# Patient Record
Sex: FEMALE | Race: WHITE | NOT HISPANIC OR LATINO | Employment: OTHER | URBAN - METROPOLITAN AREA
[De-identification: names, ages, dates, MRNs, and addresses within clinical notes are randomized per-mention and may not be internally consistent; named-entity substitution may affect disease eponyms.]

---

## 2017-09-11 DIAGNOSIS — I25.10 CORONARY ARTERY DISEASE INVOLVING NATIVE CORONARY ARTERY OF NATIVE HEART WITHOUT ANGINA PECTORIS: Primary | ICD-10-CM

## 2017-09-12 ENCOUNTER — DOCUMENTATION ONLY (OUTPATIENT)
Dept: CARDIOLOGY | Facility: CLINIC | Age: 67
End: 2017-09-12

## 2017-09-12 ENCOUNTER — INITIAL CONSULT (OUTPATIENT)
Dept: CARDIOLOGY | Facility: CLINIC | Age: 67
End: 2017-09-12
Payer: MEDICARE

## 2017-09-12 VITALS
HEIGHT: 62 IN | WEIGHT: 151 LBS | SYSTOLIC BLOOD PRESSURE: 105 MMHG | HEART RATE: 72 BPM | OXYGEN SATURATION: 94 % | DIASTOLIC BLOOD PRESSURE: 53 MMHG | BODY MASS INDEX: 27.79 KG/M2

## 2017-09-12 DIAGNOSIS — I35.0 NONRHEUMATIC AORTIC VALVE STENOSIS: Primary | ICD-10-CM

## 2017-09-12 DIAGNOSIS — K21.9 GASTROESOPHAGEAL REFLUX DISEASE WITHOUT ESOPHAGITIS: ICD-10-CM

## 2017-09-12 DIAGNOSIS — J42 CHRONIC BRONCHITIS, UNSPECIFIED CHRONIC BRONCHITIS TYPE: ICD-10-CM

## 2017-09-12 DIAGNOSIS — E78.5 DYSLIPIDEMIA: ICD-10-CM

## 2017-09-12 DIAGNOSIS — I71.40 ABDOMINAL AORTIC ANEURYSM (AAA) WITHOUT RUPTURE: ICD-10-CM

## 2017-09-12 DIAGNOSIS — Z72.0 TOBACCO ABUSE: ICD-10-CM

## 2017-09-12 DIAGNOSIS — I65.23 BILATERAL CAROTID ARTERY STENOSIS: ICD-10-CM

## 2017-09-12 DIAGNOSIS — I10 ESSENTIAL HYPERTENSION: ICD-10-CM

## 2017-09-12 PROCEDURE — 99213 OFFICE O/P EST LOW 20 MIN: CPT | Mod: PBBFAC | Performed by: INTERNAL MEDICINE

## 2017-09-12 PROCEDURE — 1126F AMNT PAIN NOTED NONE PRSNT: CPT | Mod: ,,, | Performed by: INTERNAL MEDICINE

## 2017-09-12 PROCEDURE — 1159F MED LIST DOCD IN RCRD: CPT | Mod: ,,, | Performed by: INTERNAL MEDICINE

## 2017-09-12 PROCEDURE — 99204 OFFICE O/P NEW MOD 45 MIN: CPT | Mod: S$PBB,,, | Performed by: INTERNAL MEDICINE

## 2017-09-12 PROCEDURE — 99999 PR PBB SHADOW E&M-EST. PATIENT-LVL III: CPT | Mod: PBBFAC,,, | Performed by: INTERNAL MEDICINE

## 2017-09-12 RX ORDER — SODIUM CHLORIDE 9 MG/ML
3 INJECTION, SOLUTION INTRAVENOUS CONTINUOUS
Status: CANCELLED | OUTPATIENT
Start: 2017-09-12 | End: 2017-09-12

## 2017-09-12 RX ORDER — DIPHENHYDRAMINE HCL 25 MG
50 CAPSULE ORAL ONCE
Status: CANCELLED | OUTPATIENT
Start: 2017-09-12 | End: 2017-09-12

## 2017-09-12 RX ORDER — DIPHENOXYLATE HYDROCHLORIDE AND ATROPINE SULFATE 2.5; .025 MG/1; MG/1
1 TABLET ORAL 4 TIMES DAILY PRN
Status: ON HOLD | COMMUNITY
End: 2018-04-18 | Stop reason: HOSPADM

## 2017-09-12 RX ORDER — ASPIRIN 81 MG/1
81 TABLET ORAL DAILY
Status: ON HOLD
Start: 2017-09-12 | End: 2018-03-30

## 2017-09-12 NOTE — LETTER
September 12, 2017      George Crawley MD  [address] [place] [address] Bld  [address]  Cardiology Center  Brianda MALDONADO [address]           [facility]  [address]  [facility] [address]  Phone: [phone]          Patient: Kristina Aguirre   MR Number: [id number]   YOB: 1950   Date of Visit: 9/12/2017       Dear Dr. George Crawley:    Thank you for referring Kristina Aguirre to me for evaluation. Attached you will find relevant portions of my assessment and plan of care.    If you have questions, please do not hesitate to call me. I look forward to following Kristina Aguirre along with you.    Sincerely,    John Bautista MD    Enclosure  CC:  No Recipients    If you would like to receive this communication electronically, please contact [email or web address] or [phone] to request more information on [employer] Link access.    For providers and/or their staff who would like to refer a patient to [facility], please contact us through our one-stop-shop provider referral line, [facility], at [phone].    If you feel you have received this communication in error or would no longer like to receive these types of communications, please e-mail [email or web address]

## 2017-09-12 NOTE — PROGRESS NOTES
OUTPATIENT CATHETERIZATION INSTRUCTIONS    You have been scheduled for a procedure in the catheterization lab on Monday, September 18, 2017.     Please report to the Cardiology Waiting Area on the Third floor of the hospital and check in at 6 AM.   You will then be taken to the SSCU (Short Stay Cardiac Unit) and prepared for your procedure. Please be aware that this is not the time of your procedure but the time you are to arrive. The procedures are scheduled on an hourly basis; however, emergency cases take precedence over all other cases.       You may not have anything to eat or drink after midnight the night before your test. You may take your regular morning medications with water. If there are any medications that you should not take you will be instructed to hold them that morning. If you are diabetic and on Metformin (Glucophage) do not take it the day before, the day of, and for 2 days after your procedure.      The procedure will take 1-2 hours to perform. After the procedure, you will return to SSCU on the third floor of the hospital. You will need to lie still (or keep your arm still) for the next 4 to 6 hours to minimize bleeding from the puncture site. Your family may remain in the room with you during this time.       You may be able to be discharged home that same afternoon if there is someone to drive you home and there were no complications. If you have one of the balloon, stent, or device procedures you may spend the night in the hospital. Your doctor will determine, based on your progress, the date and time of your discharge. The results of your procedure will be discussed with you before you are discharged. Any further testing or procedures will be scheduled for you either before you leave or you will be called with these appointments.       If you should have any questions, concerns, or need to change the date of your procedure, please call  SOFIE Callahan @ [phone]    Special  Instructions:  Hold Metformin Sunday, Monday, Tuesday and Wednesday  Drink plenty of water the day before and after the procedure        THE ABOVE INSTRUCTIONS WERE GIVEN TO THE PATIENT VERBALLY AND THEY VERBALIZED UNDERSTANDING.  THEY DO NOT REQUIRE ANY SPECIAL NEEDS AND DO NOT HAVE ANY LEARNING BARRIERS.          Directions for Reporting to Cardiology Waiting Area in the Hospital  If you park in the Parking Garage:  Take elevators to the1st floor of the parking garage.  Continue past the gift shop, coffee shop, and piano.  Take a right and go to the gold elevators. (Elevator B)  Take the elevator to the 3rd floor.  Follow the arrow on the sign on the wall that says Cath Lab Registration/EP Lab Registration.  Follow the long hallway all the way around until you come to a big open area.  This is the registration area.  Check in at Reception Desk.    OR    If family is dropping you off:  Have them drop you off at the front of the Hospital under the green overhang.  Enter through the doors and take a right.  Take the E elevators to the 3rd floor Cardiology Waiting Area.  Check in at the Reception Desk in the waiting room.

## 2017-09-13 NOTE — PROGRESS NOTES
"Subjective:    Patient ID:  Kristina Aguirre is a 67 y.o. female who presents for evaluation of Chest Pain    Referring cardiologist: Dr. Hector DOWLING  Mrs. Aguirre is a 66 y/o woman with a h/o HTN, dyslipidemia, PAD, DM2, and aortic valve stenosis. She reports a h/o daily chest pain for at least the last 6 months.  The patient also reports progressive exertional dyspnea, decreasing energy, and increasing fatigue.  Her chest pain is exacerbated with physical exertion.  She denies LE edema, but reports 2 pillow orthopnea and daily PND.  She denies syncope, but reports progressive "dizziness" that is relieved with bending over.  She denies dizziness when standing from a seated position. She is an active smoker.  Of note the patient reports a h/o watery diarrhea for which she is undergoing GI evauation.  She also reports having lsot 10 lbs over the last 2 months unintentionally. Finally she reports transient loss of vision in the right eye several months ago.  THis resolved spontaneously, but she reports poor visual acutiy in both eyes.    Past Medical History:   Diagnosis Date    Abdominal aortic aneurysm (AAA) without rupture 9/12/2017    Asthma     Bilateral carotid artery stenosis 9/12/2017    Cancer     lung    Diabetes mellitus     Heart valve disorder     Hyperlipidemia     Hypertension      Past Surgical History:   Procedure Laterality Date    HAND SURGERY Right     KNEE ARTHROSCOPY      LUNG LOBECTOMY Right     middle lobe    mediastinoscopy      SPINAL FUSION      TONSILLECTOMY      WRIST SURGERY Right      Current Outpatient Prescriptions on File Prior to Visit   Medication Sig Dispense Refill    amlodipine (NORVASC) 10 MG tablet Take 10 mg by mouth once daily.  5    cetirizine (ZYRTEC) 10 MG tablet Take 10 mg by mouth once daily.  6    clopidogrel (PLAVIX) 75 mg tablet Take 75 mg by mouth once daily.  3    cyclobenzaprine (FLEXERIL) 5 MG tablet TAKE 1 TABLET BY MOUTH TWICE DAILY AS NEEDED " "FOR SPASM  0    fenofibrate 160 MG Tab Take 160 mg by mouth once daily.  5    hydrocodone-acetaminophen 10-325mg (NORCO)  mg Tab Take 1 tablet by mouth 3 (three) times daily as needed.  0    JANUVIA 100 mg Tab Take 100 mg by mouth once daily.  3    losartan-hydrochlorothiazide 100-25 mg (HYZAAR) 100-25 mg per tablet Take 1 tablet by mouth once daily.  5    meclizine (ANTIVERT) 50 MG tablet Take 25 mg by mouth 3 (three) times daily as needed.      metformin (GLUCOPHAGE) 1000 MG tablet Take 1,000 mg by mouth 2 (two) times daily.  5    NEXIUM 40 mg capsule Take 40 mg by mouth once daily.  5    omega-3 acid ethyl esters (LOVAZA) 1 gram capsule       pravastatin (PRAVACHOL) 40 MG tablet Take 40 mg by mouth every evening.  5    PROAIR HFA 90 mcg/actuation inhaler Inhale 2 puffs into the lungs every 6 (six) hours as needed.  6    SURE COMFORT PEN NEEDLE 31 gauge x 3/16" Ndle USE EVERY DAY  1    SYMBICORT 160-4.5 mcg/actuation HFAA Inhale 2 puffs into the lungs 2 (two) times daily.  4    TRUEPLUS LANCETS 30 gauge Misc USE DAILY when testing  10    TRUETEST TEST STRIPS Strp   5    VICTOZA 3-JOSE 0.6 mg/0.1 mL (18 mg/3 mL) PnIj   1    lisinopril (PRINIVIL,ZESTRIL) 5 MG tablet Take 5 mg by mouth once daily.  0    nitrofurantoin, macrocrystal-monohydrate, (MACROBID) 100 MG capsule Take 100 mg by mouth 2 (two) times daily.  0    tizanidine (ZANAFLEX) 4 MG tablet       [DISCONTINUED] doxycycline (VIBRA-TABS) 100 MG tablet Take 100 mg by mouth 2 (two) times daily.  0    [DISCONTINUED] doxycycline (VIBRAMYCIN) 100 MG Cap Take 100 mg by mouth 2 (two) times daily.  0    [DISCONTINUED] oxycodone-acetaminophen (PERCOCET)  mg per tablet Take 1 tablet by mouth every 6 (six) hours as needed.  0    [DISCONTINUED] oxycodone-acetaminophen (PERCOCET) 5-325 mg per tablet   0    [DISCONTINUED] promethazine (PHENERGAN) 12.5 MG Tab Take 12.5 mg by mouth every 6 (six) hours as needed.  0    [DISCONTINUED] " SYMBICORT 80-4.5 mcg/actuation HFAA Inhale 2 puffs into the lungs 2 (two) times daily.  6    [DISCONTINUED] tobramycin-dexamethasone 0.3-0.1% (TOBRADEX) 0.3-0.1 % DrpS instill ONE DROP in left EYE 4 TIMES DAILY  0     No current facility-administered medications on file prior to visit.      Review of patient's allergies indicates:  No Known Allergies  Social History   Substance Use Topics    Smoking status: Former Smoker     Packs/day: 1.50    Smokeless tobacco: Never Used    Alcohol use No     Family History   Problem Relation Age of Onset    Throat cancer Mother     Heart attack Father        Review of Systems   Constitution: Positive for malaise/fatigue and weight loss. Negative for diaphoresis, fever, weakness and weight gain.   HENT: Negative for congestion, hearing loss, nosebleeds and sore throat.    Eyes: Positive for blurred vision. Negative for visual disturbance.   Cardiovascular: Positive for chest pain, dyspnea on exertion, orthopnea and paroxysmal nocturnal dyspnea. Negative for claudication, irregular heartbeat, leg swelling, near-syncope, palpitations and syncope.   Respiratory: Negative for cough, hemoptysis, shortness of breath and wheezing.    Endocrine: Negative for polyuria.   Hematologic/Lymphatic: Negative for bleeding problem. Does not bruise/bleed easily.   Skin: Negative for poor wound healing and rash.   Musculoskeletal: Negative for myalgias.   Gastrointestinal: Positive for diarrhea. Negative for abdominal pain, change in bowel habit, constipation, dysphagia, heartburn, hematemesis, melena, nausea and vomiting.   Genitourinary: Negative for bladder incontinence and dysuria.   Neurological: Negative for focal weakness and headaches.   Psychiatric/Behavioral: Negative for depression.   Allergic/Immunologic: Negative for hives and persistent infections.        Objective:  Vitals:    09/12/17 0920 09/12/17 0933   BP: 120/62 (!) 105/53   BP Location: Right arm Left arm   Patient  "Position: Sitting Sitting   BP Method: Medium (Automatic) Medium (Automatic)   Pulse: 72    SpO2: (!) 94%    Weight: 68.5 kg (151 lb 0.2 oz)    Height: 5' 1.5" (1.562 m)          Physical Exam   Constitutional: She appears well-developed and well-nourished.   HENT:   Head: Normocephalic and atraumatic.   Eyes: EOM are normal. Pupils are equal, round, and reactive to light. Right eye exhibits no discharge. No scleral icterus.   Neck: No JVD present. No thyromegaly present.   Cardiovascular: Normal rate and regular rhythm.  PMI is not displaced.  Exam reveals no gallop.    Murmur heard.   Systolic murmur is present with a grade of 6/6  Late peaking with soft and delayed carotid upstroke  Pulses:       Carotid pulses are 2+ on the right side, and 2+ on the left side.       Radial pulses are 2+ on the right side, and 2+ on the left side.        Femoral pulses are 2+ on the right side, and 2+ on the left side.       Dorsalis pedis pulses are 2+ on the right side, and 2+ on the left side.        Posterior tibial pulses are 2+ on the right side, and 2+ on the left side.   Pulmonary/Chest: Effort normal and breath sounds normal.   Abdominal: Soft. Bowel sounds are normal. There is no hepatosplenomegaly. There is no tenderness.   Lymphadenopathy:     She has no cervical adenopathy.   Neurological: She is alert. Gait normal.   Skin: Skin is warm, dry and intact. No rash noted. She is not diaphoretic. No erythema.   Psychiatric: She has a normal mood and affect.         Assessment:       1. Nonrheumatic aortic valve stenosis    2. Bilateral carotid artery stenosis    3. Dyslipidemia    4. Essential hypertension    5. Tobacco abuse    6. Abdominal aortic aneurysm (AAA) without rupture    7. Chronic bronchitis, unspecified chronic bronchitis type    8. Gastroesophageal reflux disease without esophagitis         Plan:       1. Chest pain syndrome.  The differential diagnosis for the patient's chest pain syndrome includes svere " aortic valve stenosis as well as obstructive CAD. Will therefore proceed with diagnostic LHC/RHC/ aortic valve study  All of the patient's questions were answered.  -restart EC ASA 81mg poq day  -continue Plavix 75mg po qday  -right CFA and CFV access    2) CAD.patient has multiple CAD risk factors; will proceed as above    3) Aortic valve stenosis. Patient has severe aortic valve stneosis by exam and by 8/24/17 TTE which demonstrated a mean aoritc valve gradient of 47.6mmHg and a LORA of 0.53cm2; will proceed as above;  Will discuss [facility] valve team referral with Dr. Young    4) Possible left subclavian artery stenosis. L UE BP is significantly lower than right UE BP in clinic today; will perform angiogram of left subclavian artery if patient is to be referred for CABG plus AVR    5) HTN. Blood pressure adequately controlled in clinic today; continue current medications    6) Dyslipidemia. patient is on pravachol 40mg po qday as well as fenofibrate and Lovaza; rec hert healthy diet    7) AAA. follow-up with Dr. Azul    8) H/o carotid stenosis. caroti duplex report datd 8/24/17 report R ICA PSV of 113.9cm/s and L ICA PSV of 143.5 cm/s; patient's transient right eye vision loss may have been amaurosis fugax; agree with statin, restart aspirin as above continue Plavix    9) DM2. rec tight glycemic control; hold metformin 24 hours prior to cath and 48 hours post cath    10) Tobacco abuse. Smoking cessation counseling given    11) H/o COPD. Rec PFT's prior to SAVR evaluation    12) H/o diarrhea. Patient reports she has f/u with GI    All of the patient's questions were answered.

## 2017-09-18 ENCOUNTER — SURGERY (OUTPATIENT)
Age: 67
End: 2017-09-18

## 2017-09-18 ENCOUNTER — HOSPITAL ENCOUNTER (OUTPATIENT)
Facility: HOSPITAL | Age: 67
Discharge: HOME | End: 2017-09-18
Attending: INTERNAL MEDICINE | Admitting: INTERNAL MEDICINE
Payer: MEDICARE

## 2017-09-18 VITALS
SYSTOLIC BLOOD PRESSURE: 133 MMHG | HEART RATE: 69 BPM | WEIGHT: 151 LBS | DIASTOLIC BLOOD PRESSURE: 62 MMHG | BODY MASS INDEX: 27.79 KG/M2 | OXYGEN SATURATION: 93 % | RESPIRATION RATE: 18 BRPM | TEMPERATURE: 97 F | HEIGHT: 62 IN

## 2017-09-18 DIAGNOSIS — I35.0 NONRHEUMATIC AORTIC VALVE STENOSIS: ICD-10-CM

## 2017-09-18 DIAGNOSIS — I65.23 BILATERAL CAROTID ARTERY STENOSIS: Primary | ICD-10-CM

## 2017-09-18 DIAGNOSIS — I35.0 NONRHEUMATIC AORTIC VALVE STENOSIS: Primary | ICD-10-CM

## 2017-09-18 DIAGNOSIS — I35.0 AORTIC VALVE STENOSIS, UNSPECIFIED ETIOLOGY: Primary | ICD-10-CM

## 2017-09-18 DIAGNOSIS — I65.23 OCCLUSION AND STENOSIS OF BILATERAL CAROTID ARTERIES: ICD-10-CM

## 2017-09-18 LAB
ABO + RH BLD: NORMAL
ALBUMIN SERPL BCP-MCNC: 3.9 G/DL
ANION GAP SERPL CALC-SCNC: 10 MMOL/L
BASOPHILS # BLD AUTO: 0.09 K/UL
BASOPHILS NFR BLD: 0.9 %
BLD GP AB SCN CELLS X3 SERPL QL: NORMAL
BUN SERPL-MCNC: 17 MG/DL
CALCIUM SERPL-MCNC: 9.4 MG/DL
CHLORIDE SERPL-SCNC: 104 MMOL/L
CO2 SERPL-SCNC: 26 MMOL/L
CREAT SERPL-MCNC: 0.8 MG/DL
DIFFERENTIAL METHOD: ABNORMAL
EOSINOPHIL # BLD AUTO: 0.8 K/UL
EOSINOPHIL NFR BLD: 7.9 %
ERYTHROCYTE [DISTWIDTH] IN BLOOD BY AUTOMATED COUNT: 14.9 %
EST. GFR  (AFRICAN AMERICAN): >60 ML/MIN/1.73 M^2
EST. GFR  (NON AFRICAN AMERICAN): >60 ML/MIN/1.73 M^2
GLUCOSE SERPL-MCNC: 118 MG/DL
HCT VFR BLD AUTO: 35.4 %
HGB BLD-MCNC: 12.2 G/DL
LYMPHOCYTES # BLD AUTO: 2 K/UL
LYMPHOCYTES NFR BLD: 20.5 %
MCH RBC QN AUTO: 28.2 PG
MCHC RBC AUTO-ENTMCNC: 34.5 G/DL
MCV RBC AUTO: 82 FL
MONOCYTES # BLD AUTO: 0.5 K/UL
MONOCYTES NFR BLD: 5.6 %
NEUTROPHILS # BLD AUTO: 6.2 K/UL
NEUTROPHILS NFR BLD: 64.7 %
PLATELET # BLD AUTO: 255 K/UL
PMV BLD AUTO: 9.6 FL
POCT GLUCOSE: 128 MG/DL (ref 70–110)
POCT GLUCOSE: 140 MG/DL (ref 70–110)
POTASSIUM SERPL-SCNC: 3.2 MMOL/L
RBC # BLD AUTO: 4.33 M/UL
SODIUM SERPL-SCNC: 140 MMOL/L
WBC # BLD AUTO: 9.64 K/UL

## 2017-09-18 PROCEDURE — 25000003 PHARM REV CODE 250: Performed by: INTERNAL MEDICINE

## 2017-09-18 PROCEDURE — 25000003 PHARM REV CODE 250

## 2017-09-18 PROCEDURE — 86900 BLOOD TYPING SEROLOGIC ABO: CPT

## 2017-09-18 PROCEDURE — 86850 RBC ANTIBODY SCREEN: CPT

## 2017-09-18 PROCEDURE — 93010 ELECTROCARDIOGRAM REPORT: CPT | Mod: ,,, | Performed by: INTERNAL MEDICINE

## 2017-09-18 PROCEDURE — 93460 R&L HRT ART/VENTRICLE ANGIO: CPT | Mod: 26,GC,, | Performed by: INTERNAL MEDICINE

## 2017-09-18 PROCEDURE — 99152 MOD SED SAME PHYS/QHP 5/>YRS: CPT | Mod: GC,,, | Performed by: INTERNAL MEDICINE

## 2017-09-18 PROCEDURE — 82040 ASSAY OF SERUM ALBUMIN: CPT

## 2017-09-18 PROCEDURE — 82962 GLUCOSE BLOOD TEST: CPT | Mod: 91

## 2017-09-18 PROCEDURE — 93005 ELECTROCARDIOGRAM TRACING: CPT | Mod: 59

## 2017-09-18 PROCEDURE — C1751 CATH, INF, PER/CENT/MIDLINE: HCPCS

## 2017-09-18 PROCEDURE — 63600175 PHARM REV CODE 636 W HCPCS

## 2017-09-18 PROCEDURE — 80048 BASIC METABOLIC PNL TOTAL CA: CPT

## 2017-09-18 PROCEDURE — 85025 COMPLETE CBC W/AUTO DIFF WBC: CPT

## 2017-09-18 PROCEDURE — 99152 MOD SED SAME PHYS/QHP 5/>YRS: CPT

## 2017-09-18 PROCEDURE — 36415 COLL VENOUS BLD VENIPUNCTURE: CPT

## 2017-09-18 RX ORDER — ACETAMINOPHEN 325 MG/1
650 TABLET ORAL EVERY 6 HOURS PRN
Status: DISCONTINUED | OUTPATIENT
Start: 2017-09-18 | End: 2017-09-18 | Stop reason: HOSPADM

## 2017-09-18 RX ORDER — SODIUM CHLORIDE 9 MG/ML
3 INJECTION, SOLUTION INTRAVENOUS CONTINUOUS
Status: ACTIVE | OUTPATIENT
Start: 2017-09-18 | End: 2017-09-18

## 2017-09-18 RX ORDER — DIPHENHYDRAMINE HCL 50 MG
50 CAPSULE ORAL ONCE
Status: COMPLETED | OUTPATIENT
Start: 2017-09-18 | End: 2017-09-18

## 2017-09-18 RX ORDER — SODIUM CHLORIDE 9 MG/ML
1.5 INJECTION, SOLUTION INTRAVENOUS CONTINUOUS
Status: ACTIVE | OUTPATIENT
Start: 2017-09-18 | End: 2017-09-18

## 2017-09-18 RX ADMIN — SODIUM CHLORIDE 3 ML/KG/HR: 0.9 INJECTION, SOLUTION INTRAVENOUS at 06:09

## 2017-09-18 RX ADMIN — DIPHENHYDRAMINE HYDROCHLORIDE 50 MG: 50 CAPSULE ORAL at 06:09

## 2017-09-18 RX ADMIN — ACETAMINOPHEN 650 MG: 325 TABLET ORAL at 12:09

## 2017-09-18 NOTE — CONSULTS
"Consult Note  Cardiothoracic Surgery    Consults  SUBJECTIVE:     History of Present Illness:  Patient is a 67 y.o. female with a history of dyslipidemia, PAD, DM2, and aortic valve stenosis. She reports frequent chest pain for at least the last 6 months.  The patient also reports progressive exertional dyspnea, decreasing energy, and increasing fatigue.  She was unable to walk from the  Her chest pain is exacerbated with physical exertion.  She denies LE edema, but reports 2 pillow orthopnea and daily PND.  She denies syncope, but reports progressive "dizziness."   She is an active smoker. She has a history of lung cancer and is s/p RML 3/2016.  Of note the patient reports a h/o watery diarrhea for which she is undergoing GI evauation.  She also reports having lost 10 lbs over the last 2 months unintentionally. CT surgery is asked to assess surgical candidacy for SAVR.    Scheduled Meds:   Infusions/Drips:   sodium chloride 0.9%       PRN Meds:    Review of patient's allergies indicates:  No Known Allergies    Past Medical History:   Diagnosis Date    Abdominal aortic aneurysm (AAA) without rupture 9/12/2017    Anticoagulant long-term use     Arthritis     Asthma     Bilateral carotid artery stenosis 9/12/2017    Cancer     lung    COPD (chronic obstructive pulmonary disease)     Diabetes mellitus     Heart valve disorder     Hyperlipidemia     Hypertension      Past Surgical History:   Procedure Laterality Date    HAND SURGERY Right     KNEE ARTHROSCOPY      LUNG LOBECTOMY Right     middle lobe    mediastinoscopy      SPINAL FUSION      TONSILLECTOMY      WRIST SURGERY Right      Family History   Problem Relation Age of Onset    Throat cancer Mother     Heart attack Father      Social History   Substance Use Topics    Smoking status: Former Smoker     Packs/day: 1.50    Smokeless tobacco: Never Used    Alcohol use No        Review of Systems:  Review of Systems   Constitutional: Negative " for fever.  + for fatigue and 10 pound weight loss over the past 2 months.  HENT: Negative for congestion and sore throat.    Eyes: Negative for blurred vision, double vision and discharge.   Respiratory: see HPI  Cardiovascular:  See HPI  Gastrointestinal: Negative for abdominal pain, constipation, having diarrhea and is in GI work up  Genitourinary: Negative for dysuria, frequency and urgency.   Musculoskeletal: Negative for back pain and myalgias.   Skin: Negative for itching and rash.   Neurological: Negative for dizziness, speech change, seizures, weakness and headaches.   Endo/Heme/Allergies: Does not bruise/bleed easily.   Psychiatric/Behavioral: Negative for depression. The patient is not nervous/anxious.        OBJECTIVE:     Vital Signs (Most Recent)  Temp: 97 °F (36.1 °C) (09/18/17 0930)  Pulse: 64 (09/18/17 1115)  Resp: 17 (09/18/17 1045)  BP: 127/60 (09/18/17 1115)  SpO2: (!) 93 % (09/18/17 0930)    Admission Weight: 68.5 kg (151 lb) (09/18/17 0639)   Most Recent Weight: 68.5 kg (151 lb) (09/18/17 0639)    Vital Signs Range (Last 24H):  Temp:  [97 °F (36.1 °C)]   Pulse:  [64-70]   Resp:  [16-18]   BP: (118-176)/(53-74)   SpO2:  [93 %]     Physical Exam:    Constitutional: She appears well-developed and well-nourished.   HENT:   Head: Normocephalic and atraumatic.   Eyes: EOM are normal. Pupils are equal, round, and reactive to light.   Neck: No JVD present. No thyromegaly present.   Cardiovascular: Normal rate and regular rhythm.  PMI is not displaced.  Exam reveals no gallop.    Murmur heard.   Systolic murmur   Pulmonary/Chest: Effort normal and breath sounds normal.   Abdominal: Soft. Bowel sounds are normal. There is no hepatosplenomegaly. There is no tenderness.   Lymphadenopathy:     She has no cervical adenopathy.   Neurological: She is alert.  Skin: Skin is warm, dry and intact. No rash noted. She is not diaphoretic. No erythema.   Psychiatric: She has a normal mood and affect.      Laboratory:  CBC:   Recent Labs  Lab 09/18/17  0616   WBC 9.64   RBC 4.33   HGB 12.2   HCT 35.4*      MCV 82   MCH 28.2   MCHC 34.5     BMP:   Recent Labs  Lab 09/18/17  0616   *      K 3.2*      CO2 26   BUN 17   CREATININE 0.8   CALCIUM 9.4       Diagnostic Results:    LHC:  - Left Main Coronary Artery:             The ostial LM has luminal irregularities. There is KRYS 3 flow.     - Left Anterior Descending Artery:             The proximal LAD has luminal irregularities. There is KRYS 3 flow. Provides collateral flow to the PDA (good).             The mid LAD has a 40% stenosis. There is KRYS 3 flow. Provides collateral flow to the PDA (good). The remaining portion of the vessel has luminal irregularities (10).             The distal LAD has luminal irregularities. There is KRYS 3 flow. Provides collateral flow to the PDA (good).     - D1:             The D1 has luminal irregularities. There is KRYS 3 flow.     - Left Circumflex Artery:             The proximal LCX has a 10% stenosis. There is KRYS 3 flow. The remaining portion of the vessel has luminal irregularities (10).             The distal LCX has luminal irregularities. There is KRYS 3 flow.     - Right Coronary Artery:             The proximal RCA has chronic total occlusion. There is KRYS 0 flow. There is collateral flow from the RCA (bridging).             The mid RCA has luminal irregularities. There is collateral flow from the RCA (bridging).             The distal RCA has luminal irregularities. There is collateral flow from the RCA (bridging).     - Posterior Lateral Branch:             The PLB has luminal irregularities. There is collateral flow from the LCX (good).             The distal PLB has luminal irregularities. There is collateral flow from the LCX (good).    Per report:  Patient has severe aortic valve stneosis by exam and by 8/24/17 TTE which demonstrated a mean aoritc valve gradient of 47.6mmHg and a LORA  "of 0.53cm2  ASSESSMENT/PLAN:     67 year old female with a past history of dyslipidemia, PAD, DM2, aortic valve stenosis, smoking history and continues to smoke "[place]ian cigarettes" and s/p RML 3/2016 for lung cancer.  She presents for TAVR work up.  She needs PFT's and frailty before STS score can be accurately assessed.  She is likely high risk for surgical AVR but should reassessed once all information available.      CTS Attending Note:    Will be glad to see in clinic once her lungs have been evaluated.   "

## 2017-09-18 NOTE — H&P (VIEW-ONLY)
"Subjective:    Patient ID:  Kristina Aguirre is a 67 y.o. female who presents for evaluation of Chest Pain    Referring cardiologist: Dr. Hector DOWLING  Mrs. Aguirre is a 68 y/o woman with a h/o HTN, dyslipidemia, PAD, DM2, and aortic valve stenosis. She reports a h/o daily chest pain for at least the last 6 months.  The patient also reports progressive exertional dyspnea, decreasing energy, and increasing fatigue.  Her chest pain is exacerbated with physical exertion.  She denies LE edema, but reports 2 pillow orthopnea and daily PND.  She denies syncope, but reports progressive "dizziness" that is relieved with bending over.  She denies dizziness when standing from a seated position. She is an active smoker.  Of note the patient reports a h/o watery diarrhea for which she is undergoing GI evauation.  She also reports having lsot 10 lbs over the last 2 months unintentionally. Finally she reports transient loss of vision in the right eye several months ago.  THis resolved spontaneously, but she reports poor visual acutiy in both eyes.    Past Medical History:   Diagnosis Date    Abdominal aortic aneurysm (AAA) without rupture 9/12/2017    Asthma     Bilateral carotid artery stenosis 9/12/2017    Cancer     lung    Diabetes mellitus     Heart valve disorder     Hyperlipidemia     Hypertension      Past Surgical History:   Procedure Laterality Date    HAND SURGERY Right     KNEE ARTHROSCOPY      LUNG LOBECTOMY Right     middle lobe    mediastinoscopy      SPINAL FUSION      TONSILLECTOMY      WRIST SURGERY Right      Current Outpatient Prescriptions on File Prior to Visit   Medication Sig Dispense Refill    amlodipine (NORVASC) 10 MG tablet Take 10 mg by mouth once daily.  5    cetirizine (ZYRTEC) 10 MG tablet Take 10 mg by mouth once daily.  6    clopidogrel (PLAVIX) 75 mg tablet Take 75 mg by mouth once daily.  3    cyclobenzaprine (FLEXERIL) 5 MG tablet TAKE 1 TABLET BY MOUTH TWICE DAILY AS NEEDED " "FOR SPASM  0    fenofibrate 160 MG Tab Take 160 mg by mouth once daily.  5    hydrocodone-acetaminophen 10-325mg (NORCO)  mg Tab Take 1 tablet by mouth 3 (three) times daily as needed.  0    JANUVIA 100 mg Tab Take 100 mg by mouth once daily.  3    losartan-hydrochlorothiazide 100-25 mg (HYZAAR) 100-25 mg per tablet Take 1 tablet by mouth once daily.  5    meclizine (ANTIVERT) 50 MG tablet Take 25 mg by mouth 3 (three) times daily as needed.      metformin (GLUCOPHAGE) 1000 MG tablet Take 1,000 mg by mouth 2 (two) times daily.  5    NEXIUM 40 mg capsule Take 40 mg by mouth once daily.  5    omega-3 acid ethyl esters (LOVAZA) 1 gram capsule       pravastatin (PRAVACHOL) 40 MG tablet Take 40 mg by mouth every evening.  5    PROAIR HFA 90 mcg/actuation inhaler Inhale 2 puffs into the lungs every 6 (six) hours as needed.  6    SURE COMFORT PEN NEEDLE 31 gauge x 3/16" Ndle USE EVERY DAY  1    SYMBICORT 160-4.5 mcg/actuation HFAA Inhale 2 puffs into the lungs 2 (two) times daily.  4    TRUEPLUS LANCETS 30 gauge Misc USE DAILY when testing  10    TRUETEST TEST STRIPS Strp   5    VICTOZA 3-JOSE 0.6 mg/0.1 mL (18 mg/3 mL) PnIj   1    lisinopril (PRINIVIL,ZESTRIL) 5 MG tablet Take 5 mg by mouth once daily.  0    nitrofurantoin, macrocrystal-monohydrate, (MACROBID) 100 MG capsule Take 100 mg by mouth 2 (two) times daily.  0    tizanidine (ZANAFLEX) 4 MG tablet       [DISCONTINUED] doxycycline (VIBRA-TABS) 100 MG tablet Take 100 mg by mouth 2 (two) times daily.  0    [DISCONTINUED] doxycycline (VIBRAMYCIN) 100 MG Cap Take 100 mg by mouth 2 (two) times daily.  0    [DISCONTINUED] oxycodone-acetaminophen (PERCOCET)  mg per tablet Take 1 tablet by mouth every 6 (six) hours as needed.  0    [DISCONTINUED] oxycodone-acetaminophen (PERCOCET) 5-325 mg per tablet   0    [DISCONTINUED] promethazine (PHENERGAN) 12.5 MG Tab Take 12.5 mg by mouth every 6 (six) hours as needed.  0    [DISCONTINUED] " SYMBICORT 80-4.5 mcg/actuation HFAA Inhale 2 puffs into the lungs 2 (two) times daily.  6    [DISCONTINUED] tobramycin-dexamethasone 0.3-0.1% (TOBRADEX) 0.3-0.1 % DrpS instill ONE DROP in left EYE 4 TIMES DAILY  0     No current facility-administered medications on file prior to visit.      Review of patient's allergies indicates:  No Known Allergies  Social History   Substance Use Topics    Smoking status: Former Smoker     Packs/day: 1.50    Smokeless tobacco: Never Used    Alcohol use No     Family History   Problem Relation Age of Onset    Throat cancer Mother     Heart attack Father        Review of Systems   Constitution: Positive for malaise/fatigue and weight loss. Negative for diaphoresis, fever, weakness and weight gain.   HENT: Negative for congestion, hearing loss, nosebleeds and sore throat.    Eyes: Positive for blurred vision. Negative for visual disturbance.   Cardiovascular: Positive for chest pain, dyspnea on exertion, orthopnea and paroxysmal nocturnal dyspnea. Negative for claudication, irregular heartbeat, leg swelling, near-syncope, palpitations and syncope.   Respiratory: Negative for cough, hemoptysis, shortness of breath and wheezing.    Endocrine: Negative for polyuria.   Hematologic/Lymphatic: Negative for bleeding problem. Does not bruise/bleed easily.   Skin: Negative for poor wound healing and rash.   Musculoskeletal: Negative for myalgias.   Gastrointestinal: Positive for diarrhea. Negative for abdominal pain, change in bowel habit, constipation, dysphagia, heartburn, hematemesis, melena, nausea and vomiting.   Genitourinary: Negative for bladder incontinence and dysuria.   Neurological: Negative for focal weakness and headaches.   Psychiatric/Behavioral: Negative for depression.   Allergic/Immunologic: Negative for hives and persistent infections.        Objective:  Vitals:    09/12/17 0920 09/12/17 0933   BP: 120/62 (!) 105/53   BP Location: Right arm Left arm   Patient  "Position: Sitting Sitting   BP Method: Medium (Automatic) Medium (Automatic)   Pulse: 72    SpO2: (!) 94%    Weight: 68.5 kg (151 lb 0.2 oz)    Height: 5' 1.5" (1.562 m)          Physical Exam   Constitutional: She appears well-developed and well-nourished.   HENT:   Head: Normocephalic and atraumatic.   Eyes: EOM are normal. Pupils are equal, round, and reactive to light. Right eye exhibits no discharge. No scleral icterus.   Neck: No JVD present. No thyromegaly present.   Cardiovascular: Normal rate and regular rhythm.  PMI is not displaced.  Exam reveals no gallop.    Murmur heard.   Systolic murmur is present with a grade of 6/6  Late peaking with soft and delayed carotid upstroke  Pulses:       Carotid pulses are 2+ on the right side, and 2+ on the left side.       Radial pulses are 2+ on the right side, and 2+ on the left side.        Femoral pulses are 2+ on the right side, and 2+ on the left side.       Dorsalis pedis pulses are 2+ on the right side, and 2+ on the left side.        Posterior tibial pulses are 2+ on the right side, and 2+ on the left side.   Pulmonary/Chest: Effort normal and breath sounds normal.   Abdominal: Soft. Bowel sounds are normal. There is no hepatosplenomegaly. There is no tenderness.   Lymphadenopathy:     She has no cervical adenopathy.   Neurological: She is alert. Gait normal.   Skin: Skin is warm, dry and intact. No rash noted. She is not diaphoretic. No erythema.   Psychiatric: She has a normal mood and affect.         Assessment:       1. Nonrheumatic aortic valve stenosis    2. Bilateral carotid artery stenosis    3. Dyslipidemia    4. Essential hypertension    5. Tobacco abuse    6. Abdominal aortic aneurysm (AAA) without rupture    7. Chronic bronchitis, unspecified chronic bronchitis type    8. Gastroesophageal reflux disease without esophagitis         Plan:       1. Chest pain syndrome.  The differential diagnosis for the patient's chest pain syndrome includes svere " aortic valve stenosis as well as obstructive CAD. Will therefore proceed with diagnostic LHC/RHC/ aortic valve study  All of the patient's questions were answered.  -restart EC ASA 81mg poq day  -continue Plavix 75mg po qday  -right CFA and CFV access    2) CAD.patient has multiple CAD risk factors; will proceed as above    3) Aortic valve stenosis. Patient has severe aortic valve stneosis by exam and by 8/24/17 TTE which demonstrated a mean aoritc valve gradient of 47.6mmHg and a LORA of 0.53cm2; will proceed as above;  Will discuss [facility] [place] [facility] valve team referral with Dr. Young    4) Possible left subclavian artery stenosis. L UE BP is significantly lower than right UE BP in clinic today; will perform angiogram of left subclavian artery if patient is to be referred for CABG plus AVR    5) HTN. Blood pressure adequately controlled in clinic today; continue current medications    6) Dyslipidemia. patient is on pravachol 40mg po qday as well as fenofibrate and Lovaza; rec hert healthy diet    7) AAA. follow-up with Dr. Azul    8) H/o carotid stenosis. caroti duplex report datd 8/24/17 report R ICA PSV of 113.9cm/s and L ICA PSV of 143.5 cm/s; patient's transient right eye vision loss may have been amaurosis fugax; agree with statin, restart aspirin as above continue Plavix    9) DM2. rec tight glycemic control; hold metformin 24 hours prior to cath and 48 hours post cath    10) Tobacco abuse. Smoking cessation counseling given    11) H/o COPD. Rec PFT's prior to SAV[facility] evaluation    12) H/o diarrhea. Patient reports she has f/u with GI    All of the patient's questions were answered.

## 2017-09-18 NOTE — PLAN OF CARE
Patient discharged per MD orders. Instructions given on medications, wound care, activity, signs of infection, when to call MD, and follow up appointments. Pt verbalized understanding. Patient AAOx3, VSS, no c/o pain or discomfort at this time. Right groin with gauze dressing c/d/i. No active bleeding. No hematoma noted. Telemetry and PIV removed. Patient was discharged home via wheel chair accompanied by her friend.                   Spontaneous, unlabored and symmetrical

## 2017-09-18 NOTE — PROGRESS NOTES
Patient c/o back pain from top to bottom. Dr Aguayo notified. Orders received for prn tylenol. Warm pack applied. Will monitor.

## 2017-09-18 NOTE — PLAN OF CARE
Problem: Patient Care Overview  Goal: Plan of Care Review  Outcome: Ongoing (interventions implemented as appropriate)  Report received from SOFIE Flores. Patient s/p c. R groin dressing saturated after patient had coughing episode. MD at bedside holding pressure. Post procedure protocol discussed with patient. Verbalized understanding. IV fluids infusing. Call light in reach. Will monitor.

## 2017-09-18 NOTE — DISCHARGE INSTRUCTIONS
Appointment with Dr. Young on Thursday, 9/21 at 2pm for 48 hour Holter Monitor.     1. Do not strain or lift anything greater than 5 lb for 1 week.   2. Do not drive or operate any dangerous machinery for 24 hours.   3. Keep the dressing on, clean, and dry for 24 hours.   4. After 24 hours, the dressing may be removed and a shower is allowed.   5. Clean the area with mild soap and water and then leave it opened to air. Do not apply any lotions or ointments to the site.   6. Once the skin has healed, bathing in a tub or swimming is allowed.   7. Inspect the groin site daily and report to the physician any  Bleeding and/or swelling at the site that   cannot be controlled with manual pressure for 10 minutes, unusual pain at the   access site or affected extremity, unusual bleeding and/orswelling at the access site, or signs or   symptoms of infection such as redness, pain, or fever.   Call 911 if you have:   Bleeding from the puncture site that you cannot stop by doing the following:   Relax and lie down right away. Keep your leg flat and apply firm pressure to the site using your fingers and a gauze pad. Keep the pressure on for 20 minutes. Continue this until the bleeding stops. This may take awhile. When bleeding stops, cover the site with a sterile bandage and keep your leg still as much as possible.

## 2017-09-18 NOTE — INTERVAL H&P NOTE
The patient has been examined and the H&P has been reviewed:    I concur with the findings and no changes have occurred since H&P was written.    Anesthesia/Surgery risks, benefits and alternative options discussed and understood by patient/family.          Active Hospital Problems    Diagnosis  POA    Nonrheumatic aortic valve stenosis [I35.0]  Yes      Resolved Hospital Problems    Diagnosis Date Resolved POA   No resolved problems to display.

## 2017-09-18 NOTE — PROGRESS NOTES
Patient ambulated in [place] with RN. R groin dressing cdi, soft. Some bruising noted but no complaints from patient. Will monitor.

## 2017-09-18 NOTE — DISCHARGE SUMMARY
Discharge Summary  Interventional Cardiology      Admit Date: 9/18/2017    Discharge Date:  9/18/2017    Attending Physician: John Bautista MD    Discharge Physician:John Bautista MD      Principal Diagnoses: Severe Aortic Stenosis   Indication for Admission: LHC/RHC/ aortic valve study  Discharged Condition: Good    Hospital Course:   Patient presented for outpatient LHC/RHC and AV study which went without complication. Severe aortic valve stenosis with mean aortic valve gradient = 54mmHg and calculated LORA=0.79cm2. Short  of proximal RCA with briding collaterals and left to right collaterals from LAD to PDA and LCX to PLB.    40% mid LAD stenosis.    Outpatient Plan:  - Follow up with Dr. Young for further managmenet of AS.  - There were no medication changes    Diet: Cardiac diet    Activity: Ad felicity    Disposition: Home or Self Care    Discharge Medications:      Medication List      CONTINUE taking these medications    amlodipine 10 MG tablet  Commonly known as:  NORVASC     aspirin 81 MG EC tablet  Commonly known as:  ECOTRIN  Take 1 tablet (81 mg total) by mouth once daily.     cetirizine 10 MG tablet  Commonly known as:  ZYRTEC     clopidogrel 75 mg tablet  Commonly known as:  PLAVIX     cyclobenzaprine 5 MG tablet  Commonly known as:  FLEXERIL     diphenoxylate-atropine 2.5-0.025 mg 2.5-0.025 mg per tablet  Commonly known as:  LOMOTIL     fenofibrate 160 MG Tab     hydrocodone-acetaminophen 10-325mg  mg Tab  Commonly known as:  NORCO     JANUVIA 100 MG Tab  Generic drug:  SITagliptin     lisinopril 5 MG tablet  Commonly known as:  PRINIVIL,ZESTRIL     losartan-hydrochlorothiazide 100-25 mg 100-25 mg per tablet  Commonly known as:  HYZAAR     meclizine 50 MG tablet  Commonly known as:  ANTIVERT     metformin 1000 MG tablet  Commonly known as:  GLUCOPHAGE     NEXIUM 40 MG capsule  Generic drug:  esomeprazole     nitrofurantoin (macrocrystal-monohydrate) 100 MG capsule  Commonly known  "as:  MACROBID     omega-3 acid ethyl esters 1 gram capsule  Commonly known as:  LOVAZA     pravastatin 40 MG tablet  Commonly known as:  PRAVACHOL     PROAIR HFA 90 mcg/actuation inhaler  Generic drug:  albuterol     SURE COMFORT PEN NEEDLE 31 gauge x 3/16" Ndle  Generic drug:  pen needle, diabetic     SYMBICORT 160-4.5 mcg/actuation Hfaa  Generic drug:  budesonide-formoterol 160-4.5 mcg     tizanidine 4 MG tablet  Commonly known as:  ZANAFLEX     TRUEPLUS LANCETS 30 gauge Misc  Generic drug:  lancets     TRUETEST TEST STRIPS Strp  Generic drug:  blood sugar diagnostic     VICTOZA 3-JOSE 0.6 mg/0.1 mL (18 mg/3 mL) Pnij  Generic drug:  liraglutide 0.6 mg/0.1 mL (18 mg/3 mL) subq PNIJ                "

## 2017-09-18 NOTE — CONSULTS
[facility]  Interventional Cardiology  Valve Center  Consult Note    Patient Name: Kristina Aguirre  MRN: [id number]    Referring: Dr. Dominick Bautista/ Dr George Young    Inpatient consult to Interventional Cardiology  Consult performed by: RADHA HERRING  Consult ordered by: PRAVEENA QUIROZ  Reason for consult: TAVR eval        Subjective:     HPI:   Ms. Aguirre is referred by Dr. Bautista (Primary Cardiologist, Dr. Young) for evaluation of severe AS. 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 She c/o worsening PLAZA and chest discomfort for at least 6 months. 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 She also c/o intermittent episodes of dizziness/lightheadedness and near syncope. The last episode was 2 weeks ago.     TAVR work-up summary:  · Echo (8/24/17): LORA= 0.53 cm2, Mean Gradient = 47.6 mmHg, EF= 50%  · LHC (9/18/17): LM has LI, LAD has LI-- provides collateral flow to PDA, LAD has LI, RCA has  with bridging collaterals  · STS= 2.6%  · Frailty: 1/4  · EKG : No block  · Needs TAVR CTA  · Needs CTS consult      Past Medical History:   Diagnosis Date    Abdominal aortic aneurysm (AAA) without rupture 9/12/2017    Anticoagulant long-term use     Arthritis     Asthma     Bilateral carotid artery stenosis 9/12/2017    Cancer     lung    COPD (chronic obstructive pulmonary disease)     Diabetes mellitus     Heart valve disorder     Hyperlipidemia     Hypertension        Past Surgical History:   Procedure Laterality Date    HAND SURGERY Right     KNEE ARTHROSCOPY      LUNG LOBECTOMY Right     middle lobe    mediastinoscopy      SPINAL FUSION      TONSILLECTOMY      WRIST SURGERY Right        Review of  patient's allergies indicates:  No Known Allergies    No current facility-administered medications on file prior to encounter.      Current Outpatient Prescriptions on File Prior to Encounter   Medication Sig    amlodipine (NORVASC) 10 MG tablet Take 10 mg by mouth once daily.    aspirin (ECOTRIN) 81 MG EC tablet Take 1 tablet (81 mg total) by mouth once daily.    cetirizine (ZYRTEC) 10 MG tablet Take 10 mg by mouth once daily.    clopidogrel (PLAVIX) 75 mg tablet Take 75 mg by mouth once daily.    diphenoxylate-atropine 2.5-0.025 mg (LOMOTIL) 2.5-0.025 mg per tablet Take 1 tablet by mouth 4 (four) times daily as needed for Diarrhea.    fenofibrate 160 MG Tab Take 160 mg by mouth once daily.    hydrocodone-acetaminophen 10-325mg (NORCO)  mg Tab Take 1 tablet by mouth 3 (three) times daily as needed.    JANUVIA 100 mg Tab Take 100 mg by mouth once daily.    lisinopril (PRINIVIL,ZESTRIL) 5 MG tablet Take 5 mg by mouth once daily.    losartan-hydrochlorothiazide 100-25 mg (HYZAAR) 100-25 mg per tablet Take 1 tablet by mouth once daily.    meclizine (ANTIVERT) 50 MG tablet Take 25 mg by mouth 3 (three) times daily as needed.    NEXIUM 40 mg capsule Take 40 mg by mouth once daily.    omega-3 acid ethyl esters (LOVAZA) 1 gram capsule     pravastatin (PRAVACHOL) 40 MG tablet Take 40 mg by mouth every evening.    PROAIR HFA 90 mcg/actuation inhaler Inhale 2 puffs into the lungs every 6 (six) hours as needed.    SYMBICORT 160-4.5 mcg/actuation HFAA Inhale 2 puffs into the lungs 2 (two) times daily.    tizanidine (ZANAFLEX) 4 MG tablet     VICTOZA 3-JOSE 0.6 mg/0.1 mL (18 mg/3 mL) PnIj     cyclobenzaprine (FLEXERIL) 5 MG tablet TAKE 1 TABLET BY MOUTH TWICE DAILY AS NEEDED FOR SPASM    metformin (GLUCOPHAGE) 1000 MG tablet Take 1,000 mg by mouth 2 (two) times daily.    nitrofurantoin, macrocrystal-monohydrate, (MACROBID) 100 MG capsule Take 100 mg by mouth 2 (two) times daily.    SURE COMFORT PEN  "NEEDLE 31 gauge x 3/16" Ndle USE EVERY DAY    TRUEPLUS LANCETS 30 gauge Misc USE DAILY when testing    TRUETEST TEST STRIPS Strp      Family History     Problem Relation (Age of Onset)    Heart attack Father    Throat cancer Mother        Social History Main Topics    Smoking status: Former Smoker     Packs/day: 1.50    Smokeless tobacco: Never Used    Alcohol use No    Drug use: No    Sexual activity: Not Currently     Review of Systems   Constitution: Negative for chills, diaphoresis, fever, weakness, weight gain and weight loss.   HENT: Negative for sore throat.    Eyes: Negative for blurred vision, vision loss in left eye, vision loss in right eye and visual disturbance.   Cardiovascular: Positive for chest pain and dyspnea on exertion. Negative for claudication, leg swelling, near-syncope, orthopnea, palpitations, paroxysmal nocturnal dyspnea and syncope.   Respiratory: Positive for shortness of breath. Negative for cough, hemoptysis, sputum production and wheezing.    Endocrine: Negative for cold intolerance and heat intolerance.   Hematologic/Lymphatic: Negative for adenopathy. Does not bruise/bleed easily.   Skin: Negative for rash.   Musculoskeletal: Negative for falls, muscle weakness and myalgias.   Gastrointestinal: Negative for abdominal pain, change in bowel habit, constipation, diarrhea, melena and nausea.   Genitourinary: Negative for bladder incontinence.   Neurological: Negative for dizziness, focal weakness, headaches, light-headedness and numbness.   Psychiatric/Behavioral: Negative for altered mental status.     Objective:     Vital Signs (Most Recent):  Temp: 97 °F (36.1 °C) (09/18/17 0930)  Pulse: 65 (09/18/17 1407)  Resp: 17 (09/18/17 1045)  BP: (!) 149/66 (09/18/17 1407)  SpO2: (!) 93 % (09/18/17 0930) Vital Signs (24h Range):  Temp:  [97 °F (36.1 °C)] 97 °F (36.1 °C)  Pulse:  [64-70] 65  Resp:  [16-18] 17  SpO2:  [93 %] 93 %  BP: (118-176)/(53-74) 149/66     Weight: 68.5 kg (151 " lb)  Body mass index is 28.07 kg/m².    SpO2: (!) 93 %  O2 Device (Oxygen Therapy): room air    No intake or output data in the 24 hours ending 09/18/17 1417    Lines/Drains/Airways     Peripheral Intravenous Line                 Peripheral IV - Single Lumen 09/18/17 0708 Left Antecubital less than 1 day         Peripheral IV - Single Lumen 09/18/17 0713 Left Forearm less than 1 day                Physical Exam   Constitutional: She is oriented to person, place, and time. She appears well-developed and well-nourished. No distress.   HENT:   Head: Normocephalic and atraumatic.   Mouth/Throat: Oropharynx is clear and moist.   Eyes: Conjunctivae and EOM are normal. Pupils are equal, round, and reactive to light. No scleral icterus.   Neck: Neck supple. No JVD present. No tracheal deviation present.   Cardiovascular: Normal rate and regular rhythm.  Exam reveals no gallop and no friction rub.    Murmur heard.  Pulmonary/Chest: Effort normal and breath sounds normal. No respiratory distress. She has no wheezes. She has no rales. She exhibits no tenderness.   Abdominal: Soft. Bowel sounds are normal. She exhibits no distension. There is no hepatosplenomegaly. There is no tenderness.   Musculoskeletal: She exhibits no edema or tenderness.   Neurological: She is alert and oriented to person, place, and time.   Skin: Skin is warm and dry. No rash noted. No erythema.   Psychiatric: She has a normal mood and affect. Her behavior is normal.       Significant Labs:   BMP:   Recent Labs  Lab 09/18/17  0616   *      K 3.2*      CO2 26   BUN 17   CREATININE 0.8   CALCIUM 9.4   , CBC   Recent Labs  Lab 09/18/17  0616   WBC 9.64   HGB 12.2   HCT 35.4*       and INR No results for input(s): INR, PROTIME in the last 48 hours.      Assessment and Plan:      Diagnosis    Nonrheumatic aortic valve stenosis - severe, symptomatic (NYHA Class II sx)  TAVR work-up summary:  · Echo (8/24/17): LORA= 0.53 cm2, Mean  "Gradient = 47.6 mmHg, EF= 50%  · [facility] (9/18/17): LM has LI, LAD has LI-- provides collateral flow to PDA, LAD has LI, RCA has  with bridging collaterals  · STS= 2.6%  · Frailty: 1/4  · EKG : No block  · Needs TAVR CTA  · Needs CTS consult      CAD -  of RCA with bridging collaterals    Lung CA - s/p RML lobectomy, path shows keratinizing squamous cell CA with positive PET. Follows with Dr. Nicole Howard. Records requested.     Diabetes mellitus - on PO meds    Dyslipidemia - stable, on statin     Essential hypertension - controlled on Norvasc, Avalide, and lisinopril    COPD - unknown severity, needs PFTs. On inhalers    Tobacco abuse - continued tobacco use. She is under the impression that the [employer] cigarettes she has been smoking are "nicotine free" and "non-addictive".      Will order 48 Hour Holter at discharge and have her see EP when she sees us in clinic. She will have the Holter done through Dr. Young (Thursday, 9/21 at 2 pm); results will be faxed to us.     Thank you for your consult. She will be scheduled to see us and EP in clinic on 9/29. TAVR CTA on 10/2.     Nataly Olivares PA-C  Cardiology   [facility]      "

## 2017-09-25 DIAGNOSIS — R55 SYNCOPE, UNSPECIFIED SYNCOPE TYPE: Primary | ICD-10-CM

## 2017-09-29 ENCOUNTER — HOSPITAL ENCOUNTER (OUTPATIENT)
Dept: PULMONOLOGY | Facility: CLINIC | Age: 67
Discharge: HOME | End: 2017-09-29
Payer: MEDICARE

## 2017-09-29 ENCOUNTER — OFFICE VISIT (OUTPATIENT)
Dept: CARDIOLOGY | Facility: CLINIC | Age: 67
End: 2017-09-29
Payer: MEDICARE

## 2017-09-29 VITALS
BODY MASS INDEX: 27.99 KG/M2 | HEIGHT: 62 IN | SYSTOLIC BLOOD PRESSURE: 101 MMHG | WEIGHT: 152.13 LBS | OXYGEN SATURATION: 95 % | HEART RATE: 66 BPM | DIASTOLIC BLOOD PRESSURE: 50 MMHG

## 2017-09-29 DIAGNOSIS — E78.5 DYSLIPIDEMIA: ICD-10-CM

## 2017-09-29 DIAGNOSIS — Z72.0 TOBACCO ABUSE: ICD-10-CM

## 2017-09-29 DIAGNOSIS — I73.9 PAD (PERIPHERAL ARTERY DISEASE): ICD-10-CM

## 2017-09-29 DIAGNOSIS — C34.2 MALIGNANT NEOPLASM OF MIDDLE LOBE OF RIGHT LUNG: ICD-10-CM

## 2017-09-29 DIAGNOSIS — J43.2 CENTRILOBULAR EMPHYSEMA: ICD-10-CM

## 2017-09-29 DIAGNOSIS — I35.0 NONRHEUMATIC AORTIC VALVE STENOSIS: ICD-10-CM

## 2017-09-29 DIAGNOSIS — I25.118 CORONARY ARTERY DISEASE OF NATIVE ARTERY OF NATIVE HEART WITH STABLE ANGINA PECTORIS: ICD-10-CM

## 2017-09-29 DIAGNOSIS — I10 ESSENTIAL HYPERTENSION: ICD-10-CM

## 2017-09-29 DIAGNOSIS — E11.51 TYPE 2 DIABETES MELLITUS WITH DIABETIC PERIPHERAL ANGIOPATHY WITHOUT GANGRENE, WITHOUT LONG-TERM CURRENT USE OF INSULIN: ICD-10-CM

## 2017-09-29 DIAGNOSIS — R55 PRE-SYNCOPE: ICD-10-CM

## 2017-09-29 PROBLEM — E11.9 DIABETES MELLITUS, TYPE 2: Status: ACTIVE | Noted: 2017-09-29

## 2017-09-29 PROBLEM — C34.91 MALIGNANT NEOPLASM OF RIGHT LUNG: Status: ACTIVE | Noted: 2017-09-29

## 2017-09-29 LAB
PRE FEV1 FVC: 71
PRE FEV1: 1.8
PRE FVC: 2.52
PREDICTED FEV1 FVC: 80
PREDICTED FEV1: 2
PREDICTED FVC: 2.54

## 2017-09-29 PROCEDURE — 1157F ADVNC CARE PLAN IN RCRD: CPT | Mod: ,,, | Performed by: INTERNAL MEDICINE

## 2017-09-29 PROCEDURE — 99999 PR PBB SHADOW E&M-EST. PATIENT-LVL V: CPT | Mod: PBBFAC,,, | Performed by: INTERNAL MEDICINE

## 2017-09-29 PROCEDURE — 94729 DIFFUSING CAPACITY: CPT | Mod: PBBFAC | Performed by: INTERNAL MEDICINE

## 2017-09-29 PROCEDURE — 1159F MED LIST DOCD IN RCRD: CPT | Mod: ,,, | Performed by: INTERNAL MEDICINE

## 2017-09-29 PROCEDURE — 94729 DIFFUSING CAPACITY: CPT | Mod: 26,S$PBB,, | Performed by: INTERNAL MEDICINE

## 2017-09-29 PROCEDURE — 99214 OFFICE O/P EST MOD 30 MIN: CPT | Mod: S$PBB,,, | Performed by: INTERNAL MEDICINE

## 2017-09-29 PROCEDURE — 99215 OFFICE O/P EST HI 40 MIN: CPT | Mod: PBBFAC | Performed by: INTERNAL MEDICINE

## 2017-09-29 PROCEDURE — 94010 BREATHING CAPACITY TEST: CPT | Mod: 26,S$PBB,, | Performed by: INTERNAL MEDICINE

## 2017-09-29 PROCEDURE — 94010 BREATHING CAPACITY TEST: CPT | Mod: PBBFAC | Performed by: INTERNAL MEDICINE

## 2017-09-29 PROCEDURE — 1126F AMNT PAIN NOTED NONE PRSNT: CPT | Mod: ,,, | Performed by: INTERNAL MEDICINE

## 2017-09-29 RX ORDER — AZITHROMYCIN 250 MG/1
TABLET, FILM COATED ORAL
COMMUNITY
Start: 2017-09-25 | End: 2017-09-29

## 2017-09-29 RX ORDER — SULFAMETHOXAZOLE AND TRIMETHOPRIM 800; 160 MG/1; MG/1
1 TABLET ORAL 2 TIMES DAILY
COMMUNITY
Start: 2017-09-25 | End: 2017-11-16

## 2017-09-29 NOTE — LETTER
September 29, 2017      John Bautista MD  [address]  [facility] [address]           [facility]  [address]  [facility] [address]  Phone: [phone]          Patient: Kristina Aguirre   MR Number: [id number]   YOB: 1950   Date of Visit: 9/29/2017       Dear Dr. John Bautista:    Thank you for referring Kristina Aguirre to me for evaluation. Attached you will find relevant portions of my assessment and plan of care.    If you have questions, please do not hesitate to call me. I look forward to following Kristina Aguirre along with you.    Sincerely,    Luis Napier MD    Enclosure  CC:  No Recipients    If you would like to receive this communication electronically, please contact [email or web address] or [phone] to request more information on [employer] access.    For providers and/or their staff who would like to refer a patient to [facility], please contact us through our one-stop-shop provider referral line, [facility], at [phone].    If you feel you have received this communication in error or would no longer like to receive these types of communications, please e-mail [email or web address]

## 2017-09-29 NOTE — PROGRESS NOTES
INTERVENTIONAL CARDIOLOGY CLINIC  HEART VALVE CENTER    REFERRING PHYSICIAN: George Young / John Bautista    CHIEF COMPLIANT:  Dyspnea    HISTORY OF PRESENT ILLNESS  Kristina Aguirre is a 67 y.o. female referred by Dr. Young and Dr Bautista for evaluation of severe aortic stenosis (NYHA class III symptoms).    Ms. Aguirre is referred by Dr. Bautista (Primary Cardiologist, Dr. Young) for evaluation of severe AS. 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 She c/o worsening PLAZA and chest discomfort for at least 6 months. 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 She also c/o intermittent episodes of dizziness/lightheadedness and near syncope. The last episode was 2 weeks ago.     She underwent ambulatory Holter monitoring that is being read by Dr Young's office, however preliminary read shows no significant bradyarrhythmias.  She is scheduled to see Dr Cabezas next Monday regarding pre-syncope.     PAST MEDICAL HISTORY  Past Medical History:   Diagnosis Date    Abdominal aortic aneurysm (AAA) without rupture 9/12/2017    Anticoagulant long-term use     Arthritis     Asthma     Bilateral carotid artery stenosis 9/12/2017    Cancer     lung    COPD (chronic obstructive pulmonary disease)     Coronary artery disease of native artery with stable angina pectoris 9/29/2017    Diabetes mellitus     Heart valve disorder     Hyperlipidemia     Hypertension         PAST SURGICAL HISTORY  Past Surgical History:   Procedure Laterality Date    HAND SURGERY Right     KNEE ARTHROSCOPY      LUNG LOBECTOMY Right     middle lobe    mediastinoscopy      SPINAL FUSION      TONSILLECTOMY      WRIST SURGERY Right   "      MEDICATIONS  Current Outpatient Prescriptions on File Prior to Visit   Medication Sig Dispense Refill    amlodipine (NORVASC) 10 MG tablet Take 10 mg by mouth once daily.  5    aspirin (ECOTRIN) 81 MG EC tablet Take 1 tablet (81 mg total) by mouth once daily.      cetirizine (ZYRTEC) 10 MG tablet Take 10 mg by mouth once daily.  6    clopidogrel (PLAVIX) 75 mg tablet Take 75 mg by mouth once daily.  3    diphenoxylate-atropine 2.5-0.025 mg (LOMOTIL) 2.5-0.025 mg per tablet Take 1 tablet by mouth 4 (four) times daily as needed for Diarrhea.      fenofibrate 160 MG Tab Take 160 mg by mouth once daily.  5    hydrocodone-acetaminophen 10-325mg (NORCO)  mg Tab Take 1 tablet by mouth 3 (three) times daily as needed.  0    JANUVIA 100 mg Tab Take 100 mg by mouth once daily.  3    lisinopril (PRINIVIL,ZESTRIL) 5 MG tablet Take 5 mg by mouth once daily.  0    losartan-hydrochlorothiazide 100-25 mg (HYZAAR) 100-25 mg per tablet Take 1 tablet by mouth once daily.  5    metformin (GLUCOPHAGE) 1000 MG tablet Take 1,000 mg by mouth 2 (two) times daily.  5    NEXIUM 40 mg capsule Take 40 mg by mouth once daily.  5    pravastatin (PRAVACHOL) 40 MG tablet Take 40 mg by mouth every evening.  5    PROAIR HFA 90 mcg/actuation inhaler Inhale 2 puffs into the lungs every 6 (six) hours as needed.  6    SYMBICORT 160-4.5 mcg/actuation HFAA Inhale 2 puffs into the lungs 2 (two) times daily.  4    VICTOZA 3-JOSE 0.6 mg/0.1 mL (18 mg/3 mL) PnIj   1    meclizine (ANTIVERT) 50 MG tablet Take 25 mg by mouth 3 (three) times daily as needed.      nitrofurantoin, macrocrystal-monohydrate, (MACROBID) 100 MG capsule Take 100 mg by mouth 2 (two) times daily.  0    SURE COMFORT PEN NEEDLE 31 gauge x 3/16" Ndle USE EVERY DAY  1    TRUEPLUS LANCETS 30 gauge Misc USE DAILY when testing  10    TRUETEST TEST STRIPS Strp   5    [DISCONTINUED] cyclobenzaprine (FLEXERIL) 5 MG tablet TAKE 1 TABLET BY MOUTH TWICE DAILY AS NEEDED " FOR SPASM  0    [DISCONTINUED] omega-3 acid ethyl esters (LOVAZA) 1 gram capsule       [DISCONTINUED] tizanidine (ZANAFLEX) 4 MG tablet        No current facility-administered medications on file prior to visit.         SOCIAL HISTORY  TOBACCO: Current everyday smoker, willing to quit.  ETOH: Denies  ILLEGAL DRUGS: Deneis      HPI  Review of Systems   Constitution: Negative for fever and weakness.   HENT: Negative for congestion and hoarse voice.    Eyes: Negative for blurred vision and double vision.   Cardiovascular: Positive for dyspnea on exertion and near-syncope. Negative for chest pain, claudication, cyanosis, irregular heartbeat, leg swelling, orthopnea, palpitations and paroxysmal nocturnal dyspnea.   Respiratory: Positive for shortness of breath. Negative for cough and hemoptysis.    Endocrine: Negative for cold intolerance and heat intolerance.   Hematologic/Lymphatic: Negative for bleeding problem. Does not bruise/bleed easily.   Skin: Negative for dry skin and nail changes.   Musculoskeletal: Positive for back pain. Negative for falls.   Gastrointestinal: Negative for abdominal pain and anorexia.   Neurological: Negative for brief paralysis and dizziness.           Objective:    Physical Exam   Constitutional: She is oriented to person, place, and time. She appears well-developed and well-nourished.   Eyes: Pupils are equal, round, and reactive to light.   Neck: No JVD present. No thyromegaly present.   Cardiovascular: Normal rate, regular rhythm and intact distal pulses.    Murmur heard.   Harsh midsystolic murmur is present with a grade of 4/6  at the upper right sternal border radiating to the neck  Pulses:       Carotid pulses are 2+ on the right side, and 2+ on the left side.       Radial pulses are 2+ on the right side, and 2+ on the left side.        Femoral pulses are 2+ on the right side, and 2+ on the left side.       Dorsalis pedis pulses are 2+ on the right side, and 2+ on the left side.         Posterior tibial pulses are 2+ on the right side, and 2+ on the left side.   Pulmonary/Chest: No respiratory distress. She has no wheezes. She exhibits no tenderness.   Abdominal: She exhibits no distension. There is no tenderness. There is no rebound.   Musculoskeletal: She exhibits no edema or tenderness.   Neurological: She is alert and oriented to person, place, and time.   Skin: Skin is warm and dry.   Psychiatric: She has a normal mood and affect.           Assessment:        Nonrheumatic aortic valve stenosis  NYHA Class III symptoms.     TAVR work-up summary:  ? Echo (8/24/17): LORA= 0.53 cm2, Mean Gradient = 47.6 mmHg, EF= 50%  ? LHC (9/18/17): LM has LI, LAD has LI-- provides collateral flow to PDA, LAD has LI, RCA has  with bridging collaterals  ? STS= 2.6%  ? Frailty: 1/4  ? EKG : No block. Holter monitor pending read per Dr Young but preliminary report only shows some short runs of SVT not correlated with symptoms.  ? Needs TAVR CTA (Scheduled 10.2.17)  ? CTS high risk per Nathan due to COPD, prior lobectomy. Awaiting PFTs  ? PFTs scheduled for today    Essential hypertension  Controlled today    Malignant neoplasm of right lung  s/p RML lobectomy, path showed keratinizing squamous cell CA with. Contacted oncologist who reported disease on remission, last PET scan negative.    Tobacco abuse  Smoking cessation advise given. Patient will call the [facility] smoking cessation program.    Dyslipidemia  On pravachol 40    Diabetes mellitus, type 2  On metformin    Coronary artery disease of native artery with stable angina pectoris  Patient has moderate mid LAD stenosis and  of the RCA. This is a short  with favorable anatomy. The vessel fills via left to right collaterals. I spoke with Dr Bautista to attempt RCA revascularization, as this may improve her symptoms while she waits for TAVR. He will bring her back to discuss  PCI. A balloon aortic valvuloplasty can be considered if unstable  for PCI.     Pre-syncope  Patient has episodes of exertional lightheadedness. This can be explained by her severe AS, RCA disease or both. She had Holter done and will see EP next week.     PAD (peripheral artery disease)  Femoral angiogram during last heart cath showed significant iliofemoral disease. Will await CT Partner to evalaute aortoiliac atherosclerosis.      Plan:       Will send Back to Dr Bautista to discuss RCA  PCI  Will review CTA, PFTs and EP evalaution when completed next week and finalize TAVR plan. TAVR will be scheduled if she's a candidate in 6-8 weeks          Luis Richmond MD  Interventional Cardiology  Structural/Valvular heart disease  [phone]

## 2017-09-29 NOTE — ASSESSMENT & PLAN NOTE
Patient has episodes of exertional lightheadedness. This can be explained by her severe AS, RCA disease or both. She had Holter done and will see EP next week.

## 2017-09-29 NOTE — ASSESSMENT & PLAN NOTE
Femoral angiogram during last heart cath showed significant iliofemoral disease. Will await CT Partner to evalaute aortoiliac atherosclerosis.

## 2017-09-29 NOTE — ASSESSMENT & PLAN NOTE
Patient has moderate mid LAD stenosis and  of the RCA. This is a short  with favorable anatomy. The vessel fills via left to right collaterals. I spoke with Dr Bautista to attempt RCA revascularization, as this may improve her symptoms while she waits for TAVR. He will bring her back to discuss  PCI. A balloon aortic valvuloplasty can be considered if unstable for PCI.

## 2017-09-29 NOTE — ASSESSMENT & PLAN NOTE
NYHA Class III symptoms.     TAVR work-up summary:  ? Echo (8/24/17): LORA= 0.53 cm2, Mean Gradient = 47.6 mmHg, EF= 50%  ? LHC (9/18/17): LM has LI, LAD has LI-- provides collateral flow to PDA, LAD has LI, RCA has  with bridging collaterals  ? STS= 2.6%  ? Frailty: 1/4  ? EKG : No block. Holter monitor pending read per Dr Young but preliminary report only shows some short runs of SVT not correlated with symptoms.  ? Needs TAVR CTA (Scheduled 10.2.17)  ? CTS high risk per Nathan due to COPD, prior lobectomy. Awaiting PFTs  ? PFTs scheduled for today

## 2017-09-29 NOTE — ASSESSMENT & PLAN NOTE
s/p RML lobectomy, path showed keratinizing squamous cell CA with. Contacted oncologist who reported disease on remission, last PET scan negative.

## 2017-10-02 ENCOUNTER — HOSPITAL ENCOUNTER (OUTPATIENT)
Dept: RADIOLOGY | Facility: HOSPITAL | Age: 67
Discharge: HOME | End: 2017-10-02
Payer: MEDICARE

## 2017-10-02 ENCOUNTER — INITIAL CONSULT (OUTPATIENT)
Dept: ELECTROPHYSIOLOGY | Facility: CLINIC | Age: 67
End: 2017-10-02
Payer: MEDICARE

## 2017-10-02 ENCOUNTER — HOSPITAL ENCOUNTER (OUTPATIENT)
Dept: CARDIOLOGY | Facility: CLINIC | Age: 67
Discharge: HOME | End: 2017-10-02
Payer: MEDICARE

## 2017-10-02 VITALS
HEIGHT: 61 IN | SYSTOLIC BLOOD PRESSURE: 140 MMHG | WEIGHT: 149 LBS | BODY MASS INDEX: 28.13 KG/M2 | DIASTOLIC BLOOD PRESSURE: 60 MMHG | HEART RATE: 68 BPM

## 2017-10-02 DIAGNOSIS — R55 POSTURAL DIZZINESS WITH PRESYNCOPE: ICD-10-CM

## 2017-10-02 DIAGNOSIS — E78.5 DYSLIPIDEMIA: Primary | ICD-10-CM

## 2017-10-02 DIAGNOSIS — I35.0 NONRHEUMATIC AORTIC VALVE STENOSIS: ICD-10-CM

## 2017-10-02 DIAGNOSIS — R55 SYNCOPE, UNSPECIFIED SYNCOPE TYPE: ICD-10-CM

## 2017-10-02 DIAGNOSIS — R42 POSTURAL DIZZINESS WITH PRESYNCOPE: ICD-10-CM

## 2017-10-02 DIAGNOSIS — I10 ESSENTIAL HYPERTENSION: ICD-10-CM

## 2017-10-02 PROCEDURE — 99999 PR PBB SHADOW E&M-EST. PATIENT-LVL III: CPT | Mod: PBBFAC,,, | Performed by: INTERNAL MEDICINE

## 2017-10-02 PROCEDURE — 1157F ADVNC CARE PLAN IN RCRD: CPT | Mod: ,,, | Performed by: INTERNAL MEDICINE

## 2017-10-02 PROCEDURE — 71275 CT ANGIOGRAPHY CHEST: CPT | Mod: TC

## 2017-10-02 PROCEDURE — 1126F AMNT PAIN NOTED NONE PRSNT: CPT | Mod: ,,, | Performed by: INTERNAL MEDICINE

## 2017-10-02 PROCEDURE — 1159F MED LIST DOCD IN RCRD: CPT | Mod: ,,, | Performed by: INTERNAL MEDICINE

## 2017-10-02 PROCEDURE — 74174 CTA ABD&PLVS W/CONTRAST: CPT | Mod: 26,GC,, | Performed by: INTERNAL MEDICINE

## 2017-10-02 PROCEDURE — 71275 CT ANGIOGRAPHY CHEST: CPT | Mod: 26,GC,, | Performed by: INTERNAL MEDICINE

## 2017-10-02 PROCEDURE — 25500020 PHARM REV CODE 255: Performed by: PHYSICIAN ASSISTANT

## 2017-10-02 PROCEDURE — 93005 ELECTROCARDIOGRAM TRACING: CPT | Mod: PBBFAC | Performed by: INTERNAL MEDICINE

## 2017-10-02 PROCEDURE — 99213 OFFICE O/P EST LOW 20 MIN: CPT | Mod: PBBFAC,25 | Performed by: INTERNAL MEDICINE

## 2017-10-02 PROCEDURE — 93010 ELECTROCARDIOGRAM REPORT: CPT | Mod: S$PBB,,, | Performed by: INTERNAL MEDICINE

## 2017-10-02 PROCEDURE — 74174 CTA ABD&PLVS W/CONTRAST: CPT | Mod: TC

## 2017-10-02 PROCEDURE — 99215 OFFICE O/P EST HI 40 MIN: CPT | Mod: S$PBB,,, | Performed by: INTERNAL MEDICINE

## 2017-10-02 RX ORDER — LOSARTAN POTASSIUM 50 MG/1
50 TABLET ORAL NIGHTLY
Qty: 90 TABLET | Refills: 3 | Status: ON HOLD | OUTPATIENT
Start: 2017-10-02 | End: 2018-03-30 | Stop reason: HOSPADM

## 2017-10-02 RX ORDER — CHLORTHALIDONE 25 MG/1
25 TABLET ORAL EVERY MORNING
Qty: 90 TABLET | Refills: 3 | Status: SHIPPED | OUTPATIENT
Start: 2017-10-02 | End: 2018-04-20 | Stop reason: SDUPTHER

## 2017-10-02 RX ADMIN — IOHEXOL 100 ML: 350 INJECTION, SOLUTION INTRAVENOUS at 02:10

## 2017-10-02 NOTE — PROGRESS NOTES
Subjective:    Patient ID:  Kristina Aguirre is a 67 y.o. female who presents for evaluation of LH    HPI     67 y.o. F  severe AS, pending TAVR  CAD, including  of RCA  AAA  DM on PO meds  HTN    Referred for eval of episodic LH, which usually occur during episodes of being in extreme heat (e.g., working at hot [employer] stand), but also has happened while just walking.  Episodes q few weeks. No LOC. Never has happened while lying down.  Occasional palpitations, but these don't correlate with the LH episodes.    Holter: 62-98 bpm. SR. No pause. 6 episodes NSAT, 3-10beats.  LVEF 50%    My interpretation of today's ECG is NSR. [id number]. QTc 474 sm    Review of Systems   Constitution: Negative. Negative for weakness and malaise/fatigue.   HENT: Negative.  Negative for ear pain and tinnitus.    Eyes: Negative for blurred vision.   Cardiovascular: Negative.  Negative for chest pain, dyspnea on exertion, near-syncope, palpitations and syncope.   Respiratory: Negative.  Negative for shortness of breath.    Endocrine: Negative.  Negative for polyuria.   Hematologic/Lymphatic: Does not bruise/bleed easily.   Skin: Negative.  Negative for rash.   Musculoskeletal: Negative.  Negative for joint pain and muscle weakness.   Gastrointestinal: Negative.  Negative for abdominal pain and change in bowel habit.   Genitourinary: Negative for frequency.   Neurological: Positive for light-headedness. Negative for dizziness.   Psychiatric/Behavioral: Negative.  Negative for depression. The patient is not nervous/anxious.    Allergic/Immunologic: Negative for environmental allergies.        Objective:    Physical Exam   Constitutional: She is oriented to person, place, and time. Vital signs are normal. She appears well-developed and well-nourished. She is active and cooperative.   HENT:   Head: Normocephalic and atraumatic.   Eyes: Conjunctivae and EOM are normal.   Neck: Normal range of motion. Carotid bruit is not present. No tracheal  deviation and no edema present. No thyroid mass and no thyromegaly present.   Cardiovascular: Normal rate, regular rhythm, intact distal pulses and normal pulses.   No extrasystoles are present. PMI is not displaced.  Exam reveals no gallop and no friction rub.    Murmur heard.   Harsh midsystolic murmur is present with a grade of 4/6  at the upper right sternal border radiating to the neck  Pulmonary/Chest: Effort normal and breath sounds normal. No respiratory distress. She has no wheezes. She has no rales.   Abdominal: Soft. Normal appearance. She exhibits no distension. There is no hepatosplenomegaly.   Musculoskeletal: Normal range of motion.   Neurological: She is alert and oriented to person, place, and time. Coordination normal.   Skin: Skin is warm and dry. No rash noted.   Psychiatric: She has a normal mood and affect. Her speech is normal and behavior is normal. Thought content normal. Cognition and memory are normal.   Nursing note and vitals reviewed.        Assessment:       1. Dyslipidemia    2. Essential hypertension    3. Postural dizziness with presyncope    severe AS       Plan:       Most likely vasovagal/orthostatic, but DDx remains broad and LH episodes may be contributed to by severe AS, perhaps bradycardic episodes...    Change [id number] to [id number] and (separately) [id number].  Discussed the indications and possible side effects of the medications prescribed to the patient by me.    30 day montior with autotrigger.  f/u after that.

## 2017-10-02 NOTE — LETTER
October 2, 2017      Nataly Olivares PA-C  [address]  [facility] [address]           Simon Samir - Arrhythmia  [address]  [facility] [address]  Phone: [phone]  Fax: [phone]          Patient: Kristina Aguirre   MR Number: [id number]   YOB: 1950   Date of Visit: 10/2/2017       Dear Nataly Olivares:    Thank you for referring Kristina Aguirre to me for evaluation. Attached you will find relevant portions of my assessment and plan of care.    If you have questions, please do not hesitate to call me. I look forward to following Kristina Aguirre along with you.    Sincerely,    Dawit Cabezas MD    Enclosure  CC:  No Recipients    If you would like to receive this communication electronically, please contact [email or web address] or [phone] to request more information on [employer] Link access.    For providers and/or their staff who would like to refer a patient to [facility], please contact us through our one-stop-shop provider referral line, [facility], at [phone].    If you feel you have received this communication in error or would no longer like to receive these types of communications, please e-mail [email or web address]

## 2017-10-03 ENCOUNTER — DOCUMENTATION ONLY (OUTPATIENT)
Dept: CARDIOLOGY | Facility: CLINIC | Age: 67
End: 2017-10-03

## 2017-10-03 DIAGNOSIS — I35.0 AORTIC VALVE STENOSIS, ETIOLOGY OF CARDIAC VALVE DISEASE UNSPECIFIED: Primary | ICD-10-CM

## 2017-10-03 NOTE — PROGRESS NOTES
[facility]    Kristina Aguirre is a 67 y.o. female referred by Dr Young/Dominick Bautista for evaluation of severe AS (NYHA Class III sx).    The patient has undergone the following TAVR work-up:     · Echo (8/24/17): LORA= 0.53 cm2, Mean Gradient = 47.6 mmHg, EF= 50%  · LHC (9/18/17): LM has LI, LAD has LI-- provides collateral flow to PDA, LAD has LI, RCA has  with bridging collaterals  · STS= 2.6%  · Frailty: 1/4   · Iliacs are >5.01 on R and > 4.7 on L. Left subclavian ostium is 7.24 mm, will need subclavian ultrasound and/or angiogram for possible transxillary access.  · LVOT area by CTA is 3.48 cm2 (23 mm X 19 mm) and Avg Diameter is 21.1 per Dr Richmond  · Incidental findings on CT: 6 mm solid nodule on her left upper lobe. She has a history of lung cancer, Dr Howard contacted (Her oncologist) who reports she will continue to do surveillance with PET scans. Her last PET was in June without recurrence of malignancy.   · CT Surgery risk assessment: Will need to schedule surgical clinic.   · Rhythm issues: NSR. Episodes of lightheadedness worked up by Dr Cabezas, Holter non-diagnostic. Has 30 day event monitor.  · PFTs: FEV1 90% predicted, DLCO 84% predicted  · Comorbidities: Lung CA, Emphysema, PAD, Tobacco use      Kristina Aguirre is a 26 mm  EvolutR valve candidate via left trans-axillary access if surgeons agree.    Will ask Dr Bautista to do a subclavian angiogram at the time of coronary angioplasty. Will have her see the surgeons in clinic now that her PFTs and rest of workup is finished.    Luis Richmond MD  Interventional Cardiology  Structural/Valvular heart disease  [phone]

## 2017-10-04 DIAGNOSIS — I73.9 PVD (PERIPHERAL VASCULAR DISEASE): Primary | ICD-10-CM

## 2017-11-02 LAB — CORONARY STENOSIS: ABNORMAL

## 2017-11-15 DIAGNOSIS — Z01.818 PRE-OP TESTING: Primary | ICD-10-CM

## 2017-11-16 ENCOUNTER — CLINICAL SUPPORT (OUTPATIENT)
Dept: CARDIOLOGY | Facility: CLINIC | Age: 67
End: 2017-11-16
Payer: MEDICARE

## 2017-11-16 ENCOUNTER — OFFICE VISIT (OUTPATIENT)
Dept: CARDIOLOGY | Facility: CLINIC | Age: 67
End: 2017-11-16
Payer: MEDICARE

## 2017-11-16 ENCOUNTER — OFFICE VISIT (OUTPATIENT)
Dept: CARDIOTHORACIC SURGERY | Facility: CLINIC | Age: 67
End: 2017-11-16
Payer: MEDICARE

## 2017-11-16 ENCOUNTER — DOCUMENTATION ONLY (OUTPATIENT)
Dept: CARDIOLOGY | Facility: CLINIC | Age: 67
End: 2017-11-16

## 2017-11-16 VITALS
OXYGEN SATURATION: 97 % | SYSTOLIC BLOOD PRESSURE: 135 MMHG | HEART RATE: 60 BPM | BODY MASS INDEX: 27.75 KG/M2 | HEIGHT: 61 IN | DIASTOLIC BLOOD PRESSURE: 62 MMHG | TEMPERATURE: 98 F | WEIGHT: 147 LBS

## 2017-11-16 VITALS
WEIGHT: 145.75 LBS | DIASTOLIC BLOOD PRESSURE: 55 MMHG | OXYGEN SATURATION: 93 % | SYSTOLIC BLOOD PRESSURE: 119 MMHG | BODY MASS INDEX: 26.82 KG/M2 | HEART RATE: 69 BPM | HEIGHT: 62 IN

## 2017-11-16 DIAGNOSIS — E78.5 DYSLIPIDEMIA: ICD-10-CM

## 2017-11-16 DIAGNOSIS — I10 ESSENTIAL HYPERTENSION: ICD-10-CM

## 2017-11-16 DIAGNOSIS — E11.51 TYPE 2 DIABETES MELLITUS WITH DIABETIC PERIPHERAL ANGIOPATHY WITHOUT GANGRENE, WITHOUT LONG-TERM CURRENT USE OF INSULIN: ICD-10-CM

## 2017-11-16 DIAGNOSIS — I73.9 PVD (PERIPHERAL VASCULAR DISEASE): ICD-10-CM

## 2017-11-16 DIAGNOSIS — I25.118 CORONARY ARTERY DISEASE OF NATIVE ARTERY OF NATIVE HEART WITH STABLE ANGINA PECTORIS: ICD-10-CM

## 2017-11-16 DIAGNOSIS — Z72.0 TOBACCO ABUSE: ICD-10-CM

## 2017-11-16 DIAGNOSIS — I35.0 NONRHEUMATIC AORTIC VALVE STENOSIS: Primary | ICD-10-CM

## 2017-11-16 DIAGNOSIS — I73.9 PAD (PERIPHERAL ARTERY DISEASE): ICD-10-CM

## 2017-11-16 DIAGNOSIS — I65.23 BILATERAL CAROTID ARTERY STENOSIS: ICD-10-CM

## 2017-11-16 DIAGNOSIS — I35.0 NONRHEUMATIC AORTIC VALVE STENOSIS: ICD-10-CM

## 2017-11-16 DIAGNOSIS — I71.40 ABDOMINAL AORTIC ANEURYSM (AAA) WITHOUT RUPTURE: ICD-10-CM

## 2017-11-16 DIAGNOSIS — I20.9 ANGINA, CLASS III: Primary | ICD-10-CM

## 2017-11-16 DIAGNOSIS — K21.9 GASTROESOPHAGEAL REFLUX DISEASE WITHOUT ESOPHAGITIS: ICD-10-CM

## 2017-11-16 PROCEDURE — 99215 OFFICE O/P EST HI 40 MIN: CPT | Mod: PBBFAC,25,27 | Performed by: INTERNAL MEDICINE

## 2017-11-16 PROCEDURE — 99213 OFFICE O/P EST LOW 20 MIN: CPT | Mod: PBBFAC | Performed by: THORACIC SURGERY (CARDIOTHORACIC VASCULAR SURGERY)

## 2017-11-16 PROCEDURE — 99999 PR PBB SHADOW E&M-EST. PATIENT-LVL III: CPT | Mod: PBBFAC,,, | Performed by: THORACIC SURGERY (CARDIOTHORACIC VASCULAR SURGERY)

## 2017-11-16 PROCEDURE — 99214 OFFICE O/P EST MOD 30 MIN: CPT | Mod: S$PBB,,, | Performed by: INTERNAL MEDICINE

## 2017-11-16 PROCEDURE — 99999 PR PBB SHADOW E&M-EST. PATIENT-LVL V: CPT | Mod: PBBFAC,,, | Performed by: INTERNAL MEDICINE

## 2017-11-16 PROCEDURE — 99204 OFFICE O/P NEW MOD 45 MIN: CPT | Mod: S$PBB,,, | Performed by: THORACIC SURGERY (CARDIOTHORACIC VASCULAR SURGERY)

## 2017-11-16 PROCEDURE — 93931 UPPER EXTREMITY STUDY: CPT | Mod: PBBFAC | Performed by: INTERNAL MEDICINE

## 2017-11-16 RX ORDER — BUDESONIDE AND FORMOTEROL FUMARATE DIHYDRATE 80; 4.5 UG/1; UG/1
2 AEROSOL RESPIRATORY (INHALATION) 2 TIMES DAILY
Refills: 1 | COMMUNITY
Start: 2017-10-24 | End: 2018-05-02

## 2017-11-16 RX ORDER — DIPHENHYDRAMINE HCL 25 MG
50 CAPSULE ORAL ONCE
Status: CANCELLED | OUTPATIENT
Start: 2017-11-16 | End: 2017-11-16

## 2017-11-16 RX ORDER — SODIUM CHLORIDE 9 MG/ML
3 INJECTION, SOLUTION INTRAVENOUS CONTINUOUS
Status: CANCELLED | OUTPATIENT
Start: 2017-11-16 | End: 2017-11-16

## 2017-11-16 RX ORDER — LOSARTAN POTASSIUM AND HYDROCHLOROTHIAZIDE 25; 100 MG/1; MG/1
1 TABLET ORAL DAILY
Refills: 1 | Status: ON HOLD | COMMUNITY
Start: 2017-09-19 | End: 2018-03-30 | Stop reason: HOSPADM

## 2017-11-16 RX ORDER — ALBUTEROL SULFATE 90 UG/1
2 AEROSOL, METERED RESPIRATORY (INHALATION) EVERY 4 HOURS PRN
Refills: 0 | COMMUNITY
Start: 2017-09-19

## 2017-11-16 RX ORDER — GLUCOSAM/CHON-MSM1/C/MANG/BOSW 500-416.6
TABLET ORAL
Refills: 10 | COMMUNITY
Start: 2017-08-31

## 2017-11-16 RX ORDER — LIRAGLUTIDE 6 MG/ML
INJECTION SUBCUTANEOUS
Refills: 2 | COMMUNITY
Start: 2017-10-24

## 2017-11-16 NOTE — PROGRESS NOTES
OUTPATIENT CATHETERIZATION INSTRUCTIONS    You have been scheduled for a procedure in the catheterization lab on Monday, December 4, 2017.     Please report to the Cardiology Waiting Area on the Third floor of the hospital and check in at 11 AM.   You will then be taken to the SSCU (Short Stay Cardiac Unit) and prepared for your procedure. Please be aware that this is not the time of your procedure but the time you are to arrive. The procedures are scheduled on an hourly basis; however, emergency cases take precedence over all other cases.       You may not have anything to eat or drink after midnight the night before your test. You may take your regular morning medications with water. If there are any medications that you should not take you will be instructed to hold them that morning. If you are diabetic and on Metformin (Glucophage) do not take it the day before, the day of, and for 2 days after your procedure.      The procedure will take 1-2 hours to perform. After the procedure, you will return to SSCU on the third floor of the hospital. You will need to lie still (or keep your arm still) for the next 4 to 6 hours to minimize bleeding from the puncture site. Your family may remain in the room with you during this time.       You may be able to be discharged home that same afternoon if there is someone to drive you home and there were no complications. If you have one of the balloon, stent, or device procedures you may spend the night in the hospital. Your doctor will determine, based on your progress, the date and time of your discharge. The results of your procedure will be discussed with you before you are discharged. Any further testing or procedures will be scheduled for you either before you leave or you will be called with these appointments.       If you should have any questions, concerns, or need to change the date of your procedure, please call  SOFIE Callahan @ [phone]    Special  Instructions:  Hold Victoza and Januvia the day of the procedure  Stop Metformin the day before, day of and 2 days after the procedure(Sunday, Monday, Tuesday and Wednesday)  Drink plenty of water the day before  And after the procedure        THE ABOVE INSTRUCTIONS WERE GIVEN TO THE PATIENT VERBALLY AND THEY VERBALIZED UNDERSTANDING.  THEY DO NOT REQUIRE ANY SPECIAL NEEDS AND DO NOT HAVE ANY LEARNING BARRIERS.          Directions for Reporting to Cardiology Waiting Area in the Hospital  If you park in the Parking Garage:  Take elevators to the1st floor of the parking garage.  Continue past the gift shop, coffee shop, and piano.  Take a right and go to the gold elevators. (Elevator B)  Take the elevator to the 3rd floor.  Follow the arrow on the sign on the wall that says Cath Lab Registration/EP Lab Registration.  Follow the long hallway all the way around until you come to a big open area.  This is the registration area.  Check in at Reception Desk.    OR    If family is dropping you off:  Have them drop you off at the front of the Hospital under the green overhang.  Enter through the doors and take a right.  Take the E elevators to the 3rd floor Cardiology Waiting Area.  Check in at the Reception Desk in the waiting room.

## 2017-11-16 NOTE — PROGRESS NOTES
"Patient is a 67 y.o. female with a history of dyslipidemia, PAD, DM2, and aortic valve stenosis. She reports frequent chest pain for at least the last 8 months.  The patient also reports progressive exertional dyspnea, decreasing energy, and increasing fatigue.  Her chest pain is exacerbated with physical exertion.  She denies LE edema, but reports 2 pillow orthopnea and daily PND.  She denies syncope, but reports progressive "dizziness."   She is an active smoker. She has a history of lung cancer and is s/p RML 3/2016.  Of note the patient reports a h/o watery diarrhea for which she is undergoing GI evauation.  She also reports having lost 10 lbs over the last 2 months unintentionally. CT surgery initially saw her in September.  At that time we recommended getting her lung stuff sorted out with her oncologist.    Allergies: reviewed  Medications: reviewed  Past medical history/past surgical history unchanged from H&P in 9/2017  ROS: unchanged from previous H&P and as stated above    PE:  Physical Exam   Constitutional: Patient appears well-developed and well-nourished.   HENT:   Head: Normocephalic and atraumatic.   Eyes: Pupils are equal, round, and reactive to light.   Neck: Normal range of motion. Neck supple.   Cardiovascular: Normal rate, regular rhythm and normal heart sounds.    Pulmonary/Chest: Effort normal and breath sounds normal.   Abdominal: Soft. Bowel sounds are normal.   Musculoskeletal: Normal range of motion.   Neurological: Alert and oriented to person, place, and time.   Skin: Skin is warm and dry.   Psychiatric: Normal mood and affect. Behavior is normal.       Data:  TAVR work-up summary:  ? Echo (8/24/17): LORA= 0.53 cm2, Mean Gradient = 47.6 mmHg, EF= 50%  ? LHC (9/18/17): LM has LI, LAD has LI-- provides collateral flow to PDA, LAD has LI, RCA has  with bridging collaterals  ? STS= 2.6%  ? Frailty: 1/4  PFT's  FEV1 1.8/90%      Assessment:  Severe AS  S/p RML 3/2016 for lung cancer.   " High risk for surgical AVR due to comorbidities      Plan:  Kristina Aguirre is a 26 mm  EvolutR valve candidate via left trans-axillary access       CTS Attending Note:    I have personally taken the history and examined this patient and agree with the NP's note as stated above. Given comorbid conditions, and calcification of ascending aorta, recommend ELISHA. There is significant arch calcification at origin of LSCA which may be an issue for TAx.

## 2017-11-16 NOTE — LETTER
November 16, 2017      Luis Napier MD  [address]  [facility] [address]           Simon Dawson - Cardiovascular Surg  [address]  [facility] [address]  Phone: [phone]          Patient: Kristina Aguirre   MR Number: [id number]   YOB: 1950   Date of Visit: 11/16/2017       Dear Dr. Luis Napier:    Thank you for referring Kristina Aguirre to me for evaluation. Attached you will find relevant portions of my assessment and plan of care.    If you have questions, please do not hesitate to call me. I look forward to following Kristina Aguirre along with you.    Sincerely,    Mode Evans MD    Enclosure  CC:  No Recipients    If you would like to receive this communication electronically, please contact [email or web address] or [phone] to request more information on [employer] Link access.    For providers and/or their staff who would like to refer a patient to [facility], please contact us through our one-stop-shop provider referral line, [facility], at [phone].    If you feel you have received this communication in error or would no longer like to receive these types of communications, please e-mail [email or web address]

## 2017-11-19 NOTE — PROGRESS NOTES
"Subjective:    Patient ID:  Kristina Aguirre is a 67 y.o. female who presents for follow-up of Aortic Stenosis and Coronary Artery Disease    Referring cardiologist: Dr. 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 She reports a h/o daily chest pain for at least the last 8 months.  The patient also reports progressive exertional dyspnea, decreasing energy, and increasing fatigue.  Her chest pain is exacerbated with physical exertion.  She denies LE edema, but reports 2 pillow orthopnea and daily PND.  She denies syncope, but reports progressive "dizziness" that is relieved with bending over.  She denies dizziness when standing from a seated position. She is an active smoker.  Of note the patient reports a h/o watery diarrhea for which she is undergoing GI evauation.   Finally she reports transient loss of vision in the right eye several months ago.  This resolved spontaneously, but she reports poor visual acutiy in both eyes.  She underwent cardiac cath on 9/18/17 and was found to have severe aortic valve stenosis as well as a  of the RCA and 40% stenosis of the mid LAD. In the interim since her last clinic visit on 9/12/17 she denies any new symptoms.        Past Medical History:   Diagnosis Date    Abdominal aortic aneurysm (AAA) without rupture 9/12/2017    Anticoagulant long-term use     Arthritis     Asthma     Bilateral carotid artery stenosis 9/12/2017    Cancer     lung    COPD (chronic obstructive pulmonary disease)     Coronary artery disease of native artery with stable angina pectoris 9/29/2017    Diabetes mellitus     Heart valve disorder     Hyperlipidemia     Hypertension      Past Surgical History:   Procedure Laterality Date    HAND SURGERY Right     KNEE ARTHROSCOPY      LUNG LOBECTOMY Right     middle lobe    mediastinoscopy      SPINAL FUSION      TONSILLECTOMY      WRIST " SURGERY Right      Current Outpatient Prescriptions on File Prior to Visit   Medication Sig Dispense Refill    amlodipine (NORVASC) 10 MG tablet Take 10 mg by mouth once daily.  5    aspirin (ECOTRIN) 81 MG EC tablet Take 1 tablet (81 mg total) by mouth once daily.      cetirizine (ZYRTEC) 10 MG tablet Take 10 mg by mouth once daily.  6    chlorthalidone (HYGROTEN) 25 MG Tab Take 1 tablet (25 mg total) by mouth every morning. 90 tablet 3    clopidogrel (PLAVIX) 75 mg tablet Take 75 mg by mouth once daily.  3    diphenoxylate-atropine 2.5-0.025 mg (LOMOTIL) 2.5-0.025 mg per tablet Take 1 tablet by mouth 4 (four) times daily as needed for Diarrhea.      fenofibrate 160 MG Tab Take 160 mg by mouth once daily.  5    hydrocodone-acetaminophen 10-325mg (NORCO)  mg Tab Take 1 tablet by mouth 3 (three) times daily as needed.  0    JANUVIA 100 mg Tab Take 100 mg by mouth once daily.  3    losartan (COZAAR) 50 MG tablet Take 1 tablet (50 mg total) by mouth every evening. 90 tablet 3    metformin (GLUCOPHAGE) 1000 MG tablet Take 1,000 mg by mouth 2 (two) times daily.  5    NEXIUM 40 mg capsule Take 40 mg by mouth once daily.  5    pravastatin (PRAVACHOL) 40 MG tablet Take 40 mg by mouth every evening.  5     No current facility-administered medications on file prior to visit.      Review of patient's allergies indicates:  No Known Allergies    Social History   Substance Use Topics    Smoking status: Former Smoker     Packs/day: 1.50     Types: Vaping w/o nicotine    Smokeless tobacco: Never Used    Alcohol use No     Family History   Problem Relation Age of Onset    Throat cancer Mother     Heart attack Father     No Known Problems Sister     No Known Problems Brother     No Known Problems Maternal Aunt     No Known Problems Maternal Uncle     No Known Problems Paternal Aunt     No Known Problems Paternal Uncle     No Known Problems Maternal Grandmother     No Known Problems Maternal Grandfather      No Known Problems Paternal Grandmother     No Known Problems Paternal Grandfather     Anemia Neg Hx     Arrhythmia Neg Hx     Asthma Neg Hx     Clotting disorder Neg Hx     Fainting Neg Hx     Heart disease Neg Hx     Heart failure Neg Hx     Hyperlipidemia Neg Hx     Hypertension Neg Hx     Stroke Neg Hx     Atrial Septal Defect Neg Hx          Review of Systems   Constitution: Negative for decreased appetite, diaphoresis, fever, weakness, malaise/fatigue, weight gain and weight loss.   HENT: Negative for congestion, hearing loss, nosebleeds and sore throat.    Eyes: Negative for blurred vision, vision loss in left eye, vision loss in right eye and visual disturbance.   Cardiovascular: Positive for chest pain, claudication, dyspnea on exertion, near-syncope and orthopnea. Negative for irregular heartbeat, leg swelling, palpitations, paroxysmal nocturnal dyspnea and syncope.   Respiratory: Negative for cough, hemoptysis, shortness of breath and wheezing.    Endocrine: Negative for polyuria.   Hematologic/Lymphatic: Negative for bleeding problem. Does not bruise/bleed easily.   Skin: Negative for nail changes, poor wound healing and rash.   Musculoskeletal: Negative for back pain, muscle cramps and myalgias.   Gastrointestinal: Negative for abdominal pain, change in bowel habit, constipation, diarrhea, dysphagia, heartburn, hematemesis, hematochezia, melena, nausea and vomiting.   Genitourinary: Negative for bladder incontinence, dysuria, frequency and hematuria.   Neurological: Negative for focal weakness and headaches.   Psychiatric/Behavioral: Negative for depression.   Allergic/Immunologic: Negative for hives and persistent infections.        Objective:  Vitals:    11/16/17 1356 11/16/17 1359   BP: (!) 120/58 (!) 119/55   BP Location: Right arm Left arm   Patient Position: Sitting Sitting   BP Method: Large (Automatic) Large (Automatic)   Pulse: 70 69   SpO2: (!) 93% (!) 93%   Weight: 66.1 kg  "(145 lb 11.6 oz) 66.1 kg (145 lb 11.6 oz)   Height: 5' 1.5" (1.562 m) 5' 1.5" (1.562 m)         Physical Exam   Constitutional: She appears well-developed and well-nourished.   HENT:   Head: Normocephalic and atraumatic.   Eyes: EOM are normal. Pupils are equal, round, and reactive to light. Right eye exhibits no discharge. No scleral icterus.   Neck: No JVD present. No thyromegaly present.   Cardiovascular: Normal rate and regular rhythm.  PMI is not displaced.  Exam reveals no gallop.    Murmur heard.   Harsh midsystolic murmur is present with a grade of 6/6  at the upper right sternal border radiating to the neck Late peaking  Pulses:       Carotid pulses are 2+ on the right side, and 2+ on the left side.       Radial pulses are 2+ on the right side, and 2+ on the left side.        Femoral pulses are 2+ on the right side, and 2+ on the left side.       Dorsalis pedis pulses are 2+ on the right side, and 2+ on the left side.        Posterior tibial pulses are 2+ on the right side, and 2+ on the left side.   Pulmonary/Chest: Effort normal and breath sounds normal.   Abdominal: Soft. Bowel sounds are normal. There is no hepatosplenomegaly. There is no tenderness.   Lymphadenopathy:     She has no cervical adenopathy.   Neurological: She is alert. Gait normal.   Skin: Skin is warm, dry and intact. She is not diaphoretic.   Psychiatric: She has a normal mood and affect.         Assessment:       1. Angina, class III    2. Abdominal aortic aneurysm (AAA) without rupture    3. Bilateral carotid artery stenosis    4. Essential hypertension    5. Nonrheumatic aortic valve stenosis    6. Coronary artery disease of native artery of native heart with stable angina pectoris    7. PAD (peripheral artery disease)    8. Tobacco abuse    9. Gastroesophageal reflux disease without esophagitis    10. Dyslipidemia    11. Type 2 diabetes mellitus with diabetic peripheral angiopathy without gangrene, without long-term current use of " insulin         Plan:       1) Aortic valve stenosis.  The patient was referred to the valve team after her diagnostic cath on 9/18/17.  She was deemed to be at high risk for sAVR and TAVR was recommended.  However in preparation for TAVR the valve team has requested RCA  PCI.   The risks, benefits, and alternatives of cardiac catheterization and  PCI were discussed with the patient.  All questions were answered and informed consent was obtained.  -Bilateral 6Fr CFA access; upsize to 8Fr  -continue EC ASA 81mg po  qday  -continue Plavix 75mg poq day  -will perform left subclavian artery angiogram a time of PCI at request of vave team for possible left subclvian access during TAVR    2) CAD. As above    3) Possible left subclavian artery stenosis. As above    5) HTN. Blood pressure adequately controlled in clinic today; continue current medications     6) Dyslipidemia. patient is on pravachol 40mg po qday as well as fenofibrate and Lovaza; rec hert healthy diet     7) AAA. follow-up with Dr. Azul     8) H/o carotid stenosis. caroti duplex report datd 8/24/17 report R ICA PSV of 113.9cm/s and L ICA PSV of 143.5 cm/s; patient's transient right eye vision loss may have been amaurosis fugax; agree with statin, continue aspirin as above continue Plavix     9) DM2. rec tight glycemic control; hold metformin 24 hours prior to cath and 48 hours post cath     10) Tobacco abuse. Smoking cessation counseling given     11) H/o COPD. stable     12) H/o diarrhea. Patient reports she has f/u with GI     All of the patient's questions were answered.

## 2017-11-30 ENCOUNTER — DOCUMENTATION ONLY (OUTPATIENT)
Dept: ELECTROPHYSIOLOGY | Facility: CLINIC | Age: 67
End: 2017-11-30

## 2017-12-04 ENCOUNTER — HOSPITAL ENCOUNTER (OUTPATIENT)
Facility: HOSPITAL | Age: 67
Discharge: HOME | End: 2017-12-05
Attending: INTERNAL MEDICINE | Admitting: INTERNAL MEDICINE
Payer: MEDICARE

## 2017-12-04 DIAGNOSIS — I25.10 CORONARY ARTERY DISEASE INVOLVING NATIVE CORONARY ARTERY OF NATIVE HEART WITHOUT ANGINA PECTORIS: Primary | ICD-10-CM

## 2017-12-04 DIAGNOSIS — I65.23 OCCLUSION AND STENOSIS OF BILATERAL CAROTID ARTERIES: ICD-10-CM

## 2017-12-04 DIAGNOSIS — I20.9 ANGINA, CLASS III: ICD-10-CM

## 2017-12-04 LAB
ABO + RH BLD: NORMAL
ANION GAP SERPL CALC-SCNC: 11 MMOL/L
BASOPHILS # BLD AUTO: 0.06 K/UL
BASOPHILS NFR BLD: 0.8 %
BLD GP AB SCN CELLS X3 SERPL QL: NORMAL
BUN SERPL-MCNC: 14 MG/DL
CALCIUM SERPL-MCNC: 9.1 MG/DL
CHLORIDE SERPL-SCNC: 100 MMOL/L
CO2 SERPL-SCNC: 28 MMOL/L
CREAT SERPL-MCNC: 0.8 MG/DL
DIFFERENTIAL METHOD: ABNORMAL
EOSINOPHIL # BLD AUTO: 0.4 K/UL
EOSINOPHIL NFR BLD: 5.4 %
ERYTHROCYTE [DISTWIDTH] IN BLOOD BY AUTOMATED COUNT: 14.4 %
EST. GFR  (AFRICAN AMERICAN): >60 ML/MIN/1.73 M^2
EST. GFR  (NON AFRICAN AMERICAN): >60 ML/MIN/1.73 M^2
GLUCOSE SERPL-MCNC: 122 MG/DL
HCT VFR BLD AUTO: 30 %
HGB BLD-MCNC: 9.8 G/DL
IMM GRANULOCYTES # BLD AUTO: 0.04 K/UL
IMM GRANULOCYTES NFR BLD AUTO: 0.5 %
LYMPHOCYTES # BLD AUTO: 1.7 K/UL
LYMPHOCYTES NFR BLD: 21.8 %
MCH RBC QN AUTO: 25.8 PG
MCHC RBC AUTO-ENTMCNC: 32.7 G/DL
MCV RBC AUTO: 79 FL
MONOCYTES # BLD AUTO: 0.5 K/UL
MONOCYTES NFR BLD: 6.4 %
NEUTROPHILS # BLD AUTO: 5.1 K/UL
NEUTROPHILS NFR BLD: 65.1 %
NRBC BLD-RTO: 0 /100 WBC
PLATELET # BLD AUTO: 236 K/UL
PMV BLD AUTO: 9.9 FL
POCT GLUCOSE: 129 MG/DL (ref 70–110)
POCT GLUCOSE: 95 MG/DL (ref 70–110)
POTASSIUM SERPL-SCNC: 3 MMOL/L
RBC # BLD AUTO: 3.8 M/UL
SODIUM SERPL-SCNC: 139 MMOL/L
WBC # BLD AUTO: 7.8 K/UL

## 2017-12-04 PROCEDURE — 25000003 PHARM REV CODE 250

## 2017-12-04 PROCEDURE — C9607 PERC D-E COR REVASC CHRO SIN: HCPCS | Mod: RC

## 2017-12-04 PROCEDURE — 92978 ENDOLUMINL IVUS OCT C 1ST: CPT | Mod: 26,GC,, | Performed by: INTERNAL MEDICINE

## 2017-12-04 PROCEDURE — 82962 GLUCOSE BLOOD TEST: CPT

## 2017-12-04 PROCEDURE — 80048 BASIC METABOLIC PNL TOTAL CA: CPT

## 2017-12-04 PROCEDURE — 63600175 PHARM REV CODE 636 W HCPCS: Performed by: INTERNAL MEDICINE

## 2017-12-04 PROCEDURE — 85025 COMPLETE CBC W/AUTO DIFF WBC: CPT

## 2017-12-04 PROCEDURE — C1753 CATH, INTRAVAS ULTRASOUND: HCPCS

## 2017-12-04 PROCEDURE — 25000003 PHARM REV CODE 250: Performed by: INTERNAL MEDICINE

## 2017-12-04 PROCEDURE — 92943 PRQ TRLUML REVSC CH OCC ANT: CPT | Mod: RC,GC,, | Performed by: INTERNAL MEDICINE

## 2017-12-04 PROCEDURE — 25000242 PHARM REV CODE 250 ALT 637 W/ HCPCS: Performed by: INTERNAL MEDICINE

## 2017-12-04 PROCEDURE — 93010 ELECTROCARDIOGRAM REPORT: CPT | Mod: 77,,, | Performed by: INTERNAL MEDICINE

## 2017-12-04 PROCEDURE — 86900 BLOOD TYPING SEROLOGIC ABO: CPT

## 2017-12-04 PROCEDURE — 93010 ELECTROCARDIOGRAM REPORT: CPT | Mod: ,,, | Performed by: INTERNAL MEDICINE

## 2017-12-04 PROCEDURE — 99152 MOD SED SAME PHYS/QHP 5/>YRS: CPT | Mod: GC,,, | Performed by: INTERNAL MEDICINE

## 2017-12-04 PROCEDURE — 99152 MOD SED SAME PHYS/QHP 5/>YRS: CPT

## 2017-12-04 PROCEDURE — 63600175 PHARM REV CODE 636 W HCPCS

## 2017-12-04 PROCEDURE — 86850 RBC ANTIBODY SCREEN: CPT

## 2017-12-04 PROCEDURE — 93005 ELECTROCARDIOGRAM TRACING: CPT

## 2017-12-04 RX ORDER — SODIUM CHLORIDE 9 MG/ML
3 INJECTION, SOLUTION INTRAVENOUS CONTINUOUS
Status: ACTIVE | OUTPATIENT
Start: 2017-12-04 | End: 2017-12-04

## 2017-12-04 RX ORDER — LOSARTAN POTASSIUM AND HYDROCHLOROTHIAZIDE 25; 100 MG/1; MG/1
1 TABLET ORAL DAILY
Status: DISCONTINUED | OUTPATIENT
Start: 2017-12-05 | End: 2017-12-05 | Stop reason: HOSPADM

## 2017-12-04 RX ORDER — FLUTICASONE FUROATE AND VILANTEROL 100; 25 UG/1; UG/1
1 POWDER RESPIRATORY (INHALATION) DAILY
Status: DISCONTINUED | OUTPATIENT
Start: 2017-12-05 | End: 2017-12-05 | Stop reason: HOSPADM

## 2017-12-04 RX ORDER — DIPHENHYDRAMINE HCL 50 MG
50 CAPSULE ORAL ONCE
Status: COMPLETED | OUTPATIENT
Start: 2017-12-04 | End: 2017-12-04

## 2017-12-04 RX ORDER — CLOPIDOGREL BISULFATE 75 MG/1
75 TABLET ORAL DAILY
Status: DISCONTINUED | OUTPATIENT
Start: 2017-12-05 | End: 2017-12-05 | Stop reason: HOSPADM

## 2017-12-04 RX ORDER — PRAVASTATIN SODIUM 40 MG/1
40 TABLET ORAL NIGHTLY
Status: DISCONTINUED | OUTPATIENT
Start: 2017-12-04 | End: 2017-12-05 | Stop reason: HOSPADM

## 2017-12-04 RX ORDER — IBUPROFEN 200 MG
16 TABLET ORAL
Status: DISCONTINUED | OUTPATIENT
Start: 2017-12-04 | End: 2017-12-05 | Stop reason: HOSPADM

## 2017-12-04 RX ORDER — POTASSIUM CHLORIDE 20 MEQ/1
40 TABLET, EXTENDED RELEASE ORAL ONCE
Status: COMPLETED | OUTPATIENT
Start: 2017-12-04 | End: 2017-12-04

## 2017-12-04 RX ORDER — POTASSIUM CHLORIDE 20 MEQ/15ML
40 SOLUTION ORAL ONCE
Status: DISCONTINUED | OUTPATIENT
Start: 2017-12-04 | End: 2017-12-04

## 2017-12-04 RX ORDER — INSULIN ASPART 100 [IU]/ML
0-5 INJECTION, SOLUTION INTRAVENOUS; SUBCUTANEOUS
Status: DISCONTINUED | OUTPATIENT
Start: 2017-12-04 | End: 2017-12-05 | Stop reason: HOSPADM

## 2017-12-04 RX ORDER — FENOFIBRATE 160 MG/1
160 TABLET ORAL DAILY
Status: DISCONTINUED | OUTPATIENT
Start: 2017-12-05 | End: 2017-12-05 | Stop reason: HOSPADM

## 2017-12-04 RX ORDER — ALBUTEROL SULFATE 90 UG/1
2 AEROSOL, METERED RESPIRATORY (INHALATION) EVERY 4 HOURS PRN
Status: DISCONTINUED | OUTPATIENT
Start: 2017-12-04 | End: 2017-12-05 | Stop reason: HOSPADM

## 2017-12-04 RX ORDER — ASPIRIN 81 MG/1
81 TABLET ORAL DAILY
Status: DISCONTINUED | OUTPATIENT
Start: 2017-12-05 | End: 2017-12-05 | Stop reason: HOSPADM

## 2017-12-04 RX ORDER — SODIUM CHLORIDE 9 MG/ML
1.5 INJECTION, SOLUTION INTRAVENOUS CONTINUOUS
Status: ACTIVE | OUTPATIENT
Start: 2017-12-04 | End: 2017-12-04

## 2017-12-04 RX ORDER — ACETAMINOPHEN 325 MG/1
650 TABLET ORAL EVERY 4 HOURS PRN
Status: DISCONTINUED | OUTPATIENT
Start: 2017-12-04 | End: 2017-12-05 | Stop reason: HOSPADM

## 2017-12-04 RX ORDER — IBUPROFEN 200 MG
24 TABLET ORAL
Status: DISCONTINUED | OUTPATIENT
Start: 2017-12-04 | End: 2017-12-05 | Stop reason: HOSPADM

## 2017-12-04 RX ORDER — DIPHENOXYLATE HYDROCHLORIDE AND ATROPINE SULFATE 2.5; .025 MG/1; MG/1
1 TABLET ORAL 4 TIMES DAILY PRN
Status: DISCONTINUED | OUTPATIENT
Start: 2017-12-04 | End: 2017-12-05 | Stop reason: HOSPADM

## 2017-12-04 RX ORDER — GLUCAGON 1 MG
1 KIT INJECTION
Status: DISCONTINUED | OUTPATIENT
Start: 2017-12-04 | End: 2017-12-05 | Stop reason: HOSPADM

## 2017-12-04 RX ADMIN — SODIUM CHLORIDE 3 ML/KG/HR: 0.9 INJECTION, SOLUTION INTRAVENOUS at 12:12

## 2017-12-04 RX ADMIN — DIPHENHYDRAMINE HYDROCHLORIDE 50 MG: 50 CAPSULE ORAL at 12:12

## 2017-12-04 RX ADMIN — PRAVASTATIN SODIUM 40 MG: 40 TABLET ORAL at 08:12

## 2017-12-04 RX ADMIN — POTASSIUM CHLORIDE 40 MEQ: 1500 TABLET, EXTENDED RELEASE ORAL at 01:12

## 2017-12-04 RX ADMIN — ACETAMINOPHEN 650 MG: 325 TABLET ORAL at 08:12

## 2017-12-04 NOTE — INTERVAL H&P NOTE
The patient has been examined and the H&P has been reviewed:    I concur with the findings and no changes have occurred since H&P was written.   Has had fevers over the weekend. TMax 100.6 a couple days ago, low grade this AM  Has c/o dry non-productive cough the past week, different than normal.  Also has chronic diarrhea.  Will await vitals and CBC results.  NPO since 11:30 PM last night.  Held metformin yesterday.  Here today for PCI of RCA .  Bilateral 6Fr CFA access; upsize to 8Fr.  Will also perform left subclavian artery angiogram at request of valve team for possible left subclvian access during TAVR.  Consents already signed in chart.    Anesthesia/Surgery risks, benefits and alternative options discussed and understood by patient/family.    Active Hospital Problems    Diagnosis  POA    Angina, class III [I20.9]  Yes      Resolved Hospital Problems    Diagnosis Date Resolved POA   No resolved problems to display.

## 2017-12-04 NOTE — PLAN OF CARE
Received report from Edison Gandara. Patient s/p [facility], AAOx3. VSS, no c/o pain or discomfort at this time, resp even and unlabored. Gauze/tegaderm dressing to ekta groin are CDI. No active bleeding. Swelling to l groin noted. MD Bautista at bedside. Resolved. Post procedure protocol reviewed with patient and patient's family. Understanding verbalized. Family members at bedside. Nurse call bell within reach. Will continue to monitor per post procedure protocol.

## 2017-12-04 NOTE — BRIEF OP NOTE
"    Post Cath Note  Referring Physician: John Bautista MD  Procedure: Angioplasty-coronary (N/A)       Access: Right CFA, Left CFA     fo RCA    See full report for further details    Intervention:     Successful PCI of proximal RCA  with 3 X 12 mm and 2.5 X 18 mm FRANCY.  Successful PCI of mid RCA with 2.25 X 22 mm FRANCY.  IVUS utilized for stent sizing.    Closure device: Mynx to L CFA, Perclosure to R CFA    Post Cath Exam:   BP (!) 114/54 (BP Location: Left arm, Patient Position: Lying)   Pulse 69   Temp 98 °F (36.7 °C) (Oral)   Resp 18   Ht 5' 1" (1.549 m)   Wt 67.6 kg (149 lb)   SpO2 95%   Breastfeeding? No   BMI 28.15 kg/m²   No unusual pain, hematoma, thrill or bruit at vascular access site.  Distal pulse present without signs of ischemia.    Recommendations:   - Routine post-cath care  - IVF at 1.5 cc/kg/hr for 4 hrs  - Cardiac rehab referral  - Continue medical management  - Risk factor reduction  - Plavix for at least 1 year and ASA 81 mg indefinitely  - Proceed with TAVR evaluation/scheduling  - Follow-up with outpatient cardiologist    Signed:  Soren Steel MD  Cardiology Fellow, PGY-5  12/4/2017 4:03 PM    "

## 2017-12-04 NOTE — H&P (VIEW-ONLY)
"Subjective:    Patient ID:  Kristina Aguirre is a 67 y.o. female who presents for follow-up of Aortic Stenosis and Coronary Artery Disease    Referring cardiologist: Dr. 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 She reports a h/o daily chest pain for at least the last 8 months.  The patient also reports progressive exertional dyspnea, decreasing energy, and increasing fatigue.  Her chest pain is exacerbated with physical exertion.  She denies LE edema, but reports 2 pillow orthopnea and daily PND.  She denies syncope, but reports progressive "dizziness" that is relieved with bending over.  She denies dizziness when standing from a seated position. She is an active smoker.  Of note the patient reports a h/o watery diarrhea for which she is undergoing GI evauation.   Finally she reports transient loss of vision in the right eye several months ago.  This resolved spontaneously, but she reports poor visual acutiy in both eyes.  She underwent cardiac cath on 9/18/17 and was found to have severe aortic valve stenosis as well as a  of the RCA and 40% stenosis of the mid LAD. In the interim since her last clinic visit on 9/12/17 she denies any new symptoms.        Past Medical History:   Diagnosis Date    Abdominal aortic aneurysm (AAA) without rupture 9/12/2017    Anticoagulant long-term use     Arthritis     Asthma     Bilateral carotid artery stenosis 9/12/2017    Cancer     lung    COPD (chronic obstructive pulmonary disease)     Coronary artery disease of native artery with stable angina pectoris 9/29/2017    Diabetes mellitus     Heart valve disorder     Hyperlipidemia     Hypertension      Past Surgical History:   Procedure Laterality Date    HAND SURGERY Right     KNEE ARTHROSCOPY      LUNG LOBECTOMY Right     middle lobe    mediastinoscopy      SPINAL FUSION      TONSILLECTOMY      WRIST " SURGERY Right      Current Outpatient Prescriptions on File Prior to Visit   Medication Sig Dispense Refill    amlodipine (NORVASC) 10 MG tablet Take 10 mg by mouth once daily.  5    aspirin (ECOTRIN) 81 MG EC tablet Take 1 tablet (81 mg total) by mouth once daily.      cetirizine (ZYRTEC) 10 MG tablet Take 10 mg by mouth once daily.  6    chlorthalidone (HYGROTEN) 25 MG Tab Take 1 tablet (25 mg total) by mouth every morning. 90 tablet 3    clopidogrel (PLAVIX) 75 mg tablet Take 75 mg by mouth once daily.  3    diphenoxylate-atropine 2.5-0.025 mg (LOMOTIL) 2.5-0.025 mg per tablet Take 1 tablet by mouth 4 (four) times daily as needed for Diarrhea.      fenofibrate 160 MG Tab Take 160 mg by mouth once daily.  5    hydrocodone-acetaminophen 10-325mg (NORCO)  mg Tab Take 1 tablet by mouth 3 (three) times daily as needed.  0    JANUVIA 100 mg Tab Take 100 mg by mouth once daily.  3    losartan (COZAAR) 50 MG tablet Take 1 tablet (50 mg total) by mouth every evening. 90 tablet 3    metformin (GLUCOPHAGE) 1000 MG tablet Take 1,000 mg by mouth 2 (two) times daily.  5    NEXIUM 40 mg capsule Take 40 mg by mouth once daily.  5    pravastatin (PRAVACHOL) 40 MG tablet Take 40 mg by mouth every evening.  5     No current facility-administered medications on file prior to visit.      Review of patient's allergies indicates:  No Known Allergies    Social History   Substance Use Topics    Smoking status: Former Smoker     Packs/day: 1.50     Types: Vaping w/o nicotine    Smokeless tobacco: Never Used    Alcohol use No     Family History   Problem Relation Age of Onset    Throat cancer Mother     Heart attack Father     No Known Problems Sister     No Known Problems Brother     No Known Problems Maternal Aunt     No Known Problems Maternal Uncle     No Known Problems Paternal Aunt     No Known Problems Paternal Uncle     No Known Problems Maternal Grandmother     No Known Problems Maternal Grandfather      No Known Problems Paternal Grandmother     No Known Problems Paternal Grandfather     Anemia Neg Hx     Arrhythmia Neg Hx     Asthma Neg Hx     Clotting disorder Neg Hx     Fainting Neg Hx     Heart disease Neg Hx     Heart failure Neg Hx     Hyperlipidemia Neg Hx     Hypertension Neg Hx     Stroke Neg Hx     Atrial Septal Defect Neg Hx          Review of Systems   Constitution: Negative for decreased appetite, diaphoresis, fever, weakness, malaise/fatigue, weight gain and weight loss.   HENT: Negative for congestion, hearing loss, nosebleeds and sore throat.    Eyes: Negative for blurred vision, vision loss in left eye, vision loss in right eye and visual disturbance.   Cardiovascular: Positive for chest pain, claudication, dyspnea on exertion, near-syncope and orthopnea. Negative for irregular heartbeat, leg swelling, palpitations, paroxysmal nocturnal dyspnea and syncope.   Respiratory: Negative for cough, hemoptysis, shortness of breath and wheezing.    Endocrine: Negative for polyuria.   Hematologic/Lymphatic: Negative for bleeding problem. Does not bruise/bleed easily.   Skin: Negative for nail changes, poor wound healing and rash.   Musculoskeletal: Negative for back pain, muscle cramps and myalgias.   Gastrointestinal: Negative for abdominal pain, change in bowel habit, constipation, diarrhea, dysphagia, heartburn, hematemesis, hematochezia, melena, nausea and vomiting.   Genitourinary: Negative for bladder incontinence, dysuria, frequency and hematuria.   Neurological: Negative for focal weakness and headaches.   Psychiatric/Behavioral: Negative for depression.   Allergic/Immunologic: Negative for hives and persistent infections.        Objective:  Vitals:    11/16/17 1356 11/16/17 1359   BP: (!) 120/58 (!) 119/55   BP Location: Right arm Left arm   Patient Position: Sitting Sitting   BP Method: Large (Automatic) Large (Automatic)   Pulse: 70 69   SpO2: (!) 93% (!) 93%   Weight: 66.1 kg  "(145 lb 11.6 oz) 66.1 kg (145 lb 11.6 oz)   Height: 5' 1.5" (1.562 m) 5' 1.5" (1.562 m)         Physical Exam   Constitutional: She appears well-developed and well-nourished.   HENT:   Head: Normocephalic and atraumatic.   Eyes: EOM are normal. Pupils are equal, round, and reactive to light. Right eye exhibits no discharge. No scleral icterus.   Neck: No JVD present. No thyromegaly present.   Cardiovascular: Normal rate and regular rhythm.  PMI is not displaced.  Exam reveals no gallop.    Murmur heard.   Harsh midsystolic murmur is present with a grade of 6/6  at the upper right sternal border radiating to the neck Late peaking  Pulses:       Carotid pulses are 2+ on the right side, and 2+ on the left side.       Radial pulses are 2+ on the right side, and 2+ on the left side.        Femoral pulses are 2+ on the right side, and 2+ on the left side.       Dorsalis pedis pulses are 2+ on the right side, and 2+ on the left side.        Posterior tibial pulses are 2+ on the right side, and 2+ on the left side.   Pulmonary/Chest: Effort normal and breath sounds normal.   Abdominal: Soft. Bowel sounds are normal. There is no hepatosplenomegaly. There is no tenderness.   Lymphadenopathy:     She has no cervical adenopathy.   Neurological: She is alert. Gait normal.   Skin: Skin is warm, dry and intact. She is not diaphoretic.   Psychiatric: She has a normal mood and affect.         Assessment:       1. Angina, class III    2. Abdominal aortic aneurysm (AAA) without rupture    3. Bilateral carotid artery stenosis    4. Essential hypertension    5. Nonrheumatic aortic valve stenosis    6. Coronary artery disease of native artery of native heart with stable angina pectoris    7. PAD (peripheral artery disease)    8. Tobacco abuse    9. Gastroesophageal reflux disease without esophagitis    10. Dyslipidemia    11. Type 2 diabetes mellitus with diabetic peripheral angiopathy without gangrene, without long-term current use of " insulin         Plan:       1) Aortic valve stenosis.  The patient was referred to the valve team after her diagnostic cath on 9/18/17.  She was deemed to be at high risk for sAVR and TAVR was recommended.  However in preparation for TAVR the valve team has requested RCA  PCI.   The risks, benefits, and alternatives of cardiac catheterization and  PCI were discussed with the patient.  All questions were answered and informed consent was obtained.  -Bilateral 6Fr CFA access; upsize to 8Fr  -continue EC ASA 81mg po  qday  -continue Plavix 75mg poq day  -will perform left subclavian artery angiogram a time of PCI at request of vave team for possible left subclvian access during TAVR    2) CAD. As above    3) Possible left subclavian artery stenosis. As above    5) HTN. Blood pressure adequately controlled in clinic today; continue current medications     6) Dyslipidemia. patient is on pravachol 40mg po qday as well as fenofibrate and Lovaza; rec hert healthy diet     7) AAA. follow-up with Dr. Azul     8) H/o carotid stenosis. caroti duplex report datd 8/24/17 report R ICA PSV of 113.9cm/s and L ICA PSV of 143.5 cm/s; patient's transient right eye vision loss may have been amaurosis fugax; agree with statin, continue aspirin as above continue Plavix     9) DM2. rec tight glycemic control; hold metformin 24 hours prior to cath and 48 hours post cath     10) Tobacco abuse. Smoking cessation counseling given     11) H/o COPD. stable     12) H/o diarrhea. Patient reports she has f/u with GI     All of the patient's questions were answered.

## 2017-12-05 VITALS
TEMPERATURE: 99 F | HEIGHT: 61 IN | RESPIRATION RATE: 18 BRPM | BODY MASS INDEX: 28.13 KG/M2 | SYSTOLIC BLOOD PRESSURE: 143 MMHG | HEART RATE: 72 BPM | WEIGHT: 149 LBS | DIASTOLIC BLOOD PRESSURE: 76 MMHG | OXYGEN SATURATION: 93 %

## 2017-12-05 DIAGNOSIS — Z98.61 POSTSURGICAL PERCUTANEOUS TRANSLUMINAL CORONARY ANGIOPLASTY STATUS: Primary | ICD-10-CM

## 2017-12-05 DIAGNOSIS — I25.10 CORONARY ARTERY DISEASE, ANGINA PRESENCE UNSPECIFIED, UNSPECIFIED VESSEL OR LESION TYPE, UNSPECIFIED WHETHER NATIVE OR TRANSPLANTED HEART: ICD-10-CM

## 2017-12-05 LAB
ANION GAP SERPL CALC-SCNC: 8 MMOL/L
BUN SERPL-MCNC: 10 MG/DL
CALCIUM SERPL-MCNC: 9.6 MG/DL
CHLORIDE SERPL-SCNC: 107 MMOL/L
CO2 SERPL-SCNC: 25 MMOL/L
CREAT SERPL-MCNC: 0.7 MG/DL
EST. GFR  (AFRICAN AMERICAN): >60 ML/MIN/1.73 M^2
EST. GFR  (NON AFRICAN AMERICAN): >60 ML/MIN/1.73 M^2
GLUCOSE SERPL-MCNC: 145 MG/DL
POCT GLUCOSE: 145 MG/DL (ref 70–110)
POTASSIUM SERPL-SCNC: 4.5 MMOL/L
SODIUM SERPL-SCNC: 140 MMOL/L

## 2017-12-05 PROCEDURE — 25000242 PHARM REV CODE 250 ALT 637 W/ HCPCS: Performed by: INTERNAL MEDICINE

## 2017-12-05 PROCEDURE — 25000003 PHARM REV CODE 250: Performed by: INTERNAL MEDICINE

## 2017-12-05 PROCEDURE — 63700000 PHARM REV CODE 250 ALT 637 W/O HCPCS: Performed by: INTERNAL MEDICINE

## 2017-12-05 PROCEDURE — 80048 BASIC METABOLIC PNL TOTAL CA: CPT

## 2017-12-05 PROCEDURE — 36415 COLL VENOUS BLD VENIPUNCTURE: CPT

## 2017-12-05 RX ORDER — METFORMIN HYDROCHLORIDE 1000 MG/1
1000 TABLET ORAL 2 TIMES DAILY
Refills: 5
Start: 2017-12-07 | End: 2018-05-02

## 2017-12-05 RX ADMIN — FLUTICASONE FUROATE AND VILANTEROL TRIFENATATE 1 PUFF: 100; 25 POWDER RESPIRATORY (INHALATION) at 11:12

## 2017-12-05 RX ADMIN — ASPIRIN 81 MG: 81 TABLET, COATED ORAL at 08:12

## 2017-12-05 RX ADMIN — CLOPIDOGREL 75 MG: 75 TABLET, FILM COATED ORAL at 08:12

## 2017-12-05 RX ADMIN — LOSARTAN POTASSIUM AND HYDROCHLOROTHIAZIDE 1 TABLET: 100; 25 TABLET, FILM COATED ORAL at 08:12

## 2017-12-05 RX ADMIN — FENOFIBRATE 160 MG: 160 TABLET ORAL at 11:12

## 2017-12-05 NOTE — NURSING
Pt is discharged with instructions. Iv,telemetry and visi are removed. Vss. Family will transport down to the front of the hospital.

## 2017-12-05 NOTE — CONSULTS
"Cardiac Rehab     Kristina Aguirre   [id number]   12/5/2017     Cardiac Rehab Phase Taught: Phase 1 & 2    Risk Factors-Modifiable: diabetes, hyperlipidemia, hypertension, sedentary lifestyle, tobacco use    Risk Factors-Non modifiable: age    Teaching Method: Verbal, Living with Heart Disease    Understanding: yes    Response: patient is interested in attending Phase II Cardiac rehab.  She lives in [place], so wants to attend at [facility].  Questions answered & encouraged patient to contact rehab staff with any additional questions.  Paper order given to patient.    Comments: S/P PTCA. Discussed cardiac rehab and risk factor modification. Team to refer patient to [facility] Cardiac Rehab Phase II. Educational materials were used in the process and given to the patient. They included "Your Guide to Living with Heart Disease", Phase Two Cardiac Rehabilitation information along with a sample Mediterranean diet.The patient expressed understanding of the teaching and expressed desire to take a role in modifying the risk factors when they return home.    Yolanda Amanda RN  Cardiac Rehab Nurse      "

## 2017-12-05 NOTE — PLAN OF CARE
Problem: Patient Care Overview  Goal: Plan of Care Review  Outcome: Ongoing (interventions implemented as appropriate)  Plan of care reviewed with pt. VS stable, pt requiring O2 while sleeping, O2 sats dropped to 82% RA while sleeping, O2 applied and no further issues. No acute events at this time. No complaints. Americo groin sites c/d/i no hematoma. Pt remains free from falls or injury. Bed low and locked, call light and personal belongings within reach. Will continue to monitor. Mariana Wolf RN

## 2017-12-05 NOTE — DISCHARGE SUMMARY
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 She reports a h/o daily chest pain for at least the last 8 months.  The patient also reports progressive exertional dyspnea, decreasing energy, and increasing fatigue.  Her chest pain is exacerbated with physical exertion.  She denies LE edema, but reports 2 pillow orthopnea and daily PND.  She denies syncope, but reports progressive "dizziness" that is relieved with bending over.  She denies dizziness when standing from a seated position. She is an active smoker.    She underwent cardiac cath on 9/18/17 and was found to have severe aortic valve stenosis as well as a  of the RCA and 40% stenosis of the mid LAD.     Patient presented for outpatient [facility] to address her RCA  which went without complication. Patient had successful PCI of proximal RCA  with 3 X 12 mm and 2.5 X 18 mm FRANCY, and successful PCI of mid RCA with 2.25 X 22 mm FRANCY. Hemostasis was achieved with Perclose of R CFA and Mynx of L CFA. Patient was monitored overnight with no incident. This morning patient's groin access sites were c/d/i, no hematoma. Patient was able to ambulate without limitation this morning. She was feeling well and anticipating discharge home today.    Outpatient Plan:  - Cardiac rehab referral  - Continue medical management  - Risk factor reduction  - Plavix for at least 1 year and ASA 81 mg indefinitely  - May resume Metformin in 2 days  - Proceed with TAVR evaluation/scheduling  - Follow-up with Dr. Coppola    Diet: Cardiac diet    Activity: Ad felicity, wound care " instructions provided    Disposition: Home or Self Care    Discharge Medications:      Medication List      CONTINUE taking these medications    amLODIPine 10 MG tablet  Commonly known as:  NORVASC     aspirin 81 MG EC tablet  Commonly known as:  ECOTRIN  Take 1 tablet (81 mg total) by mouth once daily.     cetirizine 10 MG tablet  Commonly known as:  ZYRTEC     chlorthalidone 25 MG Tab  Commonly known as:  HYGROTEN  Take 1 tablet (25 mg total) by mouth every morning.     clopidogrel 75 mg tablet  Commonly known as:  PLAVIX     diphenoxylate-atropine 2.5-0.025 mg 2.5-0.025 mg per tablet  Commonly known as:  LOMOTIL     fenofibrate 160 MG Tab     hydrocodone-acetaminophen 10-325mg  mg Tab  Commonly known as:  NORCO     JANUVIA 100 MG Tab  Generic drug:  SITagliptin     losartan 50 MG tablet  Commonly known as:  COZAAR  Take 1 tablet (50 mg total) by mouth every evening.     losartan-hydrochlorothiazide 100-25 mg 100-25 mg per tablet  Commonly known as:  HYZAAR     metFORMIN 1000 MG tablet  Commonly known as:  GLUCOPHAGE  Take 1 tablet (1,000 mg total) by mouth 2 (two) times daily.  Start taking on:  12/7/2017     NexIUM 40 MG capsule  Generic drug:  esomeprazole     pravastatin 40 MG tablet  Commonly known as:  PRAVACHOL     SYMBICORT 80-4.5 mcg/actuation Hfaa  Generic drug:  budesonide-formoterol 80-4.5 mcg     TRUEPLUS LANCETS 30 gauge Misc  Generic drug:  lancets     VENTOLIN HFA 90 mcg/actuation inhaler  Generic drug:  albuterol     VICTOZA 3-JOSE 0.6 mg/0.1 mL (18 mg/3 mL) Pnij  Generic drug:  liraglutide 0.6 mg/0.1 mL (18 mg/3 mL) subq PNIJ           Where to Get Your Medications      Information about where to get these medications is not yet available    Ask your nurse or doctor about these medications  · metFORMIN 1000 MG tablet       Follow Up:  Follow-up Information     Peter Coppola MD.    Specialties:  Cardiology, INTERVENTIONAL CARDIOLOGY  Why:  To continue TAVR evaluation  Contact  information:  [address]  [facility] [address]  [phone]

## 2017-12-05 NOTE — NURSING TRANSFER
Nursing Transfer Note      12/4/2017     Transfer To: room 389a    Transfer via stretcher    Transfer with  to O2, cardiac monitoring    Transported by nurses    Medicines sent: no    Chart send with patient: Yes    Notified: nurse    Patient reassessed at: 12/4/17 @ 1826    Upon arrival to floor: cardiac monitor applied, patient oriented to room, call bell in reach and bed in lowest position

## 2017-12-11 ENCOUNTER — CLINICAL SUPPORT (OUTPATIENT)
Dept: CARDIOLOGY | Facility: CLINIC | Age: 67
End: 2017-12-11
Attending: INTERNAL MEDICINE
Payer: MEDICARE

## 2017-12-11 DIAGNOSIS — I25.10 CORONARY ARTERY DISEASE INVOLVING NATIVE CORONARY ARTERY OF NATIVE HEART WITHOUT ANGINA PECTORIS: ICD-10-CM

## 2017-12-11 PROCEDURE — 93931 UPPER EXTREMITY STUDY: CPT | Mod: PBBFAC | Performed by: INTERNAL MEDICINE

## 2017-12-13 LAB
CORONARY STENOSIS: ABNORMAL
CORONARY STENT: YES

## 2017-12-18 ENCOUNTER — TELEPHONE (OUTPATIENT)
Dept: CARDIOLOGY | Facility: CLINIC | Age: 67
End: 2017-12-18

## 2018-01-02 ENCOUNTER — TELEPHONE (OUTPATIENT)
Dept: CARDIOLOGY | Facility: CLINIC | Age: 68
End: 2018-01-02

## 2018-01-02 NOTE — TELEPHONE ENCOUNTER
Pt called this morning asking status of TAVR workup. Was asked if pt had followup with Dr. Howard with [facility]. Pt stated had to cancel last appointment and had not rescheduled yet. Pt stated was going to call for appointment and would have the results faxed over to us after.    Ava Enamorado RN

## 2018-01-03 NOTE — PROGRESS NOTES
Received a phone call from Edda with [employer] [facility]. Multiple attempts were made to contact patient to deliver the 30 day Cardiac event monitor ordered by Dr. Cabezas. Patient did not return any messages left by [employer] nor did they receive a response to the letter that was sent to the patient.Patient monitor was cancelled.

## 2018-02-27 DIAGNOSIS — Z79.01 MONITORING FOR LONG-TERM ANTICOAGULANT USE: ICD-10-CM

## 2018-02-27 DIAGNOSIS — I35.0 AORTIC VALVE STENOSIS, ETIOLOGY OF CARDIAC VALVE DISEASE UNSPECIFIED: Primary | ICD-10-CM

## 2018-02-27 DIAGNOSIS — Z51.81 MONITORING FOR LONG-TERM ANTICOAGULANT USE: ICD-10-CM

## 2018-02-27 DIAGNOSIS — Z79.899 ENCOUNTER FOR LONG-TERM (CURRENT) USE OF MEDICATIONS: ICD-10-CM

## 2018-03-19 ENCOUNTER — DOCUMENTATION ONLY (OUTPATIENT)
Dept: CARDIOTHORACIC SURGERY | Facility: HOSPITAL | Age: 68
End: 2018-03-19

## 2018-03-19 NOTE — PROGRESS NOTES
Subjective:      Patient ID: Kristina Aguirre is a 67 y.o. female.    Chief Complaint: Severe AS    HPI:  Kristina Aguirre is a 67 y.o. female who presents for evaluation for her severe AS. 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 She c/o worsening PLAZA and chest discomfort for at least 6 months. 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 She also endorses intermittent episodes of dizziness/lightheadedness and near syncope.     Review of patient's allergies indicates:  No Known Allergies  Past Medical History:   Diagnosis Date    Abdominal aortic aneurysm (AAA) without rupture 9/12/2017    Anticoagulant long-term use     Arthritis     Asthma     Bilateral carotid artery stenosis 9/12/2017    Cancer     lung    COPD (chronic obstructive pulmonary disease)     Coronary artery disease of native artery with stable angina pectoris 9/29/2017    Diabetes mellitus     Heart valve disorder     Hyperlipidemia     Hypertension      Past Surgical History:   Procedure Laterality Date    HAND SURGERY Right     KNEE ARTHROSCOPY      LUNG LOBECTOMY Right     middle lobe    mediastinoscopy      SPINAL FUSION      TONSILLECTOMY      WRIST SURGERY Right      Family History     Problem Relation (Age of Onset)    Heart attack Father    No Known Problems Sister, Brother, Maternal Aunt, Maternal Uncle, Paternal Aunt, Paternal Uncle, Maternal Grandmother, Maternal Grandfather, Paternal Grandmother, Paternal Grandfather    Throat cancer Mother        Social History     Social History    Marital status:      Spouse name: N/A    Number of children: N/A    Years of education: N/A     Occupational History    Not on file.     Social History Main Topics     Smoking status: Former Smoker     Packs/day: 1.50     Types: Vaping w/o nicotine    Smokeless tobacco: Never Used    Alcohol use No    Drug use: No    Sexual activity: Not Currently     Other Topics Concern    Not on file     Social History Narrative    No narrative on file       Current medications Reviewed    Review of Systems   Constitutional: Positive for fatigue. Negative for activity change, appetite change and fever.   HENT: Negative for congestion, dental problem, sneezing and sore throat.    Eyes: Negative for pain, discharge and visual disturbance.   Respiratory: Positive for shortness of breath. Negative for cough and wheezing.         PLAZA   Cardiovascular: Positive for chest pain. Negative for palpitations and leg swelling.   Gastrointestinal: Negative for abdominal distention, abdominal pain, constipation, diarrhea, nausea and vomiting.   Endocrine: Negative for polydipsia, polyphagia and polyuria.   Genitourinary: Negative for dysuria, frequency and urgency.   Musculoskeletal: Negative for back pain and gait problem.   Skin: Negative for rash and wound.   Neurological: Positive for dizziness and light-headedness. Negative for seizures, syncope and headaches.   Hematological: Does not bruise/bleed easily.   Psychiatric/Behavioral: Negative for agitation and confusion. The patient is not nervous/anxious.      Objective:   Physical Exam   Constitutional: She is oriented to person, place, and time. She appears well-developed and well-nourished.   HENT:   Head: Normocephalic and atraumatic.   Eyes: Pupils are equal, round, and reactive to light.   Neck: Normal range of motion. Neck supple.   Cardiovascular: Normal rate and regular rhythm.    Murmur heard.  Pulmonary/Chest: Effort normal and breath sounds normal.   Abdominal: Soft. Bowel sounds are normal.   Musculoskeletal: Normal range of motion.   Neurological: She is alert and oriented to person, place, and time.   Skin: Skin is warm and dry.    Psychiatric: She has a normal mood and affect. Her behavior is normal.     Diagnostic Results:    Echo (8/24/17): LORA= 0.53 cm2, Mean Gradient = 47.6 mmHg, EF= 50%    [facility] 12/4/17  - Right Coronary Artery:             The proximal RCA has chronic total occlusion. There is KRYS 0 flow. There is collateral flow from the RCA (bridging). The remaining portion of the vessel has luminal irregularities (10).                     Lesion Details:   This is a Type C lesion.  The length is 10-20 mm, eccentric shape, moderate proximal tortuosity, segment angulation of 45-90 degrees, irregular contour, moderate to heavy calcification. Bifurcation is present. The   minimum luminal diameter is 0 millimeters.             The mid RCA has a 75% stenosis. There is KRYS 3 flow. There is collateral flow from the RCA (bridging). The remaining portion of the vessel has luminal irregularities (10).                     Lesion Details:   This is a Type C lesion.  The length is 20mm, eccentric shape, moderate proximal tortuosity, segment angulation of 45-90 degrees, irregular contour, mild to moderate calcification. Bifurcation is present. The minimum   luminal diameter is 0.4 millimeters.             The distal RCA has a 50% stenosis. There is KRYS 3 flow. There is collateral flow from the RCA (bridging). The remaining portion of the vessel has luminal irregularities (10).     - Posterior Lateral Branch:             The PLB has luminal irregularities. There is KRYS 3 flow. There is collateral flow from the LCX (good).     - Posterior Descending Artery:             The PDA has luminal irregularities. There is KRYS 3 flow. There is collateral flow from the LAD (good).    Intervention: Proximal RCA:              The lesion was successfully intervened. Post-stenosis of 0%, post-KRYS 3 flow and TMP grade 3. The vessel was accessed natively.  The following items were used: Cath Mini Trek 1.2x20 Rapid Exchange, 2.0MM 15MM [place] Balloon, Stent  Resolute Rx   2.50x18 (FRANCY), Stent Resolute Rx 3.00x12 (FRANCY) and Cath Ivus [place] [facility] [place].       Mid RCA:              The lesion was successfully intervened. Post-stenosis of 0%, post-KRYS 3 flow and TMP grade 3. The vessel was accessed natively.  The following items were used: Stent Resolute Rx 2.25x22 (FRANCY).    STS 2.6%  Assessment:   Kristina Aguirre is a 67 y.o. female who presents for evaluation for her severe AS. 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   Plan:   Pt is high risk for SAVR based on comorbidities of Lung CA, Emphysema, PAD, Tobacco use

## 2018-03-20 DIAGNOSIS — I35.0 NODULAR CALCIFIC AORTIC VALVE STENOSIS: Primary | ICD-10-CM

## 2018-03-20 RX ORDER — DIPHENHYDRAMINE HCL 25 MG
50 CAPSULE ORAL ONCE
Status: CANCELLED | OUTPATIENT
Start: 2018-03-20 | End: 2018-03-20

## 2018-03-20 RX ORDER — DEXTROSE MONOHYDRATE AND SODIUM CHLORIDE 5; .45 G/100ML; G/100ML
INJECTION, SOLUTION INTRAVENOUS CONTINUOUS
Status: CANCELLED | OUTPATIENT
Start: 2018-03-20

## 2018-03-21 ENCOUNTER — OFFICE VISIT (OUTPATIENT)
Dept: CARDIOTHORACIC SURGERY | Facility: CLINIC | Age: 68
DRG: 266 | End: 2018-03-21
Attending: THORACIC SURGERY (CARDIOTHORACIC VASCULAR SURGERY)
Payer: MEDICARE

## 2018-03-21 ENCOUNTER — ANESTHESIA EVENT (OUTPATIENT)
Dept: SURGERY | Facility: HOSPITAL | Age: 68
DRG: 266 | End: 2018-03-21
Payer: MEDICARE

## 2018-03-21 ENCOUNTER — HOSPITAL ENCOUNTER (OUTPATIENT)
Dept: CARDIOLOGY | Facility: CLINIC | Age: 68
Discharge: HOME | DRG: 266 | End: 2018-03-21
Attending: THORACIC SURGERY (CARDIOTHORACIC VASCULAR SURGERY)
Payer: MEDICARE

## 2018-03-21 ENCOUNTER — HOSPITAL ENCOUNTER (OUTPATIENT)
Dept: VASCULAR SURGERY | Facility: CLINIC | Age: 68
Discharge: HOME | DRG: 266 | End: 2018-03-21
Attending: THORACIC SURGERY (CARDIOTHORACIC VASCULAR SURGERY)
Payer: MEDICARE

## 2018-03-21 VITALS
HEIGHT: 62 IN | OXYGEN SATURATION: 95 % | DIASTOLIC BLOOD PRESSURE: 63 MMHG | BODY MASS INDEX: 27.02 KG/M2 | SYSTOLIC BLOOD PRESSURE: 131 MMHG | WEIGHT: 146.81 LBS | HEART RATE: 70 BPM | TEMPERATURE: 98 F

## 2018-03-21 DIAGNOSIS — I35.0 AORTIC VALVE STENOSIS, ETIOLOGY OF CARDIAC VALVE DISEASE UNSPECIFIED: Primary | ICD-10-CM

## 2018-03-21 DIAGNOSIS — I35.0 AORTIC VALVE STENOSIS, ETIOLOGY OF CARDIAC VALVE DISEASE UNSPECIFIED: ICD-10-CM

## 2018-03-21 PROCEDURE — 99213 OFFICE O/P EST LOW 20 MIN: CPT | Mod: PBBFAC,25 | Performed by: THORACIC SURGERY (CARDIOTHORACIC VASCULAR SURGERY)

## 2018-03-21 PROCEDURE — 99999 PR PBB SHADOW E&M-EST. PATIENT-LVL III: CPT | Mod: PBBFAC,,, | Performed by: THORACIC SURGERY (CARDIOTHORACIC VASCULAR SURGERY)

## 2018-03-21 PROCEDURE — 93880 EXTRACRANIAL BILAT STUDY: CPT | Mod: PBBFAC | Performed by: SURGERY

## 2018-03-21 PROCEDURE — 93880 EXTRACRANIAL BILAT STUDY: CPT | Mod: 26,S$PBB,, | Performed by: SURGERY

## 2018-03-21 PROCEDURE — 93010 ELECTROCARDIOGRAM REPORT: CPT | Mod: S$PBB,,, | Performed by: INTERNAL MEDICINE

## 2018-03-21 PROCEDURE — 93005 ELECTROCARDIOGRAM TRACING: CPT | Mod: PBBFAC | Performed by: INTERNAL MEDICINE

## 2018-03-21 PROCEDURE — 99499 UNLISTED E&M SERVICE: CPT | Mod: S$PBB,,, | Performed by: THORACIC SURGERY (CARDIOTHORACIC VASCULAR SURGERY)

## 2018-03-21 RX ORDER — CALCIUM CITRATE/VITAMIN D3 200MG-6.25
TABLET ORAL
Refills: 3 | COMMUNITY
Start: 2018-02-22

## 2018-03-21 RX ORDER — AMOXICILLIN AND CLAVULANATE POTASSIUM 875; 125 MG/1; MG/1
TABLET, FILM COATED ORAL
Refills: 0 | Status: ON HOLD | COMMUNITY
Start: 2018-01-18 | End: 2018-03-30 | Stop reason: HOSPADM

## 2018-03-21 RX ORDER — ACETAMINOPHEN AND CODEINE PHOSPHATE 120; 12 MG/5ML; MG/5ML
SOLUTION ORAL
Refills: 0 | Status: ON HOLD | COMMUNITY
Start: 2018-01-10 | End: 2018-04-18 | Stop reason: HOSPADM

## 2018-03-21 NOTE — PROGRESS NOTES
Kristina Aguirre presents to clinic for pre op visit for TA-TAVR      HPI:   Kristina Aguirre is a 67 y.o. female who presents for evaluation for her severe AS. 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 She c/o worsening PLAZA and chest discomfort for at least 6 months. 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 She also endorses intermittent episodes of dizziness/lightheadedness and near syncope.     She is tender over LLQ    She presented with acute abdominal pain. She was sent to the ER for evaluation    History of pancreatitis and diverticulitis

## 2018-03-21 NOTE — ANESTHESIA PREPROCEDURE EVALUATION
03/21/2018  Kristina Aguirre is a 67 y.o., female.    Pre-operative evaluation for Procedure(s) (LRB):  REPLACEMENT-VALVE-AORTIC (N/A)    Kristina Aguirre is a 67 y.o. female w/ h/o severe AS going for the above procedure. 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     She c/o worsening PLAZA and chest discomfort for at least 6 months. 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 She also endorses intermittent episodes of dizziness/lightheadedness and near syncope.      At her pre-op clinic visit on 3/21, pt is tender over LLQ, hx/o of pancreatitis and diverticulitis. She was sent to the ER for evaluation       Prev airway: none in system     Patient Active Problem List   Diagnosis    Bilateral carotid artery stenosis    Dyslipidemia    Essential hypertension    Aortic valve stenosis    Tobacco abuse    Abdominal aortic aneurysm (AAA) without rupture    Chronic bronchitis    Gastroesophageal reflux disease without esophagitis    Coronary artery disease of native artery with stable angina pectoris    Malignant neoplasm of right lung    Diabetes mellitus, type 2    PAD (peripheral artery disease)    Centrilobular emphysema    Pre-syncope    Angina, class III    Nodular calcific aortic valve stenosis       Review of patient's allergies indicates:  No Known Allergies     No current facility-administered medications on file prior to encounter.      Current Outpatient Prescriptions on File Prior to Encounter   Medication Sig Dispense Refill    ACETAMINOPHEN WITH CODEINE (ACETAMINOPHEN-CODEINE) 120mg 12mg 5mL Soln TAKE 1 TEASPOON (5  MILLILITERS) BY MOUTH EVERY 6 HOURS AS NEEDED FOR COUGH  0    amlodipine (NORVASC) 10 MG tablet Take 10 mg by mouth once daily.  5    amoxicillin-clavulanate 875-125mg (AUGMENTIN) 875-125 mg per tablet 1 TABLET BY MOUTH EVERY 1 2 HOURS FOR 10 DAYS  0    aspirin (ECOTRIN) 81 MG EC tablet Take 1 tablet (81 mg total) by mouth once daily.      cetirizine (ZYRTEC) 10 MG tablet Take 10 mg by mouth once daily.  6    chlorthalidone (HYGROTEN) 25 MG Tab Take 1 tablet (25 mg total) by mouth every morning. 90 tablet 3    clopidogrel (PLAVIX) 75 mg tablet Take 75 mg by mouth once daily.  3    diphenoxylate-atropine 2.5-0.025 mg (LOMOTIL) 2.5-0.025 mg per tablet Take 1 tablet by mouth 4 (four) times daily as needed for Diarrhea.      fenofibrate 160 MG Tab Take 160 mg by mouth once daily.  5    hydrocodone-acetaminophen 10-325mg (NORCO)  mg Tab Take 1 tablet by mouth 3 (three) times daily as needed.  0    JANUVIA 100 mg Tab Take 100 mg by mouth once daily.  3    losartan (COZAAR) 50 MG tablet Take 1 tablet (50 mg total) by mouth every evening. 90 tablet 3    losartan-hydrochlorothiazide 100-25 mg (HYZAAR) 100-25 mg per tablet Take 1 tablet by mouth once daily.  1    metFORMIN (GLUCOPHAGE) 1000 MG tablet Take 1 tablet (1,000 mg total) by mouth 2 (two) times daily.  5    NEXIUM 40 mg capsule Take 40 mg by mouth once daily.  5    pravastatin (PRAVACHOL) 40 MG tablet Take 40 mg by mouth every evening.  5    SYMBICORT 80-4.5 mcg/actuation HFAA Inhale 2 puffs into the lungs 2 (two) times daily.  1    TRUE METRIX GLUCOSE TEST STRIP Strp test DAILY  3    TRUEPLUS LANCETS 30 gauge Misc USE DAILY when testing  10    VENTOLIN HFA 90 mcg/actuation inhaler Inhale 2 puffs into the lungs every 4 (four) hours as needed.  0    VICTOZA 3-JOSE 0.6 mg/0.1 mL (18 mg/3 mL) PnIj INJECT 1.8 milligrams under the skin EVERY DAY  2       Past Surgical History:   Procedure Laterality Date    HAND SURGERY Right     KNEE  ARTHROSCOPY      LUNG LOBECTOMY Right     middle lobe    mediastinoscopy      SPINAL FUSION      TONSILLECTOMY      WRIST SURGERY Right        Social History     Social History    Marital status:      Spouse name: N/A    Number of children: N/A    Years of education: N/A     Occupational History    Not on file.     Social History Main Topics    Smoking status: Former Smoker     Packs/day: 1.50     Types: Vaping w/o nicotine    Smokeless tobacco: Never Used    Alcohol use No    Drug use: No    Sexual activity: Not Currently     Other Topics Concern    Not on file     Social History Narrative    No narrative on file         Vital Signs Range (Last 24H):  Temp:  [36.6 °C (97.9 °F)-37.1 °C (98.7 °F)]   Pulse:  [70]   Resp:  [16]   BP: (108-131)/(55-63)   SpO2:  [95 %]       CBC:   Recent Labs      03/21/18   1003   WBC  7.98   RBC  4.13   HGB  9.9*   HCT  32.0*   PLT  262   MCV  78*   MCH  24.0*   MCHC  30.9*       CMP:   Recent Labs      03/21/18   1003   NA  142   K  4.1   CL  105   CO2  27   BUN  16   CREATININE  0.7   GLU  117*   CALCIUM  9.7   ALBUMIN  3.6   PROT  7.0   ALKPHOS  68   ALT  6*   AST  21   BILITOT  0.5       INR  Recent Labs      03/21/18   1003   INR  0.9   APTT  <21.0           Diagnostic Studies:      EKG:  Vent. Rate : 067 BPM     Atrial Rate : 067 BPM     P-R Int : 160 ms          QRS Dur : 098 ms      QT Int : 434 ms       P-R-T Axes : 065 057 091 degrees     QTc Int : 458 ms    Normal sinus rhythm  Nonspecific ST and T wave abnormality  Abnormal ECG  When compared with ECG of 04-DEC-2017 16:39,  Nonspecific T wave abnormality now evident in Anterior leads  QT has shortened  Confirmed by Candice CASTRO, Mira ([id number]) on 3/21/2018 1:21:44 PM    Heart Cath:  Patient has a right dominant coronary artery.        - Right Coronary Artery:             The proximal RCA has chronic total occlusion. There is KRYS 0 flow. There is collateral flow from the RCA (bridging). The remaining  portion of the vessel has luminal irregularities (10).                     Lesion Details:   This is a Type C lesion.  The length is 10-20 mm, eccentric shape, moderate proximal tortuosity, segment angulation of 45-90 degrees, irregular contour, moderate to heavy calcification. Bifurcation is present. The   minimum luminal diameter is 0 millimeters.             The mid RCA has a 75% stenosis. There is KRYS 3 flow. There is collateral flow from the RCA (bridging). The remaining portion of the vessel has luminal irregularities (10).                     Lesion Details:   This is a Type C lesion.  The length is 20mm, eccentric shape, moderate proximal tortuosity, segment angulation of 45-90 degrees, irregular contour, mild to moderate calcification. Bifurcation is present. The minimum   luminal diameter is 0.4 millimeters.             The distal RCA has a 50% stenosis. There is KRYS 3 flow. There is collateral flow from the RCA (bridging). The remaining portion of the vessel has luminal irregularities (10).     - Posterior Lateral Branch:             The PLB has luminal irregularities. There is KRYS 3 flow. There is collateral flow from the LCX (good).     - Posterior Descending Artery:             The PDA has luminal irregularities. There is KRYS 3 flow. There is collateral flow from the LAD (good).          Anesthesia Evaluation         Review of Systems  Social:  Social Alcohol Use   Hematology/Oncology:        Oncology Comments: squamous cell CA s/p RML lobectomy with positive PET scan    Cardiovascular:   Hypertension CAD   Angina AAA  bilateral carotid stenosis   Pulmonary:   COPD Asthma    Hepatic/GI:   GERD    Musculoskeletal:  Musculoskeletal Normal    Neurological:  Neurology Normal    Endocrine:   Diabetes    Dermatological:  Skin Normal    Psych:   anxiety          Physical Exam  General:  Obesity    Airway/Jaw/Neck:  Airway Findings: Mouth Opening: Normal Tongue: Normal  General Airway Assessment: Adult   Mallampati: II  TM Distance: Normal, at least 6 cm  Jaw/Neck Findings:  Neck ROM: Normal ROM  Neck Findings: Normal    Eyes/Ears/Nose:  EYES/EARS/NOSE FINDINGS: Normal   Dental:  Dental Findings: Periodontal disease, Severe    Chest/Lungs:  Chest/Lungs Findings: Clear to auscultation, Normal Respiratory Rate     Heart/Vascular:  Heart Findings: Rate: Normal  Rhythm: Regular Rhythm  Sounds: Normal  Heart murmur: negative    Abdomen:  Abdomen Findings:  Tenderness     Musculoskeletal:  Musculoskeletal Findings: Normal   Skin:  Skin Findings: Normal    Mental Status:  Mental Status Findings:  Cooperative, Alert and Oriented, Anxious         Anesthesia Plan  Type of Anesthesia, risks & benefits discussed:  Anesthesia Type:  general  Patient's Preference:   Intra-op Monitoring Plan: standard ASA monitors, central line and arterial line  Intra-op Monitoring Plan Comments:   Post Op Pain Control Plan: multimodal analgesia and per primary service following discharge from PACU  Post Op Pain Control Plan Comments:   Induction:   IV  Beta Blocker:  Patient is not currently on a Beta-Blocker (No further documentation required).       Informed Consent: Patient understands risks and agrees with Anesthesia plan.  Questions answered. Anesthesia consent signed with patient.  ASA Score: 4     Day of Surgery Review of History & Physical:    H&P update referred to the surgeon.         Ready For Surgery From Anesthesia Perspective.

## 2018-03-22 ENCOUNTER — ANESTHESIA (OUTPATIENT)
Dept: SURGERY | Facility: HOSPITAL | Age: 68
DRG: 266 | End: 2018-03-22
Payer: MEDICARE

## 2018-03-22 ENCOUNTER — ANESTHESIA EVENT (OUTPATIENT)
Dept: MEDSURG UNIT | Facility: HOSPITAL | Age: 68
DRG: 266 | End: 2018-03-22
Payer: MEDICARE

## 2018-03-22 ENCOUNTER — HOSPITAL ENCOUNTER (INPATIENT)
Facility: HOSPITAL | Age: 68
LOS: 11 days | Discharge: NURSING, REHAB OR LONG-TERM CARE | DRG: 266 | End: 2018-04-02
Attending: INTERNAL MEDICINE | Admitting: THORACIC SURGERY (CARDIOTHORACIC VASCULAR SURGERY)
Payer: MEDICARE

## 2018-03-22 DIAGNOSIS — I35.0 AORTIC VALVE STENOSIS: ICD-10-CM

## 2018-03-22 DIAGNOSIS — I49.9 ARRHYTHMIA: ICD-10-CM

## 2018-03-22 DIAGNOSIS — I44.7 NEW ONSET LEFT BUNDLE BRANCH BLOCK (LBBB): ICD-10-CM

## 2018-03-22 DIAGNOSIS — Z99.11 ENCOUNTER FOR WEANING FROM VENTILATOR: ICD-10-CM

## 2018-03-22 DIAGNOSIS — I48.91 ATRIAL FIBRILLATION, UNSPECIFIED TYPE: ICD-10-CM

## 2018-03-22 DIAGNOSIS — C34.91 MALIGNANT NEOPLASM OF RIGHT LUNG: ICD-10-CM

## 2018-03-22 DIAGNOSIS — E11.9 DIABETES MELLITUS, TYPE 2: ICD-10-CM

## 2018-03-22 DIAGNOSIS — R55 PRE-SYNCOPE: ICD-10-CM

## 2018-03-22 DIAGNOSIS — E78.5 DYSLIPIDEMIA: ICD-10-CM

## 2018-03-22 DIAGNOSIS — I10 ESSENTIAL HYPERTENSION: ICD-10-CM

## 2018-03-22 DIAGNOSIS — I25.118 CORONARY ARTERY DISEASE OF NATIVE ARTERY WITH STABLE ANGINA PECTORIS: ICD-10-CM

## 2018-03-22 DIAGNOSIS — Z95.3 S/P TAVR (TRANSCATHETER AORTIC VALVE REPLACEMENT), BIOPROSTHETIC: ICD-10-CM

## 2018-03-22 DIAGNOSIS — I35.0 SEVERE AORTIC STENOSIS: Primary | ICD-10-CM

## 2018-03-22 DIAGNOSIS — K21.9 GASTROESOPHAGEAL REFLUX DISEASE WITHOUT ESOPHAGITIS: ICD-10-CM

## 2018-03-22 DIAGNOSIS — E11.51 TYPE 2 DIABETES MELLITUS WITH DIABETIC PERIPHERAL ANGIOPATHY WITHOUT GANGRENE, WITHOUT LONG-TERM CURRENT USE OF INSULIN: ICD-10-CM

## 2018-03-22 DIAGNOSIS — R00.0 TACHYCARDIA: ICD-10-CM

## 2018-03-22 DIAGNOSIS — J43.2 CENTRILOBULAR EMPHYSEMA: ICD-10-CM

## 2018-03-22 DIAGNOSIS — I73.9 PAD (PERIPHERAL ARTERY DISEASE): ICD-10-CM

## 2018-03-22 DIAGNOSIS — I65.23 BILATERAL CAROTID ARTERY STENOSIS: ICD-10-CM

## 2018-03-22 DIAGNOSIS — I35.0 NODULAR CALCIFIC AORTIC VALVE STENOSIS: ICD-10-CM

## 2018-03-22 DIAGNOSIS — I47.20 V-TACH: ICD-10-CM

## 2018-03-22 DIAGNOSIS — I44.2 ATRIOVENTRICULAR BLOCK, COMPLETE: ICD-10-CM

## 2018-03-22 DIAGNOSIS — Z72.0 TOBACCO ABUSE: ICD-10-CM

## 2018-03-22 DIAGNOSIS — I63.89 ACUTE ISCHEMIC MULTIFOCAL MULTIPLE VASCULAR TERRITORIES STROKE: ICD-10-CM

## 2018-03-22 DIAGNOSIS — I25.118 CORONARY ARTERY DISEASE OF NATIVE ARTERY OF NATIVE HEART WITH STABLE ANGINA PECTORIS: ICD-10-CM

## 2018-03-22 DIAGNOSIS — I20.9 ANGINA, CLASS III: ICD-10-CM

## 2018-03-22 DIAGNOSIS — Z95.2 S/P TAVR (TRANSCATHETER AORTIC VALVE REPLACEMENT): ICD-10-CM

## 2018-03-22 DIAGNOSIS — I65.23 OCCLUSION AND STENOSIS OF BILATERAL CAROTID ARTERIES: ICD-10-CM

## 2018-03-22 DIAGNOSIS — I71.40 ABDOMINAL AORTIC ANEURYSM (AAA) WITHOUT RUPTURE: ICD-10-CM

## 2018-03-22 DIAGNOSIS — J42 CHRONIC BRONCHITIS: ICD-10-CM

## 2018-03-22 LAB
ALLENS TEST: ABNORMAL
ANION GAP SERPL CALC-SCNC: 10 MMOL/L
AORTIC ATHEROMA: YES
AORTIC VALVE STENOSIS: ABNORMAL
APTT BLDCRRT: 27 SEC
BASOPHILS # BLD AUTO: 0.1 K/UL
BASOPHILS NFR BLD: 0.9 %
BUN SERPL-MCNC: 17 MG/DL
CALCIUM SERPL-MCNC: 8.7 MG/DL
CHLORIDE SERPL-SCNC: 106 MMOL/L
CO2 SERPL-SCNC: 24 MMOL/L
CREAT SERPL-MCNC: 0.8 MG/DL
DELSYS: ABNORMAL
DIFFERENTIAL METHOD: ABNORMAL
EOSINOPHIL # BLD AUTO: 0.7 K/UL
EOSINOPHIL NFR BLD: 6.3 %
ERYTHROCYTE [DISTWIDTH] IN BLOOD BY AUTOMATED COUNT: 19 %
EST. GFR  (AFRICAN AMERICAN): >60 ML/MIN/1.73 M^2
EST. GFR  (NON AFRICAN AMERICAN): >60 ML/MIN/1.73 M^2
FIBRINOGEN PPP-MCNC: 406 MG/DL
FIBRINOGEN PPP-MCNC: 421 MG/DL
GLUCOSE SERPL-MCNC: 168 MG/DL
HCO3 UR-SCNC: 25.5 MMOL/L (ref 24–28)
HCT VFR BLD AUTO: 29.5 %
HCT VFR BLD CALC: 27 %PCV (ref 36–54)
HGB BLD-MCNC: 9.2 G/DL
IMM GRANULOCYTES # BLD AUTO: 0.05 K/UL
IMM GRANULOCYTES NFR BLD AUTO: 0.5 %
INR PPP: 0.9
LYMPHOCYTES # BLD AUTO: 1.8 K/UL
LYMPHOCYTES NFR BLD: 16.8 %
MAGNESIUM SERPL-MCNC: 1.4 MG/DL
MCH RBC QN AUTO: 24.1 PG
MCHC RBC AUTO-ENTMCNC: 31.2 G/DL
MCV RBC AUTO: 77 FL
MITRAL VALVE MOBILITY: ABNORMAL
MITRAL VALVE REGURGITATION: ABNORMAL
MONOCYTES # BLD AUTO: 0.6 K/UL
MONOCYTES NFR BLD: 5.8 %
NEUTROPHILS # BLD AUTO: 7.5 K/UL
NEUTROPHILS NFR BLD: 69.7 %
NRBC BLD-RTO: 0 /100 WBC
PCO2 BLDA: 45 MMHG (ref 35–45)
PH SMN: 7.36 [PH] (ref 7.35–7.45)
PHOSPHATE SERPL-MCNC: 4.4 MG/DL
PLATELET # BLD AUTO: 231 K/UL
PMV BLD AUTO: 9.8 FL
PO2 BLDA: 80 MMHG (ref 80–100)
POC ACTIVATED CLOTTING TIME K: 120 SEC (ref 74–137)
POC ACTIVATED CLOTTING TIME K: 213 SEC (ref 74–137)
POC BE: 0 MMOL/L
POC IONIZED CALCIUM: 1.13 MMOL/L (ref 1.06–1.42)
POC SATURATED O2: 95 % (ref 95–100)
POC TCO2: 27 MMOL/L (ref 23–27)
POCT GLUCOSE: 103 MG/DL (ref 70–110)
POCT GLUCOSE: 110 MG/DL (ref 70–110)
POCT GLUCOSE: 111 MG/DL (ref 70–110)
POCT GLUCOSE: 122 MG/DL (ref 70–110)
POCT GLUCOSE: 125 MG/DL (ref 70–110)
POCT GLUCOSE: 158 MG/DL (ref 70–110)
POCT GLUCOSE: 184 MG/DL (ref 70–110)
POCT GLUCOSE: 197 MG/DL (ref 70–110)
POCT GLUCOSE: 199 MG/DL (ref 70–110)
POCT GLUCOSE: 225 MG/DL (ref 70–110)
POCT GLUCOSE: 232 MG/DL (ref 70–110)
POTASSIUM BLD-SCNC: 3.4 MMOL/L (ref 3.5–5.1)
POTASSIUM SERPL-SCNC: 3.7 MMOL/L
POTASSIUM SERPL-SCNC: 3.7 MMOL/L
PROTHROMBIN TIME: 10 SEC
RBC # BLD AUTO: 3.81 M/UL
RETIRED EF AND QEF - SEE NOTES: 50 (ref 55–65)
SAMPLE: ABNORMAL
SAMPLE: ABNORMAL
SAMPLE: NORMAL
SITE: ABNORMAL
SODIUM BLD-SCNC: 141 MMOL/L (ref 136–145)
SODIUM SERPL-SCNC: 140 MMOL/L
WBC # BLD AUTO: 10.75 K/UL

## 2018-03-22 PROCEDURE — 36000713 HC OR TIME LEV V EA ADD 15 MIN: Performed by: INTERNAL MEDICINE

## 2018-03-22 PROCEDURE — 20000000 HC ICU ROOM

## 2018-03-22 PROCEDURE — 25000003 PHARM REV CODE 250: Performed by: STUDENT IN AN ORGANIZED HEALTH CARE EDUCATION/TRAINING PROGRAM

## 2018-03-22 PROCEDURE — S0028 INJECTION, FAMOTIDINE, 20 MG: HCPCS | Performed by: THORACIC SURGERY (CARDIOTHORACIC VASCULAR SURGERY)

## 2018-03-22 PROCEDURE — 25000003 PHARM REV CODE 250: Performed by: INTERNAL MEDICINE

## 2018-03-22 PROCEDURE — C1729 CATH, DRAINAGE: HCPCS | Performed by: INTERNAL MEDICINE

## 2018-03-22 PROCEDURE — 93005 ELECTROCARDIOGRAM TRACING: CPT

## 2018-03-22 PROCEDURE — 37000009 HC ANESTHESIA EA ADD 15 MINS: Performed by: INTERNAL MEDICINE

## 2018-03-22 PROCEDURE — S5010 5% DEXTROSE AND 0.45% SALINE: HCPCS | Performed by: INTERNAL MEDICINE

## 2018-03-22 PROCEDURE — 25000242 PHARM REV CODE 250 ALT 637 W/ HCPCS: Performed by: THORACIC SURGERY (CARDIOTHORACIC VASCULAR SURGERY)

## 2018-03-22 PROCEDURE — 63600175 PHARM REV CODE 636 W HCPCS: Performed by: THORACIC SURGERY (CARDIOTHORACIC VASCULAR SURGERY)

## 2018-03-22 PROCEDURE — 94640 AIRWAY INHALATION TREATMENT: CPT

## 2018-03-22 PROCEDURE — 27000191 HC C-V MONITORING

## 2018-03-22 PROCEDURE — 99223 1ST HOSP IP/OBS HIGH 75: CPT | Mod: ,,, | Performed by: NURSE PRACTITIONER

## 2018-03-22 PROCEDURE — 93010 ELECTROCARDIOGRAM REPORT: CPT | Mod: 76,,, | Performed by: INTERNAL MEDICINE

## 2018-03-22 PROCEDURE — 63600175 PHARM REV CODE 636 W HCPCS

## 2018-03-22 PROCEDURE — 80048 BASIC METABOLIC PNL TOTAL CA: CPT

## 2018-03-22 PROCEDURE — 82330 ASSAY OF CALCIUM: CPT

## 2018-03-22 PROCEDURE — 63600175 PHARM REV CODE 636 W HCPCS: Performed by: STUDENT IN AN ORGANIZED HEALTH CARE EDUCATION/TRAINING PROGRAM

## 2018-03-22 PROCEDURE — 93312 ECHO TRANSESOPHAGEAL: CPT | Mod: 26,,, | Performed by: INTERNAL MEDICINE

## 2018-03-22 PROCEDURE — 82803 BLOOD GASES ANY COMBINATION: CPT

## 2018-03-22 PROCEDURE — 85730 THROMBOPLASTIN TIME PARTIAL: CPT

## 2018-03-22 PROCEDURE — C1768 GRAFT, VASCULAR: HCPCS | Performed by: INTERNAL MEDICINE

## 2018-03-22 PROCEDURE — 5A1213Z PERFORMANCE OF CARDIAC PACING, INTERMITTENT: ICD-10-PCS | Performed by: INTERNAL MEDICINE

## 2018-03-22 PROCEDURE — 84295 ASSAY OF SERUM SODIUM: CPT

## 2018-03-22 PROCEDURE — 84132 ASSAY OF SERUM POTASSIUM: CPT

## 2018-03-22 PROCEDURE — 27000221 HC OXYGEN, UP TO 24 HOURS

## 2018-03-22 PROCEDURE — 93320 DOPPLER ECHO COMPLETE: CPT | Mod: 26,,, | Performed by: INTERNAL MEDICINE

## 2018-03-22 PROCEDURE — 83735 ASSAY OF MAGNESIUM: CPT | Mod: 91

## 2018-03-22 PROCEDURE — 85384 FIBRINOGEN ACTIVITY: CPT

## 2018-03-22 PROCEDURE — 33363 REPLACE AORTIC VALVE OPEN: CPT | Mod: 62,Q0,, | Performed by: INTERNAL MEDICINE

## 2018-03-22 PROCEDURE — 27201037 HC PRESSURE MONITORING SET UP

## 2018-03-22 PROCEDURE — 94761 N-INVAS EAR/PLS OXIMETRY MLT: CPT

## 2018-03-22 PROCEDURE — 27000239 HC STAND-BY BYPASS PUMP

## 2018-03-22 PROCEDURE — 63600175 PHARM REV CODE 636 W HCPCS: Performed by: NURSE PRACTITIONER

## 2018-03-22 PROCEDURE — 93325 DOPPLER ECHO COLOR FLOW MAPG: CPT

## 2018-03-22 PROCEDURE — 99223 1ST HOSP IP/OBS HIGH 75: CPT | Mod: ,,, | Performed by: SURGERY

## 2018-03-22 PROCEDURE — 85610 PROTHROMBIN TIME: CPT

## 2018-03-22 PROCEDURE — 37000008 HC ANESTHESIA 1ST 15 MINUTES: Performed by: INTERNAL MEDICINE

## 2018-03-22 PROCEDURE — 33363 REPLACE AORTIC VALVE OPEN: CPT | Mod: 62,Q0,, | Performed by: THORACIC SURGERY (CARDIOTHORACIC VASCULAR SURGERY)

## 2018-03-22 PROCEDURE — D9220A PRA ANESTHESIA: Mod: Q0,,, | Performed by: ANESTHESIOLOGY

## 2018-03-22 PROCEDURE — 33210 INSERT ELECTRD/PM CATH SNGL: CPT | Mod: 59,Q0,, | Performed by: ANESTHESIOLOGY

## 2018-03-22 PROCEDURE — 93010 ELECTROCARDIOGRAM REPORT: CPT | Mod: ,,, | Performed by: INTERNAL MEDICINE

## 2018-03-22 PROCEDURE — C1769 GUIDE WIRE: HCPCS

## 2018-03-22 PROCEDURE — 36620 INSERTION CATHETER ARTERY: CPT | Mod: 59,Q0,, | Performed by: ANESTHESIOLOGY

## 2018-03-22 PROCEDURE — 97802 MEDICAL NUTRITION INDIV IN: CPT

## 2018-03-22 PROCEDURE — 93325 DOPPLER ECHO COLOR FLOW MAPG: CPT | Mod: 26,,, | Performed by: INTERNAL MEDICINE

## 2018-03-22 PROCEDURE — 25000003 PHARM REV CODE 250: Performed by: THORACIC SURGERY (CARDIOTHORACIC VASCULAR SURGERY)

## 2018-03-22 PROCEDURE — 82962 GLUCOSE BLOOD TEST: CPT | Performed by: INTERNAL MEDICINE

## 2018-03-22 PROCEDURE — 84100 ASSAY OF PHOSPHORUS: CPT

## 2018-03-22 PROCEDURE — 36000712 HC OR TIME LEV V 1ST 15 MIN: Performed by: INTERNAL MEDICINE

## 2018-03-22 PROCEDURE — 37799 UNLISTED PX VASCULAR SURGERY: CPT

## 2018-03-22 PROCEDURE — 85025 COMPLETE CBC W/AUTO DIFF WBC: CPT

## 2018-03-22 PROCEDURE — 02RF38Z REPLACEMENT OF AORTIC VALVE WITH ZOOPLASTIC TISSUE, PERCUTANEOUS APPROACH: ICD-10-PCS | Performed by: INTERNAL MEDICINE

## 2018-03-22 PROCEDURE — 99233 SBSQ HOSP IP/OBS HIGH 50: CPT | Mod: ,,, | Performed by: INTERNAL MEDICINE

## 2018-03-22 PROCEDURE — 85014 HEMATOCRIT: CPT

## 2018-03-22 PROCEDURE — 25000003 PHARM REV CODE 250: Performed by: NURSE PRACTITIONER

## 2018-03-22 DEVICE — VALVE COREVALVE TAV 26MM: Type: IMPLANTABLE DEVICE | Site: HEART | Status: FUNCTIONAL

## 2018-03-22 DEVICE — FELT TEFLON 1INX6IN: Type: IMPLANTABLE DEVICE | Site: HEART | Status: FUNCTIONAL

## 2018-03-22 RX ORDER — ACETAMINOPHEN 10 MG/ML
INJECTION, SOLUTION INTRAVENOUS
Status: DISCONTINUED | OUTPATIENT
Start: 2018-03-22 | End: 2018-03-22

## 2018-03-22 RX ORDER — LIDOCAINE HCL/PF 100 MG/5ML
SYRINGE (ML) INTRAVENOUS
Status: DISCONTINUED | OUTPATIENT
Start: 2018-03-22 | End: 2018-03-22

## 2018-03-22 RX ORDER — ASCORBIC ACID 250 MG
500 TABLET ORAL 2 TIMES DAILY
Status: DISCONTINUED | OUTPATIENT
Start: 2018-03-22 | End: 2018-04-02 | Stop reason: HOSPADM

## 2018-03-22 RX ORDER — NOREPINEPHRINE BITARTRATE/D5W 4MG/250ML
0.02 PLASTIC BAG, INJECTION (ML) INTRAVENOUS CONTINUOUS
Status: DISCONTINUED | OUTPATIENT
Start: 2018-03-22 | End: 2018-03-23

## 2018-03-22 RX ORDER — ROCURONIUM BROMIDE 10 MG/ML
INJECTION, SOLUTION INTRAVENOUS
Status: DISCONTINUED | OUTPATIENT
Start: 2018-03-22 | End: 2018-03-22

## 2018-03-22 RX ORDER — NEOSTIGMINE METHYLSULFATE 1 MG/ML
INJECTION, SOLUTION INTRAVENOUS
Status: DISCONTINUED | OUTPATIENT
Start: 2018-03-22 | End: 2018-03-22

## 2018-03-22 RX ORDER — PROTAMINE SULFATE 10 MG/ML
INJECTION, SOLUTION INTRAVENOUS
Status: DISCONTINUED | OUTPATIENT
Start: 2018-03-22 | End: 2018-03-22

## 2018-03-22 RX ORDER — MAGNESIUM SULFATE/D5W 2 G/50 ML
2 INTRAVENOUS SOLUTION, PIGGYBACK (ML) INTRAVENOUS
Status: DISCONTINUED | OUTPATIENT
Start: 2018-03-22 | End: 2018-03-23

## 2018-03-22 RX ORDER — POTASSIUM CHLORIDE 7.45 MG/ML
20 INJECTION INTRAVENOUS
Status: DISCONTINUED | OUTPATIENT
Start: 2018-03-22 | End: 2018-03-22

## 2018-03-22 RX ORDER — FENTANYL CITRATE 50 UG/ML
INJECTION, SOLUTION INTRAMUSCULAR; INTRAVENOUS
Status: DISCONTINUED | OUTPATIENT
Start: 2018-03-22 | End: 2018-03-22

## 2018-03-22 RX ORDER — DEXMEDETOMIDINE HYDROCHLORIDE 4 UG/ML
0.2 INJECTION, SOLUTION INTRAVENOUS CONTINUOUS
Status: DISCONTINUED | OUTPATIENT
Start: 2018-03-22 | End: 2018-03-23

## 2018-03-22 RX ORDER — POLYETHYLENE GLYCOL 3350 17 G/17G
17 POWDER, FOR SOLUTION ORAL DAILY
Status: DISCONTINUED | OUTPATIENT
Start: 2018-03-22 | End: 2018-04-02 | Stop reason: HOSPADM

## 2018-03-22 RX ORDER — MIDAZOLAM HYDROCHLORIDE 1 MG/ML
INJECTION, SOLUTION INTRAMUSCULAR; INTRAVENOUS
Status: DISCONTINUED | OUTPATIENT
Start: 2018-03-22 | End: 2018-03-22

## 2018-03-22 RX ORDER — ETOMIDATE 2 MG/ML
INJECTION INTRAVENOUS
Status: DISCONTINUED | OUTPATIENT
Start: 2018-03-22 | End: 2018-03-22

## 2018-03-22 RX ORDER — ASPIRIN 325 MG
325 TABLET ORAL DAILY
Status: DISCONTINUED | OUTPATIENT
Start: 2018-03-22 | End: 2018-03-23

## 2018-03-22 RX ORDER — GLYCOPYRROLATE 0.2 MG/ML
INJECTION INTRAMUSCULAR; INTRAVENOUS
Status: DISCONTINUED | OUTPATIENT
Start: 2018-03-22 | End: 2018-03-22

## 2018-03-22 RX ORDER — NICARDIPINE HYDROCHLORIDE 0.2 MG/ML
5 INJECTION INTRAVENOUS CONTINUOUS
Status: DISCONTINUED | OUTPATIENT
Start: 2018-03-22 | End: 2018-03-23

## 2018-03-22 RX ORDER — IPRATROPIUM BROMIDE AND ALBUTEROL SULFATE 2.5; .5 MG/3ML; MG/3ML
3 SOLUTION RESPIRATORY (INHALATION) EVERY 4 HOURS PRN
Status: DISCONTINUED | OUTPATIENT
Start: 2018-03-22 | End: 2018-04-02 | Stop reason: HOSPADM

## 2018-03-22 RX ORDER — HYDRALAZINE HYDROCHLORIDE 20 MG/ML
10 INJECTION INTRAMUSCULAR; INTRAVENOUS EVERY 6 HOURS PRN
Status: DISCONTINUED | OUTPATIENT
Start: 2018-03-22 | End: 2018-03-24

## 2018-03-22 RX ORDER — SODIUM CHLORIDE 9 MG/ML
INJECTION, SOLUTION INTRAVENOUS CONTINUOUS PRN
Status: DISCONTINUED | OUTPATIENT
Start: 2018-03-22 | End: 2018-03-22

## 2018-03-22 RX ORDER — OXYCODONE HYDROCHLORIDE 5 MG/1
5 TABLET ORAL EVERY 4 HOURS PRN
Status: DISCONTINUED | OUTPATIENT
Start: 2018-03-22 | End: 2018-03-24

## 2018-03-22 RX ORDER — ACETAMINOPHEN 650 MG/20.3ML
650 LIQUID ORAL EVERY 6 HOURS PRN
Status: DISCONTINUED | OUTPATIENT
Start: 2018-03-22 | End: 2018-04-02 | Stop reason: HOSPADM

## 2018-03-22 RX ORDER — BISACODYL 10 MG
10 SUPPOSITORY, RECTAL RECTAL EVERY 12 HOURS PRN
Status: DISCONTINUED | OUTPATIENT
Start: 2018-03-22 | End: 2018-03-25

## 2018-03-22 RX ORDER — HYDRALAZINE HYDROCHLORIDE 20 MG/ML
10 INJECTION INTRAMUSCULAR; INTRAVENOUS ONCE
Status: COMPLETED | OUTPATIENT
Start: 2018-03-22 | End: 2018-03-22

## 2018-03-22 RX ORDER — POTASSIUM CHLORIDE 7.45 MG/ML
60 INJECTION INTRAVENOUS
Status: DISCONTINUED | OUTPATIENT
Start: 2018-03-22 | End: 2018-03-22

## 2018-03-22 RX ORDER — CEFAZOLIN SODIUM 1 G/3ML
2 INJECTION, POWDER, FOR SOLUTION INTRAMUSCULAR; INTRAVENOUS
Status: COMPLETED | OUTPATIENT
Start: 2018-03-22 | End: 2018-03-23

## 2018-03-22 RX ORDER — MUPIROCIN 20 MG/G
1 OINTMENT TOPICAL 2 TIMES DAILY
Status: DISPENSED | OUTPATIENT
Start: 2018-03-22 | End: 2018-03-27

## 2018-03-22 RX ORDER — ASPIRIN 325 MG
325 TABLET ORAL ONCE
Status: DISCONTINUED | OUTPATIENT
Start: 2018-03-22 | End: 2018-03-23

## 2018-03-22 RX ORDER — ONDANSETRON 2 MG/ML
4 INJECTION INTRAMUSCULAR; INTRAVENOUS EVERY 12 HOURS PRN
Status: DISCONTINUED | OUTPATIENT
Start: 2018-03-22 | End: 2018-03-25

## 2018-03-22 RX ORDER — PHENYLEPHRINE HYDROCHLORIDE 10 MG/ML
INJECTION INTRAVENOUS
Status: DISCONTINUED | OUTPATIENT
Start: 2018-03-22 | End: 2018-03-22

## 2018-03-22 RX ORDER — LIDOCAINE HYDROCHLORIDE 10 MG/ML
INJECTION, SOLUTION EPIDURAL; INFILTRATION; INTRACAUDAL; PERINEURAL
Status: DISCONTINUED | OUTPATIENT
Start: 2018-03-22 | End: 2018-03-22 | Stop reason: HOSPADM

## 2018-03-22 RX ORDER — SODIUM CHLORIDE 450 MG/100ML
10 INJECTION, SOLUTION INTRAVENOUS CONTINUOUS
Status: DISCONTINUED | OUTPATIENT
Start: 2018-03-22 | End: 2018-03-23

## 2018-03-22 RX ORDER — ONDANSETRON 2 MG/ML
INJECTION INTRAMUSCULAR; INTRAVENOUS
Status: DISCONTINUED | OUTPATIENT
Start: 2018-03-22 | End: 2018-03-22

## 2018-03-22 RX ORDER — POTASSIUM CHLORIDE 7.45 MG/ML
40 INJECTION INTRAVENOUS
Status: DISCONTINUED | OUTPATIENT
Start: 2018-03-22 | End: 2018-03-22

## 2018-03-22 RX ORDER — NICARDIPINE HYDROCHLORIDE 0.2 MG/ML
1 INJECTION INTRAVENOUS CONTINUOUS
Status: DISCONTINUED | OUTPATIENT
Start: 2018-03-22 | End: 2018-03-23

## 2018-03-22 RX ORDER — FAMOTIDINE 10 MG/ML
20 INJECTION INTRAVENOUS 2 TIMES DAILY
Status: DISCONTINUED | OUTPATIENT
Start: 2018-03-22 | End: 2018-03-27

## 2018-03-22 RX ORDER — HEPARIN SODIUM 1000 [USP'U]/ML
INJECTION, SOLUTION INTRAVENOUS; SUBCUTANEOUS
Status: DISCONTINUED | OUTPATIENT
Start: 2018-03-22 | End: 2018-03-22

## 2018-03-22 RX ORDER — DEXTROSE MONOHYDRATE AND SODIUM CHLORIDE 5; .45 G/100ML; G/100ML
INJECTION, SOLUTION INTRAVENOUS CONTINUOUS
Status: DISCONTINUED | OUTPATIENT
Start: 2018-03-22 | End: 2018-03-23

## 2018-03-22 RX ORDER — DIPHENHYDRAMINE HCL 50 MG
50 CAPSULE ORAL ONCE
Status: DISCONTINUED | OUTPATIENT
Start: 2018-03-22 | End: 2018-03-22 | Stop reason: HOSPADM

## 2018-03-22 RX ADMIN — IPRATROPIUM BROMIDE AND ALBUTEROL SULFATE 3 ML: .5; 3 SOLUTION RESPIRATORY (INHALATION) at 10:03

## 2018-03-22 RX ADMIN — PHENYLEPHRINE HYDROCHLORIDE 100 MCG: 10 INJECTION INTRAVENOUS at 08:03

## 2018-03-22 RX ADMIN — CEFAZOLIN 2 G: 330 INJECTION, POWDER, FOR SOLUTION INTRAMUSCULAR; INTRAVENOUS at 04:03

## 2018-03-22 RX ADMIN — PHENYLEPHRINE HYDROCHLORIDE 200 MCG: 10 INJECTION INTRAVENOUS at 08:03

## 2018-03-22 RX ADMIN — ROCURONIUM BROMIDE 20 MG: 10 INJECTION, SOLUTION INTRAVENOUS at 09:03

## 2018-03-22 RX ADMIN — NOREPINEPHRINE BITARTRATE 0.02 MG: 1 INJECTION, SOLUTION, CONCENTRATE INTRAVENOUS at 08:03

## 2018-03-22 RX ADMIN — SODIUM CHLORIDE 1 UNITS/HR: 9 INJECTION, SOLUTION INTRAVENOUS at 08:03

## 2018-03-22 RX ADMIN — FAMOTIDINE 20 MG: 10 INJECTION, SOLUTION INTRAVENOUS at 12:03

## 2018-03-22 RX ADMIN — HEPARIN SODIUM 3000 UNITS: 1000 INJECTION, SOLUTION INTRAVENOUS; SUBCUTANEOUS at 10:03

## 2018-03-22 RX ADMIN — HEPARIN SODIUM 2000 UNITS: 1000 INJECTION, SOLUTION INTRAVENOUS; SUBCUTANEOUS at 10:03

## 2018-03-22 RX ADMIN — Medication 2 G: at 02:03

## 2018-03-22 RX ADMIN — NOREPINEPHRINE BITARTRATE 0.02 MG: 1 INJECTION, SOLUTION, CONCENTRATE INTRAVENOUS at 10:03

## 2018-03-22 RX ADMIN — Medication 500 MG: at 12:03

## 2018-03-22 RX ADMIN — ROCURONIUM BROMIDE 50 MG: 10 INJECTION, SOLUTION INTRAVENOUS at 08:03

## 2018-03-22 RX ADMIN — OXYCODONE HYDROCHLORIDE 5 MG: 5 TABLET ORAL at 10:03

## 2018-03-22 RX ADMIN — NEOSTIGMINE METHYLSULFATE 5 MG: 1 INJECTION INTRAVENOUS at 11:03

## 2018-03-22 RX ADMIN — PROTAMINE SULFATE 50 MG: 10 INJECTION, SOLUTION INTRAVENOUS at 11:03

## 2018-03-22 RX ADMIN — ETOMIDATE 8 MG: 2 INJECTION, SOLUTION INTRAVENOUS at 11:03

## 2018-03-22 RX ADMIN — HEPARIN SODIUM 8000 UNITS: 1000 INJECTION, SOLUTION INTRAVENOUS; SUBCUTANEOUS at 10:03

## 2018-03-22 RX ADMIN — ACETAMINOPHEN 1000 MG: 10 INJECTION, SOLUTION INTRAVENOUS at 11:03

## 2018-03-22 RX ADMIN — NICARDIPINE HYDROCHLORIDE 5 MG/HR: 0.2 INJECTION, SOLUTION INTRAVENOUS at 12:03

## 2018-03-22 RX ADMIN — MUPIROCIN 1 G: 20 OINTMENT TOPICAL at 09:03

## 2018-03-22 RX ADMIN — SODIUM CHLORIDE 10 ML/HR: 0.45 INJECTION, SOLUTION INTRAVENOUS at 12:03

## 2018-03-22 RX ADMIN — LIDOCAINE HYDROCHLORIDE 100 MG: 20 INJECTION, SOLUTION INTRAVENOUS at 08:03

## 2018-03-22 RX ADMIN — DEXTROSE 2 G: 50 INJECTION, SOLUTION INTRAVENOUS at 09:03

## 2018-03-22 RX ADMIN — POLYETHYLENE GLYCOL 3350 17 G: 17 POWDER, FOR SOLUTION ORAL at 12:03

## 2018-03-22 RX ADMIN — MIDAZOLAM HYDROCHLORIDE 1 MG: 1 INJECTION, SOLUTION INTRAMUSCULAR; INTRAVENOUS at 07:03

## 2018-03-22 RX ADMIN — NOREPINEPHRINE BITARTRATE 0.02 MCG/KG/MIN: 1 INJECTION, SOLUTION, CONCENTRATE INTRAVENOUS at 08:03

## 2018-03-22 RX ADMIN — HYDRALAZINE HYDROCHLORIDE 10 MG: 20 INJECTION INTRAMUSCULAR; INTRAVENOUS at 10:03

## 2018-03-22 RX ADMIN — FAMOTIDINE 20 MG: 10 INJECTION, SOLUTION INTRAVENOUS at 08:03

## 2018-03-22 RX ADMIN — Medication 500 MG: at 08:03

## 2018-03-22 RX ADMIN — ONDANSETRON 4 MG: 2 INJECTION INTRAMUSCULAR; INTRAVENOUS at 11:03

## 2018-03-22 RX ADMIN — POTASSIUM CHLORIDE 20 MEQ: 7.46 INJECTION, SOLUTION INTRAVENOUS at 02:03

## 2018-03-22 RX ADMIN — HYDRALAZINE HYDROCHLORIDE 10 MG: 20 INJECTION INTRAMUSCULAR; INTRAVENOUS at 04:03

## 2018-03-22 RX ADMIN — SODIUM CHLORIDE, SODIUM GLUCONATE, SODIUM ACETATE, POTASSIUM CHLORIDE, MAGNESIUM CHLORIDE, SODIUM PHOSPHATE, DIBASIC, AND POTASSIUM PHOSPHATE: .53; .5; .37; .037; .03; .012; .00082 INJECTION, SOLUTION INTRAVENOUS at 08:03

## 2018-03-22 RX ADMIN — HYDRALAZINE HYDROCHLORIDE 10 MG: 20 INJECTION INTRAMUSCULAR; INTRAVENOUS at 12:03

## 2018-03-22 RX ADMIN — MUPIROCIN 1 G: 20 OINTMENT TOPICAL at 12:03

## 2018-03-22 RX ADMIN — FENTANYL CITRATE 100 MCG: 50 INJECTION, SOLUTION INTRAMUSCULAR; INTRAVENOUS at 08:03

## 2018-03-22 RX ADMIN — SODIUM CHLORIDE, SODIUM GLUCONATE, SODIUM ACETATE, POTASSIUM CHLORIDE, MAGNESIUM CHLORIDE, SODIUM PHOSPHATE, DIBASIC, AND POTASSIUM PHOSPHATE: .53; .5; .37; .037; .03; .012; .00082 INJECTION, SOLUTION INTRAVENOUS at 09:03

## 2018-03-22 RX ADMIN — SODIUM CHLORIDE: 0.9 INJECTION, SOLUTION INTRAVENOUS at 07:03

## 2018-03-22 RX ADMIN — DEXTROSE AND SODIUM CHLORIDE: 5; .45 INJECTION, SOLUTION INTRAVENOUS at 12:03

## 2018-03-22 RX ADMIN — LIDOCAINE HYDROCHLORIDE 100 MG: 20 INJECTION, SOLUTION INTRAVENOUS at 10:03

## 2018-03-22 RX ADMIN — GLYCOPYRROLATE 0.6 MG: 0.2 INJECTION, SOLUTION INTRAMUSCULAR; INTRAVENOUS at 11:03

## 2018-03-22 RX ADMIN — ASPIRIN 325 MG ORAL TABLET 325 MG: 325 PILL ORAL at 12:03

## 2018-03-22 RX ADMIN — ETOMIDATE 12 MG: 2 INJECTION, SOLUTION INTRAVENOUS at 08:03

## 2018-03-22 NOTE — ASSESSMENT & PLAN NOTE
Contributing Nutrition Diagnosis  Increased nutrient needs    Related to (etiology):   Physiological causes    Signs and Symptoms (as evidenced by):   S/p TAVR    Nutrition Diagnosis Status:   New

## 2018-03-22 NOTE — HPI
66yo woman with severe As, COPD, hx of lung CA presenting for transaxillary TAVR today. Procedure performed successful, no periprocedural rhythm issues. EP consulted for routing post TAVR evaluation. No prior rhythm issues. EF 50-55%.    Tele: no pacing, no e/o AVB  Baseline EKG: normal sinus rhythm, CA normal,  ms  Post EKG 1: normal sinus rhythm, CA normal, new LBBB ( ms)

## 2018-03-22 NOTE — SUBJECTIVE & OBJECTIVE
Past Medical History:   Diagnosis Date    Abdominal aortic aneurysm (AAA) without rupture 9/12/2017    Anticoagulant long-term use     Arthritis     Asthma     Bilateral carotid artery stenosis 9/12/2017    Cancer     lung    COPD (chronic obstructive pulmonary disease)     Coronary artery disease of native artery with stable angina pectoris 9/29/2017    Diabetes mellitus     Heart valve disorder     Hyperlipidemia     Hypertension        Past Surgical History:   Procedure Laterality Date    HAND SURGERY Right     KNEE ARTHROSCOPY      LUNG LOBECTOMY Right     middle lobe    mediastinoscopy      SPINAL FUSION      TONSILLECTOMY      WRIST SURGERY Right        Review of patient's allergies indicates:  No Known Allergies    Current Facility-Administered Medications on File Prior to Encounter   Medication    [COMPLETED] omnipaque 350 iohexol 75 mL    [DISCONTINUED] 0.9%  NaCl infusion    [DISCONTINUED] acetaminophen (10 mg/mL) injection    [DISCONTINUED] ceFAZolin (ANCEF) 2 g in dextrose 5 % 50 mL IVPB    [DISCONTINUED] electrolyte-S (ISOLYTE)    [DISCONTINUED] electrolyte-S (ISOLYTE)    [DISCONTINUED] etomidate injection    [DISCONTINUED] fentaNYL injection    [DISCONTINUED] glycopyrrolate injection    [DISCONTINUED] heparin (porcine) injection    [DISCONTINUED] lidocaine (cardiac) injection    [DISCONTINUED] midazolam injection    [DISCONTINUED] neostigmine injection    [DISCONTINUED] norepinephrine 4 mg in dextrose 5 % 250 mL infusion    [DISCONTINUED] norepinephrine 4 mg in dextrose 5 % 250 mL infusion    [DISCONTINUED] ondansetron injection    [DISCONTINUED] phenylephrine injection    [DISCONTINUED] protamine injection    [DISCONTINUED] rocuronium injection     Current Outpatient Prescriptions on File Prior to Encounter   Medication Sig    ACETAMINOPHEN WITH CODEINE (ACETAMINOPHEN-CODEINE) 120mg 12mg 5mL Soln TAKE 1 TEASPOON (5 MILLILITERS) BY MOUTH EVERY 6 HOURS AS NEEDED  FOR COUGH    amlodipine (NORVASC) 10 MG tablet Take 10 mg by mouth once daily.    aspirin (ECOTRIN) 81 MG EC tablet Take 1 tablet (81 mg total) by mouth once daily.    cetirizine (ZYRTEC) 10 MG tablet Take 10 mg by mouth once daily.    chlorthalidone (HYGROTEN) 25 MG Tab Take 1 tablet (25 mg total) by mouth every morning.    clopidogrel (PLAVIX) 75 mg tablet Take 75 mg by mouth once daily.    diphenoxylate-atropine 2.5-0.025 mg (LOMOTIL) 2.5-0.025 mg per tablet Take 1 tablet by mouth 4 (four) times daily as needed for Diarrhea.    fenofibrate 160 MG Tab Take 160 mg by mouth once daily.    hydrocodone-acetaminophen 10-325mg (NORCO)  mg Tab Take 1 tablet by mouth 3 (three) times daily as needed.    JANUVIA 100 mg Tab Take 100 mg by mouth once daily.    losartan (COZAAR) 50 MG tablet Take 1 tablet (50 mg total) by mouth every evening.    losartan-hydrochlorothiazide 100-25 mg (HYZAAR) 100-25 mg per tablet Take 1 tablet by mouth once daily.    metFORMIN (GLUCOPHAGE) 1000 MG tablet Take 1 tablet (1,000 mg total) by mouth 2 (two) times daily.    NEXIUM 40 mg capsule Take 40 mg by mouth once daily.    pravastatin (PRAVACHOL) 40 MG tablet Take 40 mg by mouth every evening.    SYMBICORT 80-4.5 mcg/actuation HFAA Inhale 2 puffs into the lungs 2 (two) times daily.    VENTOLIN HFA 90 mcg/actuation inhaler Inhale 2 puffs into the lungs every 4 (four) hours as needed.    amoxicillin-clavulanate 875-125mg (AUGMENTIN) 875-125 mg per tablet 1 TABLET BY MOUTH EVERY 1 2 HOURS FOR 10 DAYS    TRUE METRIX GLUCOSE TEST STRIP Strp test DAILY    TRUEPLUS LANCETS 30 gauge Misc USE DAILY when testing    VICTOZA 3-JOSE 0.6 mg/0.1 mL (18 mg/3 mL) PnIj INJECT 1.8 milligrams under the skin EVERY DAY     Family History     Problem Relation (Age of Onset)    Heart attack Father    No Known Problems Sister, Brother, Maternal Aunt, Maternal Uncle, Paternal Aunt, Paternal Uncle, Maternal Grandmother, Maternal Grandfather,  Paternal Grandmother, Paternal Grandfather    Throat cancer Mother        Social History Main Topics    Smoking status: Former Smoker     Packs/day: 1.50     Types: Vaping w/o nicotine    Smokeless tobacco: Never Used    Alcohol use No    Drug use: No    Sexual activity: Not Currently     Review of Systems   Constitution: Negative for fever and malaise/fatigue.   Cardiovascular: Negative for chest pain, dyspnea on exertion, irregular heartbeat, leg swelling, near-syncope, orthopnea, palpitations, paroxysmal nocturnal dyspnea and syncope.   Respiratory: Negative for cough and shortness of breath.    Skin: Negative for color change and rash.   Gastrointestinal: Negative for abdominal pain, nausea and vomiting.   Neurological: Negative for dizziness, focal weakness and headaches.     Objective:     Vital Signs (Most Recent):  Temp: 98 °F (36.7 °C) (03/22/18 1145)  Pulse: 72 (03/22/18 1315)  Resp: 19 (03/22/18 1315)  BP: (!) 120/44 (03/22/18 0639)  SpO2: 95 % (03/22/18 1315) Vital Signs (24h Range):  Temp:  [97.7 °F (36.5 °C)-98.3 °F (36.8 °C)] 98 °F (36.7 °C)  Pulse:  [65-78] 72  Resp:  [15-19] 19  SpO2:  [93 %-97 %] 95 %  BP: (120-129)/(44-60) 120/44  Arterial Line BP: (135-158)/(42-52) 135/42     Weight: 66.2 kg (146 lb)  Body mass index is 26.7 kg/m².    SpO2: 95 %  O2 Device (Oxygen Therapy): nasal cannula    Physical Exam   Constitutional: She appears well-nourished. No distress.   HENT:   Head: Atraumatic.   Eyes: EOM are normal. No scleral icterus.   Neck: Neck supple. No JVD present.   Cardiovascular: Normal rate, regular rhythm and normal heart sounds.    Pulmonary/Chest: Effort normal. No respiratory distress.   Abdominal: Soft. She exhibits no distension.   Musculoskeletal: She exhibits no edema.   Neurological: She is alert.   somnolent   Skin: Skin is warm and dry. No erythema.       Significant Labs:   CMP:   Recent Labs  Lab 03/21/18  1003 03/21/18  1402 03/22/18  1150    139 140   K 4.1 3.6  3.7  3.7    104 106   CO2 27 26 24   * 95 168*   BUN 16 14 17   CREATININE 0.7 0.7 0.8   CALCIUM 9.7 10.2 8.7   PROT 7.0 7.3  --    ALBUMIN 3.6 3.9  --    BILITOT 0.5 0.5  --    ALKPHOS 68 60  --    AST 21 22  --    ALT 6* 9*  --    ANIONGAP 10 9 10   ESTGFRAFRICA >60.0 >60.0 >60.0   EGFRNONAA >60.0 >60.0 >60.0    and CBC:   Recent Labs  Lab 03/21/18  1003 03/21/18  1402 03/22/18  1150 03/22/18  1222   WBC 7.98 8.11 10.75  --    HGB 9.9* 9.7* 9.2*  --    HCT 32.0* 31.5* 29.5* 27*    287 231  --        Significant Imaging: Echocardiogram: 2D echo with color flow doppler: No results found for this or any previous visit.

## 2018-03-22 NOTE — H&P (VIEW-ONLY)
Subjective:      Patient ID: Kristina Aguirre is a 67 y.o. female.    Chief Complaint: Severe AS    HPI:  Kristina Aguirre is a 67 y.o. female who presents for evaluation for her severe AS. 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 She c/o worsening PLAZA and chest discomfort for at least 6 months. 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 She also endorses intermittent episodes of dizziness/lightheadedness and near syncope.     Review of patient's allergies indicates:  No Known Allergies  Past Medical History:   Diagnosis Date    Abdominal aortic aneurysm (AAA) without rupture 9/12/2017    Anticoagulant long-term use     Arthritis     Asthma     Bilateral carotid artery stenosis 9/12/2017    Cancer     lung    COPD (chronic obstructive pulmonary disease)     Coronary artery disease of native artery with stable angina pectoris 9/29/2017    Diabetes mellitus     Heart valve disorder     Hyperlipidemia     Hypertension      Past Surgical History:   Procedure Laterality Date    HAND SURGERY Right     KNEE ARTHROSCOPY      LUNG LOBECTOMY Right     middle lobe    mediastinoscopy      SPINAL FUSION      TONSILLECTOMY      WRIST SURGERY Right      Family History     Problem Relation (Age of Onset)    Heart attack Father    No Known Problems Sister, Brother, Maternal Aunt, Maternal Uncle, Paternal Aunt, Paternal Uncle, Maternal Grandmother, Maternal Grandfather, Paternal Grandmother, Paternal Grandfather    Throat cancer Mother        Social History     Social History    Marital status:      Spouse name: N/A    Number of children: N/A    Years of education: N/A     Occupational History    Not on file.     Social History Main Topics     Smoking status: Former Smoker     Packs/day: 1.50     Types: Vaping w/o nicotine    Smokeless tobacco: Never Used    Alcohol use No    Drug use: No    Sexual activity: Not Currently     Other Topics Concern    Not on file     Social History Narrative    No narrative on file       Current medications Reviewed    Review of Systems   Constitutional: Positive for fatigue. Negative for activity change, appetite change and fever.   HENT: Negative for congestion, dental problem, sneezing and sore throat.    Eyes: Negative for pain, discharge and visual disturbance.   Respiratory: Positive for shortness of breath. Negative for cough and wheezing.         PLAZA   Cardiovascular: Positive for chest pain. Negative for palpitations and leg swelling.   Gastrointestinal: Negative for abdominal distention, abdominal pain, constipation, diarrhea, nausea and vomiting.   Endocrine: Negative for polydipsia, polyphagia and polyuria.   Genitourinary: Negative for dysuria, frequency and urgency.   Musculoskeletal: Negative for back pain and gait problem.   Skin: Negative for rash and wound.   Neurological: Positive for dizziness and light-headedness. Negative for seizures, syncope and headaches.   Hematological: Does not bruise/bleed easily.   Psychiatric/Behavioral: Negative for agitation and confusion. The patient is not nervous/anxious.      Objective:   Physical Exam   Constitutional: She is oriented to person, place, and time. She appears well-developed and well-nourished.   HENT:   Head: Normocephalic and atraumatic.   Eyes: Pupils are equal, round, and reactive to light.   Neck: Normal range of motion. Neck supple.   Cardiovascular: Normal rate and regular rhythm.    Murmur heard.  Pulmonary/Chest: Effort normal and breath sounds normal.   Abdominal: Soft. Bowel sounds are normal.   Musculoskeletal: Normal range of motion.   Neurological: She is alert and oriented to person, place, and time.   Skin: Skin is warm and dry.    Psychiatric: She has a normal mood and affect. Her behavior is normal.     Diagnostic Results:    Echo (8/24/17): LORA= 0.53 cm2, Mean Gradient = 47.6 mmHg, EF= 50%    [facility] 12/4/17  - Right Coronary Artery:             The proximal RCA has chronic total occlusion. There is KRYS 0 flow. There is collateral flow from the RCA (bridging). The remaining portion of the vessel has luminal irregularities (10).                     Lesion Details:   This is a Type C lesion.  The length is 10-20 mm, eccentric shape, moderate proximal tortuosity, segment angulation of 45-90 degrees, irregular contour, moderate to heavy calcification. Bifurcation is present. The   minimum luminal diameter is 0 millimeters.             The mid RCA has a 75% stenosis. There is KRYS 3 flow. There is collateral flow from the RCA (bridging). The remaining portion of the vessel has luminal irregularities (10).                     Lesion Details:   This is a Type C lesion.  The length is 20mm, eccentric shape, moderate proximal tortuosity, segment angulation of 45-90 degrees, irregular contour, mild to moderate calcification. Bifurcation is present. The minimum   luminal diameter is 0.4 millimeters.             The distal RCA has a 50% stenosis. There is KRYS 3 flow. There is collateral flow from the RCA (bridging). The remaining portion of the vessel has luminal irregularities (10).     - Posterior Lateral Branch:             The PLB has luminal irregularities. There is KRYS 3 flow. There is collateral flow from the LCX (good).     - Posterior Descending Artery:             The PDA has luminal irregularities. There is KRYS 3 flow. There is collateral flow from the LAD (good).    Intervention: Proximal RCA:              The lesion was successfully intervened. Post-stenosis of 0%, post-KRYS 3 flow and TMP grade 3. The vessel was accessed natively.  The following items were used: Cath Mini Trek 1.2x20 Rapid Exchange, 2.0MM 15MM [place] Balloon, Stent  Resolute Rx   2.50x18 (FRANCY), Stent Resolute Rx 3.00x12 (FRANCY) and Cath Ivus [place] [facility] [place].       Mid RCA:              The lesion was successfully intervened. Post-stenosis of 0%, post-KRYS 3 flow and TMP grade 3. The vessel was accessed natively.  The following items were used: Stent Resolute Rx 2.25x22 (FRANCY).    STS 2.6%  Assessment:   Kristina Aguirre is a 67 y.o. female who presents for evaluation for her severe AS. 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   Plan:   Pt is high risk for SAVR based on comorbidities of Lung CA, Emphysema, PAD, Tobacco use

## 2018-03-22 NOTE — ASSESSMENT & PLAN NOTE
Start IV insulin infusion as BG >140 with intensive BG monitoring q1hr;  use standard IV insulin infusion given age.       Discharge planning: recommend d/c Januvia; May resume Victoza and metformin. F/u with PCP for long term DM mgmt.

## 2018-03-22 NOTE — ANESTHESIA PROCEDURE NOTES
[place] [facility]    Diagnosis: AS  Patient location during procedure: done in OR  Procedure start time: 3/22/2018 8:23 AM  Timeout: 3/22/2018 8:23 AM  Procedure end time: 3/22/2018 8:40 AM  Staffing  Anesthesiologist: FRANKI PANG  Resident/CRNA: ATA PAIGE  Other anesthesia staff: JESSIE CHAVARRIA  Performed: resident/CRNA   Anesthesiologist was present at the time of the procedure.  Preanesthetic Checklist  Completed: patient identified, site marked, surgical consent, pre-op evaluation, timeout performed, IV checked, risks and benefits discussed, monitors and equipment checked and anesthesia consent given  [place] [facility]  Skin Prep: chlorhexidine gluconate  Local Infiltration: none  Location: right,  internal jugular vein  Vessel Caliber: small, patent, compressibility normal  Coaxial introducer size: 6Fr Single lumen. manometry used.  Device: transvenous pacing catheter  Catheter Size: 5 Fr  Catheter placement by yes. Heme positive aspiration all ports. PAC floated with balloon up until wedgedSterile sheath used  Locked at: 37 cm.Insertion Attempts: 1  Indication: transvenous pacing, hemodynamic monitoring, intravenous therapy  Ultrasound Guidance  Needle advanced into vessel with real time Ultrasound guidance.  Guidewire confirmed in vessel.  Sterile sheath used.  Assessment  Central Line Bundle Protocol followed. Hand hygiene before procedure, surgical cap worn, surgical mask worn, sterile surgical gloves worn, large sterile drape used.  Verification: ultrasound and blood return  Dressing: secured with tape and tegaderm  Patient: Tolerated Well

## 2018-03-22 NOTE — CONSULTS
"  [facility]  Adult Nutrition  Consult Note    SUMMARY     Recommendations  Recommendation/Intervention:   As medically able, ADAT to Cardiac with texture per SLP.   RD to monitor.    Goals: Patient to receive nutrition by RD follow-up  Nutrition Goal Status: new  Communication of RD Recs:  (POC)    Reason for Assessment  Reason for Assessment: consult  Diagnosis: cardiac disease (s/p TAVR)  Relevant Medical History: SCC s/p lobectomy, CAD, COPD, DM, HTN, HLD  General Information Comments: OR this morning for TAVR. On NC.  Nutrition Discharge Planning: Adequate nutrition via PO intake.    Nutrition/Diet History  Do you have any cultural, spiritual, Orthodoxy conflicts, given your current situation?: none  Factors Affecting Nutritional Intake: NPO    Anthropometrics  Temp: 98 °F (36.7 °C)  Height Method: Stated  Height: 5' 2" (157.5 cm)  Height (inches): 62 in  Weight Method: Stated  Weight: 66.2 kg (146 lb)  Weight (lb): 146 lb  Ideal Body Weight (IBW), Female: 110 lb  % Ideal Body Weight, Female (lb): 132.73 lb  BMI (Calculated): 26.8  BMI Grade: 25 - 29.9 - overweight    Lab/Procedures/Meds  Pertinent Labs Reviewed: reviewed  Pertinent Labs Comments: POCT Glu 103-158, HgbA1c 6.5  Pertinent Medications Reviewed: reviewed  Pertinent Medications Comments: famotidine, IVF, precedex, epinephrine, cardene, levophed    Physical Findings/Assessment  Overall Physical Appearance: on oxygen therapy  Tubes:  (none)  Oral/Mouth Cavity: tooth/teeth missing  Skin: incision(s)    Estimated/Assessed Needs  Weight Used For Calorie Calculations: 66.2 kg (145 lb 15.1 oz)  Height: 5' 2" (157.5 cm)  Energy Calorie Requirements (kcal): 1425 kcal/day  Energy Need Method: [place]-St Jeor (x 1.25)  RMR ([place]-St. Jeor Equation): 1140.25  Protein Requirements: 80-93 g/day (1.2-1.4 g/kg)  Weight Used For Protein Calculations: 66.2 kg (145 lb 15.1 oz)  Fluid Requirements (mL): 1 mL/kcal or per MD  Fluid Need Method: RDA " Method  RDA Method (mL): 1425     Nutrition Prescription Ordered  Current Diet Order: NPO    Evaluation of Received Nutrient/Fluid Intake  I/O: +1.7L since admit  Comments: No BM recorded  % Intake of Estimated Energy Needs: 0 - 25 %  % Meal Intake: NPO    Nutrition Risk  Level of Risk/Frequency of Follow-up: high (2x/week)     Assessment and Plan  * S/p TAVR (transcatheter aortic valve replacement), bioprosthetic    Contributing Nutrition Diagnosis  Increased nutrient needs    Related to (etiology):   Physiological causes    Signs and Symptoms (as evidenced by):   S/p TAVR    Nutrition Diagnosis Status:   New          Monitor and Evaluation  Food and Nutrient Intake: energy intake, food and beverage intake  Food and Nutrient Adminstration: diet order  Physical Activity and Function: nutrition-related ADLs and IADLs  Anthropometric Measurements: weight, weight change  Biochemical Data, Medical Tests and Procedures: electrolyte and renal panel, gastrointestinal profile, glucose/endocrine profile, inflammatory profile  Nutrition-Focused Physical Findings: overall appearance     Nutrition Follow-Up  RD Follow-up?: Yes

## 2018-03-22 NOTE — SUBJECTIVE & OBJECTIVE
"PMH, PSH, FH, SH reviewed     Received in ICU, extubated. , without IV insulin infusing.     Review of Systems  Constitutional: Negative for weight changes. C/o incisional pain  Eyes: + visual disturbance, wears glasses, "blind as a bat", thinks she has + DR  Respiratory: Negative for cough. C/o dry mouth; + c/o SOB  Cardiovascular: Negative for chest pain.  Gastrointestinal: Negative for nausea.  Endocrine: Negative for polyuria, polydipsia.  Musculoskeletal: Negative for back pain.  Skin: Negative for rash.  Neurological: Negative for syncope. C/o L hand numbness  Psychiatric/Behavioral: Negative for depression.      Current Medications and/or Treatments Impacting Glycemic Control  Pressors:    Autonomic Drugs     Start     Stop Route Frequency Ordered    03/22/18 1230  epinephrine 4 mg in sodium chloride 0.9% 250 mL infusion     Question Answer Comment   Titrate by: (in mcg/kg/min) 0.05    Titrate interval: (in minutes) 5    Titrate to maintain: (SBP or MAP or Cardiac Index) SBP    Maximum dose: (in mcg/kg/min) 2        -- IV Continuous 03/22/18 1150    03/22/18 1230  norepinephrine 4 mg in dextrose 5% 250 mL infusion (premix) (titrating)     Question Answer Comment   Titrate by: (in mcg/kg/min) 0.05    Titrate interval: (in minutes) 5    Titrate to maintain: (MAP or SBP) MAP    Greater than: (in mmHg) 65    Maximum dose: (in mcg/kg/min) 3        -- IV Continuous 03/22/18 1150        Hyperglycemia/Diabetes Medications:   Antihyperglycemics     None         PHYSICAL EXAMINATION:  Vitals:    03/22/18 1400   BP:    Pulse: 75   Resp: (!) 25   Temp:      Body mass index is 26.7 kg/m².    Physical Exam   Constitutional:  Well developed, well nourished, NAD.  ENT: External ears no masses with nose patent; normal hearing.   Neck:  Supple; trachea midline.   Cardiovascular: Normal heart sounds, no LE edema.     Lungs:  Normal effort; lungs anterior bilaterally clear to auscultation.  Abdomen:  + BS x 4; Soft, no " masses,  no hernias.  MS: No clubbing or cyanosis of nails noted; unable to assess gait.  Skin: No rashes, lesions, or ulcers; no nodules.  Psychiatric: Good judgement and insight; normal mood and affect.

## 2018-03-22 NOTE — HPI
"Reason for Consult: Management of T2DM, Hyperglycemia     Surgical Procedure and Date: s/p TAVR 3/22/18    Diabetes diagnosis year: 2005 "Aleisha"  Lab Results   Component Value Date    HGBA1C 6.5 (H) 03/21/2018     Home Diabetes Medications:  Victoza 1.8mg QD, Januvia 100mg QD, metformin BID    How often checking glucose at home? One at alternating times of day  BG readings on regimen: 71 - 140  Hypoglycemia on the regimen?  Yes  Missed doses on regimen?  Yes; she skips Victoza if BG <100    Diabetes Complications include:     Diabetic retinopathy     HPI: Patient is a 67 y.o. female with a diagnosis of T2DM controlled pre admit by local PCP. She does acknowledge steroid induced hyperglycemia when she takes steroids for bronchitis.   She presented with severe AS, underwent TAVR.   Endocrine consulted for DM mgmt.           "

## 2018-03-22 NOTE — PROGRESS NOTES
Pt admitted to SICU [id number] via ICU bed connected to portable CM on 15L VM; sats 100%. VSS. Upon arrival pt connected to CM and 6L nasal cannula wall O2. Dr. Davis at bs. Pt's family at bedside. Will carry out all orders and continue to monitor patient closely.

## 2018-03-22 NOTE — TRANSFER OF CARE
"Anesthesia Transfer of Care Note    Patient: Kristina Aguirre    Procedure(s) Performed: Procedure(s) (LRB):  REPLACEMENT-VALVE-AORTIC (N/A)    Patient location: ICU    Anesthesia Type: general    Transport from OR: Transported from OR on 6-10 L/min O2 by face mask with adequate spontaneous ventilation    Post pain: adequate analgesia    Post assessment: no apparent anesthetic complications    Post vital signs: stable    Level of consciousness: awake    Nausea/Vomiting: no nausea/vomiting    Complications: none    Transfer of care protocol was followed      Last vitals:   Visit Vitals  BP (!) 120/44 (BP Location: Left arm)   Pulse 65   Temp 36.5 °C (97.7 °F) (Oral)   Resp 16   Ht 5' 2" (1.575 m)   Wt 66.2 kg (146 lb)   SpO2 97%   Breastfeeding? No   BMI 26.70 kg/m²     "

## 2018-03-22 NOTE — ASSESSMENT & PLAN NOTE
67F with hx of severe As, COPD s/p successful transaxillary TAVR. EP consulted for routine evaluation. EF 50-55%.    -No periprocedural rhythm issues  -TVP in place, threshold 0.6 mV, 40 VVI  -Tele without pacing or evidence of AVB, continue  -Baseline EKG NSR, narrow QRS  -Initial post EKG NSR, new LBBB  -Given new left bundle, tentatively plan for EPS +/- DC PPM tomorrow  -NPO at midnight  -EKG at 6 AM  -If bundle resolves, then will consider discharge with 30D event monitor    Seen and discussed with staff Dr. Cabezas.

## 2018-03-22 NOTE — INTERVAL H&P NOTE
The patient has been examined and the H&P has been reviewed:    I concur with the findings and no changes have occurred since H&P was written.    The patient has undergone the following TAVR work-up:      · Echo (8/24/17): LORA= 0.53 cm2, Mean Gradient = 47.6 mmHg, EF= 50%  · LHC (9/18/17): LM has LI, LAD has LI-- provides collateral flow to PDA, LAD has LI, RCA has  with bridging collaterals  · STS= 2.6%  · Frailty: 1/4   · Iliacs are >5.01 on R and > 4.7 on L. Left subclavian ostium is 7.24 mm, will need subclavian ultrasound and/or angiogram for possible transxillary access.  · LVOT area by CTA is 3.48 cm2 (23 mm X 19 mm) and Avg Diameter is 21.1 per Dr Richmond  · Incidental findings on CT: 6 mm solid nodule on her left upper lobe. She has a history of lung cancer, Dr Howard contacted (Her oncologist) who reports she will continue to do surveillance with PET scans. Her last PET was in June without recurrence of malignancy.   · CT Surgery risk assessment: high risk due to comorbidities.   · Rhythm issues: NSR. Episodes of lightheadedness worked up by Dr Cabezas, Holter non-diagnostic. Has 30 day event monitor.  · PFTs: FEV1 90% predicted, DLCO 84% predicted  · Comorbidities: Lung CA, Emphysema, PAD, Tobacco use        Kristina Aguirre is a 26 mm  EvolutR valve candidate via left trans-axillary access   Anesthesia/Surgery risks, benefits and alternative options discussed and understood by patient/family.          Active Hospital Problems    Diagnosis  POA    *Nodular calcific aortic valve stenosis [I35.0]  Unknown     Added automatically from request for surgery [id number]        Resolved Hospital Problems    Diagnosis Date Resolved POA   No resolved problems to display.

## 2018-03-22 NOTE — CONSULTS
"[facility]  Endocrinology  Diabetes Consult Note    Consult Requested by: Peter Coppola MD   Reason for admit: S/p TAVR (transcatheter aortic valve replacement), bioprosthetic    HISTORY OF PRESENT ILLNESS:  Reason for Consult: Management of T2DM, Hyperglycemia     Surgical Procedure and Date: s/p TAVR 3/22/18    Diabetes diagnosis year: 2005 "Aleisha"  Lab Results   Component Value Date    HGBA1C 6.5 (H) 03/21/2018     Home Diabetes Medications:  Victoza 1.8mg QD, Januvia 100mg QD, metformin BID    How often checking glucose at home? One at alternating times of day  BG readings on regimen: 71 - 140  Hypoglycemia on the regimen?  Yes  Missed doses on regimen?  Yes; she skips Victoza if BG <100    Diabetes Complications include:     Diabetic retinopathy     HPI: Patient is a 67 y.o. female with a diagnosis of T2DM controlled pre admit by local PCP. She does acknowledge steroid induced hyperglycemia when she takes steroids for bronchitis.   She presented with severe AS, underwent TAVR.   Endocrine consulted for DM mgmt.             PMH, PSH, FH, SH reviewed     Received in ICU, extubated. , without IV insulin infusing.     Review of Systems  Constitutional: Negative for weight changes. C/o incisional pain  Eyes: + visual disturbance, wears glasses, "blind as a bat", thinks she has + DR  Respiratory: Negative for cough. C/o dry mouth; + c/o SOB  Cardiovascular: Negative for chest pain.  Gastrointestinal: Negative for nausea.  Endocrine: Negative for polyuria, polydipsia.  Musculoskeletal: Negative for back pain.  Skin: Negative for rash.  Neurological: Negative for syncope. C/o L hand numbness  Psychiatric/Behavioral: Negative for depression.      Current Medications and/or Treatments Impacting Glycemic Control  Pressors:    Autonomic Drugs     Start     Stop Route Frequency Ordered    03/22/18 1230  epinephrine 4 mg in sodium chloride 0.9% 250 mL infusion     Question Answer Comment "   Titrate by: (in mcg/kg/min) 0.05    Titrate interval: (in minutes) 5    Titrate to maintain: (SBP or MAP or Cardiac Index) SBP    Maximum dose: (in mcg/kg/min) 2        -- IV Continuous 03/22/18 1150    03/22/18 1230  norepinephrine 4 mg in dextrose 5% 250 mL infusion (premix) (titrating)     Question Answer Comment   Titrate by: (in mcg/kg/min) 0.05    Titrate interval: (in minutes) 5    Titrate to maintain: (MAP or SBP) MAP    Greater than: (in mmHg) 65    Maximum dose: (in mcg/kg/min) 3        -- IV Continuous 03/22/18 1150        Hyperglycemia/Diabetes Medications:   Antihyperglycemics     None         PHYSICAL EXAMINATION:  Vitals:    03/22/18 1400   BP:    Pulse: 75   Resp: (!) 25   Temp:      Body mass index is 26.7 kg/m².    Physical Exam   Constitutional:  Well developed, well nourished, NAD.  ENT: External ears no masses with nose patent; normal hearing.   Neck:  Supple; trachea midline.   Cardiovascular: Normal heart sounds, no LE edema.     Lungs:  Normal effort; lungs anterior bilaterally clear to auscultation.  Abdomen:  + BS x 4; Soft, no masses,  no hernias.  MS: No clubbing or cyanosis of nails noted; unable to assess gait.  Skin: No rashes, lesions, or ulcers; no nodules.  Psychiatric: Good judgement and insight; normal mood and affect.        Labs Reviewed and Include     Recent Labs  Lab 03/22/18  1150   *   CALCIUM 8.7      K 3.7  3.7   CO2 24      BUN 17   CREATININE 0.8     Lab Results   Component Value Date    WBC 10.75 03/22/2018    HGB 9.2 (L) 03/22/2018    HCT 27 (L) 03/22/2018    MCV 77 (L) 03/22/2018     03/22/2018     No results for input(s): TSH, FREET4 in the last 168 hours.  Lab Results   Component Value Date    HGBA1C 6.5 (H) 03/21/2018       Nutritional status:   Body mass index is 26.7 kg/m².  Lab Results   Component Value Date    ALBUMIN 3.9 03/21/2018    ALBUMIN 3.6 03/21/2018    ALBUMIN 3.9 09/18/2017     No results found for:  PREALBUMIN    Estimated Creatinine Clearance: 59.2 mL/min (based on SCr of 0.8 mg/dL).    Accu-Checks  Recent Labs      03/21/18   1700  03/22/18   0637  03/22/18   1156   POCTGLUCOSE  103  111*  158*        ASSESSMENT and PLAN    * S/p TAVR (transcatheter aortic valve replacement), bioprosthetic    Per CTS, avoid hypoglycemia          Diabetes mellitus, type 2    Start IV insulin infusion as BG >140 with intensive BG monitoring q1hr;  use standard IV insulin infusion given age.       Discharge planning: recommend d/c Januvia; May resume Victoza and metformin. F/u with PCP for long term DM mgmt.           Coronary artery disease of native artery with stable angina pectoris    avoid hypoglycemia            PAD (peripheral artery disease)    Caution with use of SGLT2i for DM mgmt.               Plan discussed with patient, friend, and RN at bedside.     BARBARA Almanzar,ANP-C  Endocrinology  [facility]

## 2018-03-22 NOTE — PROGRESS NOTES
TRANSESOPHAGEAL ECHOCARDIOGRAPHY   PRE-PROCEDURE NOTE    03/22/2018    HPI:     Kristina Aguirre is a 67 y.o. female patient who is scheduled for TA TAVR in OR today. Has severe AS 0.53 cm2, MG 47.6, EF 50%.     Dysphagia or odynophagia:  no  Liver Disease, esophageal disease, or known varices:  no  Upper GI Bleeding: no  Snoring:  no  Sleep Apnea:  no  Prior neck surgery or radiation:  Yes. C Spine surgery in 1980s  Able to move neck in all directions:  yes  History of anesthetic difficulties:  no  Family history of anesthetic difficulties:  no  Last oral intake:  9 PM last night    Mallampati Class:  3  ASA Score:  3      Meds:     Scheduled Meds:   diphenhydrAMINE  50 mg Oral Once     PRN Meds:  Continuous Infusions:   dextrose 5 % and 0.45 % NaCl         Physical Exam:     Vitals:  Temp:  [97.7 °F (36.5 °C)-98.7 °F (37.1 °C)]   Pulse:  [65-72]   Resp:  [16-18]   BP: (108-131)/(44-63)   SpO2:  [95 %-97 %]        Constitutional:  NAD, conversant  HEENT:  Sclera anicteric, Uvula midline, EOMI, OP clear  Neck:   No JVD, moves to all direction without any limitations  CV:   RRR, systolic murmur in aortic area / rubs / gallops, normal S1/S2  Pulm:   CTAB, no wheezes, rales, or ronchi  GI:   Abdomen soft, NTND, +BS  Extremities:  No LE edema, warm with palpable pulses  Neuro:  AAOX3, no focal motor deficits      Labs:     Recent Results (from the past 336 hour(s))   CBC W/ AUTO DIFFERENTIAL    Collection Time: 03/21/18  2:02 PM   Result Value Ref Range    WBC 8.11 3.90 - 12.70 K/uL    Hemoglobin 9.7 (L) 12.0 - 16.0 g/dL    Hematocrit 31.5 (L) 37.0 - 48.5 %    Platelets 287 150 - 350 K/uL   CBC auto differential    Collection Time: 03/21/18 10:03 AM   Result Value Ref Range    WBC 7.98 3.90 - 12.70 K/uL    Hemoglobin 9.9 (L) 12.0 - 16.0 g/dL    Hematocrit 32.0 (L) 37.0 - 48.5 %    Platelets 262 150 - 350 K/uL       No results found for this or any previous visit (from the past 336 hour(s)).    Estimated Creatinine  Clearance: 67.7 mL/min (based on SCr of 0.7 mg/dL).    INR: 0.9    Imaging: out echo study. None in EPIC  · Echo (8/24/17): LORA= 0.53 cm2, Mean Gradient = 47.6 mmHg, EF= 50%  · LHC (9/18/17): LM has LI, LAD has LI-- provides collateral flow to PDA, LAD has LI, RCA has  with bridging collaterals    EKG: sinus rhythm, PVCs  Date: 3/22/18      Assessment & Plan:     PLAN:  1. ROSEY for evaluation of TAVR valve during TA TAVr surgery today in OR.    -The risks, benefits & alternatives of the procedure were explained to the patient.    -The risks of transesophageal echo include but are not limited to:  Dental trauma, esophageal trauma/perforation, bleeding, laryngospasm/brochospasm, aspiration, sore throat/hoarseness, & dislodgement of the endotracheal tube/nasogastric tube (where applicable).    -The risks of moderate sedation include hypotension, respiratory depression, arrhythmias, bronchospasm, & death.    -Informed consent was obtained & the patient is agreeable to proceed with the procedure.    I will discuss with the attending physician. Attending addendum is to follow.    Signed:  Hiren Tipton MD  Cardiology Fellow, PGY-VI  Pager: [phone]  3/22/2018 7:11 AM    Attending Addendum:

## 2018-03-22 NOTE — ANESTHESIA PREPROCEDURE EVALUATION
03/22/2018  Kristina Aguirre is a 67 y.o., female.    Kristina Aguirre is a 67 y.o. female w/ h/o severe AS going for the above procedure. 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     She c/o worsening PLAZA and chest discomfort for at least 6 months. 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 She also endorses intermittent episodes of dizziness/lightheadedness and near syncope.      At her pre-op clinic visit on 3/21, pt is tender over LLQ, hx/o of pancreatitis and diverticulitis. She was sent to the ER for evaluation    Patient is now s/p Aortic valve replacement and developed complete heart block. Patient is now scheduled for EP study and Dual Pacemaker placement.     LDA:  PIV 18G R hand  PIV 16G L AC  Art line R radial  Intoducer with PA catheter    Prev airway:  Present Prior to Hospital Arrival?: No; Placement Date: 03/22/18; Placement Time: 0812; Method of Intubation: Direct laryngoscopy; Inserted by: Anesthesia Resident; Airway Device: Endotracheal Tube; Mask Ventilation: Easy - oral; Intubated: Postinduction; Blade: Garcia #2; Airway Device Size: 7.0; Style: Cuffed; Cuff Inflation: Minimal occlusive pressure; Inflation Amount: 6; Placement Verified By: Auscultation, Capnometry, ETT Condensation; Grade: Grade I; Complicating Factors: Poor neck/head extension; Intubation Findings: Positive EtCO2, Bilateral breath sounds, Atraumatic/Condition of teeth unchanged;  Depth of Insertion: 21; Securment: Lips; Complications: None; Breath Sounds: Equal Bilateral; Insertion Attempts: 1; Removal Date: 03/22/18;  Removal Time: 1136    Jhonathan:  Cardene  @2.5      Patient Active Problem List   Diagnosis    Bilateral carotid artery stenosis    Dyslipidemia    Essential hypertension    Tobacco abuse    Abdominal aortic aneurysm (AAA) without rupture    Chronic bronchitis    Gastroesophageal reflux disease without esophagitis    Coronary artery disease of native artery with stable angina pectoris    Malignant neoplasm of right lung    Diabetes mellitus, type 2    PAD (peripheral artery disease)    Centrilobular emphysema    S/p TAVR (transcatheter aortic valve replacement), bioprosthetic       Review of patient's allergies indicates:  No Known Allergies     Current Facility-Administered Medications on File Prior to Visit   Medication Dose Route Frequency Provider Last Rate Last Dose    0.45% NaCl infusion  10 mL/hr Intravenous Continuous Robert Davis MD 10 mL/hr at 03/22/18 1300 10 mL/hr at 03/22/18 1300    acetaminophen oral solution 650 mg  650 mg Per OG tube Q6H PRN Robert Davis MD        albuterol-ipratropium 2.5mg-0.5mg/3mL nebulizer solution 3 mL  3 mL Nebulization Q4H PRN Robert Davis MD        ascorbic acid (vitamin C) tablet 500 mg  500 mg Oral BID Robert Davis MD   500 mg at 03/22/18 1244    aspirin tablet 325 mg  325 mg Oral Once Robert Davis MD   Stopped at 03/22/18 1230    aspirin tablet 325 mg  325 mg Oral Daily Robert Davis MD   325 mg at 03/22/18 1244    bisacodyl suppository 10 mg  10 mg Rectal Q12H PRN Robert Davis MD        ceFAZolin injection 2 g  2 g Intravenous Q8H Robert Davis MD        dexmedetomidine (PRECEDEX) 400mcg/100mL 0.9% NaCL infusion  0.2 mcg/kg/hr Intravenous Continuous Robert Davis MD        dextrose 5 % and 0.45 % NaCl infusion   Intravenous Continuous Peter Coppola  mL/hr at 03/22/18 1300      epinephrine 4 mg in sodium chloride 0.9% 250 mL infusion  0.02 mcg/kg/min Intravenous Continuous Robert  Jaret Davis MD        famotidine (PF) injection 20 mg  20 mg Intravenous BID Robert Davis MD   20 mg at 03/22/18 1232    magnesium sulfate 2 g in dextrose 5% 50 ml IVPB  2 g Intravenous PRN Robert Davis MD 25 mL/hr at 03/22/18 1403 2 g at 03/22/18 1403    mupirocin 2 % ointment 1 g  1 g Nasal BID Robert Davis MD   1 g at 03/22/18 1230    niCARdipine 40 mg/200 mL infusion  5 mg/hr Intravenous Continuous Robert Davis MD 12.5 mL/hr at 03/22/18 1315 2.5 mg/hr at 03/22/18 1315    niCARdipine 40 mg/200 mL infusion  1 mg/hr Intravenous Continuous Kelsea Polk MD 25 mL/hr at 03/22/18 1245 5 mg/hr at 03/22/18 1245    norepinephrine 4 mg in dextrose 5% 250 mL infusion (premix) (titrating)  0.02 mcg/kg/min Intravenous Continuous Robert Davis MD   Stopped at 03/22/18 1230    ondansetron injection 4 mg  4 mg Intravenous Q12H PRN Robert Davis MD        polyethylene glycol packet 17 g  17 g Oral Daily Robert Davis MD   17 g at 03/22/18 1244    promethazine (PHENERGAN) 6.25 mg in dextrose 5 % 50 mL IVPB  6.25 mg Intravenous Q6H PRN Robert Davis MD         Current Outpatient Prescriptions on File Prior to Visit   Medication Sig Dispense Refill    ACETAMINOPHEN WITH CODEINE (ACETAMINOPHEN-CODEINE) 120mg 12mg 5mL Soln TAKE 1 TEASPOON (5 MILLILITERS) BY MOUTH EVERY 6 HOURS AS NEEDED FOR COUGH  0    amlodipine (NORVASC) 10 MG tablet Take 10 mg by mouth once daily.  5    amoxicillin-clavulanate 875-125mg (AUGMENTIN) 875-125 mg per tablet 1 TABLET BY MOUTH EVERY 1 2 HOURS FOR 10 DAYS  0    aspirin (ECOTRIN) 81 MG EC tablet Take 1 tablet (81 mg total) by mouth once daily.      cetirizine (ZYRTEC) 10 MG tablet Take 10 mg by mouth once daily.  6    chlorthalidone (HYGROTEN) 25 MG Tab Take 1 tablet (25 mg total) by mouth every morning. 90 tablet 3    clopidogrel (PLAVIX) 75 mg tablet Take 75 mg by mouth once daily.  3     diphenoxylate-atropine 2.5-0.025 mg (LOMOTIL) 2.5-0.025 mg per tablet Take 1 tablet by mouth 4 (four) times daily as needed for Diarrhea.      fenofibrate 160 MG Tab Take 160 mg by mouth once daily.  5    hydrocodone-acetaminophen 10-325mg (NORCO)  mg Tab Take 1 tablet by mouth 3 (three) times daily as needed.  0    JANUVIA 100 mg Tab Take 100 mg by mouth once daily.  3    losartan (COZAAR) 50 MG tablet Take 1 tablet (50 mg total) by mouth every evening. 90 tablet 3    losartan-hydrochlorothiazide 100-25 mg (HYZAAR) 100-25 mg per tablet Take 1 tablet by mouth once daily.  1    metFORMIN (GLUCOPHAGE) 1000 MG tablet Take 1 tablet (1,000 mg total) by mouth 2 (two) times daily.  5    NEXIUM 40 mg capsule Take 40 mg by mouth once daily.  5    pravastatin (PRAVACHOL) 40 MG tablet Take 40 mg by mouth every evening.  5    SYMBICORT 80-4.5 mcg/actuation HFAA Inhale 2 puffs into the lungs 2 (two) times daily.  1    TRUE METRIX GLUCOSE TEST STRIP Strp test DAILY  3    TRUEPLUS LANCETS 30 gauge Misc USE DAILY when testing  10    VENTOLIN HFA 90 mcg/actuation inhaler Inhale 2 puffs into the lungs every 4 (four) hours as needed.  0    VICTOZA 3-JOSE 0.6 mg/0.1 mL (18 mg/3 mL) PnIj INJECT 1.8 milligrams under the skin EVERY DAY  2       Past Surgical History:   Procedure Laterality Date    HAND SURGERY Right     KNEE ARTHROSCOPY      LUNG LOBECTOMY Right     middle lobe    mediastinoscopy      SPINAL FUSION      TONSILLECTOMY      WRIST SURGERY Right        Social History     Social History    Marital status:      Spouse name: N/A    Number of children: N/A    Years of education: N/A     Occupational History    Not on file.     Social History Main Topics    Smoking status: Former Smoker     Packs/day: 1.50     Types: Vaping w/o nicotine    Smokeless tobacco: Never Used    Alcohol use No    Drug use: No    Sexual activity: Not Currently     Other Topics Concern    Not on file     Social  History Narrative    No narrative on file         Vital Signs Range (Last 24H):  Temp:  [36.5 °C (97.7 °F)-36.7 °C (98 °F)]   Pulse:  [65-78]   Resp:  [15-19]   BP: (120)/(44)   SpO2:  [93 %-97 %]   Arterial Line BP: (135-158)/(42-52)       CBC:   Recent Labs      03/21/18   1402  03/22/18   1150  03/22/18   1222   WBC  8.11  10.75   --    RBC  4.09  3.81*   --    HGB  9.7*  9.2*   --    HCT  31.5*  29.5*  27*   PLT  287  231   --    MCV  77*  77*   --    MCH  23.7*  24.1*   --    MCHC  30.8*  31.2*   --        CMP:   Recent Labs      03/21/18   1003  03/21/18   1402  03/22/18   1150   NA  142  139  140   K  4.1  3.6  3.7  3.7   CL  105  104  106   CO2  27  26  24   BUN  16  14  17   CREATININE  0.7  0.7  0.8   GLU  117*  95  168*   MG   --    --   1.4*   PHOS   --    --   4.4   CALCIUM  9.7  10.2  8.7   ALBUMIN  3.6  3.9   --    PROT  7.0  7.3   --    ALKPHOS  68  60   --    ALT  6*  9*   --    AST  21  22   --    BILITOT  0.5  0.5   --        INR  Recent Labs      03/21/18   1003  03/22/18   1150   INR  0.9  0.9   APTT  <21.0  27.0           Diagnostic Studies:      EKG:  Vent. Rate : 067 BPM     Atrial Rate : 067 BPM     P-R Int : 160 ms          QRS Dur : 098 ms      QT Int : 434 ms       P-R-T Axes : 065 057 091 degrees     QTc Int : 458 ms    Normal sinus rhythm  Nonspecific ST and T wave abnormality  Abnormal ECG  When compared with ECG of 04-DEC-2017 16:39,  Nonspecific T wave abnormality now evident in Anterior leads  QT has shortened  Confirmed by Mira Harvey MD ([id number]) on 3/21/2018 1:21:44 PM    Heart Cath:  Patient has a right dominant coronary artery.        - Right Coronary Artery:             The proximal RCA has chronic total occlusion. There is KRYS 0 flow. There is collateral flow from the RCA (bridging). The remaining portion of the vessel has luminal irregularities (10).                     Lesion Details:   This is a Type C lesion.  The length is 10-20 mm, eccentric shape, moderate proximal  tortuosity, segment angulation of 45-90 degrees, irregular contour, moderate to heavy calcification. Bifurcation is present. The   minimum luminal diameter is 0 millimeters.             The mid RCA has a 75% stenosis. There is KRYS 3 flow. There is collateral flow from the RCA (bridging). The remaining portion of the vessel has luminal irregularities (10).                     Lesion Details:   This is a Type C lesion.  The length is 20mm, eccentric shape, moderate proximal tortuosity, segment angulation of 45-90 degrees, irregular contour, mild to moderate calcification. Bifurcation is present. The minimum   luminal diameter is 0.4 millimeters.             The distal RCA has a 50% stenosis. There is KRYS 3 flow. There is collateral flow from the RCA (bridging). The remaining portion of the vessel has luminal irregularities (10).     - Posterior Lateral Branch:             The PLB has luminal irregularities. There is KRYS 3 flow. There is collateral flow from the LCX (good).     - Posterior Descending Artery:             The PDA has luminal irregularities. There is KRYS 3 flow. There is collateral flow from the LAD (good).          Pre-op Assessment         Review of Systems  Social:  Social Alcohol Use    Hematology/Oncology:        Oncology Comments: squamous cell CA s/p RML lobectomy with positive PET scan    Cardiovascular:   Hypertension CAD   Angina AAA  bilateral carotid stenosis   Pulmonary:   COPD Asthma    Hepatic/GI:   GERD    Musculoskeletal:  Musculoskeletal Normal    Neurological:  Neurology Normal    Endocrine:   Diabetes    Dermatological:  Skin Normal    Psych:   anxiety          Physical Exam  General:  Obesity    Airway/Jaw/Neck:  Airway Findings: Mouth Opening: Normal Tongue: Normal  General Airway Assessment: Adult  Mallampati: II  TM Distance: Normal, at least 6 cm  Jaw/Neck Findings:  Neck ROM: Normal ROM  Neck Findings: Normal    Eyes/Ears/Nose:  EYES/EARS/NOSE FINDINGS: Normal    Dental:  Dental Findings: Periodontal disease, Severe    Chest/Lungs:  Chest/Lungs Findings: Clear to auscultation, Normal Respiratory Rate     Heart/Vascular:  Heart Findings: Rate: Normal  Rhythm: Regular Rhythm  Sounds: Normal  Heart murmur: negative    Abdomen:  Abdomen Findings:  Tenderness     Musculoskeletal:  Musculoskeletal Findings: Normal   Skin:  Skin Findings: Normal    Mental Status:  Mental Status Findings:  Cooperative, Alert and Oriented, Anxious         Anesthesia Plan  Type of Anesthesia, risks & benefits discussed:  Anesthesia Type:  general  Patient's Preference:   Intra-op Monitoring Plan: standard ASA monitors, central line and arterial line  Intra-op Monitoring Plan Comments:   Post Op Pain Control Plan: multimodal analgesia and per primary service following discharge from PACU  Post Op Pain Control Plan Comments:   Induction:   IV  Beta Blocker:  Patient is not currently on a Beta-Blocker (No further documentation required).       Informed Consent: Patient understands risks and agrees with Anesthesia plan.  Questions answered. Anesthesia consent signed with patient.  ASA Score: 4     Day of Surgery Review of History & Physical:    H&P update referred to the surgeon.         Ready For Surgery From Anesthesia Perspective.

## 2018-03-22 NOTE — PLAN OF CARE
Problem: Patient Care Overview  Goal: Plan of Care Review  Recommendations  Recommendation/Intervention:   As medically able, ADAT to Cardiac with texture per SLP.   RD to monitor.    Goals: Patient to receive nutrition by RD follow-up  Nutrition Goal Status: new    Full assessment completed, see RD Note 3/22/2018.

## 2018-03-22 NOTE — PLAN OF CARE
Kristina Aguirre is a 67 y.o. female referred by Dr Young/Dominick Bautista for evaluation of severe AS (NYHA Class II sx).    The patient has undergone the following TAVR work-up:   ECHO (Date 8.24.17): LORA= 0.53 cm2, MG= 47.6mmHg, EF= 50%.   LHC (Date 12.4.17): PCI to RCA , mid LAD 40% stenosis   STS: 2.6%   Frailty: 1/4   Iliacs are >5.01 on R and > 4.7 on L. Left subclavian ostium is 7.24 mm  Subclavian ultrasound: No stenosis.   LVOT area by CTA is 3.48 cm2 (23 mm X 19 mm) and Avg Diameter is 21.1 per Dr Richmond  Incidental findings on CT: 6 mm solid nodule on her left upper lobe. She has a history of lung cancer, Dr Howard contacted (Her oncologist) who reports she will continue to do surveillance with PET scans. Her last PET was in February 2018 without recurrence of malignancy  CT Surgery risk assessment: high risk, per Dr Carvajal and Nathan due to Lung CA, emphysema, tobacco use  Rhythm issues: NSR  PFTs: FEV1 90% predicted, DLCO 84% predicted.  Comorbidities: Lung CA, Emphysema, PAD       Kristina Aguirre is a 26 mm  EvolutR valve candidate via LTAx access.

## 2018-03-22 NOTE — PROGRESS NOTES
A limited bedside trans thoracic echo done. Peak AV velocity 2.3, MG 10. No AI/paravalvular leaks noticed.  Hiren Tipton MD.  Cardiology fellow  [phone]

## 2018-03-22 NOTE — ANESTHESIA POSTPROCEDURE EVALUATION
"Anesthesia Post Evaluation    Patient: Kristina Aguirre    Procedure(s) Performed: Procedure(s) (LRB):  REPLACEMENT-VALVE-AORTIC (N/A)    Final Anesthesia Type: general  Patient location during evaluation: ICU  Patient participation: Yes- Able to Participate  Level of consciousness: awake and alert  Post-procedure vital signs: reviewed and stable  Pain management: adequate  Airway patency: patent  PONV status at discharge: No PONV  Anesthetic complications: no      Cardiovascular status: hemodynamically stable  Respiratory status: unassisted  Hydration status: euvolemic  Follow-up not needed.        Visit Vitals  BP (!) 120/44 (BP Location: Left arm)   Pulse 72   Temp 36.7 °C (98 °F) (Oral)   Resp 19   Ht 5' 2" (1.575 m)   Wt 66.2 kg (146 lb)   SpO2 95%   Breastfeeding? No   BMI 26.70 kg/m²       Pain/Renny Score: Pain Assessment Performed: Yes (3/22/2018 11:45 AM)  Presence of Pain: denies (3/22/2018 11:45 AM)  Pain Rating Post Med Admin: 0 (3/22/2018  1:43 PM)      "

## 2018-03-22 NOTE — ANESTHESIA PROCEDURE NOTES
Arterial    Diagnosis: aortic stenosis    Patient location during procedure: done in OR  Procedure start time: 3/22/2018 7:35 AM  Timeout: 3/22/2018 7:35 AM  Procedure end time: 3/22/2018 7:39 AM  Staffing  Anesthesiologist: FRANKI PANG  Resident/CRNA: ATA PAIGE  Performed: resident/CRNA   Anesthesiologist was present at the time of the procedure.  Preanesthetic Checklist  Completed: patient identified, site marked, surgical consent, pre-op evaluation, timeout performed, IV checked, risks and benefits discussed, monitors and equipment checked and anesthesia consent givenArterial  Skin Prep: chlorhexidine gluconate  Local Infiltration: lidocaine  Orientation: right  Location: radial  Catheter Size: 20 G  Catheter placement by Anatomical landmarks. Heme positive aspiration all ports.Insertion Attempts: 1  Assessment  Dressing: secured with tape and tegaderm  Patient: Tolerated well

## 2018-03-22 NOTE — CONSULTS
[facility]  Cardiac Electrophysiology  Consult Note    Admission Date: 3/22/2018  Code Status: Full Code   Attending Provider: Peter Coppola MD  Consulting Provider: Rishi Zaman MD  Principal Problem:S/p TAVR (transcatheter aortic valve replacement), bioprosthetic    Inpatient consult to Electrophysiology  Consult performed by: RISHI ZAMAN  Consult ordered by: AISHA BARROS        Subjective:     Chief Complaint:  TAVR    HPI:   68yo woman with severe As, COPD, hx of lung CA presenting for transaxillary TAVR today. Procedure performed successful, no periprocedural rhythm issues. EP consulted for routing post TAVR evaluation. No prior rhythm issues. EF 50-55%.    Tele: no pacing, no e/o AVB  Baseline EKG: normal sinus rhythm, NH normal,  ms  Post EKG 1: normal sinus rhythm, NH normal, new LBBB ( ms)     Past Medical History:   Diagnosis Date    Abdominal aortic aneurysm (AAA) without rupture 9/12/2017    Anticoagulant long-term use     Arthritis     Asthma     Bilateral carotid artery stenosis 9/12/2017    Cancer     lung    COPD (chronic obstructive pulmonary disease)     Coronary artery disease of native artery with stable angina pectoris 9/29/2017    Diabetes mellitus     Heart valve disorder     Hyperlipidemia     Hypertension        Past Surgical History:   Procedure Laterality Date    HAND SURGERY Right     KNEE ARTHROSCOPY      LUNG LOBECTOMY Right     middle lobe    mediastinoscopy      SPINAL FUSION      TONSILLECTOMY      WRIST SURGERY Right        Review of patient's allergies indicates:  No Known Allergies    Current Facility-Administered Medications on File Prior to Encounter   Medication    [COMPLETED] omnipaque 350 iohexol 75 mL    [DISCONTINUED] 0.9%  NaCl infusion    [DISCONTINUED] acetaminophen (10 mg/mL) injection    [DISCONTINUED] ceFAZolin (ANCEF) 2 g in dextrose 5 % 50 mL IVPB    [DISCONTINUED] electrolyte-S  (ISOLYTE)    [DISCONTINUED] electrolyte-S (ISOLYTE)    [DISCONTINUED] etomidate injection    [DISCONTINUED] fentaNYL injection    [DISCONTINUED] glycopyrrolate injection    [DISCONTINUED] heparin (porcine) injection    [DISCONTINUED] lidocaine (cardiac) injection    [DISCONTINUED] midazolam injection    [DISCONTINUED] neostigmine injection    [DISCONTINUED] norepinephrine 4 mg in dextrose 5 % 250 mL infusion    [DISCONTINUED] norepinephrine 4 mg in dextrose 5 % 250 mL infusion    [DISCONTINUED] ondansetron injection    [DISCONTINUED] phenylephrine injection    [DISCONTINUED] protamine injection    [DISCONTINUED] rocuronium injection     Current Outpatient Prescriptions on File Prior to Encounter   Medication Sig    ACETAMINOPHEN WITH CODEINE (ACETAMINOPHEN-CODEINE) 120mg 12mg 5mL Soln TAKE 1 TEASPOON (5 MILLILITERS) BY MOUTH EVERY 6 HOURS AS NEEDED FOR COUGH    amlodipine (NORVASC) 10 MG tablet Take 10 mg by mouth once daily.    aspirin (ECOTRIN) 81 MG EC tablet Take 1 tablet (81 mg total) by mouth once daily.    cetirizine (ZYRTEC) 10 MG tablet Take 10 mg by mouth once daily.    chlorthalidone (HYGROTEN) 25 MG Tab Take 1 tablet (25 mg total) by mouth every morning.    clopidogrel (PLAVIX) 75 mg tablet Take 75 mg by mouth once daily.    diphenoxylate-atropine 2.5-0.025 mg (LOMOTIL) 2.5-0.025 mg per tablet Take 1 tablet by mouth 4 (four) times daily as needed for Diarrhea.    fenofibrate 160 MG Tab Take 160 mg by mouth once daily.    hydrocodone-acetaminophen 10-325mg (NORCO)  mg Tab Take 1 tablet by mouth 3 (three) times daily as needed.    JANUVIA 100 mg Tab Take 100 mg by mouth once daily.    losartan (COZAAR) 50 MG tablet Take 1 tablet (50 mg total) by mouth every evening.    losartan-hydrochlorothiazide 100-25 mg (HYZAAR) 100-25 mg per tablet Take 1 tablet by mouth once daily.    metFORMIN (GLUCOPHAGE) 1000 MG tablet Take 1 tablet (1,000 mg total) by mouth 2 (two) times daily.     NEXIUM 40 mg capsule Take 40 mg by mouth once daily.    pravastatin (PRAVACHOL) 40 MG tablet Take 40 mg by mouth every evening.    SYMBICORT 80-4.5 mcg/actuation HFAA Inhale 2 puffs into the lungs 2 (two) times daily.    VENTOLIN HFA 90 mcg/actuation inhaler Inhale 2 puffs into the lungs every 4 (four) hours as needed.    amoxicillin-clavulanate 875-125mg (AUGMENTIN) 875-125 mg per tablet 1 TABLET BY MOUTH EVERY 1 2 HOURS FOR 10 DAYS    TRUE METRIX GLUCOSE TEST STRIP Strp test DAILY    TRUEPLUS LANCETS 30 gauge Misc USE DAILY when testing    VICTOZA 3-JOSE 0.6 mg/0.1 mL (18 mg/3 mL) PnIj INJECT 1.8 milligrams under the skin EVERY DAY     Family History     Problem Relation (Age of Onset)    Heart attack Father    No Known Problems Sister, Brother, Maternal Aunt, Maternal Uncle, Paternal Aunt, Paternal Uncle, Maternal Grandmother, Maternal Grandfather, Paternal Grandmother, Paternal Grandfather    Throat cancer Mother        Social History Main Topics    Smoking status: Former Smoker     Packs/day: 1.50     Types: Vaping w/o nicotine    Smokeless tobacco: Never Used    Alcohol use No    Drug use: No    Sexual activity: Not Currently     Review of Systems   Constitution: Negative for fever and malaise/fatigue.   Cardiovascular: Negative for chest pain, dyspnea on exertion, irregular heartbeat, leg swelling, near-syncope, orthopnea, palpitations, paroxysmal nocturnal dyspnea and syncope.   Respiratory: Negative for cough and shortness of breath.    Skin: Negative for color change and rash.   Gastrointestinal: Negative for abdominal pain, nausea and vomiting.   Neurological: Negative for dizziness, focal weakness and headaches.     Objective:     Vital Signs (Most Recent):  Temp: 98 °F (36.7 °C) (03/22/18 1145)  Pulse: 72 (03/22/18 1315)  Resp: 19 (03/22/18 1315)  BP: (!) 120/44 (03/22/18 0639)  SpO2: 95 % (03/22/18 1315) Vital Signs (24h Range):  Temp:  [97.7 °F (36.5 °C)-98.3 °F (36.8 °C)] 98 °F (36.7  °C)  Pulse:  [65-78] 72  Resp:  [15-19] 19  SpO2:  [93 %-97 %] 95 %  BP: (120-129)/(44-60) 120/44  Arterial Line BP: (135-158)/(42-52) 135/42     Weight: 66.2 kg (146 lb)  Body mass index is 26.7 kg/m².    SpO2: 95 %  O2 Device (Oxygen Therapy): nasal cannula    Physical Exam   Constitutional: She appears well-nourished. No distress.   HENT:   Head: Atraumatic.   Eyes: EOM are normal. No scleral icterus.   Neck: Neck supple. No JVD present.   Cardiovascular: Normal rate, regular rhythm and normal heart sounds.    Pulmonary/Chest: Effort normal. No respiratory distress.   Abdominal: Soft. She exhibits no distension.   Musculoskeletal: She exhibits no edema.   Neurological: She is alert.   somnolent   Skin: Skin is warm and dry. No erythema.       Significant Labs:   CMP:   Recent Labs  Lab 03/21/18  1003 03/21/18  1402 03/22/18  1150    139 140   K 4.1 3.6 3.7  3.7    104 106   CO2 27 26 24   * 95 168*   BUN 16 14 17   CREATININE 0.7 0.7 0.8   CALCIUM 9.7 10.2 8.7   PROT 7.0 7.3  --    ALBUMIN 3.6 3.9  --    BILITOT 0.5 0.5  --    ALKPHOS 68 60  --    AST 21 22  --    ALT 6* 9*  --    ANIONGAP 10 9 10   ESTGFRAFRICA >60.0 >60.0 >60.0   EGFRNONAA >60.0 >60.0 >60.0    and CBC:   Recent Labs  Lab 03/21/18  1003 03/21/18  1402 03/22/18  1150 03/22/18  1222   WBC 7.98 8.11 10.75  --    HGB 9.9* 9.7* 9.2*  --    HCT 32.0* 31.5* 29.5* 27*    287 231  --        Significant Imaging: Echocardiogram: 2D echo with color flow doppler: No results found for this or any previous visit.    Assessment and Plan:     * S/p TAVR (transcatheter aortic valve replacement), bioprosthetic    67F with hx of severe As, COPD s/p successful transaxillary TAVR. EP consulted for routine evaluation. EF 50-55%.    -No periprocedural rhythm issues  -TVP in place, threshold 0.6 mV, 40 VVI  -Tele without pacing or evidence of AVB, continue  -Baseline EKG NSR, narrow QRS  -Initial post EKG NSR, new LBBB  -Given new left bundle,  tentatively plan for EPS +/- DC PPM tomorrow  -NPO at midnight  -EKG at 6 AM  -If bundle resolves, then will consider discharge with 30D event monitor    Seen and discussed with staff Dr. Cabezas.            Thank you for your consult. I will follow-up with patient. Please contact us if you have any additional questions.    Rishi Zaman MD  Cardiac Electrophysiology  [facility]

## 2018-03-22 NOTE — BRIEF OP NOTE
[facility]  Brief Operative Note    SUMMARY     Surgery Date: 3/22/2018     Surgeon(s) and Role:     * Sarbjit Carvajal MD - [facility]     * Robert Davis MD - Fellow     * Peter Coppola MD -     * Luis Napier MD - [facility]    Assisting Surgeon: None    Pre-op Diagnosis:  Nodular calcific aortic valve stenosis [I35.0]    Post-op Diagnosis:  Post-Op Diagnosis Codes:     * Nodular calcific aortic valve stenosis [I35.0]    Procedure(s) (LRB):  REPLACEMENT-VALVE-AORTIC (N/A) L trans axillary open 46446    Anesthesia: General    findings of the procedure: Adequate depth of implant by fluoroscopy. Adequate hemostasis.     Estimated Blood Loss: 50 ml         Specimens:   Specimen (12h ago through future)    None

## 2018-03-23 ENCOUNTER — ANESTHESIA (OUTPATIENT)
Dept: MEDSURG UNIT | Facility: HOSPITAL | Age: 68
DRG: 266 | End: 2018-03-23
Payer: MEDICARE

## 2018-03-23 PROBLEM — I50.33 ACUTE ON CHRONIC DIASTOLIC HEART FAILURE: Status: ACTIVE | Noted: 2018-03-23

## 2018-03-23 LAB
ALLENS TEST: ABNORMAL
ANION GAP SERPL CALC-SCNC: 7 MMOL/L
APTT BLDCRRT: 22.1 SEC
BASOPHILS # BLD AUTO: 0.06 K/UL
BASOPHILS NFR BLD: 0.5 %
BUN SERPL-MCNC: 8 MG/DL
CALCIUM SERPL-MCNC: 8.3 MG/DL
CHLORIDE SERPL-SCNC: 105 MMOL/L
CO2 SERPL-SCNC: 24 MMOL/L
CREAT SERPL-MCNC: 0.7 MG/DL
DELSYS: ABNORMAL
DIFFERENTIAL METHOD: ABNORMAL
EOSINOPHIL # BLD AUTO: 0.3 K/UL
EOSINOPHIL NFR BLD: 2.9 %
ERYTHROCYTE [DISTWIDTH] IN BLOOD BY AUTOMATED COUNT: 18.9 %
ERYTHROCYTE [SEDIMENTATION RATE] IN BLOOD BY WESTERGREN METHOD: 4 MM/H
EST. GFR  (AFRICAN AMERICAN): >60 ML/MIN/1.73 M^2
EST. GFR  (NON AFRICAN AMERICAN): >60 ML/MIN/1.73 M^2
FIBRINOGEN PPP-MCNC: 457 MG/DL
FIBRINOGEN PPP-MCNC: 470 MG/DL
GLUCOSE SERPL-MCNC: 145 MG/DL
HCO3 UR-SCNC: 22.8 MMOL/L (ref 24–28)
HCT VFR BLD AUTO: 28.6 %
HCT VFR BLD AUTO: 28.6 %
HGB BLD-MCNC: 8.7 G/DL
HGB BLD-MCNC: 8.7 G/DL
IMM GRANULOCYTES # BLD AUTO: 0.05 K/UL
IMM GRANULOCYTES NFR BLD AUTO: 0.4 %
INR PPP: 1
LYMPHOCYTES # BLD AUTO: 1 K/UL
LYMPHOCYTES NFR BLD: 8.5 %
MAGNESIUM SERPL-MCNC: 1.9 MG/DL
MAGNESIUM SERPL-MCNC: 1.9 MG/DL
MCH RBC QN AUTO: 23.6 PG
MCHC RBC AUTO-ENTMCNC: 30.4 G/DL
MCV RBC AUTO: 78 FL
MODE: ABNORMAL
MONOCYTES # BLD AUTO: 0.6 K/UL
MONOCYTES NFR BLD: 5.7 %
NEUTROPHILS # BLD AUTO: 9.2 K/UL
NEUTROPHILS NFR BLD: 82 %
NRBC BLD-RTO: 0 /100 WBC
PCO2 BLDA: 36.3 MMHG (ref 35–45)
PH SMN: 7.41 [PH] (ref 7.35–7.45)
PHOSPHATE SERPL-MCNC: 2.6 MG/DL
PHOSPHATE SERPL-MCNC: 2.7 MG/DL
PLATELET # BLD AUTO: 273 K/UL
PMV BLD AUTO: 9.6 FL
PO2 BLDA: 68 MMHG (ref 80–100)
POC ACTIVATED CLOTTING TIME K: 219 SEC (ref 74–137)
POC BE: -2 MMOL/L
POC SATURATED O2: 93 % (ref 95–100)
POC TCO2: 24 MMOL/L (ref 23–27)
POCT GLUCOSE: 124 MG/DL (ref 70–110)
POCT GLUCOSE: 131 MG/DL (ref 70–110)
POCT GLUCOSE: 131 MG/DL (ref 70–110)
POCT GLUCOSE: 132 MG/DL (ref 70–110)
POCT GLUCOSE: 139 MG/DL (ref 70–110)
POCT GLUCOSE: 140 MG/DL (ref 70–110)
POCT GLUCOSE: 140 MG/DL (ref 70–110)
POCT GLUCOSE: 141 MG/DL (ref 70–110)
POCT GLUCOSE: 143 MG/DL (ref 70–110)
POCT GLUCOSE: 144 MG/DL (ref 70–110)
POCT GLUCOSE: 144 MG/DL (ref 70–110)
POCT GLUCOSE: 145 MG/DL (ref 70–110)
POCT GLUCOSE: 182 MG/DL (ref 70–110)
POCT GLUCOSE: 190 MG/DL (ref 70–110)
POCT GLUCOSE: 200 MG/DL (ref 70–110)
POTASSIUM SERPL-SCNC: 3.6 MMOL/L
POTASSIUM SERPL-SCNC: 3.6 MMOL/L
POTASSIUM SERPL-SCNC: 4.3 MMOL/L
PROTHROMBIN TIME: 9.7 SEC
RBC # BLD AUTO: 3.68 M/UL
SAMPLE: ABNORMAL
SAMPLE: ABNORMAL
SITE: ABNORMAL
SODIUM SERPL-SCNC: 136 MMOL/L
SP02: 94
WBC # BLD AUTO: 11.22 K/UL

## 2018-03-23 PROCEDURE — 25000242 PHARM REV CODE 250 ALT 637 W/ HCPCS: Performed by: THORACIC SURGERY (CARDIOTHORACIC VASCULAR SURGERY)

## 2018-03-23 PROCEDURE — 25000003 PHARM REV CODE 250: Performed by: THORACIC SURGERY (CARDIOTHORACIC VASCULAR SURGERY)

## 2018-03-23 PROCEDURE — 93600 BUNDLE OF HIS RECORDING: CPT | Mod: 26,,, | Performed by: INTERNAL MEDICINE

## 2018-03-23 PROCEDURE — 80048 BASIC METABOLIC PNL TOTAL CA: CPT

## 2018-03-23 PROCEDURE — G8979 MOBILITY GOAL STATUS: HCPCS | Mod: CJ

## 2018-03-23 PROCEDURE — 27000221 HC OXYGEN, UP TO 24 HOURS

## 2018-03-23 PROCEDURE — 83735 ASSAY OF MAGNESIUM: CPT

## 2018-03-23 PROCEDURE — 25000003 PHARM REV CODE 250: Performed by: STUDENT IN AN ORGANIZED HEALTH CARE EDUCATION/TRAINING PROGRAM

## 2018-03-23 PROCEDURE — 25000003 PHARM REV CODE 250: Performed by: NURSE ANESTHETIST, CERTIFIED REGISTERED

## 2018-03-23 PROCEDURE — 20000000 HC ICU ROOM

## 2018-03-23 PROCEDURE — 97166 OT EVAL MOD COMPLEX 45 MIN: CPT

## 2018-03-23 PROCEDURE — 84100 ASSAY OF PHOSPHORUS: CPT

## 2018-03-23 PROCEDURE — 63600175 PHARM REV CODE 636 W HCPCS: Performed by: THORACIC SURGERY (CARDIOTHORACIC VASCULAR SURGERY)

## 2018-03-23 PROCEDURE — 63600175 PHARM REV CODE 636 W HCPCS: Performed by: NURSE ANESTHETIST, CERTIFIED REGISTERED

## 2018-03-23 PROCEDURE — 99233 SBSQ HOSP IP/OBS HIGH 50: CPT | Mod: ,,, | Performed by: NURSE PRACTITIONER

## 2018-03-23 PROCEDURE — S0028 INJECTION, FAMOTIDINE, 20 MG: HCPCS | Performed by: THORACIC SURGERY (CARDIOTHORACIC VASCULAR SURGERY)

## 2018-03-23 PROCEDURE — 93005 ELECTROCARDIOGRAM TRACING: CPT

## 2018-03-23 PROCEDURE — 37000009 HC ANESTHESIA EA ADD 15 MINS: Performed by: INTERNAL MEDICINE

## 2018-03-23 PROCEDURE — 63600175 PHARM REV CODE 636 W HCPCS: Performed by: STUDENT IN AN ORGANIZED HEALTH CARE EDUCATION/TRAINING PROGRAM

## 2018-03-23 PROCEDURE — 4A0234Z MEASUREMENT OF CARDIAC ELECTRICAL ACTIVITY, PERCUTANEOUS APPROACH: ICD-10-PCS | Performed by: INTERNAL MEDICINE

## 2018-03-23 PROCEDURE — 37000008 HC ANESTHESIA 1ST 15 MINUTES: Performed by: INTERNAL MEDICINE

## 2018-03-23 PROCEDURE — G8978 MOBILITY CURRENT STATUS: HCPCS | Mod: CN

## 2018-03-23 PROCEDURE — D9220A PRA ANESTHESIA: Mod: ANES,,, | Performed by: ANESTHESIOLOGY

## 2018-03-23 PROCEDURE — 85384 FIBRINOGEN ACTIVITY: CPT

## 2018-03-23 PROCEDURE — 94761 N-INVAS EAR/PLS OXIMETRY MLT: CPT

## 2018-03-23 PROCEDURE — 93010 ELECTROCARDIOGRAM REPORT: CPT | Mod: ,,, | Performed by: INTERNAL MEDICINE

## 2018-03-23 PROCEDURE — 85025 COMPLETE CBC W/AUTO DIFF WBC: CPT

## 2018-03-23 PROCEDURE — C1730 CATH, EP, 19 OR FEW ELECT: HCPCS

## 2018-03-23 PROCEDURE — D9220A PRA ANESTHESIA: Mod: CRNA,,, | Performed by: NURSE ANESTHETIST, CERTIFIED REGISTERED

## 2018-03-23 PROCEDURE — 85730 THROMBOPLASTIN TIME PARTIAL: CPT

## 2018-03-23 PROCEDURE — 99233 SBSQ HOSP IP/OBS HIGH 50: CPT | Mod: ,,, | Performed by: SURGERY

## 2018-03-23 PROCEDURE — 97162 PT EVAL MOD COMPLEX 30 MIN: CPT

## 2018-03-23 PROCEDURE — 99900035 HC TECH TIME PER 15 MIN (STAT)

## 2018-03-23 PROCEDURE — 85610 PROTHROMBIN TIME: CPT

## 2018-03-23 PROCEDURE — 4A023FZ MEASUREMENT OF CARDIAC RHYTHM, PERCUTANEOUS APPROACH: ICD-10-PCS | Performed by: INTERNAL MEDICINE

## 2018-03-23 PROCEDURE — 82803 BLOOD GASES ANY COMBINATION: CPT

## 2018-03-23 PROCEDURE — 37799 UNLISTED PX VASCULAR SURGERY: CPT

## 2018-03-23 PROCEDURE — 25000003 PHARM REV CODE 250

## 2018-03-23 PROCEDURE — 94640 AIRWAY INHALATION TREATMENT: CPT

## 2018-03-23 PROCEDURE — 85384 FIBRINOGEN ACTIVITY: CPT | Mod: 91

## 2018-03-23 RX ORDER — GLUCAGON 1 MG
1 KIT INJECTION
Status: DISCONTINUED | OUTPATIENT
Start: 2018-03-23 | End: 2018-04-02 | Stop reason: HOSPADM

## 2018-03-23 RX ORDER — MIDAZOLAM HYDROCHLORIDE 1 MG/ML
INJECTION, SOLUTION INTRAMUSCULAR; INTRAVENOUS
Status: DISCONTINUED | OUTPATIENT
Start: 2018-03-23 | End: 2018-03-23

## 2018-03-23 RX ORDER — FENTANYL CITRATE 50 UG/ML
25 INJECTION, SOLUTION INTRAMUSCULAR; INTRAVENOUS
Status: DISCONTINUED | OUTPATIENT
Start: 2018-03-23 | End: 2018-03-24

## 2018-03-23 RX ORDER — INSULIN ASPART 100 [IU]/ML
1-10 INJECTION, SOLUTION INTRAVENOUS; SUBCUTANEOUS
Status: DISCONTINUED | OUTPATIENT
Start: 2018-03-23 | End: 2018-03-25

## 2018-03-23 RX ORDER — CLOPIDOGREL BISULFATE 75 MG/1
75 TABLET ORAL DAILY
Status: DISCONTINUED | OUTPATIENT
Start: 2018-03-23 | End: 2018-04-02 | Stop reason: HOSPADM

## 2018-03-23 RX ORDER — ASPIRIN 81 MG/1
81 TABLET ORAL DAILY
Status: DISCONTINUED | OUTPATIENT
Start: 2018-03-24 | End: 2018-03-25

## 2018-03-23 RX ORDER — SODIUM CHLORIDE 9 MG/ML
INJECTION, SOLUTION INTRAVENOUS CONTINUOUS PRN
Status: DISCONTINUED | OUTPATIENT
Start: 2018-03-23 | End: 2018-03-23

## 2018-03-23 RX ORDER — IBUPROFEN 200 MG
24 TABLET ORAL
Status: DISCONTINUED | OUTPATIENT
Start: 2018-03-23 | End: 2018-04-02 | Stop reason: HOSPADM

## 2018-03-23 RX ORDER — IBUPROFEN 200 MG
16 TABLET ORAL
Status: DISCONTINUED | OUTPATIENT
Start: 2018-03-23 | End: 2018-04-02 | Stop reason: HOSPADM

## 2018-03-23 RX ORDER — PROPOFOL 10 MG/ML
VIAL (ML) INTRAVENOUS
Status: DISCONTINUED | OUTPATIENT
Start: 2018-03-23 | End: 2018-03-23

## 2018-03-23 RX ORDER — FUROSEMIDE 10 MG/ML
40 INJECTION INTRAMUSCULAR; INTRAVENOUS ONCE
Status: COMPLETED | OUTPATIENT
Start: 2018-03-23 | End: 2018-03-23

## 2018-03-23 RX ORDER — FUROSEMIDE 10 MG/ML
20 INJECTION INTRAMUSCULAR; INTRAVENOUS ONCE
Status: COMPLETED | OUTPATIENT
Start: 2018-03-23 | End: 2018-03-23

## 2018-03-23 RX ORDER — FENTANYL CITRATE 50 UG/ML
50 INJECTION, SOLUTION INTRAMUSCULAR; INTRAVENOUS
Status: DISCONTINUED | OUTPATIENT
Start: 2018-03-23 | End: 2018-03-24

## 2018-03-23 RX ORDER — LABETALOL HYDROCHLORIDE 5 MG/ML
INJECTION, SOLUTION INTRAVENOUS
Status: DISPENSED
Start: 2018-03-23 | End: 2018-03-24

## 2018-03-23 RX ORDER — MUPIROCIN 20 MG/G
1 OINTMENT TOPICAL 2 TIMES DAILY
Status: DISPENSED | OUTPATIENT
Start: 2018-03-23 | End: 2018-03-28

## 2018-03-23 RX ORDER — OXYCODONE HYDROCHLORIDE 5 MG/1
10 TABLET ORAL
Status: DISCONTINUED | OUTPATIENT
Start: 2018-03-23 | End: 2018-03-24

## 2018-03-23 RX ORDER — OXYCODONE HYDROCHLORIDE 5 MG/1
5 TABLET ORAL
Status: DISCONTINUED | OUTPATIENT
Start: 2018-03-23 | End: 2018-03-24

## 2018-03-23 RX ORDER — FENTANYL CITRATE 50 UG/ML
INJECTION, SOLUTION INTRAMUSCULAR; INTRAVENOUS
Status: DISCONTINUED | OUTPATIENT
Start: 2018-03-23 | End: 2018-03-23

## 2018-03-23 RX ORDER — PROPRANOLOL HYDROCHLORIDE 10 MG/1
20 TABLET ORAL 2 TIMES DAILY
Status: DISCONTINUED | OUTPATIENT
Start: 2018-03-23 | End: 2018-03-23

## 2018-03-23 RX ORDER — POTASSIUM CHLORIDE 29.8 MG/ML
40 INJECTION INTRAVENOUS ONCE
Status: COMPLETED | OUTPATIENT
Start: 2018-03-23 | End: 2018-03-23

## 2018-03-23 RX ORDER — MORPHINE SULFATE ORAL SOLUTION 10 MG/5ML
10 SOLUTION ORAL
Status: DISCONTINUED | OUTPATIENT
Start: 2018-03-23 | End: 2018-03-24

## 2018-03-23 RX ORDER — SPIRONOLACTONE 25 MG/1
50 TABLET ORAL DAILY
Status: DISCONTINUED | OUTPATIENT
Start: 2018-03-23 | End: 2018-03-23

## 2018-03-23 RX ADMIN — NICARDIPINE HYDROCHLORIDE 5 MG/HR: 0.2 INJECTION, SOLUTION INTRAVENOUS at 01:03

## 2018-03-23 RX ADMIN — FENTANYL CITRATE 50 MCG: 50 INJECTION, SOLUTION INTRAMUSCULAR; INTRAVENOUS at 04:03

## 2018-03-23 RX ADMIN — HYDRALAZINE HYDROCHLORIDE 10 MG: 20 INJECTION INTRAMUSCULAR; INTRAVENOUS at 04:03

## 2018-03-23 RX ADMIN — SODIUM CHLORIDE: 0.9 INJECTION, SOLUTION INTRAVENOUS at 03:03

## 2018-03-23 RX ADMIN — IPRATROPIUM BROMIDE AND ALBUTEROL SULFATE 3 ML: .5; 3 SOLUTION RESPIRATORY (INHALATION) at 09:03

## 2018-03-23 RX ADMIN — MUPIROCIN 1 G: 20 OINTMENT TOPICAL at 08:03

## 2018-03-23 RX ADMIN — FUROSEMIDE 20 MG: 10 INJECTION, SOLUTION INTRAMUSCULAR; INTRAVENOUS at 04:03

## 2018-03-23 RX ADMIN — OXYCODONE HYDROCHLORIDE 5 MG: 5 TABLET ORAL at 01:03

## 2018-03-23 RX ADMIN — CEFAZOLIN 2 G: 330 INJECTION, POWDER, FOR SOLUTION INTRAMUSCULAR; INTRAVENOUS at 01:03

## 2018-03-23 RX ADMIN — POLYETHYLENE GLYCOL 3350 17 G: 17 POWDER, FOR SOLUTION ORAL at 08:03

## 2018-03-23 RX ADMIN — FENTANYL CITRATE 20 MCG: 50 INJECTION, SOLUTION INTRAMUSCULAR; INTRAVENOUS at 03:03

## 2018-03-23 RX ADMIN — MUPIROCIN 1 G: 20 OINTMENT TOPICAL at 09:03

## 2018-03-23 RX ADMIN — FAMOTIDINE 20 MG: 10 INJECTION, SOLUTION INTRAVENOUS at 09:03

## 2018-03-23 RX ADMIN — PROPOFOL 10 MG: 10 INJECTION, EMULSION INTRAVENOUS at 03:03

## 2018-03-23 RX ADMIN — Medication 500 MG: at 09:03

## 2018-03-23 RX ADMIN — POTASSIUM CHLORIDE 40 MEQ: 400 INJECTION, SOLUTION INTRAVENOUS at 02:03

## 2018-03-23 RX ADMIN — ACETAMINOPHEN 650 MG: 650 SOLUTION ORAL at 04:03

## 2018-03-23 RX ADMIN — CLOPIDOGREL 75 MG: 75 TABLET, FILM COATED ORAL at 01:03

## 2018-03-23 RX ADMIN — OXYCODONE HYDROCHLORIDE 5 MG: 5 TABLET ORAL at 07:03

## 2018-03-23 RX ADMIN — Medication 500 MG: at 08:03

## 2018-03-23 RX ADMIN — IPRATROPIUM BROMIDE AND ALBUTEROL SULFATE 3 ML: .5; 3 SOLUTION RESPIRATORY (INHALATION) at 04:03

## 2018-03-23 RX ADMIN — FAMOTIDINE 20 MG: 10 INJECTION, SOLUTION INTRAVENOUS at 08:03

## 2018-03-23 RX ADMIN — FUROSEMIDE 40 MG: 10 INJECTION, SOLUTION INTRAMUSCULAR; INTRAVENOUS at 08:03

## 2018-03-23 RX ADMIN — ASPIRIN 325 MG ORAL TABLET 325 MG: 325 PILL ORAL at 08:03

## 2018-03-23 RX ADMIN — PROPOFOL 30 MG: 10 INJECTION, EMULSION INTRAVENOUS at 03:03

## 2018-03-23 RX ADMIN — MIDAZOLAM 2 MG: 1 INJECTION INTRAMUSCULAR; INTRAVENOUS at 03:03

## 2018-03-23 RX ADMIN — FENTANYL CITRATE 10 MCG: 50 INJECTION, SOLUTION INTRAMUSCULAR; INTRAVENOUS at 03:03

## 2018-03-23 NOTE — BRIEF OP NOTE
Ms. Aguirre is s/p EP study which did not reveal a HV interval > 65 so at this point does not require a PPM.  Recommend a 30 day event monitor and to f/u with Dr. Banegas as outpatient in 6 weeks.    Discussed with Dr. Banegas and Dr. Richmond.    Mary Grace Nava MD  Cardiology Fellow  Pager: [phone]  3/23/2018 4:04 PM

## 2018-03-23 NOTE — ASSESSMENT & PLAN NOTE
67F with hx of severe As, COPD s/p successful transaxillary TAVR. EP consulted for routine evaluation. EF 50-55%.     -No periprocedural rhythm issues  -TVP in place, threshold 0.6 mV, 40 VVI  -Tele without pacing or evidence of AVB, continue  -Baseline EKG NSR, narrow QRS  -Initial post EKG NSR, new LBBB  -Repeat EKG this am, persistent LBBB  -EPS +/- DC PPM today  -NPO at midnight  -No heparin products

## 2018-03-23 NOTE — PROGRESS NOTES
[facility]  Interventional Cardiology  Progress Note    Patient Name: Kristina Aguirre  MRN: [id number]  Admission Date: 3/22/2018  Hospital Length of Stay: 1 days  Code Status: Full Code   Attending Physician: Sarbjit Carvajal MD   Primary Care Physician: Robert Mattson MD  Principal Problem:Severe aortic stenosis    Subjective:     Interval History: s/p 26mm Evolut TAVR via TAx access. Episode of SOB yesterday, likely acute on chronic diastolic heart failure, which improved after Lasix IV administered. NPO for EPS +/- PPM today, TVP in place. Patient more somnolent today following oxycodone administration.     Objective:     Vital Signs (Most Recent):  Temp: 98.5 °F (36.9 °C) (03/23/18 0700)  Pulse: 75 (03/23/18 0800)  Resp: (!) 33 (03/23/18 0800)  BP: (!) 135/90 (03/23/18 0800)  SpO2: 100 % (03/23/18 0800) Vital Signs (24h Range):  Temp:  [98 °F (36.7 °C)-99.8 °F (37.7 °C)] 98.5 °F (36.9 °C)  Pulse:  [] 75  Resp:  [15-38] 33  SpO2:  [89 %-100 %] 100 %  BP: (115-155)/(53-90) 135/90  Arterial Line BP: (127-185)/(34-52) 157/47     Weight: 69.9 kg (154 lb 1.6 oz)  Body mass index is 28.19 kg/m².    SpO2: 100 %  O2 Device (Oxygen Therapy): nasal cannula      Intake/Output Summary (Last 24 hours) at 03/23/18 0813  Last data filed at 03/23/18 0800   Gross per 24 hour   Intake          3583.17 ml   Output             3020 ml   Net           563.17 ml       Lines/Drains/Airways     Central Venous Catheter Line                 Percutaneous Central Line Insertion/Assessment - single lumen  03/22/18 0837 right internal jugular less than 1 day          Drain                 Urethral Catheter 03/22/18 0821 Temperature probe;Straight-tip;Non-latex 16 Fr. less than 1 day          Arterial Line                 Arterial Line 03/22/18 0738 Right Radial 1 day          Line                 Pacer Wires 03/22/18 1145 less than 1 day          Peripheral Intravenous Line                 Peripheral IV - Single  Lumen 03/22/18 0630 Right Hand 1 day         Peripheral IV - Single Lumen 03/22/18 0815 Left Antecubital less than 1 day                Physical Exam   Constitutional: She is oriented to person, place, and time. She appears well-developed and well-nourished. No distress.   HENT:   Head: Normocephalic and atraumatic.   Mouth/Throat: Oropharynx is clear and moist.   Eyes: Conjunctivae and EOM are normal. Pupils are equal, round, and reactive to light. No scleral icterus.   Neck: Neck supple. No JVD present. No tracheal deviation present.   RIJ TVP    Cardiovascular: Normal rate and regular rhythm.  Exam reveals no gallop and no friction rub.    Murmur heard.  Pulmonary/Chest: Effort normal. No respiratory distress. She has no wheezes. She has rales. She exhibits no tenderness.   Abdominal: Soft. Bowel sounds are normal. She exhibits no distension. There is no hepatosplenomegaly. There is no tenderness.   Musculoskeletal: She exhibits edema. She exhibits no tenderness.   Neurological: She is alert and oriented to person, place, and time.   Skin: Skin is warm and dry. No rash noted. No erythema.   Psychiatric: She has a normal mood and affect. Her behavior is normal.       Significant Labs:   ABG:   Recent Labs  Lab 03/22/18  1222 03/23/18  0417   PH 7.362 7.406   PCO2 45.0 36.3   HCO3 25.5 22.8*   POCSATURATED 95 93*   BE 0 -2   , BMP:   Recent Labs  Lab 03/21/18  1402 03/22/18  1150 03/22/18 2108 03/23/18  0331 03/23/18  0340   GLU 95 168*  --  145*  --     140  --  136  --    K 3.6 3.7  3.7 3.6  3.6 4.3  --     106  --  105  --    CO2 26 24  --  24  --    BUN 14 17  --  8  --    CREATININE 0.7 0.8  --  0.7  --    CALCIUM 10.2 8.7  --  8.3*  --    MG  --  1.4* 1.9  --  1.9   , CMP   Recent Labs  Lab 03/21/18  1003 03/21/18  1402 03/22/18  1150 03/22/18 2108 03/23/18  0331    139 140  --  136   K 4.1 3.6 3.7  3.7 3.6  3.6 4.3    104 106  --  105   CO2 27 26 24  --  24   * 95 168*   --  145*   BUN 16 14 17  --  8   CREATININE 0.7 0.7 0.8  --  0.7   CALCIUM 9.7 10.2 8.7  --  8.3*   PROT 7.0 7.3  --   --   --    ALBUMIN 3.6 3.9  --   --   --    BILITOT 0.5 0.5  --   --   --    ALKPHOS 68 60  --   --   --    AST 21 22  --   --   --    ALT 6* 9*  --   --   --    ANIONGAP 10 9 10  --  7*   ESTGFRAFRICA >60.0 >60.0 >60.0  --  >60.0   EGFRNONAA >60.0 >60.0 >60.0  --  >60.0    and CBC   Recent Labs  Lab 03/21/18  1402 03/22/18  1150  03/23/18  0331   WBC 8.11 10.75  --  11.22   HGB 9.7* 9.2*  --  8.7*  8.7*   HCT 31.5* 29.5*  < > 28.6*  28.6*    231  --  273   < > = values in this interval not displayed.    Significant Imaging: X-Ray: CXR: X-Ray Chest 1 View (CXR):   Results for orders placed or performed during the hospital encounter of 03/22/18   X-Ray Chest 1 View    Narrative    EXAMINATION:  XR CHEST 1 VIEW    CLINICAL HISTORY:  increased WOB;    TECHNIQUE:  Single frontal view of the chest was performed.    COMPARISON:  03/22/2018.    FINDINGS:  Cardiac silhouette is mildly enlarged but appears stable.  Moderate central vascular congestion.  There are coarsened interstitial lung markings with areas of patchy bibasilar and perihilar airspace disease, slightly worse when compared with the prior exam.  Transvenous pacer is present overlying the heart.  No pneumothorax.      Impression    Slight interval worsening of bilateral airspace disease suggestive of pulmonary edema.      Electronically signed by: Prince Núñez MD  Date:    03/23/2018  Time:    02:10     Assessment and Plan:     Patient is a 67 y.o. female presenting with:    * Severe aortic stenosis    S/p 26mm Evolut TAVR via TAx access  On ASA, will add plavix         Acute on chronic diastolic heart failure    Pt with SOB, CXR with findings suggestive of pulmonary edema  Lasix 40mg IV administered with improvement in SOB         S/p TAVR (transcatheter aortic valve replacement), bioprosthetic    S/p 26mm Evolut TAVR via TAx  access         PAD (peripheral artery disease)    Significant iliofemoral dz on angiogram   CTA with aortoiliac atherosclerosis         Diabetes mellitus, type 2    On insulin gtt         Malignant neoplasm of right lung    s/p RML lobectomy, path showed keratinizing squamous cell CA with. Contacted oncologist who reported disease on remission, last PET scan negative.        Coronary artery disease of native artery with stable angina pectoris     PCI to RCA , mid LAD 40% stenosis  (12/4/17)        Essential hypertension    Controlled on amlodipine, losartan-HCTZ        Dyslipidemia    On pravachol 40            VTE Risk Mitigation         Ordered     IP VTE HIGH RISK PATIENT  Once      03/22/18 1150     Reason for No Pharmacological VTE Prophylaxis  Once      03/22/18 1150     Place sequential compression device  Until discontinued      03/22/18 1150        PLAN:  -Diuresis overnight with Lasix IV x1 for acute on chronic diastolic heart failure  -TVP in place, awaiting EPS +/- PPM for new LBBB  -NPO for procedure as above       Janay Acuna, JOSE  Interventional Cardiology  [facility]

## 2018-03-23 NOTE — PLAN OF CARE
S/P  TAVR left transaxillary on 3/22/18. CM spoke with pt and David/friend concerning dc plan. plan to dc home with home health. Offered HH list, declined. Wishes for first available. Address is correct on face sheet. HH orders placed. Will update           03/23/18 2170   Discharge Assessment   Assessment Type Discharge Planning Assessment   Confirmed/corrected address and phone number on facesheet? Yes   Assessment information obtained from? Patient   Expected Length of Stay (days) 2   Prior to hospitilization cognitive status: Alert/Oriented   Prior to hospitalization functional status: Independent   Current cognitive status: Alert/Oriented   Current Functional Status: Needs Assistance   Lives With alone   Able to Return to Prior Arrangements yes   Is patient able to care for self after discharge? Yes   Who are your caregiver(s) and their phone number(s)? David Vega/friend [phone]   Patient's perception of discharge disposition home health   Readmission Within The Last 30 Days no previous admission in last 30 days   Patient currently being followed by outpatient case management? No   Patient currently receives any other outside agency services? No   Equipment Currently Used at Home none   Do you have any problems affording any of your prescribed medications? No   Is the patient taking medications as prescribed? yes   Does the patient have transportation home? Yes   Transportation Available family or friend will provide   Discharge Plan A Home with family;Home Health   Discharge Plan B Home with family   Patient/Family In Agreement With Plan yes

## 2018-03-23 NOTE — H&P
History & Physical  Surgical Intensive Care    SUBJECTIVE:     Chief Complaint/Reason for Admission: TAVR    History of Present Illness:   Kristina Aguirre is a 67 y.o. 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  She underwent a TAVR today.    PTA Medications   Medication Sig    ACETAMINOPHEN WITH CODEINE (ACETAMINOPHEN-CODEINE) 120mg 12mg 5mL Soln TAKE 1 TEASPOON (5 MILLILITERS) BY MOUTH EVERY 6 HOURS AS NEEDED FOR COUGH    amlodipine (NORVASC) 10 MG tablet Take 10 mg by mouth once daily.    aspirin (ECOTRIN) 81 MG EC tablet Take 1 tablet (81 mg total) by mouth once daily.    cetirizine (ZYRTEC) 10 MG tablet Take 10 mg by mouth once daily.    chlorthalidone (HYGROTEN) 25 MG Tab Take 1 tablet (25 mg total) by mouth every morning.    clopidogrel (PLAVIX) 75 mg tablet Take 75 mg by mouth once daily.    diphenoxylate-atropine 2.5-0.025 mg (LOMOTIL) 2.5-0.025 mg per tablet Take 1 tablet by mouth 4 (four) times daily as needed for Diarrhea.    fenofibrate 160 MG Tab Take 160 mg by mouth once daily.    hydrocodone-acetaminophen 10-325mg (NORCO)  mg Tab Take 1 tablet by mouth 3 (three) times daily as needed.    JANUVIA 100 mg Tab Take 100 mg by mouth once daily.    losartan (COZAAR) 50 MG tablet Take 1 tablet (50 mg total) by mouth every evening.    losartan-hydrochlorothiazide 100-25 mg (HYZAAR) 100-25 mg per tablet Take 1 tablet by mouth once daily.    metFORMIN (GLUCOPHAGE) 1000 MG tablet Take 1 tablet (1,000 mg total) by mouth 2 (two) times daily.    NEXIUM 40 mg capsule Take 40 mg by mouth once daily.    pravastatin (PRAVACHOL) 40 MG tablet Take 40 mg by mouth every evening.    SYMBICORT 80-4.5 mcg/actuation HFAA Inhale 2 puffs into the lungs 2 (two) times daily.    VENTOLIN HFA 90  mcg/actuation inhaler Inhale 2 puffs into the lungs every 4 (four) hours as needed.    amoxicillin-clavulanate 875-125mg (AUGMENTIN) 875-125 mg per tablet 1 TABLET BY MOUTH EVERY 1 2 HOURS FOR 10 DAYS    TRUE METRIX GLUCOSE TEST STRIP Strp test DAILY    TRUEPLUS LANCETS 30 gauge Misc USE DAILY when testing    VICTOZA 3-JOSE 0.6 mg/0.1 mL (18 mg/3 mL) PnIj INJECT 1.8 milligrams under the skin EVERY DAY       Review of patient's allergies indicates:  No Known Allergies    Past Medical History:   Diagnosis Date    Abdominal aortic aneurysm (AAA) without rupture 9/12/2017    Anticoagulant long-term use     Arthritis     Asthma     Bilateral carotid artery stenosis 9/12/2017    Cancer     lung    COPD (chronic obstructive pulmonary disease)     Coronary artery disease of native artery with stable angina pectoris 9/29/2017    Diabetes mellitus     Heart valve disorder     Hyperlipidemia     Hypertension      Past Surgical History:   Procedure Laterality Date    HAND SURGERY Right     KNEE ARTHROSCOPY      LUNG LOBECTOMY Right     middle lobe    mediastinoscopy      SPINAL FUSION      TONSILLECTOMY      WRIST SURGERY Right      Family History   Problem Relation Age of Onset    Throat cancer Mother     Heart attack Father     No Known Problems Sister     No Known Problems Brother     No Known Problems Maternal Aunt     No Known Problems Maternal Uncle     No Known Problems Paternal Aunt     No Known Problems Paternal Uncle     No Known Problems Maternal Grandmother     No Known Problems Maternal Grandfather     No Known Problems Paternal Grandmother     No Known Problems Paternal Grandfather     Anemia Neg Hx     Arrhythmia Neg Hx     Asthma Neg Hx     Clotting disorder Neg Hx     Fainting Neg Hx     Heart disease Neg Hx     Heart failure Neg Hx     Hyperlipidemia Neg Hx     Hypertension Neg Hx     Stroke Neg Hx     Atrial Septal Defect Neg Hx      Social History   Substance Use  Topics    Smoking status: Former Smoker     Packs/day: 1.50     Types: Vaping w/o nicotine    Smokeless tobacco: Never Used    Alcohol use No        Review of Systems:  ROS  Constitutional: Positive for fatigue. Negative for activity change, appetite change and fever.   HENT: Negative for congestion, dental problem, sneezing and sore throat.    Eyes: Negative for pain, discharge and visual disturbance.   Respiratory: Negative for cough and wheezing.    Cardiovascular: Negative for palpitations and leg swelling.   Gastrointestinal: Negative for abdominal distention, abdominal pain, constipation, diarrhea, nausea and vomiting.   Endocrine: Negative for polydipsia, polyphagia and polyuria.   Genitourinary: Negative for dysuria, frequency and urgency.   Musculoskeletal: Negative for back pain and gait problem.   Skin: Negative for rash and wound.   Neurological: Negative for seizures, syncope and headaches.   Hematological: Does not bruise/bleed easily.   Psychiatric/Behavioral: Negative for agitation and confusion. The patient is not nervous/anxious.      OBJECTIVE:     Vital Signs (Most Recent)  Temp: 99.4 °F (37.4 °C) (03/22/18 1900)  Pulse: 85 (03/22/18 1900)  Resp: (!) 32 (03/22/18 1900)  BP: (!) 133/58 (03/22/18 1900)  SpO2: 100 % (03/22/18 1900)       Physical Exam    Constitutional: She appears well-nourished. No distress.   HENT:   Head: Atraumatic.   Eyes: EOM are normal. No scleral icterus.   Neck: Neck supple. No JVD present.   Cardiovascular: Normal rate, regular rhythm and normal heart sounds.    Pulmonary/Chest: Effort normal. No respiratory distress.   Abdominal: Soft. She exhibits no distension.   Musculoskeletal: She exhibits no edema.   Neurological: She is awake, alert and oriented.  Skin: Skin is warm and dry. No erythema.    Lines/Drains:       Percutaneous Central Line Insertion/Assessment - single lumen  03/22/18 0837 right internal jugular (Active)   Dressing biopatch in place;dressing dry  and intact 3/22/2018  7:05 PM   Securement secured w/ sutures 3/22/2018  7:05 PM   Additional Site Signs no erythema;no warmth;no edema;no pain;no palpable cord 3/22/2018  7:05 PM   Patency/Care infusing 3/22/2018  7:05 PM   Waveform other (see comments) 3/22/2018  7:05 PM   Dressing Change Due 03/29/18 3/22/2018  3:00 PM   Daily Line Review Performed 3/22/2018  7:05 PM   Number of days: 0            Peripheral IV - Single Lumen 03/22/18 0630 Right Hand (Active)   Site Assessment Clean;Dry;Intact 3/22/2018  7:05 PM   Line Status Infusing 3/22/2018  7:05 PM   Dressing Status Clean;Dry;Intact 3/22/2018  7:05 PM   Dressing Intervention New dressing 3/22/2018  3:00 PM   Dressing Change Due 03/26/18 3/22/2018  3:00 PM   Site Change Due 03/26/18 3/22/2018 11:45 AM   Reason Not Rotated Not due 3/22/2018  7:05 PM   Number of days: 0            Peripheral IV - Single Lumen 03/22/18 0815 Left Antecubital (Active)   Site Assessment Clean;Dry;Intact;No redness;No swelling 3/22/2018  7:05 PM   Line Status Infusing 3/22/2018  7:05 PM   Dressing Status Clean;Dry;Intact 3/22/2018  7:05 PM   Dressing Intervention New dressing 3/22/2018  3:00 PM   Dressing Change Due 03/26/18 3/22/2018  3:00 PM   Site Change Due 03/26/18 3/22/2018 11:45 AM   Reason Not Rotated Not due 3/22/2018  7:05 PM   Number of days: 0            Arterial Line 03/22/18 0738 Right Radial (Active)   Site Assessment Clean;Dry;Intact;No redness;No swelling 3/22/2018  7:05 PM   Line Status Pulsatile blood flow 3/22/2018  7:05 PM   Art Line Waveform Appropriate 3/22/2018  7:05 PM   Arterial Line Interventions Zeroed and calibrated;Leveled;Connections checked and tightened;Line pulled back;Flushed per protocol 3/22/2018  7:05 PM   Color/Movement/Sensation Capillary refill less than 3 sec 3/22/2018  7:05 PM   Dressing Type Transparent 3/22/2018  7:05 PM   Dressing Status Clean;Dry;Intact 3/22/2018  7:05 PM   Dressing Intervention New dressing 3/22/2018  3:00 PM  "  Dressing Change Due 03/29/18 3/22/2018  3:00 PM   Number of days: 0            Pacer Wires 03/22/18 1145 (Active)   Pacer Wire Status Ventricular wires connected to pacer 3/22/2018  7:05 PM   Site Assessment Clean;Dry;Intact 3/22/2018  7:05 PM   How Pacer Wires are Secured Ventricular wires secured to dressing 3/22/2018  7:05 PM   Dressing Status Clean;Dry;Intact 3/22/2018  7:05 PM   Dressing Intervention Dressing reinforced 3/22/2018  3:00 PM   Dressing Change Due 03/29/18 3/22/2018  3:00 PM   Number of days: 0            Urethral Catheter 03/22/18 0821 Temperature probe;Straight-tip;Non-latex 16 Fr. (Active)   Site Assessment Clean;Intact 3/22/2018  7:05 PM   Collection Container Urimeter 3/22/2018  7:05 PM   Securement Method secured to top of thigh w/ adhesive device 3/22/2018  7:05 PM   Catheter Care Performed yes 3/22/2018  7:05 PM   Reason for Continuing Urinary Catheterization Post operative 3/22/2018  7:05 PM   CAUTI Prevention Bundle StatLock in place w 1" slack;Intact seal between catheter & drainage tubing;Drainage bag off the floor;Green sheeting clip in use;No dependent loops or kinks;Drainage bag not overfilled (<2/3 full);Drainage bag below bladder 3/22/2018  7:05 PM   Output (mL) 215 mL 3/22/2018  7:05 PM   Number of days: 0       Laboratory  CBC:   Recent Labs  Lab 03/22/18  1150 03/22/18  1222   WBC 10.75  --    RBC 3.81*  --    HGB 9.2*  --    HCT 29.5* 27*     --    MCV 77*  --    MCH 24.1*  --    MCHC 31.2*  --      CMP:   Recent Labs  Lab 03/21/18  1402 03/22/18  1150   GLU 95 168*   CALCIUM 10.2 8.7   ALBUMIN 3.9  --    PROT 7.3  --     140   K 3.6 3.7  3.7   CO2 26 24    106   BUN 14 17   CREATININE 0.7 0.8   ALKPHOS 60  --    ALT 9*  --    AST 22  --    BILITOT 0.5  --        Chest X-Ray: X-ray Chest Ap Portable    Result Date: 3/22/2018  See above Electronically signed by: Caleb Ledezma MD Date:    03/22/2018 Time:    15:03        ASSESSMENT/PLAN: "       Plan:    Neuro:   -Patient is AAOx3  -Pain control with oral meds     Pulmonary:   -extubated saturating comfortably on 5L         Recent Labs  Lab 03/22/18  1222   PH 7.362   PCO2 45.0   PO2 80   HCO3 25.5   POCSATURATED 95   BE 0       Cardiac:  -MAP goal >65  -HDS not requiring pressors  -TVP in place, threshold .6mV, 40VVI    Renal:   -UOP adequate   -Bun/Cr 17/.8     Fluids/Electrolytes/Nutrition/GI:   -Nutritional status: NPO @midnight   -replace lytes PRN  -maintenance fluids @125    Hematology/Oncology:  -H/H- 9.2/29.5  -Anticoagulation:     Infectious Disease:   -Afebrile  -Abx- post op ancef     Endocrine:  -Glucose goal of 120-150  -SSI    Dispo:  -Continue care in the ICU setting    Sarbjit Ruth PGY-1  03/22/2018

## 2018-03-23 NOTE — ASSESSMENT & PLAN NOTE
Plan:     Neuro:   -Patient is AAOx3  -Pain control with oral meds      Pulmonary:   -extubated saturating comfortably on RA        Recent Labs    Recent Labs  Lab 03/23/18  0417   PH 7.406   PCO2 36.3   PO2 68*   HCO3 22.8*   POCSATURATED 93*   BE -2          Cardiac:  -MAP goal >65  -HDS not requiring pressors  -TVP in place, threshold .6mV, 40VVI  -New LBBB on EKG so patient will go for EP study this morning with possible pacemaker placement      Renal:   -UOP adequate   -Bun/Cr 8/.7      Fluids/Electrolytes/Nutrition/GI:   -Nutritional status: NPO   -replace lytes PRN  -maintenance fluids @125     Hematology/Oncology:  -H/H- 9.2/29.5  -Anticoagulation:      Infectious Disease:   -Afebrile  -Abx- post op ancef      Endocrine:  -Glucose goal of 120-150  -SSI     Dispo:  -Continue care in the ICU setting.  EP study with possible pacemaker today

## 2018-03-23 NOTE — ASSESSMENT & PLAN NOTE
Pt with SOB, CXR with findings suggestive of pulmonary edema  Lasix 40mg IV administered with improvement in SOB

## 2018-03-23 NOTE — ASSESSMENT & PLAN NOTE
D/c Iv insulin infusion since suspended and diet advancing.   Convert to Novolog correction scale only with BG ac/hs but anticipate may need prandial coverage.       Discharge planning: recommend d/c Januvia; May resume Victoza and metformin. F/u with PCP for long term DM mgmt.

## 2018-03-23 NOTE — PLAN OF CARE
Problem: Physical Therapy Goal  Goal: Physical Therapy Goal  Goals to be met by: 18    Patient will increase functional independence with mobility by performin. Supine to sit with Modified [place] -not met  2. Sit to stand transfer with Modified [place] -not met  3. Gait  x 220 feet with Supervision-not met  4. Ascend/descend 2 stair with no Handrails Supervision . -not met    eval completed and goals appropriate. Eli Rice, PT 3/23/2018

## 2018-03-23 NOTE — PROGRESS NOTES
[facility]  Cardiology  Progress Note    Patient Name: Kristina Aguirre  MRN: [id number]  Admission Date: 3/22/2018  Hospital Length of Stay: 1 days  Code Status: Full Code   Attending Physician: Sarbjit Carvajal MD   Primary Care Physician: Robert Mattson MD  Expected Discharge Date:   Principal Problem:Severe aortic stenosis    Subjective:     Interval History: NAEON. Feeling well this morning.  EKG with persistent new LBBB.    ROS  Objective:     Vital Signs (Most Recent):  Temp: 98.5 °F (36.9 °C) (03/23/18 0700)  Pulse: 75 (03/23/18 0800)  Resp: (!) 33 (03/23/18 0800)  BP: (!) 135/90 (03/23/18 0800)  SpO2: 100 % (03/23/18 0800) Vital Signs (24h Range):  Temp:  [98 °F (36.7 °C)-99.8 °F (37.7 °C)] 98.5 °F (36.9 °C)  Pulse:  [] 75  Resp:  [15-38] 33  SpO2:  [89 %-100 %] 100 %  BP: (115-155)/(53-90) 135/90  Arterial Line BP: (127-185)/(34-52) 157/47     Weight: 69.9 kg (154 lb 1.6 oz)  Body mass index is 28.19 kg/m².     SpO2: 100 %  O2 Device (Oxygen Therapy): nasal cannula      Intake/Output Summary (Last 24 hours) at 03/23/18 0913  Last data filed at 03/23/18 0800   Gross per 24 hour   Intake          3583.17 ml   Output             3020 ml   Net           563.17 ml       Lines/Drains/Airways     Central Venous Catheter Line                 Percutaneous Central Line Insertion/Assessment - single lumen  03/22/18 0837 right internal jugular 1 day          Drain                 Urethral Catheter 03/22/18 0821 Temperature probe;Straight-tip;Non-latex 16 Fr. 1 day          Arterial Line                 Arterial Line 03/22/18 0738 Right Radial 1 day          Line                 Pacer Wires 03/22/18 1145 less than 1 day          Peripheral Intravenous Line                 Peripheral IV - Single Lumen 03/22/18 0630 Right Hand 1 day         Peripheral IV - Single Lumen 03/22/18 0815 Left Antecubital 1 day                Physical Exam   Constitutional: She is oriented to person, place, and time.  She appears well-nourished. No distress.   HENT:   Head: Atraumatic.   Eyes: EOM are normal. No scleral icterus.   Neck: Neck supple. No JVD present.   RIJ TVP   Cardiovascular: Normal rate and regular rhythm.  Exam reveals no gallop and no friction rub.    Murmur heard.  Abdominal: Soft. Bowel sounds are normal. She exhibits no distension.   Musculoskeletal: She exhibits no edema.   Neurological: She is alert and oriented to person, place, and time.   Skin: Skin is warm and dry. No erythema.       Significant Labs:   CMP   Recent Labs  Lab 03/21/18  1003 03/21/18  1402 03/22/18  1150 03/22/18  2108 03/23/18  0331    139 140  --  136   K 4.1 3.6 3.7  3.7 3.6  3.6 4.3    104 106  --  105   CO2 27 26 24  --  24   * 95 168*  --  145*   BUN 16 14 17  --  8   CREATININE 0.7 0.7 0.8  --  0.7   CALCIUM 9.7 10.2 8.7  --  8.3*   PROT 7.0 7.3  --   --   --    ALBUMIN 3.6 3.9  --   --   --    BILITOT 0.5 0.5  --   --   --    ALKPHOS 68 60  --   --   --    AST 21 22  --   --   --    ALT 6* 9*  --   --   --    ANIONGAP 10 9 10  --  7*   ESTGFRAFRICA >60.0 >60.0 >60.0  --  >60.0   EGFRNONAA >60.0 >60.0 >60.0  --  >60.0    and CBC   Recent Labs  Lab 03/21/18  1402 03/22/18  1150  03/23/18  0331   WBC 8.11 10.75  --  11.22   HGB 9.7* 9.2*  --  8.7*  8.7*   HCT 31.5* 29.5*  < > 28.6*  28.6*    231  --  273   < > = values in this interval not displayed.    Significant Imaging: Echocardiogram: 2D echo with color flow doppler: No results found for this or any previous visit. and EKG: sinus rhythm, new LBBB (persistent overnight)  Tele: sinus rhythm, no pacing requirement or dropped beats    Assessment and Plan:     Brief HPI:    S/p TAVR (transcatheter aortic valve replacement), bioprosthetic    67F with hx of severe As, COPD s/p successful transaxillary TAVR. EP consulted for routine evaluation. EF 50-55%.     -No periprocedural rhythm issues  -TVP in place, threshold 0.6 mV, 40 VVI  -Tele without pacing  or evidence of AVB, continue  -Baseline EKG NSR, narrow QRS  -Initial post EKG NSR, new LBBB  -Repeat EKG this am, persistent LBBB  -EPS +/- DC PPM today  -NPO at midnight  -No heparin products            Rishi Zaman MD  Cardiology  [facility]

## 2018-03-23 NOTE — PLAN OF CARE
KAI spoke with the CM.  The pt will dc in the next few days and needs HH.  She has no preference.  KAI sent the referral to [facility] and is waiting to hear back from them.    [facility] Coulter[phone]

## 2018-03-23 NOTE — PLAN OF CARE
Problem: Patient Care Overview  Goal: Plan of Care Review  Outcome: Ongoing (interventions implemented as appropriate)  Pt had multiple episodes of anxiety/resp distress. Pt stating she cant breathe and cant get comfortable. Pt able to be calmed down after each episode. Breathing treatments adminstered, chest xray obtained, lasix administered. Cardene on and off throughout shift, maintaining MAP <80. Insulin gtt currently @ 1U/hr, accuchecks done Q1H. UO >50 mL/hr. Oxycodone administered PRN for pain. Electrolytes monitored and replaced. Pt updated on plan of care throughout shift. See flow sheet for assessment and details.

## 2018-03-23 NOTE — PT/OT/SLP EVAL
Physical Therapy Evaluation    Patient Name:  Kristina Aguirre   MRN:  [id number]    Recommendations:     Discharge Recommendations:  home health PT   Discharge Equipment Recommendations:  (will determine DME needs closer to discharge.)   Barriers to discharge: Decreased caregiver support pt lives alone and does not have any assistance.     Assessment:     Kristina Aguirre is a 67 y.o. female admitted with a medical diagnosis of Severe aortic stenosis.  She presents with the following impairments/functional limitations:  impaired functional mobilty, gait instability, impaired endurance, impaired balance, decreased lower extremity function pt patti treatment fair but having transvenous pacer present decreased functional mobility. Pt will benefit from skilled PT 5x/wk to progress physically. Pt will be able to discharge home when medically stable and will need HHPT. Pt is s/p TAVR 3/22/18..    Rehab Prognosis:  good; patient would benefit from acute skilled PT services to address these deficits and reach maximum level of function.      Recent Surgery: Procedure(s) (LRB):  REPLACEMENT-VALVE-AORTIC (N/A) 1 Day Post-Op    Plan:     During this hospitalization, patient to be seen 4 x/week to address the above listed problems via gait training, therapeutic activities  · Plan of Care Expires:  04/21/18   Plan of Care Reviewed with: patient    Subjective     Communicated with nurse prior to session.  Patient found sitting in chair  upon PT entry to room, agreeable to evaluation.      Chief Complaint: pt c/o L flank pain and posterior headache.   Patient comments/goals:  To get better and go home.   Pain/Comfort:  · Pain Rating 1: 8/10  · Location - Side 1: Left  · Location 1:  (flank)  · Pain Rating Post-Intervention 1: 8/10    Patients cultural, spiritual, Spiritism conflicts given the current situation: none    Living Environment:  Pt lives alone with her dog in mobile home with 2 steps and no handrail. Pt does not have  assistance.   Prior to admission, patients level of function was Independent.  Patient has the following equipment: none.  DME owned (not currently used): none.  Upon discharge, patient will have assistance from no one. .    Objective:     Patient found with: telemetry, blood pressure cuff, arterial line, pulse ox (continuous), pretty catheter, oxygen (external pacemaker)     General Precautions: Standard, fall   Orthopedic Precautions:    Braces:       Exams:  · Cognitive Exam:  Patient is oriented to Person, Place, Time and Situation   ·   · RLE ROM: WFL  · RLE Strength: 3/5 for major muscle groups  · LLE ROM: WFL  · LLE Strength: 3/5 for major muscle groups    Functional Mobility:  · Transfers:     · Sit to Stand:  minimum assistance with no AD    Gait training-not tested 2nd to pt having transvenous pacer present.     Pt had decreased ROM and strength RUE with OT testing. Pt c/o posterior headache and  had decreased vision on R side and R visual field. RN notified of such.     AM-PAC 6 CLICK MOBILITY  Total Score:14       Patient left up in chair with all lines intact and call button in reach.    GOALS:    Physical Therapy Goals        Problem: Physical Therapy Goal    Goal Priority Disciplines Outcome Goal Variances Interventions   Physical Therapy Goal     PT/OT, PT      Description:  Goals to be met by: 18    Patient will increase functional independence with mobility by performin. Supine to sit with Modified [place] -not met  2. Sit to stand transfer with Modified [place] -not met  3. Gait  x 220 feet with Supervision-not met  4. Ascend/descend 2 stair with no Handrails Supervision . -not met                      History:     Past Medical History:   Diagnosis Date    Abdominal aortic aneurysm (AAA) without rupture 2017    Anticoagulant long-term use     Arthritis     Asthma     Bilateral carotid artery stenosis 2017    Cancer     lung    COPD (chronic obstructive  pulmonary disease)     Coronary artery disease of native artery with stable angina pectoris 9/29/2017    Diabetes mellitus     Heart valve disorder     Hyperlipidemia     Hypertension        Past Surgical History:   Procedure Laterality Date    HAND SURGERY Right     KNEE ARTHROSCOPY      LUNG LOBECTOMY Right     middle lobe    mediastinoscopy      SPINAL FUSION      TONSILLECTOMY      WRIST SURGERY Right        Clinical Decision Making:     History  Co-morbidities and personal factors that may impact the plan of care Examination  Body Structures and Functions, activity limitations and participation restrictions that may impact the plan of care Clinical Presentation   Decision Making/ Complexity Score   Co-morbidities:   [] Time since onset of injury / illness / exacerbation  [] Status of current condition  []Patient's cognitive status and safety concerns    [] Multiple Medical Problems (see med hx)  Personal Factors:   [] Patient's age  [] Prior Level of function   [] Patient's home situation (environment and family support)  [] Patient's level of motivation  [] Expected progression of patient      HISTORY:(criteria)    [] 92770 - no personal factors/history    [] 69202 - has 1-2 personal factor/comorbidity     [] 27534 - has >3 personal factor/comorbidity     Body Regions:  [] Objective examination findings  [] Head     []  Neck  [] Trunk   [] Upper Extremity  [] Lower Extremity    Body Systems:  [] For communication ability, affect, cognition, language, and learning style: the assessment of the ability to make needs known, consciousness, orientation (person, place, and time), expected emotional /behavioral responses, and learning preferences (eg, learning barriers, education  needs)  [] For the neuromuscular system: a general assessment of gross coordinated movement (eg, balance, gait, locomotion, transfers, and transitions) and motor function  (motor control and motor learning)  [] For the  musculoskeletal system: the assessment of gross symmetry, gross range of motion, gross strength, height, and weight  [] For the integumentary system: the assessment of pliability(texture), presence of scar formation, skin color, and skin integrity  [] For cardiovascular/pulmonary system: the assessment of heart rate, respiratory rate, blood pressure, and edema     Activity limitations:    [] Patient's cognitive status and saf ety concerns          [] Status of current condition      [] Weight bearing restriction  [] Cardiopulmunary Restriction    Participation Restrictions:   [] Goals and goal agreement with the patient     [] Rehab potential (prognosis) and probable outcome      Examination of Body System: (criteria)    [] 17334 - addressing 1-2 elements    [] 32886 - addressing a total of 3 or more elements     [] 23313 -  Addressing a total of 4 or more elements         Clinical Presentation: (criteria)  Choose one     On examination of body system using standardized tests and measures patient presents with (CHOOSE ONE) elements from any of the following: body structures and functions, activity limitations, and/or participation restrictions.  Leading to a clinical presentation that is considered (CHOOSE ONE)                              Clinical Decision Making  (Eval Complexity):  Choose One     Time Tracking:     PT Received On: 03/23/18  PT Start Time: 0857     PT Stop Time: 0907  PT Total Time (min): 10 min     Billable Minutes: Evaluation 10 min      Eli Rice, PT  03/23/2018

## 2018-03-23 NOTE — PROGRESS NOTES
"[facility]  Endocrinology  Progress Note    Admit Date: 3/22/2018     Reason for Consult: Management of T2DM, Hyperglycemia     Surgical Procedure and Date: s/p TAVR 3/22/18    Diabetes diagnosis year:  "Aleisha"  Lab Results   Component Value Date    HGBA1C 6.5 (H) 2018     Home Diabetes Medications:  Victoza 1.8mg QD, Januvia 100mg QD, metformin BID    How often checking glucose at home? One at alternating times of day  BG readings on regimen: 71 - 140  Hypoglycemia on the regimen?  Yes  Missed doses on regimen?  Yes; she skips Victoza if BG <100    Diabetes Complications include:     Diabetic retinopathy     HPI: Patient is a 67 y.o. female with a diagnosis of T2DM controlled pre admit by local PCP. She does acknowledge steroid induced hyperglycemia when she takes steroids for bronchitis.   She presented with severe AS, underwent TAVR.   Endocrine consulted for DM mgmt.             Interval HPI:   Overnight events: remains in ICU, IV insulin initiated last night around  when BG >200. Required varying rates 0.2-2 u/hr. Noted new LBBB since surgery.   Eatin%  Nausea: No  Hypoglycemia and intervention: No  Fever: No  TPN and/or TF: No    BP (!) 128/58 (BP Location: Left arm, Patient Position: Lying)   Pulse 83   Temp 98.6 °F (37 °C) (Oral)   Resp (!) 24   Ht 5' 2" (1.575 m)   Wt 69.9 kg (154 lb 1.6 oz)   SpO2 (!) 87%   Breastfeeding? No   BMI 28.19 kg/m²       Labs Reviewed and Include      Recent Labs  Lab 18  0331   *   CALCIUM 8.3*      K 4.3   CO2 24      BUN 8   CREATININE 0.7     Lab Results   Component Value Date    WBC 11.22 2018    HGB 8.7 (L) 2018    HGB 8.7 (L) 2018    HCT 28.6 (L) 2018    HCT 28.6 (L) 2018    MCV 78 (L) 2018     2018     No results for input(s): TSH, FREET4 in the last 168 hours.  Lab Results   Component Value Date    HGBA1C 6.5 (H) 2018       Nutritional " status:   Body mass index is 28.19 kg/m².  Lab Results   Component Value Date    ALBUMIN 3.9 03/21/2018    ALBUMIN 3.6 03/21/2018    ALBUMIN 3.9 09/18/2017     No results found for: PREALBUMIN    Estimated Creatinine Clearance: 69.5 mL/min (based on SCr of 0.7 mg/dL).    Accu-Checks  Recent Labs      03/23/18   0403  03/23/18   0501  03/23/18   0600  03/23/18   0759  03/23/18   0909  03/23/18   1019  03/23/18   1114  03/23/18   1146  03/23/18   1253  03/23/18   1644   POCTGLUCOSE  145*  182*  200*  131*  144*  132*  143*  140*  139*  124*       Hyperglycemia/Diabetes Medications: Antihyperglycemics     Start     Stop Route Frequency Ordered    03/22/18 1630  insulin regular (Humulin R) 100 Units in sodium chloride 0.9% 100 mL infusion     Question:  Insulin Rate Adjustment (DO NOT MODIFY ANSWER)  Answer:  [email or web address]    -- IV Continuous 03/22/18 1530          ASSESSMENT and PLAN    Diabetes mellitus, type 2    D/c Iv insulin infusion since suspended and diet advancing.   Convert to Novolog correction scale only with BG ac/hs but anticipate may need prandial coverage.       Discharge planning: recommend d/c Januvia; May resume Victoza and metformin. F/u with PCP for long term DM mgmt.           S/p TAVR (transcatheter aortic valve replacement), bioprosthetic    Per CTS, avoid hypoglycemia          Coronary artery disease of native artery with stable angina pectoris    avoid hypoglycemia            PAD (peripheral artery disease)    Caution with use of SGLT2i for DM mgmt.               Sierra Kohler, APRN,ANP-C  Endocrinology  [facility]

## 2018-03-23 NOTE — PROGRESS NOTES
[facility]  Critical Care - Surgery  Progress Note    Patient Name: Kristina Aguirre  MRN: [id number]  Admission Date: 3/22/2018  Hospital Length of Stay: 1 days  Code Status: Full Code  Attending Provider: Sarbjit Carvajal MD  Primary Care Provider: Robert Mattson MD   Principal Problem: S/p TAVR (transcatheter aortic valve replacement), bioprosthetic    Subjective:     Hospital/ICU Course:  No notes on file    Interval History/Significant Events: Patient did have an episode of diffuculty breathing.  CXR showed pleural effusion.  Lasix was given and this resolved the patients breathing.  Currently saturating 98% on Ra.  States she doesn't remember much from yesterday.    Follow-up For: Procedure(s) (LRB):  REPLACEMENT-VALVE-AORTIC (N/A)    Post-Operative Day: 1 Day Post-Op    Objective:     Vital Signs (Most Recent):  Temp: 99.8 °F (37.7 °C) (03/23/18 0300)  Pulse: 77 (03/23/18 0630)  Resp: (!) 25 (03/23/18 0630)  BP: 132/60 (03/23/18 0600)  SpO2: (!) 93 % (03/23/18 0630) Vital Signs (24h Range):  Temp:  [98 °F (36.7 °C)-99.8 °F (37.7 °C)] 99.8 °F (37.7 °C)  Pulse:  [] 77  Resp:  [15-38] 25  SpO2:  [89 %-100 %] 93 %  BP: (115-155)/(53-65) 132/60  Arterial Line BP: (127-185)/(34-52) 142/40     Weight: 69.9 kg (154 lb 1.6 oz)  Body mass index is 28.19 kg/m².      Intake/Output Summary (Last 24 hours) at 03/23/18 0707  Last data filed at 03/23/18 0600   Gross per 24 hour   Intake          3583.17 ml   Output             2895 ml   Net           688.17 ml       Physical Exam    Constitutional: She appears well-nourished. No distress.   HENT:   Head: Atraumatic.   Eyes: EOM are normal. No scleral icterus.   Neck: Neck supple. No JVD present.  Incision covered with dressing.  Cardiovascular: Normal rate, regular rhythm and normal heart sounds.    Pulmonary/Chest: Effort normal. No respiratory distress.   Abdominal: Soft. She exhibits no distension.   Musculoskeletal: She exhibits no edema.    Neurological: She is awake, alert and oriented.  Skin: Skin is warm and dry. No erythema.    Vents:       Lines/Drains/Airways     Central Venous Catheter Line                 Percutaneous Central Line Insertion/Assessment - single lumen  03/22/18 0837 right internal jugular less than 1 day          Drain                 Urethral Catheter 03/22/18 0821 Temperature probe;Straight-tip;Non-latex 16 Fr. less than 1 day          Arterial Line                 Arterial Line 03/22/18 0738 Right Radial less than 1 day          Line                 Pacer Wires 03/22/18 1145 less than 1 day          Peripheral Intravenous Line                 Peripheral IV - Single Lumen 03/22/18 0630 Right Hand 1 day         Peripheral IV - Single Lumen 03/22/18 0815 Left Antecubital less than 1 day                Significant Labs:    CBC/Anemia Profile:    Recent Labs  Lab 03/21/18  1402 03/22/18  1150 03/22/18  1222 03/23/18  0331   WBC 8.11 10.75  --  11.22   HGB 9.7* 9.2*  --  8.7*  8.7*   HCT 31.5* 29.5* 27* 28.6*  28.6*    231  --  273   MCV 77* 77*  --  78*   RDW 18.7* 19.0*  --  18.9*        Chemistries:    Recent Labs  Lab 03/21/18  1003 03/21/18  1402 03/22/18  1150 03/22/18  2108 03/23/18  0331 03/23/18  0340    139 140  --  136  --    K 4.1 3.6 3.7  3.7 3.6  3.6 4.3  --     104 106  --  105  --    CO2 27 26 24  --  24  --    BUN 16 14 17  --  8  --    CREATININE 0.7 0.7 0.8  --  0.7  --    CALCIUM 9.7 10.2 8.7  --  8.3*  --    ALBUMIN 3.6 3.9  --   --   --   --    PROT 7.0 7.3  --   --   --   --    BILITOT 0.5 0.5  --   --   --   --    ALKPHOS 68 60  --   --   --   --    ALT 6* 9*  --   --   --   --    AST 21 22  --   --   --   --    MG  --   --  1.4* 1.9  --  1.9   PHOS  --   --  4.4 2.7  --  2.6*       ABGs:   Recent Labs  Lab 03/23/18  0417   PH 7.406   PCO2 36.3   HCO3 22.8*   POCSATURATED 93*   BE -2     CMP:   Recent Labs  Lab 03/21/18  1003 03/21/18  1402 03/22/18  1150 03/22/18  2108 03/23/18  0331     139 140  --  136   K 4.1 3.6 3.7  3.7 3.6  3.6 4.3    104 106  --  105   CO2 27 26 24  --  24   * 95 168*  --  145*   BUN 16 14 17  --  8   CREATININE 0.7 0.7 0.8  --  0.7   CALCIUM 9.7 10.2 8.7  --  8.3*   PROT 7.0 7.3  --   --   --    ALBUMIN 3.6 3.9  --   --   --    BILITOT 0.5 0.5  --   --   --    ALKPHOS 68 60  --   --   --    AST 21 22  --   --   --    ALT 6* 9*  --   --   --    ANIONGAP 10 9 10  --  7*   EGFRNONAA >60.0 >60.0 >60.0  --  >60.0     Coagulation:   Recent Labs  Lab 03/23/18  0331 03/23/18  0340   INR 1.0  --    APTT  --  22.1     Troponin: No results for input(s): TROPONINI in the last 48 hours.  All pertinent labs within the past 24 hours have been reviewed.    Significant Imaging:  I have reviewed and interpreted all pertinent imaging results/findings within the past 24 hours.    Assessment/Plan:     * S/p TAVR (transcatheter aortic valve replacement), bioprosthetic    Plan:     Neuro:   -Patient is AAOx3  -Pain control with oral meds      Pulmonary:   -extubated saturating comfortably on RA        Recent Labs    Recent Labs  Lab 03/23/18  0417   PH 7.406   PCO2 36.3   PO2 68*   HCO3 22.8*   POCSATURATED 93*   BE -2          Cardiac:  -MAP goal >65  -HDS not requiring pressors  -TVP in place, threshold .6mV, 40VVI  -New LBBB on EKG so patient will go for EP study this morning with possible pacemaker placement      Renal:   -UOP adequate   -Bun/Cr 8/.7      Fluids/Electrolytes/Nutrition/GI:   -Nutritional status: NPO   -replace lytes PRN  -maintenance fluids @125     Hematology/Oncology:  -H/H- 9.2/29.5  -Anticoagulation:      Infectious Disease:   -Afebrile  -Abx- post op ancef      Endocrine:  -Glucose goal of 120-150  -SSI     Dispo:  -Continue care in the ICU setting.  EP study with possible pacemaker today                Critical care was time spent personally by me on the following activities: development of treatment plan with patient or surrogate and  bedside caregivers, discussions with consultants, evaluation of patient's response to treatment, examination of patient, ordering and performing treatments and interventions, ordering and review of laboratory studies, ordering and review of radiographic studies, pulse oximetry, re-evaluation of patient's condition.  This critical care time did not overlap with that of any other provider or involve time for any procedures.     Sarbjit Ruth MD  Critical Care - Surgery  [facility]

## 2018-03-23 NOTE — SUBJECTIVE & OBJECTIVE
Interval History/Significant Events: Patient did have an episode of diffuculty breathing.  CXR showed pleural effusion.  Lasix was given and this resolved the patients breathing.  Currently saturating 98% on Ra.  States she doesn't remember much from yesterday.    Follow-up For: Procedure(s) (LRB):  REPLACEMENT-VALVE-AORTIC (N/A)    Post-Operative Day: 1 Day Post-Op    Objective:     Vital Signs (Most Recent):  Temp: 99.8 °F (37.7 °C) (03/23/18 0300)  Pulse: 77 (03/23/18 0630)  Resp: (!) 25 (03/23/18 0630)  BP: 132/60 (03/23/18 0600)  SpO2: (!) 93 % (03/23/18 0630) Vital Signs (24h Range):  Temp:  [98 °F (36.7 °C)-99.8 °F (37.7 °C)] 99.8 °F (37.7 °C)  Pulse:  [] 77  Resp:  [15-38] 25  SpO2:  [89 %-100 %] 93 %  BP: (115-155)/(53-65) 132/60  Arterial Line BP: (127-185)/(34-52) 142/40     Weight: 69.9 kg (154 lb 1.6 oz)  Body mass index is 28.19 kg/m².      Intake/Output Summary (Last 24 hours) at 03/23/18 0707  Last data filed at 03/23/18 0600   Gross per 24 hour   Intake          3583.17 ml   Output             2895 ml   Net           688.17 ml       Physical Exam    Constitutional: She appears well-nourished. No distress.   HENT:   Head: Atraumatic.   Eyes: EOM are normal. No scleral icterus.   Neck: Neck supple. No JVD present.  Incision covered with dressing.  Cardiovascular: Normal rate, regular rhythm and normal heart sounds.    Pulmonary/Chest: Effort normal. No respiratory distress.   Abdominal: Soft. She exhibits no distension.   Musculoskeletal: She exhibits no edema.   Neurological: She is awake, alert and oriented.  Skin: Skin is warm and dry. No erythema.    Vents:       Lines/Drains/Airways     Central Venous Catheter Line                 Percutaneous Central Line Insertion/Assessment - single lumen  03/22/18 0837 right internal jugular less than 1 day          Drain                 Urethral Catheter 03/22/18 0821 Temperature probe;Straight-tip;Non-latex 16 Fr. less than 1 day          Arterial  Line                 Arterial Line 03/22/18 0738 Right Radial less than 1 day          Line                 Pacer Wires 03/22/18 1145 less than 1 day          Peripheral Intravenous Line                 Peripheral IV - Single Lumen 03/22/18 0630 Right Hand 1 day         Peripheral IV - Single Lumen 03/22/18 0815 Left Antecubital less than 1 day                Significant Labs:    CBC/Anemia Profile:    Recent Labs  Lab 03/21/18  1402 03/22/18  1150 03/22/18  1222 03/23/18  0331   WBC 8.11 10.75  --  11.22   HGB 9.7* 9.2*  --  8.7*  8.7*   HCT 31.5* 29.5* 27* 28.6*  28.6*    231  --  273   MCV 77* 77*  --  78*   RDW 18.7* 19.0*  --  18.9*        Chemistries:    Recent Labs  Lab 03/21/18  1003 03/21/18  1402 03/22/18  1150 03/22/18  2108 03/23/18  0331 03/23/18  0340    139 140  --  136  --    K 4.1 3.6 3.7  3.7 3.6  3.6 4.3  --     104 106  --  105  --    CO2 27 26 24  --  24  --    BUN 16 14 17  --  8  --    CREATININE 0.7 0.7 0.8  --  0.7  --    CALCIUM 9.7 10.2 8.7  --  8.3*  --    ALBUMIN 3.6 3.9  --   --   --   --    PROT 7.0 7.3  --   --   --   --    BILITOT 0.5 0.5  --   --   --   --    ALKPHOS 68 60  --   --   --   --    ALT 6* 9*  --   --   --   --    AST 21 22  --   --   --   --    MG  --   --  1.4* 1.9  --  1.9   PHOS  --   --  4.4 2.7  --  2.6*       ABGs:   Recent Labs  Lab 03/23/18  0417   PH 7.406   PCO2 36.3   HCO3 22.8*   POCSATURATED 93*   BE -2     CMP:   Recent Labs  Lab 03/21/18  1003 03/21/18  1402 03/22/18  1150 03/22/18  2108 03/23/18  0331    139 140  --  136   K 4.1 3.6 3.7  3.7 3.6  3.6 4.3    104 106  --  105   CO2 27 26 24  --  24   * 95 168*  --  145*   BUN 16 14 17  --  8   CREATININE 0.7 0.7 0.8  --  0.7   CALCIUM 9.7 10.2 8.7  --  8.3*   PROT 7.0 7.3  --   --   --    ALBUMIN 3.6 3.9  --   --   --    BILITOT 0.5 0.5  --   --   --    ALKPHOS 68 60  --   --   --    AST 21 22  --   --   --    ALT 6* 9*  --   --   --    ANIONGAP 10 9 10  --  7*    EGFRNONAA >60.0 >60.0 >60.0  --  >60.0     Coagulation:   Recent Labs  Lab 03/23/18  0331 03/23/18  0340   INR 1.0  --    APTT  --  22.1     Troponin: No results for input(s): TROPONINI in the last 48 hours.  All pertinent labs within the past 24 hours have been reviewed.    Significant Imaging:  I have reviewed and interpreted all pertinent imaging results/findings within the past 24 hours.

## 2018-03-23 NOTE — OP NOTE
DATE OF PROCEDURE:  3/2/2018     PREOPERATIVE DIAGNOSIS:  Severe aortic stenosis     POSTOPERATIVE DIAGNOSIS:  Severe aortic stenosis.     PROCEDURE:  left transaxillary transcatheter aortic valve replacement with a 26   mm evolut valve.     SURGEON:  Sarbjit Carvajal M.D.     CO-SURGEON:  Luis Richmond M.D.     ANESTHESIA:  general.     INDICATIONS FOR PROCEDURE:  A 66 yo patient high risk for surgical AVR.  He was evaluated for transcatheter valve by the valve team and found to be an acceptable transaxilalry candidate. The patient understood the risks of the procedure and was able to give informed consent.     OPERATIVE FINDINGS:  Severe aortic stenosis, severely calcified valve,   well-seated prosthesis post-implant with no paravalvular leak.     BLOOD LOSS:  100 mL     PROCEDURE IN DETAIL:  The patient was taken electively to the hybrid OR, placed   supine upon the table. The patient was prepped and draped.      The left axillary artery was exposed taking care to protect the brachial plexus. A purse string suture was used to control the arterial access.    The right femoral artery was accessed with a micropuncture technique.  We performed fluoroscopy, heparinized the patient, upsized to delivery sheaths. Catheters were positioned in the root of the aorta and we obtained our implant angle. We crossed the aortic valve with a soft wire and exchanged this for a stiff wire over the catheter.     We then performed balloon valvuloplasty under rapid pacing. The patient tolerated this well hemodynamically.     The valve was brought in the field sterilely.  We crossed the aortic arch with carotid occlusion, positioned at the aortic valve annulus and delivered it under rapid pacing.  The valve was well seated with no paravalvular leak.  Cannulas were removed.  Groins were closed percutaneously.      Axilalry incision clsoed in layers    Excellent hemostasis was achieved after administration of protamine. Dr Richmond and LATRICE  performed the procedure together as cosurgeons and were present for the procedure.

## 2018-03-23 NOTE — SUBJECTIVE & OBJECTIVE
Interval History: NAEON. Feeling well this morning.  EKG with persistent new LBBB.    ROS  Objective:     Vital Signs (Most Recent):  Temp: 98.5 °F (36.9 °C) (03/23/18 0700)  Pulse: 75 (03/23/18 0800)  Resp: (!) 33 (03/23/18 0800)  BP: (!) 135/90 (03/23/18 0800)  SpO2: 100 % (03/23/18 0800) Vital Signs (24h Range):  Temp:  [98 °F (36.7 °C)-99.8 °F (37.7 °C)] 98.5 °F (36.9 °C)  Pulse:  [] 75  Resp:  [15-38] 33  SpO2:  [89 %-100 %] 100 %  BP: (115-155)/(53-90) 135/90  Arterial Line BP: (127-185)/(34-52) 157/47     Weight: 69.9 kg (154 lb 1.6 oz)  Body mass index is 28.19 kg/m².     SpO2: 100 %  O2 Device (Oxygen Therapy): nasal cannula      Intake/Output Summary (Last 24 hours) at 03/23/18 0913  Last data filed at 03/23/18 0800   Gross per 24 hour   Intake          3583.17 ml   Output             3020 ml   Net           563.17 ml       Lines/Drains/Airways     Central Venous Catheter Line                 Percutaneous Central Line Insertion/Assessment - single lumen  03/22/18 0837 right internal jugular 1 day          Drain                 Urethral Catheter 03/22/18 0821 Temperature probe;Straight-tip;Non-latex 16 Fr. 1 day          Arterial Line                 Arterial Line 03/22/18 0738 Right Radial 1 day          Line                 Pacer Wires 03/22/18 1145 less than 1 day          Peripheral Intravenous Line                 Peripheral IV - Single Lumen 03/22/18 0630 Right Hand 1 day         Peripheral IV - Single Lumen 03/22/18 0815 Left Antecubital 1 day                Physical Exam   Constitutional: She is oriented to person, place, and time. She appears well-nourished. No distress.   HENT:   Head: Atraumatic.   Eyes: EOM are normal. No scleral icterus.   Neck: Neck supple. No JVD present.   RIJ TVP   Cardiovascular: Normal rate and regular rhythm.  Exam reveals no gallop and no friction rub.    Murmur heard.  Abdominal: Soft. Bowel sounds are normal. She exhibits no distension.   Musculoskeletal: She  exhibits no edema.   Neurological: She is alert and oriented to person, place, and time.   Skin: Skin is warm and dry. No erythema.       Significant Labs:   CMP   Recent Labs  Lab 03/21/18  1003 03/21/18  1402 03/22/18  1150 03/22/18  2108 03/23/18  0331    139 140  --  136   K 4.1 3.6 3.7  3.7 3.6  3.6 4.3    104 106  --  105   CO2 27 26 24  --  24   * 95 168*  --  145*   BUN 16 14 17  --  8   CREATININE 0.7 0.7 0.8  --  0.7   CALCIUM 9.7 10.2 8.7  --  8.3*   PROT 7.0 7.3  --   --   --    ALBUMIN 3.6 3.9  --   --   --    BILITOT 0.5 0.5  --   --   --    ALKPHOS 68 60  --   --   --    AST 21 22  --   --   --    ALT 6* 9*  --   --   --    ANIONGAP 10 9 10  --  7*   ESTGFRAFRICA >60.0 >60.0 >60.0  --  >60.0   EGFRNONAA >60.0 >60.0 >60.0  --  >60.0    and CBC   Recent Labs  Lab 03/21/18  1402 03/22/18  1150  03/23/18  0331   WBC 8.11 10.75  --  11.22   HGB 9.7* 9.2*  --  8.7*  8.7*   HCT 31.5* 29.5*  < > 28.6*  28.6*    231  --  273   < > = values in this interval not displayed.    Significant Imaging: Echocardiogram: 2D echo with color flow doppler: No results found for this or any previous visit. and EKG: sinus rhythm, new LBBB (persistent overnight)  Tele: sinus rhythm, no pacing requirement or dropped beats

## 2018-03-23 NOTE — SUBJECTIVE & OBJECTIVE
Interval History: s/p 26mm Evolut TAVR via TAx access. Episode of SOB yesterday, likely acute on chronic diastolic heart failure, which improved after Lasix IV administered. NPO for EPS +/- PPM today, TVP in place.    Objective:     Vital Signs (Most Recent):  Temp: 98.5 °F (36.9 °C) (03/23/18 0700)  Pulse: 75 (03/23/18 0800)  Resp: (!) 33 (03/23/18 0800)  BP: (!) 135/90 (03/23/18 0800)  SpO2: 100 % (03/23/18 0800) Vital Signs (24h Range):  Temp:  [98 °F (36.7 °C)-99.8 °F (37.7 °C)] 98.5 °F (36.9 °C)  Pulse:  [] 75  Resp:  [15-38] 33  SpO2:  [89 %-100 %] 100 %  BP: (115-155)/(53-90) 135/90  Arterial Line BP: (127-185)/(34-52) 157/47     Weight: 69.9 kg (154 lb 1.6 oz)  Body mass index is 28.19 kg/m².    SpO2: 100 %  O2 Device (Oxygen Therapy): nasal cannula      Intake/Output Summary (Last 24 hours) at 03/23/18 0813  Last data filed at 03/23/18 0800   Gross per 24 hour   Intake          3583.17 ml   Output             3020 ml   Net           563.17 ml       Lines/Drains/Airways     Central Venous Catheter Line                 Percutaneous Central Line Insertion/Assessment - single lumen  03/22/18 0837 right internal jugular less than 1 day          Drain                 Urethral Catheter 03/22/18 0821 Temperature probe;Straight-tip;Non-latex 16 Fr. less than 1 day          Arterial Line                 Arterial Line 03/22/18 0738 Right Radial 1 day          Line                 Pacer Wires 03/22/18 1145 less than 1 day          Peripheral Intravenous Line                 Peripheral IV - Single Lumen 03/22/18 0630 Right Hand 1 day         Peripheral IV - Single Lumen 03/22/18 0815 Left Antecubital less than 1 day                Physical Exam   Constitutional: She is oriented to person, place, and time. She appears well-developed and well-nourished. No distress.   HENT:   Head: Normocephalic and atraumatic.   Mouth/Throat: Oropharynx is clear and moist.   Eyes: Conjunctivae and EOM are normal. Pupils are equal,  round, and reactive to light. No scleral icterus.   Neck: Neck supple. No JVD present. No tracheal deviation present.   RIJ TVP    Cardiovascular: Normal rate and regular rhythm.  Exam reveals no gallop and no friction rub.    Murmur heard.  Pulmonary/Chest: Effort normal. No respiratory distress. She has no wheezes. She has rales. She exhibits no tenderness.   Abdominal: Soft. Bowel sounds are normal. She exhibits no distension. There is no hepatosplenomegaly. There is no tenderness.   Musculoskeletal: She exhibits edema. She exhibits no tenderness.   Neurological: She is alert and oriented to person, place, and time.   Skin: Skin is warm and dry. No rash noted. No erythema.   Psychiatric: She has a normal mood and affect. Her behavior is normal.       Significant Labs:   ABG:   Recent Labs  Lab 03/22/18  1222 03/23/18  0417   PH 7.362 7.406   PCO2 45.0 36.3   HCO3 25.5 22.8*   POCSATURATED 95 93*   BE 0 -2   , BMP:   Recent Labs  Lab 03/21/18  1402 03/22/18  1150 03/22/18 2108 03/23/18  0331 03/23/18  0340   GLU 95 168*  --  145*  --     140  --  136  --    K 3.6 3.7  3.7 3.6  3.6 4.3  --     106  --  105  --    CO2 26 24  --  24  --    BUN 14 17  --  8  --    CREATININE 0.7 0.8  --  0.7  --    CALCIUM 10.2 8.7  --  8.3*  --    MG  --  1.4* 1.9  --  1.9   , CMP   Recent Labs  Lab 03/21/18  1003 03/21/18  1402 03/22/18  1150 03/22/18  2108 03/23/18  0331    139 140  --  136   K 4.1 3.6 3.7  3.7 3.6  3.6 4.3    104 106  --  105   CO2 27 26 24  --  24   * 95 168*  --  145*   BUN 16 14 17  --  8   CREATININE 0.7 0.7 0.8  --  0.7   CALCIUM 9.7 10.2 8.7  --  8.3*   PROT 7.0 7.3  --   --   --    ALBUMIN 3.6 3.9  --   --   --    BILITOT 0.5 0.5  --   --   --    ALKPHOS 68 60  --   --   --    AST 21 22  --   --   --    ALT 6* 9*  --   --   --    ANIONGAP 10 9 10  --  7*   ESTGFRAFRICA >60.0 >60.0 >60.0  --  >60.0   EGFRNONAA >60.0 >60.0 >60.0  --  >60.0    and CBC   Recent Labs  Lab  03/21/18  1402 03/22/18  1150  03/23/18  0331   WBC 8.11 10.75  --  11.22   HGB 9.7* 9.2*  --  8.7*  8.7*   HCT 31.5* 29.5*  < > 28.6*  28.6*    231  --  273   < > = values in this interval not displayed.    Significant Imaging: X-Ray: CXR: X-Ray Chest 1 View (CXR):   Results for orders placed or performed during the hospital encounter of 03/22/18   X-Ray Chest 1 View    Narrative    EXAMINATION:  XR CHEST 1 VIEW    CLINICAL HISTORY:  increased WOB;    TECHNIQUE:  Single frontal view of the chest was performed.    COMPARISON:  03/22/2018.    FINDINGS:  Cardiac silhouette is mildly enlarged but appears stable.  Moderate central vascular congestion.  There are coarsened interstitial lung markings with areas of patchy bibasilar and perihilar airspace disease, slightly worse when compared with the prior exam.  Transvenous pacer is present overlying the heart.  No pneumothorax.      Impression    Slight interval worsening of bilateral airspace disease suggestive of pulmonary edema.      Electronically signed by: Prince Núñez MD  Date:    03/23/2018  Time:    02:10

## 2018-03-23 NOTE — TRANSFER OF CARE
"Anesthesia Transfer of Care Note    Patient: Kristina Aguirre    Procedure(s) Performed: Procedure(s) (LRB):  STUDY-EP (N/A)    Patient location: ICU    Anesthesia Type: MAC    Transport from OR: Transported from OR on 2-3 L/min O2 by NC with adequate spontaneous ventilation. Continuos invasive BP monitoring in transport. Continuous SpO2 monitoring in transport. Continuous ECG monitoring in transport    Post pain: pain needs to be addressed    Post assessment: no apparent anesthetic complications and tolerated procedure well    Post vital signs: stable    Level of consciousness: awake    Nausea/Vomiting: no nausea/vomiting    Complications: none    Transfer of care protocol was followed      Last vitals:   Visit Vitals  /60 (BP Location: Left arm, Patient Position: Sitting)   Pulse 74   Temp 37.3 °C (99.1 °F) (Oral)   Resp (!) 32   Ht 5' 2" (1.575 m)   Wt 69.9 kg (154 lb 1.6 oz)   SpO2 97%   Breastfeeding? No   BMI 28.19 kg/m²     "

## 2018-03-23 NOTE — SUBJECTIVE & OBJECTIVE
"Interval HPI:   Overnight events: remains in ICU, IV insulin initiated last night around  when BG >200. Required varying rates 0.2-2 u/hr. Noted new LBBB since surgery.   Eatin%  Nausea: No  Hypoglycemia and intervention: No  Fever: No  TPN and/or TF: No    BP (!) 128/58 (BP Location: Left arm, Patient Position: Lying)   Pulse 83   Temp 98.6 °F (37 °C) (Oral)   Resp (!) 24   Ht 5' 2" (1.575 m)   Wt 69.9 kg (154 lb 1.6 oz)   SpO2 (!) 87%   Breastfeeding? No   BMI 28.19 kg/m²     Labs Reviewed and Include      Recent Labs  Lab 18  0331   *   CALCIUM 8.3*      K 4.3   CO2 24      BUN 8   CREATININE 0.7     Lab Results   Component Value Date    WBC 11.22 2018    HGB 8.7 (L) 2018    HGB 8.7 (L) 2018    HCT 28.6 (L) 2018    HCT 28.6 (L) 2018    MCV 78 (L) 2018     2018     No results for input(s): TSH, FREET4 in the last 168 hours.  Lab Results   Component Value Date    HGBA1C 6.5 (H) 2018       Nutritional status:   Body mass index is 28.19 kg/m².  Lab Results   Component Value Date    ALBUMIN 3.9 2018    ALBUMIN 3.6 2018    ALBUMIN 3.9 2017     No results found for: PREALBUMIN    Estimated Creatinine Clearance: 69.5 mL/min (based on SCr of 0.7 mg/dL).    Accu-Checks  Recent Labs      18   0403  18   0501  18   0600  18   0759  18   0909  18   1019  18   1114  18   1146  18   1253  18   1644   POCTGLUCOSE  145*  182*  200*  131*  144*  132*  143*  140*  139*  124*       Hyperglycemia/Diabetes Medications: Antihyperglycemics     Start     Stop Route Frequency Ordered    18 1630  insulin regular (Humulin R) 100 Units in sodium chloride 0.9% 100 mL infusion     Question:  Insulin Rate Adjustment (DO NOT MODIFY ANSWER)  Answer:  [email or web address]    -- IV Continuous 18 1530        "

## 2018-03-23 NOTE — HPI
Kristina Aguirre is a 67 y.o. female referred by Dr Young/Dominick Bautista for evaluation of severe AS (NYHA Class II sx).     The patient has undergone the following TAVR work-up:   · ECHO (Date 8.24.17): LORA= 0.53 cm2, MG= 47.6mmHg, EF= 50%.   · LHC (Date 12.4.17): PCI to RCA , mid LAD 40% stenosis   · STS: 2.6%   · Frailty: 1/4   · Iliacs are >5.01 on R and > 4.7 on L. Left subclavian ostium is 7.24 mm  · Subclavian ultrasound: No stenosis.   · LVOT area by CTA is 3.48 cm2 (23 mm X 19 mm) and Avg Diameter is 21.1 per Dr Richmond  · Incidental findings on CT: 6 mm solid nodule on her left upper lobe. She has a history of lung cancer, Dr Howard contacted (Her oncologist) who reports she will continue to do surveillance with PET scans. Her last PET was in February 2018 without recurrence of malignancy  · CT Surgery risk assessment: high risk, per Dr Carvajal and Nathan due to Lung CA, emphysema, tobacco use  · Rhythm issues: NSR  · PFTs: FEV1 90% predicted, DLCO 84% predicted.  · Comorbidities: Lung CA, Emphysema, PAD         Kristina Aguirre is a 26 mm  EvolutR valve candidate via LTAx access.

## 2018-03-23 NOTE — NURSING
Patient exhibiting right sided weakness and neglect per physical therapy team.  Upon assessment, patient's strength is equal bilaterally, pupils are equal bilaterally, and no facial asymmetries are noted at this time.  Patient seems to have mild range of motion deficit in her right shoulder, and reports that this is her baseline when asked.  Dr. Polk called to bedside. Dr. Carvajal notified of assessment and called to bedside. No further interventions at this time.

## 2018-03-23 NOTE — PLAN OF CARE
Problem: Occupational Therapy Goal  Goal: Occupational Therapy Goal  Goals to be met by: 4/2/18     Patient will increase functional independence with ADLs by performing:    Feeding with Modified [place].  UE Dressing with Supervision.  LE Dressing with Supervision.  Grooming while standing at sink with Supervision.  Toileting from toilet with Stand-by Assistance for hygiene and clothing management.   Toilet transfer to toilet with Stand-by Assistance.  Upper extremity exercise program x10 reps per handout, with independence.    Outcome: Ongoing (interventions implemented as appropriate)  OT eval completed, and above goals established. CAROLYN Zepeda  3/23/2018

## 2018-03-24 PROBLEM — G81.91 HEMIPARESIS OF RIGHT DOMINANT SIDE: Status: ACTIVE | Noted: 2018-03-24

## 2018-03-24 LAB
ALLENS TEST: ABNORMAL
ANION GAP SERPL CALC-SCNC: 8 MMOL/L
BASOPHILS # BLD AUTO: 0.05 K/UL
BASOPHILS NFR BLD: 0.5 %
BUN SERPL-MCNC: 12 MG/DL
CALCIUM SERPL-MCNC: 8.8 MG/DL
CHLORIDE SERPL-SCNC: 108 MMOL/L
CO2 SERPL-SCNC: 25 MMOL/L
CREAT SERPL-MCNC: 0.7 MG/DL
DELSYS: ABNORMAL
DIFFERENTIAL METHOD: ABNORMAL
EOSINOPHIL # BLD AUTO: 0.1 K/UL
EOSINOPHIL NFR BLD: 0.6 %
ERYTHROCYTE [DISTWIDTH] IN BLOOD BY AUTOMATED COUNT: 19.2 %
EST. GFR  (AFRICAN AMERICAN): >60 ML/MIN/1.73 M^2
EST. GFR  (NON AFRICAN AMERICAN): >60 ML/MIN/1.73 M^2
FIBRINOGEN PPP-MCNC: 538 MG/DL
FIBRINOGEN PPP-MCNC: 552 MG/DL
FIO2: 30
FLOW: 2.5
GLUCOSE SERPL-MCNC: 150 MG/DL
HCO3 UR-SCNC: 27.9 MMOL/L (ref 24–28)
HCT VFR BLD AUTO: 24.6 %
HCT VFR BLD AUTO: 24.6 %
HGB BLD-MCNC: 7.7 G/DL
HGB BLD-MCNC: 7.7 G/DL
IMM GRANULOCYTES # BLD AUTO: 0.03 K/UL
IMM GRANULOCYTES NFR BLD AUTO: 0.3 %
LYMPHOCYTES # BLD AUTO: 1.3 K/UL
LYMPHOCYTES NFR BLD: 12.8 %
MAGNESIUM SERPL-MCNC: 1.9 MG/DL
MCH RBC QN AUTO: 24.3 PG
MCHC RBC AUTO-ENTMCNC: 31.3 G/DL
MCV RBC AUTO: 78 FL
MODE: ABNORMAL
MONOCYTES # BLD AUTO: 0.7 K/UL
MONOCYTES NFR BLD: 6.7 %
NEUTROPHILS # BLD AUTO: 8.2 K/UL
NEUTROPHILS NFR BLD: 79.1 %
NRBC BLD-RTO: 0 /100 WBC
PCO2 BLDA: 38.5 MMHG (ref 35–45)
PH SMN: 7.47 [PH] (ref 7.35–7.45)
PHOSPHATE SERPL-MCNC: 2.7 MG/DL
PLATELET # BLD AUTO: 237 K/UL
PMV BLD AUTO: 9.8 FL
PO2 BLDA: 68 MMHG (ref 80–100)
POC BE: 4 MMOL/L
POC SATURATED O2: 94 % (ref 95–100)
POC TCO2: 29 MMOL/L (ref 23–27)
POCT GLUCOSE: 113 MG/DL (ref 70–110)
POCT GLUCOSE: 131 MG/DL (ref 70–110)
POCT GLUCOSE: 152 MG/DL (ref 70–110)
POCT GLUCOSE: 154 MG/DL (ref 70–110)
POTASSIUM SERPL-SCNC: 4 MMOL/L
RBC # BLD AUTO: 3.17 M/UL
SAMPLE: ABNORMAL
SITE: ABNORMAL
SODIUM SERPL-SCNC: 141 MMOL/L
WBC # BLD AUTO: 10.35 K/UL

## 2018-03-24 PROCEDURE — 63600175 PHARM REV CODE 636 W HCPCS: Performed by: THORACIC SURGERY (CARDIOTHORACIC VASCULAR SURGERY)

## 2018-03-24 PROCEDURE — 63600175 PHARM REV CODE 636 W HCPCS: Performed by: STUDENT IN AN ORGANIZED HEALTH CARE EDUCATION/TRAINING PROGRAM

## 2018-03-24 PROCEDURE — 25500020 PHARM REV CODE 255: Performed by: THORACIC SURGERY (CARDIOTHORACIC VASCULAR SURGERY)

## 2018-03-24 PROCEDURE — 25000003 PHARM REV CODE 250: Performed by: THORACIC SURGERY (CARDIOTHORACIC VASCULAR SURGERY)

## 2018-03-24 PROCEDURE — S0028 INJECTION, FAMOTIDINE, 20 MG: HCPCS | Performed by: THORACIC SURGERY (CARDIOTHORACIC VASCULAR SURGERY)

## 2018-03-24 PROCEDURE — 85025 COMPLETE CBC W/AUTO DIFF WBC: CPT

## 2018-03-24 PROCEDURE — 84100 ASSAY OF PHOSPHORUS: CPT

## 2018-03-24 PROCEDURE — 99223 1ST HOSP IP/OBS HIGH 75: CPT | Mod: ,,, | Performed by: NURSE PRACTITIONER

## 2018-03-24 PROCEDURE — 99233 SBSQ HOSP IP/OBS HIGH 50: CPT | Mod: ,,, | Performed by: SURGERY

## 2018-03-24 PROCEDURE — 82803 BLOOD GASES ANY COMBINATION: CPT

## 2018-03-24 PROCEDURE — 80048 BASIC METABOLIC PNL TOTAL CA: CPT

## 2018-03-24 PROCEDURE — 36600 WITHDRAWAL OF ARTERIAL BLOOD: CPT

## 2018-03-24 PROCEDURE — 83735 ASSAY OF MAGNESIUM: CPT

## 2018-03-24 PROCEDURE — 94761 N-INVAS EAR/PLS OXIMETRY MLT: CPT

## 2018-03-24 PROCEDURE — 85384 FIBRINOGEN ACTIVITY: CPT

## 2018-03-24 PROCEDURE — 20600001 HC STEP DOWN PRIVATE ROOM

## 2018-03-24 PROCEDURE — 99900035 HC TECH TIME PER 15 MIN (STAT)

## 2018-03-24 PROCEDURE — 25000003 PHARM REV CODE 250: Performed by: STUDENT IN AN ORGANIZED HEALTH CARE EDUCATION/TRAINING PROGRAM

## 2018-03-24 PROCEDURE — 27000221 HC OXYGEN, UP TO 24 HOURS

## 2018-03-24 PROCEDURE — 25000242 PHARM REV CODE 250 ALT 637 W/ HCPCS: Performed by: STUDENT IN AN ORGANIZED HEALTH CARE EDUCATION/TRAINING PROGRAM

## 2018-03-24 RX ORDER — FUROSEMIDE 10 MG/ML
40 INJECTION INTRAMUSCULAR; INTRAVENOUS 2 TIMES DAILY
Status: DISCONTINUED | OUTPATIENT
Start: 2018-03-24 | End: 2018-03-27

## 2018-03-24 RX ORDER — AMLODIPINE BESYLATE 10 MG/1
10 TABLET ORAL DAILY
Status: DISCONTINUED | OUTPATIENT
Start: 2018-03-24 | End: 2018-04-02 | Stop reason: HOSPADM

## 2018-03-24 RX ORDER — MORPHINE SULFATE 2 MG/ML
1 INJECTION, SOLUTION INTRAMUSCULAR; INTRAVENOUS EVERY 6 HOURS PRN
Status: DISCONTINUED | OUTPATIENT
Start: 2018-03-24 | End: 2018-04-02 | Stop reason: HOSPADM

## 2018-03-24 RX ORDER — FLUTICASONE FUROATE AND VILANTEROL 100; 25 UG/1; UG/1
1 POWDER RESPIRATORY (INHALATION) DAILY
Status: DISCONTINUED | OUTPATIENT
Start: 2018-03-24 | End: 2018-04-02 | Stop reason: HOSPADM

## 2018-03-24 RX ORDER — HYDRALAZINE HYDROCHLORIDE 20 MG/ML
10 INJECTION INTRAMUSCULAR; INTRAVENOUS ONCE
Status: COMPLETED | OUTPATIENT
Start: 2018-03-24 | End: 2018-03-24

## 2018-03-24 RX ORDER — HYDRALAZINE HYDROCHLORIDE 20 MG/ML
20 INJECTION INTRAMUSCULAR; INTRAVENOUS EVERY 6 HOURS PRN
Status: DISCONTINUED | OUTPATIENT
Start: 2018-03-24 | End: 2018-03-25

## 2018-03-24 RX ORDER — HYDRALAZINE HYDROCHLORIDE 20 MG/ML
20 INJECTION INTRAMUSCULAR; INTRAVENOUS ONCE
Status: COMPLETED | OUTPATIENT
Start: 2018-03-24 | End: 2018-03-24

## 2018-03-24 RX ORDER — ACETAMINOPHEN 10 MG/ML
1000 INJECTION, SOLUTION INTRAVENOUS ONCE
Status: COMPLETED | OUTPATIENT
Start: 2018-03-24 | End: 2018-03-24

## 2018-03-24 RX ORDER — CARVEDILOL 3.12 MG/1
3.12 TABLET ORAL DAILY
Status: DISCONTINUED | OUTPATIENT
Start: 2018-03-24 | End: 2018-04-02 | Stop reason: HOSPADM

## 2018-03-24 RX ORDER — CHLORTHALIDONE 25 MG/1
25 TABLET ORAL EVERY MORNING
Status: DISCONTINUED | OUTPATIENT
Start: 2018-03-24 | End: 2018-04-02 | Stop reason: HOSPADM

## 2018-03-24 RX ADMIN — HYDRALAZINE HYDROCHLORIDE 10 MG: 20 INJECTION INTRAMUSCULAR; INTRAVENOUS at 01:03

## 2018-03-24 RX ADMIN — ONDANSETRON 4 MG: 2 INJECTION, SOLUTION INTRAMUSCULAR; INTRAVENOUS at 11:03

## 2018-03-24 RX ADMIN — MUPIROCIN 1 G: 20 OINTMENT TOPICAL at 09:03

## 2018-03-24 RX ADMIN — ACETAMINOPHEN 1000 MG: 10 INJECTION, SOLUTION INTRAVENOUS at 10:03

## 2018-03-24 RX ADMIN — FLUTICASONE FUROATE AND VILANTEROL TRIFENATATE 1 PUFF: 100; 25 POWDER RESPIRATORY (INHALATION) at 01:03

## 2018-03-24 RX ADMIN — CLOPIDOGREL 75 MG: 75 TABLET, FILM COATED ORAL at 08:03

## 2018-03-24 RX ADMIN — HYDRALAZINE HYDROCHLORIDE 10 MG: 20 INJECTION INTRAMUSCULAR; INTRAVENOUS at 04:03

## 2018-03-24 RX ADMIN — MUPIROCIN 1 G: 20 OINTMENT TOPICAL at 08:03

## 2018-03-24 RX ADMIN — ONDANSETRON 4 MG: 2 INJECTION, SOLUTION INTRAMUSCULAR; INTRAVENOUS at 12:03

## 2018-03-24 RX ADMIN — OXYCODONE HYDROCHLORIDE 10 MG: 5 TABLET ORAL at 07:03

## 2018-03-24 RX ADMIN — FAMOTIDINE 20 MG: 10 INJECTION, SOLUTION INTRAVENOUS at 08:03

## 2018-03-24 RX ADMIN — POLYETHYLENE GLYCOL 3350 17 G: 17 POWDER, FOR SOLUTION ORAL at 08:03

## 2018-03-24 RX ADMIN — AMLODIPINE BESYLATE 10 MG: 10 TABLET ORAL at 12:03

## 2018-03-24 RX ADMIN — CHLORTHALIDONE 25 MG: 25 TABLET ORAL at 12:03

## 2018-03-24 RX ADMIN — IOHEXOL 100 ML: 350 INJECTION, SOLUTION INTRAVENOUS at 08:03

## 2018-03-24 RX ADMIN — HYDRALAZINE HYDROCHLORIDE 20 MG: 20 INJECTION INTRAMUSCULAR; INTRAVENOUS at 07:03

## 2018-03-24 RX ADMIN — Medication 500 MG: at 08:03

## 2018-03-24 RX ADMIN — FUROSEMIDE 40 MG: 10 INJECTION, SOLUTION INTRAMUSCULAR; INTRAVENOUS at 12:03

## 2018-03-24 RX ADMIN — ASPIRIN 81 MG: 81 TABLET, COATED ORAL at 08:03

## 2018-03-24 RX ADMIN — FAMOTIDINE 20 MG: 10 INJECTION, SOLUTION INTRAVENOUS at 10:03

## 2018-03-24 NOTE — PROGRESS NOTES
[facility]  Critical Care - Surgery  Progress Note    Patient Name: Kristina Aguirre  MRN: [id number]  Admission Date: 3/22/2018  Hospital Length of Stay: 2 days  Code Status: Prior  Attending Provider: Sarbjit Carvajal MD  Primary Care Provider: Robert Mattson MD   Principal Problem: Severe aortic stenosis    Subjective:     Hospital/ICU Course: TAVR    Interval History/Significant Events: NOAEN, VSS, step down to CTSU pending room.  Follow-up For: Procedure(s) (LRB):  STUDY-EP (N/A)        Vital Signs (Most Recent):  Temp: 98.6 °F (37 °C) (03/24/18 0330)  Pulse: 78 (03/24/18 0600)  Resp: 17 (03/24/18 0600)  BP: (!) 126/59 (03/24/18 0545)  SpO2: 100 % (03/24/18 0600) Vital Signs (24h Range):  Temp:  [98.4 °F (36.9 °C)-99.1 °F (37.3 °C)] 98.6 °F (37 °C)  Pulse:  [] 78  Resp:  [10-33] 17  SpO2:  [87 %-100 %] 100 %  BP: ()/(41-90) 126/59  Arterial Line BP: (131-185)/(36-53) 161/43     Weight: 69.9 kg (154 lb 1.6 oz)  Body mass index is 28.19 kg/m².      Intake/Output Summary (Last 24 hours) at 03/24/18 0718  Last data filed at 03/24/18 0600   Gross per 24 hour   Intake              961 ml   Output             1305 ml   Net             -344 ml       Physical Exam   Constitutional: She is oriented to person, place, and time. She appears well-developed and well-nourished.   HENT:   Head: Normocephalic and atraumatic.   Neck: Normal range of motion.   Cardiovascular: Normal rate, regular rhythm and normal heart sounds.    Pulmonary/Chest: Effort normal and breath sounds normal. No respiratory distress. She has no wheezes.   Abdominal: Soft. Bowel sounds are normal. She exhibits no distension.   Musculoskeletal: She exhibits no edema.   Neurological: She is alert and oriented to person, place, and time.   Skin: Skin is warm.   Psychiatric: She has a normal mood and affect.     Vents:  Oxygen Concentration (%): 40 (03/24/18 0042)    Lines/Drains/Airways     Central Venous Catheter Line                  Percutaneous Central Line Insertion/Assessment - single lumen  03/22/18 0837 right internal jugular 1 day          Drain                 Urethral Catheter 03/22/18 0821 Temperature probe;Straight-tip;Non-latex 16 Fr. 1 day          Arterial Line                 Arterial Line 03/22/18 0738 Right Radial 1 day          Line                 Pacer Wires 03/22/18 1145 1 day          Peripheral Intravenous Line                 Peripheral IV - Single Lumen 03/22/18 0630 Right Hand 2 days         Peripheral IV - Single Lumen 03/22/18 0815 Left Antecubital 1 day                Significant Labs:    CBC/Anemia Profile:    Recent Labs  Lab 03/22/18  1150 03/22/18  1222 03/23/18  0331 03/24/18  0310   WBC 10.75  --  11.22 10.35   HGB 9.2*  --  8.7*  8.7* 7.7*  7.7*   HCT 29.5* 27* 28.6*  28.6* 24.6*  24.6*     --  273 237   MCV 77*  --  78* 78*   RDW 19.0*  --  18.9* 19.2*        Chemistries:    Recent Labs  Lab 03/22/18  1150 03/22/18  2108 03/23/18  0331 03/23/18  0340 03/24/18  0310     --  136  --  141   K 3.7  3.7 3.6  3.6 4.3  --  4.0     --  105  --  108   CO2 24  --  24  --  25   BUN 17  --  8  --  12   CREATININE 0.8  --  0.7  --  0.7   CALCIUM 8.7  --  8.3*  --  8.8   MG 1.4* 1.9  --  1.9 1.9   PHOS 4.4 2.7  --  2.6* 2.7         Significant Imaging:  I have reviewed all pertinent imaging results/findings within the past 24 hours.    Assessment/Plan:     Active Diagnoses:    Diagnosis Date Noted POA    PRINCIPAL PROBLEM:  Severe aortic stenosis [I35.0] 09/12/2017 Yes    Acute on chronic diastolic heart failure [I50.33] 03/23/2018 Unknown    Encounter for weaning from ventilator [Z99.11]  Not Applicable    S/p TAVR (transcatheter aortic valve replacement), bioprosthetic [Z95.3] 03/22/2018 Not Applicable    Coronary artery disease of native artery with stable angina pectoris [I25.118] 09/29/2017 Yes    Malignant neoplasm of right lung [C34.91] 09/29/2017 Yes    Diabetes mellitus,  type 2 [E11.9] 09/29/2017 Yes    PAD (peripheral artery disease) [I73.9] 09/29/2017 Yes    Centrilobular emphysema [J43.2] 09/29/2017 Yes    Bilateral carotid artery stenosis [I65.23] 09/12/2017 Yes    Dyslipidemia [E78.5] 09/12/2017 Yes    Essential hypertension [I10] 09/12/2017 Yes    Tobacco abuse [Z72.0] 09/12/2017 Yes    Abdominal aortic aneurysm (AAA) without rupture [I71.4] 09/12/2017 Yes    Chronic bronchitis [J42] 09/12/2017 Yes    Gastroesophageal reflux disease without esophagitis [K21.9] 09/12/2017 Yes      Problems Resolved During this Admission:    Diagnosis Date Noted Date Resolved POA    Nodular calcific aortic valve stenosis [I35.0] 03/21/2018 03/22/2018 Yes     * S/p TAVR (transcatheter aortic valve replacement), bioprosthetic     Plan:     Neuro:   -Patient is AAOx3  -Pain control with oral meds      Pulmonary:   -extubated saturating comfortably on RA           Cardiac:  -MAP goal >65  -HDS not requiring pressors  -TVP in place, threshold .6mV, 40VVI  -New LBBB on EKG, EP consulted, no needing PPM currently. outpt holter montior     Renal:   -UOP adequate   -Bun/Cr 8/.7      Fluids/Electrolytes/Nutrition/GI:   -Nutritional status: NPO   -replace lytes PRN  -maintenance fluids @125     Hematology/Oncology:  -H/H- 9.2/29.5  -Anticoagulation:      Infectious Disease:   -Afebrile  -Abx- post op ancef      Endocrine:  -Glucose goal of 120-150  -SSI     Dispo:  -likely step down to CTSU today            Critical care was time spent personally by me on the following activities: development of treatment plan with patient or surrogate and bedside caregivers, discussions with consultants, evaluation of patient's response to treatment, examination of patient, ordering and performing treatments and interventions, ordering and review of laboratory studies, ordering and review of radiographic studies, pulse oximetry, re-evaluation of patient's condition.  This critical care time did not  overlap with that of any other provider or involve time for any procedures.     Harsh Swenson MD  Critical Care - Surgery  [facility]

## 2018-03-24 NOTE — PROGRESS NOTES
Entered patient's room to administer coreg. Set patient's tray up to eat and patient could not follow commands, could not swallow, and was pocketing food in mouth. Coreg held and 20mg IV hydralazine ordered. Will continue to monitor.

## 2018-03-24 NOTE — PROGRESS NOTES
[facility]  Cardiac Electrophysiology  Progress Note    Admission Date: 3/22/2018  Code Status: Prior   Attending Physician: Sarbjit Carvajal MD   Expected Discharge Date:   Principal Problem:Severe aortic stenosis    Subjective:     Interval History: NAEON. Feels well and sitting on a chair. Telemetry shows persistent LBBB. S/p EP study which showed HVI<65. Scheduled to receive an outpatient 30 day cardiac event monitor.    ROS   Constitution: Negative for fever, chills, weight loss or gain.   HENT: Negative for sore throat, rhinorrhea, or headache.  Eyes: Negative for blurred or double vision.   Cardiovascular: See above  Pulmonary: Negative for SOB   Gastrointestinal: Negative for abdominal pain, nausea, vomiting, or diarrhea.   : Negative for dysuria.   Neurological: Negative for focal weakness or sensory changes.    Objective:     Vital Signs (Most Recent):  Temp: 98 °F (36.7 °C) (03/24/18 1100)  Pulse: 70 (03/24/18 1245)  Resp: (!) 25 (03/24/18 1245)  BP: 139/63 (03/24/18 1230)  SpO2: 97 % (03/24/18 1245) Vital Signs (24h Range):  Temp:  [98 °F (36.7 °C)-99.1 °F (37.3 °C)] 98 °F (36.7 °C)  Pulse:  [] 70  Resp:  [10-38] 25  SpO2:  [87 %-100 %] 97 %  BP: ()/(41-77) 139/63  Arterial Line BP: (126-185)/(36-53) 139/42     Weight: 69.9 kg (154 lb 1.6 oz)  Body mass index is 28.19 kg/m².     SpO2: 97 %  O2 Device (Oxygen Therapy): nasal cannula    Physical Exam  Constitutional: She is oriented to person, place, and time. She appears well-nourished. No distress.   HENT:   Head: Atraumatic.   Eyes: EOM are normal. No scleral icterus.   Neck: Neck supple. No JVD present.   RIJ Cordis catheter in place  Cardiovascular: Normal rate and regular rhythm.  Exam reveals no gallop and no friction rub.    Murmur heard.  Abdominal: Soft. Bowel sounds are normal. She exhibits no distension.   Musculoskeletal: She exhibits no edema.   Neurological: She is alert and oriented to person, place, and  time.   Skin: Skin is warm and dry. No erythema.     Significant Labs: All pertinent lab results from the last 24 hours have been reviewed.      Assessment and Plan:     S/p TAVR (transcatheter aortic valve replacement), bioprosthetic    67F with hx of severe As, COPD s/p successful transaxillary TAVR. EP consulted for routine evaluation. EF 50-55%.    -No periprocedural rhythm issues  -TVP out yesterday  -Tele without pacing or evidence of AVB, continue  -Baseline EKG NSR, narrow QRS  -Initial post EKG NSR, new LBBB  -EP study not concerning for risk of developing CHB - HVI<65  -30 day event monitor to be picked up at arrhythmia clinic once discharged  -F/u with Dr. Banegas in 6 weeks          Will sign off. Recall with further questions.    Mary Grace Nava MD  Cardiac Electrophysiology  [facility]

## 2018-03-24 NOTE — NURSING TRANSFER
Nursing Transfer Note      3/24/2018     Transfer To: [id number]    Transfer via bed    Transfer with NC to O2, cardiac monitoring    Transported by Janie Garcia, SOFIE    Medicines sent: yes    Chart send with patient: Yes    Notified: friend    Patient reassessed at: (3/24/28, 1500)    Upon arrival to floor: cardiac monitor applied, patient oriented to room, call bell in reach and bed in lowest position.    Nurse, Vishnu notified of pt arrival. Tolerated transfer well.

## 2018-03-24 NOTE — NURSING
Patient acutley confused after receiving oxycodone for pain. Oriented to self but not place, time, or situation. No slurred speech. Facial expression symmetrical. R arm weakness due to rotator cuff injury. Notified in report that patient had same symptoms yesterday after receiving oxycodone. Dr. Polk notified and all narcotics d/c'd per order. Plan to control pain with PO tylenol. Will continue to monitor.

## 2018-03-24 NOTE — PROGRESS NOTES
[facility]  Cardiothoracic Surgery  Progress Note    Patient Name: Kristina Aguirre  MRN: [id number]  Admission Date: 3/22/2018  Hospital Length of Stay: 1 days  Code Status: Prior   Attending Physician: Sarbjit Carvajal MD   Referring Provider: Robert Mattson MD  Principal Problem:Severe aortic stenosis    Subjective:     Post-Op Info:  Procedure(s) (LRB):  STUDY-EP (N/A)   Day of Surgery     Interval History: no acute events. PT thought she had right sided weakness and there was concern for stroke, but pt just has weak right shoulder d/t rotator cuff injury. Supposed to get pacemaker placed today    Medications:  Continuous Infusions:  Scheduled Meds:   labetalol        ascorbic acid (vitamin C)  500 mg Oral BID    [START ON 3/24/2018] aspirin  81 mg Oral Daily    clopidogrel  75 mg Oral Daily    famotidine (PF)  20 mg Intravenous BID    mupirocin  1 g Nasal BID    mupirocin  1 g Nasal BID    polyethylene glycol  17 g Oral Daily     PRN Meds:acetaminophen, albuterol-ipratropium 2.5mg-0.5mg/3mL, bisacodyl, dextrose 50%, dextrose 50%, fentaNYL, fentaNYL, glucagon (human recombinant), glucose, glucose, hydrALAZINE, insulin aspart U-100, morphine, ondansetron, oxyCODONE, oxyCODONE, oxyCODONE, promethazine (PHENERGAN) IVPB     Objective:     Vital Signs (Most Recent):  Temp: 98.6 °F (37 °C) (03/23/18 1630)  Pulse: 77 (03/23/18 1815)  Resp: 14 (03/23/18 1815)  BP: 134/63 (03/23/18 1800)  SpO2: 96 % (03/23/18 1815) Vital Signs (24h Range):  Temp:  [98.5 °F (36.9 °C)-99.8 °F (37.7 °C)] 98.6 °F (37 °C)  Pulse:  [] 77  Resp:  [14-38] 14  SpO2:  [87 %-100 %] 96 %  BP: (115-155)/(53-90) 134/63  Arterial Line BP: (127-185)/(36-52) 139/41     Weight: 69.9 kg (154 lb 1.6 oz)  Body mass index is 28.19 kg/m².    SpO2: 96 %  O2 Device (Oxygen Therapy): nasal cannula    Intake/Output - Last 3 Shifts       03/21 0700 - 03/22 0659 03/22 0700 - 03/23 0659 03/23 0700 - 03/24 0659    I.V. (mL/kg)   3583.2 (51.3) 896 (12.8)    Total Intake(mL/kg)  3583.2 (51.3) 896 (12.8)    Urine (mL/kg/hr)  2895 (1.7) 890 (1.1)    Total Output   2895 890    Net   +688.2 +6                 Lines/Drains/Airways     Central Venous Catheter Line                 Percutaneous Central Line Insertion/Assessment - single lumen  03/22/18 0837 right internal jugular 1 day          Drain                 Urethral Catheter 03/22/18 0821 Temperature probe;Straight-tip;Non-latex 16 Fr. 1 day          Arterial Line                 Arterial Line 03/22/18 0738 Right Radial 1 day          Line                 Pacer Wires 03/22/18 1145 1 day          Peripheral Intravenous Line                 Peripheral IV - Single Lumen 03/22/18 0630 Right Hand 1 day         Peripheral IV - Single Lumen 03/22/18 0815 Left Antecubital 1 day                Physical Exam  GEN: No acute distress  HEENT: normocephalic, atraumatic  CV: RRR, incision covered with clean dressing  Resp: normal work of breathing, full inspiratory expansion, clear to auscultation  Abd: non distended, bowel sounds normal  Ext: Distal pulses intact, no peripheral edema  Neuro: alert and oriented x3  Skin: no rashes, bruising    Significant Labs:  BMP:   Recent Labs  Lab 03/23/18  0331 03/23/18  0340   *  --      --    K 4.3  --      --    CO2 24  --    BUN 8  --    CREATININE 0.7  --    CALCIUM 8.3*  --    MG  --  1.9     CBC:   Recent Labs  Lab 03/23/18  0331   WBC 11.22   RBC 3.68*   HGB 8.7*  8.7*   HCT 28.6*  28.6*      MCV 78*   MCH 23.6*   MCHC 30.4*       Significant Diagnostics:  CXR: I have reviewed all pertinent results/findings within the past 24 hours    Assessment/Plan:     S/p TAVR (transcatheter aortic valve replacement), bioprosthetic    67 y.o. female Day of Surgery for Procedure(s) (LRB):  STUDY-EP (N/A)    Neuro: pain meds prn  CV: dressing intact  Resp: room air  Renal: adequate UOP  FEN/GI: diet  Heme: on ASA, plavix  ID: afebrile    Plan for  pacemaker placement today with cardiology   Transfer to CTSU            Gela Polk MD  Cardiothoracic Surgery  [facility]

## 2018-03-24 NOTE — ASSESSMENT & PLAN NOTE
67 y.o. female Day of Surgery for Procedure(s) (LRB):  STUDY-EP (N/A)    Neuro: pain meds prn  CV: dressing intact  Resp: room air  Renal: adequate UOP  FEN/GI: diet  Heme: on ASA, plavix  ID: afebrile    Plan for pacemaker placement today with cardiology   Transfer to CTSU

## 2018-03-24 NOTE — ASSESSMENT & PLAN NOTE
67F with hx of severe As, COPD s/p successful transaxillary TAVR. EP consulted for routine evaluation. EF 50-55%.    -No periprocedural rhythm issues  -TVP out yesterday  -Tele without pacing or evidence of AVB, continue  -Baseline EKG NSR, narrow QRS  -Initial post EKG NSR, new LBBB  -EP study not concerning for risk of developing CHB - HVI<65  -30 day event monitor to be picked up at arrhythmia clinic once discharged  -F/u with Dr. Banegas in 6 weeks

## 2018-03-24 NOTE — PT/OT/SLP PROGRESS
Physical Therapy      Patient Name:  Kristina Aguirre   MRN:  [id number]    Patient not seen today secondary to pt and her caregiver declining PT due to side effects (acute nausea and confusion) from medication. Nurse is aware  . Will follow-up per PT POC.    Martha Ashford, PT

## 2018-03-24 NOTE — NURSING TRANSFER
Nursing Transfer Note      3/24/2018     Transfer From: ICU    Transfer via stretcher    Transfer with 2.5 to O2, cardiac monitoring    Transported by ICU RN    Medicines sent: MAG, inhaler    Chart send with patient: Yes    Notified: son    Patient reassessed at: 1515 3/24/2018     Upon arrival to floor: cardiac monitor applied, patient oriented to room, call bell in reach and bed in lowest position

## 2018-03-24 NOTE — PT/OT/SLP EVAL
Occupational Therapy   Evaluation    Name: Kristina Aguirre  MRN: [id number]  Admitting Diagnosis:  Severe aortic stenosis; s/p TAVR 1 Day Post-Op    Recommendations:     Discharge Recommendations: home with home health  Discharge Equipment Recommendations:   (TBD closer to d/c)  Barriers to discharge:  Decreased caregiver support    History:     Occupational Profile:  Pt lives alone with her dog in mobile home with 2 steps and no handrail. Pt does not have assistance.   Prior to admission, patients level of function was Independent.  Patient has the following equipment: none.  DME owned (not currently used): none.  Upon discharge, patient will have assistance from no one. Pt drives and performs her own house management       Past Medical History:   Diagnosis Date    Abdominal aortic aneurysm (AAA) without rupture 9/12/2017    Anticoagulant long-term use     Arthritis     Asthma     Bilateral carotid artery stenosis 9/12/2017    Cancer     lung    COPD (chronic obstructive pulmonary disease)     Coronary artery disease of native artery with stable angina pectoris 9/29/2017    Diabetes mellitus     Heart valve disorder     Hyperlipidemia     Hypertension        Past Surgical History:   Procedure Laterality Date    HAND SURGERY Right     KNEE ARTHROSCOPY      LUNG LOBECTOMY Right     middle lobe    mediastinoscopy      SPINAL FUSION      TONSILLECTOMY      WRIST SURGERY Right        Subjective     Chief Complaint: Headache  Patient/Family stated goals: to get bettter  Communicated with: RN prior to session.  Pain/Comfort:  ·      Patients cultural, spiritual, Mormon conflicts given the current situation: none reported    Objective:     Patient found with: blood pressure cuff, telemetry, pulse ox (continuous), arterial line, pretty catheter, oxygen, peripheral IV (TVP)    General Precautions: Standard, fall   Orthopedic Precautions:    Braces:       Occupational Performance:    Bed Mobility:     · NT    Functional Mobility/Transfers:  · Patient completed Sit <> Stand Transfer with minimum assistance  with  no assistive device   · Functional Mobility: N T    Activities of Daily Living:  · Feeding:  minimum assistance simulated  · Grooming: moderate assistance    · UB Dressing: maximal assistance    · LB Dressing: total assistance    · Toileting: total assistance      Cognitive/Visual Perceptual:  Oriented to: Person, Place, Time and Situation  Follows Commands/attention: Follows multistep  commands  Communication: clear/fluent  Memory:  No Deficits noted  Safety awareness/insight to disability: intact  Coping skills/emotional control: Appropriate to situation    Physical Exam:  Postural examination/scapula alignment:    -       No postural abnormalities identified  Sensation:    -       Intact  Upper Extremity Range of Motion:     -       Right Upper Extremity: AAROM WFL  -       Left Upper Extremity: WFL  Upper Extremity Strength:    -       Right Upper Extremity: 3-/5 proximally; 3/5 otherwise  -       Left Upper Extremity: WFL   Strength:    -       Right Upper Extremity: Fair  -       Left Upper Extremity: WFL  Fine Motor Coordination:    -       Impaired on R  Gross motor coordination:  Impaired on R    Pt able to track/scan R<>L, but reports inability to see in R visual field        Patient left up in chair with all lines intact, call button in reach and RN notified    [facility] 6 Click:  [facility] Total Score:      Treatment & Education:  Pt ed on OT POC  Pt noted to have decreased R UE active use and R visual field cut along with c/o posterior headache- RN notified  Education:    Assessment:     Kristina Aguirre is a 67 y.o. female with a medical diagnosis of Severe aortic stenosis.  She presents s/p TAVR.  Performance deficits affecting function are weakness, impaired functional mobilty, impaired balance, impaired endurance, impaired self care skills, gait instability, visual deficits, decreased upper  "extremity function, decreased lower extremity function, decreased safety awareness, pain, decreased ROM, impaired cardiopulmonary response to activity.      Rehab Prognosis:  Good; patient would benefit from acute skilled OT services to address these deficits and reach maximum level of function.         Clinical Decision Makin.  OT Low:  "Pt evaluation falls under low complexity for evaluation coding due to performance deficits noted in 1-3 areas as stated above and 0 co-morbities affecting current functional status. Data obtained from problem focused assessments. No modifications or assistance was required for completion of evaluation. Only brief occupational profile and history review completed."     Plan:     Patient to be seen 4 x/week to address the above listed problems via self-care/home management, therapeutic activities, therapeutic exercises  · Plan of Care Expires: 18  · Plan of Care Reviewed with: patient    This Plan of care has been discussed with the patient who was involved in its development and understands and is in agreement with the identified goals and treatment plan    GOALS:    Occupational Therapy Goals        Problem: Occupational Therapy Goal    Goal Priority Disciplines Outcome Interventions   Occupational Therapy Goal     OT, PT/OT Ongoing (interventions implemented as appropriate)    Description:  Goals to be met by: 18     Patient will increase functional independence with ADLs by performing:    Feeding with Modified [place].  UE Dressing with Supervision.  LE Dressing with Supervision.  Grooming while standing at sink with Supervision.  Toileting from toilet with Stand-by Assistance for hygiene and clothing management.   Toilet transfer to toilet with Stand-by Assistance.  Upper extremity exercise program x10 reps per handout, with independence.                      Time Tracking:     OT Date of Treatment: 18  OT Start Time: 853  OT Stop Time: 903  OT " Total Time (min): 10 min    Billable Minutes:Evaluation 10    CAROLYN Zepeda  3/23/2018

## 2018-03-24 NOTE — SUBJECTIVE & OBJECTIVE
Interval History: NAEON. Feels well and sitting on a chair. Telemetry shows persistent LBBB. S/p EP study which showed HVI<65. Scheduled to receive an outpatient 30 day cardiac event monitor.    ROS   Constitution: Negative for fever, chills, weight loss or gain.   HENT: Negative for sore throat, rhinorrhea, or headache.  Eyes: Negative for blurred or double vision.   Cardiovascular: See above  Pulmonary: Negative for SOB   Gastrointestinal: Negative for abdominal pain, nausea, vomiting, or diarrhea.   : Negative for dysuria.   Neurological: Negative for focal weakness or sensory changes.    Objective:     Vital Signs (Most Recent):  Temp: 98 °F (36.7 °C) (03/24/18 1100)  Pulse: 70 (03/24/18 1245)  Resp: (!) 25 (03/24/18 1245)  BP: 139/63 (03/24/18 1230)  SpO2: 97 % (03/24/18 1245) Vital Signs (24h Range):  Temp:  [98 °F (36.7 °C)-99.1 °F (37.3 °C)] 98 °F (36.7 °C)  Pulse:  [] 70  Resp:  [10-38] 25  SpO2:  [87 %-100 %] 97 %  BP: ()/(41-77) 139/63  Arterial Line BP: (126-185)/(36-53) 139/42     Weight: 69.9 kg (154 lb 1.6 oz)  Body mass index is 28.19 kg/m².     SpO2: 97 %  O2 Device (Oxygen Therapy): nasal cannula    Physical Exam  Constitutional: She is oriented to person, place, and time. She appears well-nourished. No distress.   HENT:   Head: Atraumatic.   Eyes: EOM are normal. No scleral icterus.   Neck: Neck supple. No JVD present.   RIJ Cordis catheter in place  Cardiovascular: Normal rate and regular rhythm.  Exam reveals no gallop and no friction rub.    Murmur heard.  Abdominal: Soft. Bowel sounds are normal. She exhibits no distension.   Musculoskeletal: She exhibits no edema.   Neurological: She is alert and oriented to person, place, and time.   Skin: Skin is warm and dry. No erythema.     Significant Labs: All pertinent lab results from the last 24 hours have been reviewed.

## 2018-03-24 NOTE — SUBJECTIVE & OBJECTIVE
Interval History: no acute events. PT thought she had right sided weakness and there was concern for stroke, but pt just has weak right shoulder d/t rotator cuff injury. Supposed to get pacemaker placed today    Medications:  Continuous Infusions:  Scheduled Meds:   labetalol        ascorbic acid (vitamin C)  500 mg Oral BID    [START ON 3/24/2018] aspirin  81 mg Oral Daily    clopidogrel  75 mg Oral Daily    famotidine (PF)  20 mg Intravenous BID    mupirocin  1 g Nasal BID    mupirocin  1 g Nasal BID    polyethylene glycol  17 g Oral Daily     PRN Meds:acetaminophen, albuterol-ipratropium 2.5mg-0.5mg/3mL, bisacodyl, dextrose 50%, dextrose 50%, fentaNYL, fentaNYL, glucagon (human recombinant), glucose, glucose, hydrALAZINE, insulin aspart U-100, morphine, ondansetron, oxyCODONE, oxyCODONE, oxyCODONE, promethazine (PHENERGAN) IVPB     Objective:     Vital Signs (Most Recent):  Temp: 98.6 °F (37 °C) (03/23/18 1630)  Pulse: 77 (03/23/18 1815)  Resp: 14 (03/23/18 1815)  BP: 134/63 (03/23/18 1800)  SpO2: 96 % (03/23/18 1815) Vital Signs (24h Range):  Temp:  [98.5 °F (36.9 °C)-99.8 °F (37.7 °C)] 98.6 °F (37 °C)  Pulse:  [] 77  Resp:  [14-38] 14  SpO2:  [87 %-100 %] 96 %  BP: (115-155)/(53-90) 134/63  Arterial Line BP: (127-185)/(36-52) 139/41     Weight: 69.9 kg (154 lb 1.6 oz)  Body mass index is 28.19 kg/m².    SpO2: 96 %  O2 Device (Oxygen Therapy): nasal cannula    Intake/Output - Last 3 Shifts       03/21 0700 - 03/22 0659 03/22 0700 - 03/23 0659 03/23 0700 - 03/24 0659    I.V. (mL/kg)  3583.2 (51.3) 896 (12.8)    Total Intake(mL/kg)  3583.2 (51.3) 896 (12.8)    Urine (mL/kg/hr)  2895 (1.7) 890 (1.1)    Total Output   2895 890    Net   +688.2 +6                 Lines/Drains/Airways     Central Venous Catheter Line                 Percutaneous Central Line Insertion/Assessment - single lumen  03/22/18 0837 right internal jugular 1 day          Drain                 Urethral Catheter 03/22/18 0821  Temperature probe;Straight-tip;Non-latex 16 Fr. 1 day          Arterial Line                 Arterial Line 03/22/18 0738 Right Radial 1 day          Line                 Pacer Wires 03/22/18 1145 1 day          Peripheral Intravenous Line                 Peripheral IV - Single Lumen 03/22/18 0630 Right Hand 1 day         Peripheral IV - Single Lumen 03/22/18 0815 Left Antecubital 1 day                Physical Exam  GEN: No acute distress  HEENT: normocephalic, atraumatic  CV: RRR, incision covered with clean dressing  Resp: normal work of breathing, full inspiratory expansion, clear to auscultation  Abd: non distended, bowel sounds normal  Ext: Distal pulses intact, no peripheral edema  Neuro: alert and oriented x3  Skin: no rashes, bruising    Significant Labs:  BMP:   Recent Labs  Lab 03/23/18  0331 03/23/18  0340   *  --      --    K 4.3  --      --    CO2 24  --    BUN 8  --    CREATININE 0.7  --    CALCIUM 8.3*  --    MG  --  1.9     CBC:   Recent Labs  Lab 03/23/18  0331   WBC 11.22   RBC 3.68*   HGB 8.7*  8.7*   HCT 28.6*  28.6*      MCV 78*   MCH 23.6*   MCHC 30.4*       Significant Diagnostics:  CXR: I have reviewed all pertinent results/findings within the past 24 hours

## 2018-03-25 PROBLEM — I48.91 ATRIAL FIBRILLATION: Status: ACTIVE | Noted: 2018-03-25

## 2018-03-25 PROBLEM — I63.89 ACUTE ISCHEMIC MULTIFOCAL MULTIPLE VASCULAR TERRITORIES STROKE: Status: ACTIVE | Noted: 2018-03-24

## 2018-03-25 PROBLEM — I63.89 ACUTE ISCHEMIC MULTIFOCAL MULTIPLE VASCULAR TERRITORIES STROKE: Status: ACTIVE | Noted: 2018-03-25

## 2018-03-25 PROBLEM — I63.529 ACUTE ISCHEMIC MULTIFOCAL ANTERIOR CIRCULATION STROKE: Status: ACTIVE | Noted: 2018-03-25

## 2018-03-25 LAB
ANION GAP SERPL CALC-SCNC: 11 MMOL/L
BASOPHILS # BLD AUTO: 0.08 K/UL
BASOPHILS NFR BLD: 0.8 %
BUN SERPL-MCNC: 12 MG/DL
CALCIUM SERPL-MCNC: 9.3 MG/DL
CHLORIDE SERPL-SCNC: 100 MMOL/L
CHOLEST SERPL-MCNC: 150 MG/DL
CHOLEST/HDLC SERPL: 3.6 {RATIO}
CO2 SERPL-SCNC: 25 MMOL/L
CREAT SERPL-MCNC: 0.7 MG/DL
DIFFERENTIAL METHOD: ABNORMAL
EOSINOPHIL # BLD AUTO: 0.4 K/UL
EOSINOPHIL NFR BLD: 4.2 %
ERYTHROCYTE [DISTWIDTH] IN BLOOD BY AUTOMATED COUNT: 18.8 %
EST. GFR  (AFRICAN AMERICAN): >60 ML/MIN/1.73 M^2
EST. GFR  (NON AFRICAN AMERICAN): >60 ML/MIN/1.73 M^2
GLUCOSE SERPL-MCNC: 155 MG/DL
HCT VFR BLD AUTO: 26.7 %
HCT VFR BLD AUTO: 26.7 %
HDLC SERPL-MCNC: 42 MG/DL
HDLC SERPL: 28 %
HGB BLD-MCNC: 8.2 G/DL
HGB BLD-MCNC: 8.2 G/DL
IMM GRANULOCYTES # BLD AUTO: 0.04 K/UL
IMM GRANULOCYTES NFR BLD AUTO: 0.4 %
LDLC SERPL CALC-MCNC: 80.2 MG/DL
LYMPHOCYTES # BLD AUTO: 1.5 K/UL
LYMPHOCYTES NFR BLD: 15.1 %
MCH RBC QN AUTO: 23.9 PG
MCHC RBC AUTO-ENTMCNC: 30.7 G/DL
MCV RBC AUTO: 78 FL
MONOCYTES # BLD AUTO: 0.6 K/UL
MONOCYTES NFR BLD: 6.3 %
NEUTROPHILS # BLD AUTO: 7.1 K/UL
NEUTROPHILS NFR BLD: 73.2 %
NONHDLC SERPL-MCNC: 108 MG/DL
NRBC BLD-RTO: 0 /100 WBC
PLATELET # BLD AUTO: 301 K/UL
PMV BLD AUTO: 9.8 FL
POCT GLUCOSE: 115 MG/DL (ref 70–110)
POCT GLUCOSE: 127 MG/DL (ref 70–110)
POCT GLUCOSE: 143 MG/DL (ref 70–110)
POCT GLUCOSE: 157 MG/DL (ref 70–110)
POTASSIUM SERPL-SCNC: 3.7 MMOL/L
RBC # BLD AUTO: 3.43 M/UL
SODIUM SERPL-SCNC: 136 MMOL/L
TRIGL SERPL-MCNC: 139 MG/DL
TSH SERPL DL<=0.005 MIU/L-ACNC: 1.22 UIU/ML
WBC # BLD AUTO: 9.73 K/UL

## 2018-03-25 PROCEDURE — 63600175 PHARM REV CODE 636 W HCPCS: Performed by: STUDENT IN AN ORGANIZED HEALTH CARE EDUCATION/TRAINING PROGRAM

## 2018-03-25 PROCEDURE — 80061 LIPID PANEL: CPT

## 2018-03-25 PROCEDURE — 85025 COMPLETE CBC W/AUTO DIFF WBC: CPT

## 2018-03-25 PROCEDURE — G8997 SWALLOW GOAL STATUS: HCPCS | Mod: CM

## 2018-03-25 PROCEDURE — 93010 ELECTROCARDIOGRAM REPORT: CPT | Mod: ,,, | Performed by: INTERNAL MEDICINE

## 2018-03-25 PROCEDURE — 25000003 PHARM REV CODE 250: Performed by: STUDENT IN AN ORGANIZED HEALTH CARE EDUCATION/TRAINING PROGRAM

## 2018-03-25 PROCEDURE — 99233 SBSQ HOSP IP/OBS HIGH 50: CPT | Mod: GC,,, | Performed by: PSYCHIATRY & NEUROLOGY

## 2018-03-25 PROCEDURE — 36415 COLL VENOUS BLD VENIPUNCTURE: CPT

## 2018-03-25 PROCEDURE — S0028 INJECTION, FAMOTIDINE, 20 MG: HCPCS | Performed by: THORACIC SURGERY (CARDIOTHORACIC VASCULAR SURGERY)

## 2018-03-25 PROCEDURE — G8996 SWALLOW CURRENT STATUS: HCPCS | Mod: CN

## 2018-03-25 PROCEDURE — 97530 THERAPEUTIC ACTIVITIES: CPT

## 2018-03-25 PROCEDURE — 25000003 PHARM REV CODE 250: Performed by: THORACIC SURGERY (CARDIOTHORACIC VASCULAR SURGERY)

## 2018-03-25 PROCEDURE — 84443 ASSAY THYROID STIM HORMONE: CPT

## 2018-03-25 PROCEDURE — 80048 BASIC METABOLIC PNL TOTAL CA: CPT

## 2018-03-25 PROCEDURE — 93005 ELECTROCARDIOGRAM TRACING: CPT

## 2018-03-25 PROCEDURE — 20600001 HC STEP DOWN PRIVATE ROOM

## 2018-03-25 PROCEDURE — 25000003 PHARM REV CODE 250

## 2018-03-25 PROCEDURE — 92610 EVALUATE SWALLOWING FUNCTION: CPT

## 2018-03-25 RX ORDER — HYDRALAZINE HYDROCHLORIDE 20 MG/ML
10 INJECTION INTRAMUSCULAR; INTRAVENOUS EVERY 6 HOURS PRN
Status: DISCONTINUED | OUTPATIENT
Start: 2018-03-25 | End: 2018-04-02 | Stop reason: HOSPADM

## 2018-03-25 RX ORDER — BISACODYL 10 MG
10 SUPPOSITORY, RECTAL RECTAL DAILY PRN
Status: DISCONTINUED | OUTPATIENT
Start: 2018-03-25 | End: 2018-04-02 | Stop reason: HOSPADM

## 2018-03-25 RX ORDER — ONDANSETRON 2 MG/ML
8 INJECTION INTRAMUSCULAR; INTRAVENOUS EVERY 8 HOURS PRN
Status: DISCONTINUED | OUTPATIENT
Start: 2018-03-25 | End: 2018-04-02 | Stop reason: HOSPADM

## 2018-03-25 RX ORDER — HYDRALAZINE HYDROCHLORIDE 20 MG/ML
10 INJECTION INTRAMUSCULAR; INTRAVENOUS EVERY 6 HOURS PRN
Status: DISCONTINUED | OUTPATIENT
Start: 2018-03-25 | End: 2018-03-25

## 2018-03-25 RX ORDER — METOPROLOL TARTRATE 1 MG/ML
5 INJECTION, SOLUTION INTRAVENOUS EVERY 5 MIN PRN
Status: COMPLETED | OUTPATIENT
Start: 2018-03-25 | End: 2018-03-25

## 2018-03-25 RX ORDER — ENOXAPARIN SODIUM 100 MG/ML
40 INJECTION SUBCUTANEOUS EVERY 24 HOURS
Status: DISCONTINUED | OUTPATIENT
Start: 2018-03-25 | End: 2018-04-01

## 2018-03-25 RX ORDER — ASPIRIN 300 MG/1
300 SUPPOSITORY RECTAL DAILY
Status: DISCONTINUED | OUTPATIENT
Start: 2018-03-25 | End: 2018-03-26

## 2018-03-25 RX ORDER — METOPROLOL TARTRATE 1 MG/ML
INJECTION, SOLUTION INTRAVENOUS
Status: COMPLETED
Start: 2018-03-25 | End: 2018-03-25

## 2018-03-25 RX ORDER — ATORVASTATIN CALCIUM 20 MG/1
40 TABLET, FILM COATED ORAL DAILY
Status: DISCONTINUED | OUTPATIENT
Start: 2018-03-25 | End: 2018-04-02 | Stop reason: HOSPADM

## 2018-03-25 RX ORDER — HYDRALAZINE HYDROCHLORIDE 20 MG/ML
10 INJECTION INTRAMUSCULAR; INTRAVENOUS ONCE
Status: COMPLETED | OUTPATIENT
Start: 2018-03-25 | End: 2018-03-25

## 2018-03-25 RX ORDER — INSULIN ASPART 100 [IU]/ML
0-5 INJECTION, SOLUTION INTRAVENOUS; SUBCUTANEOUS
Status: DISCONTINUED | OUTPATIENT
Start: 2018-03-25 | End: 2018-04-02 | Stop reason: HOSPADM

## 2018-03-25 RX ADMIN — FLUTICASONE FUROATE AND VILANTEROL TRIFENATATE 1 PUFF: 100; 25 POWDER RESPIRATORY (INHALATION) at 09:03

## 2018-03-25 RX ADMIN — FUROSEMIDE 40 MG: 10 INJECTION, SOLUTION INTRAMUSCULAR; INTRAVENOUS at 09:03

## 2018-03-25 RX ADMIN — MUPIROCIN 1 G: 20 OINTMENT TOPICAL at 09:03

## 2018-03-25 RX ADMIN — METOPROLOL TARTRATE 5 MG: 5 INJECTION INTRAVENOUS at 12:03

## 2018-03-25 RX ADMIN — FAMOTIDINE 20 MG: 10 INJECTION, SOLUTION INTRAVENOUS at 09:03

## 2018-03-25 RX ADMIN — AMIODARONE HYDROCHLORIDE 1 MG/MIN: 1.8 INJECTION, SOLUTION INTRAVENOUS at 01:03

## 2018-03-25 RX ADMIN — ASPIRIN 300 MG: 300 SUPPOSITORY RECTAL at 12:03

## 2018-03-25 RX ADMIN — ENOXAPARIN SODIUM 40 MG: 100 INJECTION SUBCUTANEOUS at 05:03

## 2018-03-25 RX ADMIN — HYDRALAZINE HYDROCHLORIDE 20 MG: 20 INJECTION INTRAMUSCULAR; INTRAVENOUS at 12:03

## 2018-03-25 RX ADMIN — AMIODARONE HYDROCHLORIDE 0.5 MG/MIN: 1.8 INJECTION, SOLUTION INTRAVENOUS at 07:03

## 2018-03-25 RX ADMIN — FUROSEMIDE 40 MG: 10 INJECTION, SOLUTION INTRAMUSCULAR; INTRAVENOUS at 05:03

## 2018-03-25 RX ADMIN — AMIODARONE HYDROCHLORIDE 150 MG: 1.5 INJECTION, SOLUTION INTRAVENOUS at 12:03

## 2018-03-25 RX ADMIN — HYDRALAZINE HYDROCHLORIDE 10 MG: 20 INJECTION INTRAMUSCULAR; INTRAVENOUS at 09:03

## 2018-03-25 RX ADMIN — AMIODARONE HYDROCHLORIDE 1 MG/MIN: 1.8 INJECTION, SOLUTION INTRAVENOUS at 06:03

## 2018-03-25 RX ADMIN — AMIODARONE HYDROCHLORIDE 0.5 MG/MIN: 1.8 INJECTION, SOLUTION INTRAVENOUS at 03:03

## 2018-03-25 NOTE — SUBJECTIVE & OBJECTIVE
Interval History: no acute events. Did not have pacemaker placed    Medications:  Continuous Infusions:   amiodarone in dextrose 5% 1 mg/min (03/25/18 0125)    amiodarone in dextrose 5%       Scheduled Meds:   amLODIPine  10 mg Oral Daily    ascorbic acid (vitamin C)  500 mg Oral BID    aspirin  81 mg Oral Daily    carvedilol  3.125 mg Oral Daily    chlorthalidone  25 mg Oral QAM    clopidogrel  75 mg Oral Daily    famotidine (PF)  20 mg Intravenous BID    fluticasone-vilanterol  1 puff Inhalation Daily    furosemide  40 mg Intravenous BID    mupirocin  1 g Nasal BID    mupirocin  1 g Nasal BID    polyethylene glycol  17 g Oral Daily     PRN Meds:acetaminophen, albuterol-ipratropium 2.5mg-0.5mg/3mL, bisacodyl, dextrose 50%, dextrose 50%, glucagon (human recombinant), glucose, glucose, hydrALAZINE, insulin aspart U-100, morphine, ondansetron, promethazine (PHENERGAN) IVPB     Objective:     Vital Signs (Most Recent):  Temp: 98.5 °F (36.9 °C) (03/24/18 1957)  Pulse: 92 (03/25/18 0027)  Resp: 16 (03/24/18 1516)  BP: (!) 123/55 (03/25/18 0027)  SpO2: 95 % (03/24/18 1957) Vital Signs (24h Range):  Temp:  [98 °F (36.7 °C)-99.1 °F (37.3 °C)] 98.5 °F (36.9 °C)  Pulse:  [] 92  Resp:  [16-38] 16  SpO2:  [92 %-100 %] 95 %  BP: (104-164)/(54-77) 123/55  Arterial Line BP: (126-164)/(37-50) 139/42     Weight: 69.9 kg (154 lb 1.6 oz)  Body mass index is 28.19 kg/m².    SpO2: 95 %  O2 Device (Oxygen Therapy): nasal cannula    Intake/Output - Last 3 Shifts       03/23 0700 - 03/24 0659 03/24 0700 - 03/25 0659    P.O.  400    I.V. (mL/kg) 961 (13.7)     Total Intake(mL/kg) 961 (13.7) 400 (5.7)    Urine (mL/kg/hr) 1365 (0.8) 1397 (0.8)    Total Output 1365 1397    Net -404 -997          Urine Occurrence  2 x          Lines/Drains/Airways     Peripheral Intravenous Line                 Peripheral IV - Single Lumen 03/22/18 0815 Left Antecubital 2 days         Peripheral IV - Single Lumen 03/25/18 0152 Left Forearm  less than 1 day                Physical Exam    GEN: No acute distress, drowsy  HEENT: normocephalic, atraumatic  CV: RRR, incision covered with clean dressing  Resp: normal work of breathing, on 2L NC full inspiratory expansion, clear to auscultation  Abd: non distended, bowel sounds normal  Ext: Distal pulses intact, no peripheral edema  Neuro: drowsy, PERRLA, strength decreased at right shoulder but equal bilateral elbows and hand , bilateral legs. oriented x3  Skin: no rashes, bruising    Significant Labs:  BMP:     Recent Labs  Lab 03/24/18  0310   *      K 4.0      CO2 25   BUN 12   CREATININE 0.7   CALCIUM 8.8   MG 1.9     CBC:     Recent Labs  Lab 03/24/18  0310   WBC 10.35   RBC 3.17*   HGB 7.7*  7.7*   HCT 24.6*  24.6*      MCV 78*   MCH 24.3*   MCHC 31.3*       Significant Diagnostics:  CXR: I have reviewed all pertinent results/findings within the past 24 hours

## 2018-03-25 NOTE — PLAN OF CARE
Problem: Physical Therapy Goal  Goal: Physical Therapy Goal  Goals to be met by: 18    Patient will increase functional independence with mobility by performin. Supine to sit with Modified [place] -not met  2. Sit to stand transfer with Modified [place] -not met  3. Gait  x 220 feet with Supervision-not met  4. Ascend/descend 2 stair with no Handrails Supervision . -not met     Goals remain appropriate. Eli Rice, PT 3/25/2018

## 2018-03-25 NOTE — ASSESSMENT & PLAN NOTE
- s/p RML lobectomy, path showed keratinizing squamous cell CA. Contacted oncologist who reported disease on remission, last PET scan negative.

## 2018-03-25 NOTE — ASSESSMENT & PLAN NOTE
68 y/o F with medical history significant for CAD, PAD, type II DM, HTN, and severe AS s/p TAVR on 3/22. Stroke called on 3/24 at 2004 for worsening RUE weakness, right facial droop, dysarthria and aphasia. CTA without LVO and MRI with multiple bilateral (R>L) acute infarcts on cerebral and cerebellar hemispheres highly suggestive of embolic phenomenon. No tpa as outside window.     - Documented right field deficits and worsening RUE weakness <24h s/p TAVR. ROSEY during TAVR demonstrable for grade 4 atheroma on aorta. Suspect iatrogenic from TAVR as pattern is concerning for embolic phenomena.     Antithrombotics for secondary stroke prevention: Antiplatelets: Aspirin: 81 mg daily  Clopidogrel: 75 mg daily    Statins for secondary stroke prevention and hyperlipidemia, if present:   Statins: Atorvastatin- 40 mg daily    Aggressive risk factor modification: HTN, Smoking, DM, CAD, bilateral carotid stenosis, s/p TAVR     Rehab efforts: PT/OT/SLP to evaluate and treat, PM&R consult     Diagnostics ordered/pending: LDL 80, HA1c 6.5    VTE prophylaxis: Enoxaparin 40 mg SQ every 24 hours    BP parameters: MAP< 80, per s/p TAVR

## 2018-03-25 NOTE — PT/OT/SLP EVAL
Speech Language Pathology Evaluation  Bedside Swallow    Patient Name:  Kristina Aguirre   MRN:  [id number]  Admitting Diagnosis: Severe aortic stenosis  The primary encounter diagnosis was Severe aortic stenosis. Diagnoses of Nodular calcific aortic valve stenosis, Bilateral carotid artery stenosis, Dyslipidemia, Essential hypertension, Aortic valve stenosis, Tobacco abuse, Abdominal aortic aneurysm (AAA) without rupture, Chronic bronchitis, Gastroesophageal reflux disease without esophagitis, Coronary artery disease of native artery with stable angina pectoris, Malignant neoplasm of right lung, Diabetes mellitus, type 2, PAD (peripheral artery disease), Centrilobular emphysema, Pre-syncope, Angina, class III, S/P TAVR (transcatheter aortic valve replacement), Occlusion and stenosis of bilateral carotid arteries, S/p TAVR (transcatheter aortic valve replacement), bioprosthetic, New onset left bundle branch block (LBBB), Type 2 diabetes mellitus with diabetic peripheral angiopathy without gangrene, without long-term current use of insulin, Coronary artery disease of native artery of native heart with stable angina pectoris, Encounter for weaning from ventilator, Atrioventricular block, complete, and Tachycardia were also pertinent to this visit.    Recommendations:                 General Recommendations:  Dysphagia therapy and Cognitive-linguistic evaluation  Diet recommendations:  NPO, NPO   Aspiration Precautions: Alternate means of nutrition/hydration, Frequent oral care and Strict aspiration precautions   General Precautions: Standard, aspiration, other (see comments), vision impaired (Dysarthria )  Communication strategies:  provide increased time to answer and go to room if call light pushed    History:     Past Medical History:   Diagnosis Date    Abdominal aortic aneurysm (AAA) without rupture 9/12/2017    Arthritis     Asthma     Bilateral carotid artery stenosis 9/12/2017    Cancer     lung    COPD  "(chronic obstructive pulmonary disease)     Coronary artery disease of native artery with stable angina pectoris 9/29/2017    Diabetes mellitus     Heart valve disorder     Hyperlipidemia     Hypertension     Long term current use of antithrombotics/antiplatelets        Past Surgical History:   Procedure Laterality Date    HAND SURGERY Right     KNEE ARTHROSCOPY      LUNG LOBECTOMY Right     middle lobe    mediastinoscopy      SPINAL FUSION      TONSILLECTOMY      WRIST SURGERY Right        Social History: Patient lives alone in house in [place], [place].    Prior Intubation HX:  3/23/18    Modified Barium Swallow: none on file    Chest X-Rays: 3/23/18: Slight interval worsening of bilateral airspace disease suggestive of pulmonary edema.    MRI Brain: 3/24/18:   Multiple abnormal regions of restricted diffusion throughout the bilateral cerebral and cerebellar hemispheres in keeping with acute infarcts as discussed above.  Given the multiplicity and distribution, findings are suggestive of embolic phenomena.    2.  Scattered foci of supratentorial periventricular T2/FLAIR hyperintensity suggestive of chronic microvascular ischemic change.    This report was flagged in Epic as abnormal.    Prior diet: Regular, thin    Subjective     SLP reviews Pt with nurse pre/post session  Pt presents agitated, restless  She perseverates on "I don't know"  Pt's family friend at bedside explains, "she really wants to get up and move around"  Patient goals: "I need to walk"    Pain/Comfort:  · Pain Rating 1: 2/10  · Location - Orientation 1: generalized  · Location 1: chest  · Pain Addressed 1: Reposition, Distraction, Cessation of Activity, Nurse notified  · Pain Rating Post-Intervention 1: 2/10    Objective:     Oral Musculature Evaluation  · Oral Musculature: facial asymmetry present  · Dentition: scattered dentition  · Secretion Management: coughing on secretions  · Mucosal Quality: adequate  · Mandibular Strength and " Mobility: impaired  · Oral Labial Strength and Mobility: impaired coordination, impaired retraction  · Lingual Strength and Mobility: impaired protrusion, impaired right lateral movement  · Buccal Strength and Mobility: impaired  · Volitional Cough: elicited, productive   · Volitional Swallow: elicited, decreased laryngeal elevation palpated   · Voice Prior to PO Intake: clear, dysarthric   · Oral Musculature Comments: pt reporting +pain when swallowing     Bedside Swallow Eval:   Consistencies Assessed:  · Thin liquids ice chips x2  · Puree tsp bites x2     Oral Phase:   · Decreased closure around utensil  · Oral residue  · Poor oral acceptance    Pharyngeal Phase:   · delayed coughing, productive  · delayed swallow initation  · multiple spontaneous swallows  · throat clearing    Compensatory Strategies  · Effortful swallows    Treatment: Pt with decreased command following and restless, moving about in bed. Pt resisting HOB elevation, requiring max encouragement to have HOB elevated for presentations of PO. Patient talking upon presentation of ice chip x1 and immediate coughing on trials of ice chip.  Patient with decreased R labial and lingual coordination removing 1/4 tsp contents from tsp edge upon presentation of Puree. Patient with R oral holding, R lingual stasis and de;ayed productive cough with trials of puree.  Patient insisting HOB lowered back and reporting pain when attempting to swallow puree. Additional trials held 2/2 decrease cognition, increased anxiety/agitation and overt S/S aspiration with low-level trial presented. Patient and Family friends at bedside educated on SLP role, S/S aspiration, need for further assessment, and aspiration precautions. No additional questions noted. Whiteboard updated. Findings reviewed with CNA and nurse following session.     Assessment:     Kristina Aguirre is a 67 y.o. female with an SLP diagnosis of Dysphagia and Dysarthria.  She presents restless t/o assessment.  Strict aspiration precautions are advised. ST to continue to follow.   Goals:    SLP Goals        Problem: SLP Goal    Goal Priority Disciplines Outcome   SLP Goal     SLP Ongoing (interventions implemented as appropriate)   Description:  Speech Language Pathology Goals  Goals expected to be met by 4/1  1. Pt will participate in ongoing swallow assessment   2. Pt will participate in speech, language and cognitive assessment                       Plan:     · Patient to be seen:  5 x/week   · Plan of Care expires:  04/24/18  · Plan of Care reviewed with:  patient, friend   · SLP Follow-Up:  Yes       Discharge recommendations:  nursing facility, skilled   Barriers to Discharge:  None    Time Tracking:     SLP Treatment Date:   03/25/18  Speech Start Time:  0815  Speech Stop Time:  0832     Speech Total Time (min):  17 min    Billable Minutes: Eval Swallow and Oral Function 17    MARILOU Serna., [facility]-SLP  Speech-Language Pathology  Pager: [phone]      03/25/2018

## 2018-03-25 NOTE — ASSESSMENT & PLAN NOTE
Stroke risk factor  SBP <220, but MAP < 80 s/p TAVR.  - Continue amlodipine 10 mg PO daily, carvedilol 3.125 mg PO BID, chlorthalidone 25 mg PO daily, and hydralazine IV PRN.

## 2018-03-25 NOTE — PROGRESS NOTES
Entered patient's room after patient was yelling and another RN heard. Patient was found sitting on floor under table with bed alarm sounding. Patient had bed alarm on, fall risk sign in place, call bell in bed within reach for patient, side rails up and table in front of her 20 minutes prior when RN started new bag of medication. Patient assessed and has no external, visable injuries. Frequent vitals initiated. MD Linares Notified. Ordered CT for patient and Telesitter with [facility]. Called CT and stated that they have a lot of back up Cts from ED and will call when machine is available. Telesitter cleaned and placed into patient's room.  Notified POA/ Family David, stated he would be coming back within the hour to sit at the bedside with patient. Reeducated patient on reasons she is a fall risk. Verbalized understanding but patient has been having cognitive issues. Will continue to reinforce, camera in place, bed alarm on, room near nurses station, door open..  Will continue to monitor.

## 2018-03-25 NOTE — PLAN OF CARE
Problem: Patient Care Overview  Goal: Plan of Care Review  Plan of care discussed with family. Fall precautions in place. Patient has no complaints of pain. Discussed medications and care . Amiodarone gtt initiated. Family has no questions at this time.White board updated. Bed locked in lowest position. Side rails up x2. Will continue to monitor.

## 2018-03-25 NOTE — ASSESSMENT & PLAN NOTE
S/p transaxillary TAVR    Neuro: s/p cva, transferred to neurology stroke service    CV: Cardiology consulted  Afib RVR - on amio  ASA  plavix  MAP goal 60-80    Resp: wean supplemental oxygen    FEN/GI: cardiac diet on hold in setting of dysphagia  Replace electrolytes    Renal: adequate UOP, BUN/Cr stable    Heme/ID: H/H stable    Transferred to neurology service

## 2018-03-25 NOTE — ASSESSMENT & PLAN NOTE
- CXR on 3/23 with slight interval worsening of bilateral airspace disease suggestive of pulmonary edema.  - Remains on 2.5L nasal cannula.   - Continue furosemide 40 mg IV BID as per CTS team.   - Strict I/O and daily weights.

## 2018-03-25 NOTE — SUBJECTIVE & OBJECTIVE
Neurologic Chief Complaint: RUE weakness, aphasia, right facial droop    Subjective:     Interval History: No new focal deficits from yesterday evening. Patient confused and answers one question appropriately but follows commands appropriately.     HPI, Past Medical, Family, and Social History remains the same as documented in the initial encounter.     Review of Systems   Constitutional: Negative for chills and fever.   HENT: Negative for ear discharge and ear pain.    Eyes: Negative for pain and itching.   Respiratory: Negative for chest tightness and shortness of breath.    Cardiovascular: Negative for chest pain and leg swelling.   Gastrointestinal: Negative for abdominal distention and abdominal pain.   Genitourinary: Negative for difficulty urinating and dysuria.   Musculoskeletal: Positive for arthralgias.   Neurological: Positive for facial asymmetry, speech difficulty and weakness.   Psychiatric/Behavioral: Positive for confusion.     Scheduled Meds:   amLODIPine  10 mg Oral Daily    ascorbic acid (vitamin C)  500 mg Oral BID    aspirin  300 mg Rectal Daily    atorvastatin  40 mg Oral Daily    carvedilol  3.125 mg Oral Daily    chlorthalidone  25 mg Oral QAM    clopidogrel  75 mg Oral Daily    famotidine (PF)  20 mg Intravenous BID    fluticasone-vilanterol  1 puff Inhalation Daily    furosemide  40 mg Intravenous BID    mupirocin  1 g Nasal BID    mupirocin  1 g Nasal BID    polyethylene glycol  17 g Oral Daily     Continuous Infusions:   amiodarone in dextrose 5% 0.5 mg/min (03/25/18 1511)    sodium chloride 0.9%       PRN Meds:acetaminophen, albuterol-ipratropium 2.5mg-0.5mg/3mL, bisacodyl, dextrose 50%, dextrose 50%, glucagon (human recombinant), glucose, glucose, hydrALAZINE, insulin aspart U-100, morphine, ondansetron, promethazine (PHENERGAN) IVPB, sodium chloride 0.9%    Objective:     Vital Signs (Most Recent):  Temp: 98.1 °F (36.7 °C) (03/25/18 1158)  Pulse: 74 (03/25/18  1540)  Resp: 16 (03/25/18 1158)  BP: (!) 158/66 (03/25/18 1540)  SpO2: (!) 92 % (03/25/18 1158)  BP Location: Left arm    Vital Signs Range (Last 24H):  Temp:  [98.1 °F (36.7 °C)-98.5 °F (36.9 °C)]   Pulse:  []   Resp:  [16-18]   BP: (104-169)/(45-72)   SpO2:  [92 %-95 %]   BP Location: Left arm    Physical Exam   Constitutional: She appears well-developed and well-nourished.   HENT:   Head: Normocephalic and atraumatic.   Eyes: EOM are normal. Pupils are equal, round, and reactive to light.   Neck: Normal range of motion.   Cardiovascular: Normal rate.    Pulmonary/Chest: Effort normal.   Abdominal: Soft.   Neurological: She is alert.   Right facial droop, sided weakness, sensory deficit, hemianopsia, dysarthria   Skin: Skin is warm and dry.   Nursing note and vitals reviewed.      Neurological Exam:   LOC: alert  Attention Span: poor  Language: No aphasia  Articulation: Dysarthria  Orientation: Not oriented to time  Visual Fields: Hemianopsia right  EOM (CN III, IV, VI): Full/intact  Pupils (CN II, III): PERRL  Facial Sensation (CN V): Normal  Facial Movement (CN VII): Lower facial weakness on the Right  Gag Reflex: present  Reflexes: 2+ throughout  Motor: Arm left  Plegia 4/5  Leg left  Normal 5/5  Arm right  Plegia 0/5  Leg right Plegia 3/5  Cebellar: No evidence of appendicular or axial ataxia  Sensation: decrease sensation on right arm  Tone: Flaccid  RUE       Laboratory:  CMP:   Recent Labs  Lab 03/25/18  0538   CALCIUM 9.3      K 3.7   CO2 25      BUN 12   CREATININE 0.7     CBC:   Recent Labs  Lab 03/25/18  0538   WBC 9.73   RBC 3.43*   HGB 8.2*  8.2*   HCT 26.7*  26.7*      MCV 78*   MCH 23.9*   MCHC 30.7*     Lipid Panel:   Recent Labs  Lab 03/25/18  0538   CHOL 150   LDLCALC 80.2   HDL 42   TRIG 139     Hgb A1C:   Recent Labs  Lab 03/21/18  1003   HGBA1C 6.5*       Diagnostic Results     Brain Imaging   MRI Brain (Stroke Protocol) Impression     1.  Multiple abnormal regions  of restricted diffusion throughout the bilateral cerebral and cerebellar hemispheres in keeping with acute infarcts as discussed above.  Given the multiplicity and distribution, findings are suggestive of embolic phenomena.    2.  Scattered foci of supratentorial periventricular T2/FLAIR hyperintensity suggestive of chronic microvascular ischemic change.    This report was flagged in Epic as abnormal.    Electronically signed by: Naga Jones MD  Date: 03/25/2018  Time: 04:57

## 2018-03-25 NOTE — ASSESSMENT & PLAN NOTE
- CHADVASC 8.  - Newly diagnosed here. Patient found to be in afib RVR after returning from MRI on 3/24.   - Unlikely to have caused stroke as she went into arrhythmia s/p symptom onset.   - Likely post-procedural from TAVR or from hypoxic respiratory failure  - IV amiodarone loaded and started on gtt by CTS and currently remains in NSR   - Would touch base with CTS in regards to if they would like to continue medication and transition to maintenance PO dose, otherwise continuing on gtt in interim.

## 2018-03-25 NOTE — PLAN OF CARE
Problem: Patient Care Overview  Goal: Plan of Care Review  Outcome: Ongoing (interventions implemented as appropriate)  Plan of care discussed with patient.  Fall precautions in place, patient had a fall today. Frequent vitals completed, CT ordered, Telesitter at the bed side. Order do insert NG tube delayed until AM due to epistaxis. Patient still disoriented, All fall precautions in place and new precautions taken.Patient has no complaints of pain. Discussed medications and care. Patient has no questions at this time. Will continue to monitor.

## 2018-03-25 NOTE — PROGRESS NOTES
[facility]  Cardiothoracic Surgery  Progress Note    Patient Name: Kristina Aguirre  MRN: [id number]  Admission Date: 3/22/2018  Hospital Length of Stay: 3 days  Code Status: Prior   Attending Physician: Eric Obregon MD   Referring Provider: Robert Mattson MD  Principal Problem: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    Subjective:     Post-Op Info:  Procedure(s) (LRB):  STUDY-EP (N/A)   2 Days Post-Op     Interval History: afib RVR last night, did not respond to metop x3 doses, did respond to amio load and infusion. Developed acute right arm, leg weakness and right side facial droop. CT and f/u MRI show multiple, bilateral large infarcts. Stroke code called and pt transferred to neurology team for primary.    Medications:  Continuous Infusions:   amiodarone in dextrose 5% 0.5 mg/min (03/25/18 1511)    sodium chloride 0.9%       Scheduled Meds:   amLODIPine  10 mg Oral Daily    ascorbic acid (vitamin C)  500 mg Oral BID    aspirin  300 mg Rectal Daily    atorvastatin  40 mg Oral Daily    carvedilol  3.125 mg Oral Daily    chlorthalidone  25 mg Oral QAM    clopidogrel  75 mg Oral Daily    enoxaparin  40 mg Subcutaneous Daily    famotidine (PF)  20 mg Intravenous BID    fluticasone-vilanterol  1 puff Inhalation Daily    furosemide  40 mg Intravenous BID    mupirocin  1 g Nasal BID    mupirocin  1 g Nasal BID    polyethylene glycol  17 g Oral Daily     PRN Meds:acetaminophen, albuterol-ipratropium 2.5mg-0.5mg/3mL, bisacodyl, dextrose 50%, dextrose 50%, glucagon (human recombinant), glucose, glucose, hydrALAZINE, insulin aspart U-100, morphine, ondansetron, promethazine (PHENERGAN) IVPB, sodium chloride 0.9%     Objective:     Vital Signs (Most Recent):  Temp: 98.1 °F (36.7 °C) (03/25/18 1158)  Pulse: 74 (03/25/18 1630)  Resp: 16 (03/25/18 1158)  BP: (!) 147/64 (03/25/18 1630)  SpO2: (!) 92 % (03/25/18 1158) Vital Signs (24h Range):  Temp:  [98.1 °F (36.7  °C)-98.5 °F (36.9 °C)] 98.1 °F (36.7 °C)  Pulse:  [] 74  Resp:  [16-18] 16  SpO2:  [92 %-95 %] 92 %  BP: (104-172)/(45-73) 147/64     Weight: 69.9 kg (154 lb 1.6 oz)  Body mass index is 28.19 kg/m².    SpO2: (!) 92 %  O2 Device (Oxygen Therapy): nasal cannula    Intake/Output - Last 3 Shifts       03/23 0700 - 03/24 0659 03/24 0700 - 03/25 0659 03/25 0700 - 03/26 0659    P.O.  400     I.V. (mL/kg) 961 (13.7)      Total Intake(mL/kg) 961 (13.7) 400 (5.7)     Urine (mL/kg/hr) 1365 (0.8) 1397 (0.8)     Total Output 1365 1397      Net -404 -997             Urine Occurrence  4 x           Lines/Drains/Airways     Peripheral Intravenous Line                 Peripheral IV - Single Lumen 03/22/18 0815 Left Antecubital 3 days         Peripheral IV - Single Lumen 03/25/18 0152 Left Forearm less than 1 day                Physical Exam    GEN: working with physical therapy  HEENT: normocephalic, atraumatic  CV: RRR, incision covered with clean dressing  Resp: normal work of breathing, on 2L NC full inspiratory expansion  Abd: non distended  Ext: Distal pulses intact, no peripheral edema  Neuro: right arm flaccid paralysis, right side weakness  Skin: no rashes, bruising    Significant Labs:  BMP:     Recent Labs  Lab 03/24/18  0310 03/25/18  0538   * 155*    136   K 4.0 3.7    100   CO2 25 25   BUN 12 12   CREATININE 0.7 0.7   CALCIUM 8.8 9.3   MG 1.9  --      CBC:     Recent Labs  Lab 03/25/18  0538   WBC 9.73   RBC 3.43*   HGB 8.2*  8.2*   HCT 26.7*  26.7*      MCV 78*   MCH 23.9*   MCHC 30.7*       Significant Diagnostics:  CXR: I have reviewed all pertinent results/findings within the past 24 hours   Head scans reviewed.    Assessment/Plan:     S/p TAVR (transcatheter aortic valve replacement), bioprosthetic    67 y.o. female Day of Surgery for Procedure(s) (LRB):  STUDY-EP (N/A)    Neuro: pain meds prn  CV: dressing intact  Resp: room air  Renal: adequate UOP  FEN/GI: diet  Heme: on ASA,  plavix  ID: afebrile    Plan for pacemaker placement today with cardiology   Transfer to CTSU        Severe aortic stenosis    S/p transaxillary TAVR    Neuro: s/p cva, transferred to neurology stroke service    CV: Cardiology consulted  Afib RVR - on amio  ASA  plavix  MAP goal 60-80    Resp: wean supplemental oxygen    FEN/GI: cardiac diet on hold in setting of dysphagia  Replace electrolytes    Renal: adequate UOP, BUN/Cr stable    Heme/ID: H/H stable    Transferred to neurology service            Gela Polk MD  Cardiothoracic Surgery  [facility]

## 2018-03-25 NOTE — PROGRESS NOTES
[facility]  Vascular Neurology  Comprehensive Stroke Center  Progress Note    Assessment/Plan:     * Acute ischemic multifocal anterior circulation stroke    68 y/o F with medical history significant for CAD, PAD, type II DM, HTN, and severe AS s/p TAVR on 3/22. Stroke called on 3/24 at 2004 for worsening RUE weakness, right facial droop, dysarthria and aphasia. CTA without LVO and MRI with multiple bilateral (R>L) acute infarcts on cerebral and cerebellar hemispheres highly suggestive of embolic phenomenon. No tpa as outside window.     - Documented right field deficits and worsening RUE weakness <24h s/p TAVR. ROSEY during TAVR demonstrable for grade 4 atheroma on aorta. Suspect iatrogenic from TAVR as pattern is concerning for embolic phenomena.     Antithrombotics for secondary stroke prevention: Antiplatelets: Aspirin: 81 mg daily  Clopidogrel: 75 mg daily    Statins for secondary stroke prevention and hyperlipidemia, if present:   Statins: Atorvastatin- 40 mg daily    Aggressive risk factor modification: HTN, Smoking, DM, CAD, bilateral carotid stenosis, s/p TAVR     Rehab efforts: PT/OT/SLP to evaluate and treat, PM&R consult     Diagnostics ordered/pending: LDL 80, HA1c 6.5    VTE prophylaxis: Enoxaparin 40 mg SQ every 24 hours    BP parameters: MAP< 80, per s/p TAVR            Atrial fibrillation    - CHADVASC 8.  - Newly diagnosed here. Patient found to be in afib RVR after returning from MRI on 3/24.   - Unlikely to have caused stroke as she went into arrhythmia s/p symptom onset.   - Likely post-procedural from TAVR or from hypoxic respiratory failure  - IV amiodarone loaded and started on gtt by CTS and currently remains in NSR   - Would touch base with CTS in regards to if they would like to continue medication and transition to maintenance PO dose, otherwise continuing on gtt in interim.           Bilateral carotid artery stenosis    Stroke risk factor - but unlikely to have caused  this event as pattern not consistent with thrombotic/a-a considering embolic multi-territorial stroke.   - As demonstrable by recent carotid US and CTA (mixed density atherosclerosis at both carotid bifurcations)        Acute on chronic diastolic heart failure    - CXR on 3/23 with slight interval worsening of bilateral airspace disease suggestive of pulmonary edema.  - Remains on 2.5L nasal cannula.   - Continue furosemide 40 mg IV BID as per CTS team.   - Strict I/O and daily weights.         S/p TAVR (transcatheter aortic valve replacement), bioprosthetic    - Followed by CTS.  - Continue DAPT for one year.         Centrilobular emphysema    - Duo-neb prn.         Diabetes mellitus, type 2    Stroke risk factor  HgB A1C 6.5  SSI        Malignant neoplasm of right lung    - s/p RML lobectomy, path showed keratinizing squamous cell CA. Contacted oncologist who reported disease on remission, last PET scan negative.        Coronary artery disease of native artery with stable angina pectoris    Stroke risk factor  - Continue DAPT, statin, and BB  - Would benefit from ACEI or ARB for goal-directed medical therapy; unclear why patient not on.    - Start if BP uncontrolled.         Tobacco abuse    Smoking cessation        Severe aortic stenosis    S/p TAVR 3/22, followed by CTS.         Essential hypertension    Stroke risk factor  SBP <220, but MAP < 80 s/p TAVR.  - Continue amlodipine 10 mg PO daily, carvedilol 3.125 mg PO BID, chlorthalidone 25 mg PO daily, and hydralazine IV PRN.         Dyslipidemia    Stroke risk factor  LDL >70   Lipitor 40 mg daily             3/25 - New-onset afib RVR developed after return from MRI this morning. IV amiodarone loaded and started on gtt- rhythm resolved and remains on gtt. No worsening focal deficits this morning.     STROKE DOCUMENTATION   Acute Stroke Times   Last Known Normal Date: 03/23/18  Last Known Normal Time:  (Unsure)  Symptom Onset Date: 03/24/18  Symptom Onset Time:   (Unsure)  Stroke Team Called Date: 03/24/18  Stroke Team Called Time: 2004  Stroke Team Arrival Date: 03/24/18  Stroke Team Arrival Time: 2010  CT Interpretation Time: 2025    NIH Scale:  1a. Level Of Consciousness: 0-->Alert: keenly responsive  1b. LOC Questions: 1-->Answers one question correctly  1c. LOC Commands: 2-->Performs neither task correctly  2. Best Gaze: 1-->Partial gaze palsy: gaze is abnormal in one or both eyes, but forced deviation or total gaze paresis is not present  3. Visual: 2-->Complete hemianopia  4. Facial Palsy: 2-->Partial paralysis (total or near-total paralysis of lower face)  5a. Motor Arm, Left: 1-->Drift: limb holds 90 (or 45) degrees, but drifts down before full 10 seconds: does not hit bed or other support  5b. Motor Arm, Right: 4-->No movement  6a. Motor Leg, Left: 1-->Drift: leg falls by the end of the 5-sec period but does not hit bed  6b. Motor Leg, Right: 3-->No effort against gravity: leg falls to bed immediately  7. Limb Ataxia: 1-->Present in one limb  8. Sensory: 1-->Mild-to-moderate sensory loss: patient feels pinprick is less sharp or is dull on the affected side: or there is a loss of superficial pain with pinprick, but patient is aware of being touched  9. Best Language: 1-->Mild-to-moderate aphasia: some obvious loss of fluency or facility of comprehension, without significant limitation on ideas expressed or form of expression. Reduction of speech and/or comprehension, however, makes conversation. . . (see row details)  10. Dysarthria: 1-->Mild-to-moderate dysarthria: patient slurs at least some words and, at worst, can be understood with some difficulty  11. Extinction and Inattention (formerly Neglect): 0-->No abnormality  Total (NIH Stroke Scale): 21       Modified [place] Score: 1  Chelsy Coma Scale:14   ABCD2 Score:    [id number] Score:8  HAS -BLED Score:5  ICH Score:   Hunt & Eldridge Classification:      Hemorrhagic change of an Ischemic Stroke: Does this patient  have an ischemic stroke with hemorrhagic changes? No     Neurologic Chief Complaint: RUE weakness, aphasia, right facial droop    Subjective:     Interval History: No new focal deficits from yesterday evening. Patient confused and answers one question appropriately but follows commands appropriately.     HPI, Past Medical, Family, and Social History remains the same as documented in the initial encounter.     Review of Systems   Constitutional: Negative for chills and fever.   HENT: Negative for ear discharge and ear pain.    Eyes: Negative for pain and itching.   Respiratory: Negative for chest tightness and shortness of breath.    Cardiovascular: Negative for chest pain and leg swelling.   Gastrointestinal: Negative for abdominal distention and abdominal pain.   Genitourinary: Negative for difficulty urinating and dysuria.   Musculoskeletal: Positive for arthralgias.   Neurological: Positive for facial asymmetry, speech difficulty and weakness.   Psychiatric/Behavioral: Positive for confusion.     Scheduled Meds:   amLODIPine  10 mg Oral Daily    ascorbic acid (vitamin C)  500 mg Oral BID    aspirin  300 mg Rectal Daily    atorvastatin  40 mg Oral Daily    carvedilol  3.125 mg Oral Daily    chlorthalidone  25 mg Oral QAM    clopidogrel  75 mg Oral Daily    famotidine (PF)  20 mg Intravenous BID    fluticasone-vilanterol  1 puff Inhalation Daily    furosemide  40 mg Intravenous BID    mupirocin  1 g Nasal BID    mupirocin  1 g Nasal BID    polyethylene glycol  17 g Oral Daily     Continuous Infusions:   amiodarone in dextrose 5% 0.5 mg/min (03/25/18 1511)    sodium chloride 0.9%       PRN Meds:acetaminophen, albuterol-ipratropium 2.5mg-0.5mg/3mL, bisacodyl, dextrose 50%, dextrose 50%, glucagon (human recombinant), glucose, glucose, hydrALAZINE, insulin aspart U-100, morphine, ondansetron, promethazine (PHENERGAN) IVPB, sodium chloride 0.9%    Objective:     Vital Signs (Most Recent):  Temp: 98.1 °F  (36.7 °C) (03/25/18 1158)  Pulse: 74 (03/25/18 1540)  Resp: 16 (03/25/18 1158)  BP: (!) 158/66 (03/25/18 1540)  SpO2: (!) 92 % (03/25/18 1158)  BP Location: Left arm    Vital Signs Range (Last 24H):  Temp:  [98.1 °F (36.7 °C)-98.5 °F (36.9 °C)]   Pulse:  []   Resp:  [16-18]   BP: (104-169)/(45-72)   SpO2:  [92 %-95 %]   BP Location: Left arm    Physical Exam   Constitutional: She appears well-developed and well-nourished.   HENT:   Head: Normocephalic and atraumatic.   Eyes: EOM are normal. Pupils are equal, round, and reactive to light.   Neck: Normal range of motion.   Cardiovascular: Normal rate.    Pulmonary/Chest: Effort normal.   Abdominal: Soft.   Neurological: She is alert.   Right facial droop, sided weakness, sensory deficit, hemianopsia, dysarthria   Skin: Skin is warm and dry.   Nursing note and vitals reviewed.      Neurological Exam:   LOC: alert  Attention Span: poor  Language: No aphasia  Articulation: Dysarthria  Orientation: Not oriented to time  Visual Fields: Hemianopsia right  EOM (CN III, IV, VI): Full/intact  Pupils (CN II, III): PERRL  Facial Sensation (CN V): Normal  Facial Movement (CN VII): Lower facial weakness on the Right  Gag Reflex: present  Reflexes: 2+ throughout  Motor: Arm left  Plegia 4/5  Leg left  Normal 5/5  Arm right  Plegia 0/5  Leg right Plegia 3/5  Cebellar: No evidence of appendicular or axial ataxia  Sensation: decrease sensation on right arm  Tone: Flaccid  RUE       Laboratory:  CMP:   Recent Labs  Lab 03/25/18  0538   CALCIUM 9.3      K 3.7   CO2 25      BUN 12   CREATININE 0.7     CBC:   Recent Labs  Lab 03/25/18  0538   WBC 9.73   RBC 3.43*   HGB 8.2*  8.2*   HCT 26.7*  26.7*      MCV 78*   MCH 23.9*   MCHC 30.7*     Lipid Panel:   Recent Labs  Lab 03/25/18  0538   CHOL 150   LDLCALC 80.2   HDL 42   TRIG 139     Hgb A1C:   Recent Labs  Lab 03/21/18  1003   HGBA1C 6.5*       Diagnostic Results     Brain Imaging   MRI Brain (Stroke Protocol)  Impression     1.  Multiple abnormal regions of restricted diffusion throughout the bilateral cerebral and cerebellar hemispheres in keeping with acute infarcts as discussed above.  Given the multiplicity and distribution, findings are suggestive of embolic phenomena.    2.  Scattered foci of supratentorial periventricular T2/FLAIR hyperintensity suggestive of chronic microvascular ischemic change.    This report was flagged in Epic as abnormal.    Electronically signed by: Naga Jones MD  Date: 03/25/2018  Time: 04:57           Jaret Linares MD  PGY-2 Internal Medicine  [phone]    [facility]  Department of Vascular Neurology   [facility]

## 2018-03-25 NOTE — CONSULTS
[facility]  Vascular Neurology  Comprehensive Stroke Center  Consult Note    Inpatient consult to Vascular (Stroke) Neurology  Consult performed by: JAD COLLIER  Consult ordered by: BRIELLE CORDOVA  Reason for consult: stroke code        Assessment/Plan:     Patient is a 67 y.o. year old female with:    Hemiparesis of right dominant side    Could be due to stroke  MRI Brain to evaluate brain tissue  Aggressive therapy  Antithrombotics: DAPT  Statin: Lipitor 40 mg daily  Therapy: PT/OT/ST  Diagnostics: lipid, TSH  VTE: SCD's        Acute on chronic diastolic heart failure    Stroke risk factor  Per Primary team        S/p TAVR (transcatheter aortic valve replacement), bioprosthetic    Per primary team        Diabetes mellitus, type 2    Stroke risk factor  HgB A1C 6.5  SSI        Coronary artery disease of native artery with stable angina pectoris    Stoke risk factor  Per Primary team        Tobacco abuse    Smoking cessation        Essential hypertension    Stroke risk factor  SBP <220        Dyslipidemia    Stroke risk factor  Lipid profile if LDL >70 Lipitor 40 mg daily            STROKE DOCUMENTATION     Acute Stroke Times   Last Known Normal Date: 03/23/18  Last Known Normal Time:  (Unsure)  Symptom Onset Date: 03/24/18  Symptom Onset Time:  (Unsure)  Stroke Team Called Date: 03/24/18  Stroke Team Called Time: 2004  Stroke Team Arrival Date: 03/24/18  Stroke Team Arrival Time: 2010  CT Interpretation Time: 2025    NIH Scale:  1a. Level Of Consciousness: 0-->Alert: keenly responsive  1b. LOC Questions: 1-->Answers one question correctly  1c. LOC Commands: 0-->Performs both tasks correctly  2. Best Gaze: 0-->Normal  3. Visual: 2-->Complete hemianopia  4. Facial Palsy: 1-->Minor paralysis (flattened nasolabial fold, asymmetry on smiling)  5a. Motor Arm, Left: 0-->No drift: limb holds 90 (or 45) degrees for full 10 secs  5b. Motor Arm, Right: 4-->No movement  6a. Motor Leg, Left: 0-->No  drift: leg holds 30 degree position for full 5 secs  6b. Motor Leg, Right: 0-->No drift: leg holds 30 degree position for full 5 secs  7. Limb Ataxia: 0-->Absent  8. Sensory: 1-->Mild-to-moderate sensory loss: patient feels pinprick is less sharp or is dull on the affected side: or there is a loss of superficial pain with pinprick, but patient is aware of being touched  9. Best Language: 0-->No aphasia: normal  10. Dysarthria: 1-->Mild-to-moderate dysarthria: patient slurs at least some words and, at worst, can be understood with some difficulty  11. Extinction and Inattention (formerly Neglect): 0-->No abnormality  Total (NIH Stroke Scale): 10    Modified Ellen Score: 1  Dille Coma Scale:14   ABCD2 Score:    [id number] Score:   HAS -BLED Score:   ICH Score:   Hunt & Eldridge Classification:       Thrombolysis Candidate? No, Unsure LKN, TAVR      Interventional Revascularization Candidate?   Is the patient eligible for mechanical endovascular reperfusion (SHEELA)?  No; No large vessel occlusion      Hemorrhagic change of an Ischemic Stroke: Does this patient have an ischemic stroke with hemorrhagic changes? No     Subjective:     History of Present Illness:  66 y/o female who underwent TAVR on 3-22-18. Tonight noticed to have right sided facial droop and dysarthria but not sure of last known normal. Right arm weakness and some leg weakness, sensory deficit on right and right hemianopsia. Staff nurse noted changes at change of shift on assessment around 1930. Stoke code called and upon examination right arm is flaccid but moving the right leg, decrease sensation right arm, facial droop and dysarthria, hemianopsia right. Not TPA candidate due to unknown LKN, CTA Multiphase done and no LVO seen but age indeterminate infarcts in the bilateral paramedian occipital lobes, and the left corona radiata. Recommendation is for MRI brain. Discussed with Dr Salcedo  Risk factors are HTN, DM, HLP, TAVR        Past Medical History:    Diagnosis Date    Abdominal aortic aneurysm (AAA) without rupture 9/12/2017    Arthritis     Asthma     Bilateral carotid artery stenosis 9/12/2017    Cancer     lung    COPD (chronic obstructive pulmonary disease)     Coronary artery disease of native artery with stable angina pectoris 9/29/2017    Diabetes mellitus     Heart valve disorder     Hyperlipidemia     Hypertension     Long term current use of antithrombotics/antiplatelets      Past Surgical History:   Procedure Laterality Date    HAND SURGERY Right     KNEE ARTHROSCOPY      LUNG LOBECTOMY Right     middle lobe    mediastinoscopy      SPINAL FUSION      TONSILLECTOMY      WRIST SURGERY Right      Family History   Problem Relation Age of Onset    Throat cancer Mother     Heart attack Father     No Known Problems Sister     No Known Problems Brother     No Known Problems Maternal Aunt     No Known Problems Maternal Uncle     No Known Problems Paternal Aunt     No Known Problems Paternal Uncle     No Known Problems Maternal Grandmother     No Known Problems Maternal Grandfather     No Known Problems Paternal Grandmother     No Known Problems Paternal Grandfather      Social History   Substance Use Topics    Smoking status: Former Smoker     Packs/day: 1.50     Types: Vaping w/o nicotine    Smokeless tobacco: Never Used    Alcohol use No     Review of patient's allergies indicates:  No Known Allergies    Medications: I have reviewed the current medication administration record.    Prescriptions Prior to Admission   Medication Sig Dispense Refill Last Dose    ACETAMINOPHEN WITH CODEINE (ACETAMINOPHEN-CODEINE) 120mg 12mg 5mL Soln TAKE 1 TEASPOON (5 MILLILITERS) BY MOUTH EVERY 6 HOURS AS NEEDED FOR COUGH  0 Past Week at Unknown time    amlodipine (NORVASC) 10 MG tablet Take 10 mg by mouth once daily.  5 3/21/2018 at 1200    aspirin (ECOTRIN) 81 MG EC tablet Take 1 tablet (81 mg total) by mouth once daily.   3/21/2018 at  1200    cetirizine (ZYRTEC) 10 MG tablet Take 10 mg by mouth once daily.  6 3/21/2018 at 1200    chlorthalidone (HYGROTEN) 25 MG Tab Take 1 tablet (25 mg total) by mouth every morning. 90 tablet 3 3/21/2018 at 1200    clopidogrel (PLAVIX) 75 mg tablet Take 75 mg by mouth once daily.  3 3/21/2018 at 1200    diphenoxylate-atropine 2.5-0.025 mg (LOMOTIL) 2.5-0.025 mg per tablet Take 1 tablet by mouth 4 (four) times daily as needed for Diarrhea.   Past Week at Unknown time    fenofibrate 160 MG Tab Take 160 mg by mouth once daily.  5 3/21/2018 at 1200    hydrocodone-acetaminophen 10-325mg (NORCO)  mg Tab Take 1 tablet by mouth 3 (three) times daily as needed.  0 Past Week at Unknown time    JANUVIA 100 mg Tab Take 100 mg by mouth once daily.  3 3/21/2018 at 1200    losartan (COZAAR) 50 MG tablet Take 1 tablet (50 mg total) by mouth every evening. 90 tablet 3 3/21/2018 at 1200    losartan-hydrochlorothiazide 100-25 mg (HYZAAR) 100-25 mg per tablet Take 1 tablet by mouth once daily.  1 3/21/2018 at 1200    metFORMIN (GLUCOPHAGE) 1000 MG tablet Take 1 tablet (1,000 mg total) by mouth 2 (two) times daily.  5 3/21/2018 at 2100    NEXIUM 40 mg capsule Take 40 mg by mouth once daily.  5 3/21/2018 at 1200    pravastatin (PRAVACHOL) 40 MG tablet Take 40 mg by mouth every evening.  5 3/21/2018 at 1200    SYMBICORT 80-4.5 mcg/actuation HFAA Inhale 2 puffs into the lungs 2 (two) times daily.  1 3/21/2018 at 2100    VENTOLIN HFA 90 mcg/actuation inhaler Inhale 2 puffs into the lungs every 4 (four) hours as needed.  0 3/21/2018 at 2100    amoxicillin-clavulanate 875-125mg (AUGMENTIN) 875-125 mg per tablet 1 TABLET BY MOUTH EVERY 1 2 HOURS FOR 10 DAYS  0 More than a month at Unknown time    TRUE METRIX GLUCOSE TEST STRIP Strp test DAILY  3 3/21/2018    TRUEPLUS LANCETS 30 gauge Misc USE DAILY when testing  10 3/21/2018    VICTOZA 3-JOSE 0.6 mg/0.1 mL (18 mg/3 mL) PnIj INJECT 1.8 milligrams under the skin EVERY  DAY  2 3/20/2018       Review of Systems   Constitutional: Negative for chills and fever.   HENT: Negative for ear discharge and ear pain.    Eyes: Negative for pain and itching.   Respiratory: Positive for shortness of breath. Negative for chest tightness.    Cardiovascular: Negative for chest pain and leg swelling.   Gastrointestinal: Negative for abdominal distention and abdominal pain.   Genitourinary: Negative for difficulty urinating and dysuria.   Musculoskeletal: Positive for arthralgias.   Neurological: Positive for facial asymmetry, speech difficulty and weakness.   Psychiatric/Behavioral: Positive for confusion.     Objective:     Vital Signs (Most Recent):  Temp: 98.5 °F (36.9 °C) (03/24/18 1957)  Pulse: 89 (03/24/18 1957)  Resp: 16 (03/24/18 1516)  BP: (!) 164/72 (03/24/18 1957)  SpO2: 95 % (03/24/18 1957)    Vital Signs Range (Last 24H):  Temp:  [98 °F (36.7 °C)-99.1 °F (37.3 °C)]   Pulse:  [63-89]   Resp:  [10-38]   BP: ()/(41-77)   SpO2:  [88 %-100 %]   Arterial Line BP: (126-169)/(37-51)     Physical Exam   Constitutional: She appears well-developed and well-nourished.   HENT:   Head: Normocephalic and atraumatic.   Eyes: EOM are normal. Pupils are equal, round, and reactive to light.   Neck: Normal range of motion.   Cardiovascular: Normal rate.    Pulmonary/Chest: Effort normal.   Abdominal: Soft.   Neurological: She is alert.   Right facial droop, sided weakness, sensory deficit, hemianopsia, dysarthria   Skin: Skin is warm and dry.   Nursing note and vitals reviewed.      Neurological Exam:   LOC: alert  Attention Span: poor  Language: No aphasia  Articulation: Dysarthria  Orientation: Not oriented to time  Visual Fields: Full  Hemianopsia right  EOM (CN III, IV, VI): Full/intact  Pupils (CN II, III): PERRL  Facial Sensation (CN V): Normal  Facial Movement (CN VII): Lower facial weakness on the Right  Gag Reflex: present  Reflexes: 2+ throughout  Motor: Arm left  Normal 5/5  Leg left   Normal 5/5  Arm right  Plegia 0/5  Leg right Normal 5/5  Cebellar: No evidence of appendicular or axial ataxia  Sensation: decrease sensation on right arm  Tone: Flaccid  RUE      Laboratory:  CMP:   Recent Labs  Lab 03/24/18  0310   CALCIUM 8.8      K 4.0   CO2 25      BUN 12   CREATININE 0.7     CBC:   Recent Labs  Lab 03/24/18  0310   WBC 10.35   RBC 3.17*   HGB 7.7*  7.7*   HCT 24.6*  24.6*      MCV 78*   MCH 24.3*   MCHC 31.3*     Lipid Panel: No results for input(s): CHOL, LDLCALC, HDL, TRIG in the last 168 hours.  Coagulation:   Recent Labs  Lab 03/23/18  0331 03/23/18  0340   INR 1.0  --    APTT  --  22.1     Hgb A1C:   Recent Labs  Lab 03/21/18  1003   HGBA1C 6.5*     TSH: No results for input(s): TSH in the last 168 hours.    Diagnostic Results:      Brain imaging:      Vessel Imaging:  CTA Head and neck multiphase 3-24-18 results:  CTA demonstrates no high-grade stenosis or large vessel occlusion.    Non-contrast CT head demonstrates age indeterminate infarcts in the bilateral paramedian occipital lobes, and the left corona radiata.  Consider MRI for further evaluation if clinically indicated.    Additional subtle hypoattenuation in the right centrum semiovale which may represent age indeterminate infarction.    Osseous fusion of C4-C6 with left-sided hemilaminectomies.    Cervical spondylosis most prominent at C3-4 with some effacement of the anterior thecal sac and moderate right/severe left neural foraminal narrowing.    Small amount of subcutaneous air in the left anterosuperior chest wall and shoulder.    Cardiac Evaluation:   ROSEY 3-22-18 results:  The left atrium is normal in size  1 - Low normal to mildly depressed left ventricular systolic function (EF 50-55%).     2 - Mild mitral regurgitation.     3 - Severe aortic stenosis.     4 - Grade 4 atheroma disease of aorta.     5 - Successful implantation of a 26mm Evolut R CoreValve into the aortic position with no evidence of  regurgitation.       Shantel Walters NP  Comprehensive Stroke Center  Department of Vascular Neurology   [facility]

## 2018-03-25 NOTE — PROGRESS NOTES
[facility]  Cardiothoracic Surgery  Progress Note    Patient Name: Kristina Aguirre  MRN: [id number]  Admission Date: 3/22/2018  Hospital Length of Stay: 3 days  Code Status: Prior   Attending Physician: Sarbjit Carvajal MD   Referring Provider: Robert Mattson MD  Principal Problem:Severe aortic stenosis    Subjective:     Post-Op Info:  Procedure(s) (LRB):  STUDY-EP (N/A)   2 Days Post-Op     Interval History: no acute events. Did not have pacemaker placed    Medications:  Continuous Infusions:   amiodarone in dextrose 5% 1 mg/min (03/25/18 0125)    amiodarone in dextrose 5%       Scheduled Meds:   amLODIPine  10 mg Oral Daily    ascorbic acid (vitamin C)  500 mg Oral BID    aspirin  81 mg Oral Daily    carvedilol  3.125 mg Oral Daily    chlorthalidone  25 mg Oral QAM    clopidogrel  75 mg Oral Daily    famotidine (PF)  20 mg Intravenous BID    fluticasone-vilanterol  1 puff Inhalation Daily    furosemide  40 mg Intravenous BID    mupirocin  1 g Nasal BID    mupirocin  1 g Nasal BID    polyethylene glycol  17 g Oral Daily     PRN Meds:acetaminophen, albuterol-ipratropium 2.5mg-0.5mg/3mL, bisacodyl, dextrose 50%, dextrose 50%, glucagon (human recombinant), glucose, glucose, hydrALAZINE, insulin aspart U-100, morphine, ondansetron, promethazine (PHENERGAN) IVPB     Objective:     Vital Signs (Most Recent):  Temp: 98.5 °F (36.9 °C) (03/24/18 1957)  Pulse: 92 (03/25/18 0027)  Resp: 16 (03/24/18 1516)  BP: (!) 123/55 (03/25/18 0027)  SpO2: 95 % (03/24/18 1957) Vital Signs (24h Range):  Temp:  [98 °F (36.7 °C)-99.1 °F (37.3 °C)] 98.5 °F (36.9 °C)  Pulse:  [] 92  Resp:  [16-38] 16  SpO2:  [92 %-100 %] 95 %  BP: (104-164)/(54-77) 123/55  Arterial Line BP: (126-164)/(37-50) 139/42     Weight: 69.9 kg (154 lb 1.6 oz)  Body mass index is 28.19 kg/m².    SpO2: 95 %  O2 Device (Oxygen Therapy): nasal cannula    Intake/Output - Last 3 Shifts       03/23 0700 - 03/24 0659 03/24 0700 -  03/25 0659    P.O.  400    I.V. (mL/kg) 961 (13.7)     Total Intake(mL/kg) 961 (13.7) 400 (5.7)    Urine (mL/kg/hr) 1365 (0.8) 1397 (0.8)    Total Output 1365 1397    Net -404 -997          Urine Occurrence  2 x          Lines/Drains/Airways     Peripheral Intravenous Line                 Peripheral IV - Single Lumen 03/22/18 0815 Left Antecubital 2 days         Peripheral IV - Single Lumen 03/25/18 0152 Left Forearm less than 1 day                Physical Exam    GEN: No acute distress, drowsy  HEENT: normocephalic, atraumatic  CV: RRR, incision covered with clean dressing  Resp: normal work of breathing, on 2L NC full inspiratory expansion, clear to auscultation  Abd: non distended, bowel sounds normal  Ext: Distal pulses intact, no peripheral edema  Neuro: drowsy, PERRLA, strength decreased at right shoulder but equal bilateral elbows and hand , bilateral legs. oriented x3  Skin: no rashes, bruising    Significant Labs:  BMP:     Recent Labs  Lab 03/24/18  0310   *      K 4.0      CO2 25   BUN 12   CREATININE 0.7   CALCIUM 8.8   MG 1.9     CBC:     Recent Labs  Lab 03/24/18  0310   WBC 10.35   RBC 3.17*   HGB 7.7*  7.7*   HCT 24.6*  24.6*      MCV 78*   MCH 24.3*   MCHC 31.3*       Significant Diagnostics:  CXR: I have reviewed all pertinent results/findings within the past 24 hours    Assessment/Plan:     * Severe aortic stenosis    S/p transaxillary TAVR  Cardiology consulted for possible pacemaker placement    Neuro: d/c narcotic pain medication for possible confusion  CV: Cardiology consulted  ASA  plavix  Resp: wean supplemental oxygen  FEN/GI: cardiac diet  Renal: adequate UOP, BUN/Cr stable  Heme/ID: H/H stable    Plan is to transfer to C[facility]U        S/p TAVR (transcatheter aortic valve replacement), bioprosthetic    67 y.o. female Day of Surgery for Procedure(s) (LRB):  STUDY-EP (N/A)    Neuro: pain meds prn  CV: dressing intact  Resp: room air  Renal: adequate UOP  FEN/GI:  diet  Heme: on ASA, plavix  ID: afebrile    Plan for pacemaker placement today with cardiology   Transfer to CTSU            Gela Polk MD  Cardiothoracic Surgery  [facility]

## 2018-03-25 NOTE — CONSULTS
Inpatient consult to Physical Medicine Rehab  Consult performed by: BETH FELIX  Consult ordered by: TWYLA MALAVE  Reason for consult: assess rehab needs        Reviewed patient history and current admission.  Rehab team following.  Full consult to follow.    BARBARA Wood, FNP-C  Physical Medicine & Rehabilitation   03/25/2018  Spectralink: [id number]

## 2018-03-25 NOTE — ASSESSMENT & PLAN NOTE
Could be due to stroke  MRI Brain to evaluate brain tissue  Aggressive therapy  Antithrombotics: DAPT  Statin: Lipitor 40 mg daily  Therapy: PT/OT/ST  Diagnostics: lipid, TSH  VTE: SCD's

## 2018-03-25 NOTE — PROGRESS NOTES
"Attempted to start NG tube on patient x2. Patient started having a strong nose bleed and coughing refusing to swallow. Patient started yelling "no no no I do not want this" after pulling what was inserted out. Notified MD Linares and he stated to hold off for a day and retry in the morning. Will continue to monitor.  "

## 2018-03-25 NOTE — HOSPITAL COURSE
"3/25 - New-onset afib RVR developed after return from MRI this morning. IV amiodarone loaded and started on gtt- rhythm resolved and remains on gtt. No worsening focal deficits this morning.   03/28/2018 BOBBY.  Patient oriented to person only.  Currently perseverating about her  being called.  No other complaints at this time.  3/29: Discharge delayed due to discussions with EP about pt requiring heart monitoring s/p TAVR at inpatient rehab. Pt electing to go to [facility] tomorrow without monitoring [place] [facility] [place]. Currently on DAPT; plan to change to Eliquis + Plavix on 4/1/18 in setting of new-onset AFib and other cardiac hx.  3/30: [facility] now denying pt due to need for continued cardiac monitoring. Plan is now to consult EP on Sunday so pt can have ILR placed Monday and hopefully d/c to [facility] following. BAILEY Hernandez updated. UA ordered. CM attempting to arrange pet therapy situation for pt.   3/31/18 - feeling "off today" patient with history of frequent UTIs, UA ordered and awaiting collection. The patient reports her "off" feeling to be like a weakness - described as generalized. Will continue to monitor   4/1/18 - uti - culture pending, initiating treatment with augmentin.   4/2/18 - UC still pending. EP will not place loop recorder. Pt to go to [facility] on 30 day cardiac event monitor. Replaced potassium,   "

## 2018-03-25 NOTE — ASSESSMENT & PLAN NOTE
Stroke risk factor  - Continue DAPT, statin, and BB  - Would benefit from ACEI or ARB for goal-directed medical therapy; unclear why patient not on.    - Start if BP uncontrolled.

## 2018-03-25 NOTE — PROGRESS NOTES
Pt transferred from MRI to . Pt -140 A fib with RVR. Notified MD Denisse. Ordered metorprolol IVP  5mg PRN q 5 minutes for 15 minutes. Administered metoprolol IVP 5mg x3. -120. MD ordered amiodarone bolus and gtt initiated. No further orders were given. Will continue to monitor

## 2018-03-25 NOTE — PROGRESS NOTES
Called to patient's bedside 2/2 concern for new onset right sided weakness and facial drooping  Pt with significantly decreased strength in right upper and lower compared with left side  Right sided facial droop prominent    STAT CT head non-con ordered  Stroke code initiated   Will follow up on imaging and respond appropriately

## 2018-03-25 NOTE — SUBJECTIVE & OBJECTIVE
Interval History: afib RVR last night, did not respond to metop x3 doses, did respond to amio load and infusion. Developed acute right arm, leg weakness and right side facial droop. CT and f/u MRI show multiple, bilateral large infarcts. Stroke code called and pt transferred to neurology team for primary.    Medications:  Continuous Infusions:   amiodarone in dextrose 5% 0.5 mg/min (03/25/18 1511)    sodium chloride 0.9%       Scheduled Meds:   amLODIPine  10 mg Oral Daily    ascorbic acid (vitamin C)  500 mg Oral BID    aspirin  300 mg Rectal Daily    atorvastatin  40 mg Oral Daily    carvedilol  3.125 mg Oral Daily    chlorthalidone  25 mg Oral QAM    clopidogrel  75 mg Oral Daily    enoxaparin  40 mg Subcutaneous Daily    famotidine (PF)  20 mg Intravenous BID    fluticasone-vilanterol  1 puff Inhalation Daily    furosemide  40 mg Intravenous BID    mupirocin  1 g Nasal BID    mupirocin  1 g Nasal BID    polyethylene glycol  17 g Oral Daily     PRN Meds:acetaminophen, albuterol-ipratropium 2.5mg-0.5mg/3mL, bisacodyl, dextrose 50%, dextrose 50%, glucagon (human recombinant), glucose, glucose, hydrALAZINE, insulin aspart U-100, morphine, ondansetron, promethazine (PHENERGAN) IVPB, sodium chloride 0.9%     Objective:     Vital Signs (Most Recent):  Temp: 98.1 °F (36.7 °C) (03/25/18 1158)  Pulse: 74 (03/25/18 1630)  Resp: 16 (03/25/18 1158)  BP: (!) 147/64 (03/25/18 1630)  SpO2: (!) 92 % (03/25/18 1158) Vital Signs (24h Range):  Temp:  [98.1 °F (36.7 °C)-98.5 °F (36.9 °C)] 98.1 °F (36.7 °C)  Pulse:  [] 74  Resp:  [16-18] 16  SpO2:  [92 %-95 %] 92 %  BP: (104-172)/(45-73) 147/64     Weight: 69.9 kg (154 lb 1.6 oz)  Body mass index is 28.19 kg/m².    SpO2: (!) 92 %  O2 Device (Oxygen Therapy): nasal cannula    Intake/Output - Last 3 Shifts       03/23 0700 - 03/24 0659 03/24 0700 - 03/25 0659 03/25 0700 - 03/26 0659    P.O.  400     I.V. (mL/kg) 961 (13.7)      Total Intake(mL/kg) 961 (13.7) 400  (5.7)     Urine (mL/kg/hr) 1365 (0.8) 1397 (0.8)     Total Output 1365 1397      Net -404 -997             Urine Occurrence  4 x           Lines/Drains/Airways     Peripheral Intravenous Line                 Peripheral IV - Single Lumen 03/22/18 0815 Left Antecubital 3 days         Peripheral IV - Single Lumen 03/25/18 0152 Left Forearm less than 1 day                Physical Exam    GEN: working with physical therapy  HEENT: normocephalic, atraumatic  CV: RRR, incision covered with clean dressing  Resp: normal work of breathing, on 2L NC full inspiratory expansion  Abd: non distended  Ext: Distal pulses intact, no peripheral edema  Neuro: right arm flaccid paralysis, right side weakness  Skin: no rashes, bruising    Significant Labs:  BMP:     Recent Labs  Lab 03/24/18  0310 03/25/18  0538   * 155*    136   K 4.0 3.7    100   CO2 25 25   BUN 12 12   CREATININE 0.7 0.7   CALCIUM 8.8 9.3   MG 1.9  --      CBC:     Recent Labs  Lab 03/25/18  0538   WBC 9.73   RBC 3.43*   HGB 8.2*  8.2*   HCT 26.7*  26.7*      MCV 78*   MCH 23.9*   MCHC 30.7*       Significant Diagnostics:  CXR: I have reviewed all pertinent results/findings within the past 24 hours   Head scans reviewed.

## 2018-03-25 NOTE — PROGRESS NOTES
Pt transferred from  to MRI in bed. PIV intact. Pt transferred on bedisde monitor. Telemetry room notified. Will continue to monitor.

## 2018-03-25 NOTE — PROGRESS NOTES
Pt HR dropped as low as 58. Notified Denisse. No further orders were given. Will continue to monitor.

## 2018-03-25 NOTE — PT/OT/SLP PROGRESS
Physical Therapy Treatment    Patient Name:  Kristina Aguirre   MRN:  [id number]    Recommendations:     Discharge Recommendations:  rehabilitation facility   Discharge Equipment Recommendations:  (will determine DME needs closer to discharge)   Barriers to discharge: Inaccessible home and Decreased caregiver support pt lives alone and has 2 steps to entrance.     Assessment:     Kristina Aguirre is a 67 y.o. female admitted with a medical diagnosis of Severe aortic stenosis.  She presents with the following impairments/functional limitations:  weakness, impaired endurance, impaired functional mobilty, gait instability, impaired balance, decreased lower extremity function, decreased coordination, impaired cognition, decreased safety awareness pt patti treatment fair being able to sit on EOB and get into chair. Pt will benefit from skilled PT 4x/wk to progress physically and will need inpt rehab when medically stable to maximize rehab potential. Pt is s/p TAVR 3/22/18. .    Rehab Prognosis:  good; patient would benefit from acute skilled PT services to address these deficits and reach maximum level of function.      Recent Surgery: Procedure(s) (LRB):  STUDY-EP (N/A) 2 Days Post-Op    Plan:     During this hospitalization, patient to be seen 4 x/week to address the above listed problems via gait training, therapeutic activities, therapeutic exercises, neuromuscular re-education  · Plan of Care Expires:  04/21/18   Plan of Care Reviewed with: patient, friend    Subjective     Communicated with nurse prior to session.  Patient found supine upon PT entry to room, agreeable to treatment.      Chief Complaint: pt had no complaints during treatment.   Patient comments/goals: friend goals: pt to return to previous functional level.   Pain/Comfort:  · Pain Rating 1: 0/10  · Pain Rating Post-Intervention 1: 0/10    Patients cultural, spiritual, Yarsani conflicts given the current situation: none    Objective:     Patient found  with: telemetry, PureWick, oxygen, peripheral IV     General Precautions: Standard, fall   Orthopedic Precautions:    Braces:       Functional Mobility:  · Bed Mobility:   Pt needed verbal cues for hand placement and sequencing for functional mobility.   · Rolling Right: minimum assistance  · Supine to Sit: minimum assistance  ·   · Transfers:     · Sit to Stand:  minimum assistance with no AD  · Bed to Chair: moderate assistance with  no AD  using  Stand Pivot. Pt sat in chair 1.5 hours.  ·   · Balance: pt sat on EOB x 5 min with min assist for static sitting balance. pt had decreased safety awareness and decreased awareness of trunk in space.      AM-PAC 6 CLICK MOBILITY  Turning over in bed (including adjusting bedclothes, sheets and blankets)?: 3  Sitting down on and standing up from a chair with arms (e.g., wheelchair, bedside commode, etc.): 3  Moving from lying on back to sitting on the side of the bed?: 2  Moving to and from a bed to a chair (including a wheelchair)?: 2  Need to walk in hospital room?: 1  Climbing 3-5 steps with a railing?: 1  Total Score: 12         Patient left up in chair with all lines intact, call button in reach and friend present..    GOALS:    Physical Therapy Goals        Problem: Physical Therapy Goal    Goal Priority Disciplines Outcome Goal Variances Interventions   Physical Therapy Goal     PT/OT, PT      Description:  Goals to be met by: 18    Patient will increase functional independence with mobility by performin. Supine to sit with Modified [place] -not met  2. Sit to stand transfer with Modified [place] -not met  3. Gait  x 220 feet with Supervision-not met  4. Ascend/descend 2 stair with no Handrails Supervision . -not met                      Time Tracking:     PT Received On: 18  PT Start Time: 1006     PT Stop Time: 1024  PT Total Time (min): 18 min     Billable Minutes: Therapeutic Activity 18 min    Treatment Type: Treatment  PT/PTA: PT      PTA Visit Number: 0     Eli Rice, PT  03/25/2018

## 2018-03-25 NOTE — PROGRESS NOTES
Notified team that patient's blood pressure dropped after IV hydralazine and the map was in the 80s. New team stated they are going to reassess patient's BP and MAP goals. Also notified MD that patients BS was 157 and her sliding scale called for 2 units. Ordered to hold for now he will reassess BS management. Will continue to monitor.

## 2018-03-25 NOTE — PROGRESS NOTES
Notified MD Polk that patient is NPO but all medications are oral but patient is not able to swallow. Also notified MD that patient is hypertensive. Ordered to give 10mg of IV hydralazine and that she would look through patient's orders and adjust them.

## 2018-03-25 NOTE — PROGRESS NOTES
Notified MD Salcedo that patient had left sided weakness, facial droop along with confusion. MD stated that the left arm was from a torn rotator cuff. Stated that he will come to the bedside and assess the patient just to make sure she is still at baseline.

## 2018-03-25 NOTE — ASSESSMENT & PLAN NOTE
Stroke risk factor - but unlikely to have caused this event as pattern not consistent with thrombotic/a-a considering embolic multi-territorial stroke.   - As demonstrable by recent carotid US and CTA (mixed density atherosclerosis at both carotid bifurcations)

## 2018-03-25 NOTE — PROGRESS NOTES
Pt transferred from CT to . Pt transferred in bed. Pt denies any distress. PIV intact. Telemetry applied. JOSE Walters at bedside. Ordered MRI of head. Will continue to monitor.

## 2018-03-25 NOTE — HPI
"66 y/o female with medical history significant for type II DM, HTN, CAD, bilateral carotid stenosis, severe AS (s/p TAVR on 3/22/18). Stroke code was at 2004 at 3/24 for worsening RUE weakness, right-sided facial droop, and dysarthria. Right arm weakness and some leg weakness, sensory deficit on right and right hemianopsia. Staff nurse noted changes at change of shift on assessment around 1930. Stoke code called and upon examination right arm is flaccid but moving the right leg, decrease sensation right arm, facial droop and dysarthria, hemianopsia right. Not TPA candidate due to unknown LKN. CTA Multiphase done and no LVO seen but age indeterminate infarcts in the bilateral paramedian occipital lobes, and the left corona radiata. MRI demonstrable for significant "multiple abnormal regions of restricted diffusion throughout the bilateral cerebral and cerebellar hemispheres" concerning for embolic phenomenon. Discussed with Dr Salcedo. Risk factors are HTN, DM, HLP, TAVR.    Patient with TAVR on 3/22. Intra-operative ROSEY demonstrable for significant grade 4 atheroma on aorta. Patient was reportedly fine s/p procedure, but next morning 3/22, did complain of RUE weakness (which was attributed to known chronic rotator cuff injury) as well as right field deficits. This is documented in OT note dated that morning. Patient also with development with new LBBB s/p TAVR which was evaluated by arrhythmia team here who did not deem patient needing pacemaker at this time and recommend 30 day event monitor on discharge.   "

## 2018-03-25 NOTE — ASSESSMENT & PLAN NOTE
S/p transaxillary TAVR  Cardiology consulted for possible pacemaker placement    Neuro: d/c narcotic pain medication for possible confusion  CV: Cardiology consulted  ASA  plavix  Resp: wean supplemental oxygen  FEN/GI: cardiac diet  Renal: adequate UOP, BUN/Cr stable  Heme/ID: H/H stable    Plan is to transfer to CTSU

## 2018-03-25 NOTE — PROGRESS NOTES
Pt experiencing R sided weakness and R sided facial droop.Pt only oriented to self.  MD Denisse at bedside. Stroke code called. Stoke team at bedside.  Pt taken down for CT of Head STAT escorted by stroke team. Awaiting pt's return.

## 2018-03-25 NOTE — PLAN OF CARE
Problem: SLP Goal  Goal: SLP Goal  Speech Language Pathology Goals  Goals expected to be met by 4/1  1. Pt will participate in ongoing swallow assessment   2. Pt will participate in speech, language and cognitive assessment     Outcome: Ongoing (interventions implemented as appropriate)  Bedside Swallow Study initiated. Patient with decreased R labial/lingual coordination and overt S/S aspiration with trials presented. REC: NPO and ST to continue to follow to assess safety of swallow fx and complete further assessment of speech, language and cognition.  Findings reviewed with Pt, family friends at bedside, and Nurse. Thank you.    MARILOU Serna., [facility]-SLP  Speech-Language Pathology  Pager: [phone]  3/25/2018

## 2018-03-25 NOTE — PLAN OF CARE
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      3/22: Admit to SICU    Nursing:  - MAP 60-80  - Q1H accuchecks   Outcome: Ongoing (interventions implemented as appropriate)  Plan of care discussed with patient. Patient is free of fall/trauma/injury. Denies CP, SOB, or pain/discomfort. Patient disoriented to situation, time, place. Patient unable to tolerate oral intake and became NPO. BP being treated with medications, MAPs are not below 80.  All questions addressed. Will continue to monitor

## 2018-03-26 PROBLEM — I63.439: Status: ACTIVE | Noted: 2018-03-26

## 2018-03-26 PROBLEM — I63.419 CEREBRAL INFARCTION DUE TO EMBOLISM OF MIDDLE CEREBRAL ARTERY: Status: ACTIVE | Noted: 2018-03-26

## 2018-03-26 PROBLEM — I63.89 ACUTE ISCHEMIC MULTIFOCAL MULTIPLE VASCULAR TERRITORIES STROKE: Status: ACTIVE | Noted: 2018-03-26

## 2018-03-26 LAB
ALBUMIN SERPL BCP-MCNC: 3.1 G/DL
ALP SERPL-CCNC: 58 U/L
ALT SERPL W/O P-5'-P-CCNC: 7 U/L
ANION GAP SERPL CALC-SCNC: 11 MMOL/L
ANION GAP SERPL CALC-SCNC: 12 MMOL/L
AST SERPL-CCNC: 14 U/L
BASOPHILS # BLD AUTO: 0.08 K/UL
BASOPHILS NFR BLD: 0.9 %
BILIRUB SERPL-MCNC: 0.7 MG/DL
BUN SERPL-MCNC: 16 MG/DL
BUN SERPL-MCNC: 19 MG/DL
CALCIUM SERPL-MCNC: 9.4 MG/DL
CALCIUM SERPL-MCNC: 9.6 MG/DL
CHLORIDE SERPL-SCNC: 93 MMOL/L
CHLORIDE SERPL-SCNC: 98 MMOL/L
CK MB SERPL-MCNC: 1.8 NG/ML
CK MB SERPL-RTO: 2.8 %
CK SERPL-CCNC: 65 U/L
CK SERPL-CCNC: 65 U/L
CO2 SERPL-SCNC: 27 MMOL/L
CO2 SERPL-SCNC: 31 MMOL/L
CREAT SERPL-MCNC: 0.7 MG/DL
CREAT SERPL-MCNC: 0.8 MG/DL
DIFFERENTIAL METHOD: ABNORMAL
EOSINOPHIL # BLD AUTO: 0.3 K/UL
EOSINOPHIL NFR BLD: 3.6 %
ERYTHROCYTE [DISTWIDTH] IN BLOOD BY AUTOMATED COUNT: 18.6 %
EST. GFR  (AFRICAN AMERICAN): >60 ML/MIN/1.73 M^2
EST. GFR  (AFRICAN AMERICAN): >60 ML/MIN/1.73 M^2
EST. GFR  (NON AFRICAN AMERICAN): >60 ML/MIN/1.73 M^2
EST. GFR  (NON AFRICAN AMERICAN): >60 ML/MIN/1.73 M^2
GLUCOSE SERPL-MCNC: 114 MG/DL
GLUCOSE SERPL-MCNC: 191 MG/DL
HCT VFR BLD AUTO: 28.5 %
HGB BLD-MCNC: 8.8 G/DL
IMM GRANULOCYTES # BLD AUTO: 0.04 K/UL
IMM GRANULOCYTES NFR BLD AUTO: 0.4 %
LYMPHOCYTES # BLD AUTO: 1.6 K/UL
LYMPHOCYTES NFR BLD: 17.3 %
MAGNESIUM SERPL-MCNC: 1.8 MG/DL
MAGNESIUM SERPL-MCNC: 1.8 MG/DL
MCH RBC QN AUTO: 23.7 PG
MCHC RBC AUTO-ENTMCNC: 30.9 G/DL
MCV RBC AUTO: 77 FL
MONOCYTES # BLD AUTO: 0.6 K/UL
MONOCYTES NFR BLD: 7.2 %
NEUTROPHILS # BLD AUTO: 6.3 K/UL
NEUTROPHILS NFR BLD: 70.6 %
NRBC BLD-RTO: 0 /100 WBC
PHOSPHATE SERPL-MCNC: 3.8 MG/DL
PHOSPHATE SERPL-MCNC: 4.4 MG/DL
PLATELET # BLD AUTO: 374 K/UL
PMV BLD AUTO: 9.7 FL
POCT GLUCOSE: 114 MG/DL (ref 70–110)
POCT GLUCOSE: 117 MG/DL (ref 70–110)
POCT GLUCOSE: 88 MG/DL (ref 70–110)
POTASSIUM SERPL-SCNC: 3.1 MMOL/L
POTASSIUM SERPL-SCNC: 3.4 MMOL/L
PROT SERPL-MCNC: 6.6 G/DL
RBC # BLD AUTO: 3.72 M/UL
SODIUM SERPL-SCNC: 135 MMOL/L
SODIUM SERPL-SCNC: 137 MMOL/L
TROPONIN I SERPL DL<=0.01 NG/ML-MCNC: 0.69 NG/ML
WBC # BLD AUTO: 8.94 K/UL

## 2018-03-26 PROCEDURE — 93010 ELECTROCARDIOGRAM REPORT: CPT | Mod: ,,, | Performed by: INTERNAL MEDICINE

## 2018-03-26 PROCEDURE — 93005 ELECTROCARDIOGRAM TRACING: CPT

## 2018-03-26 PROCEDURE — 85025 COMPLETE CBC W/AUTO DIFF WBC: CPT

## 2018-03-26 PROCEDURE — 84100 ASSAY OF PHOSPHORUS: CPT

## 2018-03-26 PROCEDURE — 92526 ORAL FUNCTION THERAPY: CPT

## 2018-03-26 PROCEDURE — 84100 ASSAY OF PHOSPHORUS: CPT | Mod: 91

## 2018-03-26 PROCEDURE — 25000003 PHARM REV CODE 250: Performed by: NURSE PRACTITIONER

## 2018-03-26 PROCEDURE — 84484 ASSAY OF TROPONIN QUANT: CPT

## 2018-03-26 PROCEDURE — S0028 INJECTION, FAMOTIDINE, 20 MG: HCPCS | Performed by: THORACIC SURGERY (CARDIOTHORACIC VASCULAR SURGERY)

## 2018-03-26 PROCEDURE — 97530 THERAPEUTIC ACTIVITIES: CPT

## 2018-03-26 PROCEDURE — 25000003 PHARM REV CODE 250: Performed by: THORACIC SURGERY (CARDIOTHORACIC VASCULAR SURGERY)

## 2018-03-26 PROCEDURE — 25000003 PHARM REV CODE 250: Performed by: STUDENT IN AN ORGANIZED HEALTH CARE EDUCATION/TRAINING PROGRAM

## 2018-03-26 PROCEDURE — 20600001 HC STEP DOWN PRIVATE ROOM

## 2018-03-26 PROCEDURE — 80053 COMPREHEN METABOLIC PANEL: CPT

## 2018-03-26 PROCEDURE — 82550 ASSAY OF CK (CPK): CPT

## 2018-03-26 PROCEDURE — 83735 ASSAY OF MAGNESIUM: CPT

## 2018-03-26 PROCEDURE — 63600175 PHARM REV CODE 636 W HCPCS: Performed by: STUDENT IN AN ORGANIZED HEALTH CARE EDUCATION/TRAINING PROGRAM

## 2018-03-26 PROCEDURE — 83735 ASSAY OF MAGNESIUM: CPT | Mod: 91

## 2018-03-26 PROCEDURE — 97535 SELF CARE MNGMENT TRAINING: CPT

## 2018-03-26 PROCEDURE — 36415 COLL VENOUS BLD VENIPUNCTURE: CPT

## 2018-03-26 PROCEDURE — 97803 MED NUTRITION INDIV SUBSEQ: CPT | Performed by: DIETITIAN, REGISTERED

## 2018-03-26 PROCEDURE — 99222 1ST HOSP IP/OBS MODERATE 55: CPT | Mod: ,,, | Performed by: NURSE PRACTITIONER

## 2018-03-26 PROCEDURE — 99233 SBSQ HOSP IP/OBS HIGH 50: CPT | Mod: GC,,, | Performed by: PSYCHIATRY & NEUROLOGY

## 2018-03-26 PROCEDURE — 82553 CREATINE MB FRACTION: CPT

## 2018-03-26 PROCEDURE — 80048 BASIC METABOLIC PNL TOTAL CA: CPT

## 2018-03-26 RX ORDER — ASPIRIN 325 MG
325 TABLET ORAL DAILY
Status: DISCONTINUED | OUTPATIENT
Start: 2018-03-26 | End: 2018-03-27

## 2018-03-26 RX ORDER — POTASSIUM CHLORIDE 20 MEQ/15ML
40 SOLUTION ORAL 3 TIMES DAILY
Status: COMPLETED | OUTPATIENT
Start: 2018-03-26 | End: 2018-03-27

## 2018-03-26 RX ORDER — AMIODARONE HYDROCHLORIDE 200 MG/1
400 TABLET ORAL 2 TIMES DAILY
Status: DISCONTINUED | OUTPATIENT
Start: 2018-03-26 | End: 2018-04-02 | Stop reason: HOSPADM

## 2018-03-26 RX ORDER — LANOLIN ALCOHOL/MO/W.PET/CERES
400 CREAM (GRAM) TOPICAL 2 TIMES DAILY
Status: DISCONTINUED | OUTPATIENT
Start: 2018-03-26 | End: 2018-03-26

## 2018-03-26 RX ORDER — MAGNESIUM SULFATE/D5W 2 G/50 ML
2 INTRAVENOUS SOLUTION, PIGGYBACK (ML) INTRAVENOUS ONCE
Status: DISCONTINUED | OUTPATIENT
Start: 2018-03-26 | End: 2018-03-26

## 2018-03-26 RX ADMIN — CLOPIDOGREL 75 MG: 75 TABLET, FILM COATED ORAL at 10:03

## 2018-03-26 RX ADMIN — FAMOTIDINE 20 MG: 10 INJECTION, SOLUTION INTRAVENOUS at 10:03

## 2018-03-26 RX ADMIN — CARVEDILOL 3.12 MG: 3.12 TABLET, FILM COATED ORAL at 10:03

## 2018-03-26 RX ADMIN — POLYETHYLENE GLYCOL 3350 17 G: 17 POWDER, FOR SOLUTION ORAL at 10:03

## 2018-03-26 RX ADMIN — AMIODARONE HYDROCHLORIDE 0.5 MG/MIN: 1.8 INJECTION, SOLUTION INTRAVENOUS at 12:03

## 2018-03-26 RX ADMIN — ENOXAPARIN SODIUM 40 MG: 100 INJECTION SUBCUTANEOUS at 05:03

## 2018-03-26 RX ADMIN — ASPIRIN 325 MG ORAL TABLET 325 MG: 325 PILL ORAL at 10:03

## 2018-03-26 RX ADMIN — AMLODIPINE BESYLATE 10 MG: 10 TABLET ORAL at 10:03

## 2018-03-26 RX ADMIN — MUPIROCIN 1 G: 20 OINTMENT TOPICAL at 08:03

## 2018-03-26 RX ADMIN — MAGNESIUM OXIDE TAB 400 MG (241.3 MG ELEMENTAL MG) 400 MG: 400 (241.3 MG) TAB at 10:03

## 2018-03-26 RX ADMIN — Medication 500 MG: at 10:03

## 2018-03-26 RX ADMIN — FUROSEMIDE 40 MG: 10 INJECTION, SOLUTION INTRAMUSCULAR; INTRAVENOUS at 05:03

## 2018-03-26 RX ADMIN — CHLORTHALIDONE 25 MG: 25 TABLET ORAL at 10:03

## 2018-03-26 RX ADMIN — MUPIROCIN 1 G: 20 OINTMENT TOPICAL at 10:03

## 2018-03-26 RX ADMIN — Medication 500 MG: at 08:03

## 2018-03-26 RX ADMIN — ATORVASTATIN CALCIUM 40 MG: 20 TABLET, FILM COATED ORAL at 10:03

## 2018-03-26 RX ADMIN — FUROSEMIDE 40 MG: 10 INJECTION, SOLUTION INTRAMUSCULAR; INTRAVENOUS at 10:03

## 2018-03-26 RX ADMIN — FLUTICASONE FUROATE AND VILANTEROL TRIFENATATE 1 PUFF: 100; 25 POWDER RESPIRATORY (INHALATION) at 10:03

## 2018-03-26 RX ADMIN — POTASSIUM CHLORIDE 40 MEQ: 20 SOLUTION ORAL at 10:03

## 2018-03-26 RX ADMIN — FAMOTIDINE 20 MG: 10 INJECTION, SOLUTION INTRAVENOUS at 08:03

## 2018-03-26 RX ADMIN — AMIODARONE HYDROCHLORIDE 400 MG: 200 TABLET ORAL at 08:03

## 2018-03-26 NOTE — CONSULTS
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  Patient followed by CTS for severe aortic stenosis and admitted to [facility] on 3/22/18 for surgical intervention.  Now, s/p L transaxillary transcatheter aortic valve replacement with a 26 mm evolut valve (TAVR) on 3/22/18.  Post-op course complicated by stroke (Stroke code called on 3/24 for worsening RUE weakness, R facial droop, dysarthria, and aphasia.  CTA without LVO.  Not tPA or IR candidate.  MRI showed multiple bilateral R>L acute infarcts on cerebral and cerebellar hemispheres highly suggestive of embolic phenomenon.) and a-fib with RVR requiring amiodarone load and infusion.  ROSEY revealed grade 4 atheroma on aorta.  Stroke etiology likely iatrogenic 2/2 TAVR as pattern is concerning for embolic phenomena.    Functional History: Patient lives in [place], alone (with her dog), in a mobile home with 2 steps to enter.  Prior to admission, she was (I) with ADLs, including driving, and mobility.  She is right handed.  DME: none.    Hospital Course: 3/23/18:  Evaluated by PT and OT.  Sit to stand Mya.  No further transfers or gait.  UBD maxA and LBD totalA.  Grooming modA.  Toileting totalA.  Feeding Mya.    3/25/18:  Participated with PT.  Evaluated by SLP.  Found to have dysarthria and dysphagia.  SLP recommendation: NPO.  Bed mobility Mya.  Sit to stand Mya and transfers modA.  No gait.  EOB x 5 minutes Mya.  3/26/18:  Participated with SLP.  SLP recommendation: dental soft diet and thin liquids.    Past Medical History:   Diagnosis Date    Abdominal aortic aneurysm (AAA) without rupture 9/12/2017    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 3/26/2018    Arthritis     Asthma     Atrial fibrillation 3/25/2018    Bilateral carotid artery stenosis 9/12/2017    Cancer     lung    COPD (chronic obstructive pulmonary disease)     Coronary artery disease of native artery with stable angina pectoris 9/29/2017    Diabetes mellitus     Gastroesophageal reflux disease without esophagitis 9/12/2017    Heart valve disorder     Hyperlipidemia     Hypertension     Long term current use of antithrombotics/antiplatelets     S/p TAVR (transcatheter aortic valve replacement), bioprosthetic 3/22/2018    Severe aortic stenosis 9/12/2017     Past Surgical History:   Procedure Laterality Date    HAND SURGERY Right     KNEE ARTHROSCOPY      LUNG LOBECTOMY Right     middle lobe    mediastinoscopy      SPINAL FUSION      TONSILLECTOMY      WRIST SURGERY Right      Review of patient's allergies indicates:  No Known Allergies    Scheduled Medications:    amLODIPine  10 mg Oral Daily    ascorbic acid (vitamin C)  500 mg Oral BID    aspirin  325 mg Oral Daily    atorvastatin  40 mg Oral Daily    carvedilol  3.125 mg Oral Daily    chlorthalidone  25 mg Oral QAM    clopidogrel  75 mg Oral Daily    enoxaparin  40 mg Subcutaneous Daily    famotidine (PF)  20 mg Intravenous BID    fluticasone-vilanterol  1 puff Inhalation Daily    furosemide  40 mg Intravenous BID    mupirocin  1 g Nasal BID    mupirocin  1 g Nasal BID    polyethylene glycol  17 g Oral Daily       PRN Medications:  acetaminophen, albuterol-ipratropium 2.5mg-0.5mg/3mL, bisacodyl, dextrose 50%, dextrose 50%, glucagon (human recombinant), glucose, glucose, hydrALAZINE, insulin aspart U-100, morphine, ondansetron, promethazine (PHENERGAN) IVPB, sodium chloride 0.9%    Family History     Problem Relation (Age of Onset)    Heart attack Father    No Known Problems Sister, Brother, Maternal Aunt, Maternal Uncle, Paternal Aunt, Paternal Uncle, Maternal Grandmother, Maternal Grandfather, Paternal Grandmother, Paternal Grandfather    Throat cancer Mother        Social History Main Topics    Smoking status: Former Smoker     Packs/day: 1.50     Types: Vaping w/o nicotine    Smokeless tobacco: Never Used    Alcohol use No    Drug use: No    Sexual activity: Not Currently     Review of Systems   Constitutional: Positive for activity change. Negative for chills, fatigue and fever.   HENT: Negative for drooling, hearing loss, trouble swallowing and voice change.    Eyes: Positive for visual disturbance. Negative for pain.   Respiratory: Negative for cough, shortness of breath and wheezing.    Cardiovascular: Negative for chest pain and palpitations.   Gastrointestinal: Negative for abdominal distention, nausea and vomiting.   Genitourinary: Negative for difficulty urinating and flank pain.   Musculoskeletal: Positive for gait problem. Negative for arthralgias, back pain, myalgias and neck pain.   Skin: Negative for rash and wound.   Neurological: Positive for facial asymmetry, speech difficulty and weakness. Negative for dizziness, numbness and headaches.   Psychiatric/Behavioral: Negative for agitation and hallucinations. The patient is not nervous/anxious.      Objective:     Vital Signs (Most Recent):  Temp: 98.3 °F (36.8 °C) (03/26/18 0752)  Pulse: 66 (03/26/18 1017)  Resp: 16 (03/26/18 1017)  BP: (!) 184/77 (03/26/18 0752)  SpO2: 95 % (03/26/18 0752)    Vital Signs (24h Range):  Temp:  [98.1 °F (36.7 °C)-99 °F (37.2 °C)] 98.3 °F  (36.8 °C)  Pulse:  [66-74] 66  Resp:  [16-18] 16  SpO2:  [92 %-96 %] 95 %  BP: (125-184)/(59-77) 184/77     Body mass index is 28.19 kg/m².    Physical Exam   Constitutional: She is oriented to person, place, and time. She appears well-developed and well-nourished. No distress.   HENT:   Head: Normocephalic and atraumatic.   Right Ear: External ear normal.   Left Ear: External ear normal.   Nose: Nose normal.   Eyes: Right eye exhibits no discharge. Left eye exhibits no discharge. No scleral icterus.   Neck: Normal range of motion.   Cardiovascular: Normal rate, regular rhythm and intact distal pulses.    Pulmonary/Chest: Effort normal. No respiratory distress. She has no wheezes.   Abdominal: Soft. She exhibits no distension. There is no tenderness.   Musculoskeletal: Normal range of motion. She exhibits no edema or tenderness.   Neurological: She is alert and oriented to person, place, and time.   -  Mental Status:  AAOx3.  Follows commands.  Answers correct age and .  Recent and remote memory intact.  Recalls 3/3 objects with assist.  Names 4/4 seasons.  Right neglect.    -  Speech and language:  aphasia and dysarthria.    -  Vision:  R hemianopsia.  No ptosis.    -  Facial movement (CN VII): Mild facial droop.   -  Motor:  RUE: 0/5.  LUE: 5/5.  RLE: 4-/5.  LLE: 5/5.  -  Tone:  Decreased RUE.  -  Sensory:  Intact to light touch and pin prick.   Skin: Skin is warm and dry. No rash noted.   Psychiatric: She has a normal mood and affect. Her behavior is normal. Thought content normal. Her speech is slurred. Cognition and memory are impaired.   Vitals reviewed.    Diagnostic Results:   Labs: Reviewed  X-Ray: Reviewed  CT: Reviewed  MRI: Reviewed  CTA: Reviewed    Assessment/Plan:     * 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    -  Stroke code called on 3/24 for worsening RUE weakness, R facial droop, dysarthria, and aphasia    -  CTA without LVO  -  Not tPA or IR candidate  -  MRI showed  multiple bilateral R>L acute infarcts on cerebral and cerebellar hemispheres highly suggestive of embolic phenomenon  -  ROSEY revealed grade 4 atheroma on aorta  -  Stroke etiology likely iatrogenic 2/2 TAVR as pattern is concerning for embolic phenomena  See hospital course for functional, cognitive/speech/language, and nutrition/swallow status.      Recommendations  -  Encourage mobility, OOB in chair, and early ambulation as appropriate   -  PT/OT evaluate and treat  -  SLP speech and cognitive evaluate and treat  -  Monitor mood and sleep disturbances  -  Establish consistent sleep-wake cycle  -  Monitor for bowel and bladder dysfunction  -  Monitor for shoulder pain and subluxation  -  Monitor for spasticity  -  Monitor for and prevent skin breakdown and pressure ulcers  · Early mobility, repositioning/weight shifting every 20-30 minutes when sitting, turn patient every 2 hours, proper mattress/overlay and chair cushioning, pressure relief/heel protector boots  -  DVT prophylaxis:  On Lovenox        Atrial fibrillation    -  A-fib with RVR on 3/24  -  Treated with amio load and gtt  -  Risk factor for stroke; however, VN does not believe etiology of recent stroke        S/p TAVR (transcatheter aortic valve replacement), bioprosthetic    -  2/2 severe aortic stenosis        Severe aortic stenosis    -  Admitted for severe aortic stenosis  -  S/p L transaxillary transcatheter aortic valve replacement with a 26 mm evolut valve (TAVR) on 3/22/18        Good inpatient rehab candidate.  Not medically ready for discharge at this time.  Will follow progress and discuss with rehab team for final rehab recommendation.    Thank you for your consult.     ELIER Sales  Department of Physical Medicine & Rehab  [facility]

## 2018-03-26 NOTE — ASSESSMENT & PLAN NOTE
--Continue ASA (would down grade to 81), BB, statin, Plavix for FRANCY  --may start ace-inhibitor if warranted  --PT/OT  --Ambulate x4   --Wean O2 as tolerated for O2 sat >92%  --monitor CXR   --Monitor electrolytes and replace prn  --Lasix 40mg BID

## 2018-03-26 NOTE — ASSESSMENT & PLAN NOTE
Stroke risk factor  SBP <180, but MAP < 80 s/p TAVR.    - Continue amlodipine 10 mg PO daily, carvedilol 3.125 mg PO BID, chlorthalidone 25 mg PO daily, and hydralazine IV PRN.

## 2018-03-26 NOTE — PLAN OF CARE
Problem: Patient Care Overview  Goal: Plan of Care Review    Recommendations     Recommendation/Intervention:   1. Continue current dysphagia soft, cardiac diet.   2. If intake consistently <50% of meals, order Boost Plus TID.   3. RD following.     Goals: Patient to receive nutrition by RD follow-up  Nutrition Goal Status: goal met

## 2018-03-26 NOTE — SUBJECTIVE & OBJECTIVE
Past Medical History:   Diagnosis Date    Abdominal aortic aneurysm (AAA) without rupture 9/12/2017    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 3/26/2018    Arthritis     Asthma     Atrial fibrillation 3/25/2018    Bilateral carotid artery stenosis 9/12/2017    Cancer     lung    COPD (chronic obstructive pulmonary disease)     Coronary artery disease of native artery with stable angina pectoris 9/29/2017    Diabetes mellitus     Gastroesophageal reflux disease without esophagitis 9/12/2017    Heart valve disorder     Hyperlipidemia     Hypertension     Long term current use of antithrombotics/antiplatelets     S/p TAVR (transcatheter aortic valve replacement), bioprosthetic 3/22/2018    Severe aortic stenosis 9/12/2017     Past Surgical History:   Procedure Laterality Date    HAND SURGERY Right     KNEE ARTHROSCOPY      LUNG LOBECTOMY Right     middle lobe    mediastinoscopy      SPINAL FUSION      TONSILLECTOMY      WRIST SURGERY Right      Review of patient's allergies indicates:  No Known Allergies    Scheduled Medications:    amLODIPine  10 mg Oral Daily    ascorbic acid (vitamin C)  500 mg Oral BID    aspirin  325 mg Oral Daily    atorvastatin  40 mg Oral Daily    carvedilol  3.125 mg Oral Daily    chlorthalidone  25 mg Oral QAM    clopidogrel  75 mg Oral Daily    enoxaparin  40 mg Subcutaneous Daily    famotidine (PF)  20 mg Intravenous BID    fluticasone-vilanterol  1 puff Inhalation Daily    furosemide  40 mg Intravenous BID    mupirocin  1 g Nasal BID    mupirocin  1 g Nasal BID    polyethylene glycol  17 g Oral Daily       PRN Medications: acetaminophen, albuterol-ipratropium 2.5mg-0.5mg/3mL, bisacodyl, dextrose 50%, dextrose 50%, glucagon (human recombinant), glucose, glucose, hydrALAZINE, insulin aspart U-100, morphine, ondansetron, promethazine (PHENERGAN) IVPB, sodium chloride 0.9%    Family History     Problem Relation (Age of Onset)     Heart attack Father    No Known Problems Sister, Brother, Maternal Aunt, Maternal Uncle, Paternal Aunt, Paternal Uncle, Maternal Grandmother, Maternal Grandfather, Paternal Grandmother, Paternal Grandfather    Throat cancer Mother        Social History Main Topics    Smoking status: Former Smoker     Packs/day: 1.50     Types: Vaping w/o nicotine    Smokeless tobacco: Never Used    Alcohol use No    Drug use: No    Sexual activity: Not Currently     Review of Systems   Constitutional: Positive for activity change. Negative for chills, fatigue and fever.   HENT: Negative for drooling, hearing loss, trouble swallowing and voice change.    Eyes: Positive for visual disturbance. Negative for pain.   Respiratory: Negative for cough, shortness of breath and wheezing.    Cardiovascular: Negative for chest pain and palpitations.   Gastrointestinal: Negative for abdominal distention, nausea and vomiting.   Genitourinary: Negative for difficulty urinating and flank pain.   Musculoskeletal: Positive for gait problem. Negative for arthralgias, back pain, myalgias and neck pain.   Skin: Negative for rash and wound.   Neurological: Positive for facial asymmetry, speech difficulty and weakness. Negative for dizziness, numbness and headaches.   Psychiatric/Behavioral: Negative for agitation and hallucinations. The patient is not nervous/anxious.      Objective:     Vital Signs (Most Recent):  Temp: 98.3 °F (36.8 °C) (03/26/18 0752)  Pulse: 66 (03/26/18 1017)  Resp: 16 (03/26/18 1017)  BP: (!) 184/77 (03/26/18 0752)  SpO2: 95 % (03/26/18 0752)    Vital Signs (24h Range):  Temp:  [98.1 °F (36.7 °C)-99 °F (37.2 °C)] 98.3 °F (36.8 °C)  Pulse:  [66-74] 66  Resp:  [16-18] 16  SpO2:  [92 %-96 %] 95 %  BP: (125-184)/(59-77) 184/77     Body mass index is 28.19 kg/m².    Physical Exam   Constitutional: She is oriented to person, place, and time. She appears well-developed and well-nourished. No distress.   HENT:   Head: Normocephalic  and atraumatic.   Right Ear: External ear normal.   Left Ear: External ear normal.   Nose: Nose normal.   Eyes: Right eye exhibits no discharge. Left eye exhibits no discharge. No scleral icterus.   Neck: Normal range of motion.   Cardiovascular: Normal rate, regular rhythm and intact distal pulses.    Pulmonary/Chest: Effort normal. No respiratory distress. She has no wheezes.   Abdominal: Soft. She exhibits no distension. There is no tenderness.   Musculoskeletal: Normal range of motion. She exhibits no edema or tenderness.   Neurological: She is alert and oriented to person, place, and time.   -  Mental Status:  AAOx3.  Follows commands.  Answers correct age and .  Recent and remote memory intact.  Recalls 3/3 objects with assist.  Names 4/4 seasons.  Right neglect.    -  Speech and language:  aphasia and dysarthria.    -  Vision:  R hemianopsia.  No ptosis.    -  Facial movement (CN VII): Mild facial droop.   -  Motor:  RUE: 0/5.  LUE: 5/5.  RLE: 4-/5.  LLE: 5/5.  -  Tone:  Decreased RUE.  -  Sensory:  Intact to light touch and pin prick.   Skin: Skin is warm and dry. No rash noted.   Psychiatric: She has a normal mood and affect. Her behavior is normal. Thought content normal. Her speech is slurred. Cognition and memory are impaired.   Vitals reviewed.    NEUROLOGICAL EXAMINATION:     MENTAL STATUS   Oriented to person, place, and time.   Speech: slurred       Diagnostic Results:   Labs: Reviewed  X-Ray: Reviewed  CT: Reviewed  MRI: Reviewed  CTA: Reviewed

## 2018-03-26 NOTE — ASSESSMENT & PLAN NOTE
Contributing Nutrition Diagnosis  Increased nutrient needs    Related to (etiology):   Physiological causes    Signs and Symptoms (as evidenced by):   S/p TAVR    Nutrition Diagnosis Status:   Continues

## 2018-03-26 NOTE — SUBJECTIVE & OBJECTIVE
Neurologic Chief Complaint: Left PCA / MCA embolic stroke     Subjective:     Interval History: NAEON. No concerns for worsening NIHSS     HPI, Past Medical, Family, and Social History remains the same as documented in the initial encounter.     Review of Systems   Constitutional: Negative for chills and fever.   HENT: Negative for ear discharge and ear pain.    Eyes: Negative for pain and itching.   Respiratory: Negative for chest tightness and shortness of breath.    Cardiovascular: Negative for chest pain and leg swelling.   Gastrointestinal: Negative for abdominal distention and abdominal pain.   Genitourinary: Negative for difficulty urinating and dysuria.   Musculoskeletal: Positive for arthralgias.   Neurological: Positive for facial asymmetry, speech difficulty and weakness.   Psychiatric/Behavioral: Negative for confusion.     Scheduled Meds:   amLODIPine  10 mg Oral Daily    ascorbic acid (vitamin C)  500 mg Oral BID    aspirin  325 mg Oral Daily    atorvastatin  40 mg Oral Daily    carvedilol  3.125 mg Oral Daily    chlorthalidone  25 mg Oral QAM    clopidogrel  75 mg Oral Daily    enoxaparin  40 mg Subcutaneous Daily    famotidine (PF)  20 mg Intravenous BID    fluticasone-vilanterol  1 puff Inhalation Daily    furosemide  40 mg Intravenous BID    mupirocin  1 g Nasal BID    mupirocin  1 g Nasal BID    polyethylene glycol  17 g Oral Daily     Continuous Infusions:   amiodarone in dextrose 5% 0.5 mg/min (03/26/18 1255)    sodium chloride 0.9%       PRN Meds:acetaminophen, albuterol-ipratropium 2.5mg-0.5mg/3mL, bisacodyl, dextrose 50%, dextrose 50%, glucagon (human recombinant), glucose, glucose, hydrALAZINE, insulin aspart U-100, morphine, ondansetron, promethazine (PHENERGAN) IVPB, sodium chloride 0.9%    Objective:     Vital Signs (Most Recent):  Temp: 98.2 °F (36.8 °C) (03/26/18 1151)  Pulse: (!) 59 (03/26/18 1151)  Resp: 18 (03/26/18 1151)  BP: 135/60 (03/26/18 1151)  SpO2: 96 %  (03/26/18 1151)  BP Location: Right arm    Vital Signs Range (Last 24H):  Temp:  [98.2 °F (36.8 °C)-99 °F (37.2 °C)]   Pulse:  [59-74]   Resp:  [16-18]   BP: (125-184)/(59-77)   SpO2:  [93 %-96 %]   BP Location: Right arm    Physical Exam   Constitutional: She appears well-developed and well-nourished.   HENT:   Head: Normocephalic and atraumatic.   Eyes: EOM are normal. Pupils are equal, round, and reactive to light.   Neck: Normal range of motion.   Cardiovascular: Normal rate.    Pulmonary/Chest: Effort normal.   Abdominal: Soft.   Neurological: She is alert.   Right facial droop, sided weakness, sensory deficit, hemianopsia, dysarthria   Skin: Skin is warm and dry.   Nursing note and vitals reviewed.      Neurological Exam:   LOC: alert  Attention Span: Good   Language: No aphasia  Articulation: No dysarthria  Orientation: Person, Place, Time   Visual Fields: Hemianopsia right  EOM (CN III, IV, VI): Full/intact  Pupils (CN II, III): PERRL  Facial Sensation (CN V): Normal  Facial Movement (CN VII): Symmetric facial expression    Motor: Arm left  Normal 4+/5  Leg left  Normal 4+/5  Arm right  Normal 1/5  Leg right Normal 4+/5  Cebellar: No evidence of appendicular or axial ataxia  Sensation: Intact to light touch, temperature and vibration  Tone: Normal tone throughout    Laboratory:  CMP:   Recent Labs  Lab 03/26/18  0350   CALCIUM 9.4   ALBUMIN 3.1*   PROT 6.6      K 3.4*   CO2 27   CL 98   BUN 16   CREATININE 0.7   ALKPHOS 58   ALT 7*   AST 14   BILITOT 0.7     BMP:   Recent Labs  Lab 03/26/18  0350      K 3.4*   CL 98   CO2 27   BUN 16   CREATININE 0.7   CALCIUM 9.4     CBC:   Recent Labs  Lab 03/26/18  0350   WBC 8.94   RBC 3.72*   HGB 8.8*   HCT 28.5*   *   MCV 77*   MCH 23.7*   MCHC 30.9*     Lipid Panel:   Recent Labs  Lab 03/25/18  0538   CHOL 150   LDLCALC 80.2   HDL 42   TRIG 139     Coagulation:   Recent Labs  Lab 03/23/18  0331 03/23/18  0340   INR 1.0  --    APTT  --  22.1      Platelet Aggregation Study: No results for input(s): PLTAGG, PLTAGINTERP, PLTAGREGLACO, ADPPLTAGGREG in the last 168 hours.  Hgb A1C:   Recent Labs  Lab 03/21/18  1003   HGBA1C 6.5*     TSH:   Recent Labs  Lab 03/25/18  0538   TSH 1.221       Diagnostic Results     Brain Imaging   MRI: 1.  Multiple abnormal regions of restricted diffusion throughout the bilateral cerebral and cerebellar hemispheres in keeping with acute infarcts as discussed above.  Given the multiplicity and distribution, findings are suggestive of embolic phenomena.    2.  Scattered foci of supratentorial periventricular T2/FLAIR hyperintensity suggestive of chronic microvascular ischemic change.    Vessel Imaging   CTA:   Aorta: Normal 3 vessel arch.  Significant atherosclerotic calcification.    Extracranial carotid circulation: Mixed density atherosclerosis at both carotid bifurcations.  No hemodynamically significant stenosis, aneurysmal dilatation, or dissection.    Focal narrowing of the mid V4 segment of the right vertebral artery; not hemodynamically significant.  Punctate calcifications at the P3 segment of the right posterior cerebral artery, and P4 segment of the left posterior cerebral artery may represent calcified thrombi noting that the distal vessels appear patent.  No focal high-grade stenosis, occlusion, or aneurysm    Cardiac Imaging     CONCLUSIONS     1 - Low normal to mildly depressed left ventricular systolic function (EF 50-55%).     2 - Mild mitral regurgitation.     3 - Severe aortic stenosis.     4 - Grade 4 atheroma disease of aorta.     5 - Successful implantation of a 26mm Evolut R CoreValve into the aortic position with no evidence of regurgitation.

## 2018-03-26 NOTE — ASSESSMENT & PLAN NOTE
68 y/o F with medical history significant for CAD, PAD, type II DM, HTN, and severe AS s/p TAVR on 3/22. Stroke called on 3/24 at 2004 for worsening RUE weakness, right facial droop, dysarthria and aphasia.     - CTA without LVO and MRI with multiple bilateral (R>L) acute infarcts on cerebral and cerebellar hemispheres highly suggestive of embolic phenomenon. No tpa as outside window.   - Documented right field deficits and worsening RUE weakness <24h s/p TAVR. ROSEY during TAVR demonstrable for grade 4 atheroma on aorta. Suspect iatrogenic from TAVR as pattern is concerning for embolic phenomena.    - Improvement in NIHSS / RUE weakness with sensory affect / with no concerns for aphasia.      Plan:   Antithrombotics for secondary stroke prevention: Antiplatelets: Aspirin: 81 mg daily  Clopidogrel: 75 mg daily / Needs consideration for AC post acute stroke phase for recordings of new onset Afib     Statins for secondary stroke prevention and hyperlipidemia, if present:   Statins: Atorvastatin- 40 mg daily    Aggressive risk factor modification: HTN, Smoking, DM, CAD, bilateral carotid stenosis, s/p TAVR     Rehab efforts: PT/OT/SLP to evaluate and treat, PM&R consult     Diagnostics ordered/pending: None     VTE prophylaxis: Enoxaparin 40 mg SQ every 24 hours    BP parameters: SBP < 180

## 2018-03-26 NOTE — PROGRESS NOTES
"[facility]  Interventional Cardiology  Progress Note    Patient Name: Kristina Aguirre  MRN: [id number]  Admission Date: 3/22/2018  Hospital Length of Stay: 4 days  Code Status: Full Code   Attending Physician: Eric Obregon MD   Primary Care Physician: Robert Matston MD  Principal Problem: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    Subjective:     Interval History: Acute CVA over the weekend -- MRI shows "multiple abnormal regions of restricted diffusion throughout the bilateral cerebral and cerebellar hemispheres in keeping with acute infarcts". She has profound right-sided UE weakness and R visual field deficits.   She also developed new-onset a-fib with RVR yesterday. She is currently in SR on Amio gtt.     Objective:     Vital Signs (Most Recent):  Temp: 98.5 °F (36.9 °C) (03/26/18 1546)  Pulse: 62 (03/26/18 1600)  Resp: 18 (03/26/18 1546)  BP: (!) 151/60 (03/26/18 1546)  SpO2: 97 % (03/26/18 1546) Vital Signs (24h Range):  Temp:  [98.2 °F (36.8 °C)-98.7 °F (37.1 °C)] 98.5 °F (36.9 °C)  Pulse:  [59-74] 62  Resp:  [16-18] 18  SpO2:  [94 %-97 %] 97 %  BP: (125-184)/(59-77) 151/60     Weight: 69.9 kg (154 lb 1.6 oz)  Body mass index is 28.19 kg/m².    SpO2: 97 %  O2 Device (Oxygen Therapy): nasal cannula      Intake/Output Summary (Last 24 hours) at 03/26/18 1706  Last data filed at 03/26/18 1300   Gross per 24 hour   Intake              240 ml   Output              450 ml   Net             -210 ml       Lines/Drains/Airways     Peripheral Intravenous Line                 Peripheral IV - Single Lumen 03/22/18 0815 Left Antecubital 4 days         Peripheral IV - Single Lumen 03/25/18 0152 Left Forearm 1 day                Physical Exam   Constitutional: She is oriented to person, place, and time. She appears well-developed and well-nourished. No distress.   HENT:   Head: Normocephalic and atraumatic.   Mouth/Throat: Oropharynx is clear and moist.   Eyes: Conjunctivae and " EOM are normal. Pupils are equal, round, and reactive to light. No scleral icterus.   Neck: Neck supple. No JVD present. No tracheal deviation present.   Cardiovascular: Normal rate and regular rhythm.  Exam reveals no gallop and no friction rub.    Murmur heard.  Pulmonary/Chest: Effort normal and breath sounds normal. No respiratory distress. She has no wheezes. She has no rales. She exhibits no tenderness.   Abdominal: Soft. Bowel sounds are normal. She exhibits no distension. There is no hepatosplenomegaly. There is no tenderness.   Musculoskeletal: She exhibits no edema or tenderness.   Neurological: She is alert and oriented to person, place, and time.   RUE weakness - 1/5   Skin: Skin is warm and dry. No rash noted. No erythema.   Psychiatric: She has a normal mood and affect. Her behavior is normal.       Significant Labs:   CMP   Recent Labs  Lab 03/25/18  0538 03/26/18  0350    137   K 3.7 3.4*    98   CO2 25 27   * 114*   BUN 12 16   CREATININE 0.7 0.7   CALCIUM 9.3 9.4   PROT  --  6.6   ALBUMIN  --  3.1*   BILITOT  --  0.7   ALKPHOS  --  58   AST  --  14   ALT  --  7*   ANIONGAP 11 12   ESTGFRAFRICA >60.0 >60.0   EGFRNONAA >60.0 >60.0    and CBC   Recent Labs  Lab 03/25/18  0538 03/26/18  0350   WBC 9.73 8.94   HGB 8.2*  8.2* 8.8*   HCT 26.7*  26.7* 28.5*    374*     Assessment and Plan:     Patient is a 67 y.o. female presenting with:    * 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    S/p acute CVA on 3/25. MRI shows multiple bilateral (R>L) acute infarcts on cerebral and cerebellar hemispheres highly suggestive of embolic phenomenon.  Residual R UE weakness, mild dysarthria, and R visual field deficits. No aphasia.   Now on Vascular Neurology's service.         Atrial fibrillation    New onset yesterday (3/25)  Currently in SR.   On Amio gtt with Lovenox for AC.         Acute on chronic diastolic heart failure    On Lasix 40mg BID.   Appears euvolemic  currently.         PAD (peripheral artery disease)    Significant iliofemoral dz on angiogram   CTA with aortoiliac atherosclerosis         Diabetes mellitus, type 2    On SSI        Malignant neoplasm of right lung    s/p RML lobectomy, path showed keratinizing squamous cell CA with. Contacted oncologist who reported disease on remission, last PET scan negative.        Coronary artery disease of native artery with stable angina pectoris    PCI with FRANCY to RCA , mid LAD 40% stenosis (12/4/17) .   On ASA and Plavix -- must continue Plavix until December 2018 for FRANCY.         Severe aortic stenosis    S/p 26mm Evolut TAVR via TAx access  On ASA.         Essential hypertension    Controlled on amlodipine, losartan-HCTZ        Dyslipidemia    On pravachol 40            VTE Risk Mitigation         Ordered     enoxaparin injection 40 mg  Daily     Route:  Subcutaneous        03/25/18 1641     IP VTE HIGH RISK PATIENT  Once      03/22/18 1150     Reason for No Pharmacological VTE Prophylaxis  Once      03/22/18 1150          Nataly Olivares PA-C  Interventional Cardiology  [facility]  [phone]

## 2018-03-26 NOTE — PROGRESS NOTES
[facility]  Cardiothoracic Surgery  Progress Note    Patient Name: Kristina Aguirre  MRN: [id number]  Admission Date: 3/22/2018  Hospital Length of Stay: 4 days  Code Status: Full Code   Attending Physician: Eric Obregon MD   Referring Provider: Robert Mattson MD  Principal Problem: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    Subjective:     Post-Op Info:  Procedure(s) (LRB):  STUDY-EP (N/A)   3 Days Post-Op     Interval History: No acute events overnight. Pt suffered a fall yesterday. NSR on monitor.      Medications:  Continuous Infusions:   amiodarone in dextrose 5% 0.5 mg/min (03/26/18 1255)    sodium chloride 0.9%       Scheduled Meds:   amLODIPine  10 mg Oral Daily    ascorbic acid (vitamin C)  500 mg Oral BID    aspirin  325 mg Oral Daily    atorvastatin  40 mg Oral Daily    carvedilol  3.125 mg Oral Daily    chlorthalidone  25 mg Oral QAM    clopidogrel  75 mg Oral Daily    enoxaparin  40 mg Subcutaneous Daily    famotidine (PF)  20 mg Intravenous BID    fluticasone-vilanterol  1 puff Inhalation Daily    furosemide  40 mg Intravenous BID    mupirocin  1 g Nasal BID    mupirocin  1 g Nasal BID    polyethylene glycol  17 g Oral Daily     PRN Meds:acetaminophen, albuterol-ipratropium 2.5mg-0.5mg/3mL, bisacodyl, dextrose 50%, dextrose 50%, glucagon (human recombinant), glucose, glucose, hydrALAZINE, insulin aspart U-100, morphine, ondansetron, promethazine (PHENERGAN) IVPB, sodium chloride 0.9%     Objective:     Vital Signs (Most Recent):  Temp: 98.2 °F (36.8 °C) (03/26/18 1151)  Pulse: 64 (03/26/18 1400)  Resp: 18 (03/26/18 1151)  BP: 135/60 (03/26/18 1151)  SpO2: 96 % (03/26/18 1151) Vital Signs (24h Range):  Temp:  [98.2 °F (36.8 °C)-99 °F (37.2 °C)] 98.2 °F (36.8 °C)  Pulse:  [59-74] 64  Resp:  [16-18] 18  SpO2:  [93 %-96 %] 96 %  BP: (125-184)/(59-77) 135/60     Weight: 69.9 kg (154 lb 1.6 oz)  Body mass index is 28.19 kg/m².    SpO2: 96 %  O2  Device (Oxygen Therapy): nasal cannula    Intake/Output - Last 3 Shifts       03/24 0700 - 03/25 0659 03/25 0700 - 03/26 0659 03/26 0700 - 03/27 0659    P.O. 400 0 240    I.V. (mL/kg)       Total Intake(mL/kg) 400 (5.7) 0 (0) 240 (3.4)    Urine (mL/kg/hr) 1397 (0.8) 800 (0.5) 150 (0.3)    Stool  0 (0) 0 (0)    Total Output 1397 800 150    Net -997 -800 +90           Urine Occurrence 4 x 7 x           Lines/Drains/Airways     Peripheral Intravenous Line                 Peripheral IV - Single Lumen 03/22/18 0815 Left Antecubital 4 days         Peripheral IV - Single Lumen 03/25/18 0152 Left Forearm 1 day                Physical Exam   Constitutional: She is oriented to person, place, and time. She appears well-developed and well-nourished.   HENT:   Head: Normocephalic and atraumatic.   Eyes: Pupils are equal, round, and reactive to light.   Neck: Normal range of motion. Neck supple.   Cardiovascular: Normal rate, regular rhythm and normal heart sounds.    Pulmonary/Chest: Effort normal and breath sounds normal.   Abdominal: Soft. Bowel sounds are normal.   Musculoskeletal: Normal range of motion.   Neurological: She is alert and oriented to person, place, and time.   Skin: Skin is warm and dry.   Psychiatric: She has a normal mood and affect. Her behavior is normal.       Significant Labs:  CBC:   Recent Labs  Lab 03/26/18  0350   WBC 8.94   RBC 3.72*   HGB 8.8*   HCT 28.5*   *   MCV 77*   MCH 23.7*   MCHC 30.9*     CMP:   Recent Labs  Lab 03/26/18  0350   *   CALCIUM 9.4   ALBUMIN 3.1*   PROT 6.6      K 3.4*   CO2 27   CL 98   BUN 16   CREATININE 0.7   ALKPHOS 58   ALT 7*   AST 14   BILITOT 0.7       Significant Diagnostics:  CXR: I have reviewed all pertinent results/findings within the past 24 hours    Assessment/Plan:     * 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    --vascular neurology primary team         Atrial fibrillation    --currently in NSR  --on Amio gtt, can  transition to PO 400mg BID.         S/p TAVR (transcatheter aortic valve replacement), bioprosthetic    --Continue ASA (would down grade to 81), BB, statin, Plavix for FRANCY  --may start ace-inhibitor if warranted  --PT/OT  --Ambulate x4   --Wean O2 as tolerated for O2 sat >92%  --monitor CXR   --Monitor electrolytes and replace prn  --Lasix 40mg BID           Jessy Sheppard NP  Cardiothoracic Surgery  [facility]

## 2018-03-26 NOTE — ASSESSMENT & PLAN NOTE
-  A-fib with RVR on 3/24  -  Treated with amio load and gtt  -  Risk factor for stroke; however, VN does not believe etiology of recent stroke

## 2018-03-26 NOTE — ASSESSMENT & PLAN NOTE
- Newly diagnosed here. Patient found to be in afib RVR after returning from MRI on 3/24.     - Unlikely to have caused stroke as she went into arrhythmia s/p symptom onset. Likely post-procedural from TAVR.   - However CHADVASC 8 / Needs consideration for AC post acute stroke phase.     - On IV amiodarone AS per CTS and currently remains in NSR. Shall Dc eventually

## 2018-03-26 NOTE — PLAN OF CARE
Problem: Occupational Therapy Goal  Goal: Occupational Therapy Goal  Goals to be met by: 4/2/18     Patient will increase functional independence with ADLs by performing:    Feeding with Modified [place].  UE Dressing with Supervision.  LE Dressing with Supervision.  Grooming while standing at sink with Supervision.  Toileting from toilet with Stand-by Assistance for hygiene and clothing management.   Toilet transfer to toilet with Stand-by Assistance.  Upper extremity exercise program x10 reps per handout, with independence.       POC remains appropriate.  Discharge recommendation changed from HH to rehab to reflect current functional status of pt.      CAROLYN Lopez  3/26/2018

## 2018-03-26 NOTE — HOSPITAL COURSE
3/23/18:  Evaluated by PT and OT.  Sit to stand Mya.  No further transfers or gait.  UBD maxA and LBD totalA.  Grooming modA.  Toileting totalA.  Feeding Mya.   3/25/18:  Participated with PT.  Evaluated by SLP.  Found to have dysarthria and dysphagia.  SLP recommendation: NPO.  Bed mobility Mya.  Sit to stand Mya and transfers modA.  No gait.  EOB x 5 minutes Mya.  3/26/18:  Participated with SLP and OT.  SLP recommendation: dental soft diet and thin liquids.  Bed mobility min-modA.  EOB x 23 minutes SBA.  Sit to stand modA.  Standing trials maxA.  LBD totalA.   3/27/18:  Participated with OT and SLP.  Found to have dysphagia, cognitive-linguistic impairment, and visio-spatial impairment.  Bed mobility CGA-modA.  EOB x 20 minutes CGA.  Sit to stand modA.  UBD modA and LBD maxA.  Grooming CGA.

## 2018-03-26 NOTE — PROGRESS NOTES
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 Stroke called on 3/24 at 2004 for worsening RUE weakness, right facial droop, dysarthria and aphasia.     - CTA without LVO and MRI with multiple bilateral (R>L) acute infarcts on cerebral and cerebellar hemispheres highly suggestive of embolic phenomenon. No tpa as outside window.   - Documented right field deficits and worsening RUE weakness <24h s/p TAVR. ROSEY during TAVR demonstrable for grade 4 atheroma on aorta. Suspect iatrogenic from TAVR as pattern is concerning for embolic phenomena.    - Improvement in NIHSS / RUE weakness with sensory affect / with no concerns for aphasia.      Plan:   Antithrombotics for secondary stroke prevention: Antiplatelets: Aspirin: 81 mg daily  Clopidogrel: 75 mg daily / Needs consideration for AC post acute stroke phase for recordings of new onset Afib     Statins for secondary stroke prevention and hyperlipidemia, if present:   Statins: Atorvastatin- 40 mg daily    Aggressive risk factor modification: HTN, Smoking, DM, CAD, bilateral carotid stenosis, s/p TAVR     Rehab efforts: PT/OT/SLP to evaluate and treat, PM&R consult     Diagnostics ordered/pending: None     VTE prophylaxis: Enoxaparin 40 mg SQ every 24 hours    BP parameters: SBP < 180         Atrial fibrillation    - Newly diagnosed here. Patient found to be in afib RVR after returning from MRI on 3/24.     - Unlikely to have caused stroke as she went into arrhythmia s/p symptom onset. Likely post-procedural from TAVR.   - However CHADVASC 8 /  Needs consideration for AC post acute stroke phase.     - On IV amiodarone AS per CTS and currently remains in NSR. Shall Dc eventually          Acute on chronic diastolic heart failure    - CXR on 3/23 with slight interval worsening of bilateral airspace disease suggestive of pulmonary edema.  - Remains on 2.5L nasal cannula.     - Continue furosemide 40 mg IV BID as per CTS team.   - Strict I/O and daily weights.         S/p TAVR (transcatheter aortic valve replacement), bioprosthetic    - Followed by CTS.  - Continue DAPT for one year.         Centrilobular emphysema    - Duo-neb prn.         Diabetes mellitus, type 2    Stroke risk factor  HgB A1C 6.5    SSI        Malignant neoplasm of right lung    - s/p RML lobectomy, path showed keratinizing squamous cell CA. Contacted oncologist who reported disease on remission, last PET scan negative.        Coronary artery disease of native artery with stable angina pectoris    Stroke risk factor  - Continue DAPT, statin, and BB    - Would benefit from ACEI or ARB for goal-directed medical therapy; unclear why patient not on.    - Start if BP uncontrolled.         Tobacco abuse    Smoking cessation        Severe aortic stenosis    S/p TAVR 3/22, followed by CTS.         Essential hypertension    Stroke risk factor  SBP <180, but MAP < 80 s/p TAVR.    - Continue amlodipine 10 mg PO daily, carvedilol 3.125 mg PO BID, chlorthalidone 25 mg PO daily, and hydralazine IV PRN.         Dyslipidemia    Stroke risk factor  LDL >70   Lipitor 40 mg daily        Bilateral carotid artery stenosis    Stroke risk factor - but unlikely to have caused this event as pattern not consistent with thrombotic/a-a considering embolic multi-territorial stroke.     - As demonstrable by recent carotid US and CTA (mixed density atherosclerosis at both carotid bifurcations)             3/25 - New-onset afib RVR developed after return from MRI this morning. IV amiodarone loaded and started on gtt-  rhythm resolved and remains on gtt. No worsening focal deficits this morning.     STROKE DOCUMENTATION   Acute Stroke Times   Last Known Normal Date: 03/23/18  Last Known Normal Time:  (Unsure)  Symptom Onset Date: 03/24/18  Symptom Onset Time:  (Unsure)  Stroke Team Called Date: 03/24/18  Stroke Team Called Time: 2004  Stroke Team Arrival Date: 03/24/18  Stroke Team Arrival Time: 2010  CT Interpretation Time: 2025    NIH Scale:  1a. Level Of Consciousness: 0-->Alert: keenly responsive  1b. LOC Questions: 0-->Answers both questions correctly  1c. LOC Commands: 0-->Performs both tasks correctly  2. Best Gaze: 0-->Normal  3. Visual: 2-->Complete hemianopia  4. Facial Palsy: 1-->Minor paralysis (flattened nasolabial fold, asymmetry on smiling)  5a. Motor Arm, Left: 0-->No drift: limb holds 90 (or 45) degrees for full 10 secs  5b. Motor Arm, Right: 4-->No movement  6a. Motor Leg, Left: 0-->No drift: leg holds 30 degree position for full 5 secs  6b. Motor Leg, Right: 1-->Drift: leg falls by the end of the 5-sec period but does not hit bed  7. Limb Ataxia: 0-->Absent  8. Sensory: 1-->Mild-to-moderate sensory loss: patient feels pinprick is less sharp or is dull on the affected side: or there is a loss of superficial pain with pinprick, but patient is aware of being touched  9. Best Language: 0-->No aphasia: normal  10. Dysarthria: 0-->Normal  11. Extinction and Inattention (formerly Neglect): 0-->No abnormality  Total (NIH Stroke Scale): 9       Modified [place] Score: 1  [place] Coma Scale:15   ABCD2 Score:    [id number] Score:8  HAS -BLED Score:5  ICH Score:   Hunt & Eldridge Classification:      Hemorrhagic change of an Ischemic Stroke: Does this patient have an ischemic stroke with hemorrhagic changes? No     Neurologic Chief Complaint: Left PCA / MCA embolic stroke     Subjective:     Interval History: NAEON. No concerns for worsening NIHSS     HPI, Past Medical, Family, and Social History remains the same as documented  in the initial encounter.     Review of Systems   Constitutional: Negative for chills and fever.   HENT: Negative for ear discharge and ear pain.    Eyes: Negative for pain and itching.   Respiratory: Negative for chest tightness and shortness of breath.    Cardiovascular: Negative for chest pain and leg swelling.   Gastrointestinal: Negative for abdominal distention and abdominal pain.   Genitourinary: Negative for difficulty urinating and dysuria.   Musculoskeletal: Positive for arthralgias.   Neurological: Positive for facial asymmetry, speech difficulty and weakness.   Psychiatric/Behavioral: Negative for confusion.     Scheduled Meds:   amLODIPine  10 mg Oral Daily    ascorbic acid (vitamin C)  500 mg Oral BID    aspirin  325 mg Oral Daily    atorvastatin  40 mg Oral Daily    carvedilol  3.125 mg Oral Daily    chlorthalidone  25 mg Oral QAM    clopidogrel  75 mg Oral Daily    enoxaparin  40 mg Subcutaneous Daily    famotidine (PF)  20 mg Intravenous BID    fluticasone-vilanterol  1 puff Inhalation Daily    furosemide  40 mg Intravenous BID    mupirocin  1 g Nasal BID    mupirocin  1 g Nasal BID    polyethylene glycol  17 g Oral Daily     Continuous Infusions:   amiodarone in dextrose 5% 0.5 mg/min (03/26/18 1255)    sodium chloride 0.9%       PRN Meds:acetaminophen, albuterol-ipratropium 2.5mg-0.5mg/3mL, bisacodyl, dextrose 50%, dextrose 50%, glucagon (human recombinant), glucose, glucose, hydrALAZINE, insulin aspart U-100, morphine, ondansetron, promethazine (PHENERGAN) IVPB, sodium chloride 0.9%    Objective:     Vital Signs (Most Recent):  Temp: 98.2 °F (36.8 °C) (03/26/18 1151)  Pulse: (!) 59 (03/26/18 1151)  Resp: 18 (03/26/18 1151)  BP: 135/60 (03/26/18 1151)  SpO2: 96 % (03/26/18 1151)  BP Location: Right arm    Vital Signs Range (Last 24H):  Temp:  [98.2 °F (36.8 °C)-99 °F (37.2 °C)]   Pulse:  [59-74]   Resp:  [16-18]   BP: (125-184)/(59-77)   SpO2:  [93 %-96 %]   BP Location: Right  arm    Physical Exam   Constitutional: She appears well-developed and well-nourished.   HENT:   Head: Normocephalic and atraumatic.   Eyes: EOM are normal. Pupils are equal, round, and reactive to light.   Neck: Normal range of motion.   Cardiovascular: Normal rate.    Pulmonary/Chest: Effort normal.   Abdominal: Soft.   Neurological: She is alert.   Right facial droop, sided weakness, sensory deficit, hemianopsia, dysarthria   Skin: Skin is warm and dry.   Nursing note and vitals reviewed.      Neurological Exam:   LOC: alert  Attention Span: Good   Language: No aphasia  Articulation: No dysarthria  Orientation: Person, Place, Time   Visual Fields: Hemianopsia right  EOM (CN III, IV, VI): Full/intact  Pupils (CN II, III): PERRL  Facial Sensation (CN V): Normal  Facial Movement (CN VII): Symmetric facial expression    Motor: Arm left  Normal 4+/5  Leg left  Normal 4+/5  Arm right  Normal 1/5  Leg right Normal 4+/5  Cebellar: No evidence of appendicular or axial ataxia  Sensation: Intact to light touch, temperature and vibration  Tone: Normal tone throughout    Laboratory:  CMP:   Recent Labs  Lab 03/26/18  0350   CALCIUM 9.4   ALBUMIN 3.1*   PROT 6.6      K 3.4*   CO2 27   CL 98   BUN 16   CREATININE 0.7   ALKPHOS 58   ALT 7*   AST 14   BILITOT 0.7     BMP:   Recent Labs  Lab 03/26/18  0350      K 3.4*   CL 98   CO2 27   BUN 16   CREATININE 0.7   CALCIUM 9.4     CBC:   Recent Labs  Lab 03/26/18  0350   WBC 8.94   RBC 3.72*   HGB 8.8*   HCT 28.5*   *   MCV 77*   MCH 23.7*   MCHC 30.9*     Lipid Panel:   Recent Labs  Lab 03/25/18  0538   CHOL 150   LDLCALC 80.2   HDL 42   TRIG 139     Coagulation:   Recent Labs  Lab 03/23/18  0331 03/23/18  0340   INR 1.0  --    APTT  --  22.1     Platelet Aggregation Study: No results for input(s): PLTAGG, PLTAGINTERP, PLTAGREGLACO, ADPPLTAGGREG in the last 168 hours.  Hgb A1C:   Recent Labs  Lab 03/21/18  1003   HGBA1C 6.5*     TSH:   Recent Labs  Lab  03/25/18  0538   TSH 1.221       Diagnostic Results     Brain Imaging   MRI: 1.  Multiple abnormal regions of restricted diffusion throughout the bilateral cerebral and cerebellar hemispheres in keeping with acute infarcts as discussed above.  Given the multiplicity and distribution, findings are suggestive of embolic phenomena.    2.  Scattered foci of supratentorial periventricular T2/FLAIR hyperintensity suggestive of chronic microvascular ischemic change.    Vessel Imaging   CTA:   Aorta: Normal 3 vessel arch.  Significant atherosclerotic calcification.    Extracranial carotid circulation: Mixed density atherosclerosis at both carotid bifurcations.  No hemodynamically significant stenosis, aneurysmal dilatation, or dissection.    Focal narrowing of the mid V4 segment of the right vertebral artery; not hemodynamically significant.  Punctate calcifications at the P3 segment of the right posterior cerebral artery, and P4 segment of the left posterior cerebral artery may represent calcified thrombi noting that the distal vessels appear patent.  No focal high-grade stenosis, occlusion, or aneurysm    Cardiac Imaging     CONCLUSIONS     1 - Low normal to mildly depressed left ventricular systolic function (EF 50-55%).     2 - Mild mitral regurgitation.     3 - Severe aortic stenosis.     4 - Grade 4 atheroma disease of aorta.     5 - Successful implantation of a 26mm Evolut R CoreValve into the aortic position with no evidence of regurgitation.       Aissatou Walton MD  Comprehensive Stroke Center  Department of Vascular Neurology   [facility]

## 2018-03-26 NOTE — ASSESSMENT & PLAN NOTE
-  Stroke code called on 3/24 for worsening RUE weakness, R facial droop, dysarthria, and aphasia    -  CTA without LVO  -  Not tPA or IR candidate  -  MRI showed multiple bilateral R>L acute infarcts on cerebral and cerebellar hemispheres highly suggestive of embolic phenomenon  -  ROSEY revealed grade 4 atheroma on aorta  -  Stroke etiology likely iatrogenic 2/2 TAVR as pattern is concerning for embolic phenomena  See hospital course for functional, cognitive/speech/language, and nutrition/swallow status.      Recommendations  -  Encourage mobility, OOB in chair, and early ambulation as appropriate   -  PT/OT evaluate and treat  -  SLP speech and cognitive evaluate and treat  -  Monitor mood and sleep disturbances  -  Establish consistent sleep-wake cycle  -  Monitor for bowel and bladder dysfunction  -  Monitor for shoulder pain and subluxation  -  Monitor for spasticity  -  Monitor for and prevent skin breakdown and pressure ulcers  · Early mobility, repositioning/weight shifting every 20-30 minutes when sitting, turn patient every 2 hours, proper mattress/overlay and chair cushioning, pressure relief/heel protector boots  -  DVT prophylaxis:  On Lovenox

## 2018-03-26 NOTE — SUBJECTIVE & OBJECTIVE
"Interval History: Acute CVA over the weekend -- MRI shows "multiple abnormal regions of restricted diffusion throughout the bilateral cerebral and cerebellar hemispheres in keeping with acute infarcts". She has profound right-sided UE weakness and R visual field deficits.   She also developed new-onset a-fib with RVR yesterday. She is currently in SR on Amio gtt.     Objective:     Vital Signs (Most Recent):  Temp: 98.5 °F (36.9 °C) (03/26/18 1546)  Pulse: 62 (03/26/18 1600)  Resp: 18 (03/26/18 1546)  BP: (!) 151/60 (03/26/18 1546)  SpO2: 97 % (03/26/18 1546) Vital Signs (24h Range):  Temp:  [98.2 °F (36.8 °C)-98.7 °F (37.1 °C)] 98.5 °F (36.9 °C)  Pulse:  [59-74] 62  Resp:  [16-18] 18  SpO2:  [94 %-97 %] 97 %  BP: (125-184)/(59-77) 151/60     Weight: 69.9 kg (154 lb 1.6 oz)  Body mass index is 28.19 kg/m².    SpO2: 97 %  O2 Device (Oxygen Therapy): nasal cannula      Intake/Output Summary (Last 24 hours) at 03/26/18 1706  Last data filed at 03/26/18 1300   Gross per 24 hour   Intake              240 ml   Output              450 ml   Net             -210 ml       Lines/Drains/Airways     Peripheral Intravenous Line                 Peripheral IV - Single Lumen 03/22/18 0815 Left Antecubital 4 days         Peripheral IV - Single Lumen 03/25/18 0152 Left Forearm 1 day                Physical Exam   Constitutional: She is oriented to person, place, and time. She appears well-developed and well-nourished. No distress.   HENT:   Head: Normocephalic and atraumatic.   Mouth/Throat: Oropharynx is clear and moist.   Eyes: Conjunctivae and EOM are normal. Pupils are equal, round, and reactive to light. No scleral icterus.   Neck: Neck supple. No JVD present. No tracheal deviation present.   Cardiovascular: Normal rate and regular rhythm.  Exam reveals no gallop and no friction rub.    Murmur heard.  Pulmonary/Chest: Effort normal and breath sounds normal. No respiratory distress. She has no wheezes. She has no rales. She " exhibits no tenderness.   Abdominal: Soft. Bowel sounds are normal. She exhibits no distension. There is no hepatosplenomegaly. There is no tenderness.   Musculoskeletal: She exhibits no edema or tenderness.   Neurological: She is alert and oriented to person, place, and time.   RUE weakness - 1/5   Skin: Skin is warm and dry. No rash noted. No erythema.   Psychiatric: She has a normal mood and affect. Her behavior is normal.       Significant Labs:   CMP   Recent Labs  Lab 03/25/18  0538 03/26/18  0350    137   K 3.7 3.4*    98   CO2 25 27   * 114*   BUN 12 16   CREATININE 0.7 0.7   CALCIUM 9.3 9.4   PROT  --  6.6   ALBUMIN  --  3.1*   BILITOT  --  0.7   ALKPHOS  --  58   AST  --  14   ALT  --  7*   ANIONGAP 11 12   ESTGFRAFRICA >60.0 >60.0   EGFRNONAA >60.0 >60.0    and CBC   Recent Labs  Lab 03/25/18  0538 03/26/18  0350   WBC 9.73 8.94   HGB 8.2*  8.2* 8.8*   HCT 26.7*  26.7* 28.5*    374*

## 2018-03-26 NOTE — CONSULTS
" [facility]  Adult Nutrition  Progress Note    SUMMARY       Recommendations    Recommendation/Intervention:   1. Continue current dysphagia soft, cardiac diet.   2. If intake consistently <50% of meals, order Boost Plus TID.   3. RD following.    Goals: Patient to receive nutrition by RD follow-up  Nutrition Goal Status: goal met  Communication of RD Recs:  (POC)    Reason for Assessment    Reason for Assessment: RD follow-up, consult  Diagnosis: cardiac disease (s/p TAVR)  Relevant Medical History: SCC s/p lobectomy, CAD, COPD, DM, HTN, HLD    General Information Comments: Pt s/p CVA. Dental soft diet per SLP.    Nutrition Discharge Planning: Adequate nutrition via PO intake.    Nutrition Risk Screen    Nutrition Risk Screen: no indicators present    Nutrition/Diet History    Do you have any cultural, spiritual, Episcopalian conflicts, given your current situation?: none  Factors Affecting Nutritional Intake: difficulty/impaired swallowing    Anthropometrics    Temp: 98.2 °F (36.8 °C)  Height Method: Stated  Height: 5' 2" (157.5 cm)  Height (inches): 62 in  Weight Method: Bed Scale  Weight: 69.9 kg (154 lb 1.6 oz)  Weight (lb): 154.1 lb  Ideal Body Weight (IBW), Female: 110 lb  % Ideal Body Weight, Female (lb): 132.73 lb  BMI (Calculated): 26.8  BMI Grade: 25 - 29.9 - overweight    Lab/Procedures/Meds    Pertinent Labs Reviewed: reviewed  Pertinent Labs Comments: K 3.4, Glu 114, Alb 3.1  Pertinent Medications Reviewed: reviewed  Pertinent Medications Comments: vit C, statin, famotidine, lasix, polyethylene glycol    Physical Findings/Assessment    Overall Physical Appearance: nourished  Tubes:  (none)  Oral/Mouth Cavity: tooth/teeth missing  Skin: incision(s)    Estimated/Assessed Needs    Weight Used For Calorie Calculations: 66.2 kg (145 lb 15.1 oz)  Height: 5' 2" (157.5 cm)  Energy Calorie Requirements (kcal): 1425 kcal/day  Energy Need Method: [place]-St Jeor (x 1.25)  RMR ([place]-St. Jeor " Equation): 1140.25  Protein Requirements: 80-93 g/day (1.2-1.4 g/kg)  Weight Used For Protein Calculations: 66.2 kg (145 lb 15.1 oz)  Fluid Requirements (mL): 1 mL/kcal or per MD  Fluid Need Method: RDA Method  RDA Method (mL): 1425     Nutrition Prescription Ordered    Current Diet Order: Dysphagia soft, cardiac    Evaluation of Received Nutrient/Fluid Intake    Comments: No BM recorded  % Intake of Estimated Energy Needs: 0 - 25 %  % Meal Intake: Other: Diet just advanced    Nutrition Risk    Level of Risk/Frequency of Follow-up: high (2x/week)     Assessment and Plan    S/p TAVR (transcatheter aortic valve replacement), bioprosthetic    Contributing Nutrition Diagnosis  Increased nutrient needs    Related to (etiology):   Physiological causes    Signs and Symptoms (as evidenced by):   S/p TAVR    Nutrition Diagnosis Status:   Continues             Monitor and Evaluation    Food and Nutrient Intake: energy intake, food and beverage intake  Food and Nutrient Adminstration: diet order  Physical Activity and Function: nutrition-related ADLs and IADLs  Anthropometric Measurements: weight, weight change  Biochemical Data, Medical Tests and Procedures: electrolyte and renal panel, gastrointestinal profile, glucose/endocrine profile, inflammatory profile  Nutrition-Focused Physical Findings: overall appearance     Nutrition Follow-Up    RD Follow-up?: Yes

## 2018-03-26 NOTE — HPI
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  Patient followed by CTS for severe aortic stenosis and [place] [facility] [place] for surgical intervention.  Now, s/p L transaxillary transcatheter aortic valve replacement with a 26 mm evolut valve (TAVR) on 3/22/18.  Post-op course complicated by stroke (Stroke code called on 3/24 for worsening RUE weakness, R facial droop, dysarthria, and aphasia.  CTA without LVO.  Not tPA or IR candidate.  MRI showed multiple bilateral R>L acute infarcts on cerebral and cerebellar hemispheres highly suggestive of embolic phenomenon.) and a-fib with RVR requiring amiodarone load and infusion.  ROSEY revealed grade 4 atheroma on aorta.  Stroke etiology likely iatrogenic 2/2 TAVR as pattern is concerning for embolic phenomena.    Functional History: Patient lives in [place], alone (with her dog), in a mobile home with 2 steps to enter.  Prior to admission, she was (I) with ADLs, including driving, and mobility.  She is right handed.  DME: none.

## 2018-03-26 NOTE — ASSESSMENT & PLAN NOTE
-  Admitted for severe aortic stenosis  -  S/p L transaxillary transcatheter aortic valve replacement with a 26 mm evolut valve (TAVR) on 3/22/18

## 2018-03-26 NOTE — SUBJECTIVE & OBJECTIVE
Interval History: No acute events overnight. Pt suffered a fall yesterday. NSR on monitor.      Medications:  Continuous Infusions:   amiodarone in dextrose 5% 0.5 mg/min (03/26/18 1255)    sodium chloride 0.9%       Scheduled Meds:   amLODIPine  10 mg Oral Daily    ascorbic acid (vitamin C)  500 mg Oral BID    aspirin  325 mg Oral Daily    atorvastatin  40 mg Oral Daily    carvedilol  3.125 mg Oral Daily    chlorthalidone  25 mg Oral QAM    clopidogrel  75 mg Oral Daily    enoxaparin  40 mg Subcutaneous Daily    famotidine (PF)  20 mg Intravenous BID    fluticasone-vilanterol  1 puff Inhalation Daily    furosemide  40 mg Intravenous BID    mupirocin  1 g Nasal BID    mupirocin  1 g Nasal BID    polyethylene glycol  17 g Oral Daily     PRN Meds:acetaminophen, albuterol-ipratropium 2.5mg-0.5mg/3mL, bisacodyl, dextrose 50%, dextrose 50%, glucagon (human recombinant), glucose, glucose, hydrALAZINE, insulin aspart U-100, morphine, ondansetron, promethazine (PHENERGAN) IVPB, sodium chloride 0.9%     Objective:     Vital Signs (Most Recent):  Temp: 98.2 °F (36.8 °C) (03/26/18 1151)  Pulse: 64 (03/26/18 1400)  Resp: 18 (03/26/18 1151)  BP: 135/60 (03/26/18 1151)  SpO2: 96 % (03/26/18 1151) Vital Signs (24h Range):  Temp:  [98.2 °F (36.8 °C)-99 °F (37.2 °C)] 98.2 °F (36.8 °C)  Pulse:  [59-74] 64  Resp:  [16-18] 18  SpO2:  [93 %-96 %] 96 %  BP: (125-184)/(59-77) 135/60     Weight: 69.9 kg (154 lb 1.6 oz)  Body mass index is 28.19 kg/m².    SpO2: 96 %  O2 Device (Oxygen Therapy): nasal cannula    Intake/Output - Last 3 Shifts       03/24 0700 - 03/25 0659 03/25 0700 - 03/26 0659 03/26 0700 - 03/27 0659    P.O. 400 0 240    I.V. (mL/kg)       Total Intake(mL/kg) 400 (5.7) 0 (0) 240 (3.4)    Urine (mL/kg/hr) 1397 (0.8) 800 (0.5) 150 (0.3)    Stool  0 (0) 0 (0)    Total Output 1397 800 150    Net -997 -800 +90           Urine Occurrence 4 x 7 x           Lines/Drains/Airways     Peripheral Intravenous Line                  Peripheral IV - Single Lumen 03/22/18 0815 Left Antecubital 4 days         Peripheral IV - Single Lumen 03/25/18 0152 Left Forearm 1 day                Physical Exam   Constitutional: She is oriented to person, place, and time. She appears well-developed and well-nourished.   HENT:   Head: Normocephalic and atraumatic.   Eyes: Pupils are equal, round, and reactive to light.   Neck: Normal range of motion. Neck supple.   Cardiovascular: Normal rate, regular rhythm and normal heart sounds.    Pulmonary/Chest: Effort normal and breath sounds normal.   Abdominal: Soft. Bowel sounds are normal.   Musculoskeletal: Normal range of motion.   Neurological: She is alert and oriented to person, place, and time.   Skin: Skin is warm and dry.   Psychiatric: She has a normal mood and affect. Her behavior is normal.       Significant Labs:  CBC:   Recent Labs  Lab 03/26/18  0350   WBC 8.94   RBC 3.72*   HGB 8.8*   HCT 28.5*   *   MCV 77*   MCH 23.7*   MCHC 30.9*     CMP:   Recent Labs  Lab 03/26/18  0350   *   CALCIUM 9.4   ALBUMIN 3.1*   PROT 6.6      K 3.4*   CO2 27   CL 98   BUN 16   CREATININE 0.7   ALKPHOS 58   ALT 7*   AST 14   BILITOT 0.7       Significant Diagnostics:  CXR: I have reviewed all pertinent results/findings within the past 24 hours

## 2018-03-26 NOTE — PLAN OF CARE
Problem: Patient Care Overview  Goal: Plan of Care Review  Plan of care discussed with patient. Fall precautions in place. TeleSitter at bedside. Pt AAO x4. Patient has no complaints of pain. Discussed medications and care. Patient has no questions at this time.White board updated. Bed locked in lowest position. Side rails up x2. Will continue to monitor.

## 2018-03-26 NOTE — PT/OT/SLP PROGRESS
"Speech Language Pathology Treatment    Patient Name:  Kristina Aguirre   MRN:  [id number]  Admitting Diagnosis: 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    Recommendations:                 General Recommendations:  Dysphagia therapy and Cognitive-linguistic evaluation  Diet recommendations:  Dental Soft, Liquid Diet Level: Thin   Aspiration Precautions: 1 bite/sip at a time, Alternating bites/sips, Assistance with meals, Avoid talking while eating, Eliminate distractions, Feed only when awake/alert, HOB to 90 degrees, Meds whole 1 at a time, Monitor for s/s of aspiration, Small bites/sips and Standard aspiration precautions   General Precautions: Standard, fall, NPO  Communication strategies:  none    Subjective     "This is not right.  Can;t I get up?"  Pt stated when SLp adjusted HOB re: inability to move R side of her body  Patient goals: to be able to eat and drink and move     Pain/Comfort:  · Pain Rating 1: 0/10  · Pain Rating Post-Intervention 1: 0/10    Objective:     Has the patient been evaluated by SLP for swallowing?   Yes  Keep patient NPO? No   Current Respiratory Status: nasal cannula      Pt was awake and alert in NAD.  She was seated upright w/ HOB elevated for optimal swallowing safety.  She was offered and accepted po trials of thin liquids w/ ice chip x1, tsp sips x2, cup edge sips x4, straw sips x6, tsp bites x2, and self fed bites of solids x2.  She demonstrated good tolerance of all trials w/ no overt signs of airway compromise seen.  Moderate oral dysphagia was noted w/ decreased labial seal w/ straw on R side of oral cavity and mild oral stasis of solids cleared indep by pt w/ multiple swallows, lingual sweeps, and thin liquid wash.  Education was provided to pt re: SLP role, diet recs, aspiration precautions, and SLP POC.  Pt indicated fair understanding, though insight into situation was decreased.  Pt's whiteboard was updated and results were reviewed w/ RN.  NIKKIE " evaluation was deferred as Rehabilitation NP entered pt room to assess pt.    Assessment:     Kristina Aguirre is a 67 y.o. female with an SLP diagnosis of Dysphagia.  She presents with moderate oral dysphagia.    Goals:    SLP Goals        Problem: SLP Goal    Goal Priority Disciplines Outcome   SLP Goal     SLP Ongoing (interventions implemented as appropriate)   Description:  Speech Language Pathology Goals  Goals expected to be met by 4/1  1. Pt will participate in ongoing swallow assessment   2. Pt will participate in speech, language and cognitive assessment                       Plan:     · Patient to be seen:  5 x/week   · Plan of Care expires:  04/24/18  · Plan of Care reviewed with:  patient   · SLP Follow-Up:  Yes       Discharge recommendations:  rehabilitation facility   Barriers to Discharge:  Decreased Care Giver Support    Time Tracking:     SLP Treatment Date:   03/26/18  Speech Start Time:  0930  Speech Stop Time:  0946     Speech Total Time (min):  16 min    Billable Minutes: Treatment Swallowing Dysfunction 8 and Seld Care/Home Management Training 8    Nichol Perdomo CCC-SLP  03/26/2018

## 2018-03-26 NOTE — PLAN OF CARE
KAI reached out to pt's healthcare POA Lefty to discuss discharge plan of care. Don's choices include 1. [facility]. 2. [facility]. 3. [facility].    KAI sent referrals via Health system.     Reva Clark LMSW  [facility]  Ext. [phone]

## 2018-03-26 NOTE — CARE UPDATE
Computer glance, bg 110s-150s, will sign off today. D/c home on victoza 1.8 mg daily and metformin only. D/c januvia upon discharge.  ** Please call Endocrine for any BG related issues **  Abbie Gloria, NP  [phone]

## 2018-03-26 NOTE — PLAN OF CARE
Problem: SLP Goal  Goal: SLP Goal  Speech Language Pathology Goals  Goals expected to be met by 4/1  1. Pt will participate in ongoing swallow assessment   2. Pt will participate in speech, language and cognitive assessment      Outcome: Ongoing (interventions implemented as appropriate)  Pt w/ improved tolerance of po trials.  REC:  Pt resume careful po intake w/ Dental Soft diet w/ thin liquids, oral meds whole 1-2 at a time, Supervision/Assistance w/ meals, aspiration precautions.  Results reviewed w/ RN.  Will review w/ team.  Cont ST per POC.    Nichol Perdomo CCC-SLP  3/26/2018

## 2018-03-26 NOTE — ANESTHESIA POSTPROCEDURE EVALUATION
"Anesthesia Post Evaluation    Patient: Kristina Aguirre    Procedure(s) Performed: Procedure(s) (LRB):  STUDY-EP (N/A)    Final Anesthesia Type: MAC  Patient location during evaluation: ICU  Patient participation: Yes- Able to Participate  Level of consciousness: awake and alert  Post-procedure vital signs: reviewed and stable  Pain management: adequate  Airway patency: patent  PONV status at discharge: No PONV  Anesthetic complications: no      Cardiovascular status: blood pressure returned to baseline  Respiratory status: unassisted, spontaneous ventilation and room air  Hydration status: euvolemic  Follow-up not needed.        Visit Vitals  BP (!) 184/77 (BP Location: Right arm, Patient Position: Lying)   Pulse 72   Temp 36.8 °C (98.3 °F) (Oral)   Resp 16   Ht 5' 2" (1.575 m)   Wt 69.9 kg (154 lb 1.6 oz)   SpO2 95%   Breastfeeding? No   BMI 28.19 kg/m²       Pain/Renny Score: Pain Assessment Performed: Yes (3/26/2018  6:00 AM)  Presence of Pain: denies (3/26/2018  6:00 AM)  Pain Rating Post Med Admin: 0 (3/25/2018  1:02 PM)      "

## 2018-03-26 NOTE — PLAN OF CARE
Problem: Patient Care Overview  Goal: Plan of Care Review  Outcome: Ongoing (interventions implemented as appropriate)  Plan of care discussed with patient.  Patient in bed with, fall precautions in place.Continuing to encourage using the call bell system when patient has needs. Patient has no complaints of pain. Discussed medications and care. Amio gtt continued. Mds are talking about D/Cing gtt and transferring to neuro. Patient more oriented and able to interact in care. Still having difficulty retaining teachings, reinforcement being given. Patient has no questions at this time. Will continue to monitor.

## 2018-03-26 NOTE — PT/OT/SLP PROGRESS
Occupational Therapy   Treatment    Name: Kristina Aguirre  MRN: [id number]  Admitting Diagnosis:  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  3 Days Post-Op    Recommendations:     Discharge Recommendations: rehabilitation facility  Discharge Equipment Recommendations:   (TBD pending progress)  Barriers to discharge:  Decreased caregiver support    Subjective     Communicated with: RN prior to session.  Pain/Comfort:  · Pain Rating 1: 0/10  · Pain Rating Post-Intervention 1: 0/10    Patients cultural, spiritual, Roman Catholic conflicts given the current situation: none reported    Objective:     Patient found with: telemetry, PureWick, oxygen, peripheral IV    General Precautions: Standard, fall, NPO   Orthopedic Precautions:N/A   Braces:   N/A    Occupational Performance:    Bed Mobility:    · Patient completed Rolling/Turning to Right with moderate assistance  · Patient completed Scooting/Bridging with minimum assistance towards EOB; CGA towards HOB propelling body upwards with (B) LE.  · Patient completed Supine to Sit with moderate assistance  · Patient completed Sit to Supine with minimum assistance     Functional Mobility/Transfers:  · Patient completed Sit <> Stand Transfer with moderate assistance  with  no assistive device x 1 trial from EOB.  On first trial pt stood for ~30 seconds with Max A to maintain balance and upright position.  Pt later completed two partial stands with Mod A to scoot laterally towards HOB.  · Functional Mobility: Pt did not take steps this date.    Activities of Daily Living:  · LB Dressing: total assistance for adjusting sock on R side while seated EOB.      Patient left HOB elevated with all lines intact, call button in reach and RN notified    [facility] 6 Click:  AMPA Total Score: 11    Treatment & Education:  *Pt sat EOB for ~23 minutes with SBA given to maintain upright position.    *Pt performed sit to stand transfer with cues provided for positioning of feet and for  posture while standing as noted above.  *PROM performed on RUE incorporating all joints to provide stretch and promote increased functional use: 3 sets x 15 reps.  *AAROM performed on RLE: 2 sets x 15 reps (LAQ and seated marches).  *Pt clasped hands together with Max A and performed 3 UE exercises to address bilateral coordination: 2 sets x 15 reps on each exercise.  Support provided near elbow and shoulder on RUE during activity.  -Shoulder flexion  -Chest press  -Bicep curls  *Pt performed partial stand x 2 trials to laterally scoot to right towards HOB.  Once pt supine in bed she propelled body upwards towards HOB x 4 trials with SBA.   *POC reviewed with pt   Education:    Assessment:     Kristina Aguirre is a 67 y.o. female with a medical diagnosis of 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.  She presents with the following performance deficits affecting function:  weakness, impaired endurance, impaired functional mobilty, impaired balance, decreased lower extremity function, impaired cognition, decreased safety awareness, impaired self care skills, decreased upper extremity function, gait instability, decreased coordination, RUE weakness.  Pt demonstrated improvement with sitting balance this date maintaining upright position with SBA.  Pt able to initiate movement with RLE; however, unable to initiate movement with RUE.  During sit to stand transfer Mod A required with Max A provided to help pt maintain upright position.  Pt requires increased assist for ADLs at this time.  She is highly motivated and is making progress towards goals.  She would continue to benefit from skilled OT services to address problems listed below and increase independence with ADLs.  D/c recommendation changed from HH to rehab to reflect current functional status of pt.       Rehab Prognosis:  Good; patient would benefit from acute skilled OT services to address these deficits and reach maximum level of  function.       Plan:     Patient to be seen 4 x/week to address the above listed problems via self-care/home management, therapeutic activities, therapeutic exercises  · Plan of Care Expires: 04/22/18  · Plan of Care Reviewed with: patient    This Plan of care has been discussed with the patient who was involved in its development and understands and is in agreement with the identified goals and treatment plan    GOALS:    Occupational Therapy Goals        Problem: Occupational Therapy Goal    Goal Priority Disciplines Outcome Interventions   Occupational Therapy Goal     OT, PT/OT Ongoing (interventions implemented as appropriate)    Description:  Goals to be met by: 4/2/18     Patient will increase functional independence with ADLs by performing:    Feeding with Modified [place].  UE Dressing with Supervision.  LE Dressing with Supervision.  Grooming while standing at sink with Supervision.  Toileting from toilet with Stand-by Assistance for hygiene and clothing management.   Toilet transfer to toilet with Stand-by Assistance.  Upper extremity exercise program x10 reps per handout, with independence.                      Time Tracking:     OT Date of Treatment: 03/26/18  OT Start Time: 1034  OT Stop Time: 1102  OT Total Time (min): 28 min    Billable Minutes:Therapeutic Activity 28    CAROLYN Lopez  3/26/2018

## 2018-03-26 NOTE — ASSESSMENT & PLAN NOTE
S/p acute CVA on 3/25. MRI shows multiple bilateral (R>L) acute infarcts on cerebral and cerebellar hemispheres highly suggestive of embolic phenomenon.  Residual R UE weakness, mild dysarthria, and R visual field deficits. No aphasia.   Now on Vascular Neurology's service.

## 2018-03-26 NOTE — ASSESSMENT & PLAN NOTE
PCI with FRANCY to RCA , mid LAD 40% stenosis (12/4/17).   On ASA and Plavix -- must continue Plavix until December 2018 for FRANCY.

## 2018-03-27 LAB
ALBUMIN SERPL BCP-MCNC: 3.2 G/DL
ALP SERPL-CCNC: 77 U/L
ALT SERPL W/O P-5'-P-CCNC: 10 U/L
ANION GAP SERPL CALC-SCNC: 14 MMOL/L
AST SERPL-CCNC: 21 U/L
BASOPHILS # BLD AUTO: 0.08 K/UL
BASOPHILS NFR BLD: 1 %
BILIRUB SERPL-MCNC: 0.9 MG/DL
BUN SERPL-MCNC: 16 MG/DL
CALCIUM SERPL-MCNC: 9.5 MG/DL
CHLORIDE SERPL-SCNC: 97 MMOL/L
CK MB SERPL-MCNC: 1.2 NG/ML
CK MB SERPL-MCNC: 1.4 NG/ML
CK MB SERPL-RTO: 1.9 %
CK MB SERPL-RTO: 2.5 %
CK SERPL-CCNC: 55 U/L
CK SERPL-CCNC: 64 U/L
CO2 SERPL-SCNC: 26 MMOL/L
CREAT SERPL-MCNC: 0.7 MG/DL
DIFFERENTIAL METHOD: ABNORMAL
EOSINOPHIL # BLD AUTO: 0.4 K/UL
EOSINOPHIL NFR BLD: 5.3 %
ERYTHROCYTE [DISTWIDTH] IN BLOOD BY AUTOMATED COUNT: 18 %
EST. GFR  (AFRICAN AMERICAN): >60 ML/MIN/1.73 M^2
EST. GFR  (NON AFRICAN AMERICAN): >60 ML/MIN/1.73 M^2
GLUCOSE SERPL-MCNC: 118 MG/DL
HCT VFR BLD AUTO: 29.6 %
HGB BLD-MCNC: 9.3 G/DL
IMM GRANULOCYTES # BLD AUTO: 0.02 K/UL
IMM GRANULOCYTES NFR BLD AUTO: 0.2 %
LYMPHOCYTES # BLD AUTO: 1.6 K/UL
LYMPHOCYTES NFR BLD: 19.6 %
MAGNESIUM SERPL-MCNC: 2.1 MG/DL
MCH RBC QN AUTO: 23.7 PG
MCHC RBC AUTO-ENTMCNC: 31.4 G/DL
MCV RBC AUTO: 75 FL
MONOCYTES # BLD AUTO: 0.7 K/UL
MONOCYTES NFR BLD: 8.9 %
NEUTROPHILS # BLD AUTO: 5.4 K/UL
NEUTROPHILS NFR BLD: 65 %
NRBC BLD-RTO: 0 /100 WBC
PHOSPHATE SERPL-MCNC: 3.8 MG/DL
PLATELET # BLD AUTO: 387 K/UL
PMV BLD AUTO: 9.5 FL
POCT GLUCOSE: 110 MG/DL (ref 70–110)
POCT GLUCOSE: 111 MG/DL (ref 70–110)
POCT GLUCOSE: 146 MG/DL (ref 70–110)
POCT GLUCOSE: 168 MG/DL (ref 70–110)
POTASSIUM SERPL-SCNC: 3.7 MMOL/L
PROT SERPL-MCNC: 6.7 G/DL
RBC # BLD AUTO: 3.93 M/UL
SODIUM SERPL-SCNC: 137 MMOL/L
TROPONIN I SERPL DL<=0.01 NG/ML-MCNC: 0.52 NG/ML
TROPONIN I SERPL DL<=0.01 NG/ML-MCNC: 0.62 NG/ML
WBC # BLD AUTO: 8.28 K/UL

## 2018-03-27 PROCEDURE — 97530 THERAPEUTIC ACTIVITIES: CPT

## 2018-03-27 PROCEDURE — 25000003 PHARM REV CODE 250: Performed by: NURSE PRACTITIONER

## 2018-03-27 PROCEDURE — 25000003 PHARM REV CODE 250: Performed by: THORACIC SURGERY (CARDIOTHORACIC VASCULAR SURGERY)

## 2018-03-27 PROCEDURE — 99232 SBSQ HOSP IP/OBS MODERATE 35: CPT | Mod: ,,, | Performed by: NURSE PRACTITIONER

## 2018-03-27 PROCEDURE — 82553 CREATINE MB FRACTION: CPT | Mod: 91

## 2018-03-27 PROCEDURE — 99233 SBSQ HOSP IP/OBS HIGH 50: CPT | Mod: GC,,, | Performed by: PSYCHIATRY & NEUROLOGY

## 2018-03-27 PROCEDURE — 63600175 PHARM REV CODE 636 W HCPCS: Performed by: PSYCHIATRY & NEUROLOGY

## 2018-03-27 PROCEDURE — S0028 INJECTION, FAMOTIDINE, 20 MG: HCPCS | Performed by: THORACIC SURGERY (CARDIOTHORACIC VASCULAR SURGERY)

## 2018-03-27 PROCEDURE — 25000003 PHARM REV CODE 250: Performed by: STUDENT IN AN ORGANIZED HEALTH CARE EDUCATION/TRAINING PROGRAM

## 2018-03-27 PROCEDURE — 82550 ASSAY OF CK (CPK): CPT | Mod: 91

## 2018-03-27 PROCEDURE — 84484 ASSAY OF TROPONIN QUANT: CPT

## 2018-03-27 PROCEDURE — 92526 ORAL FUNCTION THERAPY: CPT

## 2018-03-27 PROCEDURE — 93010 ELECTROCARDIOGRAM REPORT: CPT | Mod: 76,,, | Performed by: INTERNAL MEDICINE

## 2018-03-27 PROCEDURE — 25000003 PHARM REV CODE 250: Performed by: PSYCHIATRY & NEUROLOGY

## 2018-03-27 PROCEDURE — 36415 COLL VENOUS BLD VENIPUNCTURE: CPT

## 2018-03-27 PROCEDURE — 97535 SELF CARE MNGMENT TRAINING: CPT

## 2018-03-27 PROCEDURE — 20600001 HC STEP DOWN PRIVATE ROOM

## 2018-03-27 PROCEDURE — 63600175 PHARM REV CODE 636 W HCPCS: Performed by: STUDENT IN AN ORGANIZED HEALTH CARE EDUCATION/TRAINING PROGRAM

## 2018-03-27 PROCEDURE — 83735 ASSAY OF MAGNESIUM: CPT

## 2018-03-27 PROCEDURE — 80053 COMPREHEN METABOLIC PANEL: CPT

## 2018-03-27 PROCEDURE — 84100 ASSAY OF PHOSPHORUS: CPT

## 2018-03-27 PROCEDURE — 92523 SPEECH SOUND LANG COMPREHEN: CPT

## 2018-03-27 PROCEDURE — 97110 THERAPEUTIC EXERCISES: CPT

## 2018-03-27 PROCEDURE — 93005 ELECTROCARDIOGRAM TRACING: CPT

## 2018-03-27 PROCEDURE — 97112 NEUROMUSCULAR REEDUCATION: CPT

## 2018-03-27 PROCEDURE — 85025 COMPLETE CBC W/AUTO DIFF WBC: CPT

## 2018-03-27 RX ORDER — INSULIN ASPART 100 [IU]/ML
0-5 INJECTION, SOLUTION INTRAVENOUS; SUBCUTANEOUS EVERY 6 HOURS PRN
Status: DISCONTINUED | OUTPATIENT
Start: 2018-03-27 | End: 2018-04-02 | Stop reason: HOSPADM

## 2018-03-27 RX ORDER — FUROSEMIDE 20 MG/1
20 TABLET ORAL 2 TIMES DAILY
Status: DISCONTINUED | OUTPATIENT
Start: 2018-03-28 | End: 2018-04-02 | Stop reason: HOSPADM

## 2018-03-27 RX ORDER — GLUCAGON 1 MG
1 KIT INJECTION
Status: DISCONTINUED | OUTPATIENT
Start: 2018-03-27 | End: 2018-04-02 | Stop reason: HOSPADM

## 2018-03-27 RX ORDER — FAMOTIDINE 20 MG/1
20 TABLET, FILM COATED ORAL 2 TIMES DAILY
Status: DISCONTINUED | OUTPATIENT
Start: 2018-03-27 | End: 2018-04-02 | Stop reason: HOSPADM

## 2018-03-27 RX ORDER — ASPIRIN 81 MG/1
81 TABLET ORAL DAILY
Status: DISCONTINUED | OUTPATIENT
Start: 2018-03-28 | End: 2018-04-02

## 2018-03-27 RX ADMIN — POTASSIUM CHLORIDE 40 MEQ: 20 SOLUTION ORAL at 02:03

## 2018-03-27 RX ADMIN — ENOXAPARIN SODIUM 40 MG: 100 INJECTION SUBCUTANEOUS at 05:03

## 2018-03-27 RX ADMIN — POLYETHYLENE GLYCOL 3350 17 G: 17 POWDER, FOR SOLUTION ORAL at 09:03

## 2018-03-27 RX ADMIN — AMLODIPINE BESYLATE 10 MG: 10 TABLET ORAL at 09:03

## 2018-03-27 RX ADMIN — AMIODARONE HYDROCHLORIDE 400 MG: 200 TABLET ORAL at 08:03

## 2018-03-27 RX ADMIN — ATORVASTATIN CALCIUM 40 MG: 20 TABLET, FILM COATED ORAL at 09:03

## 2018-03-27 RX ADMIN — Medication 500 MG: at 08:03

## 2018-03-27 RX ADMIN — MUPIROCIN 1 G: 20 OINTMENT TOPICAL at 09:03

## 2018-03-27 RX ADMIN — CARVEDILOL 3.12 MG: 3.12 TABLET, FILM COATED ORAL at 09:03

## 2018-03-27 RX ADMIN — FLUTICASONE FUROATE AND VILANTEROL TRIFENATATE 1 PUFF: 100; 25 POWDER RESPIRATORY (INHALATION) at 09:03

## 2018-03-27 RX ADMIN — FAMOTIDINE 20 MG: 10 INJECTION, SOLUTION INTRAVENOUS at 09:03

## 2018-03-27 RX ADMIN — FUROSEMIDE 40 MG: 10 INJECTION, SOLUTION INTRAMUSCULAR; INTRAVENOUS at 05:03

## 2018-03-27 RX ADMIN — Medication 500 MG: at 09:03

## 2018-03-27 RX ADMIN — AMIODARONE HYDROCHLORIDE 400 MG: 200 TABLET ORAL at 09:03

## 2018-03-27 RX ADMIN — MAGNESIUM SULFATE HEPTAHYDRATE 1 G: 500 INJECTION, SOLUTION INTRAMUSCULAR; INTRAVENOUS at 01:03

## 2018-03-27 RX ADMIN — ASPIRIN 325 MG ORAL TABLET 325 MG: 325 PILL ORAL at 09:03

## 2018-03-27 RX ADMIN — FAMOTIDINE 20 MG: 20 TABLET, FILM COATED ORAL at 08:03

## 2018-03-27 RX ADMIN — POTASSIUM CHLORIDE 40 MEQ: 20 SOLUTION ORAL at 09:03

## 2018-03-27 RX ADMIN — CLOPIDOGREL 75 MG: 75 TABLET, FILM COATED ORAL at 09:03

## 2018-03-27 RX ADMIN — CHLORTHALIDONE 25 MG: 25 TABLET ORAL at 07:03

## 2018-03-27 RX ADMIN — FUROSEMIDE 40 MG: 10 INJECTION, SOLUTION INTRAMUSCULAR; INTRAVENOUS at 09:03

## 2018-03-27 NOTE — ASSESSMENT & PLAN NOTE
-  A-fib with RVR on 3/24  -  Treated with amio load and gtt--> transitioned to PO  -  Risk factor for stroke; however, VN does not believe etiology of recent stroke

## 2018-03-27 NOTE — ASSESSMENT & PLAN NOTE
- CXR on 3/23 with slight interval worsening of bilateral airspace disease suggestive of pulmonary edema.  - Remains on 2.5L nasal cannula.     - Continue furosemide 40 mg IV BID / chlorthalidone as per CTS team.   - patient appears euvolemic / awaits recs for further continuation   - Strict I/O and daily weights.

## 2018-03-27 NOTE — SUBJECTIVE & OBJECTIVE
Neurologic Chief Complaint: Left PCA / MCA embolic stroke     Subjective:     Interval History: NAEON. No concerns for worsening NIHSS     HPI, Past Medical, Family, and Social History remains the same as documented in the initial encounter.     Review of Systems   Constitutional: Negative for chills and fever.   HENT: Negative for ear discharge and ear pain.    Eyes: Negative for pain and itching.   Respiratory: Negative for chest tightness and shortness of breath.    Cardiovascular: Negative for chest pain and leg swelling.   Gastrointestinal: Negative for abdominal distention and abdominal pain.   Genitourinary: Negative for difficulty urinating and dysuria.   Musculoskeletal: Positive for arthralgias.   Neurological: Positive for weakness. Negative for facial asymmetry and speech difficulty.   Psychiatric/Behavioral: Negative for confusion.     Scheduled Meds:   amiodarone  400 mg Oral BID    amLODIPine  10 mg Oral Daily    ascorbic acid (vitamin C)  500 mg Oral BID    aspirin  325 mg Oral Daily    atorvastatin  40 mg Oral Daily    carvedilol  3.125 mg Oral Daily    chlorthalidone  25 mg Oral QAM    clopidogrel  75 mg Oral Daily    enoxaparin  40 mg Subcutaneous Daily    famotidine  20 mg Oral BID    fluticasone-vilanterol  1 puff Inhalation Daily    furosemide  40 mg Intravenous BID    mupirocin  1 g Nasal BID    polyethylene glycol  17 g Oral Daily     Continuous Infusions:   sodium chloride 0.9%       PRN Meds:acetaminophen, albuterol-ipratropium 2.5mg-0.5mg/3mL, bisacodyl, dextrose 50%, dextrose 50%, glucagon (human recombinant), glucose, glucose, hydrALAZINE, insulin aspart U-100, morphine, ondansetron, promethazine (PHENERGAN) IVPB, sodium chloride 0.9%    Objective:     Vital Signs (Most Recent):  Temp: 97.9 °F (36.6 °C) (03/27/18 1157)  Pulse: (!) 59 (03/27/18 1500)  Resp: 16 (03/27/18 1157)  BP: (!) 110/51 (03/27/18 1157)  SpO2: 96 % (03/27/18 1157)  BP Location: Left arm    Vital Signs  Range (Last 24H):  Temp:  [97.9 °F (36.6 °C)-98.7 °F (37.1 °C)]   Pulse:  [55-72]   Resp:  [16-20]   BP: (110-174)/(51-72)   SpO2:  [78 %-96 %]   BP Location: Left arm    Physical Exam   Constitutional: She appears well-developed and well-nourished.   HENT:   Head: Normocephalic and atraumatic.   Eyes: EOM are normal. Pupils are equal, round, and reactive to light.   Neck: Normal range of motion.   Cardiovascular: Normal rate.    Pulmonary/Chest: Effort normal.   Abdominal: Soft.   Neurological: She is alert.   Right sided weakness, sensory deficit, hemianopsia, dysarthria   Skin: Skin is warm and dry.   Nursing note and vitals reviewed.      Neurological Exam:   LOC: alert  Attention Span: Good   Language: No aphasia  Articulation: No dysarthria  Orientation: Person, Place, Time   Visual Fields: Hemianopsia right  EOM (CN III, IV, VI): Full/intact  Pupils (CN II, III): PERRL  Facial Sensation (CN V): Normal  Facial Movement (CN VII): Symmetric facial expression    Motor: Arm left  Normal 4+/5  Leg left  Normal 4+/5  Arm right  Normal 1/5  Leg right Normal 4/5  Cebellar: No evidence of appendicular or axial ataxia  Sensation: Intact to light touch, temperature and vibration  Tone: Normal tone throughout    Laboratory:  CMP:     Recent Labs  Lab 03/27/18  0459   CALCIUM 9.5   ALBUMIN 3.2*   PROT 6.7      K 3.7   CO2 26   CL 97   BUN 16   CREATININE 0.7   ALKPHOS 77   ALT 10   AST 21   BILITOT 0.9     BMP:     Recent Labs  Lab 03/27/18  0459      K 3.7   CL 97   CO2 26   BUN 16   CREATININE 0.7   CALCIUM 9.5     CBC:     Recent Labs  Lab 03/27/18  0459   WBC 8.28   RBC 3.93*   HGB 9.3*   HCT 29.6*   *   MCV 75*   MCH 23.7*   MCHC 31.4*     Lipid Panel:     Recent Labs  Lab 03/25/18  0538   CHOL 150   LDLCALC 80.2   HDL 42   TRIG 139     Coagulation:     Recent Labs  Lab 03/23/18  0331 03/23/18  0340   INR 1.0  --    APTT  --  22.1     Platelet Aggregation Study: No results for input(s): PLTAGG,  PLTAGINTERP, PLTAGREGLACO, ADPPLTAGGREG in the last 168 hours.  Hgb A1C:     Recent Labs  Lab 03/21/18  1003   HGBA1C 6.5*     TSH:     Recent Labs  Lab 03/25/18  0538   TSH 1.221       Diagnostic Results     Brain Imaging   MRI: 1.  Multiple abnormal regions of restricted diffusion throughout the bilateral cerebral and cerebellar hemispheres in keeping with acute infarcts as discussed above.  Given the multiplicity and distribution, findings are suggestive of embolic phenomena.    2.  Scattered foci of supratentorial periventricular T2/FLAIR hyperintensity suggestive of chronic microvascular ischemic change.    Vessel Imaging   CTA:   Aorta: Normal 3 vessel arch.  Significant atherosclerotic calcification.    Extracranial carotid circulation: Mixed density atherosclerosis at both carotid bifurcations.  No hemodynamically significant stenosis, aneurysmal dilatation, or dissection.    Focal narrowing of the mid V4 segment of the right vertebral artery; not hemodynamically significant.  Punctate calcifications at the P3 segment of the right posterior cerebral artery, and P4 segment of the left posterior cerebral artery may represent calcified thrombi noting that the distal vessels appear patent.  No focal high-grade stenosis, occlusion, or aneurysm    Cardiac Imaging     CONCLUSIONS     1 - Low normal to mildly depressed left ventricular systolic function (EF 50-55%).     2 - Mild mitral regurgitation.     3 - Severe aortic stenosis.     4 - Grade 4 atheroma disease of aorta.     5 - Successful implantation of a 26mm Evolut R CoreValve into the aortic position with no evidence of regurgitation.

## 2018-03-27 NOTE — SIGNIFICANT EVENT
Called to bedside by nurse for patient complaints of numbness around lips and tingling to left hand, along with an 18 beat run of Vtach.      On exam patient is awake and alert.  Able to respond to questions appropriately.  Right hemianopia and right hemiplegia which is unchanged from recent stroke.  Patient unable to fully quantify when numbness/tingling first began.  Unsure if it was present earlier in day.  Exam from stroke team this afternoon shows patient with sensory loss.  Sensory loss to lips and left hand does not fit a vascular distribution concerning for new infarct.  Will continue to monitor.  Low threashold for stat CT if patient worsens or develops new symptoms.  Patient currently on ASA, plavix, atorvastatin.    Patient complaining of palpitations.  No chest pain, arm pain, or back pain.  Denies SOB.  Stat EKG completed.  Multiple PVCs.  Reviewed vtach strip.  Stat mag, phos, CMP, troponin, CPK ordered.  Requested nurse to contact CTSU service who is currently following patient.  Patient recently discontinued from IV amiodarone and placed on oral dosing.  No prior history of vtach during this admission.    K+ 3.1 - will replace  Mag 1.8 - low side of normal will replace  Troponin 0.687 - will trend every 6 hours plus repeat EKG    CTSU service aware and following.      Nereida Alexandre, NP-C  Vascular Neurology  [phone]

## 2018-03-27 NOTE — ASSESSMENT & PLAN NOTE
--currently in NSR  --Amiodrone PO 400mg BID- we typically discharge patients on this dose until patient returns for post op visit- roughly 4 weeks after surgery.

## 2018-03-27 NOTE — PLAN OF CARE
Problem: Patient Care Overview  Goal: Plan of Care Review  Outcome: Ongoing (interventions implemented as appropriate)  Plan of care discussed with patient. Fall precautions in place.Telesitter at bedside and activated along with bed alarm. Reviewed fall risk and reinforcing. Reviewed eating with supervision, still reinforcing. Patient has no complaints of pain. Discussed medications and care. Patient has no questions at this time. Will continue to monitor.

## 2018-03-27 NOTE — PROGRESS NOTES
Notified Team that patient's Heart rate has been holding pretty steadily in the 50s throughout the day. Patient is asymptomatic. Team is ok with HR in the 50s. Will continue to monitor.

## 2018-03-27 NOTE — PT/OT/SLP EVAL
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    Recommendations:     Recommendations:                General Recommendations:  Dysphagia therapy, Speech/language therapy and Cognitive-linguistic therapy  Diet recommendations:  Dental Soft, Thin   Aspiration Precautions: slow rate of eating, 1 bite/sip at a time, Alternating bites/sips, Assistance with meals, Check for pocketing/oral residue, Eliminate distractions, Feed only when awake/alert, HOB to 90 degrees, Meds whole 1 at a time, Monitor for s/s of aspiration, No straws, Small bites/sips and Standard aspiration precautions   General Precautions: Standard, aspiration, fall  Communication strategies:  provide increased time to answer and go to room if call light pushed    History:     Past Medical History:   Diagnosis Date    Abdominal aortic aneurysm (AAA) without rupture 9/12/2017    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 3/26/2018    Arthritis     Asthma     Atrial fibrillation 3/25/2018    Bilateral carotid artery stenosis 9/12/2017    Cancer     lung    COPD (chronic obstructive pulmonary disease)     Coronary artery disease of native artery with stable angina pectoris 9/29/2017    Diabetes mellitus     Gastroesophageal reflux disease without esophagitis 9/12/2017    Heart valve disorder     Hyperlipidemia     Hypertension     Long term current use of antithrombotics/antiplatelets     S/p TAVR (transcatheter aortic valve replacement), bioprosthetic 3/22/2018    Severe aortic stenosis 9/12/2017       Past Surgical History:   Procedure Laterality Date    HAND SURGERY Right     KNEE ARTHROSCOPY      LUNG LOBECTOMY Right     middle lobe    mediastinoscopy      SPINAL FUSION      TONSILLECTOMY      WRIST SURGERY Right        Social History: Patient lives  "alone.    Prior Intubation HX:  None this admission    Modified Barium Swallow: none this admission    Chest X-Rays: 3/24/18:  Right vascular sheath remains.  Valve replacement noted.  Heart remains enlarged with diffuse increase in the pulmonary vascular, interstitial and alveolar markings similar.  No pneumothorax    Prior diet: regular.    Occupation/hobbies/homemaking: Pt was a  and hairdresser for many years.      Subjective     "I can;t stand this."  Pt stated re: decreased mobility  Patient goals: to get better     Pain/Comfort:  · Pain Rating 1: 0/10  · Pain Rating Post-Intervention 1: 0/10    Objective:   Cognitive Status:    Arousal/Alertness Appropriate response to stimuli  Attention Selective attention deficit Right Neglect  Orientation Person and Situation  Memory Immediate Recall 100% indep and Delayed0% indep up to 100% acc given min categorical cues  Problem Solving Categories 100% acc for concrete, 60% acc for abstract, Solutions 100% acc given mod cues and Compare/contrast 60% acc for comparisons and contrasts indep up to 100% cued  Safety awareness severely decreased      Receptive Language:   Comprehension:      Mild deficits w/ complex,multiple step directives  Questions Simple yes/no 100%  Complex yes/no 80-%  Commands  One step 100%  two step basic commands 100%  multistep basic commands 50%    Pragmatics:    WFL    Expressive Language:  Verbal:    WFL  Automatic Speech  Counting 100%  Initiation WFL  Repetition Sentences 100%  Naming Confrontation 100%, Convergent 100%, Divergent responsive 100% and Single word responsive naming 100%  Sentence formulation WFL  Conversational speech WFL for simple and complex expression      Motor Speech:  WFL    Voice:   WFL    Visual-Spatial:  DNT    Reading:   DNT     Written Expression:   DNT    Treatment: Pt was provided trials of thin liquids via small lumen straw (juice box) and open cup and self fed bites of soft solids.  She tolerated all cup " edge sips and self fed bites w/ no overt signs of airway compromise.  Pt was noted w/ increased impulsivity w/ Straw sips which resulted in throat clearing.  Mild oral dysphagia was seen w/ solids w/ anterior spillage of cracker pieces from right oral cavity d/t decreased labial seal and sensation.  Given cues, pt was able to manipulate bolus w/ improved control and no stasis or spillage.    Education was provided to pt re: SLP role, diet recs, aspiration precautions, eval results and SLP POC.  She indicated good understanding and agreed w/ established POC.  Pt's whiteboard was updated.    Assessment:   Kristina Aguirre is a 67 y.o. female with an SLP diagnosis of Dysphagia, Cognitive-Linguistic Impairment and Visio-Spatial Impairment.  She presents with moderate cognitive-linguistic impairment and mild oral dysphagia.    Goals:    SLP Goals        Problem: SLP Goal    Goal Priority Disciplines Outcome   SLP Goal     SLP Ongoing (interventions implemented as appropriate)   Description:  Updated Speech Language Pathology Goals  Goals expected to be met by 4/3/18:    1.  Pt will tolerate Dental Soft diet w/ thin liquids w/ no overt signs of aspiration.  2.  Pt will be oriented x4 using external cues.  3.  Pt will complete abstract naming tasks w/ 80% acc given min cues.  4.  Pt will complete problem-solving tasks w/ 80% acc given min cues.  5.  Pt will complete reasoning tasks w/ 80% acc given min cues.  6.  Pt will complete short term memory tasks w/ 66% acc indep using memory strategies.  7.  Pt will complete further evaluation of reading, writing and visual spatial skills to determine the need for additional goals.        Speech Language Pathology Goals  Goals expected to be met by 4/1  1. Pt will participate in ongoing swallow assessment -Goal Met  2. Pt will participate in speech, language and cognitive assessment -Goal Met                       Plan:   · Patient to be seen:  5 x/week   · Plan of Care expires:   04/24/18  · Plan of Care reviewed with:  patient   · SLP Follow-Up:  Yes       Discharge recommendations:  Discharge Facility/Level Of Care Needs: rehabilitation facility   Barriers to Discharge:  Decreased Care Giver Support    Time Tracking:   SLP Treatment Date:   03/27/18  Speech Start Time:  1132  Speech Stop Time:  1216     Speech Total Time (min):  44 min    Billable Minutes: Eval 24 , Treatment Swallowing Dysfunction 10 and Seld Care/Home Management Training 10    Nichol Perdomo CCC-SLP  03/27/2018

## 2018-03-27 NOTE — PT/OT/SLP PROGRESS
"Occupational Therapy   Treatment    Name: Kristina Aguirre  MRN: [id number]  Admitting Diagnosis:  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  4 Days Post-Op    Recommendations:     Discharge Recommendations: rehabilitation facility  Discharge Equipment Recommendations:   (TBD pending progress)  Barriers to discharge:  Decreased caregiver support    Subjective     Communicated with: RN prior to session.  Pt agreeable to participate with therapy.   " I'm ready to get out of this bed"     Pain/Comfort:  · Pain Rating 1: 0/10  · Pain Rating Post-Intervention 1: 0/10    Patients cultural, spiritual, [employer] conflicts given the current situation: none reported    Objective:     Patient found with: telemetry, PureWick, peripheral IV    General Precautions: Standard, fall, aspiration   Orthopedic Precautions:N/A   Braces: N/A     Occupational Performance:    Bed Mobility:    · Patient completed Rolling/Turning to Right with contact guard assistance  · Patient completed Scooting/Bridging with moderate assistance  · Patient completed Supine to Sit with moderate assistance  · Patient completed Sit to Supine with moderate assistance     Sitting EOB:   Pt sat EOB ~20 minutes with CGA for postural control and safety.    Functional Mobility/Transfers:  · Patient completed Sit <> Stand Transfer with moderate assistance  with  no assistive device       Activities of Daily Living:  · Grooming: contact guard assistance while seated EOB to wash face and brush hair using LUE; required hand over hand (TA) to complete with RUE  · UB Dressing: moderate assistance using soha-dressing technique; pt required assistance to jhonathan behind back and for tie completion   · LB Dressing: maximal assistance to jhonathan B socks while seated EOB    Patient left supine with PCT present to clean pt following tx session.     [facility] 6 Click:  AMPA Total Score: 14    Treatment & Education:  -Pt edu on OT role/POC  -Importance of OOB activity with " staff assistance  -Safety during functional t/f and mobility   - White board updated  - Multiple self care tasks--assistance level noted   - She performed dynamic sitting activity incorporating weight bearing through RUE, weight shifting, visual scanning, and core control to increase dynamic balance, strength, and endurance for increased independence with ADL tasks.         * Pt required CGA-min A for dynamic sitting balance        *  Min verbal/tactile cues to locate items in R visual field (pt compensated by rotating cervical spine to R side)  - Pt/family edu on positioning of RUE; elevated with pillow; supported for joint approximation; edu on BUE HEP to complete 3x daily ( pt/family verbalized understanding)   - She completed x4 trials sit <> stand from EOB-- maintaining standing position ~30 seconds each/     * Pt required min-mod cues for overall safety ( pt presents with mild impulsivity)   * R visual field deficit  * R sided weakness and sensation deficits ( No active movement observed)       Education:    Assessment:     Kristina Aguirre is a 67 y.o. female with a medical diagnosis of 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.  She presents motivated to participate with therapy. Pt is progressing well with goals.   Performance deficits affecting function are weakness, impaired endurance, impaired self care skills, impaired functional mobilty, impaired balance, decreased lower extremity function, decreased upper extremity function, decreased safety awareness, impaired fine motor, decreased ROM, visual deficits.  Due to deficits listed above she requires increased assistance with ADL and functional mobility at this time. Pt would benefit from IP rehab following acute care stay to continue to progress towards goals and improve quality of life.     Rehab Prognosis:  Good; patient would benefit from acute skilled OT services to address these deficits and reach maximum level of function.        Plan:     Patient to be seen 4 x/week to address the above listed problems via self-care/home management, therapeutic activities, therapeutic exercises, neuromuscular re-education, cognitive retraining  · Plan of Care Expires: 04/22/18  · Plan of Care Reviewed with: patient    This Plan of care has been discussed with the patient who was involved in its development and understands and is in agreement with the identified goals and treatment plan    GOALS:    Occupational Therapy Goals        Problem: Occupational Therapy Goal    Goal Priority Disciplines Outcome Interventions   Occupational Therapy Goal     OT, PT/OT Ongoing (interventions implemented as appropriate)    Description:  Goals to be met by: 4/2/18     Patient will increase functional independence with ADLs by performing:    Feeding with Modified [place].  UE Dressing with Supervision.  LE Dressing with Supervision.  Grooming while standing at sink with Supervision.  Toileting from toilet with Stand-by Assistance for hygiene and clothing management.   Toilet transfer to toilet with Stand-by Assistance.  Upper extremity exercise program x10 reps per handout, with independence.                      Time Tracking:     OT Date of Treatment: 03/27/18  OT Start Time: 1519  OT Stop Time: 1557  OT Total Time (min): 38 min    Billable Minutes:Self Care/Home Management 15  Therapeutic Activity 8  Neuromuscular Re-education 15    Supriya gibson, KELVIN  3/27/2018

## 2018-03-27 NOTE — PROGRESS NOTES
Per Neuro request to keep CTS informed, CTS MD on call notified of pt's current labs, including troponin, and runs of NSVT. Informed him of Neuros plan to replace lytes. MD acknowledged information. Orders to inform if runs of NSVT continue and may need to restart IV Amio. Will continue to monitor. Mariana Wolf RN

## 2018-03-27 NOTE — PROGRESS NOTES
NIURKA Walters NP ordered 2g IVPB Mg to be administered and d/c'd previous 400mg  PO Mg. Pt had already received the PO mg. MD on call notified, OK to change to 1g IVPB Mg.

## 2018-03-27 NOTE — PROGRESS NOTES
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 Stroke called on 3/24 at 2004 for worsening RUE weakness, right facial droop, dysarthria and aphasia.     - CTA without LVO and MRI with multiple bilateral (R>L) acute infarcts on cerebral and cerebellar hemispheres highly suggestive of embolic phenomenon. No tpa as outside window.   - Documented right field deficits and worsening RUE weakness <24h s/p TAVR. ROSEY during TAVR demonstrable for grade 4 atheroma on aorta. Suspect iatrogenic from TAVR as pattern is concerning for embolic phenomena.    - Improvement in NIHSS / RUE weakness with sensory affect / with no concerns for aphasia.      Plan:   Antithrombotics for secondary stroke prevention: Antiplatelets: Aspirin: 81 mg daily  Clopidogrel: 75 mg daily / Needs consideration for AC post acute stroke phase for recordings of new onset Afib     Statins for secondary stroke prevention and hyperlipidemia, if present:   Statins: Atorvastatin- 40 mg daily    Aggressive risk factor modification: HTN, Smoking, DM, CAD, bilateral carotid stenosis, s/p TAVR     Rehab efforts: PT/OT/SLP to evaluate and treat, PM&R consult     Diagnostics ordered/pending: None     VTE prophylaxis: Enoxaparin 40 mg SQ every 24 hours    BP parameters: SBP < 180         Atrial fibrillation    - Newly diagnosed here. Patient found to be in afib RVR after returning from MRI on 3/24.     - Unlikely to have caused stroke as she went into arrhythmia s/p symptom onset. Likely post-procedural from TAVR.   - However CHADVASC 8 /  Needs consideration for AC post acute stroke phase.     - Switched IV to oral amiodarone AS per CTS and currently remains in NSR.   - Awaits CTS recs for any further continuation of amidarone           Acute on chronic diastolic heart failure    - CXR on 3/23 with slight interval worsening of bilateral airspace disease suggestive of pulmonary edema.  - Remains on 2.5L nasal cannula.     - Continue furosemide 40 mg IV BID / chlorthalidone as per CTS team.   - patient appears euvolemic / awaits recs for further continuation of diuretics   - Strict I/O and daily weights.         S/p TAVR (transcatheter aortic valve replacement), bioprosthetic    - Followed by CTS.  - Continue DAPT for one year.         Centrilobular emphysema    - Duo-neb prn.         Diabetes mellitus, type 2    Stroke risk factor  HgB A1C 6.5    SSI        Malignant neoplasm of right lung    - s/p RML lobectomy, path showed keratinizing squamous cell CA. Contacted oncologist who reported disease on remission, last PET scan negative.        Coronary artery disease of native artery with stable angina pectoris    Stroke risk factor  - Continue DAPT, statin, and BB    - Continue meds as CTS         Tobacco abuse    Smoking cessation        Severe aortic stenosis    S/p TAVR 3/22, followed by CTS.         Essential hypertension    Stroke risk factor  SBP <180, but MAP < 80 s/p TAVR.    - Continue amlodipine 10 mg PO daily, carvedilol 3.125 mg PO BID, chlorthalidone 25 mg PO daily, lasix 40 mg IV BID and hydralazine IV PRN.   - Awaiting CTS recs for finalizing essential Dc medications         Dyslipidemia    Stroke risk factor  LDL >70   Lipitor 40 mg daily        Bilateral carotid artery stenosis    Stroke risk factor - but unlikely to have caused this event as pattern not consistent with thrombotic/a-a considering embolic multi-territorial stroke.     - As demonstrable by recent carotid US and CTA (mixed density atherosclerosis at both carotid  bifurcations)             3/25 - New-onset afib RVR developed after return from MRI this morning. IV amiodarone loaded and started on gtt- rhythm resolved and remains on gtt. No worsening focal deficits this morning.     STROKE DOCUMENTATION   Acute Stroke Times   Last Known Normal Date: 03/23/18  Last Known Normal Time:  (Unsure)  Symptom Onset Date: 03/24/18  Symptom Onset Time:  (Unsure)  Stroke Team Called Date: 03/24/18  Stroke Team Called Time: 2004  Stroke Team Arrival Date: 03/24/18  Stroke Team Arrival Time: 2010  CT Interpretation Time: 2025    NIH Scale:  1a. Level Of Consciousness: 0-->Alert: keenly responsive  1b. LOC Questions: 0-->Answers both questions correctly  1c. LOC Commands: 0-->Performs both tasks correctly  2. Best Gaze: 0-->Normal  3. Visual: 2-->Complete hemianopia  4. Facial Palsy: 0-->Normal symmetrical movements  5a. Motor Arm, Left: 0-->No drift: limb holds 90 (or 45) degrees for full 10 secs  5b. Motor Arm, Right: 4-->No movement  6a. Motor Leg, Left: 0-->No drift: leg holds 30 degree position for full 5 secs  6b. Motor Leg, Right: 1-->Drift: leg falls by the end of the 5-sec period but does not hit bed  7. Limb Ataxia: 0-->Absent  8. Sensory: 1-->Mild-to-moderate sensory loss: patient feels pinprick is less sharp or is dull on the affected side: or there is a loss of superficial pain with pinprick, but patient is aware of being touched  9. Best Language: 0-->No aphasia: normal  10. Dysarthria: 0-->Normal  11. Extinction and Inattention (formerly Neglect): 0-->No abnormality  Total (NIH Stroke Scale): 8       Modified Ellen Score: 1  Tripp Coma Scale:15   ABCD2 Score:    [id number] Score:8  HAS -BLED Score:5  ICH Score:   Hunt & Eldridge Classification:      Hemorrhagic change of an Ischemic Stroke: Does this patient have an ischemic stroke with hemorrhagic changes? No     Neurologic Chief Complaint: Left PCA / MCA embolic stroke     Subjective:     Interval History: NAEON. No  concerns for worsening NIHSS     HPI, Past Medical, Family, and Social History remains the same as documented in the initial encounter.     Review of Systems   Constitutional: Negative for chills and fever.   HENT: Negative for ear discharge and ear pain.    Eyes: Negative for pain and itching.   Respiratory: Negative for chest tightness and shortness of breath.    Cardiovascular: Negative for chest pain and leg swelling.   Gastrointestinal: Negative for abdominal distention and abdominal pain.   Genitourinary: Negative for difficulty urinating and dysuria.   Musculoskeletal: Positive for arthralgias.   Neurological: Positive for weakness. Negative for facial asymmetry and speech difficulty.   Psychiatric/Behavioral: Negative for confusion.     Scheduled Meds:   amiodarone  400 mg Oral BID    amLODIPine  10 mg Oral Daily    ascorbic acid (vitamin C)  500 mg Oral BID    aspirin  325 mg Oral Daily    atorvastatin  40 mg Oral Daily    carvedilol  3.125 mg Oral Daily    chlorthalidone  25 mg Oral QAM    clopidogrel  75 mg Oral Daily    enoxaparin  40 mg Subcutaneous Daily    famotidine  20 mg Oral BID    fluticasone-vilanterol  1 puff Inhalation Daily    furosemide  40 mg Intravenous BID    mupirocin  1 g Nasal BID    polyethylene glycol  17 g Oral Daily     Continuous Infusions:   sodium chloride 0.9%       PRN Meds:acetaminophen, albuterol-ipratropium 2.5mg-0.5mg/3mL, bisacodyl, dextrose 50%, dextrose 50%, glucagon (human recombinant), glucose, glucose, hydrALAZINE, insulin aspart U-100, morphine, ondansetron, promethazine (PHENERGAN) IVPB, sodium chloride 0.9%    Objective:     Vital Signs (Most Recent):  Temp: 97.9 °F (36.6 °C) (03/27/18 1157)  Pulse: (!) 59 (03/27/18 1500)  Resp: 16 (03/27/18 1157)  BP: (!) 110/51 (03/27/18 1157)  SpO2: 96 % (03/27/18 1157)  BP Location: Left arm    Vital Signs Range (Last 24H):  Temp:  [97.9 °F (36.6 °C)-98.7 °F (37.1 °C)]   Pulse:  [55-72]   Resp:  [16-20]   BP:  (110-174)/(51-72)   SpO2:  [78 %-96 %]   BP Location: Left arm    Physical Exam   Constitutional: She appears well-developed and well-nourished.   HENT:   Head: Normocephalic and atraumatic.   Eyes: EOM are normal. Pupils are equal, round, and reactive to light.   Neck: Normal range of motion.   Cardiovascular: Normal rate.    Pulmonary/Chest: Effort normal.   Abdominal: Soft.   Neurological: She is alert.   Right sided weakness, sensory deficit, hemianopsia, dysarthria   Skin: Skin is warm and dry.   Nursing note and vitals reviewed.      Neurological Exam:   LOC: alert  Attention Span: Good   Language: No aphasia  Articulation: No dysarthria  Orientation: Person, Place, Time   Visual Fields: Hemianopsia right  EOM (CN III, IV, VI): Full/intact  Pupils (CN II, III): PERRL  Facial Sensation (CN V): Normal  Facial Movement (CN VII): Symmetric facial expression    Motor: Arm left  Normal 4+/5  Leg left  Normal 4+/5  Arm right  Normal 1/5  Leg right Normal 4/5  Cebellar: No evidence of appendicular or axial ataxia  Sensation: Intact to light touch, temperature and vibration  Tone: Normal tone throughout    Laboratory:  CMP:     Recent Labs  Lab 03/27/18  0459   CALCIUM 9.5   ALBUMIN 3.2*   PROT 6.7      K 3.7   CO2 26   CL 97   BUN 16   CREATININE 0.7   ALKPHOS 77   ALT 10   AST 21   BILITOT 0.9     BMP:     Recent Labs  Lab 03/27/18  0459      K 3.7   CL 97   CO2 26   BUN 16   CREATININE 0.7   CALCIUM 9.5     CBC:     Recent Labs  Lab 03/27/18  0459   WBC 8.28   RBC 3.93*   HGB 9.3*   HCT 29.6*   *   MCV 75*   MCH 23.7*   MCHC 31.4*     Lipid Panel:     Recent Labs  Lab 03/25/18  0538   CHOL 150   LDLCALC 80.2   HDL 42   TRIG 139     Coagulation:     Recent Labs  Lab 03/23/18  0331 03/23/18  0340   INR 1.0  --    APTT  --  22.1     Platelet Aggregation Study: No results for input(s): PLTAGG, PLTAGINTERP, PLTAGREGLACO, ADPPLTAGGREG in the last 168 hours.  Hgb A1C:     Recent Labs  Lab 03/21/18  1009    HGBA1C 6.5*     TSH:     Recent Labs  Lab 03/25/18  0538   TSH 1.221       Diagnostic Results     Brain Imaging   MRI: 1.  Multiple abnormal regions of restricted diffusion throughout the bilateral cerebral and cerebellar hemispheres in keeping with acute infarcts as discussed above.  Given the multiplicity and distribution, findings are suggestive of embolic phenomena.    2.  Scattered foci of supratentorial periventricular T2/FLAIR hyperintensity suggestive of chronic microvascular ischemic change.    Vessel Imaging   CTA:   Aorta: Normal 3 vessel arch.  Significant atherosclerotic calcification.    Extracranial carotid circulation: Mixed density atherosclerosis at both carotid bifurcations.  No hemodynamically significant stenosis, aneurysmal dilatation, or dissection.    Focal narrowing of the mid V4 segment of the right vertebral artery; not hemodynamically significant.  Punctate calcifications at the P3 segment of the right posterior cerebral artery, and P4 segment of the left posterior cerebral artery may represent calcified thrombi noting that the distal vessels appear patent.  No focal high-grade stenosis, occlusion, or aneurysm    Cardiac Imaging     CONCLUSIONS     1 - Low normal to mildly depressed left ventricular systolic function (EF 50-55%).     2 - Mild mitral regurgitation.     3 - Severe aortic stenosis.     4 - Grade 4 atheroma disease of aorta.     5 - Successful implantation of a 26mm Evolut R CoreValve into the aortic position with no evidence of regurgitation.       Aissatou Walton MD  Comprehensive Stroke Center  Department of Vascular Neurology   [facility]

## 2018-03-27 NOTE — ASSESSMENT & PLAN NOTE
Stroke risk factor  SBP <180, but MAP < 80 s/p TAVR.    - Continue amlodipine 10 mg PO daily, carvedilol 3.125 mg PO BID, chlorthalidone 25 mg PO daily, lasix 40 mg IV BID and hydralazine IV PRN.   - Awaiting CTS recs for finalizing essential Dc medications

## 2018-03-27 NOTE — PROGRESS NOTES
[facility]  Physical Medicine & Rehab  Progress Note    Patient Name: Kristina Aguirre  MRN: [id number]  Admission Date: 3/22/2018  Length of Stay: 5 days  Attending Physician: Eric Obregon MD    Subjective:     Principal Problem: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    Hospital Course:   3/23/18:  Evaluated by PT and OT.  Sit to stand Mya.  No further transfers or gait.  UBD maxA and LBD totalA.  Grooming modA.  Toileting totalA.  Feeding Mya.   3/25/18:  Participated with PT.  Evaluated by SLP.  Found to have dysarthria and dysphagia.  SLP recommendation: NPO.  Bed mobility Mya.  Sit to stand Mya and transfers modA.  No gait.  EOB x 5 minutes Mya.  3/26/18:  Participated with SLP and OT.  SLP recommendation: dental soft diet and thin liquids.  Bed mobility min-modA.  EOB x 23 minutes SBA.  Sit to stand modA.  Standing trials maxA.  LBD totalA.     Interval History 3/27/2018:  Patient is seen for follow-up rehab evaluation and recommendations: No acute events over night.  Off amio gtt.  Participating with therapy.  Barriers for discharge/rehab admission: rehab bed    HPI, Past Medical, Family, and Social History remains the same as documented in the initial encounter.    Scheduled Medications:    amiodarone  400 mg Oral BID    amLODIPine  10 mg Oral Daily    ascorbic acid (vitamin C)  500 mg Oral BID    aspirin  325 mg Oral Daily    atorvastatin  40 mg Oral Daily    carvedilol  3.125 mg Oral Daily    chlorthalidone  25 mg Oral QAM    clopidogrel  75 mg Oral Daily    enoxaparin  40 mg Subcutaneous Daily    famotidine (PF)  20 mg Intravenous BID    fluticasone-vilanterol  1 puff Inhalation Daily    furosemide  40 mg Intravenous BID    mupirocin  1 g Nasal BID    polyethylene glycol  17 g Oral Daily    potassium chloride 10%  40 mEq Oral TID     PRN Medications: acetaminophen, albuterol-ipratropium 2.5mg-0.5mg/3mL, bisacodyl, dextrose 50%, dextrose 50%,  glucagon (human recombinant), glucose, glucose, hydrALAZINE, insulin aspart U-100, morphine, ondansetron, promethazine (PHENERGAN) IVPB, sodium chloride 0.9%    Review of Systems   Constitutional: Positive for activity change. Negative for chills, fatigue and fever.   HENT: Negative for drooling, hearing loss, trouble swallowing and voice change.    Eyes: Positive for visual disturbance. Negative for pain.   Respiratory: Negative for cough, shortness of breath and wheezing.    Cardiovascular: Negative for chest pain and palpitations.   Gastrointestinal: Negative for abdominal distention, nausea and vomiting.   Genitourinary: Negative for difficulty urinating and flank pain.   Musculoskeletal: Positive for gait problem. Negative for arthralgias, back pain, myalgias and neck pain.   Skin: Negative for rash and wound.   Neurological: Positive for facial asymmetry, speech difficulty and weakness. Negative for dizziness, numbness and headaches.   Psychiatric/Behavioral: Negative for agitation and hallucinations. The patient is not nervous/anxious.      Objective:     Vital Signs (Most Recent):  Temp: 98.6 °F (37 °C) (03/27/18 0718)  Pulse: 60 (03/27/18 0903)  Resp: 16 (03/27/18 0903)  BP: 138/62 (03/27/18 0718)  SpO2: 95 % (03/27/18 0718)    Vital Signs (24h Range):  Temp:  [98.1 °F (36.7 °C)-98.7 °F (37.1 °C)] 98.6 °F (37 °C)  Pulse:  [56-72] 60  Resp:  [16-20] 16  SpO2:  [78 %-97 %] 95 %  BP: (124-174)/(60-72) 138/62     Physical Exam   Constitutional: She is oriented to person, place, and time. She appears well-developed and well-nourished. No distress.   HENT:   Head: Normocephalic and atraumatic.   Right Ear: External ear normal.   Left Ear: External ear normal.   Nose: Nose normal.   Eyes: Right eye exhibits no discharge. Left eye exhibits no discharge. No scleral icterus.   Neck: Normal range of motion.   Cardiovascular: Normal rate, regular rhythm and intact distal pulses.    Pulmonary/Chest: Effort normal. No  respiratory distress. She has no wheezes.   Abdominal: Soft. She exhibits no distension. There is no tenderness.   Musculoskeletal: Normal range of motion. She exhibits no edema or tenderness.   Neurological: She is alert and oriented to person, place, and time.   -  Mental Status:  AAOx3.  Follows commands.  Answers correct age and .  Recent and remote memory intact.  -  Speech and language:  No aphasia, mild dysarthria.    -  Vision:  R hemianopsia.  No ptosis.    -  Facial movement (CN VII): Mild facial droop.   -  Motor:  RUE: 0/5.  LUE: 5/5.  RLE: 4-/5.  LLE: 5/5.  -  Tone:  Decreased RUE.  -  Sensory:  Intact to light touch and pin prick.   Skin: Skin is warm and dry. No rash noted.   Psychiatric: She has a normal mood and affect. Her behavior is normal. Thought content normal. Her speech is slurred. Cognition and memory are impaired.   Vitals reviewed.    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      Recommendations  -  Encourage mobility, OOB in chair, and early ambulation as appropriate   -  PT/OT evaluate and treat  -  SLP speech and cognitive evaluate and treat  -  Monitor mood and sleep disturbances  -  Establish consistent sleep-wake cycle  -  Monitor for bowel and bladder dysfunction  -  Monitor for shoulder pain and subluxation  -  Monitor for  spasticity  -  Monitor for and prevent skin breakdown and pressure ulcers  · Early mobility, repositioning/weight shifting every 20-30 minutes when sitting, turn patient every 2 hours, proper mattress/overlay and chair cushioning, pressure relief/heel protector boots  -  DVT prophylaxis:  On Lovenox        Atrial fibrillation    -  A-fib with RVR on 3/24  -  Treated with amio load and gtt--> transitioned to PO  -  Risk factor for stroke; however, VN does not believe etiology of recent stroke        S/p TAVR (transcatheter aortic valve replacement), bioprosthetic    -  2/2 severe aortic stenosis        Severe aortic stenosis    -  Admitted for severe aortic stenosis  -  S/p L transaxillary transcatheter aortic valve replacement with a 26 mm evolut valve (TAVR) on 3/22/18        Recommend IRF.  Patient approved for [facility].  Transfer to rehab when medically ready for discharge and rehab bed available.      ELIER Sales  Department of Physical Medicine & Rehab   [facility]

## 2018-03-27 NOTE — ASSESSMENT & PLAN NOTE
- Newly diagnosed here. Patient found to be in afib RVR after returning from MRI on 3/24.     - Unlikely to have caused stroke as she went into arrhythmia s/p symptom onset. Likely post-procedural from TAVR.   - However CHADVASC 8 / Needs consideration for AC post acute stroke phase.     - Switched IV to oral amiodarone AS per CTS and currently remains in NSR.   - Awaits CTS recs for any further continuation of amidarone

## 2018-03-27 NOTE — PLAN OF CARE
Problem: Occupational Therapy Goal  Goal: Occupational Therapy Goal  Goals to be met by: 4/2/18     Patient will increase functional independence with ADLs by performing:    Feeding with Modified [place].  UE Dressing with Supervision.  LE Dressing with Supervision.  Grooming while standing at sink with Supervision.  Toileting from toilet with Stand-by Assistance for hygiene and clothing management.   Toilet transfer to toilet with Stand-by Assistance.  Upper extremity exercise program x10 reps per handout, with independence.     Outcome: Ongoing (interventions implemented as appropriate)  Continue with POC.   Supriya gibson OT  3/27/2018

## 2018-03-27 NOTE — PLAN OF CARE
Problem: Patient Care Overview  Goal: Plan of Care Review  Outcome: Ongoing (interventions implemented as appropriate)  Plan of care reviewed with pt. Pt oriented to everything except time. Pt c/o numbness and tingling to L hand and lips, see notes.  VS stable. Pt with runs of NSVT on tele, lytes replaced, no new events noted since 2045. No complaints. Pt remains free from falls or injury. Bed low and locked, AVASYS at bedside, call light and personal belongings within reach. Will continue to monitor. Mariana Wolf RN

## 2018-03-27 NOTE — ASSESSMENT & PLAN NOTE
66 y/o F with medical history significant for CAD, PAD, type II DM, HTN, and severe AS s/p TAVR on 3/22. Stroke called on 3/24 at 2004 for worsening RUE weakness, right facial droop, dysarthria and aphasia.     - CTA without LVO and MRI with multiple bilateral (R>L) acute infarcts on cerebral and cerebellar hemispheres highly suggestive of embolic phenomenon. No tpa as outside window.   - Documented right field deficits and worsening RUE weakness <24h s/p TAVR. ROSEY during TAVR demonstrable for grade 4 atheroma on aorta. Suspect iatrogenic from TAVR as pattern is concerning for embolic phenomena.    - Improvement in NIHSS / RUE weakness with sensory affect / with no concerns for aphasia.      Plan:   Antithrombotics for secondary stroke prevention: Antiplatelets: Aspirin: 81 mg daily  Clopidogrel: 75 mg daily / Needs consideration for AC post acute stroke phase for recordings of new onset Afib     Statins for secondary stroke prevention and hyperlipidemia, if present:   Statins: Atorvastatin- 40 mg daily    Aggressive risk factor modification: HTN, Smoking, DM, CAD, bilateral carotid stenosis, s/p TAVR     Rehab efforts: PT/OT/SLP to evaluate and treat, PM&R consult     Diagnostics ordered/pending: None     VTE prophylaxis: Enoxaparin 40 mg SQ every 24 hours    BP parameters: SBP < 180

## 2018-03-27 NOTE — PROGRESS NOTES
Spoke with pt's Lefty AVALOS. Lefty stated the numbness to pt's L hand was present before the CVA.

## 2018-03-27 NOTE — SUBJECTIVE & OBJECTIVE
Interval History: No acute events overnight. NSR on monitor.    Medications:  Continuous Infusions:   sodium chloride 0.9%       Scheduled Meds:   amiodarone  400 mg Oral BID    amLODIPine  10 mg Oral Daily    ascorbic acid (vitamin C)  500 mg Oral BID    aspirin  325 mg Oral Daily    atorvastatin  40 mg Oral Daily    carvedilol  3.125 mg Oral Daily    chlorthalidone  25 mg Oral QAM    clopidogrel  75 mg Oral Daily    enoxaparin  40 mg Subcutaneous Daily    famotidine  20 mg Oral BID    fluticasone-vilanterol  1 puff Inhalation Daily    furosemide  40 mg Intravenous BID    mupirocin  1 g Nasal BID    polyethylene glycol  17 g Oral Daily     PRN Meds:acetaminophen, albuterol-ipratropium 2.5mg-0.5mg/3mL, bisacodyl, dextrose 50%, dextrose 50%, glucagon (human recombinant), glucose, glucose, hydrALAZINE, insulin aspart U-100, morphine, ondansetron, promethazine (PHENERGAN) IVPB, sodium chloride 0.9%     Objective:     Vital Signs (Most Recent):  Temp: 98.5 °F (36.9 °C) (03/27/18 1627)  Pulse: (!) 57 (03/27/18 1627)  Resp: 16 (03/27/18 1627)  BP: 135/63 (03/27/18 1627)  SpO2: (!) 92 % (03/27/18 1627) Vital Signs (24h Range):  Temp:  [97.9 °F (36.6 °C)-98.7 °F (37.1 °C)] 98.5 °F (36.9 °C)  Pulse:  [55-72] 57  Resp:  [16-20] 16  SpO2:  [78 %-96 %] 92 %  BP: (110-174)/(51-72) 135/63     Weight: 69.9 kg (154 lb 1.6 oz)  Body mass index is 28.19 kg/m².    SpO2: (!) 92 %  O2 Device (Oxygen Therapy): nasal cannula    Intake/Output - Last 3 Shifts       03/25 0700 - 03/26 0659 03/26 0700 - 03/27 0659 03/27 0700 - 03/28 0659    P.O. 0 640 660    Total Intake(mL/kg) 0 (0) 640 (9.2) 660 (9.4)    Urine (mL/kg/hr) 800 (0.5) 150 (0.1) 700 (1)    Stool 0 (0) 0 (0) 2 (0)    Total Output 800 150 702    Net -800 +490 -42           Urine Occurrence 7 x      Stool Occurrence  1 x           Lines/Drains/Airways     Peripheral Intravenous Line                 Peripheral IV - Single Lumen 03/27/18 0034 Left Forearm less than 1  day                Physical Exam   Constitutional: She is oriented to person, place, and time. She appears well-developed and well-nourished.   HENT:   Head: Normocephalic and atraumatic.   Eyes: Pupils are equal, round, and reactive to light.   Neck: Normal range of motion. Neck supple.   Cardiovascular: Normal rate, regular rhythm and normal heart sounds.    Pulmonary/Chest: Effort normal and breath sounds normal.   Abdominal: Soft. Bowel sounds are normal.   Musculoskeletal: Normal range of motion.   Neurological: She is alert and oriented to person, place, and time.   Skin: Skin is warm and dry.   Psychiatric: She has a normal mood and affect. Her behavior is normal.       Significant Labs:  CBC:   Recent Labs  Lab 03/27/18  0459   WBC 8.28   RBC 3.93*   HGB 9.3*   HCT 29.6*   *   MCV 75*   MCH 23.7*   MCHC 31.4*     CMP:   Recent Labs  Lab 03/27/18  0459   *   CALCIUM 9.5   ALBUMIN 3.2*   PROT 6.7      K 3.7   CO2 26   CL 97   BUN 16   CREATININE 0.7   ALKPHOS 77   ALT 10   AST 21   BILITOT 0.9       Significant Diagnostics:  CXR: I have reviewed all pertinent results/findings within the past 24 hours

## 2018-03-27 NOTE — PLAN OF CARE
SW left message for [facility], pt's first choice, to follow up on referral. KAI waiting to heat back.     Reva Clark LMSW  [facility]  Ext. [phone]

## 2018-03-27 NOTE — ASSESSMENT & PLAN NOTE
--Continue ASA 81, BB, statin, Plavix for FRANCY  --may start ace-inhibitor if warranted  --PT/OT  --Ambulate x4   --Wean O2 as tolerated for O2 sat >92%  --monitor CXR   --Monitor electrolytes and replace prn  --can transition to PO Lasix 20mg BID  ---- upon DC- lasix BID for 7 days then decrease to once daily & Potassium 20mEq BID for 7 days then once daily  ---- when pt is ready for transfer pls call CTS for DC med rec

## 2018-03-27 NOTE — PROGRESS NOTES
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 NSR on monitor.    Medications:  Continuous Infusions:   sodium chloride 0.9%       Scheduled Meds:   amiodarone  400 mg Oral BID    amLODIPine  10 mg Oral Daily    ascorbic acid (vitamin C)  500 mg Oral BID    aspirin  325 mg Oral Daily    atorvastatin  40 mg Oral Daily    carvedilol  3.125 mg Oral Daily    chlorthalidone  25 mg Oral QAM    clopidogrel  75 mg Oral Daily    enoxaparin  40 mg Subcutaneous Daily    famotidine  20 mg Oral BID    fluticasone-vilanterol  1 puff Inhalation Daily    furosemide  40 mg Intravenous BID    mupirocin  1 g Nasal BID    polyethylene glycol  17 g Oral Daily     PRN Meds:acetaminophen, albuterol-ipratropium 2.5mg-0.5mg/3mL, bisacodyl, dextrose 50%, dextrose 50%, glucagon (human recombinant), glucose, glucose, hydrALAZINE, insulin aspart U-100, morphine, ondansetron, promethazine (PHENERGAN) IVPB, sodium chloride 0.9%     Objective:     Vital Signs (Most Recent):  Temp: 98.5 °F (36.9 °C) (03/27/18 1627)  Pulse: (!) 57 (03/27/18 1627)  Resp: 16 (03/27/18 1627)  BP: 135/63 (03/27/18 1627)  SpO2: (!) 92 % (03/27/18 1627) Vital Signs (24h Range):  Temp:  [97.9 °F (36.6 °C)-98.7 °F (37.1 °C)] 98.5 °F (36.9 °C)  Pulse:  [55-72] 57  Resp:  [16-20] 16  SpO2:  [78 %-96 %] 92 %  BP: (110-174)/(51-72) 135/63     Weight: 69.9 kg (154 lb 1.6 oz)  Body mass index is 28.19 kg/m².    SpO2: (!) 92 %  O2 Device (Oxygen Therapy): nasal cannula    Intake/Output - Last 3 Shifts       03/25  0700 - 03/26 0659 03/26 0700 - 03/27 0659 03/27 0700 - 03/28 0659    P.O. 0 640 660    Total Intake(mL/kg) 0 (0) 640 (9.2) 660 (9.4)    Urine (mL/kg/hr) 800 (0.5) 150 (0.1) 700 (1)    Stool 0 (0) 0 (0) 2 (0)    Total Output 800 150 702    Net -800 +490 -42           Urine Occurrence 7 x      Stool Occurrence  1 x           Lines/Drains/Airways     Peripheral Intravenous Line                 Peripheral IV - Single Lumen 03/27/18 0034 Left Forearm less than 1 day                Physical Exam   Constitutional: She is oriented to person, place, and time. She appears well-developed and well-nourished.   HENT:   Head: Normocephalic and atraumatic.   Eyes: Pupils are equal, round, and reactive to light.   Neck: Normal range of motion. Neck supple.   Cardiovascular: Normal rate, regular rhythm and normal heart sounds.    Pulmonary/Chest: Effort normal and breath sounds normal.   Abdominal: Soft. Bowel sounds are normal.   Musculoskeletal: Normal range of motion.   Neurological: She is alert and oriented to person, place, and time.   Skin: Skin is warm and dry.   Psychiatric: She has a normal mood and affect. Her behavior is normal.       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         S/p TAVR  (transcatheter aortic valve replacement), bioprosthetic    --Continue ASA 81, BB, statin, Plavix for FRANCY  --may start ace-inhibitor if warranted  --PT/OT  --Ambulate x4   --Wean O2 as tolerated for O2 sat >92%  --monitor CXR   --Monitor electrolytes and replace prn  --can transition to PO Lasix 20mg BID  ---- upon DC- lasix BID for 7 days then decrease to once daily & Potassium 20mEq BID for 7 days then once daily  ---- when pt is ready for transfer pls call CTS for DC med rec                 Jessy Sheppard NP  Cardiothoracic Surgery  [facility]  [phone]

## 2018-03-27 NOTE — PROGRESS NOTES
"Pt c/o numbness and tingling to lips and L fingers. Pt also experienced 18bt run of NSVT. VS stable. Pt initially stated she didn't feel any fluttering but now states that she "I may have felt something". MD on call notified, MD coming to bedside. Orders for STAT EKG ordered.   "

## 2018-03-27 NOTE — SUBJECTIVE & OBJECTIVE
Interval History 3/27/2018:  Patient is seen for follow-up rehab evaluation and recommendations: No acute events over night.  Off amio gtt.  Participating with therapy.  Barriers for discharge/rehab admission: rehab bed    HPI, Past Medical, Family, and Social History remains the same as documented in the initial encounter.    Scheduled Medications:    amiodarone  400 mg Oral BID    amLODIPine  10 mg Oral Daily    ascorbic acid (vitamin C)  500 mg Oral BID    aspirin  325 mg Oral Daily    atorvastatin  40 mg Oral Daily    carvedilol  3.125 mg Oral Daily    chlorthalidone  25 mg Oral QAM    clopidogrel  75 mg Oral Daily    enoxaparin  40 mg Subcutaneous Daily    famotidine (PF)  20 mg Intravenous BID    fluticasone-vilanterol  1 puff Inhalation Daily    furosemide  40 mg Intravenous BID    mupirocin  1 g Nasal BID    polyethylene glycol  17 g Oral Daily    potassium chloride 10%  40 mEq Oral TID     PRN Medications: acetaminophen, albuterol-ipratropium 2.5mg-0.5mg/3mL, bisacodyl, dextrose 50%, dextrose 50%, glucagon (human recombinant), glucose, glucose, hydrALAZINE, insulin aspart U-100, morphine, ondansetron, promethazine (PHENERGAN) IVPB, sodium chloride 0.9%    Review of Systems   Constitutional: Positive for activity change. Negative for chills, fatigue and fever.   HENT: Negative for drooling, hearing loss, trouble swallowing and voice change.    Eyes: Positive for visual disturbance. Negative for pain.   Respiratory: Negative for cough, shortness of breath and wheezing.    Cardiovascular: Negative for chest pain and palpitations.   Gastrointestinal: Negative for abdominal distention, nausea and vomiting.   Genitourinary: Negative for difficulty urinating and flank pain.   Musculoskeletal: Positive for gait problem. Negative for arthralgias, back pain, myalgias and neck pain.   Skin: Negative for rash and wound.   Neurological: Positive for facial asymmetry, speech difficulty and weakness.  Negative for dizziness, numbness and headaches.   Psychiatric/Behavioral: Negative for agitation and hallucinations. The patient is not nervous/anxious.      Objective:     Vital Signs (Most Recent):  Temp: 98.6 °F (37 °C) (18)  Pulse: 60 (18 09)  Resp: 16 (18)  BP: 138/62 (18)  SpO2: 95 % (18)    Vital Signs (24h Range):  Temp:  [98.1 °F (36.7 °C)-98.7 °F (37.1 °C)] 98.6 °F (37 °C)  Pulse:  [56-72] 60  Resp:  [16-20] 16  SpO2:  [78 %-97 %] 95 %  BP: (124-174)/(60-72) 138/62     Physical Exam   Constitutional: She is oriented to person, place, and time. She appears well-developed and well-nourished. No distress.   HENT:   Head: Normocephalic and atraumatic.   Right Ear: External ear normal.   Left Ear: External ear normal.   Nose: Nose normal.   Eyes: Right eye exhibits no discharge. Left eye exhibits no discharge. No scleral icterus.   Neck: Normal range of motion.   Cardiovascular: Normal rate, regular rhythm and intact distal pulses.    Pulmonary/Chest: Effort normal. No respiratory distress. She has no wheezes.   Abdominal: Soft. She exhibits no distension. There is no tenderness.   Musculoskeletal: Normal range of motion. She exhibits no edema or tenderness.   Neurological: She is alert and oriented to person, place, and time.   -  Mental Status:  AAOx3.  Follows commands.  Answers correct age and .  Recent and remote memory intact.  -  Speech and language:  No aphasia, mild dysarthria.    -  Vision:  R hemianopsia.  No ptosis.    -  Facial movement (CN VII): Mild facial droop.   -  Motor:  RUE: 0/5.  LUE: 5/5.  RLE: 4-/5.  LLE: 5/5.  -  Tone:  Decreased RUE.  -  Sensory:  Intact to light touch and pin prick.   Skin: Skin is warm and dry. No rash noted.   Psychiatric: She has a normal mood and affect. Her behavior is normal. Thought content normal. Her speech is slurred. Cognition and memory are impaired.   Vitals reviewed.    Diagnostic Results:   Labs:  Reviewed  X-Ray: Reviewed  CT: Reviewed  MRI: Reviewed  CTA: Reviewed    NEUROLOGICAL EXAMINATION:     MENTAL STATUS   Oriented to person, place, and time.   Speech: slurred

## 2018-03-28 LAB
ALBUMIN SERPL BCP-MCNC: 3.2 G/DL
ALP SERPL-CCNC: 82 U/L
ALT SERPL W/O P-5'-P-CCNC: 11 U/L
ANION GAP SERPL CALC-SCNC: 11 MMOL/L
AST SERPL-CCNC: 17 U/L
BASOPHILS # BLD AUTO: 0.11 K/UL
BASOPHILS NFR BLD: 1.1 %
BILIRUB SERPL-MCNC: 0.6 MG/DL
BUN SERPL-MCNC: 21 MG/DL
CALCIUM SERPL-MCNC: 9.5 MG/DL
CHLORIDE SERPL-SCNC: 97 MMOL/L
CO2 SERPL-SCNC: 29 MMOL/L
CREAT SERPL-MCNC: 0.7 MG/DL
DIFFERENTIAL METHOD: ABNORMAL
EOSINOPHIL # BLD AUTO: 0.7 K/UL
EOSINOPHIL NFR BLD: 6.4 %
ERYTHROCYTE [DISTWIDTH] IN BLOOD BY AUTOMATED COUNT: 17.8 %
EST. GFR  (AFRICAN AMERICAN): >60 ML/MIN/1.73 M^2
EST. GFR  (NON AFRICAN AMERICAN): >60 ML/MIN/1.73 M^2
GLUCOSE SERPL-MCNC: 128 MG/DL
HCT VFR BLD AUTO: 30.8 %
HGB BLD-MCNC: 9.6 G/DL
IMM GRANULOCYTES # BLD AUTO: 0.06 K/UL
IMM GRANULOCYTES NFR BLD AUTO: 0.6 %
LYMPHOCYTES # BLD AUTO: 2.5 K/UL
LYMPHOCYTES NFR BLD: 24.4 %
MAGNESIUM SERPL-MCNC: 2.1 MG/DL
MCH RBC QN AUTO: 23.7 PG
MCHC RBC AUTO-ENTMCNC: 31.2 G/DL
MCV RBC AUTO: 76 FL
MONOCYTES # BLD AUTO: 0.9 K/UL
MONOCYTES NFR BLD: 9 %
NEUTROPHILS # BLD AUTO: 6 K/UL
NEUTROPHILS NFR BLD: 58.5 %
NRBC BLD-RTO: 0 /100 WBC
PHOSPHATE SERPL-MCNC: 4.2 MG/DL
PLATELET # BLD AUTO: 445 K/UL
PMV BLD AUTO: 9.6 FL
POCT GLUCOSE: 120 MG/DL (ref 70–110)
POCT GLUCOSE: 128 MG/DL (ref 70–110)
POCT GLUCOSE: 207 MG/DL (ref 70–110)
POCT GLUCOSE: 320 MG/DL (ref 70–110)
POTASSIUM SERPL-SCNC: 3.8 MMOL/L
PROT SERPL-MCNC: 6.8 G/DL
RBC # BLD AUTO: 4.05 M/UL
SODIUM SERPL-SCNC: 137 MMOL/L
WBC # BLD AUTO: 10.17 K/UL

## 2018-03-28 PROCEDURE — 97530 THERAPEUTIC ACTIVITIES: CPT

## 2018-03-28 PROCEDURE — 63600175 PHARM REV CODE 636 W HCPCS: Performed by: THORACIC SURGERY (CARDIOTHORACIC VASCULAR SURGERY)

## 2018-03-28 PROCEDURE — 25000003 PHARM REV CODE 250: Performed by: PSYCHIATRY & NEUROLOGY

## 2018-03-28 PROCEDURE — 20600001 HC STEP DOWN PRIVATE ROOM

## 2018-03-28 PROCEDURE — 99232 SBSQ HOSP IP/OBS MODERATE 35: CPT | Mod: ,,, | Performed by: NURSE PRACTITIONER

## 2018-03-28 PROCEDURE — 25000003 PHARM REV CODE 250: Performed by: STUDENT IN AN ORGANIZED HEALTH CARE EDUCATION/TRAINING PROGRAM

## 2018-03-28 PROCEDURE — 85025 COMPLETE CBC W/AUTO DIFF WBC: CPT

## 2018-03-28 PROCEDURE — 63600175 PHARM REV CODE 636 W HCPCS: Performed by: STUDENT IN AN ORGANIZED HEALTH CARE EDUCATION/TRAINING PROGRAM

## 2018-03-28 PROCEDURE — 92507 TX SP LANG VOICE COMM INDIV: CPT

## 2018-03-28 PROCEDURE — 92526 ORAL FUNCTION THERAPY: CPT

## 2018-03-28 PROCEDURE — 25000003 PHARM REV CODE 250: Performed by: THORACIC SURGERY (CARDIOTHORACIC VASCULAR SURGERY)

## 2018-03-28 PROCEDURE — 25000003 PHARM REV CODE 250: Performed by: PHYSICIAN ASSISTANT

## 2018-03-28 PROCEDURE — 84100 ASSAY OF PHOSPHORUS: CPT

## 2018-03-28 PROCEDURE — 83735 ASSAY OF MAGNESIUM: CPT

## 2018-03-28 PROCEDURE — 97116 GAIT TRAINING THERAPY: CPT

## 2018-03-28 PROCEDURE — 97803 MED NUTRITION INDIV SUBSEQ: CPT

## 2018-03-28 PROCEDURE — 80053 COMPREHEN METABOLIC PANEL: CPT

## 2018-03-28 PROCEDURE — 36415 COLL VENOUS BLD VENIPUNCTURE: CPT

## 2018-03-28 PROCEDURE — 25000003 PHARM REV CODE 250: Performed by: NURSE PRACTITIONER

## 2018-03-28 PROCEDURE — 97535 SELF CARE MNGMENT TRAINING: CPT

## 2018-03-28 PROCEDURE — 99233 SBSQ HOSP IP/OBS HIGH 50: CPT | Mod: GC,,, | Performed by: PSYCHIATRY & NEUROLOGY

## 2018-03-28 RX ADMIN — MUPIROCIN 1 G: 20 OINTMENT TOPICAL at 10:03

## 2018-03-28 RX ADMIN — CARVEDILOL 3.12 MG: 3.12 TABLET, FILM COATED ORAL at 09:03

## 2018-03-28 RX ADMIN — CHLORTHALIDONE 25 MG: 25 TABLET ORAL at 06:03

## 2018-03-28 RX ADMIN — AMLODIPINE BESYLATE 10 MG: 10 TABLET ORAL at 09:03

## 2018-03-28 RX ADMIN — FUROSEMIDE 20 MG: 20 TABLET ORAL at 05:03

## 2018-03-28 RX ADMIN — ENOXAPARIN SODIUM 40 MG: 100 INJECTION SUBCUTANEOUS at 05:03

## 2018-03-28 RX ADMIN — AMIODARONE HYDROCHLORIDE 400 MG: 200 TABLET ORAL at 09:03

## 2018-03-28 RX ADMIN — INSULIN ASPART 4 UNITS: 100 INJECTION, SOLUTION INTRAVENOUS; SUBCUTANEOUS at 06:03

## 2018-03-28 RX ADMIN — HYDRALAZINE HYDROCHLORIDE 10 MG: 20 INJECTION INTRAMUSCULAR; INTRAVENOUS at 06:03

## 2018-03-28 RX ADMIN — INSULIN ASPART 5 UNITS: 100 INJECTION, SOLUTION INTRAVENOUS; SUBCUTANEOUS at 06:03

## 2018-03-28 RX ADMIN — FUROSEMIDE 20 MG: 20 TABLET ORAL at 09:03

## 2018-03-28 RX ADMIN — HYDRALAZINE HYDROCHLORIDE 10 MG: 20 INJECTION INTRAMUSCULAR; INTRAVENOUS at 12:03

## 2018-03-28 RX ADMIN — AMIODARONE HYDROCHLORIDE 400 MG: 200 TABLET ORAL at 10:03

## 2018-03-28 RX ADMIN — FAMOTIDINE 20 MG: 20 TABLET, FILM COATED ORAL at 09:03

## 2018-03-28 RX ADMIN — ATORVASTATIN CALCIUM 40 MG: 20 TABLET, FILM COATED ORAL at 09:03

## 2018-03-28 RX ADMIN — Medication 500 MG: at 09:03

## 2018-03-28 RX ADMIN — Medication 500 MG: at 10:03

## 2018-03-28 RX ADMIN — FAMOTIDINE 20 MG: 20 TABLET, FILM COATED ORAL at 10:03

## 2018-03-28 RX ADMIN — CLOPIDOGREL 75 MG: 75 TABLET, FILM COATED ORAL at 09:03

## 2018-03-28 RX ADMIN — ASPIRIN 81 MG: 81 TABLET, COATED ORAL at 09:03

## 2018-03-28 NOTE — PLAN OF CARE
Problem: SLP Goal  Goal: SLP Goal  Updated Speech Language Pathology Goals  Goals expected to be met by 4/3/18:    1.  Pt will tolerate Dental Soft diet w/ thin liquids w/ no overt signs of aspiration.  2.  Pt will be oriented x4 using external cues.  3.  Pt will complete abstract naming tasks w/ 80% acc given min cues.  4.  Pt will complete problem-solving tasks w/ 80% acc given min cues.  5.  Pt will complete reasoning tasks w/ 80% acc given min cues.  6.  Pt will complete short term memory tasks w/ 66% acc indep using memory strategies.  7.  Pt will complete further evaluation of reading, writing and visual spatial skills to determine the need for additional goals.        Speech Language Pathology Goals  Goals expected to be met by 4/1  1. Pt will participate in ongoing swallow assessment -Goal Met  2. Pt will participate in speech, language and cognitive assessment -Goal Met        Pt with good progress towards. Ongoing skilled speech therapy warranted.     Xuan Cleary MS, CCC-SLP  Speech Language Pathologist  Pager: [phone]  Date 3/28/2018

## 2018-03-28 NOTE — PROGRESS NOTES
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 Stroke called on 3/24 at 2004 for worsening RUE weakness, right facial droop, dysarthria and aphasia.     - CTA without LVO and MRI with multiple bilateral (R>L) acute infarcts on cerebral and cerebellar hemispheres highly suggestive of embolic phenomenon. No tpa as outside window.   - Documented right field deficits and worsening RUE weakness <24h s/p TAVR. ROSEY during TAVR demonstrable for grade 4 atheroma on aorta. Suspect iatrogenic from TAVR as pattern is concerning for embolic phenomena.    - Improvement in NIHSS / RUE weakness with sensory affect / with no concerns for aphasia.      Plan:   Antithrombotics for secondary stroke prevention: Antiplatelets: Aspirin: 81 mg daily  Clopidogrel: 75 mg daily / Will consider starting Apixaban in 3 or more days per Dr. Hinkle  Statins for secondary stroke prevention and hyperlipidemia, if present:   Statins: Atorvastatin- 40 mg daily    Aggressive risk factor modification: HTN, Smoking, DM, CAD, bilateral carotid stenosis, s/p TAVR     Rehab efforts: PT/OT/SLP recommend rehab placement, pending    Diagnostics ordered/pending: None     VTE prophylaxis: Enoxaparin 40 mg SQ every 24 hours    BP parameters: SBP < 180         Atrial fibrillation    - Newly diagnosed here. Patient found to be in afib RVR after returning from MRI on 3/24.   - Unlikely to have caused stroke as she went into arrhythmia s/p symptom onset. Likely post-procedural from TAVR.   - However CHADVASC 8.  Discussed with Dr. Hinkle, will consider starting Apixaban in 3 or more days, though it may have to be considered on  an outpatient basis as the plan is for the patient to go to a rehab facility  - Continue oral amiodarone per CTS recs.  Currently remains in NSR.   - Contact CTS prior to transfer/discharge for discharge med recs         Essential hypertension    -Stroke risk factor.  SBP recorded as 190s this am, but MAP < 80 s/p TAVR.  -Continue amlodipine 10 mg PO daily, carvedilol 3.125 mg PO BID, chlorthalidone 25 mg PO daily, lasix 40 mg IV BID and hydralazine IV PRN.  Monitor and titrate meds prn.   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  Subsequent imaging revealed no significant interval change  - Remains on 2.5L nasal cannula.   - Continue furosemide 40 mg IV BID / chlorthalidone as per CTS team.   - patient appears euvolemic / awaits recs for further continuation   - Strict I/O and daily weights.         S/p TAVR (transcatheter aortic valve replacement), bioprosthetic    - Followed by CTS.  - Continue DAPT for one year.         Centrilobular emphysema    - Continue Duo-neb prn.         Diabetes mellitus, type 2    -Stroke risk factor.  HgB A1C 6.5.  -SSI        Malignant neoplasm of right lung    - s/p RML lobectomy, path showed keratinizing squamous cell CA. Contacted oncologist who reported disease on remission, last PET scan negative.        Coronary artery disease of native artery with stable angina pectoris    Stroke risk factor  - Continue DAPT, statin, and BB    - Continue meds as CTS         Tobacco abuse    -Stroke risk factor.  Educate patient about Smoking cessation.          Severe aortic stenosis    S/p TAVR 3/22, followed by CTS.         Dyslipidemia    -Stroke risk factor.  LDL >70.  -Continue Lipitor 40 mg daily        Bilateral carotid artery  stenosis    Stroke risk factor - but unlikely to have caused this event as pattern not consistent with thrombotic/a-a considering embolic multi-territorial stroke.     - As demonstrable by recent carotid US and CTA (mixed density atherosclerosis at both carotid bifurcations)  -Avoid hypotension             3/25 - New-onset afib RVR developed after return from MRI this morning. IV amiodarone loaded and started on gtt- rhythm resolved and remains on gtt. No worsening focal deficits this morning.   03/28/2018 NAEON.  Patient oriented to person only.  Currently perseverating about her  being called.  No other complaints at this time.    STROKE DOCUMENTATION   Acute Stroke Times   Last Known Normal Date: 03/23/18  Last Known Normal Time:  (Unsure)  Symptom Onset Date: 03/24/18  Symptom Onset Time:  (Unsure)  Stroke Team Called Date: 03/24/18  Stroke Team Called Time: 2004  Stroke Team Arrival Date: 03/24/18  Stroke Team Arrival Time: 2010  CT Interpretation Time: 2025    NIH Scale:  1a. Level Of Consciousness: 0-->Alert: keenly responsive  1b. LOC Questions: 1-->Answers one question correctly  1c. LOC Commands: 0-->Performs both tasks correctly  2. Best Gaze: 0-->Normal  3. Visual: 0-->No visual loss  4. Facial Palsy: 0-->Normal symmetrical movements  5a. Motor Arm, Left: 0-->No drift: limb holds 90 (or 45) degrees for full 10 secs  5b. Motor Arm, Right: 4-->No movement  6a. Motor Leg, Left: 0-->No drift: leg holds 30 degree position for full 5 secs  6b. Motor Leg, Right: 1-->Drift: leg falls by the end of the 5-sec period but does not hit bed  7. Limb Ataxia: 0-->Absent  8. Sensory: 0-->Normal: no sensory loss  9. Best Language: 0-->No aphasia: normal  10. Dysarthria: 0-->Normal  11. Extinction and Inattention (formerly Neglect): 0-->No abnormality  Total (NIH Stroke Scale): 6       Modified Ellen Score: 1  Chelsy Coma Scale:14   ABCD2 Score:    [id number] Score:8  HAS -BLED Score:5  ICH Score:   Hunt  & Eldridge Classification:      Hemorrhagic change of an Ischemic Stroke: Does this patient have an ischemic stroke with hemorrhagic changes? No     Neurologic Chief Complaint: Left PCA/MCA embolic stroke    Subjective:     Interval History: Patient is seen for follow-up neurological assessment and treatment recommendations: BOBBY.  Patient currently oriented to person only.  Perseverating about person on contact list being called.  She has no complaints at this time.    HPI, Past Medical, Family, and Social History remains the same as documented in the initial encounter.     Review of Systems   Unable to perform ROS: Mental status change   Constitutional: Negative for fever.   HENT: Negative for drooling.    Eyes: Negative for discharge and redness.   Respiratory: Negative for cough.    Cardiovascular: Negative for leg swelling.   Gastrointestinal: Negative for diarrhea and vomiting.   Genitourinary: Negative for hematuria.   Skin: Negative for rash.   Neurological: Positive for weakness (RUE). Negative for facial asymmetry, speech difficulty, numbness and headaches.   Psychiatric/Behavioral: Positive for confusion.     Scheduled Meds:   amiodarone  400 mg Oral BID    amLODIPine  10 mg Oral Daily    ascorbic acid (vitamin C)  500 mg Oral BID    aspirin  81 mg Oral Daily    atorvastatin  40 mg Oral Daily    carvedilol  3.125 mg Oral Daily    chlorthalidone  25 mg Oral QAM    clopidogrel  75 mg Oral Daily    enoxaparin  40 mg Subcutaneous Daily    famotidine  20 mg Oral BID    fluticasone-vilanterol  1 puff Inhalation Daily    furosemide  20 mg Oral BID    mupirocin  1 g Nasal BID    polyethylene glycol  17 g Oral Daily     Continuous Infusions:   sodium chloride 0.9%       PRN Meds:acetaminophen, albuterol-ipratropium 2.5mg-0.5mg/3mL, bisacodyl, dextrose 50%, dextrose 50%, dextrose 50%, glucagon (human recombinant), glucagon (human recombinant), glucose, glucose, hydrALAZINE, insulin aspart U-100, insulin  aspart U-100, morphine, ondansetron, promethazine (PHENERGAN) IVPB, sodium chloride 0.9%    Objective:     Vital Signs (Most Recent):  Temp: 98.7 °F (37.1 °C) (03/28/18 0753)  Pulse: 68 (03/28/18 0800)  Resp: 18 (03/28/18 0753)  BP: (!) 195/76 (03/28/18 0753)  SpO2: 98 % (03/28/18 0753)  BP Location: Right arm    Vital Signs Range (Last 24H):  Temp:  [97.1 °F (36.2 °C)-98.7 °F (37.1 °C)]   Pulse:  [55-68]   Resp:  [16-20]   BP: (110-195)/(51-87)   SpO2:  [91 %-98 %]   BP Location: Right arm    Physical Exam   Constitutional: She appears well-developed and well-nourished. No distress.   Eyes: Conjunctivae and EOM are normal. Pupils are equal, round, and reactive to light. Right eye exhibits no discharge. Left eye exhibits no discharge. No scleral icterus.   Cardiovascular: Normal rate and regular rhythm.    Pulmonary/Chest: Effort normal and breath sounds normal.   Abdominal: Soft. Bowel sounds are normal. She exhibits no distension. There is no tenderness.   Musculoskeletal: She exhibits no edema.   Neurological: She is alert. GCS eye subscore is 4. GCS verbal subscore is 4. GCS motor subscore is 6.   Skin: Skin is warm and dry. She is not diaphoretic.   Nursing note and vitals reviewed.      Neurological Exam:   LOC: alert  Language: No aphasia  Articulation: No dysarthria  Orientation: Not oriented to place, Not oriented to time  Visual Fields: Full  EOM (CN III, IV, VI): Full/intact  Facial Movement (CN VII): Symmetric facial expression    Motor: Arm left  Normal 5/5  Leg left  Normal 5/5  Arm right  Plegia 0/5  Leg right Paresis: 4/5    Laboratory:  CMP:   Recent Labs  Lab 03/28/18  0449   CALCIUM 9.5   ALBUMIN 3.2*   PROT 6.8      K 3.8   CO2 29   CL 97   BUN 21   CREATININE 0.7   ALKPHOS 82   ALT 11   AST 17   BILITOT 0.6     BMP:   Recent Labs  Lab 03/28/18  0449      K 3.8   CL 97   CO2 29   BUN 21   CREATININE 0.7   CALCIUM 9.5     CBC:   Recent Labs  Lab 03/28/18  0449   WBC 10.17   RBC 4.05    HGB 9.6*   HCT 30.8*   *   MCV 76*   MCH 23.7*   MCHC 31.2*     Lipid Panel:   Recent Labs  Lab 03/25/18  0538   CHOL 150   LDLCALC 80.2   HDL 42   TRIG 139     Coagulation:   Recent Labs  Lab 03/23/18  0331 03/23/18  0340   INR 1.0  --    APTT  --  22.1     Hgb A1C: No results for input(s): HGBA1C in the last 168 hours.  TSH:   Recent Labs  Lab 03/25/18  0538   TSH 1.221       Diagnostic Results     Brain Imaging   CTH 3/25/17 revealed redemonstration of multiple infarctions w/expected temporal evolution.  No hemorrhage or hemorrhagic conversion.    Vessel Imaging   MRI brain 3/25/18 revealed bilateral cerebral and cerebellar embolic infarcts.    Cardiac Imaging   ROSEY 3/22/18:  CONCLUSIONS     1 - Low normal to mildly depressed left ventricular systolic function (EF 50-55%).     2 - Mild mitral regurgitation.     3 - Severe aortic stenosis.     4 - Grade 4 atheroma disease of aorta.     5 - Successful implantation of a 26mm Evolut R CoreValve into the aortic position with no evidence of regurgitation.       Melisa Lucas, DNP, NP  Comprehensive Stroke Center  Department of Vascular Neurology   [facility]

## 2018-03-28 NOTE — SUBJECTIVE & OBJECTIVE
Interval History: No acute events overnight. NSR on monitor. Pt transferred to NSU       Medications:  Continuous Infusions:   sodium chloride 0.9%       Scheduled Meds:   amiodarone  400 mg Oral BID    amLODIPine  10 mg Oral Daily    ascorbic acid (vitamin C)  500 mg Oral BID    aspirin  81 mg Oral Daily    atorvastatin  40 mg Oral Daily    carvedilol  3.125 mg Oral Daily    chlorthalidone  25 mg Oral QAM    clopidogrel  75 mg Oral Daily    enoxaparin  40 mg Subcutaneous Daily    famotidine  20 mg Oral BID    fluticasone-vilanterol  1 puff Inhalation Daily    furosemide  20 mg Oral BID    mupirocin  1 g Nasal BID    polyethylene glycol  17 g Oral Daily     PRN Meds:acetaminophen, albuterol-ipratropium 2.5mg-0.5mg/3mL, bisacodyl, dextrose 50%, dextrose 50%, dextrose 50%, glucagon (human recombinant), glucagon (human recombinant), glucose, glucose, hydrALAZINE, insulin aspart U-100, insulin aspart U-100, morphine, ondansetron, promethazine (PHENERGAN) IVPB, sodium chloride 0.9%     Objective:     Vital Signs (Most Recent):  Temp: 98.6 °F (37 °C) (03/28/18 1613)  Pulse: (!) 53 (03/28/18 1613)  Resp: 18 (03/28/18 1613)  BP: (!) 142/63 (03/28/18 1613)  SpO2: 99 % (03/28/18 1613) Vital Signs (24h Range):  Temp:  [97.1 °F (36.2 °C)-98.7 °F (37.1 °C)] 98.6 °F (37 °C)  Pulse:  [53-68] 53  Resp:  [18-20] 18  SpO2:  [90 %-99 %] 99 %  BP: (114-195)/(58-87) 142/63     Weight: 69.9 kg (154 lb 1.6 oz)  Body mass index is 28.19 kg/m².    SpO2: 99 %  O2 Device (Oxygen Therapy): nasal cannula    Intake/Output - Last 3 Shifts       03/26 0700 - 03/27 0659 03/27 0700 - 03/28 0659 03/28 0700 - 03/29 0659    P.O. 640 660     Total Intake(mL/kg) 640 (9.2) 660 (9.4)     Urine (mL/kg/hr) 150 (0.1) 700 (0.4)     Stool 0 (0) 2 (0)     Total Output 150 702      Net +490 -42             Stool Occurrence 1 x            Lines/Drains/Airways     Peripheral Intravenous Line                 Peripheral IV - Single Lumen 03/27/18 0034  Left Forearm 1 day                Physical Exam   Constitutional: She is oriented to person, place, and time. She appears well-developed and well-nourished.   HENT:   Head: Normocephalic and atraumatic.   Eyes: Pupils are equal, round, and reactive to light.   Neck: Normal range of motion. Neck supple.   Cardiovascular: Normal rate, regular rhythm and normal heart sounds.    Pulmonary/Chest: Effort normal and breath sounds normal.   Abdominal: Soft. Bowel sounds are normal.   Musculoskeletal: Normal range of motion.   Neurological: She is alert and oriented to person, place, and time.   RUE weakness, RLE absent motor function   Skin: Skin is warm and dry.   Psychiatric: She has a normal mood and affect. Her behavior is normal.       Significant Labs:  CBC:   Recent Labs  Lab 03/28/18  0449   WBC 10.17   RBC 4.05   HGB 9.6*   HCT 30.8*   *   MCV 76*   MCH 23.7*   MCHC 31.2*     CMP:   Recent Labs  Lab 03/28/18  0449   *   CALCIUM 9.5   ALBUMIN 3.2*   PROT 6.8      K 3.8   CO2 29   CL 97   BUN 21   CREATININE 0.7   ALKPHOS 82   ALT 11   AST 17   BILITOT 0.6

## 2018-03-28 NOTE — ASSESSMENT & PLAN NOTE
66 y/o F with medical history significant for CAD, PAD, type II DM, HTN, and severe AS s/p TAVR on 3/22. Stroke called on 3/24 at 2004 for worsening RUE weakness, right facial droop, dysarthria and aphasia.     - CTA without LVO and MRI with multiple bilateral (R>L) acute infarcts on cerebral and cerebellar hemispheres highly suggestive of embolic phenomenon. No tpa as outside window.   - Documented right field deficits and worsening RUE weakness <24h s/p TAVR. ROSEY during TAVR demonstrable for grade 4 atheroma on aorta. Suspect iatrogenic from TAVR as pattern is concerning for embolic phenomena.    - Improvement in NIHSS / RUE weakness with sensory affect / with no concerns for aphasia.      Plan:   Antithrombotics for secondary stroke prevention: Antiplatelets: Aspirin: 81 mg daily  Clopidogrel: 75 mg daily / Will consider starting Apixaban in 3 or more days per Dr. Hinkle  Statins for secondary stroke prevention and hyperlipidemia, if present:   Statins: Atorvastatin- 40 mg daily    Aggressive risk factor modification: HTN, Smoking, DM, CAD, bilateral carotid stenosis, s/p TAVR     Rehab efforts: PT/OT/SLP recommend rehab placement, pending    Diagnostics ordered/pending: None     VTE prophylaxis: Enoxaparin 40 mg SQ every 24 hours    BP parameters: SBP < 180

## 2018-03-28 NOTE — PROGRESS NOTES
[facility]  Physical Medicine & Rehab  Progress Note    Patient Name: Kristina Aguirre  MRN: [id number]  Admission Date: 3/22/2018  Length of Stay: 6 days  Attending Physician: Eric Obregon MD    Subjective:     Principal Problem: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    Hospital Course:   3/23/18:  Evaluated by PT and OT.  Sit to stand Mya.  No further transfers or gait.  UBD maxA and LBD totalA.  Grooming modA.  Toileting totalA.  Feeding Mya.   3/25/18:  Participated with PT.  Evaluated by SLP.  Found to have dysarthria and dysphagia.  SLP recommendation: NPO.  Bed mobility Mya.  Sit to stand Mya and transfers modA.  No gait.  EOB x 5 minutes Mya.  3/26/18:  Participated with SLP and OT.  SLP recommendation: dental soft diet and thin liquids.  Bed mobility min-modA.  EOB x 23 minutes SBA.  Sit to stand modA.  Standing trials maxA.  LBD totalA.   3/27/18:  Participated with OT and SLP.  Found to have dysphagia, cognitive-linguistic impairment, and visio-spatial impairment.  Bed mobility CGA-modA.  EOB x 20 minutes CGA.  Sit to stand modA.  UBD modA and LBD maxA.  Grooming CGA.      Interval History 3/28/2018:  Patient is seen for follow-up rehab evaluation and recommendations: No acute events over night.  Moved to 7th floor.  Participating with therapy.  Barriers for discharge/rehab admission: [facility] IRF approval    HPI, Past Medical, Family, and Social History remains the same as documented in the initial encounter.    Scheduled Medications:    amiodarone  400 mg Oral BID    amLODIPine  10 mg Oral Daily    ascorbic acid (vitamin C)  500 mg Oral BID    aspirin  81 mg Oral Daily    atorvastatin  40 mg Oral Daily    carvedilol  3.125 mg Oral Daily    chlorthalidone  25 mg Oral QAM    clopidogrel  75 mg Oral Daily    enoxaparin  40 mg Subcutaneous Daily    famotidine  20 mg Oral BID    fluticasone-vilanterol  1 puff Inhalation Daily    furosemide  20 mg Oral  BID    mupirocin  1 g Nasal BID    polyethylene glycol  17 g Oral Daily     PRN Medications: acetaminophen, albuterol-ipratropium 2.5mg-0.5mg/3mL, bisacodyl, dextrose 50%, dextrose 50%, dextrose 50%, glucagon (human recombinant), glucagon (human recombinant), glucose, glucose, hydrALAZINE, insulin aspart U-100, insulin aspart U-100, morphine, ondansetron, promethazine (PHENERGAN) IVPB, sodium chloride 0.9%    Review of Systems   Constitutional: Positive for activity change. Negative for chills, fatigue and fever.   HENT: Negative for drooling, hearing loss, trouble swallowing and voice change.    Eyes: Positive for visual disturbance. Negative for pain.   Respiratory: Negative for cough, shortness of breath and wheezing.    Cardiovascular: Negative for chest pain and palpitations.   Gastrointestinal: Negative for abdominal distention, nausea and vomiting.   Genitourinary: Negative for difficulty urinating and flank pain.   Musculoskeletal: Positive for gait problem. Negative for arthralgias, back pain, myalgias and neck pain.   Skin: Negative for rash and wound.   Neurological: Positive for facial asymmetry, speech difficulty and weakness. Negative for dizziness, numbness and headaches.   Psychiatric/Behavioral: Negative for agitation and hallucinations. The patient is not nervous/anxious.      Objective:     Vital Signs (Most Recent):  Temp: 98.3 °F (36.8 °C) (03/28/18 1142)  Pulse: (!) 57 (03/28/18 1142)  Resp: 18 (03/28/18 1142)  BP: 136/64 (03/28/18 1142)  SpO2: (!) 90 % (03/28/18 1142)    Vital Signs (24h Range):  Temp:  [97.1 °F (36.2 °C)-98.7 °F (37.1 °C)] 98.3 °F (36.8 °C)  Pulse:  [55-68] 57  Resp:  [16-20] 18  SpO2:  [90 %-98 %] 90 %  BP: (110-195)/(51-87) 136/64     Physical Exam   Constitutional: She is oriented to person, place, and time. She appears well-developed and well-nourished. No distress.   HENT:   Head: Normocephalic and atraumatic.   Right Ear: External ear normal.   Left Ear: External ear  normal.   Nose: Nose normal.   Eyes: Right eye exhibits no discharge. Left eye exhibits no discharge. No scleral icterus.   Neck: Normal range of motion.   Cardiovascular: Normal rate, regular rhythm and intact distal pulses.    Pulmonary/Chest: Effort normal. No respiratory distress. She has no wheezes.   Abdominal: Soft. She exhibits no distension. There is no tenderness.   Musculoskeletal: Normal range of motion. She exhibits no edema or tenderness.   Neurological: She is alert and oriented to person, place, and time.   -  Mental Status:  AAOx3.  Follows commands.  Answers correct age and .  Recent and remote memory intact.  -  Speech and language:  No aphasia, mild dysarthria.    -  Vision:  R hemianopsia.  No ptosis.    -  Facial movement (CN VII): Mild facial droop.   -  Motor:  RUE: 0/5.  LUE: 5/5.  RLE: 4-/5.  LLE: 5/5.  -  Tone:  Decreased RUE.  -  Sensory:  Intact to light touch and pin prick.   Skin: Skin is warm and dry. No rash noted.   Psychiatric: She has a normal mood and affect. Her behavior is normal. Thought content normal. Her speech is slurred. Cognition and memory are impaired.   Vitals reviewed.    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      Recommendations  -  Encourage mobility, OOB in chair, and early ambulation as appropriate   -  PT/OT  evaluate and treat  -  SLP speech and cognitive evaluate and treat  -  Monitor mood and sleep disturbances  -  Establish consistent sleep-wake cycle  -  Monitor for bowel and bladder dysfunction  -  Monitor for shoulder pain and subluxation  -  Monitor for spasticity  -  Monitor for and prevent skin breakdown and pressure ulcers  · Early mobility, repositioning/weight shifting every 20-30 minutes when sitting, turn patient every 2 hours, proper mattress/overlay and chair cushioning, pressure relief/heel protector boots  -  DVT prophylaxis:  On Lovenox        Atrial fibrillation    -  A-fib with RVR on 3/24  -  Treated with amio load and gtt--> transitioned to PO  -  Risk factor for stroke; however, VN does not believe etiology of recent stroke        S/p TAVR (transcatheter aortic valve replacement), bioprosthetic    -  2/2 severe aortic stenosis        Severe aortic stenosis    -  Admitted for severe aortic stenosis  -  S/p L transaxillary transcatheter aortic valve replacement with a 26 mm evolut valve (TAVR) on 3/22/18        Recommend IPF.  Patient approved for [facility]; however, patient prefers [facility].  Will sign off.  Please call with questions/concerns or re-consult if situation changes.    ELIER Sales  Department of Physical Medicine & Rehab   [facility]

## 2018-03-28 NOTE — ASSESSMENT & PLAN NOTE
-Stroke risk factor.  SBP recorded as 190s this am, but MAP < 80 s/p TAVR.  -Continue amlodipine 10 mg PO daily, carvedilol 3.125 mg PO BID, chlorthalidone 25 mg PO daily, lasix 40 mg IV BID and hydralazine IV PRN.  Monitor and titrate meds prn.   - Contact CTS for recs for final discharge medications

## 2018-03-28 NOTE — PROGRESS NOTES
" [facility]  Adult Nutrition  Progress Note    SUMMARY       Recommendations    1. Continue current Dysphagia Soft, cardiac diet.   2. If intake consistently <50% of meals, order Boost Plus TID.     RD following.    Goals: Meet % EEN/EPN until discharge   Nutrition Goal Status: new  Communication of RD Recs:  (POC)    Reason for Assessment    Reason for Assessment: RD follow-up  Diagnosis: cardiac disease (s/p TAVR)  Relevant Medical History: SCC s/p lobectomy, CAD, COPD, DM, HTN, HLD    General Information Comments: Pt seen this am, OOB in chair eating breakfast. Good intake/ appetite reported. Plans for rehab discharge soon.     Nutrition Discharge Planning: Cardiac, Carb Consistent diet    Nutrition Risk Screen    Nutrition Risk Screen: no indicators present    Nutrition/Diet History    Do you have any cultural, spiritual, Anabaptist conflicts, given your current situation?: none  Food Allergies: NKFA  Factors Affecting Nutritional Intake: difficulty/impaired swallowing    Anthropometrics    Temp: 98.7 °F (37.1 °C)  Height Method: Stated  Height: 5' 2" (157.5 cm)  Height (inches): 62 in  Weight Method: Bed Scale  Weight: 69.9 kg (154 lb 1.6 oz)  Weight (lb): 154.1 lb  Ideal Body Weight (IBW), Female: 110 lb  % Ideal Body Weight, Female (lb): 132.73 lb  BMI (Calculated): 26.8  BMI Grade: 25 - 29.9 - overweight       Lab/Procedures/Meds    Pertinent Labs Reviewed: reviewed  Pertinent Labs Comments: POCT Glu 111-131, A1c 6.5, Alb 3.2  Pertinent Medications Reviewed: reviewed  Pertinent Medications Comments: famotidine, KCl, furosemide, statin, Vit C     Physical Findings/Assessment    Overall Physical Appearance: nourished, listlessness  Tubes:  (none)  Oral/Mouth Cavity: tooth/teeth missing  Skin: incision(s)    Estimated/Assessed Needs    Weight Used For Calorie Calculations: 69.9 kg (154 lb 1.6 oz)  Height: 5' 2" (157.5 cm)  Energy Calorie Requirements (kcal): 1471  Energy Need Method: " [place] (x 1.25)  RMR (Titus-St. Jeor Equation): 1177.25  Protein Requirements: 70-84g (1.0-1.2g/kg)  Weight Used For Protein Calculations: 69.9 kg (154 lb 1.6 oz)  Fluid Requirements (mL): 1 mL/kcal or per MD  Fluid Need Method: RDA Method  RDA Method (mL): 1471  CHO Requirement: 45-50%     Nutrition Prescription Ordered    Current Diet Order: Dysphagia soft, cardiac    Evaluation of Received Nutrient/Fluid Intake    I/O: -I/O   Comments: No BM recorded  % Intake of Estimated Energy Needs: 75 - 100 %  % Meal Intake: 75 - 100 %    Nutrition Risk    Level of Risk/Frequency of Follow-up: low (f/u 1x week)     Assessment and Plan      S/p TAVR (transcatheter aortic valve replacement), bioprosthetic     Contributing Nutrition Diagnosis  Increased nutrient needs     Related to (etiology):   Physiological causes     Signs and Symptoms (as evidenced by):   S/p TAVR     Nutrition Diagnosis Status:   Continues       Monitor and Evaluation    Food and Nutrient Intake: energy intake, food and beverage intake  Food and Nutrient Adminstration: diet order  Physical Activity and Function: nutrition-related ADLs and IADLs  Anthropometric Measurements: weight, weight change  Biochemical Data, Medical Tests and Procedures: electrolyte and renal panel, gastrointestinal profile, glucose/endocrine profile, inflammatory profile  Nutrition-Focused Physical Findings: overall appearance     Nutrition Follow-Up    RD Follow-up?: Yes

## 2018-03-28 NOTE — ASSESSMENT & PLAN NOTE
- CXR on 3/23 with slight interval worsening of bilateral airspace disease suggestive of pulmonary edema.  Subsequent imaging revealed no significant interval change  - Remains on 2.5L nasal cannula.   - Continue furosemide 40 mg IV BID / chlorthalidone as per CTS team.   - patient appears euvolemic / awaits recs for further continuation   - Strict I/O and daily weights.

## 2018-03-28 NOTE — SUBJECTIVE & OBJECTIVE
Neurologic Chief Complaint: Left PCA/MCA embolic stroke    Subjective:     Interval History: Patient is seen for follow-up neurological assessment and treatment recommendations: BOBBY.  Patient currently oriented to person only.  Perseverating about person on contact list being called.  She has no complaints at this time.    HPI, Past Medical, Family, and Social History remains the same as documented in the initial encounter.     Review of Systems   Unable to perform ROS: Mental status change   Constitutional: Negative for fever.   HENT: Negative for drooling.    Eyes: Negative for discharge and redness.   Respiratory: Negative for cough.    Cardiovascular: Negative for leg swelling.   Gastrointestinal: Negative for diarrhea and vomiting.   Genitourinary: Negative for hematuria.   Skin: Negative for rash.   Neurological: Positive for weakness (RUE). Negative for facial asymmetry, speech difficulty, numbness and headaches.   Psychiatric/Behavioral: Positive for confusion.     Scheduled Meds:   amiodarone  400 mg Oral BID    amLODIPine  10 mg Oral Daily    ascorbic acid (vitamin C)  500 mg Oral BID    aspirin  81 mg Oral Daily    atorvastatin  40 mg Oral Daily    carvedilol  3.125 mg Oral Daily    chlorthalidone  25 mg Oral QAM    clopidogrel  75 mg Oral Daily    enoxaparin  40 mg Subcutaneous Daily    famotidine  20 mg Oral BID    fluticasone-vilanterol  1 puff Inhalation Daily    furosemide  20 mg Oral BID    mupirocin  1 g Nasal BID    polyethylene glycol  17 g Oral Daily     Continuous Infusions:   sodium chloride 0.9%       PRN Meds:acetaminophen, albuterol-ipratropium 2.5mg-0.5mg/3mL, bisacodyl, dextrose 50%, dextrose 50%, dextrose 50%, glucagon (human recombinant), glucagon (human recombinant), glucose, glucose, hydrALAZINE, insulin aspart U-100, insulin aspart U-100, morphine, ondansetron, promethazine (PHENERGAN) IVPB, sodium chloride 0.9%    Objective:     Vital Signs (Most Recent):  Temp:  98.7 °F (37.1 °C) (03/28/18 0753)  Pulse: 68 (03/28/18 0800)  Resp: 18 (03/28/18 0753)  BP: (!) 195/76 (03/28/18 0753)  SpO2: 98 % (03/28/18 0753)  BP Location: Right arm    Vital Signs Range (Last 24H):  Temp:  [97.1 °F (36.2 °C)-98.7 °F (37.1 °C)]   Pulse:  [55-68]   Resp:  [16-20]   BP: (110-195)/(51-87)   SpO2:  [91 %-98 %]   BP Location: Right arm    Physical Exam   Constitutional: She appears well-developed and well-nourished. No distress.   Eyes: Conjunctivae and EOM are normal. Pupils are equal, round, and reactive to light. Right eye exhibits no discharge. Left eye exhibits no discharge. No scleral icterus.   Cardiovascular: Normal rate and regular rhythm.    Pulmonary/Chest: Effort normal and breath sounds normal.   Abdominal: Soft. Bowel sounds are normal. She exhibits no distension. There is no tenderness.   Musculoskeletal: She exhibits no edema.   Neurological: She is alert. GCS eye subscore is 4. GCS verbal subscore is 4. GCS motor subscore is 6.   Skin: Skin is warm and dry. She is not diaphoretic.   Nursing note and vitals reviewed.      Neurological Exam:   LOC: alert  Language: No aphasia  Articulation: No dysarthria  Orientation: Not oriented to place, Not oriented to time  Visual Fields: Full  EOM (CN III, IV, VI): Full/intact  Facial Movement (CN VII): Symmetric facial expression    Motor: Arm left  Normal 5/5  Leg left  Normal 5/5  Arm right  Plegia 0/5  Leg right Paresis: 4/5    Laboratory:  CMP:   Recent Labs  Lab 03/28/18  0449   CALCIUM 9.5   ALBUMIN 3.2*   PROT 6.8      K 3.8   CO2 29   CL 97   BUN 21   CREATININE 0.7   ALKPHOS 82   ALT 11   AST 17   BILITOT 0.6     BMP:   Recent Labs  Lab 03/28/18  0449      K 3.8   CL 97   CO2 29   BUN 21   CREATININE 0.7   CALCIUM 9.5     CBC:   Recent Labs  Lab 03/28/18  0449   WBC 10.17   RBC 4.05   HGB 9.6*   HCT 30.8*   *   MCV 76*   MCH 23.7*   MCHC 31.2*     Lipid Panel:   Recent Labs  Lab 03/25/18  0538   CHOL 150   LDLCALC  80.2   HDL 42   TRIG 139     Coagulation:   Recent Labs  Lab 03/23/18  0331 03/23/18  0340   INR 1.0  --    APTT  --  22.1     Hgb A1C: No results for input(s): HGBA1C in the last 168 hours.  TSH:   Recent Labs  Lab 03/25/18  0538   TSH 1.221       Diagnostic Results     Brain Imaging   CTH 3/25/17 revealed redemonstration of multiple infarctions w/expected temporal evolution.  No hemorrhage or hemorrhagic conversion.    Vessel Imaging   MRI brain 3/25/18 revealed bilateral cerebral and cerebellar embolic infarcts.    Cardiac Imaging   ROSEY 3/22/18:  CONCLUSIONS     1 - Low normal to mildly depressed left ventricular systolic function (EF 50-55%).     2 - Mild mitral regurgitation.     3 - Severe aortic stenosis.     4 - Grade 4 atheroma disease of aorta.     5 - Successful implantation of a 26mm Evolut R CoreValve into the aortic position with no evidence of regurgitation.

## 2018-03-28 NOTE — SUBJECTIVE & OBJECTIVE
Interval History 3/28/2018:  Patient is seen for follow-up rehab evaluation and recommendations: No acute events over night.  Moved to 7th floor.  Participating with therapy.  Barriers for discharge/rehab admission: [facility] IRF approval    HPI, Past Medical, Family, and Social History remains the same as documented in the initial encounter.    Scheduled Medications:    amiodarone  400 mg Oral BID    amLODIPine  10 mg Oral Daily    ascorbic acid (vitamin C)  500 mg Oral BID    aspirin  81 mg Oral Daily    atorvastatin  40 mg Oral Daily    carvedilol  3.125 mg Oral Daily    chlorthalidone  25 mg Oral QAM    clopidogrel  75 mg Oral Daily    enoxaparin  40 mg Subcutaneous Daily    famotidine  20 mg Oral BID    fluticasone-vilanterol  1 puff Inhalation Daily    furosemide  20 mg Oral BID    mupirocin  1 g Nasal BID    polyethylene glycol  17 g Oral Daily     PRN Medications: acetaminophen, albuterol-ipratropium 2.5mg-0.5mg/3mL, bisacodyl, dextrose 50%, dextrose 50%, dextrose 50%, glucagon (human recombinant), glucagon (human recombinant), glucose, glucose, hydrALAZINE, insulin aspart U-100, insulin aspart U-100, morphine, ondansetron, promethazine (PHENERGAN) IVPB, sodium chloride 0.9%    Review of Systems   Constitutional: Positive for activity change. Negative for chills, fatigue and fever.   HENT: Negative for drooling, hearing loss, trouble swallowing and voice change.    Eyes: Positive for visual disturbance. Negative for pain.   Respiratory: Negative for cough, shortness of breath and wheezing.    Cardiovascular: Negative for chest pain and palpitations.   Gastrointestinal: Negative for abdominal distention, nausea and vomiting.   Genitourinary: Negative for difficulty urinating and flank pain.   Musculoskeletal: Positive for gait problem. Negative for arthralgias, back pain, myalgias and neck pain.   Skin: Negative for rash and wound.   Neurological: Positive for facial asymmetry, speech difficulty and  weakness. Negative for dizziness, numbness and headaches.   Psychiatric/Behavioral: Negative for agitation and hallucinations. The patient is not nervous/anxious.      Objective:     Vital Signs (Most Recent):  Temp: 98.3 °F (36.8 °C) (18 1142)  Pulse: (!) 57 (18 1142)  Resp: 18 (18 1142)  BP: 136/64 (18 1142)  SpO2: (!) 90 % (18 1142)    Vital Signs (24h Range):  Temp:  [97.1 °F (36.2 °C)-98.7 °F (37.1 °C)] 98.3 °F (36.8 °C)  Pulse:  [55-68] 57  Resp:  [16-20] 18  SpO2:  [90 %-98 %] 90 %  BP: (110-195)/(51-87) 136/64     Physical Exam   Constitutional: She is oriented to person, place, and time. She appears well-developed and well-nourished. No distress.   HENT:   Head: Normocephalic and atraumatic.   Right Ear: External ear normal.   Left Ear: External ear normal.   Nose: Nose normal.   Eyes: Right eye exhibits no discharge. Left eye exhibits no discharge. No scleral icterus.   Neck: Normal range of motion.   Cardiovascular: Normal rate, regular rhythm and intact distal pulses.    Pulmonary/Chest: Effort normal. No respiratory distress. She has no wheezes.   Abdominal: Soft. She exhibits no distension. There is no tenderness.   Musculoskeletal: Normal range of motion. She exhibits no edema or tenderness.   Neurological: She is alert and oriented to person, place, and time.   -  Mental Status:  AAOx3.  Follows commands.  Answers correct age and .  Recent and remote memory intact.  -  Speech and language:  No aphasia, mild dysarthria.    -  Vision:  R hemianopsia.  No ptosis.    -  Facial movement (CN VII): Mild facial droop.   -  Motor:  RUE: 0/5.  LUE: 5/5.  RLE: 4-/5.  LLE: 5/5.  -  Tone:  Decreased RUE.  -  Sensory:  Intact to light touch and pin prick.   Skin: Skin is warm and dry. No rash noted.   Psychiatric: She has a normal mood and affect. Her behavior is normal. Thought content normal. Her speech is slurred. Cognition and memory are impaired.   Vitals  reviewed.    Diagnostic Results:   Labs: Reviewed  X-Ray: Reviewed  CT: Reviewed  MRI: Reviewed  CTA: Reviewed    NEUROLOGICAL EXAMINATION:     MENTAL STATUS   Oriented to person, place, and time.   Speech: slurred

## 2018-03-28 NOTE — ASSESSMENT & PLAN NOTE
- Newly diagnosed here. Patient found to be in afib RVR after returning from MRI on 3/24.   - Unlikely to have caused stroke as she went into arrhythmia s/p symptom onset. Likely post-procedural from TAVR.   - However CHADVASC 8.  Discussed with Dr. Hinkle, will consider starting Apixaban in 3 or more days, though it may have to be considered on an outpatient basis as the plan is for the patient to go to a rehab facility  - Continue oral amiodarone per CTS recs.  Currently remains in NSR.   - Contact CTS prior to transfer/discharge for discharge med recs

## 2018-03-28 NOTE — PT/OT/SLP PROGRESS
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   Yes  Keep patient NPO? No   Current Respiratory Status: room air      Pt awake and alert in chair upon arrival. Pt oriented to self, situation and type of building independently. Pt able to name hospital with min cues. Pt unable report date yet able to name month without moderate cueing. When reporting year Pt with good self correction from 2016--> 2018. Pt demonstrated ability to complete divergent naming tasks able to generate an avg 6 items independently and improved to generate 3 more items with  SLP mod verbal cues and item descriptions . Pt responses typically rigid in nature for examples when asked to name "items that at cold" Pt reported, "parfaits, ice cream, homemade ice cream. " Pt continues to present with poor short term memory and attention warranting multiple verbal repitions to enhance task maintenance. Pt provided simple solutions to common safety problems with 100% acc independently.     Assessment:     Kristina Aguirre is a 67 y.o. female with an SLP diagnosis of Aphasia and Cognitive-Linguistic Impairment.      Goals:    SLP Goals        Problem: SLP Goal    Goal Priority Disciplines Outcome   SLP Goal     SLP Ongoing (interventions implemented as appropriate)   Description:  Updated Speech Language Pathology Goals  Goals expected to be met by 4/3/18:    1.  Pt will " tolerate Dental Soft diet w/ thin liquids w/ no overt signs of aspiration.  2.  Pt will be oriented x4 using external cues.  3.  Pt will complete abstract naming tasks w/ 80% acc given min cues.  4.  Pt will complete problem-solving tasks w/ 80% acc given min cues.  5.  Pt will complete reasoning tasks w/ 80% acc given min cues.  6.  Pt will complete short term memory tasks w/ 66% acc indep using memory strategies.  7.  Pt will complete further evaluation of reading, writing and visual spatial skills to determine the need for additional goals.        Speech Language Pathology Goals  Goals expected to be met by 4/1  1. Pt will participate in ongoing swallow assessment -Goal Met  2. Pt will participate in speech, language and cognitive assessment -Goal Met                       Plan:     · Patient to be seen:  5 x/week   · Plan of Care expires:  04/24/18  · Plan of Care reviewed with:  patient   · SLP Follow-Up:  Yes       Discharge recommendations:  rehabilitation facility   Barriers to Discharge:  None per ST discipline     Time Tracking:     SLP Treatment Date:   03/28/18  Speech Start Time:  1109  Speech Stop Time:  1122     Speech Total Time (min):  13 min    Billable Minutes: Speech Therapy Individual 13    Xuan Cleary CCC-SLP  03/28/2018

## 2018-03-28 NOTE — PLAN OF CARE
Problem: Occupational Therapy Goal  Goal: Occupational Therapy Goal  Goals to be met by: 4/2/18     Patient will increase functional independence with ADLs by performing:    Feeding with Modified [place].  UE Dressing with Supervision.  LE Dressing with Supervision.  Grooming while standing at sink with Supervision.  Toileting from toilet with Stand-by Assistance for hygiene and clothing management.   Toilet transfer to toilet with Stand-by Assistance.  Upper extremity exercise program x10 reps per handout, with independence.       Pt is making progress towards goals.    CAROLYN Lopez  3/28/2018

## 2018-03-28 NOTE — PROGRESS NOTES
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 NSR on monitor. Pt transferred to NSU       Medications:  Continuous Infusions:   sodium chloride 0.9%       Scheduled Meds:   amiodarone  400 mg Oral BID    amLODIPine  10 mg Oral Daily    ascorbic acid (vitamin C)  500 mg Oral BID    aspirin  81 mg Oral Daily    atorvastatin  40 mg Oral Daily    carvedilol  3.125 mg Oral Daily    chlorthalidone  25 mg Oral QAM    clopidogrel  75 mg Oral Daily    enoxaparin  40 mg Subcutaneous Daily    famotidine  20 mg Oral BID    fluticasone-vilanterol  1 puff Inhalation Daily    furosemide  20 mg Oral BID    mupirocin  1 g Nasal BID    polyethylene glycol  17 g Oral Daily     PRN Meds:acetaminophen, albuterol-ipratropium 2.5mg-0.5mg/3mL, bisacodyl, dextrose 50%, dextrose 50%, dextrose 50%, glucagon (human recombinant), glucagon (human recombinant), glucose, glucose, hydrALAZINE, insulin aspart U-100, insulin aspart U-100, morphine, ondansetron, promethazine (PHENERGAN) IVPB, sodium chloride 0.9%     Objective:     Vital Signs (Most Recent):  Temp: 98.6 °F (37 °C) (03/28/18 1613)  Pulse: (!) 53 (03/28/18 1613)  Resp: 18 (03/28/18 1613)  BP: (!) 142/63 (03/28/18 1613)  SpO2: 99 % (03/28/18 1613) Vital Signs (24h Range):  Temp:  [97.1 °F (36.2 °C)-98.7 °F (37.1 °C)] 98.6 °F (37 °C)  Pulse:  [53-68] 53  Resp:  [18-20] 18  SpO2:  [90 %-99 %] 99 %  BP: (114-195)/(58-87) 142/63     Weight: 69.9 kg (154 lb 1.6 oz)  Body mass index is 28.19 kg/m².    SpO2: 99 %  O2 Device  (Oxygen Therapy): nasal cannula    Intake/Output - Last 3 Shifts       03/26 0700 - 03/27 0659 03/27 0700 - 03/28 0659 03/28 0700 - 03/29 0659    P.O. 640 660     Total Intake(mL/kg) 640 (9.2) 660 (9.4)     Urine (mL/kg/hr) 150 (0.1) 700 (0.4)     Stool 0 (0) 2 (0)     Total Output 150 702      Net +490 -42             Stool Occurrence 1 x            Lines/Drains/Airways     Peripheral Intravenous Line                 Peripheral IV - Single Lumen 03/27/18 0034 Left Forearm 1 day                Physical Exam   Constitutional: She is oriented to person, place, and time. She appears well-developed and well-nourished.   HENT:   Head: Normocephalic and atraumatic.   Eyes: Pupils are equal, round, and reactive to light.   Neck: Normal range of motion. Neck supple.   Cardiovascular: Normal rate, regular rhythm and normal heart sounds.    Pulmonary/Chest: Effort normal and breath sounds normal.   Abdominal: Soft. Bowel sounds are normal.   Musculoskeletal: Normal range of motion.   Neurological: She is alert and oriented to person, place, and time.   RUE weakness, RLE absent motor function   Skin: Skin is warm and dry.   Psychiatric: She has a normal mood and affect. Her behavior is normal.       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         S/p TAVR (transcatheter aortic valve replacement),  bioprosthetic    --Continue ASA 81, BB, statin, Plavix for FRANCY  --may start ace-inhibitor if warranted  --PT/OT  --Ambulate x4   --Wean O2 as tolerated for O2 sat >92%  --monitor CXR   --Monitor electrolytes and replace prn  --PO Lasix 20mg BID  ---- upon DC- lasix BID for 7 days then decrease to once daily & Potassium 20mEq BID for 7 days then once daily  ---- when pt is ready for transfer pls call CTS for DC med rec                  Jessy Sheppard NP  Cardiothoracic Surgery  [facility]

## 2018-03-28 NOTE — NURSING TRANSFER
Nursing Transfer Note      3/27/2018     Transfer To: [id number]    Transfer via bed    Transfer with  to O2, cardiac monitoring    Transported by RNx2    Chart send with patient: Yes    Notified: BALIEY Olea notified    Upon arrival to floor: cardiac monitor applied, patient oriented to room, call bell in reach and bed in lowest position.  Belongings sent with pt include flowers, bag of clothing, glassesx2, cardiac monitor for pt to bring home.

## 2018-03-28 NOTE — PT/OT/SLP PROGRESS
Occupational Therapy   Treatment    Name: Kristina Aguirre  MRN: [id number]  Admitting Diagnosis:  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  5 Days Post-Op    Recommendations:     Discharge Recommendations: rehabilitation facility  Discharge Equipment Recommendations:   (TBD pending progress)  Barriers to discharge:  Decreased caregiver support    Subjective     Communicated with: RN prior to session.  Pain/Comfort:  · Pain Rating 1: 0/10  · Pain Rating Post-Intervention 1: 0/10    Patients cultural, spiritual, Hindu conflicts given the current situation: none reported    Objective:     Patient found with: telemetry (pt sitting up in chair upon arrival)    General Precautions: Standard, fall   Orthopedic Precautions:N/A   Braces: N/A     Occupational Performance:    Bed Mobility:    · Not assessed 2* pt up in chair upon arrival.      Functional Mobility/Transfers:  · Not assessed this date; session performed while pt in chair    Activities of Daily Living:  · Grooming: minimum assistance for brushing teeth while seated up in chair.  Pt reported double vision during task.  OT assisted with holding right hand over toothbrush on table while pt placed toothpaste onto brush.  Cues required for coordination 2* pt with difficulty accurately connecting toothpaste with toothbrush.  Supervision for washing face while seated up in chair.      Patient left up in chair with speech therapist present    [facility] 6 Click:  [facility] Total Score: 14    Treatment & Education:  *Pt participated in session while seated up in chair.  PROM performed on RUE incorporating all joints with support provided for scapular and shoulder: 3 sets x 15 reps.    *Pt performed grooming tasks as noted above.  Cues required to assist pt with completing task and techniques provided to assist with performing grooming incorporating both hands.  *Pt performed 3 bilateral UE exercises to promote increased coordination and functional use of RUE:  2 sets x 15 reps.  OT assisted pt with clasping hands together and provided support near elbow and wrist.    -Bicep curls  -Chest press  -Shoulder flexion  *POC reviewed with pt  Education:    Assessment:     Kristina Aguirre is a 67 y.o. female with a medical diagnosis of 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.  She presents with the following performance deficits affecting function are weakness, impaired endurance, impaired functional mobilty, impaired self care skills, impaired balance, decreased lower extremity function, decreased upper extremity function, visual deficits, decreased coordination, R hemiparesis.  Pt tolerated session well with great participation noted throughout.  Pt reported double vision impacting ability to perform grooming task.  Min A given to assist pt with incorporating (B) UE into task. During UE exercises pt reported increased fatigue, but was able to fully complete.  Pt is highly motivated and is making progress towards goals.  She would continue to benefit from skilled OT services to address problems listed below and increase independence with ADLs.    Rehab Prognosis:  Good; patient would benefit from acute skilled OT services to address these deficits and reach maximum level of function.       Plan:     Patient to be seen 4 x/week to address the above listed problems via self-care/home management, therapeutic activities, therapeutic exercises, neuromuscular re-education, cognitive retraining  · Plan of Care Expires: 04/22/18  · Plan of Care Reviewed with: patient    This Plan of care has been discussed with the patient who was involved in its development and understands and is in agreement with the identified goals and treatment plan    GOALS:    Occupational Therapy Goals        Problem: Occupational Therapy Goal    Goal Priority Disciplines Outcome Interventions   Occupational Therapy Goal     OT, PT/OT Ongoing (interventions implemented as appropriate)     Description:  Goals to be met by: 4/2/18     Patient will increase functional independence with ADLs by performing:    Feeding with Modified [place].  UE Dressing with Supervision.  LE Dressing with Supervision.  Grooming while standing at sink with Supervision.  Toileting from toilet with Stand-by Assistance for hygiene and clothing management.   Toilet transfer to toilet with Stand-by Assistance.  Upper extremity exercise program x10 reps per handout, with independence.                      Time Tracking:     OT Date of Treatment: 03/28/18  OT Start Time: 1046  OT Stop Time: 1110  OT Total Time (min): 24 min    Billable Minutes:Self Care/Home Management 10  Therapeutic Activity 14    CAROLYN Lopez  3/28/2018

## 2018-03-28 NOTE — PLAN OF CARE
KAI spoke with Magdalena at [facility] to follow up on referral. Magdalena states that patient canbe accepted tomorrow to rehab if the A-fib and heart rate are stable. Magdalena also has questions regarding pt's halter monitor and if telemetry is needed for patient upon discharge. KAI will follow up.    Reva Clark LMSW  [facility]  Ext. [phone]

## 2018-03-28 NOTE — ASSESSMENT & PLAN NOTE
Stroke risk factor - but unlikely to have caused this event as pattern not consistent with thrombotic/a-a considering embolic multi-territorial stroke.     - As demonstrable by recent carotid US and CTA (mixed density atherosclerosis at both carotid bifurcations)  -Avoid hypotension

## 2018-03-28 NOTE — PLAN OF CARE
Problem: Physical Therapy Goal  Goal: Physical Therapy Goal  Goals to be met by: 18    Patient will increase functional independence with mobility by performin. Supine to sit with Modified [place] -not met  2. Sit to stand transfer with Modified [place] -not met  3. Gait  x 220 feet with Supervision-not met  4. Ascend/descend 2 stair with no Handrails Supervision . -not met       Discharge Recommendations: Rehab    Pt safe to transfer OOB with RN/PCT via SQPT: Use no AD with mod/max A of 1 person.    Goals remain appropriate.     Sheri Romero, PTA.   [phone]   3/28/2018

## 2018-03-29 PROBLEM — I50.33 ACUTE ON CHRONIC DIASTOLIC HEART FAILURE: Status: ACTIVE | Noted: 2018-03-29

## 2018-03-29 PROBLEM — M47.812 CERVICAL SPONDYLOSIS: Status: ACTIVE | Noted: 2018-03-29

## 2018-03-29 LAB
ALBUMIN SERPL BCP-MCNC: 3.5 G/DL
ALP SERPL-CCNC: 80 U/L
ALT SERPL W/O P-5'-P-CCNC: 10 U/L
ANION GAP SERPL CALC-SCNC: 10 MMOL/L
AST SERPL-CCNC: 14 U/L
BASOPHILS # BLD AUTO: 0.11 K/UL
BASOPHILS NFR BLD: 1.2 %
BILIRUB SERPL-MCNC: 0.7 MG/DL
BUN SERPL-MCNC: 22 MG/DL
CALCIUM SERPL-MCNC: 9.6 MG/DL
CHLORIDE SERPL-SCNC: 96 MMOL/L
CO2 SERPL-SCNC: 28 MMOL/L
CREAT SERPL-MCNC: 0.7 MG/DL
DIFFERENTIAL METHOD: ABNORMAL
EOSINOPHIL # BLD AUTO: 0.6 K/UL
EOSINOPHIL NFR BLD: 6.3 %
ERYTHROCYTE [DISTWIDTH] IN BLOOD BY AUTOMATED COUNT: 18.1 %
EST. GFR  (AFRICAN AMERICAN): >60 ML/MIN/1.73 M^2
EST. GFR  (NON AFRICAN AMERICAN): >60 ML/MIN/1.73 M^2
GLUCOSE SERPL-MCNC: 129 MG/DL
HCT VFR BLD AUTO: 30.2 %
HGB BLD-MCNC: 9.2 G/DL
IMM GRANULOCYTES # BLD AUTO: 0.05 K/UL
IMM GRANULOCYTES NFR BLD AUTO: 0.5 %
LYMPHOCYTES # BLD AUTO: 1.7 K/UL
LYMPHOCYTES NFR BLD: 18.4 %
MAGNESIUM SERPL-MCNC: 2.1 MG/DL
MCH RBC QN AUTO: 23.5 PG
MCHC RBC AUTO-ENTMCNC: 30.5 G/DL
MCV RBC AUTO: 77 FL
MONOCYTES # BLD AUTO: 0.8 K/UL
MONOCYTES NFR BLD: 8.8 %
NEUTROPHILS # BLD AUTO: 6 K/UL
NEUTROPHILS NFR BLD: 64.8 %
NRBC BLD-RTO: 0 /100 WBC
PHOSPHATE SERPL-MCNC: 4.3 MG/DL
PLATELET # BLD AUTO: 475 K/UL
PMV BLD AUTO: 9 FL
POCT GLUCOSE: 108 MG/DL (ref 70–110)
POCT GLUCOSE: 134 MG/DL (ref 70–110)
POTASSIUM SERPL-SCNC: 3.5 MMOL/L
PROT SERPL-MCNC: 7 G/DL
RBC # BLD AUTO: 3.92 M/UL
SODIUM SERPL-SCNC: 134 MMOL/L
WBC # BLD AUTO: 9.33 K/UL

## 2018-03-29 PROCEDURE — 36415 COLL VENOUS BLD VENIPUNCTURE: CPT

## 2018-03-29 PROCEDURE — 94761 N-INVAS EAR/PLS OXIMETRY MLT: CPT

## 2018-03-29 PROCEDURE — 84100 ASSAY OF PHOSPHORUS: CPT

## 2018-03-29 PROCEDURE — 97530 THERAPEUTIC ACTIVITIES: CPT

## 2018-03-29 PROCEDURE — 92526 ORAL FUNCTION THERAPY: CPT

## 2018-03-29 PROCEDURE — 25000003 PHARM REV CODE 250: Performed by: STUDENT IN AN ORGANIZED HEALTH CARE EDUCATION/TRAINING PROGRAM

## 2018-03-29 PROCEDURE — 25000003 PHARM REV CODE 250: Performed by: NURSE PRACTITIONER

## 2018-03-29 PROCEDURE — 80053 COMPREHEN METABOLIC PANEL: CPT

## 2018-03-29 PROCEDURE — 25000003 PHARM REV CODE 250: Performed by: PSYCHIATRY & NEUROLOGY

## 2018-03-29 PROCEDURE — 20600001 HC STEP DOWN PRIVATE ROOM

## 2018-03-29 PROCEDURE — 25000003 PHARM REV CODE 250: Performed by: THORACIC SURGERY (CARDIOTHORACIC VASCULAR SURGERY)

## 2018-03-29 PROCEDURE — 83735 ASSAY OF MAGNESIUM: CPT

## 2018-03-29 PROCEDURE — 63600175 PHARM REV CODE 636 W HCPCS: Performed by: STUDENT IN AN ORGANIZED HEALTH CARE EDUCATION/TRAINING PROGRAM

## 2018-03-29 PROCEDURE — 97535 SELF CARE MNGMENT TRAINING: CPT

## 2018-03-29 PROCEDURE — 92507 TX SP LANG VOICE COMM INDIV: CPT

## 2018-03-29 PROCEDURE — 85025 COMPLETE CBC W/AUTO DIFF WBC: CPT

## 2018-03-29 PROCEDURE — 99233 SBSQ HOSP IP/OBS HIGH 50: CPT | Mod: GC,,, | Performed by: PSYCHIATRY & NEUROLOGY

## 2018-03-29 PROCEDURE — 25000003 PHARM REV CODE 250: Performed by: PHYSICIAN ASSISTANT

## 2018-03-29 PROCEDURE — 97112 NEUROMUSCULAR REEDUCATION: CPT

## 2018-03-29 RX ORDER — FUROSEMIDE 20 MG/1
20 TABLET ORAL 2 TIMES DAILY
Qty: 60 TABLET | Refills: 11 | Status: CANCELLED | OUTPATIENT
Start: 2018-03-29 | End: 2019-03-29

## 2018-03-29 RX ORDER — AMIODARONE HYDROCHLORIDE 400 MG/1
400 TABLET ORAL 2 TIMES DAILY
Qty: 60 TABLET | Refills: 11 | Status: CANCELLED | OUTPATIENT
Start: 2018-03-29 | End: 2019-03-29

## 2018-03-29 RX ORDER — CARVEDILOL 3.12 MG/1
3.12 TABLET ORAL DAILY
Qty: 30 TABLET | Refills: 11 | Status: CANCELLED | OUTPATIENT
Start: 2018-03-29 | End: 2019-03-29

## 2018-03-29 RX ADMIN — ASPIRIN 81 MG: 81 TABLET, COATED ORAL at 09:03

## 2018-03-29 RX ADMIN — FAMOTIDINE 20 MG: 20 TABLET, FILM COATED ORAL at 09:03

## 2018-03-29 RX ADMIN — Medication 500 MG: at 09:03

## 2018-03-29 RX ADMIN — CARVEDILOL 3.12 MG: 3.12 TABLET, FILM COATED ORAL at 09:03

## 2018-03-29 RX ADMIN — AMIODARONE HYDROCHLORIDE 400 MG: 200 TABLET ORAL at 09:03

## 2018-03-29 RX ADMIN — FUROSEMIDE 20 MG: 20 TABLET ORAL at 05:03

## 2018-03-29 RX ADMIN — CLOPIDOGREL 75 MG: 75 TABLET, FILM COATED ORAL at 09:03

## 2018-03-29 RX ADMIN — CHLORTHALIDONE 25 MG: 25 TABLET ORAL at 06:03

## 2018-03-29 RX ADMIN — ATORVASTATIN CALCIUM 40 MG: 20 TABLET, FILM COATED ORAL at 09:03

## 2018-03-29 RX ADMIN — AMLODIPINE BESYLATE 10 MG: 10 TABLET ORAL at 09:03

## 2018-03-29 RX ADMIN — FUROSEMIDE 20 MG: 20 TABLET ORAL at 09:03

## 2018-03-29 RX ADMIN — ENOXAPARIN SODIUM 40 MG: 100 INJECTION SUBCUTANEOUS at 05:03

## 2018-03-29 NOTE — PT/OT/SLP PROGRESS
"Physical Therapy Treatment    Patient Name:  Kristina Aguirre   MRN:  [id number]  Admitting Diagnosis: 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  Recent Surgery: Procedure(s) (LRB):  STUDY-EP (N/A) 6 Days Post-Op    Recommendations:     Discharge Recommendations:  rehabilitation facility   Discharge Equipment Recommendations:  (TBD)   Barriers to discharge: Inaccessible home, Decreased caregiver support and pt lives alone and has 3 CESAR    Plan:     During this hospitalization, patient to be seen 4 x/week to address the above listed problems via gait training, therapeutic activities, therapeutic exercises, neuromuscular re-education  · Plan of Care Expires:  04/21/18   Plan of Care Reviewed with: patient    This Plan of care has been discussed with the patient who was involved in its development and understands and is in agreement with the identified goals and treatment plan    Subjective     Communicated with RN (Cira) prior to session.     Patient comments: "I feel like I'm doing better today"  Pain/Comfort:  · Pain Rating 1: 0/10  · Pain Rating Post-Intervention 1: 0/10    Objective:     Patient found with: bed alarm, telemetry    Patient found in semi R side lying upon PT entry to room, agreeable to treatment.  NO family/friends present in the room.    General Precautions: Standard, Cardiac fall (dental soft diet, thin liquids)   Orthopedic Precautions:N/A   Braces: N/A     Pt was found to be soiled with BM and required assistance for hygiene prior to sitting at the EOB    BED MOBILITY (with vc's for sequencing and safe technique of functional mobility):        Rolling to the R with SBA with use of bedrail       Sup > sit at the EOB with mod A from R side lying        Sit > sup with mod A       Scooting hips to the EOB upon sitting with mod A x2 scoots       Pt performs scooting laterally to the L via bridging with mod A x2 scoot(s)            SITTING AT THE EDGE OF THE BED (5-8 min)   Assistance " Level Required: CGA for trunk control with L UE support     Postural deviations noted: flexed trunk, PPT, decreased attention to the R   Encouraged: trunk ext, neutral pelvis, attention to the R        TRANSFERS   Patient completed Sit <> Stand Transfer from EOB with mod A with no AD/tray table (multiple)   Patient completed Stand <> Sit Transfer to EOB with mod A with no AD/tray table                  Pt performs sit > squat from EOB x2 sets of 5 reps with tray table requiring mod A     STANDING/PRE-GAIT Activities (5 min x2 trials)       Pt tolerates standing with tray table requiring mod A for hip ext, trunk ext, R knee control (to prevent hyperext/buckling) and for R UE with vc's.           GAIT:   Patient ambulated: 3-4 lateral steps to the R   Patient required: max A for hip ext, trunk ext, R knee control, to advance R LE laterally to the R   Patient used:  tray table   Gait Pattern observed: 2-point gait   Gait Deviation(s): decreased perez, increased time in double stance, decreased velocity of limb motion, decreased step length, decreased swing-to-stance ratio, decreased toe-to-floor clearance and decreased weight-shifting ability    Comments: vc's and tc's for hip ext, weight shift, R knee ext, directional guidance and sequencing      EDUCATION  Education provided to pt regarding: postural control, weight shift, attention to the R.    They were provided and educated on proper positioning in supine and in sitting with support of affected R UE in order to increase awareness of it and to decrease the effects of immobility, specifically edema and pain.     Whiteboard updated with correct mobility information. RN/PCT notified.  Pt safe to transfer OOB with RN/PCT via SQPT: Use no AD with mod A of 1 person.    Patient left supine, with R UE propped on pillow for scapular support and edema management, with all lines intact, call button in reach, bed alarm on and RN notified    AM-PAC 6 CLICK MOBILITY  Turning  over in bed (including adjusting bedclothes, sheets and blankets)?: 3  Sitting down on and standing up from a chair with arms (e.g., wheelchair, bedside commode, etc.): 2  Moving from lying on back to sitting on the side of the bed?: 2  Moving to and from a bed to a chair (including a wheelchair)?: 2  Need to walk in hospital room?: 2  Climbing 3-5 steps with a railing?: 1  Total Score: 12     Assessment:     Kristina Aguirre is a 67 y.o. female admitted with a medical diagnosis of 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.  She presents with the following impairments/functional limitations:  weakness, impaired endurance, impaired sensation, impaired self care skills, impaired functional mobilty, gait instability, impaired balance, impaired cognition, decreased coordination, decreased upper extremity function, decreased lower extremity function, decreased safety awareness, abnormal tone, impaired coordination, impaired fine motor. R hemiparesis requiring significant assistant for bed mob, scooting, sit < > stand, standing and gait 2* weak R hip muscles, decreased control of R knee and impaired balance.   In light of pt's current functional level and deficits, it is anticipated that pt will need to participate in an intense rehab program consisting of PT, OT and ST in order to achieve full rehab potential to return to previous level of function and roles.  Pt remains motivated to participate in PT session and will cont to benefit from skilled PT intervention.    Rehab Prognosis:  Good; patient would benefit from acute skilled PT services to address these deficits and reach maximum level of function.      GOALS:    Physical Therapy Goals        Problem: Physical Therapy Goal    Goal Priority Disciplines Outcome Goal Variances Interventions   Physical Therapy Goal     PT/OT, PT      Description:  Goals to be met by: 4/6/18    Patient will increase functional independence with mobility by  performin. Supine to sit with Modified [place] -not met  2. Sit to stand transfer with Modified [place] -not met  3. Gait  x 220 feet with Supervision-not met  4. Ascend/descend 2 stair with no Handrails Supervision . -not met                      Time Tracking:     PT Received On: 18  PT Start Time: 1537     PT Stop Time: 1612  PT Total Time (min): 35 min     Billable Minutes: Therapeutic Activity 18 and Neuromuscular Re-education 20    Treatment Type: Treatment  PT/PTA: PTA     PTA Visit Number: 2       Sheri Romero PTA.  Pager [phone]    3/29/2018    .

## 2018-03-29 NOTE — ASSESSMENT & PLAN NOTE
- initial post EKG: NSR, new LBBB  - EPS not concerning for risk of developing CHB HVI<65 though high risk out from procedure  - 30 day event monitor will not be covered because of inpatient rehab  - recommend telemetry while in rehab  - f/uu with Dr. Banegas in 6 weeks

## 2018-03-29 NOTE — PLAN OF CARE
Problem: Physical Therapy Goal  Goal: Physical Therapy Goal  Goals to be met by: 18    Patient will increase functional independence with mobility by performin. Supine to sit with Modified [place] -not met  2. Sit to stand transfer with Modified [place] -not met  3. Gait  x 220 feet with Supervision-not met  4. Ascend/descend 2 stair with no Handrails Supervision . -not met       Discharge Recommendations: Rehab    Pt safe to transfer OOB with RN/PCT via SQPT: Use no AD with mod A of 1 person    Goals remain appropriate.     Sheri Romero, PTA.   [phone]   3/29/2018

## 2018-03-29 NOTE — PLAN OF CARE
Cm spoke with EP doctor regarding patient's discharge to [facility] without a 30 day monitor. EP Doctor stated that they recommend that the patient got to a facility that has either telemetry [facility] so that the patient can have a halter monitor.    1430  Cm discussed the above stated information with the patient's friend who is her BAILEY Hernandez. David stated that he understood what had been explained and would get back to  with a decision.    1643   was called by David AVALOS to discuss patient's discharge destination. David spoke to the patient and they decided that [facility] is where they want to discharge to tomorrow.

## 2018-03-29 NOTE — PHYSICIAN QUERY
PT Name: Kristina Aguirre  MR #: [id number]    Physician Query Form - Atrial Fibrillation Specificity     CDS/: Emily Irving               Contact information: [email or web address]      This form is a permanent document in the medical record.     Query Date: March 29, 2018    By submitting this query, we are merely seeking further clarification of documentation. Please utilize your independent clinical judgment when addressing the question(s) below.    The medical record contains the following:   Indicators     Supporting Clinical Findings Location in Medical Record   X Atrial Fibrillation Atrial fibrillation   Cardiothoracic Surgery PN 3/28   X EKG results Atrial fibrillation with rapid ventricular response EKG 3/25    Medication     X Treatment --Amiodrone PO 400mg BID- we typically discharge patients on this dose  Cardiothoracic Surgery PN 3/28    Other         Provider, please further specify the Atrial Fibrillation diagnosis.    [  ] Chronic  [ X ] Paroxysmal  [  ] Permanent  [  ] Persistent  [  ] Other (please specify): ____________________________  [  ] Clinically Undetermined    Please document in your progress notes daily for the duration of treatment until resolved, and include in your discharge summary.

## 2018-03-29 NOTE — PT/OT/SLP PROGRESS
"Physical Therapy Treatment    Patient Name:  Kristina Aguirre   MRN:  [id number]  Admitting Diagnosis: 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  Recent Surgery: Procedure(s) (LRB):  STUDY-EP (N/A) 5 Days Post-Op    Recommendations:     Discharge Recommendations:  rehabilitation facility (dental soft, thin liquids)   Discharge Equipment Recommendations:  (TBD)   Barriers to discharge: Inaccessible home and Decreased caregiver support  Pt lives alone and has 2 CESAR    Plan:     During this hospitalization, patient to be seen 4 x/week to address the above listed problems via gait training, therapeutic activities, therapeutic exercises, neuromuscular re-education  · Plan of Care Expires:  04/21/18   Plan of Care Reviewed with: patient, friend (David)    This Plan of care has been discussed with the patient who was involved in its development and understands and is in agreement with the identified goals and treatment plan    Subjective     Communicated with RN (Cira) prior to session.     Patient comments: "I thought I was standing straight"  Pain/Comfort:  · Pain Rating 1: 0/10  · Pain Rating Post-Intervention 1: 0/10    Objective:     Patient found with: bed alarm, oxygen, telemetry    Patient found sup in bed upon PT entry to room, agreeable to treatment.  Friend (David) present in the room.    General Precautions: Standard, Cardiac fall   Orthopedic Precautions:N/A   Braces: N/A       BED MOBILITY (with vc's for sequencing and safe technique of functional mobility):        Rolling to the R with min A no use of bedrail       Sup > sit at the EOB with mod A from R side lying        Sit > sup with mod A       Scooting hips to the EOB upon sitting with min A x2-3 scoots          TRANSFERS   Patient completed Sit <> Stand Transfer from EOB/BS chair with mod A with no assistive device    Patient completed Stand <> Sit Transfer to BS chair with mod A with no assistive device    Patient completed Bed <> EOB " Transfer using Squat Pivot technique to the L with max A with no assistive device      STANDING (4 min)       Pt tolerates standing with B HHA requiring total A (max/mod A of 2, rehab tech for B UE support) hip ext, trunk ext, R LE control with vc's.  Pt demonstrates R hip weakness, L lateral lean of trunk, post lean      GAIT: (rehab tech follows with BS chair and O2 tank)   Patient ambulated: 7-8ft and then 5ft   Patient required: max A for hip ext, trunk ext, R knee control, advancement of R LE and support to R UE   Patient used:  NO AD/R UE support   Gait Pattern observed: swing-to gait   Gait Deviation(s): decreased perez, increased time in double stance, decreased velocity of limb motion, decreased step length, decreased stride length, decreased toe-to-floor clearance, decreased weight-shifting ability and R lateral lean    Comments: vc's and tc's for correction      EDUCATION  Education provided to pt regarding: postural control, weight shift, attention to the R.    They were provided and educated on proper positioning in supine and in sitting with support of affected R UE in order to increase awareness of it and to decrease the effects of immobility, specifically edema and pain.     Whiteboard updated with correct mobility information. RN/PCT notified.  Pt safe to transfer OOB with RN/PCT via SQPT: Use no AD with mod/max A of 1 person    Patient left supine, with R UE propped on pillow for scapular support and edema management, head in midline,  with all lines intact, call button in reach, bed alarm on, RN notified and friend (David) present    AM-PAC 6 CLICK MOBILITY  Turning over in bed (including adjusting bedclothes, sheets and blankets)?: 3  Sitting down on and standing up from a chair with arms (e.g., wheelchair, bedside commode, etc.): 2  Moving from lying on back to sitting on the side of the bed?: 2  Moving to and from a bed to a chair (including a wheelchair)?: 2  Need to walk in hospital room?:  2  Climbing 3-5 steps with a railing?: 1  Total Score: 12     Assessment:     Kristina Aguirre is a 67 y.o. female admitted with a medical diagnosis of 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.  She presents with the following impairments/functional limitations:  weakness, impaired endurance, impaired sensation, impaired self care skills, impaired functional mobilty, gait instability, impaired balance, visual deficits, impaired cognition, decreased coordination, decreased upper extremity function, decreased lower extremity function, decreased safety awareness, abnormal tone, decreased ROM, impaired coordination, impaired fine motor. R hemiparesis requiring significant assistance and verbal cues for bed mob, sit < > stand transfers, OOB transfers, sitting, standing and gait 2* R HP, decreased attention to the R, and R lateral lean.   In light of pt's current functional level and deficits, it is anticipated that pt will need to participate in an intense rehab program consisting of PT, OT and ST in order to achieve full rehab potential to return to previous level of function and roles.  Pt remains motivated to participate in PT session and will cont to benefit from skilled PT intervention.      Rehab Prognosis:  Good; patient would benefit from acute skilled PT services to address these deficits and reach maximum level of function.      GOALS:    Physical Therapy Goals        Problem: Physical Therapy Goal    Goal Priority Disciplines Outcome Goal Variances Interventions   Physical Therapy Goal     PT/OT, PT      Description:  Goals to be met by: 18    Patient will increase functional independence with mobility by performin. Supine to sit with Modified [place] -not met  2. Sit to stand transfer with Modified [place] -not met  3. Gait  x 220 feet with Supervision-not met  4. Ascend/descend 2 stair with no Handrails Supervision . -not met                      Time Tracking:     PT  Received On: 03/28/18  PT Start Time: 1520     PT Stop Time: 1603  PT Total Time (min): 43 min     Billable Minutes: Gait Training 23 and Therapeutic Activity 20    Treatment Type: Treatment  PT/PTA: PTA     PTA Visit Number: 1       Sheri Romero PTA.  Pager [phone]    3/28/2018    .

## 2018-03-29 NOTE — SUBJECTIVE & OBJECTIVE
Interval History:    68 y/o female with PMH of right lung cancer s/p lobectomy, CAD, HLD, bilateral carotid stenosis (mixed density atherosclerosis at both carotid bifurcations), severe AS and COPD who EP last saw 3/24 s/p TAVR 3/22. An EPS was not concerning for a risk of devloping CHB (HVI <65); procedure performed in the setting of a new LBBB. A 30 day event monitor was recommended as was f/u with Dr. Banegas in 6 weeks. The patient developed multifocal cerebral infarcts with largest area of infarction involving the left PCA territory post-op (stroke 3/24), and neurology plans on starting a DOAC for new onset AF (CHADVASC 8) in 5 days. AF was noted on 3/24 after the patient returned from an MRI. IV amiodarone was started and the rhythm converted to NSR. Neurology is asking if we can place the event monitor after rehab (the patient will need at least several weeks of rehab), and if AC is OK with a patient on DAPT for ELISHA. The patient is on amiodarone 400 mg BID and Coreg 3.125 mg daily.       3/22/18    1 - Low normal to mildly depressed left ventricular systolic function (EF 50-55%).     2 - Mild mitral regurgitation.     3 - Severe aortic stenosis.     4 - Grade 4 atheroma disease of aorta.     5 - Successful implantation of a 26mm Evolut R CoreValve into the aortic position with no evidence of regurgitation.     Review of Systems   Constitution: Negative for chills and fever.   HENT: Negative for ear discharge.    Eyes: Negative for pain and visual disturbance.   Cardiovascular: Negative for chest pain, dyspnea on exertion, irregular heartbeat, leg swelling, orthopnea, palpitations, paroxysmal nocturnal dyspnea and syncope.   Respiratory: Negative for hemoptysis, shortness of breath and wheezing.    Skin: Negative for rash and suspicious lesions.   Musculoskeletal: Negative for joint pain and muscle weakness.   Gastrointestinal: Negative for abdominal pain, diarrhea, hematemesis, hematochezia, melena, nausea  and vomiting.   Genitourinary: Negative for dysuria and frequency.   Neurological: Negative for focal weakness, headaches and tremors.   Psychiatric/Behavioral: Negative for altered mental status, suicidal ideas and thoughts of violence.     Objective:     Vital Signs (Most Recent):  Temp: 98.8 °F (37.1 °C) (03/29/18 1025)  Pulse: (!) 55 (03/29/18 1200)  Resp: 18 (03/29/18 1025)  BP: (!) 140/63 (03/29/18 1025)  SpO2: (!) 92 % (03/29/18 1025) Vital Signs (24h Range):  Temp:  [98 °F (36.7 °C)-98.8 °F (37.1 °C)] 98.8 °F (37.1 °C)  Pulse:  [53-69] 55  Resp:  [18] 18  SpO2:  [92 %-99 %] 92 %  BP: (140-155)/(63-70) 140/63     Weight: 69.9 kg (154 lb 1.6 oz)  Body mass index is 28.19 kg/m².     SpO2: (!) 92 %  O2 Device (Oxygen Therapy): room air    Physical Exam   Constitutional: She is oriented to person, place, and time. She appears well-developed and well-nourished.   HENT:   Head: Normocephalic and atraumatic.   Eyes: EOM are normal.   Cardiovascular: Normal rate and regular rhythm.  Exam reveals no gallop and no friction rub.    Pulmonary/Chest: Effort normal and breath sounds normal. No stridor. She has no wheezes. She has no rales.   Abdominal: Soft. Bowel sounds are normal. There is no rebound and no guarding.   Musculoskeletal: She exhibits no edema.   Neurological: She is alert and oriented to person, place, and time. No cranial nerve deficit.   Skin: Skin is warm and dry.   Psychiatric: She has a normal mood and affect. Her behavior is normal.       Significant Labs: All pertinent lab results from the last 24 hours have been reviewed.

## 2018-03-29 NOTE — PROGRESS NOTES
[facility]  Cardiac Electrophysiology  Progress Note    Admission Date: 3/22/2018  Code Status: Full Code   Attending Physician: Eric Obregon MD   Expected Discharge Date: 3/29/2018  Principal Problem: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    Subjective:     Interval History:    66 y/o female with PMH of right lung cancer s/p lobectomy, CAD, HLD, bilateral carotid stenosis (mixed density atherosclerosis at both carotid bifurcations), severe AS and COPD who EP last saw 3/24 s/p TAVR 3/22. An EPS was not concerning for a risk of devloping CHB (HVI <65); procedure performed in the setting of a new LBBB. A 30 day event monitor was recommended as was f/u with Dr. Banegas in 6 weeks. The patient developed multifocal cerebral infarcts with largest area of infarction involving the left PCA territory post-op (stroke 3/24), and neurology plans on starting a DOAC for new onset AF (CHADVASC 8) in 5 days. AF was noted on 3/24 after the patient returned from an MRI. IV amiodarone was started and the rhythm converted to NSR. Neurology is asking if we can place the event monitor after rehab (the patient will need at least several weeks of rehab), and if AC is OK with a patient on DAPT for ELISHA. The patient is on amiodarone 400 mg BID and Coreg 3.125 mg daily.       3/22/18    1 - Low normal to mildly depressed left ventricular systolic function (EF 50-55%).     2 - Mild mitral regurgitation.     3 - Severe aortic stenosis.     4 - Grade 4 atheroma disease of aorta.     5 - Successful implantation of a 26mm Evolut R CoreValve into the aortic position with no evidence of regurgitation.     Review of Systems   Constitution: Negative for chills and fever.   HENT: Negative for ear discharge.    Eyes: Negative for pain and visual disturbance.   Cardiovascular: Negative for chest pain, dyspnea on exertion, irregular heartbeat, leg swelling, orthopnea, palpitations, paroxysmal nocturnal  dyspnea and syncope.   Respiratory: Negative for hemoptysis, shortness of breath and wheezing.    Skin: Negative for rash and suspicious lesions.   Musculoskeletal: Negative for joint pain and muscle weakness.   Gastrointestinal: Negative for abdominal pain, diarrhea, hematemesis, hematochezia, melena, nausea and vomiting.   Genitourinary: Negative for dysuria and frequency.   Neurological: Negative for focal weakness, headaches and tremors.   Psychiatric/Behavioral: Negative for altered mental status, suicidal ideas and thoughts of violence.     Objective:     Vital Signs (Most Recent):  Temp: 98.8 °F (37.1 °C) (03/29/18 1025)  Pulse: (!) 55 (03/29/18 1200)  Resp: 18 (03/29/18 1025)  BP: (!) 140/63 (03/29/18 1025)  SpO2: (!) 92 % (03/29/18 1025) Vital Signs (24h Range):  Temp:  [98 °F (36.7 °C)-98.8 °F (37.1 °C)] 98.8 °F (37.1 °C)  Pulse:  [53-69] 55  Resp:  [18] 18  SpO2:  [92 %-99 %] 92 %  BP: (140-155)/(63-70) 140/63     Weight: 69.9 kg (154 lb 1.6 oz)  Body mass index is 28.19 kg/m².     SpO2: (!) 92 %  O2 Device (Oxygen Therapy): room air    Physical Exam   Constitutional: She is oriented to person, place, and time. She appears well-developed and well-nourished.   HENT:   Head: Normocephalic and atraumatic.   Eyes: EOM are normal.   Cardiovascular: Normal rate and regular rhythm.  Exam reveals no gallop and no friction rub.    Pulmonary/Chest: Effort normal and breath sounds normal. No stridor. She has no wheezes. She has no rales.   Abdominal: Soft. Bowel sounds are normal. There is no rebound and no guarding.   Musculoskeletal: She exhibits no edema.   Neurological: She is alert and oriented to person, place, and time. No cranial nerve deficit.   Skin: Skin is warm and dry.   Psychiatric: She has a normal mood and affect. Her behavior is normal.       Significant Labs: All pertinent lab results from the last 24 hours have been reviewed.        Assessment and Plan:     Atrial fibrillation    - paroxysmal;  currently in NSR  - Dr. Ramirez discussed with Dr. Carvajal and Dr. Coppola  - P2Y12i of interventional cardiology's choice (recent TAVR)  - AC (e.g. NOAC) with the P2Y12i but w/o ASA to decrease bleed risk        S/p TAVR (transcatheter aortic valve replacement), bioprosthetic    - initial post EKG: NSR, new LBBB  - EPS not concerning for risk of developing CHB HVI<65 though high risk out from procedure  - 30 day event monitor will not be covered because of inpatient rehab  - recommend telemetry while in rehab  - f/uu with Dr. Banegas in 6 weeks            Aman Hale MD  Cardiac Electrophysiology  [facility]

## 2018-03-29 NOTE — PLAN OF CARE
Problem: Occupational Therapy Goal  Goal: Occupational Therapy Goal  Goals to be met by: 4/2/18     Patient will increase functional independence with ADLs by performing:    Feeding with Modified [place].  UE Dressing with Supervision.  LE Dressing with Supervision.  Grooming while standing at sink with Supervision.  Toileting from toilet with Stand-by Assistance for hygiene and clothing management.   Toilet transfer to toilet with Stand-by Assistance.  Upper extremity exercise program x10 reps per handout, with independence.     Continue OT POC.     Comments: Antoine Barbosa OTR/L  3/29/2018

## 2018-03-29 NOTE — PT/OT/SLP PROGRESS
Occupational Therapy   Treatment    Name: Kristina Aguirre  MRN: [id number]  Admitting Diagnosis:  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  6 Days Post-Op    Recommendations:     Discharge Recommendations: rehabilitation facility  Discharge Equipment Recommendations:   (tbd)  Barriers to discharge:  Decreased caregiver support    Subjective     Communicated with: RN prior to session.  Pain/Comfort:  · Pain Rating 1: 0/10  · Pain Rating Post-Intervention 1: 0/10    Patients cultural, spiritual, Hindu conflicts given the current situation: none reported    Objective:     Patient found with: telemetry    General Precautions: Standard, fall   Orthopedic Precautions:N/A   Braces: N/A     Occupational Performance:    Pt received UIC.    Functional Mobility/Transfers:  · Patient completed Sit <> Stand Transfer with contact guard assistance  with  no assistive device   · Functional Mobility: No ambulation at this time.Worked on weight shifting and static standing balance.     Activities of Daily Living:  · Feeding:  independence pt able to eat food, take meds, and drink indep. Pt usiaing visual scanning to compensate for visual deficits   · UB Dressing: maximal assistance donned gown as robe    Patient left up in chair with all lines intact, call button in reach and SLP  present    [facility] 6 Click:  [facility] Total Score: 14    Treatment & Education:  Pt provided eduction on the following:  · POC  · D/C planning  · CLOF  · Rehab vs acute therapy  · Benefits of rehab  · Dressing technique for best results  AAROM for shoulder flx and elbow flx/ext (gravity eliminated) for 10 reps.  PROM for gross grasp, thumb opposition,and shoulder external rotation.  Pt performed sit<>stand for 4 reps from bed side chair at cga w/ and w/o RW.  Pt completed static  total for 3 minutes at cga to min a for balance. Pt presented w/ intermittent spells  of increased R knee flexion requiring tactile cues to extend.  Pt  answered SLP's questions while performing weight shift w/ OT in front of bed side chair.    Education:    Assessment:     Kristina Aguirre is a 67 y.o. female with a medical diagnosis of 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.  She presents with impairments listed below. Pt did well to participate and tolerate therapy. Pt displayed improved active use of RUE. But still displaying major deficits of RUE. Pt displayed increased strength and endurance w/ standing and static stand. Pt progressing towards goals.  Pt would benefit from skilled OT services to improve independence and overall occupational functioning.    Performance deficits affecting function are weakness, impaired endurance, impaired self care skills, impaired functional mobilty, gait instability, impaired balance, decreased lower extremity function, decreased upper extremity function, decreased safety awareness, decreased ROM, abnormal tone.      Rehab Prognosis:  good; patient would benefit from acute skilled OT services to address these deficits and reach maximum level of function.       Plan:     Patient to be seen 4 x/week to address the above listed problems via self-care/home management, therapeutic activities, neuromuscular re-education, therapeutic exercises, cognitive retraining  · Plan of Care Expires: 04/22/18  · Plan of Care Reviewed with: patient    This Plan of care has been discussed with the patient who was involved in its development and understands and is in agreement with the identified goals and treatment plan    GOALS:    Occupational Therapy Goals        Problem: Occupational Therapy Goal    Goal Priority Disciplines Outcome Interventions   Occupational Therapy Goal     OT, PT/OT Ongoing (interventions implemented as appropriate)    Description:  Goals to be met by: 4/2/18     Patient will increase functional independence with ADLs by performing:    Feeding with Modified [place].  UE Dressing with  Supervision.  LE Dressing with Supervision.  Grooming while standing at sink with Supervision.  Toileting from toilet with Stand-by Assistance for hygiene and clothing management.   Toilet transfer to toilet with Stand-by Assistance.  Upper extremity exercise program x10 reps per handout, with independence.                      Time Tracking:     OT Date of Treatment: 03/29/18  OT Start Time: 0914  OT Stop Time: 0952  OT Total Time (min): 38 min    Billable Minutes:Self Care/Home Management 10 minutes   Therapeutic Activity 28 minutes    Antoine Barbosa, OT  3/29/2018

## 2018-03-29 NOTE — PLAN OF CARE
KAI informed by Magdalena with [facility] that patient can be admitted today to rehab. KAI informed stroke team and is awaiting discharge orders.     Reva Clark LMSW  [facility]  Ext. [phone]

## 2018-03-29 NOTE — ASSESSMENT & PLAN NOTE
- paroxysmal; currently in NSR  - Dr. Ramirez discussed with Dr. Carvajal and Dr. Coppola  - P2Y12i of interventional cardiology's choice (recent TAVR)  - AC (e.g. NOAC) with the P2Y12i but w/o ASA to decrease bleed risk

## 2018-03-29 NOTE — SUBJECTIVE & OBJECTIVE
Interval History: No acute events overnight. NSR on monitor.    Medications:  Continuous Infusions:   sodium chloride 0.9%       Scheduled Meds:   amiodarone  400 mg Oral BID    amLODIPine  10 mg Oral Daily    ascorbic acid (vitamin C)  500 mg Oral BID    aspirin  81 mg Oral Daily    atorvastatin  40 mg Oral Daily    carvedilol  3.125 mg Oral Daily    chlorthalidone  25 mg Oral QAM    clopidogrel  75 mg Oral Daily    enoxaparin  40 mg Subcutaneous Daily    famotidine  20 mg Oral BID    fluticasone-vilanterol  1 puff Inhalation Daily    furosemide  20 mg Oral BID    polyethylene glycol  17 g Oral Daily     PRN Meds:acetaminophen, albuterol-ipratropium 2.5mg-0.5mg/3mL, bisacodyl, dextrose 50%, dextrose 50%, dextrose 50%, glucagon (human recombinant), glucagon (human recombinant), glucose, glucose, hydrALAZINE, insulin aspart U-100, insulin aspart U-100, morphine, ondansetron, promethazine (PHENERGAN) IVPB, sodium chloride 0.9%     Objective:     Vital Signs (Most Recent):  Temp: 98.8 °F (37.1 °C) (03/29/18 1025)  Pulse: (!) 55 (03/29/18 1200)  Resp: 18 (03/29/18 1025)  BP: (!) 140/63 (03/29/18 1025)  SpO2: (!) 92 % (03/29/18 1025) Vital Signs (24h Range):  Temp:  [98 °F (36.7 °C)-98.8 °F (37.1 °C)] 98.8 °F (37.1 °C)  Pulse:  [53-69] 55  Resp:  [18] 18  SpO2:  [92 %-99 %] 92 %  BP: (140-155)/(63-70) 140/63     Weight: 69.9 kg (154 lb 1.6 oz)  Body mass index is 28.19 kg/m².    SpO2: (!) 92 %  O2 Device (Oxygen Therapy): room air    Intake/Output - Last 3 Shifts       03/27 0700 - 03/28 0659 03/28 0700 - 03/29 0659 03/29 0700 - 03/30 0659    P.O. 660      Total Intake(mL/kg) 660 (9.4)      Urine (mL/kg/hr) 700 (0.4)      Stool 2 (0)      Total Output 702        Net -42               Urine Occurrence   2 x    Stool Occurrence  1 x 2 x          Lines/Drains/Airways     Peripheral Intravenous Line                 Peripheral IV - Single Lumen 03/27/18 0034 Left Forearm 2 days                Physical Exam    Constitutional: She is oriented to person, place, and time. She appears well-developed and well-nourished.   HENT:   Head: Normocephalic and atraumatic.   Eyes: Pupils are equal, round, and reactive to light.   Neck: Normal range of motion. Neck supple.   Cardiovascular: Normal rate, regular rhythm and normal heart sounds.    Pulmonary/Chest: Effort normal and breath sounds normal.   Abdominal: Soft. Bowel sounds are normal.   Musculoskeletal: Normal range of motion.   Neurological: She is alert and oriented to person, place, and time.   Skin: Skin is warm and dry.   Psychiatric: She has a normal mood and affect. Her behavior is normal.       Significant Labs:  CBC:   Recent Labs  Lab 03/29/18  0500   WBC 9.33   RBC 3.92*   HGB 9.2*   HCT 30.2*   *   MCV 77*   MCH 23.5*   MCHC 30.5*     CMP:   Recent Labs  Lab 03/29/18  0500   *   CALCIUM 9.6   ALBUMIN 3.5   PROT 7.0   *   K 3.5   CO2 28   CL 96   BUN 22   CREATININE 0.7   ALKPHOS 80   ALT 10   AST 14   BILITOT 0.7

## 2018-03-29 NOTE — PROGRESS NOTES
[facility]  Cardiothoracic Surgery  Progress Note    Patient Name: Kristina Aguirre  MRN: [id number]  Admission Date: 3/22/2018  Hospital Length of Stay: 7 days  Code Status: Full Code   Attending Physician: Eric Obregon MD   Referring Provider: Robert Mattson MD  Principal Problem: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    Subjective:     Post-Op Info:  Procedure(s) (LRB):  STUDY-EP (N/A)   6 Days Post-Op     Interval History: No acute events overnight. NSR on monitor.    Medications:  Continuous Infusions:   sodium chloride 0.9%       Scheduled Meds:   amiodarone  400 mg Oral BID    amLODIPine  10 mg Oral Daily    ascorbic acid (vitamin C)  500 mg Oral BID    aspirin  81 mg Oral Daily    atorvastatin  40 mg Oral Daily    carvedilol  3.125 mg Oral Daily    chlorthalidone  25 mg Oral QAM    clopidogrel  75 mg Oral Daily    enoxaparin  40 mg Subcutaneous Daily    famotidine  20 mg Oral BID    fluticasone-vilanterol  1 puff Inhalation Daily    furosemide  20 mg Oral BID    polyethylene glycol  17 g Oral Daily     PRN Meds:acetaminophen, albuterol-ipratropium 2.5mg-0.5mg/3mL, bisacodyl, dextrose 50%, dextrose 50%, dextrose 50%, glucagon (human recombinant), glucagon (human recombinant), glucose, glucose, hydrALAZINE, insulin aspart U-100, insulin aspart U-100, morphine, ondansetron, promethazine (PHENERGAN) IVPB, sodium chloride 0.9%     Objective:     Vital Signs (Most Recent):  Temp: 98.8 °F (37.1 °C) (03/29/18 1025)  Pulse: (!) 55 (03/29/18 1200)  Resp: 18 (03/29/18 1025)  BP: (!) 140/63 (03/29/18 1025)  SpO2: (!) 92 % (03/29/18 1025) Vital Signs (24h Range):  Temp:  [98 °F (36.7 °C)-98.8 °F (37.1 °C)] 98.8 °F (37.1 °C)  Pulse:  [53-69] 55  Resp:  [18] 18  SpO2:  [92 %-99 %] 92 %  BP: (140-155)/(63-70) 140/63     Weight: 69.9 kg (154 lb 1.6 oz)  Body mass index is 28.19 kg/m².    SpO2: (!) 92 %  O2 Device (Oxygen Therapy): room air    Intake/Output -  Last 3 Shifts       03/27 0700 - 03/28 0659 03/28 0700 - 03/29 0659 03/29 0700 - 03/30 0659    P.O. 660      Total Intake(mL/kg) 660 (9.4)      Urine (mL/kg/hr) 700 (0.4)      Stool 2 (0)      Total Output 702        Net -42               Urine Occurrence   2 x    Stool Occurrence  1 x 2 x          Lines/Drains/Airways     Peripheral Intravenous Line                 Peripheral IV - Single Lumen 03/27/18 0034 Left Forearm 2 days                Physical Exam   Constitutional: She is oriented to person, place, and time. She appears well-developed and well-nourished.   HENT:   Head: Normocephalic and atraumatic.   Eyes: Pupils are equal, round, and reactive to light.   Neck: Normal range of motion. Neck supple.   Cardiovascular: Normal rate, regular rhythm and normal heart sounds.    Pulmonary/Chest: Effort normal and breath sounds normal.   Abdominal: Soft. Bowel sounds are normal.   Musculoskeletal: Normal range of motion.   Neurological: She is alert and oriented to person, place, and time.   Skin: Skin is warm and dry.   Psychiatric: She has a normal mood and affect. Her behavior is normal.       Significant Labs:  CBC:   Recent Labs  Lab 03/29/18  0500   WBC 9.33   RBC 3.92*   HGB 9.2*   HCT 30.2*   *   MCV 77*   MCH 23.5*   MCHC 30.5*     CMP:   Recent Labs  Lab 03/29/18  0500   *   CALCIUM 9.6   ALBUMIN 3.5   PROT 7.0   *   K 3.5   CO2 28   CL 96   BUN 22   CREATININE 0.7   ALKPHOS 80   ALT 10   AST 14   BILITOT 0.7         Assessment/Plan:     * 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    --vascular neurology primary team         Atrial fibrillation    --currently in NSR  --Amiodrone PO 400mg BID- we typically discharge patients on this dose until patient returns for post op visit- roughly 4 weeks after surgery.     --primary team had questions regarding anticoagulation, instructed to defer to EP for med rec.       S/p TAVR (transcatheter aortic valve replacement),  bioprosthetic    --Continue ASA 81, BB, statin, Plavix for one year for FRANCY  --may start ace-inhibitor if warranted  --PT/OT  --Ambulate x4   --Wean O2 as tolerated for O2 sat >92%  --monitor CXR   --Monitor electrolytes and replace prn  --can transition to PO Lasix 20mg BID  ---- upon DC- lasix BID for 7 days then decrease to once daily & Potassium 20mEq BID for 7 days then once daily  ---- when pt is ready for transfer pls call CTS for DC med rec        Jessy Sheppard NP  Cardiothoracic Surgery  [facility]

## 2018-03-30 PROBLEM — R30.0 DYSURIA: Status: ACTIVE | Noted: 2018-03-30

## 2018-03-30 LAB
ALBUMIN SERPL BCP-MCNC: 3.5 G/DL
ALP SERPL-CCNC: 81 U/L
ALT SERPL W/O P-5'-P-CCNC: 11 U/L
ANION GAP SERPL CALC-SCNC: 10 MMOL/L
AST SERPL-CCNC: 16 U/L
BASOPHILS # BLD AUTO: 0.1 K/UL
BASOPHILS NFR BLD: 1.2 %
BILIRUB SERPL-MCNC: 0.5 MG/DL
BUN SERPL-MCNC: 25 MG/DL
CALCIUM SERPL-MCNC: 9.4 MG/DL
CHLORIDE SERPL-SCNC: 95 MMOL/L
CO2 SERPL-SCNC: 29 MMOL/L
CREAT SERPL-MCNC: 0.8 MG/DL
DIFFERENTIAL METHOD: ABNORMAL
EOSINOPHIL # BLD AUTO: 0.6 K/UL
EOSINOPHIL NFR BLD: 6.6 %
ERYTHROCYTE [DISTWIDTH] IN BLOOD BY AUTOMATED COUNT: 17.7 %
EST. GFR  (AFRICAN AMERICAN): >60 ML/MIN/1.73 M^2
EST. GFR  (NON AFRICAN AMERICAN): >60 ML/MIN/1.73 M^2
GLUCOSE SERPL-MCNC: 141 MG/DL
HCT VFR BLD AUTO: 29.8 %
HGB BLD-MCNC: 9.3 G/DL
IMM GRANULOCYTES # BLD AUTO: 0.05 K/UL
IMM GRANULOCYTES NFR BLD AUTO: 0.6 %
LYMPHOCYTES # BLD AUTO: 2.2 K/UL
LYMPHOCYTES NFR BLD: 25.7 %
MAGNESIUM SERPL-MCNC: 1.9 MG/DL
MCH RBC QN AUTO: 23.6 PG
MCHC RBC AUTO-ENTMCNC: 31.2 G/DL
MCV RBC AUTO: 76 FL
MONOCYTES # BLD AUTO: 0.7 K/UL
MONOCYTES NFR BLD: 8.3 %
NEUTROPHILS # BLD AUTO: 5 K/UL
NEUTROPHILS NFR BLD: 57.6 %
NRBC BLD-RTO: 0 /100 WBC
PHOSPHATE SERPL-MCNC: 3.8 MG/DL
PLATELET # BLD AUTO: 496 K/UL
PMV BLD AUTO: 9.3 FL
POCT GLUCOSE: 128 MG/DL (ref 70–110)
POCT GLUCOSE: 160 MG/DL (ref 70–110)
POCT GLUCOSE: 212 MG/DL (ref 70–110)
POTASSIUM SERPL-SCNC: 3.5 MMOL/L
PROT SERPL-MCNC: 7.1 G/DL
RBC # BLD AUTO: 3.94 M/UL
SODIUM SERPL-SCNC: 134 MMOL/L
WBC # BLD AUTO: 8.68 K/UL

## 2018-03-30 PROCEDURE — 94761 N-INVAS EAR/PLS OXIMETRY MLT: CPT

## 2018-03-30 PROCEDURE — 84100 ASSAY OF PHOSPHORUS: CPT

## 2018-03-30 PROCEDURE — 20600001 HC STEP DOWN PRIVATE ROOM

## 2018-03-30 PROCEDURE — 25000003 PHARM REV CODE 250: Performed by: PSYCHIATRY & NEUROLOGY

## 2018-03-30 PROCEDURE — 36415 COLL VENOUS BLD VENIPUNCTURE: CPT

## 2018-03-30 PROCEDURE — 25000003 PHARM REV CODE 250: Performed by: STUDENT IN AN ORGANIZED HEALTH CARE EDUCATION/TRAINING PROGRAM

## 2018-03-30 PROCEDURE — 83735 ASSAY OF MAGNESIUM: CPT

## 2018-03-30 PROCEDURE — 99233 SBSQ HOSP IP/OBS HIGH 50: CPT | Mod: ,,, | Performed by: PSYCHIATRY & NEUROLOGY

## 2018-03-30 PROCEDURE — 25000003 PHARM REV CODE 250: Performed by: PHYSICIAN ASSISTANT

## 2018-03-30 PROCEDURE — 97530 THERAPEUTIC ACTIVITIES: CPT

## 2018-03-30 PROCEDURE — 97110 THERAPEUTIC EXERCISES: CPT

## 2018-03-30 PROCEDURE — 85025 COMPLETE CBC W/AUTO DIFF WBC: CPT

## 2018-03-30 PROCEDURE — 25000003 PHARM REV CODE 250: Performed by: THORACIC SURGERY (CARDIOTHORACIC VASCULAR SURGERY)

## 2018-03-30 PROCEDURE — 63600175 PHARM REV CODE 636 W HCPCS: Performed by: THORACIC SURGERY (CARDIOTHORACIC VASCULAR SURGERY)

## 2018-03-30 PROCEDURE — 97116 GAIT TRAINING THERAPY: CPT

## 2018-03-30 PROCEDURE — 25000003 PHARM REV CODE 250: Performed by: NURSE PRACTITIONER

## 2018-03-30 PROCEDURE — 80053 COMPREHEN METABOLIC PANEL: CPT

## 2018-03-30 RX ORDER — ASPIRIN 81 MG/1
81 TABLET ORAL DAILY
Refills: 0 | Status: ON HOLD
Start: 2018-03-30 | End: 2018-04-18

## 2018-03-30 RX ORDER — ATORVASTATIN CALCIUM 40 MG/1
40 TABLET, FILM COATED ORAL DAILY
Qty: 90 TABLET | Refills: 3 | Status: SHIPPED | OUTPATIENT
Start: 2018-03-30 | End: 2019-03-30

## 2018-03-30 RX ORDER — CLOPIDOGREL BISULFATE 75 MG/1
75 TABLET ORAL DAILY
Qty: 90 TABLET | Refills: 3 | Status: SHIPPED | OUTPATIENT
Start: 2018-03-30

## 2018-03-30 RX ORDER — POTASSIUM CHLORIDE 20 MEQ/1
20 TABLET, EXTENDED RELEASE ORAL 2 TIMES DAILY
Qty: 14 TABLET | Refills: 0 | Status: ON HOLD
Start: 2018-03-30 | End: 2018-04-18 | Stop reason: HOSPADM

## 2018-03-30 RX ORDER — POTASSIUM CHLORIDE 20 MEQ/1
20 TABLET, EXTENDED RELEASE ORAL DAILY
Qty: 60 TABLET | Refills: 0 | Status: ON HOLD
Start: 2018-04-07 | End: 2018-04-18 | Stop reason: HOSPADM

## 2018-03-30 RX ORDER — FUROSEMIDE 20 MG/1
20 TABLET ORAL DAILY
Qty: 360 TABLET | Refills: 0 | Status: ON HOLD
Start: 2018-04-07 | End: 2018-04-18 | Stop reason: HOSPADM

## 2018-03-30 RX ORDER — FUROSEMIDE 20 MG/1
20 TABLET ORAL 2 TIMES DAILY
Qty: 14 TABLET | Refills: 0 | Status: ON HOLD | OUTPATIENT
Start: 2018-03-30 | End: 2018-04-18 | Stop reason: HOSPADM

## 2018-03-30 RX ORDER — CARVEDILOL 3.12 MG/1
3.12 TABLET ORAL DAILY
Qty: 30 TABLET | Refills: 1 | Status: SHIPPED | OUTPATIENT
Start: 2018-03-30 | End: 2019-03-30

## 2018-03-30 RX ORDER — AMIODARONE HYDROCHLORIDE 400 MG/1
400 TABLET ORAL 2 TIMES DAILY
Qty: 60 TABLET | Refills: 1 | Status: SHIPPED | OUTPATIENT
Start: 2018-03-30 | End: 2019-03-30

## 2018-03-30 RX ORDER — IPRATROPIUM BROMIDE AND ALBUTEROL SULFATE 2.5; .5 MG/3ML; MG/3ML
3 SOLUTION RESPIRATORY (INHALATION) EVERY 4 HOURS PRN
Qty: 1 BOX | Refills: 0 | Status: SHIPPED | OUTPATIENT
Start: 2018-03-30 | End: 2019-03-30

## 2018-03-30 RX ADMIN — INSULIN ASPART 1 UNITS: 100 INJECTION, SOLUTION INTRAVENOUS; SUBCUTANEOUS at 10:03

## 2018-03-30 RX ADMIN — AMIODARONE HYDROCHLORIDE 400 MG: 200 TABLET ORAL at 09:03

## 2018-03-30 RX ADMIN — SODIUM CHLORIDE TAB 1 GM 2 G: 1 TAB at 02:03

## 2018-03-30 RX ADMIN — Medication 500 MG: at 09:03

## 2018-03-30 RX ADMIN — ATORVASTATIN CALCIUM 40 MG: 20 TABLET, FILM COATED ORAL at 09:03

## 2018-03-30 RX ADMIN — CLOPIDOGREL 75 MG: 75 TABLET, FILM COATED ORAL at 09:03

## 2018-03-30 RX ADMIN — FAMOTIDINE 20 MG: 20 TABLET, FILM COATED ORAL at 09:03

## 2018-03-30 RX ADMIN — FUROSEMIDE 20 MG: 20 TABLET ORAL at 09:03

## 2018-03-30 RX ADMIN — AMLODIPINE BESYLATE 10 MG: 10 TABLET ORAL at 09:03

## 2018-03-30 RX ADMIN — CARVEDILOL 3.12 MG: 3.12 TABLET, FILM COATED ORAL at 09:03

## 2018-03-30 RX ADMIN — ASPIRIN 81 MG: 81 TABLET, COATED ORAL at 09:03

## 2018-03-30 RX ADMIN — POLYETHYLENE GLYCOL 3350 17 G: 17 POWDER, FOR SOLUTION ORAL at 09:03

## 2018-03-30 RX ADMIN — CHLORTHALIDONE 25 MG: 25 TABLET ORAL at 06:03

## 2018-03-30 NOTE — PROGRESS NOTES
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 Stroke called on 3/24 at 2004 for worsening RUE weakness, right facial droop, dysarthria and aphasia. CTA without LVO. MRI with multiple bilateral (R>L) acute infarcts on cerebral and cerebellar hemispheres, highly suggestive of embolic phenomenon. No tpa as outside window.     Documented right field deficits and worsening RUE weakness <24h s/p TAVR. ROSEY during TAVR demonstrable for grade 4 atheroma on aorta. Suspect etiology iatrogenic from TAVR as pattern is concerning for embolic phenomena.    Exam improved- RUE weakness with sensory affect. Pt remains somewhat confused but not aphasic.    Spoke with EP team regarding pt's discharge to [facility] without a 30-day monitor. EP concerned for 3rd-degree heart block in this pt with recent TAVR placement; MD stating they recommend pt go to a facility that either has telemetry or an [facility] facility so she can have 30-day monitor while admitted.   CM discussed this with pt's friend who is her POA, David. He discussed with pt and they decided to proceed with plan for d/c to [facility], knowing she will not get the heart monitoring she needs and that this is against EP's recommendations. Pt will follow up with Dr. Carvajal after rehab discharge and will need to revisit discussion of cardiac monitoring at that time.    Antithrombotics for secondary stroke prevention: Antiplatelets: Aspirin: 81 mg daily and Clopidogrel: 75 mg daily for now. Discussed with EP and Interventional Cards- Will plan to change to Eliquis 5mg BID + Plavix 75mg Daily (no ASA) on 4/1/18 at inpatient rehab.  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    See Cerebral infarction due to embolism of PCA        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    See Cerebral infarction due to embolism of PCA        S/p TAVR (transcatheter aortic valve replacement), bioprosthetic    - Followed by CTS  - EP discussed with Interventional Cardiology- Ok with Eliquis + Plavix but w/o ASA (to decrease bleed risk) in setting of cardiac hx + new-onset AFib        Bilateral carotid artery stenosis    - As demonstrable by recent carotid US and CTA (mixed density atherosclerosis at both carotid bifurcations)  Stroke risk factor, but unlikely to have caused this event as pattern not consistent with thrombotic/a-a considering embolic multi-territorial stroke.   -Avoid hypotension        Atrial fibrillation    - Newly diagnosed here. Pt found to be in afib RVR after returning from MRI on 3/24.   - Unlikely to have caused stroke as she went into arrhythmia s/p symptom onset. Likely post-procedural from TAVR.   - Seen on ECG  - CHADVASC 8  - Plan to start Apixaban 5 mg BID on 4/1/18 at inpatient rehab  - Continue oral amiodarone per CTS recs. Currently remains in NSR        Acute on chronic diastolic heart failure    - CXR on 3/23 with slight interval worsening of bilateral airspace disease suggestive of pulmonary edema. Subsequent imaging revealed no significant interval change  - Intermittently requiring 3.5L nasal cannula.   - Continue furosemide 40 mg IV BID / chlorthalidone as per CTS team  - Patient appears euvolemia  - Strict I/O and daily weights        Diabetes mellitus, type 2    -Stroke risk factor.    HgB A1C 6.5%  -SSI        Coronary artery  disease of native artery with stable angina pectoris    Stroke risk factor  - Continue DAPT, statin, and BB for now and at d/c  - Per approval from EP, Interventional cards, CTS, pt will switch to Eliquis 5mg BID + Plavix (no ASA) on 4/1/18        Severe aortic stenosis    S/p TAVR 3/22, followed by CTS        Essential hypertension    -Stroke risk factor  -Discussed with CTS, ok with MAP > 80 at this time (s/p TAVR) to allow adequate cerebral perfusion  -Continue amlodipine 10 mg PO daily, carvedilol 3.125 mg PO BID, chlorthalidone 25 mg PO daily, lasix 40 mg IV BID and hydralazine IV PRN. Monitor and titrate meds prn.   - See CTS note for final d/c med recs  - Pt not started on ACEi this admission due to concern for hypotension. Will need to be revisited outpatient in this pt with HFpF        Dyslipidemia    -Stroke risk factor  LDL >70.  -Continue Lipitor 40 mg daily        Centrilobular emphysema    Hx Lung CA, now in remission  - Continue Duo-neb prn  Pt will likely need O2 at rehab (not on O2 at home)        Tobacco abuse    -Stroke risk factor  Educate patient about smoking cessation        Cervical spondylosis    Seen on CTA Multiphase  Most prominent at C3-4 with some effacement of anterior thecal sac and moderate right/severe left neural foraminal narrowing  Need to discuss need for outpatient referral to NSGY        Malignant neoplasm of right lung    - s/p RML lobectomy, path showed keratinizing squamous cell CA. Contacted oncologist who reported disease on remission, last PET scan negative.             3/25 - New-onset afib RVR developed after return from MRI this morning. IV amiodarone loaded and started on gtt- rhythm resolved and remains on gtt. No worsening focal deficits this morning.   03/28/2018 BOBBY.  Patient oriented to person only.  Currently perseverating about her  being called.  No other complaints at this time.  3/29: Discharge delayed due to discussions with EP about pt  requiring heart monitoring s/p TAVR at inpatient rehab. Pt electing to go to [facility] tomorrow without monitoring instead of [facility] with [place]. Currently on DAPT; plan to change to Eliquis + Plavix on 4/1/18 in setting of new-onset AFib and other cardiac hx.    STROKE DOCUMENTATION   Acute Stroke Times   Last Known Normal Date: 03/23/18  Last Known Normal Time:  (Unsure)  Symptom Onset Date: 03/24/18  Symptom Onset Time:  (Unsure)  Stroke Team Called Date: 03/24/18  Stroke Team Called Time: 2004  Stroke Team Arrival Date: 03/24/18  Stroke Team Arrival Time: 2010  CT Interpretation Time: 2025    NIH Scale:  1a. Level Of Consciousness: 0-->Alert: keenly responsive  1b. LOC Questions: 1-->Answers one question correctly  1c. LOC Commands: 0-->Performs both tasks correctly  2. Best Gaze: 0-->Normal  3. Visual: 0-->No visual loss  4. Facial Palsy: 1-->Minor paralysis (flattened nasolabial fold, asymmetry on smiling)  5a. Motor Arm, Left: 0-->No drift: limb holds 90 (or 45) degrees for full 10 secs  5b. Motor Arm, Right: 2-->Some effort against gravity: limb cannot get to or maintain (if cued) 90 (or 45) degrees, drifts down to bed, but has some effort against gravity  6a. Motor Leg, Left: 0-->No drift: leg holds 30 degree position for full 5 secs  6b. Motor Leg, Right: 1-->Drift: leg falls by the end of the 5-sec period but does not hit bed  7. Limb Ataxia: 0-->Absent  8. Sensory: 0-->Normal: no sensory loss  9. Best Language: 0-->No aphasia: normal  10. Dysarthria: 0-->Normal  11. Extinction and Inattention (formerly Neglect): 0-->No abnormality  Total (NIH Stroke Scale): 5       Modified Ellen Score: 1  Chelsy Coma Scale:    ABCD2 Score:    [id number] Score:8  HAS -BLED Score:5  ICH Score:   Hunt & Eldridge Classification:      Hemorrhagic change of an Ischemic Stroke: Does this patient have an ischemic stroke with hemorrhagic changes? No     Neurologic Chief Complaint: Left PCA/MCA embolic stroke    Subjective:      Interval History: Patient is seen for follow-up neurological assessment and treatment recommendations:   Discharge delayed due to discussions with EP about pt requiring heart monitoring s/p TAVR at inpatient rehab. Pt electing to go to [facility] tomorrow without monitoring instead of [facility] with monitoring. Currently on DAPT; plan to change to Eliquis + Plavix on 4/1/18 in setting of new-onset AFib and other cardiac hx.    HPI, Past Medical, Family, and Social History remains the same as documented in the initial encounter.     Review of Systems   Unable to perform ROS: Mental status change   Constitutional: Negative for fever.   HENT: Negative for drooling.    Eyes: Negative for discharge and redness.   Respiratory: Negative for cough.    Cardiovascular: Negative for leg swelling.   Gastrointestinal: Negative for diarrhea and vomiting.   Genitourinary: Negative for hematuria.   Skin: Negative for rash.   Neurological: Positive for weakness (RUE). Negative for facial asymmetry, speech difficulty, numbness and headaches.   Psychiatric/Behavioral: Positive for confusion.     Scheduled Meds:   amiodarone  400 mg Oral BID    amLODIPine  10 mg Oral Daily    ascorbic acid (vitamin C)  500 mg Oral BID    aspirin  81 mg Oral Daily    atorvastatin  40 mg Oral Daily    carvedilol  3.125 mg Oral Daily    chlorthalidone  25 mg Oral QAM    clopidogrel  75 mg Oral Daily    enoxaparin  40 mg Subcutaneous Daily    famotidine  20 mg Oral BID    fluticasone-vilanterol  1 puff Inhalation Daily    furosemide  20 mg Oral BID    polyethylene glycol  17 g Oral Daily     Continuous Infusions:   sodium chloride 0.9%       PRN Meds:acetaminophen, albuterol-ipratropium 2.5mg-0.5mg/3mL, bisacodyl, dextrose 50%, dextrose 50%, dextrose 50%, glucagon (human recombinant), glucagon (human recombinant), glucose, glucose, hydrALAZINE, insulin aspart U-100, insulin aspart U-100, morphine, ondansetron, promethazine (PHENERGAN) IVPB,  sodium chloride 0.9%    Objective:     Vital Signs (Most Recent):  Temp: 97.3 °F (36.3 °C) (03/29/18 1617)  Pulse: (!) 57 (03/29/18 1617)  Resp: 18 (03/29/18 1617)  BP: (!) 152/68 (03/29/18 1617)  SpO2: 95 % (03/29/18 1617)  BP Location: Left arm    Vital Signs Range (Last 24H):  Temp:  [97.3 °F (36.3 °C)-98.8 °F (37.1 °C)]   Pulse:  [55-62]   Resp:  [18]   BP: (140-155)/(63-70)   SpO2:  [92 %-99 %]   BP Location: Left arm    Physical Exam   Constitutional: She appears well-developed and well-nourished. No distress.   HENT:   Head: Normocephalic and atraumatic.   Eyes: Conjunctivae and EOM are normal.   Cardiovascular: Normal rate.    Pulmonary/Chest: Effort normal. No respiratory distress.   Musculoskeletal: She exhibits no edema or deformity.   Neurological: She is alert. A cranial nerve deficit is present.   Skin: Skin is warm and dry.   Psychiatric: She has a normal mood and affect. Her behavior is normal.   Nursing note and vitals reviewed.      Neurological Exam:   LOC: alert  Attention Span: Good   Language: No aphasia  Articulation: No dysarthria  Orientation: Oriented to month, not age  Visual Fields: Full  EOM (CN III, IV, VI): Full/intact  Facial Movement (CN VII): Lower facial weakness on the Right  Motor: Arm left  Normal 5/5  Leg left  Normal 5/5  Arm right  Paresis: 2/5  Leg right Paresis: 4/5  Sensation: Intact to light touch, temperature  Tone: Normal tone throughout    Laboratory:  CMP:     Recent Labs  Lab 03/29/18  0500   CALCIUM 9.6   ALBUMIN 3.5   PROT 7.0   *   K 3.5   CO2 28   CL 96   BUN 22   CREATININE 0.7   ALKPHOS 80   ALT 10   AST 14   BILITOT 0.7     BMP:     Recent Labs  Lab 03/29/18  0500   *   K 3.5   CL 96   CO2 28   BUN 22   CREATININE 0.7   CALCIUM 9.6     CBC:     Recent Labs  Lab 03/29/18  0500   WBC 9.33   RBC 3.92*   HGB 9.2*   HCT 30.2*   *   MCV 77*   MCH 23.5*   MCHC 30.5*     Lipid Panel:     Recent Labs  Lab 03/25/18  0538   CHOL 150   LDLCALC 80.2    HDL 42   TRIG 139     Coagulation:     Recent Labs  Lab 03/23/18  0331 03/23/18  0340   INR 1.0  --    APTT  --  22.1     Hgb A1C: No results for input(s): HGBA1C in the last 168 hours.  TSH:     Recent Labs  Lab 03/25/18  0538   TSH 1.221         Diagnostic Results       Brain Imaging     CTH 3/25/17 revealed redemonstration of multiple infarctions w/expected temporal evolution.  No hemorrhage or hemorrhagic conversion.    MRI Brain 3/24/18 revealed bilateral cerebral and cerebellar embolic infarcts.        Vessel Imaging     CTA Multiphase 3/24/18  CTA demonstrates no high-grade stenosis or large vessel occlusion.      Cardiac Imaging     ROSEY 3/22/18:  CONCLUSIONS   Normal LA, No evidence of shunt    1 - Low normal to mildly depressed left ventricular systolic function (EF 50-55%).     2 - Mild mitral regurgitation.     3 - Severe aortic stenosis.     4 - Grade 4 atheroma disease of aorta.     5 - Successful implantation of a 26mm Evolut R CoreValve into the aortic position with no evidence of regurgitation.       Haley Mota PA-C  Comprehensive Stroke Center  Department of Vascular Neurology   [facility]

## 2018-03-30 NOTE — ASSESSMENT & PLAN NOTE
- CXR on 3/23 with slight interval worsening of bilateral airspace disease suggestive of pulmonary edema. Subsequent imaging revealed no significant interval change  - Intermittently requiring 3.5L nasal cannula.   - Continue furosemide 40 mg IV BID / chlorthalidone as per CTS team  - Patient appears euvolemia  - Strict I/O and daily weights

## 2018-03-30 NOTE — PT/OT/SLP PROGRESS
Physical Therapy Treatment    Patient Name:  Kristina Aguirre   MRN:  [id number]    Recommendations:     Discharge Recommendations:  rehabilitation facility   Discharge Equipment Recommendations:  (TBD)   Barriers to discharge: Inaccessible home and Decreased caregiver support    Assessment:     Kristina Aguirre is a 67 y.o. 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  She presents with the following impairments/functional limitations:  weakness, impaired endurance, impaired self care skills, gait instability, impaired functional mobilty, impaired balance, visual deficits, decreased lower extremity function, decreased upper extremity function, abnormal tone, decreased safety awareness, impaired cognition, impaired coordination, impaired sensation, decreased coordination Pt  motivated and cooperative with treatment session.   Patient is making progress towards goals, participating well in this session. Pt continues to require  assist with gait and transfers. Pt with noted improvement with bed mobility. the pt's deficits effect her ability to safely and independently participate in self-care tasks and functional mobility which increases their caregiver burden. pt would continue to benefit from acute PT services to address the following limitations: high fall risk, weakness, instability, the need for supervised instruction of exercise.  Education was provided to patient regarding importance of continued participation in therapy, patient's progress and discharge disposition. Pt would benefit from IPR?upon discharge to improve quality of life and focus on recovery of impairments. Goals remain appropriate  .    Rehab Prognosis:  good; patient would benefit from acute skilled PT services to address these deficits and reach maximum level of function.      Recent Surgery: Procedure(s) (LRB):  STUDY-EP (N/A) 7 Days Post-Op    Plan:     During this hospitalization,  patient to be seen 4 x/week to address the above listed problems via therapeutic activities, gait training, therapeutic exercises, neuromuscular re-education  · Plan of Care Expires:  04/21/18   Plan of Care Reviewed with: patient    Subjective     Communicated with RN prior to session.  Patient found supine upon PT entry to room, agreeable to treatment.      Chief Complaint: I need to use the bathroom  Pain/Comfort:  · Pain Rating 1: 0/10  · Pain Rating Post-Intervention 1: 0/10    Patients cultural, spiritual, Baptist conflicts given the current situation: none    Objective:     Patient found with: bed alarm, telemetry     General Precautions: Standard, fall   Orthopedic Precautions:N/A   Braces: N/A     Functional Mobility:  · Bed Mobility:     · Rolling Left:  stand by assistance  · Supine to Sit: minimum assistance  · Sit to Supine: minimum assistance  · Transfers:     · Sit to Stand:  minimum assistance with no AD  · Toilet Transfer: minimum assistance with  no AD  using  Step Transfer  · Gait: pt ambulated 5 ft forwards/backwards x 2 trials, 43 ft and 16 ft x 2  with HHA with min/modA due to 3 episodes of LOB with modA to recover. Pt required vcs for sequencing,safety,upright posture.  Pt demo decreased perez, increased time in double stance, decreased velocity of limb motion, decreased step length, decreased swing-to-stance ratio, decreased toe-to-floor clearance and decreased weight-shifting ability     AM-PAC 6 CLICK MOBILITY  Turning over in bed (including adjusting bedclothes, sheets and blankets)?: 3  Sitting down on and standing up from a chair with arms (e.g., wheelchair, bedside commode, etc.): 3  Moving from lying on back to sitting on the side of the bed?: 3  Moving to and from a bed to a chair (including a wheelchair)?: 3  Need to walk in hospital room?: 2  Climbing 3-5 steps with a railing?: 1  Total Score: 15       Therapeutic Activities and Exercises:  Discussed/educated patient on  progress, safety,  d/c, PT POC,postural control, weight shift, attention to the R  Patient performed therex X 15 reps seated  B LE AROM AP, LAQ, Hip Flexion, Hip Abd/Add   White board updated   Bedside table in front of patient and area set up for function, convenience, and safety. RN aware of patient's mobility needs and status. Questions/concerns addressed within PT scope of practice; patient and family with no further questions.       Patient left supine with R UE propped on pillow for scapular support and edema management with all lines intact, call button in reach, bed alarm on and nsg notified..    GOALS:    Physical Therapy Goals        Problem: Physical Therapy Goal    Goal Priority Disciplines Outcome Goal Variances Interventions   Physical Therapy Goal     PT/OT, PT      Description:  Goals to be met by: 18    Patient will increase functional independence with mobility by performin. Supine to sit with Modified [place] -not met  2. Sit to stand transfer with Modified [place] -not met  3. Gait  x 220 feet with Supervision-not met  4. Ascend/descend 2 stair with no Handrails Supervision . -not met                      Time Tracking:     PT Received On: 18  PT Total Time (min):   54 min    Billable Minutes: Gait Training 28, Therapeutic Activity 14 and Therapeutic Exercise 12    Treatment Type: Treatment  PT/PTA: PTA     PTA Visit Number: 3     Basil Gregory PTA  2018

## 2018-03-30 NOTE — ASSESSMENT & PLAN NOTE
-Stroke risk factor  -Discussed with CTS, ok with MAP > 80 in setting of stroke  -Continue amlodipine 10 mg PO daily, carvedilol 3.125 mg PO BID, chlorthalidone 25 mg PO daily, lasix 40 mg IV BID and hydralazine IV PRN. Monitor and titrate meds prn.   - SBP goal ~160, Avoid hypotension to allow adequate cerebral perfusion in setting of bilateral carotid stenosis  - See CTS note for final d/c med recs  - Pt not started on ACEi this admission due to concern for hypotension. Will need to be revisited outpatient in this pt with HFpF

## 2018-03-30 NOTE — PLAN OF CARE
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 [I63.439]  Yes     Priority: 1 - High    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 [I63.8]  Yes     Priority: 1 - High    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 This is a short  with favorable anatomy. The vessel fills via left to right collaterals. I spoke with Dr Bautista to attempt RCA revascularization, as this may improve her symptoms while she waits for TAVR. He will bring her back to discuss  PCI.       Diabetes mellitus, type 2 [E11.9]  Yes     Priority: 3     Dyslipidemia [E78.5]  Yes     Priority: 3     Essential hypertension [I10]  Yes     Priority: 3     Severe aortic stenosis [I35.0]  Yes     Priority: 3     Centrilobular emphysema [J43.2]  Yes     Priority: 4      Awaiting PFTs today      Tobacco abuse [Z72.0]  Yes     Priority: 4     Cervical spondylosis [M47.812]  Yes     Priority: 5     Gastroesophageal reflux disease without esophagitis [K21.9]  Yes      [facility]  Problems    Diagnosis Date Resolved POA    Encounter for weaning from ventilator [Z99.11] 03/25/2018 Not Applicable    Nodular calcific aortic valve stenosis [I35.0] 03/22/2018 Yes     Added automatically from request for surgery [id number]         Allergies:Review of patient's allergies indicates:  No Known Allergies    Diet: soft diet and fluid consistency thin    Activities: Activity as tolerated    Nursing: SBP goal ~160, Avoid hypotension to allow adequate cerebral perfusion in setting of bilateral carotid stenosis  As per facility protocol     Labs: As per facility protocol    CONSULTS:    Physical Therapy to evaluate and treat. , Occupational Therapy to evaluate and treat. and Speech Therapy to evaluate and treat for Cognition.    MISCELLANEOUS CARE:  Diabetes Care:   SN to perform and educate Diabetic management with blood glucose monitoring:, Fingerstick blood sugar AC and HS and Report CBG < 60 or > 350 to physician.     Pt will need a 30-day cardiac event monitor vs referral for loop recorder placement ordered at rehab discharge (s/p TAVR)      WOUND CARE ORDERS  None    Medications: Review discharge medications with patient and family and provide education.      Current Discharge Medication List      START taking these medications    Details   albuterol-ipratropium 2.5mg-0.5mg/3mL (DUO-NEB) 0.5 mg-3 mg(2.5 mg base)/3 mL nebulizer solution Take 3 mLs by nebulization every 4 (four) hours as needed for Wheezing. Rescue  Qty: 1 Box, Refills: 0      amiodarone (PACERONE) 400 MG tablet Take 1 tablet (400 mg total) by mouth 2 (two) times daily.  Qty: 60 tablet, Refills: 1      apixaban (ELIQUIS) 5 mg Tab Take 1 tablet (5 mg total) by mouth 2 (two) times daily.  Qty: 60 tablet, Refills: 1    Start date: 4/1/18      atorvastatin (LIPITOR) 40 MG tablet Take 1 tablet (40 mg total) by mouth once daily.  Qty: 90 tablet, Refills: 3      carvedilol (COREG) 3.125 MG tablet Take 1 tablet (3.125 mg total) by mouth once  daily.  Qty: 30 tablet, Refills: 1      !! furosemide (LASIX) 20 MG tablet Take 1 tablet (20 mg total) by mouth 2 (two) times daily.  Qty: 14 tablet, Refills: 0    Start date: 3/30/18     Stop date: 4/6/18      !! furosemide (LASIX) 20 MG tablet Take 1 tablet (20 mg total) by mouth once daily.  Qty: 360 tablet, Refills: 0    Start date: 4/7/18      !! potassium chloride SA (K-DUR,KLOR-CON) 20 MEQ tablet Take 1 tablet (20 mEq total) by mouth 2 (two) times daily. Take one tablet by mouth twice daily for 7 days then decrease to once daily  Qty: 14 tablet, Refills: 0    Start date: 3/30/18     Stop date: 4/6/18      !! potassium chloride SA (K-DUR,KLOR-CON) 20 MEQ tablet Take 1 tablet (20 mEq total) by mouth once daily.  Qty: 60 tablet, Refills: 0    Start date: 4/7/18       !! - Potential duplicate medications found. Please discuss with provider.      CONTINUE these medications which have CHANGED    Details   aspirin (ECOTRIN) 81 MG EC tablet Take 1 tablet (81 mg total) by mouth once daily.  Refills: 0    Stop date: 3/31/18      clopidogrel (PLAVIX) 75 mg tablet Take 1 tablet (75 mg total) by mouth once daily.  Qty: 90 tablet, Refills: 3         CONTINUE these medications which have NOT CHANGED    Details   ACETAMINOPHEN WITH CODEINE (ACETAMINOPHEN-CODEINE) 120mg 12mg 5mL Soln TAKE 1 TEASPOON (5 MILLILITERS) BY MOUTH EVERY 6 HOURS AS NEEDED FOR COUGH  Refills: 0      amlodipine (NORVASC) 10 MG tablet Take 10 mg by mouth once daily.  Refills: 5      cetirizine (ZYRTEC) 10 MG tablet Take 10 mg by mouth once daily.  Refills: 6      chlorthalidone (HYGROTEN) 25 MG Tab Take 1 tablet (25 mg total) by mouth every morning.  Qty: 90 tablet, Refills: 3      diphenoxylate-atropine 2.5-0.025 mg (LOMOTIL) 2.5-0.025 mg per tablet Take 1 tablet by mouth 4 (four) times daily as needed for Diarrhea.      hydrocodone-acetaminophen 10-325mg (NORCO)  mg Tab Take 1 tablet by mouth 3 (three) times daily as needed.  Refills: 0       metFORMIN (GLUCOPHAGE) 1000 MG tablet Take 1 tablet (1,000 mg total) by mouth 2 (two) times daily.  Refills: 5      NEXIUM 40 mg capsule Take 40 mg by mouth once daily.  Refills: 5      SYMBICORT 80-4.5 mcg/actuation HFAA Inhale 2 puffs into the lungs 2 (two) times daily.  Refills: 1      VENTOLIN HFA 90 mcg/actuation inhaler Inhale 2 puffs into the lungs every 4 (four) hours as needed.  Refills: 0      TRUE METRIX GLUCOSE TEST STRIP Strp test DAILY  Refills: 3      TRUEPLUS LANCETS 30 gauge Misc USE DAILY when testing  Refills: 10      VICTOZA 3-JOSE 0.6 mg/0.1 mL (18 mg/3 mL) PnIj INJECT 1.8 milligrams under the skin EVERY DAY  Refills: 2         STOP taking these medications       fenofibrate 160 MG Tab Comments:   Reason for Stopping:         JANUVIA 100 mg Tab Comments:   Reason for Stopping:         losartan (COZAAR) 50 MG tablet Comments:   Reason for Stopping:         losartan-hydrochlorothiazide 100-25 mg (HYZAAR) 100-25 mg per tablet Comments:   Reason for Stopping:         pravastatin (PRAVACHOL) 40 MG tablet Comments:   Reason for Stopping:         amoxicillin-clavulanate 875-125mg (AUGMENTIN) 875-125 mg per tablet Comments:   Reason for Stopping:                    _________________________________  Haley Mota PA-C  03/30/2018

## 2018-03-30 NOTE — ASSESSMENT & PLAN NOTE
68 y/o F with PMHx CAD, PAD, DM2, HTN, and severe AS s/p TAVR on 3/22. Stroke called on 3/24 at 2004 for worsening RUE weakness, right facial droop, dysarthria and aphasia. CTA without LVO. MRI with multiple bilateral (R>L) acute infarcts on cerebral and cerebellar hemispheres, highly suggestive of embolic phenomenon. No tpa as outside window.     Documented right field deficits and worsening RUE weakness <24h s/p TAVR. ROSEY during TAVR demonstrable for grade 4 atheroma on aorta. Suspect etiology iatrogenic from TAVR as pattern is concerning for embolic phenomena.    Exam improved- RUE weakness with sensory affect. Pt remains somewhat confused but not aphasic.    Spoke with EP team regarding pt's discharge to [facility] without a 30-day monitor. EP concerned for 3rd-degree heart block in this pt with recent TAVR placement; MD stating they recommend pt go to a facility that either has telemetry or an [facility] facility so she can have 30-day monitor while admitted.   CM discussed this with pt's friend who is her POA, David. He discussed with pt and they decided to proceed with plan for d/c to [facility], knowing she will not get the heart monitoring she needs and that this is against EP's recommendations. Pt will follow up with Dr. Carvajal after rehab discharge and will need to revisit discussion of cardiac monitoring at that time.    Antithrombotics for secondary stroke prevention: Antiplatelets: Aspirin: 81 mg daily and Clopidogrel: 75 mg daily for now. Discussed with EP and Interventional Cards- Will plan to change to Eliquis 5mg BID + Plavix 75mg Daily (no ASA) on 4/1/18 at inpatient rehab.  Statins for secondary stroke prevention and hyperlipidemia, if present:   Statins: Atorvastatin- 40 mg daily  Aggressive risk factor modification: HTN, Smoking, DM, CAD, bilateral carotid stenosis, s/p TAVR  Rehab efforts: PT/OT/SLP recommend Rehab; Plan for [place]  Diagnostics ordered/pending: None   VTE prophylaxis:  Enoxaparin 40 mg SQ every 24 hours  BP parameters: SBP < 160

## 2018-03-30 NOTE — PLAN OF CARE
CATHY rec'd call back from Magdalena at [facility]. Patient has been denied by  Rehab at this time due to need for continued cardiac monitoring. CATHY called and spoke with patient's David AVALOS, who reluctantly agreed to [facility]. Haley LAY updated. Haley to check with cardiology about when holter monitor will be available.      336pm - Patient will not be able to get any monitor until Monday. Haley LAY stated that patient may be able to get a loop recorder Monday and then maybe go to  Rehab. CATHY called and spoke to Magdalena with  Rehab; she will check with her MD and get back to this CM or CATHY Yan on Monday. CATHY updated David AVALOS. David confirmed that patient will remain here at least until Monday. Will follow.

## 2018-03-30 NOTE — ASSESSMENT & PLAN NOTE
Pt complaining to nurse of discomfort with urination  UA ordered - Needs to be collected  Will consider cx, abx based on result

## 2018-03-30 NOTE — ASSESSMENT & PLAN NOTE
- Followed by CTS  - EP discussed with Interventional Cardiology- Ok with Eliquis + Plavix but w/o ASA (to decrease bleed risk) in setting of cardiac hx + new-onset AFib

## 2018-03-30 NOTE — ASSESSMENT & PLAN NOTE
Seen on CTA Multiphase  Most prominent at C3-4 with some effacement of anterior thecal sac and moderate right/severe left neural foraminal narrowing  Discussed with NSGY - Would only constitute outpatient work-up if pt were symptomatic

## 2018-03-30 NOTE — ASSESSMENT & PLAN NOTE
- CXR on 3/23 with slight interval worsening of bilateral airspace disease suggestive of pulmonary edema. Subsequent imaging revealed no significant interval change  - O2 90-94% on RA over last 24 hrs  - Continue furosemide 40 mg IV BID / chlorthalidone as per CTS team  - Patient appears euvolemia  - Strict I/O and daily weights

## 2018-03-30 NOTE — ASSESSMENT & PLAN NOTE
66 y/o F with PMHx CAD, PAD, DM2, HTN, and severe AS s/p TAVR on 3/22. Stroke called on 3/24 at 2004 for worsening RUE weakness, right facial droop, dysarthria and aphasia. CTA without LVO. MRI with multiple bilateral (R>L) acute infarcts on cerebral and cerebellar hemispheres, highly suggestive of embolic phenomenon. No tpa as outside window.     Documented right field deficits and worsening RUE weakness <24h s/p TAVR. ROSEY during TAVR demonstrable for grade 4 atheroma on aorta. Suspect etiology iatrogenic from TAVR as pattern is concerning for embolic phenomena.    Exam improved- RUE weakness with sensory affect. Pt remains somewhat confused but not aphasic.    Spoke with EP team regarding [facility] 30[facility]day monitor. EP concerned for 3rd-degree heart block in this pt with recent TAVR placement; MD stating they recommend pt go to a facility that either has telemetry or an [facility] facility so she can have 30-day monitor while admitted. CM discussed this with pt's friend who is her POA, David. He discussed with pt and they decided to proceed with plan for d/c to [facility], knowing she will not get the heart monitoring she needs and that this is against EP's recommendations. Pt with plans to follow up with Dr. Carvajal after rehab discharge.    [facility] now denying pt due to need for continued cardiac monitoring. No longer safe option to go to [facility] either as unable to obtain a cardiac event monitor on a holiday.  Adjusted plan is now to consult EP on Sunday so pt can hopefully have ILR placed on Monday and d/c to [facility] following. BAILEY Hernandez updated and confirmed current plan.    UA ordered, not yet obtained. CM attempting to arrange pet therapy situation for pt.     Antithrombotics for secondary stroke prevention: Antiplatelets: Aspirin: 81 mg daily and Clopidogrel: 75 mg daily for now. Discussed with EP and Interventional Cards- Will plan to change to Eliquis 5mg BID +  Plavix 75mg Daily (no ASA) on 4/1/18  Statins for secondary stroke prevention and hyperlipidemia, if present:   Statins: Atorvastatin- 40 mg daily  Aggressive risk factor modification: HTN, Smoking, DM, CAD, bilateral carotid stenosis, s/p TAVR  Rehab efforts: PT/OT/SLP recommend Rehab; Hopeful d/c to [place] on Monday or Tuesday  Diagnostics ordered/pending: None   VTE prophylaxis: Enoxaparin 40 mg SQ every 24 hours  BP parameters: SBP < 160

## 2018-03-30 NOTE — ASSESSMENT & PLAN NOTE
- As demonstrable by recent carotid US and CTA (mixed density atherosclerosis at both carotid bifurcations)  Stroke risk factor, but unlikely to have caused this event as pattern not consistent with thrombotic/a-a considering embolic multi-territorial stroke.   -Avoid hypotension

## 2018-03-30 NOTE — SUBJECTIVE & OBJECTIVE
Neurologic Chief Complaint: Left PCA/MCA embolic stroke    Subjective:     Interval History: Patient is seen for follow-up neurological assessment and treatment recommendations:   [facility] now denying pt due to need for continued cardiac monitoring. Plan is now to consult EP on Sunday so pt can have ILR placed Monday and hopefully d/c to [facility] following. BAILEY Hernandez updated. UA ordered. CM attempting to arrange pet therapy situation for pt.     HPI, Past Medical, Family, and Social History remains the same as documented in the initial encounter.     Review of Systems   Unable to perform ROS: Mental status change   Constitutional: Negative for fever.   HENT: Negative for drooling.    Eyes: Negative for discharge and redness.   Respiratory: Negative for cough.    Cardiovascular: Negative for leg swelling.   Gastrointestinal: Negative for diarrhea and vomiting.   Genitourinary: Negative for hematuria.   Skin: Negative for rash.   Neurological: Positive for weakness (RUE). Negative for facial asymmetry, speech difficulty, numbness and headaches.   Psychiatric/Behavioral: Positive for confusion.     Scheduled Meds:   amiodarone  400 mg Oral BID    amLODIPine  10 mg Oral Daily    ascorbic acid (vitamin C)  500 mg Oral BID    aspirin  81 mg Oral Daily    atorvastatin  40 mg Oral Daily    carvedilol  3.125 mg Oral Daily    chlorthalidone  25 mg Oral QAM    clopidogrel  75 mg Oral Daily    enoxaparin  40 mg Subcutaneous Daily    famotidine  20 mg Oral BID    fluticasone-vilanterol  1 puff Inhalation Daily    furosemide  20 mg Oral BID    polyethylene glycol  17 g Oral Daily     Continuous Infusions:   sodium chloride 0.9%       PRN Meds:acetaminophen, albuterol-ipratropium 2.5mg-0.5mg/3mL, bisacodyl, dextrose 50%, dextrose 50%, dextrose 50%, glucagon (human recombinant), glucagon (human recombinant), glucose, glucose, hydrALAZINE, insulin aspart U-100, insulin aspart U-100, morphine, ondansetron,  promethazine (PHENERGAN) IVPB, sodium chloride 0.9%    Objective:     Vital Signs (Most Recent):  Temp: 98.1 °F (36.7 °C) (03/30/18 1601)  Pulse: (!) 53 (03/30/18 1617)  Resp: 17 (03/30/18 1601)  BP: (!) 152/78 (03/30/18 1613)  SpO2: (!) 94 % (03/30/18 1601)  BP Location: Left arm    Vital Signs Range (Last 24H):  Temp:  [97.7 °F (36.5 °C)-98.9 °F (37.2 °C)]   Pulse:  [53-66]   Resp:  [17-18]   BP: (152-180)/(67-78)   SpO2:  [90 %-94 %]   BP Location: Left arm    Physical Exam   Constitutional: She appears well-developed and well-nourished. No distress.   HENT:   Head: Normocephalic and atraumatic.   Eyes: Conjunctivae and EOM are normal.   Cardiovascular: Normal rate.    Pulmonary/Chest: Effort normal. No respiratory distress.   Musculoskeletal: She exhibits no edema or deformity.   Neurological: She is alert. A cranial nerve deficit is present.   Skin: Skin is warm and dry.   Psychiatric: She has a normal mood and affect. Her behavior is normal.   Nursing note and vitals reviewed.      Neurological Exam:   LOC: alert  Attention Span: Good   Language: No aphasia  Articulation: No dysarthria  Orientation: Oriented to month, not age  Visual Fields: Full  EOM (CN III, IV, VI): Full/intact  Facial Movement (CN VII): Lower facial weakness on the Right  Motor: Arm left  Normal 5/5  Leg left  Normal 5/5  Arm right  Paresis: 2/5  Leg right Paresis: 4/5  Sensation: Intact to light touch, temperature  Tone: Normal tone throughout    Laboratory:  CMP:     Recent Labs  Lab 03/30/18  0338   CALCIUM 9.4   ALBUMIN 3.5   PROT 7.1   *   K 3.5   CO2 29   CL 95   BUN 25*   CREATININE 0.8   ALKPHOS 81   ALT 11   AST 16   BILITOT 0.5     BMP:     Recent Labs  Lab 03/30/18  0338   *   K 3.5   CL 95   CO2 29   BUN 25*   CREATININE 0.8   CALCIUM 9.4     CBC:     Recent Labs  Lab 03/30/18  0338   WBC 8.68   RBC 3.94*   HGB 9.3*   HCT 29.8*   *   MCV 76*   MCH 23.6*   MCHC 31.2*     Lipid Panel:     Recent Labs  Lab  03/25/18  0538   CHOL 150   LDLCALC 80.2   HDL 42   TRIG 139     Coagulation:   No results for input(s): PT, INR, APTT in the last 168 hours.  Hgb A1C: No results for input(s): HGBA1C in the last 168 hours.  TSH:     Recent Labs  Lab 03/25/18  0538   TSH 1.221         Diagnostic Results       Brain Imaging     CTH 3/25/17 revealed redemonstration of multiple infarctions w/expected temporal evolution.  No hemorrhage or hemorrhagic conversion.    MRI Brain 3/24/18 revealed bilateral cerebral and cerebellar embolic infarcts.        Vessel Imaging     CTA Multiphase 3/24/18  CTA demonstrates no high-grade stenosis or large vessel occlusion.      Cardiac Imaging     ROSEY 3/22/18:  CONCLUSIONS   Normal LA, No evidence of shunt    1 - Low normal to mildly depressed left ventricular systolic function (EF 50-55%).     2 - Mild mitral regurgitation.     3 - Severe aortic stenosis.     4 - Grade 4 atheroma disease of aorta.     5 - Successful implantation of a 26mm Evolut R CoreValve into the aortic position with no evidence of regurgitation.

## 2018-03-30 NOTE — ASSESSMENT & PLAN NOTE
- Newly diagnosed here. Pt found to be in afib RVR after returning from MRI on 3/24.   - Unlikely to have caused stroke as she went into arrhythmia s/p symptom onset. Likely post-procedural from TAVR.   - Seen on ECG  - CHADVASC 8  - Plan to start Apixaban 5 mg BID on 4/1/18  - Continue oral amiodarone per CTS recs. Currently remains in NSR

## 2018-03-30 NOTE — SUBJECTIVE & OBJECTIVE
Neurologic Chief Complaint: Left PCA/MCA embolic stroke    Subjective:     Interval History: Patient is seen for follow-up neurological assessment and treatment recommendations:   Discharge delayed due to discussions with EP about pt requiring heart monitoring s/p TAVR at inpatient rehab. Pt electing to go [place] [facility] tomorrow [place] [facility]. Currently on DAPT; plan to change to Eliquis + Plavix on 4/1/18 in setting of new-onset AFib and other cardiac hx.    HPI, Past Medical, Family, and Social History remains the same as documented in the initial encounter.     Review of Systems   Unable to perform ROS: Mental status change   Constitutional: Negative for fever.   HENT: Negative for drooling.    Eyes: Negative for discharge and redness.   Respiratory: Negative for cough.    Cardiovascular: Negative for leg swelling.   Gastrointestinal: Negative for diarrhea and vomiting.   Genitourinary: Negative for hematuria.   Skin: Negative for rash.   Neurological: Positive for weakness (RUE). Negative for facial asymmetry, speech difficulty, numbness and headaches.   Psychiatric/Behavioral: Positive for confusion.     Scheduled Meds:   amiodarone  400 mg Oral BID    amLODIPine  10 mg Oral Daily    ascorbic acid (vitamin C)  500 mg Oral BID    aspirin  81 mg Oral Daily    atorvastatin  40 mg Oral Daily    carvedilol  3.125 mg Oral Daily    chlorthalidone  25 mg Oral QAM    clopidogrel  75 mg Oral Daily    enoxaparin  40 mg Subcutaneous Daily    famotidine  20 mg Oral BID    fluticasone-vilanterol  1 puff Inhalation Daily    furosemide  20 mg Oral BID    polyethylene glycol  17 g Oral Daily     Continuous Infusions:   sodium chloride 0.9%       PRN Meds:acetaminophen, albuterol-ipratropium 2.5mg-0.5mg/3mL, bisacodyl, dextrose 50%, dextrose 50%, dextrose 50%, glucagon (human recombinant), glucagon (human recombinant), glucose, glucose, hydrALAZINE, insulin aspart U-100, insulin  aspart U-100, morphine, ondansetron, promethazine (PHENERGAN) IVPB, sodium chloride 0.9%    Objective:     Vital Signs (Most Recent):  Temp: 97.3 °F (36.3 °C) (03/29/18 1617)  Pulse: (!) 57 (03/29/18 1617)  Resp: 18 (03/29/18 1617)  BP: (!) 152/68 (03/29/18 1617)  SpO2: 95 % (03/29/18 1617)  BP Location: Left arm    Vital Signs Range (Last 24H):  Temp:  [97.3 °F (36.3 °C)-98.8 °F (37.1 °C)]   Pulse:  [55-62]   Resp:  [18]   BP: (140-155)/(63-70)   SpO2:  [92 %-99 %]   BP Location: Left arm    Physical Exam   Constitutional: She appears well-developed and well-nourished. No distress.   HENT:   Head: Normocephalic and atraumatic.   Eyes: Conjunctivae and EOM are normal.   Cardiovascular: Normal rate.    Pulmonary/Chest: Effort normal. No respiratory distress.   Musculoskeletal: She exhibits no edema or deformity.   Neurological: She is alert. A cranial nerve deficit is present.   Skin: Skin is warm and dry.   Psychiatric: She has a normal mood and affect. Her behavior is normal.   Nursing note and vitals reviewed.      Neurological Exam:   LOC: alert  Attention Span: Good   Language: No aphasia  Articulation: No dysarthria  Orientation: Oriented to month, not age  Visual Fields: Full  EOM (CN III, IV, VI): Full/intact  Facial Movement (CN VII): Lower facial weakness on the Right  Motor: Arm left  Normal 5/5  Leg left  Normal 5/5  Arm right  Paresis: 2/5  Leg right Paresis: 4/5  Sensation: Intact to light touch, temperature  Tone: Normal tone throughout    Laboratory:  CMP:     Recent Labs  Lab 03/29/18  0500   CALCIUM 9.6   ALBUMIN 3.5   PROT 7.0   *   K 3.5   CO2 28   CL 96   BUN 22   CREATININE 0.7   ALKPHOS 80   ALT 10   AST 14   BILITOT 0.7     BMP:     Recent Labs  Lab 03/29/18  0500   *   K 3.5   CL 96   CO2 28   BUN 22   CREATININE 0.7   CALCIUM 9.6     CBC:     Recent Labs  Lab 03/29/18  0500   WBC 9.33   RBC 3.92*   HGB 9.2*   HCT 30.2*   *   MCV 77*   MCH 23.5*   MCHC 30.5*     Lipid  Panel:     Recent Labs  Lab 03/25/18  0538   CHOL 150   LDLCALC 80.2   HDL 42   TRIG 139     Coagulation:     Recent Labs  Lab 03/23/18  0331 03/23/18  0340   INR 1.0  --    APTT  --  22.1     Hgb A1C: No results for input(s): HGBA1C in the last 168 hours.  TSH:     Recent Labs  Lab 03/25/18  0538   TSH 1.221         Diagnostic Results       Brain Imaging     CTH 3/25/17 revealed redemonstration of multiple infarctions w/expected temporal evolution.  No hemorrhage or hemorrhagic conversion.    MRI Brain 3/24/18 revealed bilateral cerebral and cerebellar embolic infarcts.        Vessel Imaging     CTA Multiphase 3/24/18  CTA demonstrates no high-grade stenosis or large vessel occlusion.      Cardiac Imaging     ROSEY 3/22/18:  CONCLUSIONS   Normal LA, No evidence of shunt    1 - Low normal to mildly depressed left ventricular systolic function (EF 50-55%).     2 - Mild mitral regurgitation.     3 - Severe aortic stenosis.     4 - Grade 4 atheroma disease of aorta.     5 - Successful implantation of a 26mm Evolut R CoreValve into the aortic position with no evidence of regurgitation.

## 2018-03-30 NOTE — ASSESSMENT & PLAN NOTE
Hx Lung CA, now in remission  - Continue Duo-neb prn  Pt will likely need O2 at rehab (not on O2 at home)

## 2018-03-30 NOTE — ASSESSMENT & PLAN NOTE
- Newly diagnosed here. Pt found to be in afib RVR after returning from MRI on 3/24.   - Unlikely to have caused stroke as she went into arrhythmia s/p symptom onset. Likely post-procedural from TAVR.   - Seen on ECG  - CHADVASC 8  - Plan to start Apixaban 5 mg BID on 4/1/18 at inpatient rehab  - Continue oral amiodarone per CTS recs. Currently remains in NSR

## 2018-03-30 NOTE — ASSESSMENT & PLAN NOTE
Stroke risk factor  - Continue DAPT, statin, and BB for now and at d/c  - Per approval from EP, Interventional cards, CTS, pt will switch to Eliquis 5mg BID + Plavix (no ASA) on 4/1/18

## 2018-03-30 NOTE — ASSESSMENT & PLAN NOTE
Seen on CTA Multiphase  Most prominent at C3-4 with some effacement of anterior thecal sac and moderate right/severe left neural foraminal narrowing  Need to discuss need for outpatient referral to NSGY

## 2018-03-30 NOTE — ASSESSMENT & PLAN NOTE
- As demonstrable by recent carotid US and CTA (mixed density atherosclerosis at both carotid bifurcations)  Stroke risk factor, but unlikely to have caused this event as pattern not consistent with thrombotic/a-a considering embolic multi-territorial stroke  -Avoid hypotension

## 2018-03-30 NOTE — ASSESSMENT & PLAN NOTE
-Stroke risk factor  -Discussed with CTS, ok with MAP > 80 at this time (s/p TAVR) to allow adequate cerebral perfusion  -Continue amlodipine 10 mg PO daily, carvedilol 3.125 mg PO BID, chlorthalidone 25 mg PO daily, lasix 40 mg IV BID and hydralazine IV PRN. Monitor and titrate meds prn.   - See CTS note for final d/c med recs  - Pt not started on ACEi this admission due to concern for hypotension. Will need to be revisited outpatient in this pt with HFpF

## 2018-03-30 NOTE — PLAN OF CARE
Problem: Physical Therapy Goal  Goal: Physical Therapy Goal  Goals to be met by: 18    Patient will increase functional independence with mobility by performin. Supine to sit with Modified [place] -not met  2. Sit to stand transfer with Modified [place] -not met  3. Gait  x 220 feet with Supervision-not met  4. Ascend/descend 2 stair with no Handrails Supervision . -not met     Pt progressing towards goals. continue with PT POC.Goals remain appropriate.  Basil Gregory PTA  3/30/2018

## 2018-03-30 NOTE — PROGRESS NOTES
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 Stroke called on 3/24 at 2004 for worsening RUE weakness, right facial droop, dysarthria and aphasia. CTA without LVO. MRI with multiple bilateral (R>L) acute infarcts on cerebral and cerebellar hemispheres, highly suggestive of embolic phenomenon. No tpa as outside window.     Documented right field deficits and worsening RUE weakness <24h s/p TAVR. ROSEY during TAVR demonstrable for grade 4 atheroma on aorta. Suspect etiology iatrogenic from TAVR as pattern is concerning for embolic phenomena.    Exam improved- RUE weakness with sensory affect. Pt remains somewhat confused but not aphasic.    Spoke with EP team regarding [facility] without [facility] 30-day monitor. EP concerned for 3rd-degree heart block in this pt with recent TAVR placement; MD stating they recommend pt go to a facility that either has telemetry or an [facility] facility so she can have 30-day monitor while admitted. CM discussed this with pt's friend who is her POA, David. He discussed with pt and they decided to proceed with plan for d/c to [facility], knowing she will not get the heart monitoring she needs and that this is against EP's recommendations. Pt with plans to follow up with Dr. Carvajal after rehab discharge.    [facility] now denying pt due to need for continued cardiac monitoring. No longer safe option to go to [facility] either as unable to obtain a cardiac event monitor on a holiday.  Adjusted plan is now to consult EP on Sunday so pt can hopefully have ILR placed on Monday and d/c to [facility] following. BAILEY Hernandez updated and confirmed current plan.    UA ordered, not yet obtained. CATHY attempting to arrange pet therapy situation for pt.     Antithrombotics for  secondary stroke prevention: Antiplatelets: Aspirin: 81 mg daily and Clopidogrel: 75 mg daily for now. 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    See Cerebral infarction due to embolism of PCA        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    See Cerebral infarction due to embolism of PCA        S/p TAVR (transcatheter aortic valve replacement), bioprosthetic    - Followed by CTS  - EP discussed with Interventional Cardiology- Ok with Eliquis + Plavix but w/o ASA (to decrease bleed risk) in setting of cardiac hx + new-onset AFib        Dysuria    Pt complaining to nurse of discomfort with urination  UA ordered - Needs to be collected  Will consider cx, abx based on result        Bilateral carotid artery stenosis    - As demonstrable by recent carotid US and CTA (mixed density atherosclerosis at both carotid bifurcations)  Stroke risk factor, but unlikely to have caused this event as pattern not consistent with thrombotic/a-a considering embolic multi-territorial stroke  -Avoid hypotension        Atrial fibrillation    - Newly diagnosed here. Pt found to be in afib RVR after returning from MRI on 3/24.   - Unlikely to have caused stroke as she went into arrhythmia s/p symptom onset. Likely post-procedural from TAVR.   - Seen on ECG  - CHADVASC 8  - Plan to start Apixaban 5 mg BID on 4/1/18  - Continue oral amiodarone per CTS recs. Currently  remains in NSR        Acute on chronic diastolic heart failure    - CXR on 3/23 with slight interval worsening of bilateral airspace disease suggestive of pulmonary edema. Subsequent imaging revealed no significant interval change  - O2 90-94% on RA over last 24 hrs  - Continue furosemide 40 mg IV BID / chlorthalidone as per CTS team  - Patient appears euvolemia  - Strict I/O and daily weights        Diabetes mellitus, type 2    -Stroke risk factor  HgB A1C 6.5%  -SSI        Coronary artery disease of native artery with stable angina pectoris    Stroke risk factor  - Continue DAPT, statin, and BB for now and at d/c  - Per approval from EP, Interventional cards, CTS, pt will switch to Eliquis 5mg BID + Plavix (no ASA) on 4/1/18        Severe aortic stenosis    S/p TAVR 3/22, followed by CTS        Essential hypertension    -Stroke risk factor  -Discussed with CTS, ok with MAP > 80 in setting of stroke  -Continue amlodipine 10 mg PO daily, carvedilol 3.125 mg PO BID, chlorthalidone 25 mg PO daily, lasix 40 mg IV BID and hydralazine IV PRN. Monitor and titrate meds prn.   - SBP goal ~160, Avoid hypotension to allow adequate cerebral perfusion in setting of bilateral carotid stenosis  - See CTS note for final d/c med recs  - Pt not started on ACEi this admission due to concern for hypotension. Will need to be revisited outpatient in this pt with HFpF        Dyslipidemia    -Stroke risk factor  LDL >70  -Continue Lipitor 40 mg daily        Centrilobular emphysema    Hx Lung CA, now in remission  - Continue Duo-neb prn  Pt will likely need O2 at rehab (not on O2 at home)        Tobacco abuse    -Stroke risk factor  Educate patient about smoking cessation        Cervical spondylosis    Seen on CTA Multiphase  Most prominent at C3-4 with some effacement of anterior thecal sac and moderate right/severe left neural foraminal narrowing  Discussed with NSGY - Would only constitute outpatient work-up if pt were symptomatic         Malignant neoplasm of right lung    - s/p RML lobectomy, path showed keratinizing squamous cell CA. Contacted oncologist who reported disease on remission, last PET scan negative.             3/25 - New-onset afib RVR developed after return from MRI this morning. IV amiodarone loaded and started on gtt- rhythm resolved and remains on gtt. No worsening focal deficits this morning.   03/28/2018 BOBBY.  Patient oriented to person only.  Currently perseverating about her  being called.  No other complaints at this time.  3/29: Discharge delayed due to discussions with EP about pt requiring heart monitoring s/p TAVR at inpatient rehab. Pt electing to go to [facility] tomorrow without monitoring instead of [facility] with monitoring. Currently on DAPT; plan to change to Eliquis + Plavix on 4/1/18 in setting of new-onset AFib and other cardiac hx.  3/30: [facility] now denying pt due to need for continued cardiac monitoring. Plan is now to consult EP on Sunday so pt can have ILR placed Monday and hopefully d/c to [facility] following. BAILEY Hernandez updated. UA ordered. CM attempting to arrange pet therapy situation for pt.     STROKE DOCUMENTATION   Acute Stroke Times   Last Known Normal Date: 03/23/18  Last Known Normal Time:  (Unsure)  Symptom Onset Date: 03/24/18  Symptom Onset Time:  (Unsure)  Stroke Team Called Date: 03/24/18  Stroke Team Called Time: 2004  Stroke Team Arrival Date: 03/24/18  Stroke Team Arrival Time: 2010  CT Interpretation Time: 2025    NIH Scale:  1a. Level Of Consciousness: 0-->Alert: keenly responsive  1b. LOC Questions: 1-->Answers one question correctly  1c. LOC Commands: 0-->Performs both tasks correctly  2. Best Gaze: 0-->Normal  3. Visual: 0-->No visual loss  4. Facial Palsy: 1-->Minor paralysis (flattened nasolabial fold, asymmetry on smiling)  5a. Motor Arm, Left: 0-->No drift: limb holds 90 (or 45) degrees for full 10 secs  5b. Motor Arm, Right: 2-->Some effort against gravity:  limb cannot get to or maintain (if cued) 90 (or 45) degrees, drifts down to bed, but has some effort against gravity  6a. Motor Leg, Left: 0-->No drift: leg holds 30 degree position for full 5 secs  6b. Motor Leg, Right: 1-->Drift: leg falls by the end of the 5-sec period but does not hit bed  7. Limb Ataxia: 0-->Absent  8. Sensory: 0-->Normal: no sensory loss  9. Best Language: 0-->No aphasia: normal  10. Dysarthria: 0-->Normal  11. Extinction and Inattention (formerly Neglect): 0-->No abnormality  Total (NIH Stroke Scale): 5       [place] Score: 1  Chelsy Coma Scale:    ABCD2 Score:    [id number] Score:8  HAS -BLED Score:5  ICH Score:   Hunt & Eldridge Classification:      Hemorrhagic change of an Ischemic Stroke: Does this patient have an ischemic stroke with hemorrhagic changes? No     Neurologic Chief Complaint: Left PCA/MCA embolic stroke    Subjective:     Interval History: Patient is seen for follow-up neurological assessment and treatment recommendations:   [facility] now denying pt due to need for continued cardiac monitoring. Plan is now to consult EP on Sunday so pt can have ILR placed Monday and hopefully d/c to [facility] following. BAILEY Hernandez updated. UA ordered. CM attempting to arrange pet therapy situation for pt.     HPI, Past Medical, Family, and Social History remains the same as documented in the initial encounter.     Review of Systems   Unable to perform ROS: Mental status change   Constitutional: Negative for fever.   HENT: Negative for drooling.    Eyes: Negative for discharge and redness.   Respiratory: Negative for cough.    Cardiovascular: Negative for leg swelling.   Gastrointestinal: Negative for diarrhea and vomiting.   Genitourinary: Negative for hematuria.   Skin: Negative for rash.   Neurological: Positive for weakness (RUE). Negative for facial asymmetry, speech difficulty, numbness and headaches.   Psychiatric/Behavioral: Positive for confusion.     Scheduled Meds:    amiodarone  400 mg Oral BID    amLODIPine  10 mg Oral Daily    ascorbic acid (vitamin C)  500 mg Oral BID    aspirin  81 mg Oral Daily    atorvastatin  40 mg Oral Daily    carvedilol  3.125 mg Oral Daily    chlorthalidone  25 mg Oral QAM    clopidogrel  75 mg Oral Daily    enoxaparin  40 mg Subcutaneous Daily    famotidine  20 mg Oral BID    fluticasone-vilanterol  1 puff Inhalation Daily    furosemide  20 mg Oral BID    polyethylene glycol  17 g Oral Daily     Continuous Infusions:   sodium chloride 0.9%       PRN Meds:acetaminophen, albuterol-ipratropium 2.5mg-0.5mg/3mL, bisacodyl, dextrose 50%, dextrose 50%, dextrose 50%, glucagon (human recombinant), glucagon (human recombinant), glucose, glucose, hydrALAZINE, insulin aspart U-100, insulin aspart U-100, morphine, ondansetron, promethazine (PHENERGAN) IVPB, sodium chloride 0.9%    Objective:     Vital Signs (Most Recent):  Temp: 98.1 °F (36.7 °C) (03/30/18 1601)  Pulse: (!) 53 (03/30/18 1617)  Resp: 17 (03/30/18 1601)  BP: (!) 152/78 (03/30/18 1613)  SpO2: (!) 94 % (03/30/18 1601)  BP Location: Left arm    Vital Signs Range (Last 24H):  Temp:  [97.7 °F (36.5 °C)-98.9 °F (37.2 °C)]   Pulse:  [53-66]   Resp:  [17-18]   BP: (152-180)/(67-78)   SpO2:  [90 %-94 %]   BP Location: Left arm    Physical Exam   Constitutional: She appears well-developed and well-nourished. No distress.   HENT:   Head: Normocephalic and atraumatic.   Eyes: Conjunctivae and EOM are normal.   Cardiovascular: Normal rate.    Pulmonary/Chest: Effort normal. No respiratory distress.   Musculoskeletal: She exhibits no edema or deformity.   Neurological: She is alert. A cranial nerve deficit is present.   Skin: Skin is warm and dry.   Psychiatric: She has a normal mood and affect. Her behavior is normal.   Nursing note and vitals reviewed.      Neurological Exam:   LOC: alert  Attention Span: Good   Language: No aphasia  Articulation: No dysarthria  Orientation: Oriented to month, not  age  Visual Fields: Full  EOM (CN III, IV, VI): Full/intact  Facial Movement (CN VII): Lower facial weakness on the Right  Motor: Arm left  Normal 5/5  Leg left  Normal 5/5  Arm right  Paresis: 2/5  Leg right Paresis: 4/5  Sensation: Intact to light touch, temperature  Tone: Normal tone throughout    Laboratory:  CMP:     Recent Labs  Lab 03/30/18  0338   CALCIUM 9.4   ALBUMIN 3.5   PROT 7.1   *   K 3.5   CO2 29   CL 95   BUN 25*   CREATININE 0.8   ALKPHOS 81   ALT 11   AST 16   BILITOT 0.5     BMP:     Recent Labs  Lab 03/30/18  0338   *   K 3.5   CL 95   CO2 29   BUN 25*   CREATININE 0.8   CALCIUM 9.4     CBC:     Recent Labs  Lab 03/30/18  0338   WBC 8.68   RBC 3.94*   HGB 9.3*   HCT 29.8*   *   MCV 76*   MCH 23.6*   MCHC 31.2*     Lipid Panel:     Recent Labs  Lab 03/25/18  0538   CHOL 150   LDLCALC 80.2   HDL 42   TRIG 139     Coagulation:   No results for input(s): PT, INR, APTT in the last 168 hours.  Hgb A1C: No results for input(s): HGBA1C in the last 168 hours.  TSH:     Recent Labs  Lab 03/25/18  0538   TSH 1.221         Diagnostic Results       Brain Imaging     CTH 3/25/17 revealed redemonstration of multiple infarctions w/expected temporal evolution.  No hemorrhage or hemorrhagic conversion.    MRI Brain 3/24/18 revealed bilateral cerebral and cerebellar embolic infarcts.        Vessel Imaging     CTA Multiphase 3/24/18  CTA demonstrates no high-grade stenosis or large vessel occlusion.      Cardiac Imaging     ROSEY 3/22/18:  CONCLUSIONS   Normal LA, No evidence of shunt    1 - Low normal to mildly depressed left ventricular systolic function (EF 50-55%).     2 - Mild mitral regurgitation.     3 - Severe aortic stenosis.     4 - Grade 4 atheroma disease of aorta.     5 - Successful implantation of a 26mm Evolut R CoreValve into the aortic position with no evidence of regurgitation.       Haley Mota PA-C  [facility]  Department of Vascular Neurology   [facility]

## 2018-03-31 LAB
ALBUMIN SERPL BCP-MCNC: 3.4 G/DL
ALP SERPL-CCNC: 95 U/L
ALT SERPL W/O P-5'-P-CCNC: 11 U/L
ANION GAP SERPL CALC-SCNC: 10 MMOL/L
AST SERPL-CCNC: 17 U/L
BASOPHILS # BLD AUTO: 0.1 K/UL
BASOPHILS NFR BLD: 1.1 %
BILIRUB SERPL-MCNC: 0.4 MG/DL
BUN SERPL-MCNC: 22 MG/DL
CALCIUM SERPL-MCNC: 9.5 MG/DL
CHLORIDE SERPL-SCNC: 97 MMOL/L
CO2 SERPL-SCNC: 30 MMOL/L
CREAT SERPL-MCNC: 0.7 MG/DL
DIFFERENTIAL METHOD: ABNORMAL
EOSINOPHIL # BLD AUTO: 0.6 K/UL
EOSINOPHIL NFR BLD: 6.3 %
ERYTHROCYTE [DISTWIDTH] IN BLOOD BY AUTOMATED COUNT: 17.6 %
EST. GFR  (AFRICAN AMERICAN): >60 ML/MIN/1.73 M^2
EST. GFR  (NON AFRICAN AMERICAN): >60 ML/MIN/1.73 M^2
GLUCOSE SERPL-MCNC: 125 MG/DL
HCT VFR BLD AUTO: 30.3 %
HGB BLD-MCNC: 9.5 G/DL
IMM GRANULOCYTES # BLD AUTO: 0.03 K/UL
IMM GRANULOCYTES NFR BLD AUTO: 0.3 %
LYMPHOCYTES # BLD AUTO: 2 K/UL
LYMPHOCYTES NFR BLD: 22.7 %
MAGNESIUM SERPL-MCNC: 2.1 MG/DL
MCH RBC QN AUTO: 23.7 PG
MCHC RBC AUTO-ENTMCNC: 31.4 G/DL
MCV RBC AUTO: 76 FL
MONOCYTES # BLD AUTO: 0.9 K/UL
MONOCYTES NFR BLD: 9.8 %
NEUTROPHILS # BLD AUTO: 5.3 K/UL
NEUTROPHILS NFR BLD: 59.8 %
NRBC BLD-RTO: 0 /100 WBC
PHOSPHATE SERPL-MCNC: 4.2 MG/DL
PLATELET # BLD AUTO: 462 K/UL
PMV BLD AUTO: 9.1 FL
POCT GLUCOSE: 281 MG/DL (ref 70–110)
POTASSIUM SERPL-SCNC: 3.4 MMOL/L
PROT SERPL-MCNC: 6.9 G/DL
RBC # BLD AUTO: 4.01 M/UL
SODIUM SERPL-SCNC: 137 MMOL/L
WBC # BLD AUTO: 8.87 K/UL

## 2018-03-31 PROCEDURE — 87186 SC STD MICRODIL/AGAR DIL: CPT

## 2018-03-31 PROCEDURE — 25000003 PHARM REV CODE 250: Performed by: STUDENT IN AN ORGANIZED HEALTH CARE EDUCATION/TRAINING PROGRAM

## 2018-03-31 PROCEDURE — 99233 SBSQ HOSP IP/OBS HIGH 50: CPT | Mod: ,,, | Performed by: PSYCHIATRY & NEUROLOGY

## 2018-03-31 PROCEDURE — 25000003 PHARM REV CODE 250: Performed by: PSYCHIATRY & NEUROLOGY

## 2018-03-31 PROCEDURE — 63600175 PHARM REV CODE 636 W HCPCS: Performed by: STUDENT IN AN ORGANIZED HEALTH CARE EDUCATION/TRAINING PROGRAM

## 2018-03-31 PROCEDURE — 20600001 HC STEP DOWN PRIVATE ROOM

## 2018-03-31 PROCEDURE — 25000003 PHARM REV CODE 250: Performed by: PHYSICIAN ASSISTANT

## 2018-03-31 PROCEDURE — 83735 ASSAY OF MAGNESIUM: CPT

## 2018-03-31 PROCEDURE — 87086 URINE CULTURE/COLONY COUNT: CPT

## 2018-03-31 PROCEDURE — 80053 COMPREHEN METABOLIC PANEL: CPT

## 2018-03-31 PROCEDURE — 36415 COLL VENOUS BLD VENIPUNCTURE: CPT

## 2018-03-31 PROCEDURE — 87088 URINE BACTERIA CULTURE: CPT

## 2018-03-31 PROCEDURE — 85025 COMPLETE CBC W/AUTO DIFF WBC: CPT

## 2018-03-31 PROCEDURE — 87077 CULTURE AEROBIC IDENTIFY: CPT

## 2018-03-31 PROCEDURE — 25000003 PHARM REV CODE 250: Performed by: THORACIC SURGERY (CARDIOTHORACIC VASCULAR SURGERY)

## 2018-03-31 PROCEDURE — 84100 ASSAY OF PHOSPHORUS: CPT

## 2018-03-31 PROCEDURE — 25000003 PHARM REV CODE 250: Performed by: NURSE PRACTITIONER

## 2018-03-31 PROCEDURE — 81001 URINALYSIS AUTO W/SCOPE: CPT

## 2018-03-31 RX ADMIN — ATORVASTATIN CALCIUM 40 MG: 20 TABLET, FILM COATED ORAL at 09:03

## 2018-03-31 RX ADMIN — Medication 500 MG: at 10:03

## 2018-03-31 RX ADMIN — Medication 500 MG: at 09:03

## 2018-03-31 RX ADMIN — CHLORTHALIDONE 25 MG: 25 TABLET ORAL at 06:03

## 2018-03-31 RX ADMIN — FAMOTIDINE 20 MG: 20 TABLET, FILM COATED ORAL at 09:03

## 2018-03-31 RX ADMIN — CARVEDILOL 3.12 MG: 3.12 TABLET, FILM COATED ORAL at 09:03

## 2018-03-31 RX ADMIN — AMIODARONE HYDROCHLORIDE 400 MG: 200 TABLET ORAL at 09:03

## 2018-03-31 RX ADMIN — ACETAMINOPHEN 650 MG: 650 SOLUTION ORAL at 09:03

## 2018-03-31 RX ADMIN — ENOXAPARIN SODIUM 40 MG: 100 INJECTION SUBCUTANEOUS at 05:03

## 2018-03-31 RX ADMIN — AMIODARONE HYDROCHLORIDE 400 MG: 200 TABLET ORAL at 10:03

## 2018-03-31 RX ADMIN — FUROSEMIDE 20 MG: 20 TABLET ORAL at 09:03

## 2018-03-31 RX ADMIN — FAMOTIDINE 20 MG: 20 TABLET, FILM COATED ORAL at 10:03

## 2018-03-31 RX ADMIN — ASPIRIN 81 MG: 81 TABLET, COATED ORAL at 09:03

## 2018-03-31 RX ADMIN — AMLODIPINE BESYLATE 10 MG: 10 TABLET ORAL at 09:03

## 2018-03-31 RX ADMIN — FUROSEMIDE 20 MG: 20 TABLET ORAL at 06:03

## 2018-03-31 RX ADMIN — CLOPIDOGREL 75 MG: 75 TABLET, FILM COATED ORAL at 09:03

## 2018-03-31 RX ADMIN — POLYETHYLENE GLYCOL 3350 17 G: 17 POWDER, FOR SOLUTION ORAL at 09:03

## 2018-03-31 NOTE — PROGRESS NOTES
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 Stroke called on 3/24 at 2004 for worsening RUE weakness, right facial droop, dysarthria and aphasia. CTA without LVO. MRI with multiple bilateral (R>L) acute infarcts on cerebral and cerebellar hemispheres, highly suggestive of embolic phenomenon. No tpa as outside window.     Events from admission regarding placement-   Spoke with EP team regarding pt's discharge to [facility] without a 30-day monitor. EP concerned for 3rd-degree heart block in this pt with recent TAVR placement; MD stating they recommend pt go to a facility that either has telemetry or an [facility] facility so she can have 30-day monitor while admitted. CM discussed this with pt's friend who is her POA, David. He discussed with pt and they decided to proceed with plan for d[place] to [place], knowing she will not get the heart monitoring she needs and that this is against EP's recommendations. Pt with plans to follow up with Dr. Carvajal after rehab discharge.    [place] [facility] now denying pt due to need for continued cardiac monitoring. No longer safe option to go to [facility] either as unable to obtain a cardiac event monitor on a holiday.  Adjusted plan is now to consult EP on Sunday so pt can hopefully have ILR placed on Monday and d/c to [place] following. BAILEY Hernandez updated and confirmed current plan.    UA ordered, not yet obtained. CM attempting to arrange pet therapy situation for pt.     Antithrombotics for secondary stroke prevention: Antiplatelets: Aspirin: 81 mg daily and Clopidogrel: 75 mg daily for now. Discussed with EP and Interventional Cards- Will plan to change to Eliquis 5mg BID + Plavix 75mg Daily (no ASA) on 4/1/18  Statins for secondary stroke prevention and  hyperlipidemia, if present:   Statins: Atorvastatin- 40 mg daily  Aggressive risk factor modification: HTN, Smoking, DM, CAD, bilateral carotid stenosis, s/p TAVR  Rehab efforts: PT/OT/SLP recommend Rehab; Hopeful d/c to [place] on Monday or Tuesday  Diagnostics ordered/pending: None   VTE prophylaxis: Enoxaparin 40 mg SQ every 24 hours  BP parameters: SBP < 160         Dysuria    Pt complaining to nurse of discomfort with urination  UA ordered - Needs to be collected  Will consider cx, abx based on result        Cervical spondylosis    Seen on CTA Multiphase  Most prominent at C3-4 with some effacement of anterior thecal sac and moderate right/severe left neural foraminal narrowing  Discussed with NSGY - Would only constitute outpatient work-up if pt were symptomatic        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 Pt found to be in afib RVR after returning from MRI on 3/24.   - Unlikely to have caused stroke as she went into arrhythmia s/p symptom onset. Likely post-procedural from TAVR.   - Seen on ECG  - CHADVASC 8  - Plan to start Apixaban 5 mg BID on 4/1/18  - Continue oral amiodarone per CTS recs. Currently remains in NSR        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    See Cerebral infarction due to embolism of PCA        Acute on chronic diastolic heart failure    - CXR on 3/23 with slight interval worsening of bilateral airspace disease suggestive of pulmonary edema. Subsequent imaging revealed no significant interval change  - O2 90-94% on RA over last 24 hrs  - Continue furosemide 40 mg IV BID / chlorthalidone as per CTS team  - Patient appears euvolemia  - Strict I/O and daily weights        S/p TAVR (transcatheter aortic valve replacement), bioprosthetic    - Followed by CTS  - EP discussed with Interventional Cardiology- Ok with Eliquis + Plavix but w/o  ASA (to decrease bleed risk) in setting of cardiac hx + new-onset AFib        Centrilobular emphysema    Hx Lung CA, now in remission  - Continue Duo-neb prn  Pt will likely need O2 at rehab (not on O2 at home)        Diabetes mellitus, type 2    -Stroke risk factor  HgB A1C 6.5%  -SSI        Malignant neoplasm of right lung    - s/p RML lobectomy, path showed keratinizing squamous cell CA. Contacted oncologist who reported disease on remission, last PET scan negative.        Coronary artery disease of native artery with stable angina pectoris    Stroke risk factor  - Continue DAPT, statin, and BB for now and at d/c  - Per approval from EP, Interventional cards, CTS, pt will switch to Eliquis 5mg BID + Plavix (no ASA) on 4/1/18        Tobacco abuse    -Stroke risk factor  Educate patient about smoking cessation        Severe aortic stenosis    S/p TAVR 3/22, followed by CTS        Essential hypertension    -Stroke risk factor  -Discussed with CTS, ok with MAP > 80 in setting of stroke  -Continue amlodipine 10 mg PO daily, carvedilol 3.125 mg PO BID, chlorthalidone 25 mg PO daily, lasix 40 mg IV BID and hydralazine IV PRN. Monitor and titrate meds prn.   - SBP goal ~160, Avoid hypotension to allow adequate cerebral perfusion in setting of bilateral carotid stenosis  - See CTS note for final d/c med recs  - Pt not started on ACEi this admission due to concern for hypotension. Will need to be revisited outpatient in this pt with HFpF        Dyslipidemia    -Stroke risk factor  LDL >70  -Continue Lipitor 40 mg daily        Bilateral carotid artery stenosis    - As demonstrable by recent carotid US and CTA (mixed density atherosclerosis at both carotid bifurcations)  Stroke risk factor, but unlikely to have caused this event as pattern not consistent with thrombotic/a-a considering embolic multi-territorial stroke  -Avoid hypotension             3/25 - New-onset afib RVR developed after return from MRI this morning. IV  "amiodarone loaded and started on gtt- rhythm resolved and remains on gtt. No worsening focal deficits this morning.   03/28/2018 BOBBY.  Patient oriented to person only.  Currently perseverating about her  being called.  No other complaints at this time.  3/29: Discharge delayed due to discussions with EP about pt requiring heart monitoring s/p TAVR at inpatient rehab. Pt electing to go to [facility] tomorrow without monitoring instead of [facility] with monitoring. Currently on DAPT; plan to change to Eliquis + Plavix on 4/1/18 in setting of new-onset AFib and other cardiac hx.  3/30: [facility] now denying pt due to need for continued cardiac monitoring. Plan is now to consult EP on Sunday so pt can have ILR placed Monday and hopefully d/c to [facility] following. BAILEY Hernandez updated. UA ordered. CM attempting to arrange pet therapy situation for pt.   3/31/18 - feeling "off today" patient with history of frequent UTIs, UA ordered and awaiting collection. The patient reports her "off" feeling to be like a weakness - described as generalized. Will continue to monitor     STROKE DOCUMENTATION   Acute Stroke Times   Last Known Normal Date: 03/23/18  Last Known Normal Time:  (Unsure)  Symptom Onset Date: 03/24/18  Symptom Onset Time:  (Unsure)  Stroke Team Called Date: 03/24/18  Stroke Team Called Time: 2004  Stroke Team Arrival Date: 03/24/18  Stroke Team Arrival Time: 2010  CT Interpretation Time: 2025    NIH Scale:  Interval: baseline (upon arrival/admit)  1a. Level Of Consciousness: 0-->Alert: keenly responsive  1b. LOC Questions: 0-->Answers both questions correctly  1c. LOC Commands: 0-->Performs both tasks correctly  2. Best Gaze: 0-->Normal  3. Visual: 2-->Complete hemianopia  4. Facial Palsy: 1-->Minor paralysis (flattened nasolabial fold, asymmetry on smiling)  5a. Motor Arm, Left: 0-->No drift: limb holds 90 (or 45) degrees for full 10 secs  5b. Motor Arm, Right: 2-->Some effort against gravity: " "limb cannot get to or maintain (if cued) 90 (or 45) degrees, drifts down to bed, but has some effort against gravity  6a. Motor Leg, Left: 0-->No drift: leg holds 30 degree position for full 5 secs  6b. Motor Leg, Right: 1-->Drift: leg falls by the end of the 5-sec period but does not hit bed  7. Limb Ataxia: 0-->Absent  8. Sensory: 1-->Mild-to-moderate sensory loss: patient feels pinprick is less sharp or is dull on the affected side: or there is a loss of superficial pain with pinprick, but patient is aware of being touched  9. Best Language: 0-->No aphasia: normal  10. Dysarthria: 0-->Normal  11. Extinction and Inattention (formerly Neglect): 0-->No abnormality  Total (NIH Stroke Scale): 7       Modified Ellen Score: 1  Chelsy Coma Scale:    ABCD2 Score:    [id number] Score:8  HAS -BLED Score:5  ICH Score:   Hunt & Eldridge Classification:      Hemorrhagic change of an Ischemic Stroke: Does this patient have an ischemic stroke with hemorrhagic changes? No     Neurologic Chief Complaint: Left PCA/MCA embolic stroke    Subjective:     Interval History: Patient is seen for follow-up neurological assessment and treatment recommendations:    feeling "off today" patient with history of frequent UTIs, UA ordered and awaiting collection. The patient reports her "off" feeling to be like a weakness - described as generalized. Will continue to monitor     Reports feeling tired of being in bed, bilateral numbness of the feet from her positioning, wishes to see her dog and expresses general frustration of having been independent and now stuck in the hospital without anything to do     HPI, Past Medical, Family, and Social History remains the same as documented in the initial encounter.     Review of Systems   Constitutional: Negative for fever.   Genitourinary:        (+) frequent UTIs    Neurological: Positive for weakness (RUE). Negative for facial asymmetry, speech difficulty, numbness and headaches.     Scheduled Meds:   " amiodarone  400 mg Oral BID    amLODIPine  10 mg Oral Daily    ascorbic acid (vitamin C)  500 mg Oral BID    aspirin  81 mg Oral Daily    atorvastatin  40 mg Oral Daily    carvedilol  3.125 mg Oral Daily    chlorthalidone  25 mg Oral QAM    clopidogrel  75 mg Oral Daily    enoxaparin  40 mg Subcutaneous Daily    famotidine  20 mg Oral BID    fluticasone-vilanterol  1 puff Inhalation Daily    furosemide  20 mg Oral BID    polyethylene glycol  17 g Oral Daily     Continuous Infusions:   sodium chloride 0.9%       PRN Meds:acetaminophen, albuterol-ipratropium 2.5mg-0.5mg/3mL, bisacodyl, dextrose 50%, dextrose 50%, dextrose 50%, glucagon (human recombinant), glucagon (human recombinant), glucose, glucose, hydrALAZINE, insulin aspart U-100, insulin aspart U-100, morphine, ondansetron, promethazine (PHENERGAN) IVPB, sodium chloride 0.9%    Objective:     Vital Signs (Most Recent):  Temp: 98.4 °F (36.9 °C) (03/31/18 1137)  Pulse: (!) 57 (03/31/18 1137)  Resp: 18 (03/31/18 1137)  BP: (!) 177/75 (03/31/18 1137)  SpO2: 96 % (03/31/18 1137)  BP Location: Left arm    Vital Signs Range (Last 24H):  Temp:  [98.1 °F (36.7 °C)-98.8 °F (37.1 °C)]   Pulse:  [53-64]   Resp:  [17-18]   BP: (137-182)/(65-78)   SpO2:  [92 %-96 %]   BP Location: Left arm    Physical Exam   Constitutional: She appears well-developed and well-nourished. No distress.   HENT:   Head: Normocephalic and atraumatic.   Cardiovascular: Normal rate.    Pulmonary/Chest: Effort normal. No respiratory distress.   Neurological: She is alert.   Skin: Skin is warm and dry.   Psychiatric:   Depressed mood noted today    Nursing note and vitals reviewed.      Neurological Exam:   LOC: alert  Attention Span: Good   Language: No aphasia  Articulation: No dysarthria  Visual Fields: right hemianopsia   EOM (CN III, IV, VI): Full/intact  Facial Movement (CN VII): Lower facial weakness on the Right  Motor: Arm left  Normal 5/5  Leg left  Normal 5/5  Arm right   Paresis: 2/5  Leg right Paresis: 4/5  Sensation: mild decrease in sensation to right side, can still appreciate light touch   Tone: Normal tone throughout    Laboratory:  CMP:     Recent Labs  Lab 03/31/18  0401   CALCIUM 9.5   ALBUMIN 3.4*   PROT 6.9      K 3.4*   CO2 30*   CL 97   BUN 22   CREATININE 0.7   ALKPHOS 95   ALT 11   AST 17   BILITOT 0.4     BMP:     Recent Labs  Lab 03/31/18  0401      K 3.4*   CL 97   CO2 30*   BUN 22   CREATININE 0.7   CALCIUM 9.5     CBC:     Recent Labs  Lab 03/31/18  0401   WBC 8.87   RBC 4.01   HGB 9.5*   HCT 30.3*   *   MCV 76*   MCH 23.7*   MCHC 31.4*     Lipid Panel:     Recent Labs  Lab 03/25/18  0538   CHOL 150   LDLCALC 80.2   HDL 42   TRIG 139     Coagulation:   No results for input(s): PT, INR, APTT in the last 168 hours.  Hgb A1C: No results for input(s): HGBA1C in the last 168 hours.  TSH:     Recent Labs  Lab 03/25/18  0538   TSH 1.221         Diagnostic Results       Brain Imaging   CTH 3/25/17 revealed redemonstration of multiple infarctions w/expected temporal evolution.  No hemorrhage or hemorrhagic conversion.    MRI Brain 3/24/18 revealed bilateral cerebral and cerebellar embolic infarcts.      Vessel Imaging   CTA Multiphase 3/24/18  CTA demonstrates no high-grade stenosis or large vessel occlusion.    Cardiac Imaging   ROSEY 3/22/18:  CONCLUSIONS   Normal LA, No evidence of shunt    1 - Low normal to mildly depressed left ventricular systolic function (EF 50-55%).     2 - Mild mitral regurgitation.     3 - Severe aortic stenosis.     4 - Grade 4 atheroma disease of aorta.     5 - Successful implantation of a 26mm Evolut R CoreValve into the aortic position with no evidence of regurgitation.     UA pending       ROSANNE Adair  [facility]  Department of Vascular Neurology   [facility]

## 2018-03-31 NOTE — SUBJECTIVE & OBJECTIVE
"Neurologic Chief Complaint: Left PCA/MCA embolic stroke    Subjective:     Interval History: Patient is seen for follow-up neurological assessment and treatment recommendations:    feeling "off today" patient with history of frequent UTIs, UA ordered and awaiting collection. The patient reports her "off" feeling to be like a weakness - described as generalized. Will continue to monitor     Reports feeling tired of being in bed, bilateral numbness of the feet from her positioning, wishes to see her dog and expresses general frustration of having been independent and now stuck in the hospital without anything to do     HPI, Past Medical, Family, and Social History remains the same as documented in the initial encounter.     Review of Systems   Constitutional: Negative for fever.   Genitourinary:        (+) frequent UTIs    Neurological: Positive for weakness (RUE). Negative for facial asymmetry, speech difficulty, numbness and headaches.     Scheduled Meds:   amiodarone  400 mg Oral BID    amLODIPine  10 mg Oral Daily    ascorbic acid (vitamin C)  500 mg Oral BID    aspirin  81 mg Oral Daily    atorvastatin  40 mg Oral Daily    carvedilol  3.125 mg Oral Daily    chlorthalidone  25 mg Oral QAM    clopidogrel  75 mg Oral Daily    enoxaparin  40 mg Subcutaneous Daily    famotidine  20 mg Oral BID    fluticasone-vilanterol  1 puff Inhalation Daily    furosemide  20 mg Oral BID    polyethylene glycol  17 g Oral Daily     Continuous Infusions:   sodium chloride 0.9%       PRN Meds:acetaminophen, albuterol-ipratropium 2.5mg-0.5mg/3mL, bisacodyl, dextrose 50%, dextrose 50%, dextrose 50%, glucagon (human recombinant), glucagon (human recombinant), glucose, glucose, hydrALAZINE, insulin aspart U-100, insulin aspart U-100, morphine, ondansetron, promethazine (PHENERGAN) IVPB, sodium chloride 0.9%    Objective:     Vital Signs (Most Recent):  Temp: 98.4 °F (36.9 °C) (03/31/18 1137)  Pulse: (!) 57 (03/31/18 " 1137)  Resp: 18 (03/31/18 1137)  BP: (!) 177/75 (03/31/18 1137)  SpO2: 96 % (03/31/18 1137)  BP Location: Left arm    Vital Signs Range (Last 24H):  Temp:  [98.1 °F (36.7 °C)-98.8 °F (37.1 °C)]   Pulse:  [53-64]   Resp:  [17-18]   BP: (137-182)/(65-78)   SpO2:  [92 %-96 %]   BP Location: Left arm    Physical Exam   Constitutional: She appears well-developed and well-nourished. No distress.   HENT:   Head: Normocephalic and atraumatic.   Cardiovascular: Normal rate.    Pulmonary/Chest: Effort normal. No respiratory distress.   Neurological: She is alert.   Skin: Skin is warm and dry.   Psychiatric:   Depressed mood noted today    Nursing note and vitals reviewed.      Neurological Exam:   LOC: alert  Attention Span: Good   Language: No aphasia  Articulation: No dysarthria  Visual Fields: right hemianopsia   EOM (CN III, IV, VI): Full/intact  Facial Movement (CN VII): Lower facial weakness on the Right  Motor: Arm left  Normal 5/5  Leg left  Normal 5/5  Arm right  Paresis: 2/5  Leg right Paresis: 4/5  Sensation: mild decrease in sensation to right side, can still appreciate light touch   Tone: Normal tone throughout    Laboratory:  CMP:     Recent Labs  Lab 03/31/18  0401   CALCIUM 9.5   ALBUMIN 3.4*   PROT 6.9      K 3.4*   CO2 30*   CL 97   BUN 22   CREATININE 0.7   ALKPHOS 95   ALT 11   AST 17   BILITOT 0.4     BMP:     Recent Labs  Lab 03/31/18  0401      K 3.4*   CL 97   CO2 30*   BUN 22   CREATININE 0.7   CALCIUM 9.5     CBC:     Recent Labs  Lab 03/31/18  0401   WBC 8.87   RBC 4.01   HGB 9.5*   HCT 30.3*   *   MCV 76*   MCH 23.7*   MCHC 31.4*     Lipid Panel:     Recent Labs  Lab 03/25/18  0538   CHOL 150   LDLCALC 80.2   HDL 42   TRIG 139     Coagulation:   No results for input(s): PT, INR, APTT in the last 168 hours.  Hgb A1C: No results for input(s): HGBA1C in the last 168 hours.  TSH:     Recent Labs  Lab 03/25/18  0538   TSH 1.221         Diagnostic Results       Brain Imaging   CTH  3/25/17 revealed redemonstration of multiple infarctions w/expected temporal evolution.  No hemorrhage or hemorrhagic conversion.    MRI Brain 3/24/18 revealed bilateral cerebral and cerebellar embolic infarcts.      Vessel Imaging   CTA Multiphase 3/24/18  CTA demonstrates no high-grade stenosis or large vessel occlusion.    Cardiac Imaging   ROSEY 3/22/18:  CONCLUSIONS   Normal LA, No evidence of shunt    1 - Low normal to mildly depressed left ventricular systolic function (EF 50-55%).     2 - Mild mitral regurgitation.     3 - Severe aortic stenosis.     4 - Grade 4 atheroma disease of aorta.     5 - Successful implantation of a 26mm Evolut R CoreValve into the aortic position with no evidence of regurgitation.     UA pending

## 2018-04-01 PROBLEM — N30.00 ACUTE CYSTITIS: Status: ACTIVE | Noted: 2018-03-30

## 2018-04-01 LAB
ALBUMIN SERPL BCP-MCNC: 3.4 G/DL
ALP SERPL-CCNC: 85 U/L
ALT SERPL W/O P-5'-P-CCNC: 12 U/L
ANION GAP SERPL CALC-SCNC: 10 MMOL/L
AST SERPL-CCNC: 16 U/L
BACTERIA #/AREA URNS AUTO: ABNORMAL /HPF
BASOPHILS # BLD AUTO: 0.12 K/UL
BASOPHILS NFR BLD: 1.4 %
BILIRUB SERPL-MCNC: 0.5 MG/DL
BILIRUB UR QL STRIP: NEGATIVE
BUN SERPL-MCNC: 19 MG/DL
CALCIUM SERPL-MCNC: 9.5 MG/DL
CHLORIDE SERPL-SCNC: 97 MMOL/L
CLARITY UR REFRACT.AUTO: ABNORMAL
CO2 SERPL-SCNC: 31 MMOL/L
COLOR UR AUTO: YELLOW
CREAT SERPL-MCNC: 0.7 MG/DL
DIFFERENTIAL METHOD: ABNORMAL
EOSINOPHIL # BLD AUTO: 0.5 K/UL
EOSINOPHIL NFR BLD: 6.3 %
ERYTHROCYTE [DISTWIDTH] IN BLOOD BY AUTOMATED COUNT: 17.8 %
EST. GFR  (AFRICAN AMERICAN): >60 ML/MIN/1.73 M^2
EST. GFR  (NON AFRICAN AMERICAN): >60 ML/MIN/1.73 M^2
GLUCOSE SERPL-MCNC: 140 MG/DL
GLUCOSE UR QL STRIP: NEGATIVE
HCT VFR BLD AUTO: 29.3 %
HGB BLD-MCNC: 9.2 G/DL
HGB UR QL STRIP: NEGATIVE
HYALINE CASTS UR QL AUTO: 10 /LPF
IMM GRANULOCYTES # BLD AUTO: 0.07 K/UL
IMM GRANULOCYTES NFR BLD AUTO: 0.8 %
KETONES UR QL STRIP: NEGATIVE
LEUKOCYTE ESTERASE UR QL STRIP: ABNORMAL
LYMPHOCYTES # BLD AUTO: 1.8 K/UL
LYMPHOCYTES NFR BLD: 20.3 %
MAGNESIUM SERPL-MCNC: 1.9 MG/DL
MCH RBC QN AUTO: 23.8 PG
MCHC RBC AUTO-ENTMCNC: 31.4 G/DL
MCV RBC AUTO: 76 FL
MICROSCOPIC COMMENT: ABNORMAL
MONOCYTES # BLD AUTO: 0.8 K/UL
MONOCYTES NFR BLD: 8.8 %
NEUTROPHILS # BLD AUTO: 5.4 K/UL
NEUTROPHILS NFR BLD: 62.4 %
NITRITE UR QL STRIP: POSITIVE
NRBC BLD-RTO: 0 /100 WBC
PH UR STRIP: 5 [PH] (ref 5–8)
PHOSPHATE SERPL-MCNC: 3.8 MG/DL
PLATELET # BLD AUTO: 465 K/UL
PMV BLD AUTO: 9.2 FL
POCT GLUCOSE: 145 MG/DL (ref 70–110)
POCT GLUCOSE: 158 MG/DL (ref 70–110)
POCT GLUCOSE: 180 MG/DL (ref 70–110)
POCT GLUCOSE: 200 MG/DL (ref 70–110)
POCT GLUCOSE: 270 MG/DL (ref 70–110)
POTASSIUM SERPL-SCNC: 3.3 MMOL/L
PROT SERPL-MCNC: 6.6 G/DL
PROT UR QL STRIP: NEGATIVE
RBC # BLD AUTO: 3.86 M/UL
RBC #/AREA URNS AUTO: 1 /HPF (ref 0–4)
SODIUM SERPL-SCNC: 138 MMOL/L
SP GR UR STRIP: 1.01 (ref 1–1.03)
SQUAMOUS #/AREA URNS AUTO: 1 /HPF
URN SPEC COLLECT METH UR: ABNORMAL
UROBILINOGEN UR STRIP-ACNC: NEGATIVE EU/DL
WBC # BLD AUTO: 8.61 K/UL
WBC #/AREA URNS AUTO: 24 /HPF (ref 0–5)

## 2018-04-01 PROCEDURE — 25000003 PHARM REV CODE 250: Performed by: PSYCHIATRY & NEUROLOGY

## 2018-04-01 PROCEDURE — 25000003 PHARM REV CODE 250: Performed by: STUDENT IN AN ORGANIZED HEALTH CARE EDUCATION/TRAINING PROGRAM

## 2018-04-01 PROCEDURE — 97530 THERAPEUTIC ACTIVITIES: CPT

## 2018-04-01 PROCEDURE — 84100 ASSAY OF PHOSPHORUS: CPT

## 2018-04-01 PROCEDURE — 25000003 PHARM REV CODE 250: Performed by: PHYSICIAN ASSISTANT

## 2018-04-01 PROCEDURE — 80053 COMPREHEN METABOLIC PANEL: CPT

## 2018-04-01 PROCEDURE — 25000003 PHARM REV CODE 250: Performed by: THORACIC SURGERY (CARDIOTHORACIC VASCULAR SURGERY)

## 2018-04-01 PROCEDURE — 20600001 HC STEP DOWN PRIVATE ROOM

## 2018-04-01 PROCEDURE — 85025 COMPLETE CBC W/AUTO DIFF WBC: CPT

## 2018-04-01 PROCEDURE — 83735 ASSAY OF MAGNESIUM: CPT

## 2018-04-01 PROCEDURE — 25000003 PHARM REV CODE 250: Performed by: NURSE PRACTITIONER

## 2018-04-01 PROCEDURE — 99233 SBSQ HOSP IP/OBS HIGH 50: CPT | Mod: ,,, | Performed by: PSYCHIATRY & NEUROLOGY

## 2018-04-01 PROCEDURE — 97112 NEUROMUSCULAR REEDUCATION: CPT

## 2018-04-01 PROCEDURE — 63600175 PHARM REV CODE 636 W HCPCS: Performed by: STUDENT IN AN ORGANIZED HEALTH CARE EDUCATION/TRAINING PROGRAM

## 2018-04-01 PROCEDURE — 36415 COLL VENOUS BLD VENIPUNCTURE: CPT

## 2018-04-01 RX ORDER — AMOXICILLIN AND CLAVULANATE POTASSIUM 875; 125 MG/1; MG/1
1 TABLET, FILM COATED ORAL EVERY 12 HOURS
Status: DISCONTINUED | OUTPATIENT
Start: 2018-04-01 | End: 2018-04-01

## 2018-04-01 RX ORDER — AMOXICILLIN AND CLAVULANATE POTASSIUM 500; 125 MG/1; MG/1
1 TABLET, FILM COATED ORAL 2 TIMES DAILY
Status: DISCONTINUED | OUTPATIENT
Start: 2018-04-01 | End: 2018-04-02 | Stop reason: HOSPADM

## 2018-04-01 RX ADMIN — FUROSEMIDE 20 MG: 20 TABLET ORAL at 05:04

## 2018-04-01 RX ADMIN — AMIODARONE HYDROCHLORIDE 400 MG: 200 TABLET ORAL at 09:04

## 2018-04-01 RX ADMIN — AMLODIPINE BESYLATE 10 MG: 10 TABLET ORAL at 09:04

## 2018-04-01 RX ADMIN — FAMOTIDINE 20 MG: 20 TABLET, FILM COATED ORAL at 10:04

## 2018-04-01 RX ADMIN — FAMOTIDINE 20 MG: 20 TABLET, FILM COATED ORAL at 09:04

## 2018-04-01 RX ADMIN — AMOXICILLIN AND CLAVULANATE POTASSIUM 500 MG: 500; 125 TABLET, FILM COATED ORAL at 03:04

## 2018-04-01 RX ADMIN — CHLORTHALIDONE 25 MG: 25 TABLET ORAL at 05:04

## 2018-04-01 RX ADMIN — CARVEDILOL 3.12 MG: 3.12 TABLET, FILM COATED ORAL at 09:04

## 2018-04-01 RX ADMIN — ATORVASTATIN CALCIUM 40 MG: 20 TABLET, FILM COATED ORAL at 09:04

## 2018-04-01 RX ADMIN — INSULIN ASPART 1 UNITS: 100 INJECTION, SOLUTION INTRAVENOUS; SUBCUTANEOUS at 10:04

## 2018-04-01 RX ADMIN — AMIODARONE HYDROCHLORIDE 400 MG: 200 TABLET ORAL at 10:04

## 2018-04-01 RX ADMIN — AMOXICILLIN AND CLAVULANATE POTASSIUM 500 MG: 500; 125 TABLET, FILM COATED ORAL at 10:04

## 2018-04-01 RX ADMIN — ENOXAPARIN SODIUM 40 MG: 100 INJECTION SUBCUTANEOUS at 04:04

## 2018-04-01 RX ADMIN — CLOPIDOGREL 75 MG: 75 TABLET, FILM COATED ORAL at 09:04

## 2018-04-01 RX ADMIN — Medication 500 MG: at 10:04

## 2018-04-01 RX ADMIN — ASPIRIN 81 MG: 81 TABLET, COATED ORAL at 09:04

## 2018-04-01 RX ADMIN — Medication 500 MG: at 09:04

## 2018-04-01 RX ADMIN — INSULIN ASPART 1 UNITS: 100 INJECTION, SOLUTION INTRAVENOUS; SUBCUTANEOUS at 12:04

## 2018-04-01 RX ADMIN — FUROSEMIDE 20 MG: 20 TABLET ORAL at 09:04

## 2018-04-01 RX ADMIN — POLYETHYLENE GLYCOL 3350 17 G: 17 POWDER, FOR SOLUTION ORAL at 09:04

## 2018-04-01 NOTE — PLAN OF CARE
Problem: Patient Care Overview  Goal: Plan of Care Review  Plan of care reviewed with pt. Pt voiced understanding. Pt AAOx3. Pt denies any c/o during the shift. No apparent distress noted. Fall precautions maintained. Bed in lowest position, and locked. Call light within reach and advised to call for assistance. Side rails x 2 and slip resistant socks on at this time.

## 2018-04-01 NOTE — PT/OT/SLP PROGRESS
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  Pain/Comfort:  · Pain Rating 1:  (no number given;however, pain in neck)    Patients cultural, spiritual, Adventism conflicts given the current situation: none    Objective:     Patient found with: telemetry, bed alarm (supine in bed; heplock LUE)    General Precautions: Standard, fall, diabetic   Orthopedic Precautions:N/A   Braces: N/A     Occupational Performance:    Bed Mobility:    · Patient completed Scooting/Bridging with supervision  · Patient completed Supine to Sit with supervision     Functional Mobility/Transfers:  · Patient completed Sit <> Stand Transfer with contact guard assistance  with  no assistive device   · Patient completed Bed <> Chair Transfer using Stand Pivot technique with minimum assistance with no assistive device  · Patient completed Toilet Transfer Stand Pivot technique with minimum assistance with  no AD  · Functional Mobility: Pt ambulated HHA from bed across room and into bathroom and back to bed then bedside chair. Pt would benefit from an AD(SC vs QC vs HW)  · Pt with cues for safety during ambulation and technique     Activities of Daily Living:  · Grooming: minimum assistance comb hair seated  · UB Dressing: moderate assistance jhonathan gown  · LB Dressing: supervision to doff and jhonathan socks in bedside chair--utilizing LUE  · Toileting: minimum assistance on standard toilet with grab bar    Patient left up in chair with all lines intact and bed alarm on    AMPAC 6 Click:  AMPAC Total Score: 18    Treatment & Education:  Pt performed AROM/AAROM and  PROM with RUE through all available planes and joints. Pt with active elbow flexion/extension; shoulder abduction  Pt educated re: AROM/SROM; right neglect strategies and safety  Education:    Assessment:     Kristina Aguirre is a 67 y.o. 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  Pt tolerated tx and demonstrated increased movement of RUE, increased indep with ambulation, toileting and LBD  Performance deficits affecting function are weakness, impaired endurance, impaired sensation, impaired self care skills, visual deficits, impaired balance, gait instability, impaired functional mobilty, decreased coordination, decreased upper extremity function, decreased ROM, abnormal tone, pain, impaired coordination, impaired fine motor.      Rehab Prognosis:  Good ; patient would benefit from acute skilled OT services to address these deficits and reach maximum level of function.     Pt is very motivated     Plan:     Patient to be seen 4 x/week to address the above listed problems via self-care/home management, therapeutic activities, therapeutic exercises, neuromuscular re-education  · Plan of Care Expires: 04/22/18  · Plan of Care Reviewed with: patient    This Plan of care has been discussed with the patient who was involved in its development and understands and is in agreement with the identified goals and treatment plan    GOALS:    Occupational Therapy Goals        Problem: Occupational Therapy Goal    Goal Priority Disciplines Outcome Interventions   Occupational Therapy Goal     OT, PT/OT Ongoing (interventions implemented as appropriate)    Description:  Goals to be met by: 4/2/18     Patient will increase functional independence with ADLs by performing:    Feeding with Modified [place].  UE Dressing with Supervision.  LE Dressing with Supervision.  Grooming while standing at sink with Supervision.  Toileting from toilet with Stand-by Assistance for  hygiene and clothing management.   Toilet transfer to toilet with Stand-by Assistance.  Upper extremity exercise program x10 reps per handout, with independence.                      Time Tracking:     OT Date of Treatment: 04/01/18  OT Start Time: 0710  OT Stop Time: 0740  OT Total Time (min): 30 min    Billable Minutes:Therapeutic Activity 15  Neuromuscular Re-education 15    CAROLYN Gaspar  4/1/2018

## 2018-04-01 NOTE — PLAN OF CARE
Problem: Occupational Therapy Goal  Goal: Occupational Therapy Goal  Goals to be met by: 4/2/18     Patient will increase functional independence with ADLs by performing:    Feeding with Modified [place].  UE Dressing with Supervision.  LE Dressing with Supervision.  Grooming while standing at sink with Supervision.  Toileting from toilet with Stand-by Assistance for hygiene and clothing management.   Toilet transfer to toilet with Stand-by Assistance.  Upper extremity exercise program x10 reps per handout, with independence.     Outcome: Ongoing (interventions implemented as appropriate)  Progressing. CAROLYN Gaspar  4/1/2018

## 2018-04-02 ENCOUNTER — CLINICAL SUPPORT (OUTPATIENT)
Dept: ELECTROPHYSIOLOGY | Facility: CLINIC | Age: 68
DRG: 266 | End: 2018-04-02
Attending: INTERNAL MEDICINE
Payer: MEDICARE

## 2018-04-02 ENCOUNTER — HOSPITAL ENCOUNTER (INPATIENT)
Facility: HOSPITAL | Age: 68
LOS: 17 days | Discharge: HOME HEALTH | DRG: 057 | End: 2018-04-19
Attending: PHYSICAL MEDICINE & REHABILITATION | Admitting: PHYSICAL MEDICINE & REHABILITATION
Payer: MEDICARE

## 2018-04-02 VITALS
DIASTOLIC BLOOD PRESSURE: 78 MMHG | RESPIRATION RATE: 16 BRPM | OXYGEN SATURATION: 97 % | TEMPERATURE: 99 F | BODY MASS INDEX: 28.36 KG/M2 | WEIGHT: 154.13 LBS | HEART RATE: 62 BPM | SYSTOLIC BLOOD PRESSURE: 157 MMHG | HEIGHT: 62 IN

## 2018-04-02 DIAGNOSIS — I63.419 CEREBRAL INFARCTION DUE TO EMBOLISM OF MIDDLE CEREBRAL ARTERY, UNSPECIFIED BLOOD VESSEL LATERALITY: Primary | ICD-10-CM

## 2018-04-02 DIAGNOSIS — I35.0 SEVERE AORTIC STENOSIS: ICD-10-CM

## 2018-04-02 DIAGNOSIS — R07.9 CHEST PAIN: ICD-10-CM

## 2018-04-02 DIAGNOSIS — I63.89 ACUTE ISCHEMIC MULTIFOCAL MULTIPLE VASCULAR TERRITORIES STROKE: ICD-10-CM

## 2018-04-02 DIAGNOSIS — I63.9 CVA (CEREBRAL VASCULAR ACCIDENT): ICD-10-CM

## 2018-04-02 DIAGNOSIS — I63.419: ICD-10-CM

## 2018-04-02 DIAGNOSIS — I48.91 ATRIAL FIBRILLATION: Primary | ICD-10-CM

## 2018-04-02 LAB
ALBUMIN SERPL BCP-MCNC: 3.3 G/DL
ALP SERPL-CCNC: 95 U/L
ALT SERPL W/O P-5'-P-CCNC: 14 U/L
ANION GAP SERPL CALC-SCNC: 9 MMOL/L
AST SERPL-CCNC: 20 U/L
BASOPHILS # BLD AUTO: 0.11 K/UL
BASOPHILS NFR BLD: 1.2 %
BILIRUB SERPL-MCNC: 0.3 MG/DL
BILIRUB UR QL STRIP: NEGATIVE
BUN SERPL-MCNC: 23 MG/DL
CALCIUM SERPL-MCNC: 9.2 MG/DL
CHLORIDE SERPL-SCNC: 100 MMOL/L
CLARITY UR REFRACT.AUTO: ABNORMAL
CO2 SERPL-SCNC: 28 MMOL/L
COLOR UR AUTO: ABNORMAL
CREAT SERPL-MCNC: 0.8 MG/DL
DIFFERENTIAL METHOD: ABNORMAL
EOSINOPHIL # BLD AUTO: 0.5 K/UL
EOSINOPHIL NFR BLD: 5.7 %
ERYTHROCYTE [DISTWIDTH] IN BLOOD BY AUTOMATED COUNT: 18.1 %
EST. GFR  (AFRICAN AMERICAN): >60 ML/MIN/1.73 M^2
EST. GFR  (NON AFRICAN AMERICAN): >60 ML/MIN/1.73 M^2
GLUCOSE SERPL-MCNC: 179 MG/DL
GLUCOSE UR QL STRIP: ABNORMAL
HCT VFR BLD AUTO: 27.9 %
HGB BLD-MCNC: 8.8 G/DL
HGB UR QL STRIP: NEGATIVE
IMM GRANULOCYTES # BLD AUTO: 0.07 K/UL
IMM GRANULOCYTES NFR BLD AUTO: 0.7 %
KETONES UR QL STRIP: NEGATIVE
LEUKOCYTE ESTERASE UR QL STRIP: ABNORMAL
LYMPHOCYTES # BLD AUTO: 2.7 K/UL
LYMPHOCYTES NFR BLD: 28.3 %
MAGNESIUM SERPL-MCNC: 1.9 MG/DL
MCH RBC QN AUTO: 23.6 PG
MCHC RBC AUTO-ENTMCNC: 31.5 G/DL
MCV RBC AUTO: 75 FL
MICROSCOPIC COMMENT: ABNORMAL
MONOCYTES # BLD AUTO: 0.9 K/UL
MONOCYTES NFR BLD: 9.6 %
NEUTROPHILS # BLD AUTO: 5.1 K/UL
NEUTROPHILS NFR BLD: 54.5 %
NITRITE UR QL STRIP: NEGATIVE
NRBC BLD-RTO: 0 /100 WBC
PH UR STRIP: 6 [PH] (ref 5–8)
PHOSPHATE SERPL-MCNC: 3.8 MG/DL
PLATELET # BLD AUTO: 428 K/UL
PMV BLD AUTO: 9.4 FL
POCT GLUCOSE: 147 MG/DL (ref 70–110)
POCT GLUCOSE: 193 MG/DL (ref 70–110)
POTASSIUM SERPL-SCNC: 3.2 MMOL/L
PROT SERPL-MCNC: 6.4 G/DL
PROT UR QL STRIP: NEGATIVE
RBC # BLD AUTO: 3.73 M/UL
RBC #/AREA URNS AUTO: 0 /HPF (ref 0–4)
SODIUM SERPL-SCNC: 137 MMOL/L
SP GR UR STRIP: 1.01 (ref 1–1.03)
SQUAMOUS #/AREA URNS AUTO: 0 /HPF
URN SPEC COLLECT METH UR: ABNORMAL
UROBILINOGEN UR STRIP-ACNC: NEGATIVE EU/DL
WBC # BLD AUTO: 9.41 K/UL
WBC #/AREA URNS AUTO: 90 /HPF (ref 0–5)

## 2018-04-02 PROCEDURE — 97110 THERAPEUTIC EXERCISES: CPT

## 2018-04-02 PROCEDURE — 25000003 PHARM REV CODE 250: Performed by: PHYSICAL MEDICINE & REHABILITATION

## 2018-04-02 PROCEDURE — 25000003 PHARM REV CODE 250: Performed by: NURSE PRACTITIONER

## 2018-04-02 PROCEDURE — 83735 ASSAY OF MAGNESIUM: CPT

## 2018-04-02 PROCEDURE — 25000242 PHARM REV CODE 250 ALT 637 W/ HCPCS: Performed by: STUDENT IN AN ORGANIZED HEALTH CARE EDUCATION/TRAINING PROGRAM

## 2018-04-02 PROCEDURE — 12800000 HC REHAB SEMI-PRIVATE ROOM

## 2018-04-02 PROCEDURE — 25000003 PHARM REV CODE 250: Performed by: PSYCHIATRY & NEUROLOGY

## 2018-04-02 PROCEDURE — 85025 COMPLETE CBC W/AUTO DIFF WBC: CPT

## 2018-04-02 PROCEDURE — 36415 COLL VENOUS BLD VENIPUNCTURE: CPT

## 2018-04-02 PROCEDURE — 25000003 PHARM REV CODE 250: Performed by: THORACIC SURGERY (CARDIOTHORACIC VASCULAR SURGERY)

## 2018-04-02 PROCEDURE — 63600175 PHARM REV CODE 636 W HCPCS: Performed by: NURSE PRACTITIONER

## 2018-04-02 PROCEDURE — 80053 COMPREHEN METABOLIC PANEL: CPT

## 2018-04-02 PROCEDURE — 92507 TX SP LANG VOICE COMM INDIV: CPT

## 2018-04-02 PROCEDURE — G8998 SWALLOW D/C STATUS: HCPCS | Mod: CJ

## 2018-04-02 PROCEDURE — 99900035 HC TECH TIME PER 15 MIN (STAT)

## 2018-04-02 PROCEDURE — 84100 ASSAY OF PHOSPHORUS: CPT

## 2018-04-02 PROCEDURE — 99232 SBSQ HOSP IP/OBS MODERATE 35: CPT | Mod: ,,, | Performed by: INTERNAL MEDICINE

## 2018-04-02 PROCEDURE — 25000003 PHARM REV CODE 250: Performed by: STUDENT IN AN ORGANIZED HEALTH CARE EDUCATION/TRAINING PROGRAM

## 2018-04-02 PROCEDURE — 87086 URINE CULTURE/COLONY COUNT: CPT

## 2018-04-02 PROCEDURE — 99233 SBSQ HOSP IP/OBS HIGH 50: CPT | Mod: ,,, | Performed by: PSYCHIATRY & NEUROLOGY

## 2018-04-02 PROCEDURE — 25000003 PHARM REV CODE 250: Performed by: PHYSICIAN ASSISTANT

## 2018-04-02 PROCEDURE — 97530 THERAPEUTIC ACTIVITIES: CPT

## 2018-04-02 PROCEDURE — 94761 N-INVAS EAR/PLS OXIMETRY MLT: CPT

## 2018-04-02 PROCEDURE — 97116 GAIT TRAINING THERAPY: CPT

## 2018-04-02 PROCEDURE — 94799 UNLISTED PULMONARY SVC/PX: CPT

## 2018-04-02 PROCEDURE — 81001 URINALYSIS AUTO W/SCOPE: CPT

## 2018-04-02 RX ORDER — IBUPROFEN 200 MG
24 TABLET ORAL
Status: CANCELLED | OUTPATIENT
Start: 2018-04-02

## 2018-04-02 RX ORDER — GLUCAGON 1 MG
1 KIT INJECTION
Status: DISCONTINUED | OUTPATIENT
Start: 2018-04-02 | End: 2018-04-02

## 2018-04-02 RX ORDER — ADHESIVE BANDAGE
30 BANDAGE TOPICAL DAILY PRN
Status: DISCONTINUED | OUTPATIENT
Start: 2018-04-02 | End: 2018-04-19 | Stop reason: HOSPADM

## 2018-04-02 RX ORDER — ASCORBIC ACID 250 MG
500 TABLET ORAL 2 TIMES DAILY
Status: DISCONTINUED | OUTPATIENT
Start: 2018-04-02 | End: 2018-04-19 | Stop reason: HOSPADM

## 2018-04-02 RX ORDER — CLOPIDOGREL BISULFATE 75 MG/1
75 TABLET ORAL DAILY
Status: CANCELLED | OUTPATIENT
Start: 2018-04-03

## 2018-04-02 RX ORDER — ASCORBIC ACID 250 MG
500 TABLET ORAL 2 TIMES DAILY
Status: CANCELLED | OUTPATIENT
Start: 2018-04-02

## 2018-04-02 RX ORDER — AMOXICILLIN AND CLAVULANATE POTASSIUM 500; 125 MG/1; MG/1
1 TABLET, FILM COATED ORAL 2 TIMES DAILY
Status: CANCELLED | OUTPATIENT
Start: 2018-04-02

## 2018-04-02 RX ORDER — IPRATROPIUM BROMIDE AND ALBUTEROL SULFATE 2.5; .5 MG/3ML; MG/3ML
3 SOLUTION RESPIRATORY (INHALATION) EVERY 4 HOURS PRN
Status: CANCELLED | OUTPATIENT
Start: 2018-04-02

## 2018-04-02 RX ORDER — IBUPROFEN 200 MG
16 TABLET ORAL
Status: CANCELLED | OUTPATIENT
Start: 2018-04-02

## 2018-04-02 RX ORDER — FUROSEMIDE 20 MG/1
20 TABLET ORAL 2 TIMES DAILY
Status: CANCELLED | OUTPATIENT
Start: 2018-04-02

## 2018-04-02 RX ORDER — FLUTICASONE FUROATE AND VILANTEROL 100; 25 UG/1; UG/1
1 POWDER RESPIRATORY (INHALATION) DAILY
Status: DISCONTINUED | OUTPATIENT
Start: 2018-04-03 | End: 2018-04-19 | Stop reason: HOSPADM

## 2018-04-02 RX ORDER — ATORVASTATIN CALCIUM 20 MG/1
40 TABLET, FILM COATED ORAL DAILY
Status: CANCELLED | OUTPATIENT
Start: 2018-04-03

## 2018-04-02 RX ORDER — IPRATROPIUM BROMIDE AND ALBUTEROL SULFATE 2.5; .5 MG/3ML; MG/3ML
3 SOLUTION RESPIRATORY (INHALATION) EVERY 4 HOURS PRN
Status: DISCONTINUED | OUTPATIENT
Start: 2018-04-02 | End: 2018-04-19 | Stop reason: HOSPADM

## 2018-04-02 RX ORDER — GLUCAGON 1 MG
1 KIT INJECTION
Status: DISCONTINUED | OUTPATIENT
Start: 2018-04-02 | End: 2018-04-19 | Stop reason: HOSPADM

## 2018-04-02 RX ORDER — FLUTICASONE FUROATE AND VILANTEROL 100; 25 UG/1; UG/1
1 POWDER RESPIRATORY (INHALATION) DAILY
Status: CANCELLED | OUTPATIENT
Start: 2018-04-03

## 2018-04-02 RX ORDER — INSULIN ASPART 100 [IU]/ML
0-5 INJECTION, SOLUTION INTRAVENOUS; SUBCUTANEOUS
Status: DISCONTINUED | OUTPATIENT
Start: 2018-04-02 | End: 2018-04-19 | Stop reason: HOSPADM

## 2018-04-02 RX ORDER — POTASSIUM CHLORIDE 7.45 MG/ML
10 INJECTION INTRAVENOUS
Status: COMPLETED | OUTPATIENT
Start: 2018-04-02 | End: 2018-04-02

## 2018-04-02 RX ORDER — CHLORTHALIDONE 25 MG/1
25 TABLET ORAL EVERY MORNING
Status: DISCONTINUED | OUTPATIENT
Start: 2018-04-03 | End: 2018-04-19 | Stop reason: HOSPADM

## 2018-04-02 RX ORDER — AMOXICILLIN AND CLAVULANATE POTASSIUM 500; 125 MG/1; MG/1
1 TABLET, FILM COATED ORAL 2 TIMES DAILY
Status: DISCONTINUED | OUTPATIENT
Start: 2018-04-02 | End: 2018-04-10

## 2018-04-02 RX ORDER — FAMOTIDINE 20 MG/1
20 TABLET, FILM COATED ORAL 2 TIMES DAILY
Status: CANCELLED | OUTPATIENT
Start: 2018-04-02

## 2018-04-02 RX ORDER — IBUPROFEN 200 MG
16 TABLET ORAL
Status: DISCONTINUED | OUTPATIENT
Start: 2018-04-02 | End: 2018-04-19 | Stop reason: HOSPADM

## 2018-04-02 RX ORDER — FAMOTIDINE 20 MG/1
20 TABLET, FILM COATED ORAL 2 TIMES DAILY
Status: DISCONTINUED | OUTPATIENT
Start: 2018-04-02 | End: 2018-04-19 | Stop reason: HOSPADM

## 2018-04-02 RX ORDER — ACETAMINOPHEN 650 MG/20.3ML
650 LIQUID ORAL EVERY 6 HOURS PRN
Status: CANCELLED | OUTPATIENT
Start: 2018-04-02

## 2018-04-02 RX ORDER — CARVEDILOL 3.12 MG/1
3.12 TABLET ORAL DAILY
Status: DISCONTINUED | OUTPATIENT
Start: 2018-04-03 | End: 2018-04-19 | Stop reason: HOSPADM

## 2018-04-02 RX ORDER — CHLORTHALIDONE 25 MG/1
25 TABLET ORAL EVERY MORNING
Status: CANCELLED | OUTPATIENT
Start: 2018-04-03

## 2018-04-02 RX ORDER — HYDROCODONE BITARTRATE AND ACETAMINOPHEN 5; 325 MG/1; MG/1
1 TABLET ORAL EVERY 4 HOURS PRN
Status: DISCONTINUED | OUTPATIENT
Start: 2018-04-02 | End: 2018-04-12

## 2018-04-02 RX ORDER — ONDANSETRON 2 MG/ML
8 INJECTION INTRAMUSCULAR; INTRAVENOUS EVERY 8 HOURS PRN
Status: CANCELLED | OUTPATIENT
Start: 2018-04-02

## 2018-04-02 RX ORDER — AMLODIPINE BESYLATE 10 MG/1
10 TABLET ORAL DAILY
Status: CANCELLED | OUTPATIENT
Start: 2018-04-03

## 2018-04-02 RX ORDER — GLUCAGON 1 MG
1 KIT INJECTION
Status: CANCELLED | OUTPATIENT
Start: 2018-04-02

## 2018-04-02 RX ORDER — ATORVASTATIN CALCIUM 20 MG/1
40 TABLET, FILM COATED ORAL DAILY
Status: DISCONTINUED | OUTPATIENT
Start: 2018-04-03 | End: 2018-04-19 | Stop reason: HOSPADM

## 2018-04-02 RX ORDER — INSULIN ASPART 100 [IU]/ML
0-5 INJECTION, SOLUTION INTRAVENOUS; SUBCUTANEOUS
Status: CANCELLED | OUTPATIENT
Start: 2018-04-02

## 2018-04-02 RX ORDER — POLYETHYLENE GLYCOL 3350 17 G/17G
17 POWDER, FOR SOLUTION ORAL DAILY
Status: DISCONTINUED | OUTPATIENT
Start: 2018-04-03 | End: 2018-04-19 | Stop reason: HOSPADM

## 2018-04-02 RX ORDER — BISACODYL 10 MG
10 SUPPOSITORY, RECTAL RECTAL DAILY PRN
Status: DISCONTINUED | OUTPATIENT
Start: 2018-04-02 | End: 2018-04-19 | Stop reason: HOSPADM

## 2018-04-02 RX ORDER — IBUPROFEN 200 MG
24 TABLET ORAL
Status: DISCONTINUED | OUTPATIENT
Start: 2018-04-02 | End: 2018-04-19 | Stop reason: HOSPADM

## 2018-04-02 RX ORDER — CARVEDILOL 3.12 MG/1
3.12 TABLET ORAL DAILY
Status: CANCELLED | OUTPATIENT
Start: 2018-04-03

## 2018-04-02 RX ORDER — ONDANSETRON 8 MG/1
8 TABLET, ORALLY DISINTEGRATING ORAL EVERY 8 HOURS PRN
Status: DISCONTINUED | OUTPATIENT
Start: 2018-04-02 | End: 2018-04-19 | Stop reason: HOSPADM

## 2018-04-02 RX ORDER — AMIODARONE HYDROCHLORIDE 200 MG/1
400 TABLET ORAL 2 TIMES DAILY
Status: CANCELLED | OUTPATIENT
Start: 2018-04-02

## 2018-04-02 RX ORDER — ACETAMINOPHEN 650 MG/20.3ML
650 LIQUID ORAL EVERY 6 HOURS PRN
Status: DISCONTINUED | OUTPATIENT
Start: 2018-04-02 | End: 2018-04-19 | Stop reason: HOSPADM

## 2018-04-02 RX ORDER — FUROSEMIDE 20 MG/1
20 TABLET ORAL 2 TIMES DAILY
Status: DISCONTINUED | OUTPATIENT
Start: 2018-04-02 | End: 2018-04-19 | Stop reason: HOSPADM

## 2018-04-02 RX ORDER — CLOPIDOGREL BISULFATE 75 MG/1
75 TABLET ORAL DAILY
Status: DISCONTINUED | OUTPATIENT
Start: 2018-04-03 | End: 2018-04-19 | Stop reason: HOSPADM

## 2018-04-02 RX ORDER — AMIODARONE HYDROCHLORIDE 200 MG/1
400 TABLET ORAL 2 TIMES DAILY
Status: DISCONTINUED | OUTPATIENT
Start: 2018-04-02 | End: 2018-04-19 | Stop reason: HOSPADM

## 2018-04-02 RX ORDER — POLYETHYLENE GLYCOL 3350 17 G/17G
17 POWDER, FOR SOLUTION ORAL DAILY
Status: CANCELLED | OUTPATIENT
Start: 2018-04-03

## 2018-04-02 RX ORDER — AMLODIPINE BESYLATE 10 MG/1
10 TABLET ORAL DAILY
Status: DISCONTINUED | OUTPATIENT
Start: 2018-04-03 | End: 2018-04-19 | Stop reason: HOSPADM

## 2018-04-02 RX ADMIN — CLOPIDOGREL 75 MG: 75 TABLET, FILM COATED ORAL at 08:04

## 2018-04-02 RX ADMIN — AMOXICILLIN AND CLAVULANATE POTASSIUM 500 MG: 500; 125 TABLET, FILM COATED ORAL at 08:04

## 2018-04-02 RX ADMIN — Medication 500 MG: at 08:04

## 2018-04-02 RX ADMIN — APIXABAN 5 MG: 5 TABLET, FILM COATED ORAL at 08:04

## 2018-04-02 RX ADMIN — ATORVASTATIN CALCIUM 40 MG: 20 TABLET, FILM COATED ORAL at 08:04

## 2018-04-02 RX ADMIN — AMIODARONE HYDROCHLORIDE 400 MG: 200 TABLET ORAL at 08:04

## 2018-04-02 RX ADMIN — CARVEDILOL 3.12 MG: 3.12 TABLET, FILM COATED ORAL at 08:04

## 2018-04-02 RX ADMIN — FAMOTIDINE 20 MG: 20 TABLET, FILM COATED ORAL at 08:04

## 2018-04-02 RX ADMIN — HYDROCODONE BITARTRATE AND ACETAMINOPHEN 1 TABLET: 5; 325 TABLET ORAL at 11:04

## 2018-04-02 RX ADMIN — POTASSIUM CHLORIDE 10 MEQ: 149 INJECTION, SOLUTION, CONCENTRATE INTRAVENOUS at 11:04

## 2018-04-02 RX ADMIN — AMLODIPINE BESYLATE 10 MG: 10 TABLET ORAL at 08:04

## 2018-04-02 RX ADMIN — CHLORTHALIDONE 25 MG: 25 TABLET ORAL at 07:04

## 2018-04-02 RX ADMIN — FUROSEMIDE 20 MG: 20 TABLET ORAL at 08:04

## 2018-04-02 RX ADMIN — FUROSEMIDE 20 MG: 20 TABLET ORAL at 06:04

## 2018-04-02 RX ADMIN — POLYETHYLENE GLYCOL 3350 17 G: 17 POWDER, FOR SOLUTION ORAL at 08:04

## 2018-04-02 RX ADMIN — POTASSIUM CHLORIDE 10 MEQ: 149 INJECTION, SOLUTION, CONCENTRATE INTRAVENOUS at 12:04

## 2018-04-02 RX ADMIN — ASPIRIN 81 MG: 81 TABLET, COATED ORAL at 08:04

## 2018-04-02 RX ADMIN — FLUTICASONE FUROATE AND VILANTEROL TRIFENATATE 1 PUFF: 100; 25 POWDER RESPIRATORY (INHALATION) at 09:04

## 2018-04-02 RX ADMIN — APIXABAN 5 MG: 5 TABLET, FILM COATED ORAL at 11:04

## 2018-04-02 RX ADMIN — POTASSIUM CHLORIDE 10 MEQ: 149 INJECTION, SOLUTION, CONCENTRATE INTRAVENOUS at 09:04

## 2018-04-02 NOTE — ASSESSMENT & PLAN NOTE
- Newly diagnosed here. Pt found to be in afib RVR after returning from MRI on 3/24.   - Unlikely to have caused stroke as she went into arrhythmia s/p symptom onset. Likely post-procedural from TAVR.   - Seen on ECG  - CHADVASC 8  - Plan to start Apixaban 5 mg BID   - Continue oral amiodarone per CTS recs. Currently remains in NSR

## 2018-04-02 NOTE — PLAN OF CARE
Problem: Physical Therapy Goal  Goal: Physical Therapy Goal  Goals to be met by: 18    Patient will increase functional independence with mobility by performin. Supine to sit with Modified [place] -not met  2. Sit to stand transfer with Modified [place] -not met  3. Gait  x 220 feet with Supervision-not met  4. Ascend/descend 2 stair with no Handrails Supervision . -not met     Pt progressing towards goals. continue with PT POC.Goals remain appropriate.  Basil Gregory PTA  2018

## 2018-04-02 NOTE — ASSESSMENT & PLAN NOTE
- EPS: LBBB with HV 63 ms  - agree with 30 day event monitor  - f/u with Dr. Banegas in 6 weeks  - will sign off; please call with further questions

## 2018-04-02 NOTE — PROGRESS NOTES
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 No HB. We were asked to re-visit ILR placement, as the primary team is having trouble with placement in a patient that needs either telemetry or 30 day event monitor.    Review of Systems   Constitution: Negative for chills and fever.   HENT: Negative for ear discharge.    Eyes: Negative for pain and visual disturbance.   Cardiovascular: Negative for chest pain, dyspnea on exertion, irregular heartbeat, leg swelling, orthopnea, palpitations, paroxysmal nocturnal dyspnea and syncope.   Respiratory: Negative for hemoptysis, shortness of breath and wheezing.    Skin: Negative for rash and suspicious lesions.   Musculoskeletal: Negative for joint pain and muscle weakness.   Gastrointestinal: Negative for abdominal pain, diarrhea, hematemesis, hematochezia, melena, nausea and vomiting.   Genitourinary: Negative for dysuria and frequency.   Neurological: Negative for focal weakness, headaches and tremors.   Psychiatric/Behavioral: Negative for altered mental status, suicidal ideas and thoughts of violence.     Objective:     Vital Signs (Most Recent):  Temp: 98.1 °F (36.7 °C) (04/02/18 0323)  Pulse: (!) 49 (04/02/18 1100)  Resp: 16 (04/02/18 0948)  BP: (!) 159/72 (04/02/18 0800)  SpO2: (!) 91 % (04/02/18 0323) Vital Signs (24h Range):  Temp:  [97.9 °F (36.6 °C)-98.2 °F (36.8 °C)] 98.1 °F (36.7 °C)  Pulse:  [49-67] 49  Resp:  [15-18] 16  SpO2:  [91 %-95 %] 91 %  BP: (148-166)/(65-72) 159/72     Weight: 69.9 kg (154 lb 1.6 oz)  Body mass index is 28.19 kg/m².     SpO2: (!) 91 %  O2 Device (Oxygen Therapy): room air    Physical Exam   Constitutional: She is oriented to person, place, and time. She  appears well-developed and well-nourished.   HENT:   Head: Normocephalic and atraumatic.   Eyes: EOM are normal.   Cardiovascular: Normal rate and regular rhythm.  Exam reveals no gallop and no friction rub.    Pulmonary/Chest: Effort normal and breath sounds normal. No stridor. She has no wheezes. She has no rales.   Abdominal: Soft. Bowel sounds are normal. There is no rebound and no guarding.   Musculoskeletal: She exhibits no edema.   Neurological: She is alert and oriented to person, place, and time. No cranial nerve deficit.   Skin: Skin is warm and dry.   Psychiatric: She has a normal mood and affect. Her behavior is normal.       Significant Labs: All pertinent lab results from the last 24 hours have been reviewed.      Assessment and Plan:     Atrial fibrillation    - paroxysmal; currently in NSR  - Dr. Ramirez discussed with Dr. Carvajal and Dr. Coppola last week AC strategy  - P2Y12i of interventional cardiology's choice (recent TAVR)  - recommend AC (e.g. NOAC) with the P2Y12i (w/o ASA to decrease bleed risk)        S/p TAVR (transcatheter aortic valve replacement), bioprosthetic    - EPS: LBBB with HV 63 ms  - agree with 30 day event monitor  - f/u with Dr. Banegas in 6 weeks  - will sign off; please call with further questions            Aman Hale MD  Cardiac Electrophysiology  [facility]

## 2018-04-02 NOTE — PROGRESS NOTES
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 Stroke called on 3/24 at 2004 for worsening RUE weakness, right facial droop, dysarthria and aphasia. CTA without LVO. MRI with multiple bilateral (R>L) acute infarcts on cerebral and cerebellar hemispheres, highly suggestive of embolic phenomenon. No tpa as outside window.     Events from admission regarding placement-   Spoke with EP team regarding pt's discharge [facility] without a 30-day monitor. EP concerned for 3rd-degree heart block in this pt with recent TAVR placement; MD stating they recommend pt go to a facility that either has telemetry or an [facility] facility so she can have 30-day monitor while admitted. CM discussed this with pt's friend who is her POA, David. He discussed with pt and they decided to proceed with plan for d/c to [facility], knowing she will not get the heart monitoring she needs and that this is against EP's recommendations. Pt with plans to follow up with Dr. Carvajal after rehab discharge.    Dicussed loop recorder with EP- no new consult placed as they are already following. They will discuss with staff which mechanism of post TAVR monitoring needs to be done and comment to the team tomorrow. Explained to EP team prior situation regarding  and [facility] ability to do external monitoring. EP will not place loop recorder but recommends 30 day monitor. Pt to dc to [facility].     CM attempting to arrange pet therapy situation for pt.     Antithrombotics for secondary stroke prevention: Antiplatelets: Aspirin: 81 mg daily and Clopidogrel: 75 mg daily for now. Discussed with EP and Interventional Cards- Will plan to change to Eliquis 5mg BID + Plavix 75mg Daily with discharge   Statins for secondary stroke prevention  and hyperlipidemia, if present:   Statins: Atorvastatin- 40 mg daily  Aggressive risk factor modification: HTN, Smoking, DM, CAD, bilateral carotid stenosis, s/p TAVR  Rehab efforts: PT/OT/SLP recommend Rehab; Hopeful d/c to [place] on Monday or Tuesday  Diagnostics ordered/pending: None   VTE prophylaxis: Eliquis 5 mg BID  BP parameters: SBP < 160         Acute cystitis    Pt complaining to nurse of discomfort with urination  UA ordered - nitrite positive  Culture pending - started augmentin         Cervical spondylosis    Seen on CTA Multiphase  Most prominent at C3-4 with some effacement of anterior thecal sac and moderate right/severe left neural foraminal narrowing  Discussed with NSGY - Would only constitute outpatient work-up if pt were symptomatic        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 Pt found to be in afib RVR after returning from MRI on 3/24.   - Unlikely to have caused stroke as she went into arrhythmia s/p symptom onset. Likely post-procedural from TAVR.   - Seen on ECG  - CHADVASC 8  - Plan to start Apixaban 5 mg BID   - Continue oral amiodarone per CTS recs. Currently remains in NSR        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    See Cerebral infarction due to embolism of PCA        Acute on chronic diastolic heart failure    - CXR on 3/23 with slight interval worsening of bilateral airspace disease suggestive of pulmonary edema. Subsequent imaging revealed no significant interval change  - O2 90-94% on RA over last 24 hrs  - Continue furosemide 40 mg IV BID / chlorthalidone as per CTS team  - Patient appears euvolemia  - Strict I/O and daily weights        S/p TAVR (transcatheter aortic valve replacement), bioprosthetic    - Followed by CTS  - EP discussed with Interventional Cardiology- Ok with Eliquis + Plavix but w/o ASA (to decrease bleed  risk) in setting of cardiac hx + new-onset AFib        Centrilobular emphysema    Hx Lung CA, now in remission  - Continue Duo-neb prn  Now tolerating room air with O2 sat 95-96%        Diabetes mellitus, type 2    -Stroke risk factor  HgB A1C 6.5%  -SSI        Malignant neoplasm of right lung    - s/p RML lobectomy, path showed keratinizing squamous cell CA. Contacted oncologist who reported disease on remission, last PET scan negative.        Coronary artery disease of native artery with stable angina pectoris    Stroke risk factor  - Continue DAPT, statin, and BB for now and at d/c  - Per approval from EP, Interventional cards, CTS, pt will switch to Eliquis 5mg BID + Plavix (no ASA)         Tobacco abuse    -Stroke risk factor  Educate patient about smoking cessation        Severe aortic stenosis    S/p TAVR 3/22, followed by CTS        Essential hypertension    -Stroke risk factor  -Discussed with CTS, ok with MAP > 80 in setting of stroke  -Continue amlodipine 10 mg PO daily, carvedilol 3.125 mg PO BID, chlorthalidone 25 mg PO daily, lasix 40 mg IV BID and hydralazine IV PRN. Monitor and titrate meds prn.   - SBP goal ~160, Avoid hypotension to allow adequate cerebral perfusion in setting of bilateral carotid stenosis  - See CTS note for final d/c med recs  - Pt not started on ACEi this admission due to concern for hypotension. Will need to be revisited outpatient in this pt with HFpF        Dyslipidemia    -Stroke risk factor  LDL >70  -Continue Lipitor 40 mg daily        Bilateral carotid artery stenosis    - As demonstrable by recent carotid US and CTA (mixed density atherosclerosis at both carotid bifurcations)  Stroke risk factor, but unlikely to have caused this event as pattern not consistent with thrombotic/a-a considering embolic multi-territorial stroke  -Avoid hypotension             3/25 - New-onset afib RVR developed after return from MRI this morning. IV amiodarone loaded and started on gtt-  "rhythm resolved and remains on gtt. No worsening focal deficits this morning.   03/28/2018 BOBBY.  Patient oriented to person only.  Currently perseverating about her  being called.  No other complaints at this time.  3/29: Discharge delayed due to discussions with EP about pt requiring heart monitoring s/p TAVR at inpatient rehab. Pt electing to go to [facility] tomorrow without monitoring instead of [facility] with monitoring. Currently on DAPT; plan to change to Eliquis + Plavix on 4/1/18 in setting of new-onset AFib and other cardiac hx.  3/30: [facility] now denying pt due to need for continued cardiac monitoring. Plan is now to consult EP on Sunday so pt can have ILR placed Monday and hopefully d/c to [facility] following. BAILEY Hernandez updated. UA ordered. CM attempting to arrange pet therapy situation for pt.   3/31/18 - feeling "off today" patient with history of frequent UTIs, UA ordered and awaiting collection. The patient reports her "off" feeling to be like a weakness - described as generalized. Will continue to monitor   4/1/18 - uti - culture pending, initiating treatment with augmentin.   4/2/18 - UC still pending. EP will not place loop recorder. Pt to go to [facility] on 30 day cardiac event monitor. Replaced potassium.    STROKE DOCUMENTATION   Acute Stroke Times   Last Known Normal Date: 03/23/18  Last Known Normal Time:  (Unsure)  Symptom Onset Date: 03/24/18  Symptom Onset Time:  (Unsure)  Stroke Team Called Date: 03/24/18  Stroke Team Called Time: 2004  Stroke Team Arrival Date: 03/24/18  Stroke Team Arrival Time: 2010  CT Interpretation Time: 2025    NIH Scale:  1a. Level Of Consciousness: 0-->Alert: keenly responsive  1b. LOC Questions: 0-->Answers both questions correctly  1c. LOC Commands: 0-->Performs both tasks correctly  2. Best Gaze: 0-->Normal  3. Visual: 2-->Complete hemianopia  4. Facial Palsy: 1-->Minor paralysis (flattened nasolabial fold, asymmetry on smiling)  5a. " Motor Arm, Left: 0-->No drift: limb holds 90 (or 45) degrees for full 10 secs  5b. Motor Arm, Right: 2-->Some effort against gravity: limb cannot get to or maintain (if cued) 90 (or 45) degrees, drifts down to bed, but has some effort against gravity  6a. Motor Leg, Left: 0-->No drift: leg holds 30 degree position for full 5 secs  6b. Motor Leg, Right: 1-->Drift: leg falls by the end of the 5-sec period but does not hit bed  7. Limb Ataxia: 0-->Absent  8. Sensory: 1-->Mild-to-moderate sensory loss: patient feels pinprick is less sharp or is dull on the affected side: or there is a loss of superficial pain with pinprick, but patient is aware of being touched  9. Best Language: 0-->No aphasia: normal  10. Dysarthria: 0-->Normal  11. Extinction and Inattention (formerly Neglect): 0-->No abnormality  Total (NIH Stroke Scale): 7       Modified Ellen Score: 1  Chelsy Coma Scale:    ABCD2 Score:    [id number] Score:8  HAS -BLED Score:5  ICH Score:   Hunt & Eldridge Classification:      Hemorrhagic change of an Ischemic Stroke: Does this patient have an ischemic stroke with hemorrhagic changes? No     Neurologic Chief Complaint: Left PCA/MCA embolic stroke    Subjective:     Interval History: Patient is seen for follow-up neurological assessment and treatment recommendations:   UC still pending. EP will not place loop recorder. Pt to go to [facility] on 30 day cardiac event monitor. Replaced potassium,     HPI, Past Medical, Family, and Social History remains the same as documented in the initial encounter.     Review of Systems   Constitutional: Negative for fever.   Eyes: Positive for visual disturbance.   Genitourinary:        (+) frequent UTIs    Neurological: Positive for weakness (RUE). Negative for facial asymmetry, speech difficulty, numbness and headaches.     Scheduled Meds:   amiodarone  400 mg Oral BID    amLODIPine  10 mg Oral Daily    amoxicillin-clavulanate 500-125mg  1 tablet Oral BID    apixaban  5  mg Oral BID    ascorbic acid (vitamin C)  500 mg Oral BID    atorvastatin  40 mg Oral Daily    carvedilol  3.125 mg Oral Daily    chlorthalidone  25 mg Oral QAM    clopidogrel  75 mg Oral Daily    famotidine  20 mg Oral BID    fluticasone-vilanterol  1 puff Inhalation Daily    furosemide  20 mg Oral BID    polyethylene glycol  17 g Oral Daily     Continuous Infusions:   sodium chloride 0.9% Stopped (04/02/18 0940)     PRN Meds:acetaminophen, albuterol-ipratropium 2.5mg-0.5mg/3mL, bisacodyl, dextrose 50%, dextrose 50%, dextrose 50%, glucagon (human recombinant), glucagon (human recombinant), glucose, glucose, hydrALAZINE, insulin aspart U-100, insulin aspart U-100, morphine, ondansetron, promethazine (PHENERGAN) IVPB, sodium chloride 0.9%    Objective:     Vital Signs (Most Recent):  Temp: 98.7 °F (37.1 °C) (04/02/18 1200)  Pulse: 62 (04/02/18 1200)  Resp: 16 (04/02/18 1200)  BP: (!) 157/78 (04/02/18 1200)  SpO2: 97 % (04/02/18 1200)  BP Location: Right arm    Vital Signs Range (Last 24H):  Temp:  [97.9 °F (36.6 °C)-98.7 °F (37.1 °C)]   Pulse:  [49-67]   Resp:  [15-18]   BP: (148-166)/(65-78)   SpO2:  [91 %-97 %]   BP Location: Right arm    Physical Exam   Constitutional: She appears well-developed and well-nourished. No distress.   HENT:   Head: Normocephalic and atraumatic.   Cardiovascular: Normal rate.    Pulmonary/Chest: Effort normal. No respiratory distress.   Neurological: She is alert.   Skin: Skin is warm and dry.   Psychiatric:   Depressed mood noted   Nursing note and vitals reviewed.      Neurological Exam:   LOC: alert  Attention Span: Good   Language: No aphasia  Articulation: No dysarthria  Visual Fields: right hemianopsia   EOM (CN III, IV, VI): Full/intact  Facial Movement (CN VII): Lower facial weakness on the Right  Motor: Arm left  Normal 5/5  Leg left  Normal 5/5  Arm right  Paresis: 2/5  Leg right Paresis: 4/5  Sensation: mild decrease in sensation to right side, can still appreciate  light touch   Tone: Normal tone throughout    Laboratory:  CMP:     Recent Labs  Lab 04/02/18  0418   CALCIUM 9.2   ALBUMIN 3.3*   PROT 6.4      K 3.2*   CO2 28      BUN 23   CREATININE 0.8   ALKPHOS 95   ALT 14   AST 20   BILITOT 0.3     BMP:     Recent Labs  Lab 04/02/18 0418      K 3.2*      CO2 28   BUN 23   CREATININE 0.8   CALCIUM 9.2     CBC:     Recent Labs  Lab 04/02/18 0418   WBC 9.41   RBC 3.73*   HGB 8.8*   HCT 27.9*   *   MCV 75*   MCH 23.6*   MCHC 31.5*     Lipid Panel:   No results for input(s): CHOL, LDLCALC, HDL, TRIG in the last 168 hours.  Coagulation:   No results for input(s): PT, INR, APTT in the last 168 hours.  Hgb A1C: No results for input(s): HGBA1C in the last 168 hours.  TSH:   No results for input(s): TSH in the last 168 hours.      Diagnostic Results       Brain Imaging   CTH 3/25/17 revealed redemonstration of multiple infarctions w/expected temporal evolution.  No hemorrhage or hemorrhagic conversion.    MRI Brain 3/24/18 revealed bilateral cerebral and cerebellar embolic infarcts.      Vessel Imaging   CTA Multiphase 3/24/18  CTA demonstrates no high-grade stenosis or large vessel occlusion.    Cardiac Imaging   ROSEY 3/22/18:  CONCLUSIONS   Normal LA, No evidence of shunt    1 - Low normal to mildly depressed left ventricular systolic function (EF 50-55%).     2 - Mild mitral regurgitation.     3 - Severe aortic stenosis.     4 - Grade 4 atheroma disease of aorta.     5 - Successful implantation of a 26mm Evolut R CoreValve into the aortic position with no evidence of regurgitation.     UA  - nitrite (+), leuk 1+  Culture pending       Geovanna Almeida NP  [facility] Stroke [facility]  Department of Vascular Neurology   [facility]

## 2018-04-02 NOTE — PROGRESS NOTES
Pt d/c to [facility]. Pt transported via  by [facility] transport with all personal belongings. Tele D/C, PIV D/C. Pt tolerated well.

## 2018-04-02 NOTE — ASSESSMENT & PLAN NOTE
68 y/o F with PMHx CAD, PAD, DM2, HTN, and severe AS s/p TAVR on 3/22. Stroke called on 3/24 at 2004 for worsening RUE weakness, right facial droop, dysarthria and aphasia. CTA without LVO. MRI with multiple bilateral (R>L) acute infarcts on cerebral and cerebellar hemispheres, highly suggestive of embolic phenomenon. No tpa as outside window.     Events from admission regarding placement-   Spoke with EP team regarding pt's discharge to [facility] without a 30-day monitor. EP concerned for 3rd-degree heart block in this pt with recent TAVR placement; MD stating they recommend pt go to a facility that either has telemetry or an [facility] facility so she can have 30-day monitor while admitted. CM discussed this with pt's friend who is her POA, David. He discussed with pt and they decided to proceed with plan for d/c to [facility], knowing she will not get the heart monitoring she needs and that this is against EP's recommendations. Pt with plans to follow up with Dr. Carvajal after rehab discharge.    Dicussed loop recorder with EP- no new consult placed as they are already following. They will discuss with staff which mechanism of post TAVR monitoring needs to be done and comment to the team tomorrow. Explained to EP team prior situation regarding [facility] and [facility] ability to do external monitoring     CM attempting to arrange pet therapy situation for pt.     Antithrombotics for secondary stroke prevention: Antiplatelets: Aspirin: 81 mg daily and Clopidogrel: 75 mg daily for now. Discussed with EP and Interventional Cards- Will plan to change to Eliquis 5mg BID + Plavix 75mg Daily with discharge   Statins for secondary stroke prevention and hyperlipidemia, if present:   Statins: Atorvastatin- 40 mg daily  Aggressive risk factor modification: HTN, Smoking, DM, CAD, bilateral carotid stenosis, s/p TAVR  Rehab efforts: PT/OT/SLP recommend Rehab; Hopeful d/c to [place] on Monday or Tuesday  Diagnostics  ordered/pending: None   VTE prophylaxis: Enoxaparin 40 mg SQ every 24 hours (held for potential ep procedure)  BP parameters: SBP < 160

## 2018-04-02 NOTE — PROGRESS NOTES
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 Stroke called on 3/24 at 2004 for worsening RUE weakness, right facial droop, dysarthria and aphasia. CTA without LVO. MRI with multiple bilateral (R>L) acute infarcts on cerebral and cerebellar hemispheres, highly suggestive of embolic phenomenon. No tpa as outside window.     Events from admission regarding placement-   Spoke with EP team regarding pt's discharge [facility] without a 30-day monitor. EP concerned for 3rd-degree heart block in this pt with recent TAVR placement; MD stating they recommend pt go to a facility that either has telemetry or an [facility] facility so she can have 30-day monitor while admitted. CM discussed this with pt's friend who is her POA, David. He discussed with pt and they decided to proceed with plan for d/c to [facility], knowing she will not get the heart monitoring she needs and that this is against EP's recommendations. Pt with plans to follow up with Dr. Carvajal after rehab discharge.    Dicussed loop recorder with EP- no new consult placed as they are already following. They will discuss with staff which mechanism of post TAVR monitoring needs to be done and comment to the team tomorrow. Explained to EP team prior situation regarding  and [facility] ability to do external monitoring     CM attempting to arrange pet therapy situation for pt.     Antithrombotics for secondary stroke prevention: Antiplatelets: Aspirin: 81 mg daily and Clopidogrel: 75 mg daily for now. Discussed with EP and Interventional Cards- Will plan to change to Eliquis 5mg BID + Plavix 75mg Daily with discharge   Statins for secondary stroke prevention and hyperlipidemia, if present:   Statins: Atorvastatin- 40 mg daily  Aggressive risk  factor modification: HTN, Smoking, DM, CAD, bilateral carotid stenosis, s/p TAVR  Rehab efforts: PT/OT/SLP recommend Rehab; Hopeful d/c to [place] on Monday or Tuesday  Diagnostics ordered/pending: None   VTE prophylaxis: Enoxaparin 40 mg SQ every 24 hours (held for potential ep procedure)  BP parameters: SBP < 160         Acute cystitis    Pt complaining to nurse of discomfort with urination  UA ordered - nitrite positive  Culture pending - started augmentin         Cervical spondylosis    Seen on CTA Multiphase  Most prominent at C3-4 with some effacement of anterior thecal sac and moderate right/severe left neural foraminal narrowing  Discussed with NSGY - Would only constitute outpatient work-up if pt were symptomatic        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 Pt found to be in afib RVR after returning from MRI on 3/24.   - Unlikely to have caused stroke as she went into arrhythmia s/p symptom onset. Likely post-procedural from TAVR.   - Seen on ECG  - CHADVASC 8  - Plan to start Apixaban 5 mg BID   - Continue oral amiodarone per CTS recs. Currently remains in NSR        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    See Cerebral infarction due to embolism of PCA        Acute on chronic diastolic heart failure    - CXR on 3/23 with slight interval worsening of bilateral airspace disease suggestive of pulmonary edema. Subsequent imaging revealed no significant interval change  - O2 90-94% on RA over last 24 hrs  - Continue furosemide 40 mg IV BID / chlorthalidone as per CTS team  - Patient appears euvolemia  - Strict I/O and daily weights        S/p TAVR (transcatheter aortic valve replacement), bioprosthetic    - Followed by CTS  - EP discussed with Interventional Cardiology- Ok with Eliquis + Plavix but w/o ASA (to decrease bleed risk) in setting of cardiac hx +  new-onset AFib        Centrilobular emphysema    Hx Lung CA, now in remission  - Continue Duo-neb prn  Now tolerating room air with O2 sat 95-96%        Diabetes mellitus, type 2    -Stroke risk factor  HgB A1C 6.5%  -SSI        Malignant neoplasm of right lung    - s/p RML lobectomy, path showed keratinizing squamous cell CA. Contacted oncologist who reported disease on remission, last PET scan negative.        Coronary artery disease of native artery with stable angina pectoris    Stroke risk factor  - Continue DAPT, statin, and BB for now and at d/c  - Per approval from EP, Interventional cards, CTS, pt will switch to Eliquis 5mg BID + Plavix (no ASA)         Tobacco abuse    -Stroke risk factor  Educate patient about smoking cessation        Severe aortic stenosis    S/p TAVR 3/22, followed by CTS        Essential hypertension    -Stroke risk factor  -Discussed with CTS, ok with MAP > 80 in setting of stroke  -Continue amlodipine 10 mg PO daily, carvedilol 3.125 mg PO BID, chlorthalidone 25 mg PO daily, lasix 40 mg IV BID and hydralazine IV PRN. Monitor and titrate meds prn.   - SBP goal ~160, Avoid hypotension to allow adequate cerebral perfusion in setting of bilateral carotid stenosis  - See CTS note for final d/c med recs  - Pt not started on ACEi this admission due to concern for hypotension. Will need to be revisited outpatient in this pt with HFpF        Dyslipidemia    -Stroke risk factor  LDL >70  -Continue Lipitor 40 mg daily        Bilateral carotid artery stenosis    - As demonstrable by recent carotid US and CTA (mixed density atherosclerosis at both carotid bifurcations)  Stroke risk factor, but unlikely to have caused this event as pattern not consistent with thrombotic/a-a considering embolic multi-territorial stroke  -Avoid hypotension             3/25 - New-onset afib RVR developed after return from MRI this morning. IV amiodarone loaded and started on gtt- rhythm resolved and remains on gtt.  "No worsening focal deficits this morning.   03/28/2018 BOBBY.  Patient oriented to person only.  Currently perseverating about her  being called.  No other complaints at this time.  3/29: Discharge delayed due to discussions with EP about pt requiring heart monitoring s/p TAVR at inpatient rehab. Pt electing to go to [facility] tomorrow without monitoring instead of [facility] with monitoring. Currently on DAPT; plan to change to Eliquis + Plavix on 4/1/18 in setting of new-onset AFib and other cardiac hx.  3/30: [facility] now denying pt due to need for continued cardiac monitoring. Plan is now to consult EP on Sunday so pt can have ILR placed Monday and hopefully d/c to [facility] following. BAILEY Hernandez updated. UA ordered. CM attempting to arrange pet therapy situation for pt.   3/31/18 - feeling "off today" patient with history of frequent UTIs, UA ordered and awaiting collection. The patient reports her "off" feeling to be like a weakness - described as generalized. Will continue to monitor   4/1/18 - uti - culture pending, initiating treatment with augmentin.     STROKE DOCUMENTATION   Acute Stroke Times   Last Known Normal Date: 03/23/18  Last Known Normal Time:  (Unsure)  Symptom Onset Date: 03/24/18  Symptom Onset Time:  (Unsure)  Stroke Team Called Date: 03/24/18  Stroke Team Called Time: 2004  Stroke Team Arrival Date: 03/24/18  Stroke Team Arrival Time: 2010  CT Interpretation Time: 2025    NIH Scale:  1a. Level Of Consciousness: 0-->Alert: keenly responsive  1b. LOC Questions: 0-->Answers both questions correctly  1c. LOC Commands: 0-->Performs both tasks correctly  2. Best Gaze: 0-->Normal  3. Visual: 2-->Complete hemianopia  4. Facial Palsy: 1-->Minor paralysis (flattened nasolabial fold, asymmetry on smiling)  5a. Motor Arm, Left: 0-->No drift: limb holds 90 (or 45) degrees for full 10 secs  5b. Motor Arm, Right: 2-->Some effort against gravity: limb cannot get to or maintain (if cued) 90 " (or 45) degrees, drifts down to bed, but has some effort against gravity  6a. Motor Leg, Left: 0-->No drift: leg holds 30 degree position for full 5 secs  6b. Motor Leg, Right: 1-->Drift: leg falls by the end of the 5-sec period but does not hit bed  7. Limb Ataxia: 0-->Absent  8. Sensory: 1-->Mild-to-moderate sensory loss: patient feels pinprick is less sharp or is dull on the affected side: or there is a loss of superficial pain with pinprick, but patient is aware of being touched  9. Best Language: 0-->No aphasia: normal  10. Dysarthria: 0-->Normal  11. Extinction and Inattention (formerly Neglect): 0-->No abnormality  Total (NIH Stroke Scale): 7       Modified [place] Score: 1  [place] Coma Scale:    ABCD2 Score:    [id number] Score:8  HAS -BLED Score:5  ICH Score:   Hunt & Eldridge Classification:      Hemorrhagic change of an Ischemic Stroke: Does this patient have an ischemic stroke with hemorrhagic changes? No     Neurologic Chief Complaint: Left PCA/MCA embolic stroke    Subjective:     Interval History: Patient is seen for follow-up neurological assessment and treatment recommendations:   uti - culture pending, initiating treatment with augmentin.     Discussed with EP team - they are currently following already will assess with staff in the AM about doing the loop recorder.  Patient made npo at midnight and vte ppx held in the case that they are able to do it tomorrow.       HPI, Past Medical, Family, and Social History remains the same as documented in the initial encounter.     Review of Systems   Constitutional: Negative for fever.   Eyes: Positive for visual disturbance.   Genitourinary:        (+) frequent UTIs    Neurological: Positive for weakness (RUE). Negative for facial asymmetry, speech difficulty, numbness and headaches.     Scheduled Meds:   amiodarone  400 mg Oral BID    amLODIPine  10 mg Oral Daily    amoxicillin-clavulanate 500-125mg  1 tablet Oral BID    ascorbic acid (vitamin C)  500  mg Oral BID    aspirin  81 mg Oral Daily    atorvastatin  40 mg Oral Daily    carvedilol  3.125 mg Oral Daily    chlorthalidone  25 mg Oral QAM    clopidogrel  75 mg Oral Daily    famotidine  20 mg Oral BID    fluticasone-vilanterol  1 puff Inhalation Daily    furosemide  20 mg Oral BID    polyethylene glycol  17 g Oral Daily     Continuous Infusions:   sodium chloride 0.9%       PRN Meds:acetaminophen, albuterol-ipratropium 2.5mg-0.5mg/3mL, bisacodyl, dextrose 50%, dextrose 50%, dextrose 50%, glucagon (human recombinant), glucagon (human recombinant), glucose, glucose, hydrALAZINE, insulin aspart U-100, insulin aspart U-100, morphine, ondansetron, promethazine (PHENERGAN) IVPB, sodium chloride 0.9%    Objective:     Vital Signs (Most Recent):  Temp: 97.9 °F (36.6 °C) (04/01/18 1643)  Pulse: (!) 58 (04/01/18 1643)  Resp: 18 (04/01/18 1643)  BP: (!) 154/68 (04/01/18 1643)  SpO2: 95 % (04/01/18 1643)  BP Location: Left arm    Vital Signs Range (Last 24H):  Temp:  [97.4 °F (36.3 °C)-98.1 °F (36.7 °C)]   Pulse:  [54-62]   Resp:  [16-18]   BP: (135-154)/(58-68)   SpO2:  [95 %-96 %]   BP Location: Left arm    Physical Exam   Constitutional: She appears well-developed and well-nourished. No distress.   HENT:   Head: Normocephalic and atraumatic.   Cardiovascular: Normal rate.    Pulmonary/Chest: Effort normal. No respiratory distress.   Neurological: She is alert.   Skin: Skin is warm and dry.   Psychiatric:   Depressed mood noted   Nursing note and vitals reviewed.      Neurological Exam:   LOC: alert  Attention Span: Good   Language: No aphasia  Articulation: No dysarthria  Visual Fields: right hemianopsia   EOM (CN III, IV, VI): Full/intact  Facial Movement (CN VII): Lower facial weakness on the Right  Motor: Arm left  Normal 5/5  Leg left  Normal 5/5  Arm right  Paresis: 2/5  Leg right Paresis: 4/5  Sensation: mild decrease in sensation to right side, can still appreciate light touch   Tone: Normal tone  throughout    Laboratory:  CMP:     Recent Labs  Lab 04/01/18  0607   CALCIUM 9.5   ALBUMIN 3.4*   PROT 6.6      K 3.3*   CO2 31*   CL 97   BUN 19   CREATININE 0.7   ALKPHOS 85   ALT 12   AST 16   BILITOT 0.5     BMP:     Recent Labs  Lab 04/01/18  0607      K 3.3*   CL 97   CO2 31*   BUN 19   CREATININE 0.7   CALCIUM 9.5     CBC:     Recent Labs  Lab 04/01/18  0607   WBC 8.61   RBC 3.86*   HGB 9.2*   HCT 29.3*   *   MCV 76*   MCH 23.8*   MCHC 31.4*     Lipid Panel:   No results for input(s): CHOL, LDLCALC, HDL, TRIG in the last 168 hours.  Coagulation:   No results for input(s): PT, INR, APTT in the last 168 hours.  Hgb A1C: No results for input(s): HGBA1C in the last 168 hours.  TSH:   No results for input(s): TSH in the last 168 hours.      Diagnostic Results       Brain Imaging   CTH 3/25/17 revealed redemonstration of multiple infarctions w/expected temporal evolution.  No hemorrhage or hemorrhagic conversion.    MRI Brain 3/24/18 revealed bilateral cerebral and cerebellar embolic infarcts.      Vessel Imaging   CTA Multiphase 3/24/18  CTA demonstrates no high-grade stenosis or large vessel occlusion.    Cardiac Imaging   ROSEY 3/22/18:  CONCLUSIONS   Normal LA, No evidence of shunt    1 - Low normal to mildly depressed left ventricular systolic function (EF 50-55%).     2 - Mild mitral regurgitation.     3 - Severe aortic stenosis.     4 - Grade 4 atheroma disease of aorta.     5 - Successful implantation of a 26mm Evolut R CoreValve into the aortic position with no evidence of regurgitation.     UA  - nitrite (+), leuk 1+  Culture pending       ROSANNE Adair  [facility]  Department of Vascular Neurology   [facility]

## 2018-04-02 NOTE — ASSESSMENT & PLAN NOTE
Pt complaining to nurse of discomfort with urination  UA ordered - nitrite positive  Culture pending - started augmentin

## 2018-04-02 NOTE — PLAN OF CARE
Spoke with patient upon admit to rehab unit. Confirmed address and phone number. Patient lives alone with her dog (Latrice hinkle). Anxious to return home to care for dog. Has multiple friends that can check in throughout the day and they sometimes stay over night with her (2-3 times a week). Lives in a trailer with 2 steps to get in. Has no DME, endorses no HH preference. Will need transportation on DC. Educated on rehab program, safety, barriers to go home and team conference. Pt states understanding and will do whatever she needs to do to get better so she can return home. CM will continue to follow.        04/02/18 1700   Discharge Assessment   Assessment Type Discharge Planning Assessment   Confirmed/corrected address and phone number on facesheet? Yes   Assessment information obtained from? Patient   Expected Length of Stay (days) 14   Communicated expected length of stay with patient/caregiver yes   Prior to hospitilization cognitive status: Alert/Oriented;No Deficits   Prior to hospitalization functional status: Independent   Current cognitive status: Alert/Oriented   Current Functional Status: Assistive Equipment;Needs Assistance   [place] [facility]   Lives With alone   Able to Return to Prior Arrangements unable to determine at this time (comments)   Is patient able to care for self after discharge? Unable to determine at this time (comments)   Patient's perception of discharge disposition home health   Equipment Currently Used at Home none   Do you have any problems affording any of your prescribed medications? No   Does the patient have transportation home? No   Discharge Plan A Home;Home Health   Discharge Plan B Home;Home Health   Patient/Family In Agreement With Plan yes

## 2018-04-02 NOTE — ASSESSMENT & PLAN NOTE
Stroke risk factor  - Continue DAPT, statin, and BB for now and at d/c  - Per approval from EP, Interventional cards, CTS, pt will switch to Eliquis 5mg BID + Plavix (no ASA)

## 2018-04-02 NOTE — PLAN OF CARE
RN can call report to [facility] at [phone].    SW arranged transport with [facility] via [facility] for 3:30 PM transport.     SOFIE Rider aware.     Reva Clark, ARTURO  [facility]  Ext. [phone]

## 2018-04-02 NOTE — PHYSICIAN QUERY
PT Name: Kristina Aguirre  MR #: [id number]    Physician Query Form - Consultant Diagnosis Clarification     CDS/: Emily Irving               Contact information: [email or web address]   This form is a permanent document in the medical record.     Query Date: April 2, 2018      By submitting this query, we are merely seeking further clarification of documentation.  Please utilize your independent clinical judgment when addressing the question(s) below.      The Medical record contains the following:   Diagnosis Supporting Clinical Information Location in Medical Record   hypoxic respiratory failure      Episode of SOB yesterday, likely acute on chronic diastolic heart failure, which improved after Lasix IV administered. Vascular neurology PN 3/25    Interventional Cardiology PN 3/23         Do you agree with the Consultants diagnosis of ___hypoxic respiratory failure____________?                 [   ]   Yes               [   ]   Yes, but it resolved prior to my assessment of the patient               [   ]   No                [   ]   Clinically insignificant               [ xxxxxxxx  ]   Clinically undetermined               [   ]   Other/Clarification of findings: ___________________________________________

## 2018-04-02 NOTE — PLAN OF CARE
Problem: Patient Care Overview  Goal: Plan of Care Review  Outcome: Ongoing (interventions implemented as appropriate)  POC reviewed with pt and POA at 2100. Plans for possible ILR discussed, pt NPO since midnight.     NAEON. Pt up in chair until 2200 and assisted with bed transfers and to bedside commode. Fall precautions including camera, bed alarm, and supervised activity necessary for pt's visual deficits,R sided weakness, and occasional reported dizziness. SBP just under 160 overnight , patti RA, and afebrile.  Two instances of loose stools during early afternoon, pt states when she has had uti's in the past she frequently has GI upset as well (RN to monitor this).Oral antibiotics given and UA sent.    Bed alarm on call light in reach.

## 2018-04-02 NOTE — PLAN OF CARE
04/02/18 1522   Final Note   Assessment Type Final Discharge Note   Discharge Disposition Rehab     Patient is discharged to [facility] today. Sw to arrange transportation to the facility. Cm informed the patient and her POA of the discharge today and both were in agreement.

## 2018-04-02 NOTE — SUBJECTIVE & OBJECTIVE
Neurologic Chief Complaint: Left PCA/MCA embolic stroke    Subjective:     Interval History: Patient is seen for follow-up neurological assessment and treatment recommendations:   uti - culture pending, initiating treatment with augmentin.     Discussed with EP team - they are currently following already will assess with staff in the AM about doing the loop recorder.  Patient made npo at midnight and vte ppx held in the case that they are able to do it tomorrow.       HPI, Past Medical, Family, and Social History remains the same as documented in the initial encounter.     Review of Systems   Constitutional: Negative for fever.   Eyes: Positive for visual disturbance.   Genitourinary:        (+) frequent UTIs    Neurological: Positive for weakness (RUE). Negative for facial asymmetry, speech difficulty, numbness and headaches.     Scheduled Meds:   amiodarone  400 mg Oral BID    amLODIPine  10 mg Oral Daily    amoxicillin-clavulanate 500-125mg  1 tablet Oral BID    ascorbic acid (vitamin C)  500 mg Oral BID    aspirin  81 mg Oral Daily    atorvastatin  40 mg Oral Daily    carvedilol  3.125 mg Oral Daily    chlorthalidone  25 mg Oral QAM    clopidogrel  75 mg Oral Daily    famotidine  20 mg Oral BID    fluticasone-vilanterol  1 puff Inhalation Daily    furosemide  20 mg Oral BID    polyethylene glycol  17 g Oral Daily     Continuous Infusions:   sodium chloride 0.9%       PRN Meds:acetaminophen, albuterol-ipratropium 2.5mg-0.5mg/3mL, bisacodyl, dextrose 50%, dextrose 50%, dextrose 50%, glucagon (human recombinant), glucagon (human recombinant), glucose, glucose, hydrALAZINE, insulin aspart U-100, insulin aspart U-100, morphine, ondansetron, promethazine (PHENERGAN) IVPB, sodium chloride 0.9%    Objective:     Vital Signs (Most Recent):  Temp: 97.9 °F (36.6 °C) (04/01/18 1643)  Pulse: (!) 58 (04/01/18 1643)  Resp: 18 (04/01/18 1643)  BP: (!) 154/68 (04/01/18 1643)  SpO2: 95 % (04/01/18 1643)  BP  Location: Left arm    Vital Signs Range (Last 24H):  Temp:  [97.4 °F (36.3 °C)-98.1 °F (36.7 °C)]   Pulse:  [54-62]   Resp:  [16-18]   BP: (135-154)/(58-68)   SpO2:  [95 %-96 %]   BP Location: Left arm    Physical Exam   Constitutional: She appears well-developed and well-nourished. No distress.   HENT:   Head: Normocephalic and atraumatic.   Cardiovascular: Normal rate.    Pulmonary/Chest: Effort normal. No respiratory distress.   Neurological: She is alert.   Skin: Skin is warm and dry.   Psychiatric:   Depressed mood noted   Nursing note and vitals reviewed.      Neurological Exam:   LOC: alert  Attention Span: Good   Language: No aphasia  Articulation: No dysarthria  Visual Fields: right hemianopsia   EOM (CN III, IV, VI): Full/intact  Facial Movement (CN VII): Lower facial weakness on the Right  Motor: Arm left  Normal 5/5  Leg left  Normal 5/5  Arm right  Paresis: 2/5  Leg right Paresis: 4/5  Sensation: mild decrease in sensation to right side, can still appreciate light touch   Tone: Normal tone throughout    Laboratory:  CMP:     Recent Labs  Lab 04/01/18  0607   CALCIUM 9.5   ALBUMIN 3.4*   PROT 6.6      K 3.3*   CO2 31*   CL 97   BUN 19   CREATININE 0.7   ALKPHOS 85   ALT 12   AST 16   BILITOT 0.5     BMP:     Recent Labs  Lab 04/01/18  0607      K 3.3*   CL 97   CO2 31*   BUN 19   CREATININE 0.7   CALCIUM 9.5     CBC:     Recent Labs  Lab 04/01/18  0607   WBC 8.61   RBC 3.86*   HGB 9.2*   HCT 29.3*   *   MCV 76*   MCH 23.8*   MCHC 31.4*     Lipid Panel:   No results for input(s): CHOL, LDLCALC, HDL, TRIG in the last 168 hours.  Coagulation:   No results for input(s): PT, INR, APTT in the last 168 hours.  Hgb A1C: No results for input(s): HGBA1C in the last 168 hours.  TSH:   No results for input(s): TSH in the last 168 hours.      Diagnostic Results       Brain Imaging   CT 3/25/17 revealed redemonstration of multiple infarctions w/expected temporal evolution.  No hemorrhage or  hemorrhagic conversion.    MRI Brain 3/24/18 revealed bilateral cerebral and cerebellar embolic infarcts.      Vessel Imaging   CTA Multiphase 3/24/18  CTA demonstrates no high-grade stenosis or large vessel occlusion.    Cardiac Imaging   ROSEY 3/22/18:  CONCLUSIONS   Normal LA, No evidence of shunt    1 - Low normal to mildly depressed left ventricular systolic function (EF 50-55%).     2 - Mild mitral regurgitation.     3 - Severe aortic stenosis.     4 - Grade 4 atheroma disease of aorta.     5 - Successful implantation of a 26mm Evolut R CoreValve into the aortic position with no evidence of regurgitation.     UA  - nitrite (+), leuk 1+  Culture pending

## 2018-04-02 NOTE — PT/OT/SLP PROGRESS
Physical Therapy Treatment    Patient Name:  Kristina Aguirre   MRN:  [id number]    Recommendations:     Discharge Recommendations:  rehabilitation facility   Discharge Equipment Recommendations:  (TBD)   Barriers to discharge: Inaccessible home and Decreased caregiver support    Assessment:     Kristina Aguirre is a 67 y.o. 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  She presents with the following impairments/functional limitations:  weakness, impaired endurance, impaired self care skills, gait instability, impaired functional mobilty, impaired fine motor, abnormal tone, pain, decreased upper extremity function, decreased lower extremity function, impaired balance, visual deficits, decreased safety awareness .Patient is making progress towards goals, participating well  With treatment session.  Pt with noted improvement with  Transfers. the pt's deficits effect her ability to safely and independently participate in self-care tasks and functional mobility which increases their caregiver burden. pt would continue to benefit from acute PT services to address the following limitations: high fall risk, weakness, instability, the need for supervised instruction of exercise.  Education was provided to patient regarding importance of continued participation in therapy, patient's progress and discharge disposition. Pt would benefit from IPR upon discharge to improve quality of life and focus on recovery of impairments. Goals remain appropriate    Rehab Prognosis:  good; patient would benefit from acute skilled PT services to address these deficits and reach maximum level of function.      Recent Surgery: Procedure(s) (LRB):  STUDY-EP (N/A) 10 Days Post-Op    Plan:     During this hospitalization, patient to be seen 4 x/week to address the above listed problems via gait training, therapeutic activities, therapeutic exercises, neuromuscular re-education  · Plan of  Care Expires:  04/21/18   Plan of Care Reviewed with: patient    Subjective     Communicated with RN prior to session.  Patient found Seated upon PT entry to room, agreeable to treatment.      Chief Complaint: I want some food, nsg notified and aware    Pain/Comfort:  · Pain Rating 1: 0/10  · Pain Rating Post-Intervention 1: 0/10    Patients cultural, spiritual, Nondenominational conflicts given the current situation: none noted    Objective:     Patient found with: telemetry (chair alarm)     General Precautions: Standard, fall, diabetic   Orthopedic Precautions:N/A   Braces: N/A     Functional Mobility:  · Transfers:     · Sit to Stand:  CGA with QC vcs for hand placement  · Toilet Transfer: CGA with  no AD  using  Step Transfer vcs for safety  · Gait: pt ambulated 12 ft to bathroom with QC with 2 LOB initally due to pt demo increased stride length on L LE and coordination with QC with min/modA to ,pt then ambulated 95 ft with QC with Mya . Pt with 1 episodes of LOB with Mya/modA to recover. Pt required moderate vcs for sequencing,placement of AD,safety, R sided awareness, pt walking towards walls on R side  Pt demo decreased perez, increased time in double stance, decreased velocity of limb motion,  decreased swing-to-stance ratio, decreased toe-to-floor clearance and decreased weight-shifting ability       AM-PAC 6 CLICK MOBILITY  Turning over in bed (including adjusting bedclothes, sheets and blankets)?: 3  Sitting down on and standing up from a chair with arms (e.g., wheelchair, bedside commode, etc.): 3  Moving from lying on back to sitting on the side of the bed?: 3  Moving to and from a bed to a chair (including a wheelchair)?: 3  Need to walk in hospital room?: 2  Climbing 3-5 steps with a railing?: 1  Total Score: 15       Therapeutic Activities and Exercises:   Discussed/educated patient on progress, safety,  d/c, PT POC,postural control, weight shift, attention to the R  Patient performed therex X 15 reps  seated  B LE AROM AP, LAQ, Hip Flexion, Hip Abd/Add   White board updated   Bedside table in front of patient and area set up for function, convenience, and safety. RN aware of patient's mobility needs and status. Questions/concerns addressed within PT scope of practice; patient and family with no further questions.         Patient left seated UIC with R UE propped on pillow for scapular support and edema management with all lines intact, call button in reach, chair alarm on and nsg notified..      GOALS:    Physical Therapy Goals        Problem: Physical Therapy Goal    Goal Priority Disciplines Outcome Goal Variances Interventions   Physical Therapy Goal     PT/OT, PT      Description:  Goals to be met by: 18    Patient will increase functional independence with mobility by performin. Supine to sit with Modified [place] -not met  2. Sit to stand transfer with Modified [place] -not met  3. Gait  x 220 feet with Supervision-not met  4. Ascend/descend 2 stair with no Handrails Supervision . -not met                      Time Tracking:     PT Received On: 18  PT Start Time: 1123     PT Stop Time: 1206  PT Total Time (min): 43 min     Billable Minutes: Gait Training 15, Therapeutic Activity 15 and Therapeutic Exercise 9    Treatment Type: Treatment  PT/PTA: PTA     PTA Visit Number: 4     Bsail Gregory, PTA  2018

## 2018-04-02 NOTE — PT/OT/SLP PROGRESS
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   Yes  Keep patient NPO? No   Current Respiratory Status: room air      Pt upright in chair upon entry. Pt agitated with team today after she was NPO for a procedure and procedure was cancelled in am, but she did not receive a tray until noon. SLP provided comfort. POC reviewed with pt at beginning of session. Pt completed abstract naming task with 100% accuracy ind'ly. Pt feels that she is near cognitive baseline at this time. She is concerned about occasional pocketing while eating and taking pill, but assures SLP that she is "very aware" of it. SLP educated pt on rehab setting and POC. Continue to monitor diet tolerance and pocketing. Recommend dental soft diet/thin liquids at this time.    Assessment:     Kristina Aguirre is a 67 y.o. female with an SLP diagnosis of Dysphagia.    Goals:    SLP Goals        Problem: SLP Goal    Goal Priority Disciplines Outcome   SLP Goal     SLP Ongoing (interventions implemented as appropriate)   Description:  Updated Speech Language Pathology Goals  Goals expected to be met by 4/3/18:    1.  Pt will tolerate Dental Soft diet w/ thin liquids w/ no overt signs of aspiration.  2.  Pt will be oriented x4 using external cues. MET  3.  Pt will complete abstract naming tasks w/ 80% acc given min cues. MET  4.  Pt will complete problem-solving tasks w/ 80% acc " given min cues. MET  5.  Pt will complete reasoning tasks w/ 80% acc given min cues.MET  6.  Pt will complete short term memory tasks w/ 66% acc indep using memory strategies. MET  7.  Pt will complete further evaluation of reading, writing and visual spatial skills to determine the need for additional goals.        Speech Language Pathology Goals  Goals expected to be met by 4/1  1. Pt will participate in ongoing swallow assessment -Goal Met  2. Pt will participate in speech, language and cognitive assessment -Goal Met                        Plan:     · Patient to be seen:  3 x/week   · Plan of Care expires:  04/24/18  · Plan of Care reviewed with:  patient   · SLP Follow-Up:  Yes       Discharge recommendations:  rehabilitation facility       Time Tracking:     SLP Treatment Date:   04/02/18  Speech Start Time:  1318  Speech Stop Time:  1334     Speech Total Time (min):  16 min    Billable Minutes: Speech Therapy Individual 16    Priya English CCC-SLP  04/02/2018

## 2018-04-02 NOTE — ASSESSMENT & PLAN NOTE
68 y/o F with PMHx CAD, PAD, DM2, HTN, and severe AS s/p TAVR on 3/22. Stroke called on 3/24 at 2004 for worsening RUE weakness, right facial droop, dysarthria and aphasia. CTA without LVO. MRI with multiple bilateral (R>L) acute infarcts on cerebral and cerebellar hemispheres, highly suggestive of embolic phenomenon. No tpa as outside window.     Events from admission regarding placement-   Spoke with EP team [facility]. EP concerned for 3rd-degree heart block in this pt with recent TAVR placement; MD stating they recommend pt go to a facility that either has telemetry or an [facility] facility so she can have 30-day monitor while admitted. CM discussed this with pt's friend who is her POA, David. He discussed with pt and they decided to proceed with plan for d/c to [facility], knowing she will not get the heart monitoring she needs and that this is against EP's recommendations. Pt with plans to follow up with Dr. Carvajal after rehab discharge.    Dicussed loop recorder with EP- no new consult placed as they are already following. They will discuss with staff which mechanism of post TAVR monitoring needs to be done and comment to the team tomorrow. Explained to EP team prior situation regarding [facility] and [facility] ability to do external monitoring. EP will not place loop recorder but recommends 30 day monitor.    CM attempting to arrange pet therapy situation for pt.     Antithrombotics for secondary stroke prevention: Antiplatelets: Aspirin: 81 mg daily and Clopidogrel: 75 mg daily for now. Discussed with EP and Interventional Cards- Will plan to change to Eliquis 5mg BID + Plavix 75mg Daily with discharge   Statins for secondary stroke prevention and hyperlipidemia, if present:   Statins: Atorvastatin- 40 mg daily  Aggressive risk factor modification: HTN, Smoking, DM, CAD, bilateral carotid stenosis, s/p TAVR  Rehab efforts: PT/OT/SLP recommend Rehab;  Hopeful d/c to [place] on Monday or Tuesday  Diagnostics ordered/pending: None   VTE prophylaxis: Eliquis 5 mg BID  BP parameters: SBP < 160

## 2018-04-02 NOTE — SUBJECTIVE & OBJECTIVE
Neurologic Chief Complaint: Left PCA/MCA embolic stroke    Subjective:     Interval History: Patient is seen for follow-up neurological assessment and treatment recommendations:   UC still pending. EP will not place loop recorder. Pt to go to [facility] on 30 day cardiac event monitor. Replaced potassium,     HPI, Past Medical, Family, and Social History remains the same as documented in the initial encounter.     Review of Systems   Constitutional: Negative for fever.   Eyes: Positive for visual disturbance.   Genitourinary:        (+) frequent UTIs    Neurological: Positive for weakness (RUE). Negative for facial asymmetry, speech difficulty, numbness and headaches.     Scheduled Meds:   amiodarone  400 mg Oral BID    amLODIPine  10 mg Oral Daily    amoxicillin-clavulanate 500-125mg  1 tablet Oral BID    apixaban  5 mg Oral BID    ascorbic acid (vitamin C)  500 mg Oral BID    atorvastatin  40 mg Oral Daily    carvedilol  3.125 mg Oral Daily    chlorthalidone  25 mg Oral QAM    clopidogrel  75 mg Oral Daily    famotidine  20 mg Oral BID    fluticasone-vilanterol  1 puff Inhalation Daily    furosemide  20 mg Oral BID    polyethylene glycol  17 g Oral Daily     Continuous Infusions:   sodium chloride 0.9% Stopped (04/02/18 0940)     PRN Meds:acetaminophen, albuterol-ipratropium 2.5mg-0.5mg/3mL, bisacodyl, dextrose 50%, dextrose 50%, dextrose 50%, glucagon (human recombinant), glucagon (human recombinant), glucose, glucose, hydrALAZINE, insulin aspart U-100, insulin aspart U-100, morphine, ondansetron, promethazine (PHENERGAN) IVPB, sodium chloride 0.9%    Objective:     Vital Signs (Most Recent):  Temp: 98.7 °F (37.1 °C) (04/02/18 1200)  Pulse: 62 (04/02/18 1200)  Resp: 16 (04/02/18 1200)  BP: (!) 157/78 (04/02/18 1200)  SpO2: 97 % (04/02/18 1200)  BP Location: Right arm    Vital Signs Range (Last 24H):  Temp:  [97.9 °F (36.6 °C)-98.7 °F (37.1 °C)]   Pulse:  [49-67]   Resp:  [15-18]   BP:  (148-166)/(65-78)   SpO2:  [91 %-97 %]   BP Location: Right arm    Physical Exam   Constitutional: She appears well-developed and well-nourished. No distress.   HENT:   Head: Normocephalic and atraumatic.   Cardiovascular: Normal rate.    Pulmonary/Chest: Effort normal. No respiratory distress.   Neurological: She is alert.   Skin: Skin is warm and dry.   Psychiatric:   Depressed mood noted   Nursing note and vitals reviewed.      Neurological Exam:   LOC: alert  Attention Span: Good   Language: No aphasia  Articulation: No dysarthria  Visual Fields: right hemianopsia   EOM (CN III, IV, VI): Full/intact  Facial Movement (CN VII): Lower facial weakness on the Right  Motor: Arm left  Normal 5/5  Leg left  Normal 5/5  Arm right  Paresis: 2/5  Leg right Paresis: 4/5  Sensation: mild decrease in sensation to right side, can still appreciate light touch   Tone: Normal tone throughout    Laboratory:  CMP:     Recent Labs  Lab 04/02/18 0418   CALCIUM 9.2   ALBUMIN 3.3*   PROT 6.4      K 3.2*   CO2 28      BUN 23   CREATININE 0.8   ALKPHOS 95   ALT 14   AST 20   BILITOT 0.3     BMP:     Recent Labs  Lab 04/02/18 0418      K 3.2*      CO2 28   BUN 23   CREATININE 0.8   CALCIUM 9.2     CBC:     Recent Labs  Lab 04/02/18 0418   WBC 9.41   RBC 3.73*   HGB 8.8*   HCT 27.9*   *   MCV 75*   MCH 23.6*   MCHC 31.5*     Lipid Panel:   No results for input(s): CHOL, LDLCALC, HDL, TRIG in the last 168 hours.  Coagulation:   No results for input(s): PT, INR, APTT in the last 168 hours.  Hgb A1C: No results for input(s): HGBA1C in the last 168 hours.  TSH:   No results for input(s): TSH in the last 168 hours.      Diagnostic Results       Brain Imaging   CTH 3/25/17 revealed redemonstration of multiple infarctions w/expected temporal evolution.  No hemorrhage or hemorrhagic conversion.    MRI Brain 3/24/18 revealed bilateral cerebral and cerebellar embolic infarcts.      Vessel Imaging   CTA Multiphase  3/24/18  CTA demonstrates no high-grade stenosis or large vessel occlusion.    Cardiac Imaging   ROSEY 3/22/18:  CONCLUSIONS   Normal LA, No evidence of shunt    1 - Low normal to mildly depressed left ventricular systolic function (EF 50-55%).     2 - Mild mitral regurgitation.     3 - Severe aortic stenosis.     4 - Grade 4 atheroma disease of aorta.     5 - Successful implantation of a 26mm Evolut R CoreValve into the aortic position with no evidence of regurgitation.     UA  - nitrite (+), leuk 1+  Culture pending

## 2018-04-02 NOTE — SUBJECTIVE & OBJECTIVE
Interval History: SR. No HB. We were asked to re-visit ILR placement, as the primary team is having trouble with placement in a patient that needs either telemetry or 30 day event monitor.    Review of Systems   Constitution: Negative for chills and fever.   HENT: Negative for ear discharge.    Eyes: Negative for pain and visual disturbance.   Cardiovascular: Negative for chest pain, dyspnea on exertion, irregular heartbeat, leg swelling, orthopnea, palpitations, paroxysmal nocturnal dyspnea and syncope.   Respiratory: Negative for hemoptysis, shortness of breath and wheezing.    Skin: Negative for rash and suspicious lesions.   Musculoskeletal: Negative for joint pain and muscle weakness.   Gastrointestinal: Negative for abdominal pain, diarrhea, hematemesis, hematochezia, melena, nausea and vomiting.   Genitourinary: Negative for dysuria and frequency.   Neurological: Negative for focal weakness, headaches and tremors.   Psychiatric/Behavioral: Negative for altered mental status, suicidal ideas and thoughts of violence.     Objective:     Vital Signs (Most Recent):  Temp: 98.1 °F (36.7 °C) (04/02/18 0323)  Pulse: (!) 49 (04/02/18 1100)  Resp: 16 (04/02/18 0948)  BP: (!) 159/72 (04/02/18 0800)  SpO2: (!) 91 % (04/02/18 0323) Vital Signs (24h Range):  Temp:  [97.9 °F (36.6 °C)-98.2 °F (36.8 °C)] 98.1 °F (36.7 °C)  Pulse:  [49-67] 49  Resp:  [15-18] 16  SpO2:  [91 %-95 %] 91 %  BP: (148-166)/(65-72) 159/72     Weight: 69.9 kg (154 lb 1.6 oz)  Body mass index is 28.19 kg/m².     SpO2: (!) 91 %  O2 Device (Oxygen Therapy): room air    Physical Exam   Constitutional: She is oriented to person, place, and time. She appears well-developed and well-nourished.   HENT:   Head: Normocephalic and atraumatic.   Eyes: EOM are normal.   Cardiovascular: Normal rate and regular rhythm.  Exam reveals no gallop and no friction rub.    Pulmonary/Chest: Effort normal and breath sounds normal. No stridor. She has no wheezes. She has no  rales.   Abdominal: Soft. Bowel sounds are normal. There is no rebound and no guarding.   Musculoskeletal: She exhibits no edema.   Neurological: She is alert and oriented to person, place, and time. No cranial nerve deficit.   Skin: Skin is warm and dry.   Psychiatric: She has a normal mood and affect. Her behavior is normal.       Significant Labs: All pertinent lab results from the last 24 hours have been reviewed.

## 2018-04-02 NOTE — NURSING
NPO orders and possible plan for ILR discussed with patient and POA David over the phone. Patient and POA reluctant to consent to procedure until it is further explained tomorrow but pt is willing to observe NPO status in anticipation.

## 2018-04-02 NOTE — ASSESSMENT & PLAN NOTE
- paroxysmal; currently in NSR  - Dr. Ramirez discussed with Dr. Carvajal and Dr. Coppola last week AC strategy  - P2Y12i of interventional cardiology's choice (recent TAVR)  - recommend AC (e.g. NOAC) with the P2Y12i (w/o ASA to decrease bleed risk)

## 2018-04-03 LAB
ANION GAP SERPL CALC-SCNC: 9 MMOL/L
BACTERIA UR CULT: NO GROWTH
BACTERIA UR CULT: NORMAL
BUN SERPL-MCNC: 20 MG/DL
CALCIUM SERPL-MCNC: 9.6 MG/DL
CHLORIDE SERPL-SCNC: 98 MMOL/L
CO2 SERPL-SCNC: 30 MMOL/L
CREAT SERPL-MCNC: 0.7 MG/DL
EST. GFR  (AFRICAN AMERICAN): >60 ML/MIN/1.73 M^2
EST. GFR  (NON AFRICAN AMERICAN): >60 ML/MIN/1.73 M^2
GLUCOSE SERPL-MCNC: 151 MG/DL
POCT GLUCOSE: 167 MG/DL (ref 70–110)
POCT GLUCOSE: 200 MG/DL (ref 70–110)
POCT GLUCOSE: 225 MG/DL (ref 70–110)
POCT GLUCOSE: 255 MG/DL (ref 70–110)
POTASSIUM SERPL-SCNC: 4 MMOL/L
SODIUM SERPL-SCNC: 137 MMOL/L

## 2018-04-03 PROCEDURE — 97163 PT EVAL HIGH COMPLEX 45 MIN: CPT

## 2018-04-03 PROCEDURE — 36415 COLL VENOUS BLD VENIPUNCTURE: CPT

## 2018-04-03 PROCEDURE — 97116 GAIT TRAINING THERAPY: CPT

## 2018-04-03 PROCEDURE — 97530 THERAPEUTIC ACTIVITIES: CPT

## 2018-04-03 PROCEDURE — 97167 OT EVAL HIGH COMPLEX 60 MIN: CPT

## 2018-04-03 PROCEDURE — 25000242 PHARM REV CODE 250 ALT 637 W/ HCPCS: Performed by: NURSE PRACTITIONER

## 2018-04-03 PROCEDURE — 63600175 PHARM REV CODE 636 W HCPCS: Performed by: NURSE PRACTITIONER

## 2018-04-03 PROCEDURE — 97802 MEDICAL NUTRITION INDIV IN: CPT

## 2018-04-03 PROCEDURE — 25000003 PHARM REV CODE 250: Performed by: PHYSICAL MEDICINE & REHABILITATION

## 2018-04-03 PROCEDURE — 92523 SPEECH SOUND LANG COMPREHEN: CPT

## 2018-04-03 PROCEDURE — 12800000 HC REHAB SEMI-PRIVATE ROOM

## 2018-04-03 PROCEDURE — 99223 1ST HOSP IP/OBS HIGH 75: CPT | Mod: AI,,, | Performed by: PHYSICAL MEDICINE & REHABILITATION

## 2018-04-03 PROCEDURE — 80048 BASIC METABOLIC PNL TOTAL CA: CPT

## 2018-04-03 PROCEDURE — 25000003 PHARM REV CODE 250: Performed by: NURSE PRACTITIONER

## 2018-04-03 RX ORDER — HYDRALAZINE HYDROCHLORIDE 25 MG/1
25 TABLET, FILM COATED ORAL EVERY 8 HOURS PRN
Status: DISCONTINUED | OUTPATIENT
Start: 2018-04-03 | End: 2018-04-19 | Stop reason: HOSPADM

## 2018-04-03 RX ORDER — LISINOPRIL 10 MG/1
10 TABLET ORAL DAILY
Status: DISCONTINUED | OUTPATIENT
Start: 2018-04-03 | End: 2018-04-04

## 2018-04-03 RX ADMIN — AMIODARONE HYDROCHLORIDE 400 MG: 200 TABLET ORAL at 08:04

## 2018-04-03 RX ADMIN — FUROSEMIDE 20 MG: 20 TABLET ORAL at 05:04

## 2018-04-03 RX ADMIN — AMLODIPINE BESYLATE 10 MG: 10 TABLET ORAL at 08:04

## 2018-04-03 RX ADMIN — Medication 500 MG: at 08:04

## 2018-04-03 RX ADMIN — CHLORTHALIDONE 25 MG: 25 TABLET ORAL at 08:04

## 2018-04-03 RX ADMIN — FLUTICASONE FUROATE AND VILANTEROL TRIFENATATE 1 PUFF: 100; 25 POWDER RESPIRATORY (INHALATION) at 08:04

## 2018-04-03 RX ADMIN — APIXABAN 5 MG: 5 TABLET, FILM COATED ORAL at 08:04

## 2018-04-03 RX ADMIN — POLYETHYLENE GLYCOL 3350 17 G: 17 POWDER, FOR SOLUTION ORAL at 08:04

## 2018-04-03 RX ADMIN — INSULIN ASPART 3 UNITS: 100 INJECTION, SOLUTION INTRAVENOUS; SUBCUTANEOUS at 10:04

## 2018-04-03 RX ADMIN — FUROSEMIDE 20 MG: 20 TABLET ORAL at 08:04

## 2018-04-03 RX ADMIN — FAMOTIDINE 20 MG: 20 TABLET, FILM COATED ORAL at 08:04

## 2018-04-03 RX ADMIN — ATORVASTATIN CALCIUM 40 MG: 20 TABLET, FILM COATED ORAL at 08:04

## 2018-04-03 RX ADMIN — CARVEDILOL 3.12 MG: 3.12 TABLET, FILM COATED ORAL at 08:04

## 2018-04-03 RX ADMIN — CLOPIDOGREL BISULFATE 75 MG: 75 TABLET ORAL at 08:04

## 2018-04-03 RX ADMIN — AMOXICILLIN AND CLAVULANATE POTASSIUM 500 MG: 500; 125 TABLET, FILM COATED ORAL at 08:04

## 2018-04-03 RX ADMIN — INSULIN ASPART 4 UNITS: 100 INJECTION, SOLUTION INTRAVENOUS; SUBCUTANEOUS at 05:04

## 2018-04-03 RX ADMIN — LISINOPRIL 10 MG: 10 TABLET ORAL at 10:04

## 2018-04-03 RX ADMIN — HYDROCODONE BITARTRATE AND ACETAMINOPHEN 1 TABLET: 5; 325 TABLET ORAL at 09:04

## 2018-04-03 NOTE — ASSESSMENT & PLAN NOTE
Urine culture positive for Citrobacter freundii complex. Pan sensitive    -Continue Augmentin for 7day course

## 2018-04-03 NOTE — ASSESSMENT & PLAN NOTE
Elevated on admission. Continue current meds    4/3/18 - started Lisinopril 10mg daily  4/4/18 - slightly improved. Increased to 20mg daily

## 2018-04-03 NOTE — ASSESSMENT & PLAN NOTE
68 y/o F with PMHx CAD, PAD, DM2, HTN, and severe AS s/p TAVR on 3/22. Stroke called on 3/24 at 2004 for worsening RUE weakness, right facial droop, dysarthria and aphasia. CTA without LVO. MRI with multiple bilateral (R>L) acute infarcts on cerebral and cerebellar hemispheres, highly suggestive of embolic phenomenon. No tpa as outside window.     Events from admission regarding placement-   Spoke with EP team [facility]. EP concerned for 3rd-degree heart block in this pt with recent TAVR placement; MD stating they recommend pt go to a facility that either has telemetry or an [facility] facility so she can have 30-day monitor while admitted. CM discussed this with pt's friend who is her POA, David. He discussed with pt and they decided to proceed with plan for d/c to [facility], knowing she will not get the heart monitoring she needs and that this is against EP's recommendations. Pt with plans to follow up with Dr. Carvajal after rehab discharge.    Dicussed loop recorder with EP- no new consult placed as they are already following. They will discuss with staff which mechanism of post TAVR monitoring needs to be done and comment to the team tomorrow. Explained to EP team prior situation regarding  and [facility] ability to do external monitoring. EP will not place loop recorder but recommends 30 day monitor.    Antithrombotics for secondary stroke prevention: Antiplatelets: Aspirin: 81 mg daily and Clopidogrel: 75 mg daily for now. Discussed with EP and Interventional Cards- Will plan to change to Eliquis 5mg BID + Plavix 75mg Daily with discharge   Statins for secondary stroke prevention and hyperlipidemia, if present:   Statins: Atorvastatin- 40 mg daily  Aggressive risk factor modification: HTN, Smoking, DM, CAD, bilateral carotid stenosis, s/p TAVR  Rehab efforts: PT/OT/SLP recommend Rehab  Diagnostics ordered/pending: None   VTE prophylaxis: Eliquis 5 mg  BID  BP parameters: SBP < 160

## 2018-04-03 NOTE — CONSULTS
"  [facility]  Adult Nutrition  Consult Note    SUMMARY     Recommendations    Recommendation/Intervention:   1. Recommend Cardiac/ Carbohydrate Consistent/ dysphagia soft diet.   2. Recommend double meat portions.   3. Encourage snacks in between therapy sessions.   4. RD to follow.     Goals: Meet >85% of EEN/EPN.   Nutrition Goal Status: new  Communication of RD Recs:  (POC)    Reason for Assessment    Reason for Assessment: consult  Diagnosis:  (Gait instability)    General Information Comments: Pt reports a good appetite with a 100% intake. Pt c/o having to eat during therapy sessions due to feeling fatigued. Pt states that she would rather have her breakfast before therapy. Pt also states she is still hungry and agreed to doing double meat portions. Pt rejected ONS and yogurt.     Nutrition Discharge Planning: Adequate nutrition on cardiac, consistent carb diet.     Nutrition Risk Screen    Nutrition Risk Screen: no indicators present    Nutrition/Diet History    Do you have any cultural, spiritual, Rastafari conflicts, given your current situation?: none    Anthropometrics    Temp: 98.1 °F (36.7 °C)  Height Method: Stated  Height: 5' 2" (157.5 cm)  Height (inches): 62 in  Weight Method: Standard Scale  Weight: 65.8 kg (145 lb)  Weight (lb): 145 lb  Ideal Body Weight (IBW), Female: 110 lb  % Ideal Body Weight, Female (lb): 131.82 lb  BMI (Calculated): 26.6  BMI Grade: 25 - 29.9 - overweight       Lab/Procedures/Meds    Pertinent Labs Reviewed: reviewed  Pertinent Labs Comments:  (Gluc 151, Alb 3.3. POCT Gluc 167, HgbA1C, 6.5, )  Pertinent Medications Reviewed: reviewed  Pertinent Medications Comments: Vit-C, Carvedilol, lasix, statin, lisinopril, insulin, Mg hydroxide    Physical Findings/Assessment    Overall Physical Appearance: nourished  Oral/Mouth Cavity: tooth/teeth missing  Skin: incision(s), edema    Estimated/Assessed Needs    Weight Used For Calorie Calculations: 65.8 kg (145 lb 1 " oz)  Energy Calorie Requirements (kcal): 1420   Energy Need Method: [place]-St Jeor (PAL x 1.25)  Protein Requirements: 66-79g/day (1.0-1.2g/kg)  Weight Used For Protein Calculations: 65.8 kg (145 lb 1 oz)  Fluid Requirements (mL): 1 mL/kcal or per MD     RDA Method (mL): 1420       Nutrition Prescription Ordered    Current Diet Order: Dysphagia Soft    Evaluation of Received Nutrient/Fluid Intake    Comments: LBM: 4/1/48  % Intake of Estimated Energy Needs: 75 - 100 %  % Meal Intake: 75 - 100 %    Nutrition Risk    Level of Risk/Frequency of Follow-up: low     Assessment and Plan    Nutrition Problem  Inadequate energy intake     Related to (etiology):   Pt's inability to complete meals before therapy sessions    Signs and Symptoms (as evidenced by):   Pt stating that she still feels fatigued and hungry during therapy     Interventions/Recommendations (treatment strategy):  Double protein portions and snacks in between therapy sessions.     Nutrition Diagnosis Status:   New     Monitor and Evaluation    Food and Nutrient Intake: energy intake, food and beverage intake  Food and Nutrient Adminstration: diet order  Knowledge/Beliefs/Attitudes: food and nutrition knowledge/skill, beliefs and attitudes  Physical Activity and Function: nutrition-related ADLs and IADLs  Anthropometric Measurements: weight, weight change, body mass index  Biochemical Data, Medical Tests and Procedures: electrolyte and renal panel, gastrointestinal profile, glucose/endocrine profile, inflammatory profile, lipid profile  Nutrition-Focused Physical Findings: overall appearance     Nutrition Follow-Up    RD Follow-up?: Yes

## 2018-04-03 NOTE — ASSESSMENT & PLAN NOTE
- CXR on 3/23 with slight interval worsening of bilateral airspace disease suggestive of pulmonary edema. Subsequent imaging revealed no significant interval change  - O2 90-94% on RA over last 24 hrs  - Continue furosemide 20 mg BID / chlorthalidone as per CTS team  - Patient appears euvolemia  - Strict I/O and daily weights

## 2018-04-03 NOTE — PROGRESS NOTES
"Occupational Therapy   Treatment    Kristina Aguirre   MRN: [id number]      04/03/18 1045   OT Time Calculation   OT Start Time 1045   OT Stop Time 1115   OT Total Time (min) 30 min   General   OT Date of Treatment 04/03/18   Family/Caregiver Present No   Patient Found (position) Supine in bed   Pt found with (heart monitor)   Precautions   General Precautions fall;diabetic;vision impaired   Orthopedic No   Required Braces or Orthoses No   Cardiovascular/Pulmonary Other (see comments)  (heart monitor)   Visual/Auditory Vision impaired  (decreased peripheral, wears glasses)   Subjective   Patient states "My left arm feels like it's swollen."   Pain/Comfort   Pain Rating 8/10   Location - Side Bilateral   Location - Orientation posterior   Location back  (R shoulder)   Pain Addressed Reposition;Distraction   Pain Comment Pt does not wish to take medicine at this time.   Bed Mobility   Bed Mobility yes   Supine to Sit Minimum Assistance   Supine to Sit Comments assist for trunk   Transfers   Transfer yes   Bed to Chair   Bed <> Chair Technique Stand Pivot   Bed <> Chair Transfer Assistance Minimum Assistance   Bed <> Chair Assistive Device No Assistive Device   Trials/Comments for safety, pt impulsive at times with cues for safety   Tub Bench Transfer   Tub Transfer Technique Stand Pivot   Tub Bench Transfer Assistance Maximum Assistance   Tub Transfer Assistive Device No Assistive Device   Trials/Comments Prior to admit pt was stepping over ledge of tub to bathe, at this time required Max A to safely step into and out of tub without AD   Toilet Transfer   Toilet Transfer Technique Stand Pivot   Toilet Transfer Assistance Contact Guard Assistance   Toilet Transfer Assistive Device No Assistive Device   Trials/Comments light guidance to toilet   Toileting   Toileting Level of Assistance Moderate assistance   Toileting Where Assessed Bedside commode   Toileting Comments assist to manage clothing over R hip   Activity Tolerance "   Activity Tolerance Patient tolerated treatment well   Medical Staff Made Aware NSG   After Treatment   Patient Position After Treatment Seated in bedside chair   Patient after treatment left call button in reach  (seat alarm on)   Assessment   Prognosis Fair   Problem List Decreased Self Care skills;Decreased upper extremity range of motion;Decreased upper extremity strength;Decreased safe judgment during ADL;Decreased endurance;Decreased sensation;Visual deficit;Decreased fine motor control;Decreased functional mobility;Decreased Function of right upper extremity;Decreased IADLs;Decreased gross motor control;Decreased Function of left upper extremity;Decreased trunk control for functional activities   Assessment Pt with inattention to affected RUE during functional mobility and ADL tasks requiring cues for re-positioning as well as impaired safety awareness during functional ADLs and transfers with impulsivity noted. In light of pt's situation, pt good candidate for IP rehab program emphasizing functional use of affected RUE to incorporate into daily routines and increase awareness of affected R side.   Level of Motivation/Participation Good   Barriers to Discharge Decreased caregiver support   Discharge Recommendations   Equipment Needed After Discharge 3-in-1 commode;bath bench   Discharge Facility/Level Of Care Needs outpatient OT   Plan   Plan Continue with current plan   Therapy Frequency 2 times/day   Occupational Therapy Follow-up   OT Follow-up? Yes   Treatment/Billable Minutes   Therapeutic Activity 30   Total Time 30     CAROLYN Dixon   4/3/2018

## 2018-04-03 NOTE — PROGRESS NOTES
Occupational Therapy   Evaluation    Kristina Aguirre   MRN: [id number]    04/03/18 0730   OT Time Calculation   OT Start Time 0730   OT Stop Time 0832   OT Total Time (min) 62 min   General   OT Date of Treatment 04/03/18   Chart Reviewed Yes   Onset of Illness/Injury or Date of Surgery 03/23/18   Diagnosis PCA CVA s/p TAVR; TAVR 3/22/18   Additional Pertinent History Past Medical History:   Diagnosis Date    Abdominal aortic aneurysm (AAA) without rupture 9/12/2017    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 3/26/2018    Arthritis     Asthma     Atrial fibrillation 3/25/2018    Bilateral carotid artery stenosis 9/12/2017    Cancer     lung    COPD (chronic obstructive pulmonary disease)     Coronary artery disease of native artery with stable angina pectoris 9/29/2017    Diabetes mellitus     Gastroesophageal reflux disease without esophagitis 9/12/2017    Heart valve disorder     Hyperlipidemia     Hypertension     Long term current use of antithrombotics/antiplatelets     S/p TAVR (transcatheter aortic valve replacement), bioprosthetic 3/22/2018    Severe aortic stenosis 9/12/2017     Past Surgical History:   Procedure Laterality Date    HAND SURGERY Right     KNEE ARTHROSCOPY      LUNG LOBECTOMY Right     middle lobe    mediastinoscopy      SPINAL FUSION      TONSILLECTOMY      WRIST SURGERY Right       Date Referred to OT 04/03/18   Referring Physician Dr. Dunaway   Family/Caregiver Present No   Patient Found (position) Supine in bed   Pt found with (Holter monitor)   Precautions   General Precautions fall;diabetic;vision impaired   Orthopedic No   Required Braces or Orthoses No   Cardiovascular/Pulmonary Other (see comments)  (Holter heart monitor)   Visual/Auditory Vision impaired  (decreased peripheral, wears glasses)   BADL History   Bed Mobility/Transfers independent   Grooming independent   Bathing independent   Upper Body Dressing independent   Lower Body Dressing  "independent   Toileting independent   Home Management Skills independent   IADL History   Driving License Yes   Mode of Transportation Car   Occupation Part time employment   Type of Occupation Pt works 3x per week at [employer]   Leisure and Hobbies Pt reports that she walks her dog 2x per day.   IADL Comments R handed   Living Environment   Lives With alone   Living Arrangements mobile home   Home Accessibility stairs to enter home   Home Layout (tub-shower combo)   Number of Stairs to Enter Home 2   Stair Railings at Home none   Transportation Available none   Living Environment Comment Prior to admit pt was living alone with dog in [place] where she reports (I) as PLOF. Pt has limited support, friends from [employer] mostly.   Equipment Currently Used at Home none   Subjective   Patient states "My vision is bad, I keep dropping food by my mouth."   Patient stated goals Return to PLOF.   Pain/Comfort   Pain Rating 8/10  (pt reports she was always in pain even before stroke)   Location - Side Bilateral   Location - Orientation posterior   Location back  (neck, shoulders)   Pain Addressed Reposition;Distraction   Pain Comment Pt states that she does not want to take anything for pain   Cognitive Status   Level of Consciousness (AVPU) alert   Arousal Level opens eyes spontaneously   Orientation oriented x 4   Speech clear/fluent   Mood/Behavior cooperative   Sensory Exam   Additional Documenation yes   Range of Motion (ROM)   Range of Motion Examination deficits as listed below   Additional Documentation (RUE weakness, WFL passively)   Manual Muscle Testing (MMT)   Additional Documentation (R shoulder elevation -3/5, R shoulder flex 0/5, R elbow flex 0/5, R digit flex 0/5; LUE grossly 3/5)   Tone   Right UE  hypotonic   Left UE normal   Observation   Appearance WN female    Posture Rounded shoulders;Kyphosis in Thoracic Spine;Posterior pelvic tilt  (head forward, )   Skin intact;warm;dry   Bed Mobility   Bed " Mobility yes   Supine to Sit Minimum Assistance  (assist for trunk)   Supine to Sit Comments assist for trunk   Transfers   Transfer yes   Sit to Stand   Sit <> Stand Assistance Contact Guard Assistance   Sit <> Stand Assistive Device No Assistive Device   Trials/Comments light steadying, cues for safety pt stands impulsively without locking brakes   Stand to Sit   Assistance Contact Guard Assistance   Assistive Device No Assistive Device   Trials/Comments cues for safety and hand placement   Bed to Chair   Bed <> Chair Technique Stand Pivot   Bed <> Chair Transfer Assistance Min Assistance   Bed <> Chair Assistive Device No Assistive Device   Trials/Comments light steadying/guiding to chair   Feeding   Feeding Level of Assistance Stand by assistance  (for visual deficits and safety)   Feeding Where Assessed Wheelchair   Feeding Comments set up to place utensil in hand and locate utensil on tray   Grooming   Additional Grooming yes   Grooming Level of Assistance Minimum assistance  (assist for shaving)   Face Washing Level of Assistance Stand by assistance   Oral Hygeine Level of Assistance Stand by assistance   Hair Grooming Level of Assistance Stand by assistance   Shaving/Makeup Level of Assistance Total assistance   Grooming Where Assessed Sitting sinkside   Grooming Comments pt completed 4/5 parts, assist for shaving   Bathing   Bathing Level of Assistance Moderate assistance  (1 LOB, intermittent CGA when attempting to cross legs)   Bathing Where Assessed Edge of bed   Bathing Comments assist for R thigh, thoroughness of BUEs, and drying B feet   UE Dressing   UE Dressing Level of Assistance Maximum assistance   UE Dressing Where Assessed Edge of bed   UE Dressing Comments Assist to don bra, thread B sleeves and manage down trunk, pt able to manage over head. Pt with difficulty visually locating shirt on bed next to her   LE Dressing   LE Dressing Yes   LE Dressing  Level of Assistance Moderate assistance   LE  Dressing Level of Assistance Contact guard  (for balance and safety while seated EOB)   Sock Level of Assistance Contact guard  (for balance and safety while seated EOB)   Shoe Level of Assistance Moderate assistance  (assist to don R shoe)   Adult Briefs Level of Assistance Moderate assistance  (for underwear, assist to manage over R hip)   LE Dressing Where Assessed Edge of bed   Treatment   Treatment Comprehension:  Complex= humor, finances, rationale for medical treatment(hip precautions, pressure relief)  Basic= pain, hunger, thirst, bathroom needs, cold, nutrition, sleep    · Understands basic 75-89%- may need words repeated (4)    Expression:  - Expresses self with assistance from staff member to occlude trach  (4)  - Expresses basic 75-89% of time - Needs to repeat words  (4)    Social Interaction:  - Interacts appropriately 75-89% of time - needs redirection for appropriate language or to initiate interaction  (4)    .Problem Solving:  - Solves basic problems 75-89% of the time  (4)  Memory:    - Recognizes, recalls, or executes 50-74% of time 2 steps of 2 step request (3)     Activity Tolerance   Activity Tolerance Patient tolerated treatment well   Medical Staff Made Aware NSG   After Treatment   Patient Position After Treatment Seated in wheelchair   Patient after treatment left call button in reach   Assessment   Prognosis Fair   Problem List Decreased Self Care skills;Decreased upper extremity range of motion;Decreased upper extremity strength;Decreased safe judgment during ADL;Decreased endurance;Decreased sensation;Decreased fine motor control;Visual deficit;Decreased functional mobility;Decreased gross motor control;Decreased IADLs;Decreased Function of right upper extremity;Decreased trunk control for functional activities   Assessment Pt presents to therapy after CVA of PCA resulting in RUE hemiparesis, peripheral vision deficits, decreased safety awareness,strength and endurance which hinders her  ability to complete basic ADL tasks independently and safely. Pt would benefit from IP rehab program to enhance functional independence and safety with self care tasks.   Level of Motivation/Participation Good   Barriers to Discharge Decreased caregiver support   Barrier Comments Pt lives alone with little to no family support/assistance, but pt reports close friends may be able to assist her when she returns home.   Short Term Goals   Additional Documentation yes   Time For Goal Achievement 7 days   Pt Will Transfer Sit to Stand Discontinued (comment)   Transfer Sit to stand - Met/Not Met Not Met   Pt Will Transfer Bed/Chair Stand Pivot;With stand by assist;New goal   Transfer Bed/Chair - Met/Not Met Not Met   Pt Will Perform Eating With setup;With stand by assistance;New goal   Eating - Met/Not Met Not Met   Pt Will Perform Grooming In wheelchair;With supervision;New goal  (sitting sinkside)   Grooming - Met/Not Met Not Met   Pt Will Perform Bathing On shower seat/tub bench;With minimal assistance;New goal   Bathing - Met/Not Met Not Met   Pt Will Perform UE Dressing At edge of bed;With maximum assistance;New goal   UE Dressing - Met/Not Met Not Met   Pt Will Perform LE Dressing At edge of bed;With minimal assistance;New goal   LE Dressing - Met/Not Met Not Met   Pt Will Perform Toileting With contact guard assistance;New goal   Toileting-Met/Not Met Not Met   Long Term Goals   Additional Documentation yes   Time For Goal Achievement 2 weeks   Pt Will Perform Supine To Sit New goal;Independently   Pt Will Perform Sit to Supine Independently;New goal   Supine to Sit - Met/Not Met Not Met   Pt Will Transfer Sit to Stand Supervision;With RW;New goal   Transfer Sit to stand - Met/Not Met Not Met   Pt Will Transfer Bed/Chair Stand Pivot;With stand by assist;New goal  (or SQPT)   Transfer Bed/Chair - Met/Not Met Not Met   Pt Will Transfer To Bedside Commode With supervision;New goal  (stand pivot or squat pivot)    Transfer Bedside Commode -Met/Not Met Not Met   Pt Will Transfer To Toilet Supervision   Pt Will Transfer To Shower With supervision;New goal  (squat pivot onto TTB)   Transfer to Shower-Met/Not Met Not Met   Pt Will Perform Eating Independently;New goal   Eating - Met/Not Met Not Met   Pt Will Perform Grooming Independently;In wheelchair;New goal   Grooming - Met/Not Met Not Met   Pt Will Perform Bathing On shower seat/tub bench;New goal;With minimal assistance   Bathing - Met/Not Met Not Met   Pt Will Perform UE Dressing At edge of bed;With stand by assistance;New goal   UE Dressing - Met/Not Met Not Met   Pt Will Perform LE Dressing With contact guard assistance;At edge of bed;New goal   LE Dressing - Met/Not Met Not Met   Pt Will Perform Toileting With stand by assistance;New goal   Toileting-Met/Not Met Not Met   Discharge Recommendations   Equipment Needed After Discharge 3-in-1 commode;bath bench   Discharge Facility/Level Of Care Needs outpatient OT   Plan   Plan Self care retraining;Functional transfer training;Neuromuscular Re-education;AAROM;PROM;AROM;Community re-training;Fine motor training;Safety training;UE thereex;Family training;Patient education;Functional endurance training;Functional Standing Activities   Therapy Frequency 2 times/day   Occupational Therapy Follow-up   OT Follow-up? Yes   Treatment/Billable Minutes   Evaluation 62   Total Time 62

## 2018-04-03 NOTE — PROGRESS NOTES
Physical Therapy   Evaluation     Kristina Aguirre   MRN: [id number]    04/03/18 1259   PT Time Calculation   PT Start Time 1259   PT Stop Time 1429   PT Total Time (min) 90 min   Treatment   Treatment Type Evaluation   General   PT Received On 04/03/18   Chart Reviewed Yes   Onset of Illness/Injury or Date of Surgery 03/23/18   Diagnosis PCA CVA s/p TAVR; TAVR on 3/22/2018   Additional Pertinent History Past Medical History:   Diagnosis Date    Abdominal aortic aneurysm (AAA) without rupture 9/12/2017    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 3/26/2018    Arthritis     Asthma     Atrial fibrillation 3/25/2018    Bilateral carotid artery stenosis 9/12/2017    Cancer     lung    COPD (chronic obstructive pulmonary disease)     Coronary artery disease of native artery with stable angina pectoris 9/29/2017    Diabetes mellitus     Gastroesophageal reflux disease without esophagitis 9/12/2017    Heart valve disorder     Hyperlipidemia     Hypertension     Long term current use of antithrombotics/antiplatelets     S/p TAVR (transcatheter aortic valve replacement), bioprosthetic 3/22/2018    Severe aortic stenosis 9/12/2017     Past Surgical History:   Procedure Laterality Date    HAND SURGERY Right     KNEE ARTHROSCOPY      LUNG LOBECTOMY Right     middle lobe    mediastinoscopy      SPINAL FUSION      TONSILLECTOMY      WRIST SURGERY Right         Date Referred to PT 04/02/18   Referring Physician Emelyn   Family/Caregiver Present No   Patient Found (position) Seated in bedside chair   Pt found with (heart monitor)   Precautions   General Precautions fall;diabetic;vision impaired   Orthopedic No   Required Braces or Orthoses No   Cardiovascular/Pulmonary Other (see comments)  (heart monitor)   Visual/Auditory Vision impaired   Previous Level of Function   Ambulation Skills independent   Transfer Skills independent   ADL Skills independent   Work/Leisure Activity independent  "  Living Environment   Lives With alone   Living Arrangements mobile home   Home Accessibility stairs to enter home   Number of Stairs to Enter Home 2   Stair Railings at Home none   Transportation Available none   Living Environment Comment Pt reports living alone with her dog in a trailer independently. Pt reports that her CESAR are going to have hand rails upon return home. She has little support from family/friends upon discharge.    Equipment Currently Used at Home none   Subjective   Patient states "You know, I drive a convertable."    Patient stated goals walk & live alone again   Pain/Comfort   Pain Rating 1 no pain   Pain Rating Post-Intervention 1 no pain   Cognitive Status   Level of Consciousness (AVPU) alert   Arousal Level opens eyes spontaneously   Orientation oriented x 4   Able to Follow Commands (Communication) success, 2-step commands   Speech clear/fluent;follows commands   Mood/Behavior calm;cooperative   Sensory Examination   Additional Documentation yes   Light Touch   LUE w/i normal limits   RUE w/i normal limits   LLE w/i normal limits   RLE w/i normal limits   Proprioception   LLE w/i normal limits   RLE mild impairment   Range of Motion (ROM)   Range of Motion Examination bilateral lower extremity ROM was WFL   Manual Muscle Testing (MMT)   Additional Documentation Yes       MMT: Hip, Rehab Eval   Left Flexion (5/5) normal, left   Right Flexion (4-/5) good minus, right       MMT: Knee, Rehab Eval   Left Flexion (5/5) normal, left   Right Flexion (5/5) normal, right   Left Extension (5/5) normal, left   Right Extension (5/5) normal, right       MMT: Ankle, Rehab Eval   Left Dorsiflexion (5/5) normal, left   Right Dorsiflexion (5/5) normal, right   Left Plantarflexion (5/5) normal, left   Right Plantarflexion (5/5) normal, right   Tone   Right UE  hypotonic   Left UE normal   Right LE normal   Left LE normal   Fine Motor Coordination Examination   Additional Documentation Fine Motor " Coordination   Fine Motor Coordination   Left Hand, Finger to Nose normal performance   RLE Heel to Guerrero normal performance   LLE Heel to Guerrero normal performance   Observation   Skin intact   Bed Mobility   Bed Mobility yes   Rolling/Turning to Left Modified independent   Rolling/Turning Right Modified independent   Supine to Sit Moderate Assistance  (pt required assistance with trunk)   Sit to Supine Contact Guard Assistance   Transfers   Transfer yes   Sit to Stand   Sit <> Stand Assistance Contact Guard Assistance   Sit <> Stand Assistive Device No Assistive Device   Trials/Comments multiple trials throughout evaluation   Stand to Sit   Assistance Contact Guard Assistance   Assistive Device No Assistive Device   Trials/Comments multiple trials throughout evaluation   Chair to Mat   Chair<> Mat Technique Stand Pivot   Chair<>Mat Assistance Minimum Assistance   Chair <> Mat Assistive Device No Assistive Device   Trials/Comments Mya for balance during transfer   Mat to Chair   Technique Stand Pivot   Assistance Minimum Assistance   Assistive Device No Assistive Device   Trials/Comments x1 trial. Mya for balance during transfer   Tub Bench Transfer   Technique Stand Pivot   Assistance Moderate Assistance   Assistive Device No Assistive Device   Trials/Comments Pt stepped over tub into shower using only the wall for balance as she was using a tub shower combo PTA. She required modA for assistance with balance and sequencing during this transfer.    Toilet Transfer   Toilet Transfer Technique Stand Pivot   Toilet Transfer Assistance Minimum Assistance   Toilet Transfer Assistive Device bedside commode   Trials/Comments x1 transfer to [facility] at [facility] for balance during transfer   Car Transfer   Car Transfer Technique Stand Pivot   Car Transfer Assistance Minimum Assistance   Car Transfer Assistive Device No Assistive Device   Trials/Comments x1 trial. Mya for balance.    Wheelchair Activities   Wheelchair Size k  "  Propulsion Yes   Propulsion Type 1 Manual   Level 1 Level tile   Method 1 Left upper extremity;Right lower extremity;Left lower extremity   Level of Assistance 1 Moderate assistance   Description/ Details 1 Pt travelled 15' requiring modA for hand placement onto WC and for steering chair.    Gait   Gait Yes   Weight Bearing Status full   Gait 1   Surface 1 Level tile   Gait Assistive Device No device   Other Apparatus 1 Wheelchair follow   Assistance 1 Moderate assistance   Gait Distance Pt ambulated 22' requiring modA for balance during ambulation   Gait Pattern swing-through gait   Gait Deviation(s) decreased perez;increased time in double stance;decreased velocity of limb motion;decreased step length;decreased stride length;increased stride width;decreased weight-shifting ability;foot flat;decreased toe-to-floor clearance   Impairments Contributing to Gait Deviations impaired balance;impaired motor control;impaired postural control;decreased strength;decreased sensation   Stairs   Stairs Yes   Stairs/Curb Step   Rails 1 Left   Device 1 No device   Assistance 1 Moderate assistance   Comment/# Steps 1 4, 6" steps. ModA for balance.    Stairs/ Curb Step  2   Rails 2 None (Comment)   Device 2 No device   Assistance 2 Moderate assistance   Comment/# Steps 2 Pt traversed one 4" curb step.    Endurance Deficit   Endurance Deficit Yes   Endurance Deficit Description Pt tires easily and required rest breaks between tasks.    Balance   Balance Yes   Static Standing Balance   Static Standing-Balance Support Right upper extremity supported;Left upper extremity supported   Static Standing-Level of Assistance Contact guard   Static Standing-Comment/# of Minutes Pt stood for 3' between the parallel bars   Activity Tolerance   Activity Tolerance Patient tolerated treatment well   After Treatment   Patient Position After Treatment Seated in bedside chair  (seat alarm enabled)   Patient after treatment left call button in " reach   Treatment   Treatment Following evaluation of pt, assistive devices were used for gait with pt. Pt first ambulated with LBQC for 37' still requiring modA for balance during ambulation and needing verbal cues for sequencing of AD. Next pt ambulated 45' with RW and NeuroIFRAH paddle attached requiring modA for balance and assistance with negotiation of the RW. Pt ambulated back to bedside chair with HHA from therapist at [facility].   Assessment   Prognosis Good   Problem List Decreased strength;Decreased endurance;Impaired balance;Decreased mobility;Impaired judgement;Decreased safety awareness;Impaired vision   Session Assessment other (see comments)  (evaluation complete and plan of care initiated)   Assessment Pt presents to IP rehab following LVAD complicated with PCA CVA. She is pleasant and willing to participate fully in session. She presents with the following impairments: decreased balance, decreased strength, decreased sensation, decreased mobility, and decreased visual field. Pt has trouble staying attentive to task at times but is easily redirected to instructions. She will benefit from skilled PT services in order to increase her functional mobility in order for her to safely return home.    Level of Motivation/Participation good   Barriers to Discharge Decreased caregiver support;Inaccessible home environment   Barriers to Discharge Comments pt lives alone and has two CESAR home   Short Term Goals   Additional Documentation yes   Time For Goal Achievement 7 days   Pt Will Perform Supine To Sit New goal;With stand by assist   Supine to Sit-Met/Not Met Not Met   Pt Will Perform Sit to Supine New goal;With stand by assist   Sit to Supine - Met/Not Met Not Met   Pt Will Transfer Sit to Stand New goal;With stand by assist  (with appropriate AD)   Transfer Sit to stand - Met/Not Met Not Met   Pt Will Transfer Bed/Chair New goal;Stand Pivot;With contact guard assistance  (with or without appropriate AD)  "  Transfer Bed/Chair - Met/Not Met Not Met   Pt Will Ambulate New goal; feet;Without assistive device;With assistive device;With minimal assist;With contact guard assistance   Ambulate - Met/Not Met Not met   Pt Will Go Up / Down Stairs New goal;6-10 stairs;With minimal assist;With contact guard assist   Go Up/Down Stairs - Met/Not Met Not met   Pt Will Go Up / Down Curb Step New goal;4" curb step;With assistive device;Without assitive device;With minimal assist;With contact guard assist   Go Up/Down Curb- Met/Not Met Not Met   Pt Will Propel Wheelchair New goal;With minimal assist; feet;With (L) UE;With (B) LE   Propel Wheelchair - Met/Not Met Not met   Long Term Goals   Additional Documentation yes   Time For Goal Achievement 2 weeks   Pt Will Perform Supine To Sit New goal;Modified Independently   Supine to Sit-Met/Not Met Not Met   Pt Will Perform Sit to Supine New goal;Modified Independently   Sit to Supine - Met/Not Met Not Met   Pt Will Transfer Sit to Stand New goal;With stand by assist;Supervision   Transfer Sit to stand - Met/Not Met Not Met   Pt Will Transfer Bed/Chair New goal;Stand Pivot;With contact guard assistance;With stand by assist   Transfer Bed/Chair - Met/Not Met Not Met   Pt Will Ambulate New goal;101-150 feet;Without assistive device;With assistive device;Supervision   Ambulate - Met/Not Met Not met   Pt Will Go Up / Down Stairs New goal;1 flight;With Bilateral Rails;With stand by assist   Go Up/Down Stairs - Met/Not Met Not met   Pt Will Go Up / Down Curb Step New goal;4" curb step;With assistive device;Without assitive device;With stand by assist   Go Up/Down Curb- Met/Not Met Not Met   Pt Will Propel Wheelchair New goal;> 150 feet;Supervision;With (L) UE;With (B) LE   Propel Wheelchair - Met/Not Met Not met   Other Goal Pt will transfer to car at [facility] with no AD.    Other Goal - Progress Not Met   Discharge Recommendations   Equipment Needed After Discharge 3-in-1 commode;bath " bench   Discharge Facility/Level Of Care Needs outpatient PT   Plan   Planned Therapy Intervention Plan of care initiated;Bed mobility training;Transfer training;Gait training;Balance Training;ROM;Stretching;Strengthening;Neuromuscular Re-education;Motor Coordination Training;Wheelchair Management/Propulsion   Therapy Frequency 2 times/day;daily;Monday-Friday;Saturday or Sunday   Physical Therapy Follow-up   PT Follow-up? Yes   PT - Next Visit Date 04/04/18   Other Comments   Comments Pt's BP, HR and O2 saturation was monitored secondary to her reporting feeling dizzy while seated in WC and found to be 145/62, 51 bpm, and 93% O2 saturation.    Treatment/Billable Minutes   Evaluation 60   Gait training 30   Total Time 90   Geronimo Deleon, PT   4/3/2018

## 2018-04-03 NOTE — ASSESSMENT & PLAN NOTE
continue Apixaban    4/4 - reports of SOB. EKG shows no sign of A fib with RVR or Mi. CXR showing resolution of pulm edema. Pending labs.

## 2018-04-03 NOTE — ASSESSMENT & PLAN NOTE
Stroke risk factor  - Per approval from EP, Interventional cards, CTS, pt will switch to Eliquis 5mg BID + Plavix (no ASA, ordered at discharge)

## 2018-04-03 NOTE — ASSESSMENT & PLAN NOTE
MRI showed multiple bilateral R>L acute infarcts on cerebral and cerebellar hemispheres highly suggestive of embolic phenomenon  -PT/OT/SLP

## 2018-04-03 NOTE — DISCHARGE SUMMARY
"[facility]  Vascular Neurology  Comprehensive Stroke Center  Discharge Summary     Summary:     Admit Date: 3/22/2018  5:35 AM    Discharge Date and Time:  04/02/2018 7:33 PM    Attending Physician: No att. providers found     Discharge Provider: Geovanna Almeida NP    History of Present Illness: 66 y/o female with medical history significant for type II DM, HTN, CAD, bilateral carotid stenosis, severe AS (s/p TAVR on 3/22/18). Stroke code was at 2004 at 3/24 for worsening RUE weakness, right-sided facial droop, and dysarthria. Right arm weakness and some leg weakness, sensory deficit on right and right hemianopsia. Staff nurse noted changes at change of shift on assessment around 1930. Stoke code called and upon examination right arm is flaccid but moving the right leg, decrease sensation right arm, facial droop and dysarthria, hemianopsia right. Not TPA candidate due to unknown LKN. CTA Multiphase done and no LVO seen but age indeterminate infarcts in the bilateral paramedian occipital lobes, and the left corona radiata. MRI demonstrable for significant "multiple abnormal regions of restricted diffusion throughout the bilateral cerebral and cerebellar hemispheres" concerning for embolic phenomenon. Discussed with Dr Salcedo. Risk factors are HTN, DM, HLP, TAVR.    Patient with TAVR on 3/22. Intra-operative ROSEY demonstrable for significant grade 4 atheroma on aorta. Patient was reportedly fine s/p procedure, but next morning 3/22, did complain of RUE weakness (which was attributed to known chronic rotator cuff injury) as well as right field deficits. This is documented in OT note dated that morning. Patient also with development with new LBBB s/p TAVR which was evaluated by arrhythmia team here who did not deem patient needing pacemaker at this time and recommend 30 day event monitor on discharge.     Hospital Course (synopsis of major diagnoses, care, treatment, and services provided during the " course of the hospital stay): Pt s/p TAVR with cerebral infarction due to embolism of PCA. Etiology cardio embolic vs. Micki-procerdural TAVR. During the patients hospital stay she had a new onset of  afib RVR. Patient discharged to rehab Eliquis + Plavix on 4/1/18 in setting of new-onset AFib and other cardiac hx. Ep was consulted for loop recorder due to pt requested rehab not being able to do cardiac monitoring. EP did not replace loop recorder and pt discharged to [facility]. UC still pending. Pt on Augmentin. Pt to follow up with CTS, PCP, and stroke clinic after rehab.    STROKE DOCUMENTATION   Acute Stroke Times   Last Known Normal Date: 03/23/18  Last Known Normal Time:  (Unsure)  Symptom Onset Date: 03/24/18  Symptom Onset Time:  (Unsure)  Stroke Team Called Date: 03/24/18  Stroke Team Called Time: 2004  Stroke Team Arrival Date: 03/24/18  Stroke Team Arrival Time: 2010  CT Interpretation Time: 2025     NIH Scale:  Interval: 7 days or at discharge (whichever comes first)  1a. Level Of Consciousness: 0-->Alert: keenly responsive  1b. LOC Questions: 0-->Answers both questions correctly  1c. LOC Commands: 0-->Performs both tasks correctly  2. Best Gaze: 0-->Normal  3. Visual: 2-->Complete hemianopia  4. Facial Palsy: 1-->Minor paralysis (flattened nasolabial fold, asymmetry on smiling)  5a. Motor Arm, Left: 0-->No drift: limb holds 90 (or 45) degrees for full 10 secs  5b. Motor Arm, Right: 2-->Some effort against gravity: limb cannot get to or maintain (if cued) 90 (or 45) degrees, drifts down to bed, but has some effort against gravity  6a. Motor Leg, Left: 0-->No drift: leg holds 30 degree position for full 5 secs  6b. Motor Leg, Right: 1-->Drift: leg falls by the end of the 5-sec period but does not hit bed  7. Limb Ataxia: 0-->Absent  8. Sensory: 1-->Mild-to-moderate sensory loss: patient feels pinprick is less sharp or is dull on the affected side: or there is a loss of superficial pain with pinprick,  but patient is aware of being touched  9. Best Language: 0-->No aphasia: normal  10. Dysarthria: 0-->Normal  11. Extinction and Inattention (formerly Neglect): 0-->No abnormality  Total (NIH Stroke Scale): 7        Modified Jupiter Score: 2  Chelsy Coma Scale:    ABCD2 Score:    [id number] Score:8  HAS -BLED Score:5  ICH Score:   Hunt & Eldridge Classification:       Assessment/Plan:     Diagnostic Results:      Brain Imaging   CTH 3/25/17 revealed redemonstration of multiple infarctions w/expected temporal evolution.  No hemorrhage or hemorrhagic conversion.     MRI Brain 3/24/18 revealed bilateral cerebral and cerebellar embolic infarcts.       Vessel Imaging   CTA Multiphase 3/24/18  CTA demonstrates no high-grade stenosis or large vessel occlusion.     Cardiac Imaging   ROSEY 3/22/18:  CONCLUSIONS   Normal LA, No evidence of shunt    1 - Low normal to mildly depressed left ventricular systolic function (EF 50-55%).     2 - Mild mitral regurgitation.     3 - Severe aortic stenosis.     4 - Grade 4 atheroma disease of aorta.     5 - Successful implantation of a 26mm Evolut R CoreValve into the aortic position with no evidence of regurgitation.      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 [I63.439] 03/26/2018 Yes    Acute cystitis [N30.00] 03/30/2018 Yes    Acute on chronic diastolic heart failure [I50.33] 03/29/2018 Yes    Cervical spondylosis [M47.812] 03/29/2018 Yes    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 [I63.8] 03/26/2018 Yes    Cerebral infarction due to embolism of middle cerebral artery [I63.419] 03/26/2018 Yes    Atrial fibrillation [I48.91] 03/25/2018 No    S/p TAVR (transcatheter aortic valve replacement), bioprosthetic [Z95.3] 03/22/2018 Not Applicable     Coronary artery disease of native artery with stable angina pectoris [I25.118] 09/29/2017 Yes    Diabetes mellitus, type 2 [E11.9] 09/29/2017 Yes    Centrilobular emphysema [J43.2] 09/29/2017 Yes    Bilateral carotid artery stenosis [I65.23] 09/12/2017 Yes    Dyslipidemia [E78.5] 09/12/2017 Yes    Essential hypertension [I10] 09/12/2017 Yes    Tobacco abuse [Z72.0] 09/12/2017 Yes    Gastroesophageal reflux disease without esophagitis [K21.9] 09/12/2017 Yes    Severe aortic stenosis [I35.0] 09/12/2017 Yes      Problems Resolved During this Admission:    Diagnosis Date Noted Date Resolved POA    Encounter for weaning from ventilator [Z99.11]  03/25/2018 Not Applicable    Nodular calcific aortic valve stenosis [I35.0] 03/21/2018 03/22/2018 Yes     * 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 Stroke called on 3/24 at [facility] for worsening RUE weakness, right facial droop, dysarthria and aphasia. CTA without LVO. MRI with multiple bilateral (R>L) acute infarcts on cerebral and cerebellar hemispheres, highly suggestive of embolic phenomenon. No tpa as outside window.     Events from admission regarding placement-   Spoke with EP team regarding pt's discharge to [facility] without a 30-day monitor. EP concerned for 3rd-degree heart block in this pt with recent TAVR placement; MD stating they recommend pt go to a facility that either has telemetry or an [facility] facility so she can have 30-day monitor while admitted. CM discussed this with pt's friend who is her POA, David. He discussed with pt and they decided to proceed with plan for d/c to [facility], knowing she will not get the heart monitoring she needs and that this is against EP's recommendations. Pt with plans to follow up with Dr. Carvajal after rehab discharge.    Dicussed loop recorder with EP- no new consult placed as they are already following. They will  discuss with staff which mechanism of post TAVR monitoring needs to be done and comment to the team tomorrow. Explained to EP team prior situation regarding [facility] and [facility] ability to do external monitoring. EP will not place loop recorder but recommends 30 day monitor.    Antithrombotics for secondary stroke prevention: Antiplatelets: Aspirin: 81 mg daily and Clopidogrel: 75 mg daily for now. Discussed with EP and Interventional Cards- Will plan to change to Eliquis 5mg BID + Plavix 75mg Daily with discharge   Statins for secondary stroke prevention and hyperlipidemia, if present:   Statins: Atorvastatin- 40 mg daily  Aggressive risk factor modification: HTN, Smoking, DM, CAD, bilateral carotid stenosis, s/p TAVR  Rehab efforts: PT/OT/SLP recommend Rehab  Diagnostics ordered/pending: None   VTE prophylaxis: Eliquis 5 mg BID  BP parameters: SBP < 160         Cervical spondylosis    Seen on CTA Multiphase  Most prominent at C3-4 with some effacement of anterior thecal sac and moderate right/severe left neural foraminal narrowing  Discussed with NSGY - Would only constitute outpatient work-up if pt were symptomatic        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 Pt found to be in afib RVR after returning from MRI on 3/24.   - Unlikely to have caused stroke as she went into arrhythmia s/p symptom onset. Likely post-procedural from TAVR.   - Seen on ECG  - CHADVASC 8  - Plan to start Apixaban 5 mg BID   - Continue oral amiodarone per CTS recs. Currently remains in NSR        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    See Cerebral infarction due to embolism of PCA        Acute on chronic diastolic heart failure    - CXR on 3/23 with slight interval worsening of bilateral airspace disease suggestive of pulmonary edema. Subsequent imaging revealed no significant interval  change  - O2 90-94% on RA over last 24 hrs  - Continue furosemide 20 mg BID / chlorthalidone as per CTS team  - Patient appears euvolemia  - Strict I/O and daily weights        S/p TAVR (transcatheter aortic valve replacement), bioprosthetic    - Followed by CTS  - EP discussed with Interventional Cardiology- Ok with Eliquis + Plavix but w/o ASA (to decrease bleed risk) in setting of cardiac hx + new-onset AFib        Centrilobular emphysema    Hx Lung CA, now in remission  - Continue Duo-neb prn  Now tolerating room air with O2 sat 95-96%        Diabetes mellitus, type 2    -Stroke risk factor  HgB A1C 6.5%  -SSI        Coronary artery disease of native artery with stable angina pectoris    Stroke risk factor  - Per approval from EP, Interventional cards, CTS, pt will switch to Eliquis 5mg BID + Plavix (no ASA, ordered at discharge)        Tobacco abuse    -Stroke risk factor  Educate patient about smoking cessation        Severe aortic stenosis    S/p TAVR 3/22, followed by CTS        Essential hypertension    -Stroke risk factor  -Discussed with CTS, ok with MAP > 80 in setting of stroke  -Continue amlodipine 10 mg PO daily, carvedilol 3.125 mg PO BID, chlorthalidone 25 mg PO daily  - SBP goal ~160, Avoid hypotension to allow adequate cerebral perfusion in setting of bilateral carotid stenosis  - See CTS note for final d/c med recs  - Pt not started on ACEi this admission due to concern for hypotension. Will need to be revisited outpatient in this pt with HFpF        Dyslipidemia    -Stroke risk factor  LDL >70  -Continue Lipitor 40 mg daily        Bilateral carotid artery stenosis    - As demonstrable by recent carotid US and CTA (mixed density atherosclerosis at both carotid bifurcations)  Stroke risk factor, but unlikely to have caused this event as pattern not consistent with thrombotic/a-a considering embolic multi-territorial stroke  -Avoid hypotension  - Apixaban 5 mg BID, plavix 75 mg qd             Recommendations:     Post-discharge complication risks: Falls    Stroke Education given to: patient    Follow-up in Stroke Clinic in 4-6 weeks.     Discharge Plan:  Antithrombotics: Clopidogrel 75mg  Statin: Atorvastatin 40mg  Anticoagulant: Eliquis 5 mg BID    Follow Up:  Follow-up Information     [facility] [place].    Specialty:  Home Health Services  Why:  Home Health: The home health nurse will see the patient on Monday.  Contact information:  [address]  [address]  [place] [id number] [phone]             Sarbjit Carvajal MD In 3 weeks.    Specialty:  Cardiothoracic Surgery  Contact information:  [address]  [facility] [address]  [phone]             Robert Mattson MD In 1 week.    Specialty:  General Practice  Why:  Call your PCP to set up an appt within 1 week of rehab discharge  Contact information:  [address] [facility]  [place] [address]  [phone]             [facility] VASCULAR NEUROLOGY In 4 weeks.    Specialty:  Vascular Neurology  Why:  Someone will call you to set up this appt  Contact information:  [address]  [facility] [address]  [phone]                 Patient Instructions:   No discharge procedures on file.    Medications:  Transfer Medications (for Discharge Readmit only):   No current facility-administered medications for this encounter.      No current outpatient prescriptions on file.     Facility-Administered Medications Ordered in Other Encounters   Medication Dose Route Frequency Provider Last Rate Last Dose    acetaminophen oral solution 650 mg  650 mg Per OG tube Q6H PRN Geovanna Almeida NP        albuterol-ipratropium 2.5mg-0.5mg/3mL nebulizer solution 3 mL  3 mL Nebulization Q4H PRN Geovanna Almeida NP        amiodarone tablet 400 mg  400 mg Oral BID Geovanna Almeida NP        [START ON 4/3/2018] amLODIPine tablet 10 mg  10 mg Oral Daily Geovanna Almeida NP        amoxicillin-clavulanate 500-125mg per tablet 500 mg  1  tablet Oral BID Geovanna Almeida, JOSE        apixaban tablet 5 mg  5 mg Oral BID Geovanna Almeida, JOSE        ascorbic acid (vitamin C) tablet 500 mg  500 mg Oral BID Geovanna Almeida, JOSE        [START ON 4/3/2018] atorvastatin tablet 40 mg  40 mg Oral Daily Geovanna Almeida, JOSE        bisacodyl suppository 10 mg  10 mg Rectal Daily PRN Peter Dunaway MD        [START ON 4/3/2018] carvedilol tablet 3.125 mg  3.125 mg Oral Daily Geovanna Almeida, NP        [START ON 4/3/2018] chlorthalidone tablet 25 mg  25 mg Oral QAM Geovanna Almeida, NP        [START ON 4/3/2018] clopidogrel tablet 75 mg  75 mg Oral Daily Geovanna Almeida, NP        dextrose 50% injection 12.5 g  12.5 g Intravenous PRN Geovanna Almeida, NP        dextrose 50% injection 12.5 g  12.5 g Intravenous PRN Geovanna Almeida, NP        dextrose 50% injection 25 g  25 g Intravenous PRN Geovanna Almeida, NP        famotidine tablet 20 mg  20 mg Oral BID Geovanna Almeida, NP        [START ON 4/3/2018] fluticasone-vilanterol 100-25 mcg/dose diskus inhaler 1 puff  1 puff Inhalation Daily Geovanna Almeida, JOSE        furosemide tablet 20 mg  20 mg Oral BID Geovanna ROBERT Almeida, NP   20 mg at 04/02/18 1812    glucagon (human recombinant) injection 1 mg  1 mg Intramuscular PRN Geovanna Almeida, NP        glucose chewable tablet 16 g  16 g Oral PRN Geovanna Almeida, NP        glucose chewable tablet 24 g  24 g Oral PRN Geovannajame Almeida, NP        hydrocodone-acetaminophen 5-325mg per tablet 1 tablet  1 tablet Oral Q4H PRN Peter Dunaway MD        insulin aspart U-100 pen 0-5 Units  0-5 Units Subcutaneous QID (AC + HS) PRN Geovanna Almeida, NP        magnesium hydroxide 400 mg/5 ml suspension 2,400 mg  30 mL Oral Daily PRN Peter Dunaway MD        ondansetron disintegrating tablet 8 mg  8 mg Oral Q8H PRN Peter Dunaway MD        [START ON 4/3/2018] polyethylene glycol packet 17 g  17 g Oral Daily JOSE Greene NP  Comprehensive Stroke  [place]  Department of Vascular Neurology   [facility]

## 2018-04-03 NOTE — PLAN OF CARE
Problem: Patient Care Overview  Goal: Plan of Care Review  Outcome: Ongoing (interventions implemented as appropriate)  Patient's plan of care reviewed     Problem: Pain, Acute (Adult)  Goal: Identify Related Risk Factors and Signs and Symptoms  Related risk factors and signs and symptoms are identified upon initiation of Human Response Clinical Practice Guideline (CPG)   Outcome: Ongoing (interventions implemented as appropriate)  Patient's pain is controlled with medication     Problem: Fall Risk (Adult)  Goal: Identify Related Risk Factors and Signs and Symptoms  Related risk factors and signs and symptoms are identified upon initiation of Human Response Clinical Practice Guideline (CPG)   Outcome: Ongoing (interventions implemented as appropriate)  Patient has not had a fall     Problem: Pressure Ulcer Risk (Micah Scale) (Adult,Obstetrics,Pediatric)  Goal: Identify Related Risk Factors and Signs and Symptoms  Related risk factors and signs and symptoms are identified upon initiation of Human Response Clinical Practice Guideline (CPG)   Outcome: Ongoing (interventions implemented as appropriate)  Patient has not developed new pressure ulcers

## 2018-04-03 NOTE — PROGRESS NOTES
Physical Therapy   Admit [facility]    Kristina Aguirre   MRN: [id number]      04/03/18 1700   Transfers   Bed/Chair/WC 3   Toilet 4   Tub 3   Locomotion   Distance Walked 1   Distance Wheelchair 1   Walk 1   Wheelchair 1   Mode W   Stairs 2   Geronimo Deleon, PT   4/3/2018

## 2018-04-03 NOTE — PT/OT/SLP DISCHARGE
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  Please refer to prior OT note dated 4/1/18 for functional status.    Assessment:      Patient appropriate for care in another setting.    Objective:     GOALS:    Occupational Therapy Goals     Not on file          Multidisciplinary Problems (Resolved)        Problem: Occupational Therapy Goal    Goal Priority Disciplines Outcome Interventions   Occupational Therapy Goal   (Resolved)     OT, PT/OT Outcome(s) achieved    Description:  Goals to be met by: 4/2/18     Patient will increase functional independence with ADLs by performing:    Feeding with Modified [place].  UE Dressing with Supervision.  LE Dressing with Supervision.  Grooming while standing at sink with Supervision.  Toileting from toilet with Stand-by Assistance for hygiene and clothing management.   Toilet transfer to toilet with Stand-by Assistance.  Upper extremity exercise program x10 reps per handout, with independence.                      Reasons for Discontinuation of Therapy Services  Transfer to alternate level of care.      Plan:     Patient Discharged to: Inpatient Rehab    CAROLYN Gaspar  4/3/2018

## 2018-04-03 NOTE — ASSESSMENT & PLAN NOTE
- As demonstrable by recent carotid US and CTA (mixed density atherosclerosis at both carotid bifurcations)  Stroke risk factor, but unlikely to have caused this event as pattern not consistent with thrombotic/a-a considering embolic multi-territorial stroke  -Avoid hypotension  - Apixaban 5 mg BID, plavix 75 mg qd

## 2018-04-03 NOTE — PLAN OF CARE
Problem: Patient Care Overview  Goal: Plan of Care Review  Outcome: Ongoing (interventions implemented as appropriate)  Nutrition Problem  Inadequate energy intake     Related to (etiology):   Pt's inability to complete meals before therapy sessions    Signs and Symptoms (as evidenced by):   Pt stating that she still feels fatigued and hungry during therapy     Interventions/Recommendations (treatment strategy):  Double protein portions and snacks in between therapy sessions.     Nutrition Diagnosis Status:   New    Recommendation/Intervention:   1. Recommend Cardiac/ Carbohydrate Consistent/ dysphagia soft diet.   2. Recommend double meat portions.   3. Encourage snacks in between therapy sessions.   4. RD to follow.     Goals: Meet >85% of EEN/EPN.   Nutrition Goal Status: new

## 2018-04-03 NOTE — H&P
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  Patient followed by CTS for severe aortic stenosis and admitted to [facility] on 3/22/18 for surgical intervention.  Now, s/p L transaxillary transcatheter aortic valve replacement with a 26 mm evolut valve (TAVR) on 3/22/18.  Post-op course complicated by stroke (Stroke code called on 3/24 for worsening RUE weakness, R facial droop, dysarthria, and aphasia.  CTA without LVO.  Not tPA or IR candidate.  MRI showed multiple bilateral R>L acute infarcts on cerebral and cerebellar hemispheres highly suggestive of embolic phenomenon.) and a-fib with RVR requiring amiodarone load and infusion.  ROSEY revealed grade 4 atheroma on aorta.  Stroke etiology likely iatrogenic 2/2 TAVR as pattern is concerning for embolic phenomena. Currently below functional baseline.     Current Functional Status:  Participating with therapy. Bed mobility Mya.  Sit to stand Mya and transfers modA.  No gait.  EOB x 5 minutes Mya. SLP recommendation: dental soft diet and thin liquids.    Functional History: Patient lives in [place], alone (with her dog), in a mobile home with 2 steps to enter.  Prior to admission, she was (I) with ADLs, including driving, and mobility.  She is right handed.  DME: none.    Past Medical History:   Diagnosis Date    Abdominal aortic aneurysm (AAA) without rupture 9/12/2017    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  3/26/2018    Arthritis     Asthma     Atrial fibrillation 3/25/2018    Bilateral carotid artery stenosis 9/12/2017    Cancer     lung    COPD (chronic obstructive pulmonary disease)     Coronary artery disease of native artery with stable angina pectoris 9/29/2017    Diabetes mellitus     Gastroesophageal reflux disease without esophagitis 9/12/2017    Heart valve disorder     Hyperlipidemia     Hypertension     Long term current use of antithrombotics/antiplatelets     S/p TAVR (transcatheter aortic valve replacement), bioprosthetic 3/22/2018    Severe aortic stenosis 9/12/2017     Past Surgical History:   Procedure Laterality Date    HAND SURGERY Right     KNEE ARTHROSCOPY      LUNG LOBECTOMY Right     middle lobe    mediastinoscopy      SPINAL FUSION      TONSILLECTOMY      WRIST SURGERY Right      Review of patient's allergies indicates:  No Known Allergies    Scheduled Medications:    amiodarone  400 mg Oral BID    amLODIPine  10 mg Oral Daily    amoxicillin-clavulanate 500-125mg  1 tablet Oral BID    apixaban  5 mg Oral BID    ascorbic acid (vitamin C)  500 mg Oral BID    atorvastatin  40 mg Oral Daily    carvedilol  3.125 mg Oral Daily    chlorthalidone  25 mg Oral QAM    clopidogrel  75 mg Oral Daily    famotidine  20 mg Oral BID    fluticasone-vilanterol  1 puff Inhalation Daily    furosemide  20 mg Oral BID    lisinopril  10 mg Oral Daily    polyethylene glycol  17 g Oral Daily       PRN Medications: acetaminophen, albuterol-ipratropium 2.5mg-0.5mg/3mL, bisacodyl, dextrose 50%, dextrose 50%, dextrose 50%, glucagon (human recombinant), glucose, glucose, hydrALAZINE, hydrocodone-acetaminophen 5-325mg, insulin aspart U-100, magnesium hydroxide 400 mg/5 ml, ondansetron    Family History     Problem Relation (Age of Onset)    Heart attack Father    No Known Problems Sister, Brother, Maternal Aunt, Maternal Uncle, Paternal Aunt, Paternal Uncle, Maternal Grandmother, Maternal  Grandfather, Paternal Grandmother, Paternal Grandfather    Throat cancer Mother        Social History Main Topics    Smoking status: Former Smoker     Packs/day: 1.50     Types: Vaping w/o nicotine    Smokeless tobacco: Never Used    Alcohol use No    Drug use: No    Sexual activity: Not Currently     Review of Systems   Constitutional: Positive for activity change and fatigue. Negative for chills, diaphoresis and fever.   HENT: Negative for congestion, drooling, ear discharge, facial swelling and sneezing.    Eyes: Negative for pain, redness and visual disturbance.   Respiratory: Negative for apnea, cough, shortness of breath and wheezing.    Cardiovascular: Negative for chest pain and palpitations.   Gastrointestinal: Negative for constipation, diarrhea, nausea and vomiting.   Endocrine: Negative for cold intolerance and heat intolerance.   Genitourinary: Negative for dysuria, flank pain and frequency.   Musculoskeletal: Positive for arthralgias, gait problem and myalgias.   Skin: Negative for color change and wound.   Neurological: Positive for weakness. Negative for dizziness, light-headedness and numbness.   Hematological: Negative for adenopathy.   Psychiatric/Behavioral: Negative for agitation, confusion and sleep disturbance.     Objective:     Vital Signs (Most Recent):  Temp: 98.1 °F (36.7 °C) (04/03/18 0643)  Pulse: 64 (04/03/18 0857)  Resp: 16 (04/03/18 0857)  BP: (!) 150/62 (04/03/18 0857)  SpO2: (!) 94 % (04/03/18 0643)    Vital Signs (24h Range):  Temp:  [97.9 °F (36.6 °C)-98.2 °F (36.8 °C)] 98.1 °F (36.7 °C)  Pulse:  [54-64] 64  Resp:  [16-20] 16  SpO2:  [92 %-96 %] 94 %  BP: (132-186)/(54-76) 150/62     Body mass index is 26.52 kg/m².    Physical Exam   Constitutional: She appears well-developed and well-nourished.   HENT:   Head: Normocephalic and atraumatic.   Right Ear: External ear normal.   Left Ear: External ear normal.   Eyes: Conjunctivae and EOM are normal. Pupils are equal, round, and  reactive to light.   Neck: Normal range of motion. Neck supple. No thyromegaly present.   Cardiovascular: Normal rate, regular rhythm, normal heart sounds and intact distal pulses.    Pulmonary/Chest: Effort normal and breath sounds normal. No respiratory distress. She has no rales.   Abdominal: Soft. Bowel sounds are normal. She exhibits no distension. There is no guarding.   Lymphadenopathy:     She has no cervical adenopathy.   Neurological:   -  Mental Status:  AAOx3.  Follows commands.  Answers correct age and .  Recent and remote memory intact.  Recalls 3/3 objects with assist.  Names 4/4 seasons.  Right neglect.    -  Speech and language:  aphasia and dysarthria.    -  Vision:  R hemianopsia.  No ptosis.    -  Facial movement (CN VII): Mild facial droop.   -  Motor:  RUE: 0/5.  LUE: 5/5.  RLE: 4-/5.  LLE: 5/5.  -  Tone:  Decreased RUE.  -  Sensory:  Intact to light touch and pin prick.    Skin: Skin is warm and dry. Capillary refill takes less than 2 seconds.   Psychiatric: She has a normal mood and affect. Her behavior is normal.   Vitals reviewed.    NEUROLOGICAL EXAMINATION:     CRANIAL NERVES     CN III, IV, VI   Pupils are equal, round, and reactive to light.  Extraocular motions are normal.       Diagnostic Results:   Labs: Reviewed  CT: Reviewed  MRI: Reviewed    Assessment/Plan:     Kristina Aguirre is a 67 y.o. female being admitted to inpatient rehabilitation on 2018 for 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 with impaired mobility and ADLs.     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 Pan sensitive    -Continue Augmentin for 7day course        Atrial fibrillation    cotininue Apixaban        S/p TAVR (transcatheter aortic  valve replacement), bioprosthetic    Continue Apixaban and Plavix        Diabetes mellitus, type 2    Continue to monitor blood glucoses        Essential hypertension    Elevated on admission. Continue current meds    4/3/18 - started Lisinopril 10mg daily              Peter Dunaway MD  Department of Physical Medicine & Rehab   [facility]    Post Admission Assessment:    Kristina Aguirre is a 67 y.o. female being admitted to inpatient rehabilitation on 4/2/2018 for CVA with impaired mobility and ADLs.   Patient is appropriate for inpatient rehabilitation and is expected to tolerate 3 hours of therapy a day or 15 hours of therapy a week.  Patient is expected to benefit from the following therapy services: Physical Therapy, Occupational Therapy, Speech Therapy, as well as Recreational Therapy  Impairments include: Impaired balance, Decreased Endurance, Impaired activity tolerance  Goals: Supervision to Mod I with Mobility, ADLs, Transfers using LRAD   Precautions:  Fall, Aspiration  ELOS: 10-14 days   Disposition: Home with family     I have had the opportunity to examine the patient within 24 hours of admission and have reviewed the Pre-Admission Assessment and find it consistent with my examination and evaluation of the patient. I confirm that this patient is appropriate for admission and treatment in this inpatient rehabilitation hospital, needs intense interdisciplinary rehabilitation care under my direction and is expected to achieve meaningful goals within a reasonable period of time that are consistent with the planned discharge disposition.     The patient will be admitted for inpatient comprehensive interdisciplinary rehabilitation to address the impairments and medical conditions listed above while assessing equipment needs and compensatory strategies, with coordinated interdisciplinary services that will include physical therapy, occupational therapy, and close monitoring and treatment  with 24-hour rehabilitative nursing. This interdisciplinary program will be performed under the direction of a physiatrist.

## 2018-04-03 NOTE — PROGRESS NOTES
"Speech Therapy   Evaluation    Kristina Aguirre   MRN: [id number]        04/03/18 1515   Speech Time Calculation   Speech Start Time 1515   Speech Stop Time 1600   Speech Total (min) 45 min   General Information   SLP Treatment Date 04/03/18   Referring Physician Dr Dunaway   General Observations Pt seen individually within ST office while sitting upright in w/c.    Pertinent History of Current Problem Past Medical History:   Diagnosis Date    Abdominal aortic aneurysm (AAA) without rupture 9/12/2017    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 3/26/2018    Arthritis     Asthma     Atrial fibrillation 3/25/2018    Bilateral carotid artery stenosis 9/12/2017    Cancer     lung    COPD (chronic obstructive pulmonary disease)     Coronary artery disease of native artery with stable angina pectoris 9/29/2017    Diabetes mellitus     Gastroesophageal reflux disease without esophagitis 9/12/2017    Heart valve disorder     Hyperlipidemia     Hypertension     Long term current use of antithrombotics/antiplatelets     S/p TAVR (transcatheter aortic valve replacement), bioprosthetic 3/22/2018    Severe aortic stenosis 9/12/2017      Current Respiratory Status room air   General Precautions aspiration;fall;diabetic;vision impaired  (dental soft diet)   Visual/Auditory Vision impaired  (decreased peripheral, wears glasses)   Subjective   Patient states Pt stated "my vision is whats changed the most. I don't have trouble eating. 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  Patient followed by CTS for severe aortic stenosis and admitted to [facility] on 3/22/18 for surgical intervention.  Now, s/p L transaxillary transcatheter aortic valve replacement with a 26 mm evolut valve (TAVR) on 3/22/18.  Post-op course complicated by stroke (Stroke code called on 3/24 for worsening RUE weakness, R facial droop, dysarthria, and aphasia.  CTA without LVO.  Not tPA or IR candidate.  MRI showed multiple bilateral R>L acute infarcts on cerebral and cerebellar hemispheres highly suggestive of embolic phenomenon.) and a-fib with RVR requiring amiodarone load and infusion.  ROSEY revealed grade 4 atheroma on aorta.  Stroke etiology likely iatrogenic 2/2 TAVR as pattern is concerning for embolic phenomena. Currently below functional baseline.    Pt presents today with moderate cognitive-linguistic deficits. Pt noted with decreased safety awareness, visual deficits, and poor attention to right side of body. Pt previously living independently, working, driving, and completing financial management. Pt also cares for small dog, Latrice. Orientation task x4 completed with 60% acc. Pt completed complex YNQ task indly with 100% acc, followed simple commands with 60% acc, and complete auditory comprehension of paragraph read aloud by SLP with 50% acc. Deficits noted 2' poor attention skills to details and decreased recall skills. Attempted simple symbol and single letter reading task, but pt unable to ID items at this time. Peripheral deficits observed with possible field cuts at this time of evaluation. Pt verbalized months of the year indly with 100% acc and completed responsive naming task indly with 100% acc. Pt completed divergent naming task within one minute of time with 10 items listed with decreased thought  "organization skills. Immediate memory task completed with 71% acc, STM task completed with 33% acc, and LTM task completed with 100% acc. Simple home problem solving task completed indly with 100% acc; however, simple money/time math calculations task completed with 25% acc indly. Simple verbal sequencing task completed with 50% acc. Pt unable to perform writing task at this time with right shoulder pain, right handed weakness, and poor vision.     Pt denied swallowing difficulty initially; however, later reported she "chokes" sometimes with too much food in her mouth. Recommend to complete full dysphagia evaluation next visit. Pt currently with     Pt to benefit from skilled ST services targeting attention skills, vision, thought organization, recall, and executive functioning cognitive skills for increase in independence. Education provided to pt regarding ST role and recommended plan of care, pt agreeable at time of evaluation.   Level of Motivation/Participation good   Recommendations   Solid Diet Level Dental Soft   Liquid Diet Level Thin   Additional Recommendations aspiration precautions   Short Term Goals   Goal 1 Pt will be oriented x4 given supervision with 90% acc.    Goal 2 Pt will complete language comprehension tasks given supervision with 90% acc.    Goal 3 Pt will complete expressive language tasks given supervision with 90% acc.    Goal 4 Pt will complete problem solving/reasoning/sequencing tasks given supervision with 90% acc.    Goal 5 Pt will complete functional/recent recall task given min verbal cues with 80% acc.    Long Term Goals   Goal 1 Pt will complete visual scanning task given min verbal cues with 80% acc.    Goal 2 Pt will complete language comprehension tasks with modified independence with % acc.    Goal 3 Pt will complete expressive language tasks with modified independence with % acc.    Goal 4 Pt will complete problem solving/reasoning/sequencing tasks with modified " independence with % acc.    Goal 5 Pt will complete functional/recent recall task given supervision with 90% acc.   Discharge Recommendations   Discharge Facility/Level Of Care Needs outpatient speech therapy   Plan   Plan Plan of care intiated;Ongoing swallow assessment to intiate least restrictive diet;Speech Language/Cognitive Therapy;Patient Education;Family Education  (complete dysphagia evaluation)   Therapy Frequency Monday-Friday;Saturday or Sunday   Plan of Care Expires on 05/03/18   SLP Follow-up   SLP Follow-up? Yes   Treatment/Billable Minutes   Eval 45   Total Time 45     The patient's spiritual, cultural, social, and educational needs were considered with no evidence of barriers noted, and the patient is agreeable to plan of care.    Lala Muñoz CCC-SLP  4/3/2018

## 2018-04-03 NOTE — PROGRESS NOTES
Speech Therapy   Admit FIM Scores    Kristina Aguirre   MRN: [id number]        04/03/18 1515   Communication   Comprehension 4   Mode A   Expression 4   Mode V   Social Cognition   Social Interaction 4   Problem Solving 4   Memory 3     Comprehension:  Complex= humor, finances, rationale for medical treatment(hip precautions, pressure relief)  Basic= pain, hunger, thirst, bathroom needs, cold, nutrition, sleep    · Understands basic 75-89%- may need words repeated (4)    Expression:  - Expresses basic 75-89% of time - Needs to repeat words  (4)    Social Interaction:  - Interacts appropriately 75-89% of time - needs redirection for appropriate language or to initiate interaction  (4)    Problem Solving:  - Solves basic problems 75-89% of the time  (4)    Memory:  - Recognizes, recalls, or executes 50-74% of time 2 steps of 2 step request (3)    Lala Muñoz CCC-SLP  4/3/2018

## 2018-04-04 LAB
ALBUMIN SERPL BCP-MCNC: 3.6 G/DL
ALP SERPL-CCNC: 86 U/L
ALT SERPL W/O P-5'-P-CCNC: 13 U/L
ANION GAP SERPL CALC-SCNC: 9 MMOL/L
AST SERPL-CCNC: 22 U/L
BASOPHILS # BLD AUTO: 0.11 K/UL
BASOPHILS NFR BLD: 0.9 %
BILIRUB SERPL-MCNC: 0.4 MG/DL
BUN SERPL-MCNC: 25 MG/DL
CALCIUM SERPL-MCNC: 9.8 MG/DL
CHLORIDE SERPL-SCNC: 97 MMOL/L
CO2 SERPL-SCNC: 31 MMOL/L
CREAT SERPL-MCNC: 0.9 MG/DL
DIFFERENTIAL METHOD: ABNORMAL
EOSINOPHIL # BLD AUTO: 0.6 K/UL
EOSINOPHIL NFR BLD: 4.9 %
ERYTHROCYTE [DISTWIDTH] IN BLOOD BY AUTOMATED COUNT: 18.3 %
EST. GFR  (AFRICAN AMERICAN): >60 ML/MIN/1.73 M^2
EST. GFR  (NON AFRICAN AMERICAN): >60 ML/MIN/1.73 M^2
GLUCOSE SERPL-MCNC: 145 MG/DL
HCT VFR BLD AUTO: 30.2 %
HGB BLD-MCNC: 9.4 G/DL
IMM GRANULOCYTES # BLD AUTO: 0.06 K/UL
IMM GRANULOCYTES NFR BLD AUTO: 0.5 %
LYMPHOCYTES # BLD AUTO: 2.3 K/UL
LYMPHOCYTES NFR BLD: 18.9 %
MCH RBC QN AUTO: 23.9 PG
MCHC RBC AUTO-ENTMCNC: 31.1 G/DL
MCV RBC AUTO: 77 FL
MONOCYTES # BLD AUTO: 0.9 K/UL
MONOCYTES NFR BLD: 7.6 %
NEUTROPHILS # BLD AUTO: 8.1 K/UL
NEUTROPHILS NFR BLD: 67.2 %
NRBC BLD-RTO: 0 /100 WBC
PLATELET # BLD AUTO: 461 K/UL
PMV BLD AUTO: 9.6 FL
POCT GLUCOSE: 159 MG/DL (ref 70–110)
POCT GLUCOSE: 168 MG/DL (ref 70–110)
POCT GLUCOSE: 176 MG/DL (ref 70–110)
POCT GLUCOSE: 284 MG/DL (ref 70–110)
POTASSIUM SERPL-SCNC: 4 MMOL/L
PROT SERPL-MCNC: 7 G/DL
RBC # BLD AUTO: 3.93 M/UL
SODIUM SERPL-SCNC: 137 MMOL/L
TROPONIN I SERPL DL<=0.01 NG/ML-MCNC: 0.43 NG/ML
WBC # BLD AUTO: 12.11 K/UL

## 2018-04-04 PROCEDURE — 93005 ELECTROCARDIOGRAM TRACING: CPT

## 2018-04-04 PROCEDURE — 85025 COMPLETE CBC W/AUTO DIFF WBC: CPT

## 2018-04-04 PROCEDURE — 97116 GAIT TRAINING THERAPY: CPT

## 2018-04-04 PROCEDURE — 25000242 PHARM REV CODE 250 ALT 637 W/ HCPCS: Performed by: NURSE PRACTITIONER

## 2018-04-04 PROCEDURE — 80053 COMPREHEN METABOLIC PANEL: CPT

## 2018-04-04 PROCEDURE — 25000003 PHARM REV CODE 250: Performed by: NURSE PRACTITIONER

## 2018-04-04 PROCEDURE — 63600175 PHARM REV CODE 636 W HCPCS: Performed by: NURSE PRACTITIONER

## 2018-04-04 PROCEDURE — 92507 TX SP LANG VOICE COMM INDIV: CPT

## 2018-04-04 PROCEDURE — 97150 GROUP THERAPEUTIC PROCEDURES: CPT

## 2018-04-04 PROCEDURE — 36415 COLL VENOUS BLD VENIPUNCTURE: CPT

## 2018-04-04 PROCEDURE — 84484 ASSAY OF TROPONIN QUANT: CPT

## 2018-04-04 PROCEDURE — 25000003 PHARM REV CODE 250: Performed by: PHYSICAL MEDICINE & REHABILITATION

## 2018-04-04 PROCEDURE — 12800000 HC REHAB SEMI-PRIVATE ROOM

## 2018-04-04 PROCEDURE — 93010 ELECTROCARDIOGRAM REPORT: CPT | Mod: ,,, | Performed by: INTERNAL MEDICINE

## 2018-04-04 PROCEDURE — 97535 SELF CARE MNGMENT TRAINING: CPT

## 2018-04-04 PROCEDURE — 99233 SBSQ HOSP IP/OBS HIGH 50: CPT | Mod: ,,, | Performed by: PHYSICAL MEDICINE & REHABILITATION

## 2018-04-04 PROCEDURE — 97530 THERAPEUTIC ACTIVITIES: CPT

## 2018-04-04 PROCEDURE — 92610 EVALUATE SWALLOWING FUNCTION: CPT

## 2018-04-04 RX ORDER — LISINOPRIL 20 MG/1
20 TABLET ORAL DAILY
Status: DISCONTINUED | OUTPATIENT
Start: 2018-04-05 | End: 2018-04-06

## 2018-04-04 RX ORDER — HYDROXYZINE PAMOATE 25 MG/1
25 CAPSULE ORAL EVERY 8 HOURS PRN
Status: DISCONTINUED | OUTPATIENT
Start: 2018-04-04 | End: 2018-04-19 | Stop reason: HOSPADM

## 2018-04-04 RX ORDER — LIDOCAINE 50 MG/G
1 PATCH TOPICAL
Status: DISCONTINUED | OUTPATIENT
Start: 2018-04-04 | End: 2018-04-19 | Stop reason: HOSPADM

## 2018-04-04 RX ADMIN — AMIODARONE HYDROCHLORIDE 400 MG: 200 TABLET ORAL at 08:04

## 2018-04-04 RX ADMIN — CARVEDILOL 3.12 MG: 3.12 TABLET, FILM COATED ORAL at 07:04

## 2018-04-04 RX ADMIN — APIXABAN 5 MG: 5 TABLET, FILM COATED ORAL at 08:04

## 2018-04-04 RX ADMIN — INSULIN ASPART 3 UNITS: 100 INJECTION, SOLUTION INTRAVENOUS; SUBCUTANEOUS at 09:04

## 2018-04-04 RX ADMIN — CHLORTHALIDONE 25 MG: 25 TABLET ORAL at 07:04

## 2018-04-04 RX ADMIN — POLYETHYLENE GLYCOL 3350 17 G: 17 POWDER, FOR SOLUTION ORAL at 07:04

## 2018-04-04 RX ADMIN — AMLODIPINE BESYLATE 10 MG: 10 TABLET ORAL at 07:04

## 2018-04-04 RX ADMIN — ATORVASTATIN CALCIUM 40 MG: 20 TABLET, FILM COATED ORAL at 07:04

## 2018-04-04 RX ADMIN — AMOXICILLIN AND CLAVULANATE POTASSIUM 500 MG: 500; 125 TABLET, FILM COATED ORAL at 08:04

## 2018-04-04 RX ADMIN — FUROSEMIDE 20 MG: 20 TABLET ORAL at 05:04

## 2018-04-04 RX ADMIN — CLOPIDOGREL BISULFATE 75 MG: 75 TABLET ORAL at 07:04

## 2018-04-04 RX ADMIN — FAMOTIDINE 20 MG: 20 TABLET, FILM COATED ORAL at 08:04

## 2018-04-04 RX ADMIN — LISINOPRIL 10 MG: 10 TABLET ORAL at 07:04

## 2018-04-04 RX ADMIN — LIDOCAINE 1 PATCH: 50 PATCH TOPICAL at 05:04

## 2018-04-04 RX ADMIN — Medication 500 MG: at 08:04

## 2018-04-04 RX ADMIN — FUROSEMIDE 20 MG: 20 TABLET ORAL at 08:04

## 2018-04-04 RX ADMIN — AMOXICILLIN AND CLAVULANATE POTASSIUM 500 MG: 500; 125 TABLET, FILM COATED ORAL at 07:04

## 2018-04-04 RX ADMIN — INSULIN ASPART 4 UNITS: 100 INJECTION, SOLUTION INTRAVENOUS; SUBCUTANEOUS at 05:04

## 2018-04-04 RX ADMIN — AMIODARONE HYDROCHLORIDE 400 MG: 200 TABLET ORAL at 07:04

## 2018-04-04 RX ADMIN — APIXABAN 5 MG: 5 TABLET, FILM COATED ORAL at 07:04

## 2018-04-04 RX ADMIN — INSULIN ASPART 2 UNITS: 100 INJECTION, SOLUTION INTRAVENOUS; SUBCUTANEOUS at 07:04

## 2018-04-04 RX ADMIN — FLUTICASONE FUROATE AND VILANTEROL TRIFENATATE 1 PUFF: 100; 25 POWDER RESPIRATORY (INHALATION) at 07:04

## 2018-04-04 RX ADMIN — FAMOTIDINE 20 MG: 20 TABLET, FILM COATED ORAL at 07:04

## 2018-04-04 RX ADMIN — HYDROCODONE BITARTRATE AND ACETAMINOPHEN 1 TABLET: 5; 325 TABLET ORAL at 06:04

## 2018-04-04 RX ADMIN — INSULIN ASPART 2 UNITS: 100 INJECTION, SOLUTION INTRAVENOUS; SUBCUTANEOUS at 12:04

## 2018-04-04 RX ADMIN — Medication 500 MG: at 07:04

## 2018-04-04 RX ADMIN — IPRATROPIUM BROMIDE AND ALBUTEROL SULFATE 3 ML: .5; 3 SOLUTION RESPIRATORY (INHALATION) at 02:04

## 2018-04-04 NOTE — PROGRESS NOTES
"Occupational Therapy   Treatment    Kristina Aguirre   MRN: [id number]    04/04/18 1110   OT Time Calculation   OT Start Time 1110   OT Stop Time 1205   OT Total Time (min) 55 min   General   OT Date of Treatment 04/04/18   Family/Caregiver Present No   Patient Found (position) Seated in wheelchair   Pt found with (holter monitor)   Precautions   General Precautions aspiration;diabetic;fall;vision impaired   Orthopedic No   Required Braces or Orthoses No   Cardiovascular/Pulmonary Other (see comments)  (holter monitor)   Visual/Auditory Vision impaired   Subjective   Patient states "I'm going to give y'all a heart attack when I stand up by myself."   Pain/Comfort   Pain Rating (pt does not rate or give details)   Location - Side Left   Location - Orientation lower   Location (breast)   Pain Addressed Distraction;Nurse notified;Cessation of Activity   Pain Comment pt reports dull pain under L breast   Transfers   Transfer yes   Sit to Stand   Sit <> Stand Assistance Contact Guard Assistance;Minimum Assistance  (Min A for steadying at times during bathing)   Sit <> Stand Assistive Device Grab bars   Trials/Comments Assist for steadying during bathing tasks, pt very impulsive throughout session   Stand to Sit   Assistance Contact Guard Assistance;Minimum Assistance   Assistive Device Grab bars   Trials/Comments Assist for steadying at times during shower   Shower Transfer   Shower Transfer Assistance Contact Guard Assistance   Shower Transfer Assistive Device Shower bench   Trials/Comments pt tf'd from wc<>TTB needing cues for safety and awareness of surroundings especially on R side   Toilet Transfer   Toilet Transfer Technique Stand Pivot   Toilet Transfer Assistance Minimum Assistance  (steadying assistance)   Toilet Transfer Assistive Device grab bar   Trials/Comments Assist to steady self onto and off of toilet in bathroom   Feeding   Feeding Level of Assistance Supervision  (with set up)   Feeding Where Assessed " Wheelchair  (in dining room)   Feeding Comments set up to pour dressing onto salad and open containers and silverware   Bathing   Bathing Adaptive Equipment Tub bench   Bathing Level of Assistance Minimum assistance   Bathing Where Assessed tub bench   Bathing Comments pt completed 9/10 parts with assistance for washing and drying LUE   UE Dressing   UE Dressing Level of Assistance Maximum assistance   UE Dressing Where Assessed Other (Comment)  (seated on TTB)   UE Dressing Comments assist with 3/4 parts   LE Dressing   LE Dressing  Level of Assistance Minimum assistance   LE Dressing Level of Assistance Minimum assistance   Shoe Level of Assistance Stand by assistance  (to don slippers; pt refused to don socks and shoes)   Adult Briefs Level of Assistance Minimum assistance   LE Dressing Where Assessed Other (Comment)  (seated on TTB)   LE Dressing Comments Pt completed 6/8 parts, needed assistance with managing pants and underwear over R hip   Toileting   Toileting Level of Assistance Moderate assistance   Toileting Where Assessed Toilet   Toileting Comments assist to manage clothing over R hip   Additional Activities   Additional Activities Comments  - NSG ([place]) notified OT assisting pt in shower and requesting NSG to check Holter monitor once pt out of shower for proper placement of electrodes. [place] arrived at end of session to look at electrode placement. Pt then placed in dining room for supervised meal as recommended by speech therapist.    Activity Tolerance   Activity Tolerance Patient tolerated treatment well   Medical Staff Made Aware NSG   After Treatment   Patient Position After Treatment Seated in wheelchair  (in dining room)   Patient after treatment left nursing notified   Assessment   Prognosis Fair   Problem List Decreased Self Care skills;Decreased upper extremity range of motion;Decreased upper extremity strength;Decreased safe judgment during ADL;Decreased cognition;Decreased  endurance;Decreased sensation;Visual deficit;Decreased fine motor control;Decreased functional mobility;Decreased gross motor control;Decreased IADLs;Decreased Function of right upper extremity;Decreased trunk control for functional activities;Decreased Function of left upper extremity   Assessment  Pt did improve independence with bathing tasks while in shower today but remains high safety risk due to cognition, visual deficits, and impaired balance. Pt needs close supervision during ADLs and mobility as she is unreceptive to cues or suggestions given by therapist.   Level of Motivation/Participation Poor   Barriers to Discharge Decreased caregiver support   Discharge Recommendations   Equipment Needed After Discharge 3-in-1 commode;bath bench   Discharge Facility/Level Of Care Needs outpatient OT   Plan   Plan Continue with current plan   Therapy Frequency 2 times/day   Occupational Therapy Follow-up   OT Follow-up? Yes   Treatment/Billable Minutes   Self Care/Home Management 55   Total Time 55       CAROLYN Dixon   4/4/2018

## 2018-04-04 NOTE — PROGRESS NOTES
Dr. Dunaway notified of patient complaint of shortness of breath and discomfort to bilateral rib area when breath deeply.Pulse ox 93% on room air at this time. States to call for respiratory treatment and to order a chest x-ray for patient.

## 2018-04-04 NOTE — PROGRESS NOTES
"Speech Therapy   Bedside Swallow Evaluation/ Speech Treatment    Kristina Aguirre   MRN: [id number]            04/04/18 0900   Speech Time Calculation   Speech Start Time 0900   Speech Stop Time 0945   Speech Total (min) 45 min   General Information   SLP Treatment Date 04/04/18   Referring Physician Dr. Dunaway   General Observations Patient seen while sitting edge of bed in pt's room. ST at patient's midline to improve visual recognition t/o session.    General Precautions aspiration;diabetic;fall;vision impaired  (peripheral vision impaired)   Visual/Auditory Vision impaired   Current Diet   Current Diet Textures Dental Soft   Current Liquid Consistencies Thin   Subjective   Patient states "Half the time, I don't use the stuff because I can't get it open." Patient referring to food/drink packaging.   Pain/Comfort   Pain Rating no pain   Oral Musculature Evaluation   Oral Musculature right weakness   Dentition scattered dentition   Mandibular Strength and Mobility WFL   Oral Labial Strength and Mobility WFL   Lingual Strength and Mobility impaired protrusion  (deviation to R side )   Buccal Strength and Mobility decreased tone   Volitional Swallow delayed initiation of swallow; decreased hyolaryngeal elevation/excursion   Assessment   SLP Diagnosis moderate cog-ling deficits; severe visual deficits; R orofacial weakness; mild oropharyngeal dysphagia   Prognosis Good   Problem List R orofacial weakness; decreased attention skills; visual changes; decreased thought organization; decreased reasoning skills; poor recall; decreased awareness of CVA changes   Assessment BSE completed.   Patient with delayed initiation of swallow upon palpation. Minimal hyolaryngeal elevation/excursion noted. Patient tolerated bites of regular solid consistency with no anterior spillage and no oral residue. Noted that patient cleared oral cavity independently via lingual sweep. Patient stated, "I don't want to choke so I always check." Patient " tolerated 4 oz via cup sip and straw sip with no overt s/s aspiration. Patient requested assistance with opening johnathan cracker packaging. Patient reported that she has difficulty with food prep/self feeding 2/2 R sided weakness. ST recommending patient has assistance with all meals. Swallow recommendations/precautions sign placed above patient's bed and charge nurse notified of pt's need for assistance with meals.     DIET RECOMMENDATIONS  Solids: Regular  Liquids: Thin liquids    Swallowing Precautions:  Patient needs assistance with ALL MEALS  Sit upright during and 45 minutes after  Check for oral residue  Small bites/sips      Cognitive Treatment    Patient oriented to person, place, and situation independently. Answered temporal orientation questions with 70% accuracy independently. Patient recalled recent therapy session (previous day), given min verbal prompting. Patient recalled recent meals, given min verbal prompting. Patient recalled conversations with family friend and dietician this AM. Noted that patient demonstrated poor peripheral vision t/o session, despite moderate verbal cues.    Level of Motivation/Participation Good   Recommendations   Solid Diet Level Regular   Liquid Diet Level Thin   Additional Recommendations NEEDS ASSISTANCE WITH ALL MEALS   Short Term Goals   Goal 1 Patient will complete OME's x10 each given moderate verbal and visual cues to improve R orofacial weakness.   Goal 2 Patient will complete dysphagia exercises x10 each given moderate verbal and visual cues to improve laryngeal elevation and tongue base retraction.   Goal 3 Patient will tolerate current diet of regular solids with thin liquids with no overt s/s aspiration.    Long Term Goals   Goal 1 Patient will complete OME's x10 each given min verbal and visual cues to improve R orofacial weakness.   Goal 2 Patient will complete dysphagia exercises x10 each given min verbal and visual cues to improve laryngeal elevation and  tongue base retraction.   Discharge Recommendations   Discharge Facility/Level Of Care Needs outpatient speech therapy   Plan   Plan Plan of care intiated;Ongoing swallow assessment to intiate least restrictive diet;Dysphagia Therapy;Speech Language/Cognitive Therapy;Patient Education;Family Education   Therapy Frequency Monday-Friday   Plan of Care Expires on 05/03/18   SLP Follow-up   SLP Follow-up? Yes   Treatment/Billable Minutes   Speech Therapy Individual 20   Eval Swallow and Oral Function 25   Total Time 45       The patient's spiritual, cultural, social, and educational needs were considered with no evidence of barriers noted, and the patient is agreeable to plan of care.    Claire Vázquez CCC-SLP  4/4/2018

## 2018-04-04 NOTE — PROGRESS NOTES
"Occupational Therapy   Dressing Group    Kristina Aguirre   MRN: [id number]       Subjective: "I have trouble with everything"     Pain level:    8/10 R shoulder                                   How was pain addressed:  Reposition; distraction     Objective:   Pt participated in 45 min functional dressing group. The group activity reviewed safety considerations, energy conservation, and adaptive strategies for upper body and lower body dressing. Patient educated on adaptive equipment available to assist with dressing (button hook, long handled shoe horn, reacher, dressing stick, elastic shoe laces). Patient also educated on dressing tips that promote increased (I) with dressing such as wearing loose fit clothing, using footstool, and adapted clothing available.      UB dressing (pullover shirt, button down shirt, jacket):  Total A for threading BUE and pulling over head; Total A for sweater to thread BUE and pull around back     LB dressing (socks)  Stand by Assist                      (pants)    Max Assist; hindered by peripheral vision deficits to thread BLE into correct pant leg, assist to manage over R hip                      (shoes)  Stand by Assist to don slippers    These activities were performed in a group setting to encourage participation with peers and social interaction skills.Social Interaction: (3)     Interacts appropriately 50-74% of the time-may be verbally inappropriate     Assessment:  Patient participated in a 45 minute dressing group activity.  The group activity challenged dynamic standing/sitting balance, core strengthening, bilateral upper extremity/ lower extremity strengthening, hand -eye coordination, and social affect/mood.  The patient verbalized understanding, demonstrated comprehension of use of AE and adaptive techniques for dressing.          04/04/18 1015   OT Time Calculation   OT Start Time 1015   OT Stop Time 1100   OT Total Time (min) 45 min   General   OT Date of Treatment " 04/04/18   Family/Caregiver Present No   Patient Found (position) Supine in bed   Pt found with (holter monitor)   Precautions   General Precautions aspiration;diabetic;fall;vision impaired   Orthopedic No   Required Braces or Orthoses No   Cardiovascular/Pulmonary Other (see comments)  (Holter monitor)   Visual/Auditory Vision impaired   Treatment/Billable Minutes   Therapeutic Group 45   Total Time 45       Upon OT entry into pt room, pt found in bed in [facility]. Pt reports she had not been bathed or dressed for the day and initially refusing group until she was bathed. OT encouraged pt to participate and assisted pt in donning new pair of scrubs. OT offered to assist pt in shower (directly after group during scheduled OT session), pt agitated, wanted OT to have given pt a shower earlier this morning. OT explained daily rehab schedule at length and inability for OT to provide shower during unscheduled times, again reinforced that OT would provide shower after group if pt would be agreeable. Once dressed pt agreeable to group.

## 2018-04-04 NOTE — SUBJECTIVE & OBJECTIVE
"Interval History 4/4/2018:  Patient is seen for follow-up rehab evaluation and recommendations: Patient complaining of SOB and left side pain. Denies chest pain or vision changes or weakness. Tenderness over the left ribs. Does reported feeling "flushed". Vitals stable and Blood glucose stable. EKG ordered STAT as well as CXR showing resolved pulm edema. Stat Troponins and CBC and CMP ordered as well.     Scheduled Medications:    amiodarone  400 mg Oral BID    amLODIPine  10 mg Oral Daily    amoxicillin-clavulanate 500-125mg  1 tablet Oral BID    apixaban  5 mg Oral BID    ascorbic acid (vitamin C)  500 mg Oral BID    atorvastatin  40 mg Oral Daily    carvedilol  3.125 mg Oral Daily    chlorthalidone  25 mg Oral QAM    clopidogrel  75 mg Oral Daily    famotidine  20 mg Oral BID    fluticasone-vilanterol  1 puff Inhalation Daily    furosemide  20 mg Oral BID    lidocaine  1 patch Transdermal Q24H    [START ON 4/5/2018] lisinopril  20 mg Oral Daily    polyethylene glycol  17 g Oral Daily       Diagnostic Results: ECG: Reviewed  X-Ray: Reviewed    PRN Medications: acetaminophen, albuterol-ipratropium 2.5mg-0.5mg/3mL, bisacodyl, dextrose 50%, dextrose 50%, dextrose 50%, glucagon (human recombinant), glucose, glucose, hydrALAZINE, hydrocodone-acetaminophen 5-325mg, hydrOXYzine pamoate, insulin aspart U-100, magnesium hydroxide 400 mg/5 ml, ondansetron    Review of Systems   Constitutional: Positive for activity change and fatigue. Negative for chills, diaphoresis and fever.   HENT: Negative for congestion, ear discharge and ear pain.    Eyes: Negative for photophobia, pain and visual disturbance.   Respiratory: Positive for shortness of breath. Negative for cough, chest tightness and wheezing.    Cardiovascular: Negative for chest pain and palpitations.   Gastrointestinal: Negative for abdominal distention, abdominal pain, constipation, diarrhea and nausea.   Endocrine: Negative for cold intolerance and " heat intolerance.   Genitourinary: Negative for enuresis, frequency and genital sores.   Musculoskeletal: Positive for gait problem and myalgias (over left ribs). Negative for arthralgias.   Skin: Negative for color change and wound.   Neurological: Negative for dizziness, seizures, weakness and headaches.   Hematological: Negative for adenopathy.   Psychiatric/Behavioral: Negative for agitation, confusion and hallucinations. The patient is nervous/anxious.      Objective:     Vital Signs (Most Recent):  Temp: 98.4 °F (36.9 °C) (18 162)  Pulse: 77 (18 162)  Resp: 20 (18)  BP: (!) 132/56 (18)  SpO2: 98 % (18)    Vital Signs (24h Range):  Temp:  [98.3 °F (36.8 °C)-98.4 °F (36.9 °C)] 98.4 °F (36.9 °C)  Pulse:  [54-77] 77  Resp:  [18-20] 20  SpO2:  [91 %-98 %] 98 %  BP: (132-162)/(56-72) 132/56     Physical Exam   Constitutional: She is oriented to person, place, and time. She appears well-developed and well-nourished. No distress.   HENT:   Head: Normocephalic and atraumatic.   Right Ear: External ear normal.   Left Ear: External ear normal.   Nose: Nose normal.   Eyes: Right eye exhibits no discharge. Left eye exhibits no discharge. No scleral icterus.   Neck: Normal range of motion.   Cardiovascular: Normal rate, regular rhythm and intact distal pulses.    Pulmonary/Chest: Effort normal. No respiratory distress. She has no wheezes.   Abdominal: Soft. She exhibits no distension. There is no tenderness.   Musculoskeletal: Normal range of motion. She exhibits no edema or tenderness.   Neurological: She is alert and oriented to person, place, and time.   -  Mental Status:  AAOx3.  Follows commands.  Answers correct age and .  Recent and remote memory intact.  -  Speech and language:  No aphasia, mild dysarthria.    -  Vision:  R hemianopsia.  No ptosis.    -  Facial movement (CN VII): Mild facial droop.   -  Motor:  RUE: 0/5.  LUE: 5/5.  RLE: -/.  LLE: .  -  Tone:   Decreased RUE.  -  Sensory:  Intact to light touch and pin prick.   Skin: Skin is warm and dry. No rash noted.   Psychiatric: She has a normal mood and affect. Her behavior is normal. Thought content normal. Cognition and memory are impaired.   Vitals reviewed.    NEUROLOGICAL EXAMINATION:     MENTAL STATUS   Oriented to person, place, and time.

## 2018-04-04 NOTE — PROGRESS NOTES
"Physical Therapy   Treatment    Kristina Aguirre   MRN: [id number]    04/04/18 1445   PT Time Calculation   PT Start Time 1445   PT Stop Time 1530   PT Total Time (min) 45 min   Treatment   Treatment Type Treatment   PT/PTA PT   PTA Visit Number 0   General   PT Received On 04/04/18   Family/Caregiver Present No   Patient Found (position) Seated in wheelchair   Precautions   General Precautions aspiration;diabetic;fall;vision impaired   Orthopedic No   Required Braces or Orthoses No   Cardiovascular/Pulmonary Other (see comments)  (holter monitor)   Visual/Auditory Vision impaired   Subjective   Patient states "My morning wasn't good, but I'm not dwelling on it."   Pain/Comfort   Pain Rating 1 6/10   Location - Side 1 Left   Location - Orientation 1 generalized   Location 1 rib(s)   Pain Addressed 1 Reposition;Distraction   Pain Rating Post-Intervention 1 6/10   Bed Mobility   Bed Mobility yes   Supine to Sit Minimum Assistance   Sit to Supine Contact Guard Assistance   Transfers   Transfer yes   Sit to Stand   Sit <> Stand Assistance Contact Guard Assistance   Sit <> Stand Assistive Device No Assistive Device   Trials/Comments multiple trials   Stand to Sit   Assistance Contact Guard Assistance   Assistive Device No Assistive Device   Trials/Comments multiple trials   Chair to Mat   Chair<> Mat Technique Stand Pivot   Chair<>Mat Assistance Minimum Assistance   Chair <> Mat Assistive Device No Assistive Device   Trials/Comments x1 trial Mya for balance    Mat to Chair   Technique Stand Pivot   Assistance Minimum Assistance   Assistive Device No Assistive Device   Trials/Comments x1 trial, Mya for balance   Wheelchair Activities   Propulsion Yes   Propulsion Type 1 Manual   Level 1 Level tile   Method 1 Left upper extremity;Right lower extremity;Left lower extremity   Level of Assistance 1 Stand by assistsance   Description/ Details 1 Pt travelled 100' in WC   Gait   Gait Yes   Weight Bearing Status full   Gait 1 "   Surface 1 Level tile   Gait Assistive Device No device   Assistance 1 Moderate assistance   Gait Distance Pt ambulated 100' requiring modA for balance    Gait Pattern swing-through gait   Gait Deviation(s) decreased perez;increased time in double stance;decreased velocity of limb motion;decreased step length;decreased stride length;decreased weight-shifting ability;forward lean   Impairments Contributing to Gait Deviations impaired balance;impaired motor control;impaired postural control;decreased strength;decreased sensation   Other Activities   Comments Pt with complaints of dizziness after movement during session, so PT performed kenyatta's hallpike test to L and R with no signs of nystagmous. PT also tested the horizontal canal on both sides, again with no signs of nystagmous. PT noted impaired smooth persuit and tracking toward the R indicating dizziness may be more central nervous system related.    Activity Tolerance   Activity Tolerance Patient tolerated treatment well   After Treatment   Patient Position After Treatment Seated in wheelchair  (seat belt donned, direct hand off to nursing)   Assessment   Prognosis Good   Problem List Decreased strength;Decreased endurance;Impaired balance;Decreased mobility;Impaired judgement;Decreased safety awareness;Decreased cognition;Impaired vision;Pain   Session Assessment progressing toward goals   Assessment Pt is making progress in therapy as evidenced by increased distance in gait today. She is often limited by her dizziness, but the dizziness goes away fairly quickly. She is motivated and amiable during PT sessions. Cont POC.   Level of Motivation/Participation good   Barriers to Discharge Decreased caregiver support   Discharge Recommendations   Equipment Needed After Discharge 3-in-1 commode;bath bench   Discharge Facility/Level Of Care Needs outpatient PT   Plan   Planned Therapy Intervention Continue with current plan   Therapy Frequency 2  times/day;daily;Monday-Friday;Saturday or Sunday   Physical Therapy Follow-up   PT Follow-up? Yes   PT - Next Visit Date 04/05/18   Treatment/Billable Minutes   Gait training 15   Therapeutic Activity 30   Total Time 45   Geronimo Deleon, PT   4/4/2018

## 2018-04-04 NOTE — PROGRESS NOTES
"[facility]  Physical Medicine & Rehab  Progress Note    Patient Name: Kristina Aguirre  MRN: [id number]  Patient Class: IP- Rehab   Admission Date: 4/2/2018  Length of Stay: 2 days  Attending Physician: Peter Dunaway MD  Primary Care Provider: Robert Mattson MD    Subjective:     Principal Problem: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    Interval History 4/4/2018:  Patient is seen for follow-up rehab evaluation and recommendations: Patient complaining of SOB and left side pain. Denies chest pain or vision changes or weakness. Tenderness over the left ribs. Does reported feeling "flushed". Vitals stable and Blood glucose stable. EKG ordered STAT as well as CXR showing resolved pulm edema. Stat Troponins and CBC and CMP ordered as well.     Scheduled Medications:    amiodarone  400 mg Oral BID    amLODIPine  10 mg Oral Daily    amoxicillin-clavulanate 500-125mg  1 tablet Oral BID    apixaban  5 mg Oral BID    ascorbic acid (vitamin C)  500 mg Oral BID    atorvastatin  40 mg Oral Daily    carvedilol  3.125 mg Oral Daily    chlorthalidone  25 mg Oral QAM    clopidogrel  75 mg Oral Daily    famotidine  20 mg Oral BID    fluticasone-vilanterol  1 puff Inhalation Daily    furosemide  20 mg Oral BID    lidocaine  1 patch Transdermal Q24H    [START ON 4/5/2018] lisinopril  20 mg Oral Daily    polyethylene glycol  17 g Oral Daily       Diagnostic Results: ECG: Reviewed  X-Ray: Reviewed    PRN Medications: acetaminophen, albuterol-ipratropium 2.5mg-0.5mg/3mL, bisacodyl, dextrose 50%, dextrose 50%, dextrose 50%, glucagon (human recombinant), glucose, glucose, hydrALAZINE, hydrocodone-acetaminophen 5-325mg, hydrOXYzine pamoate, insulin aspart U-100, magnesium hydroxide 400 mg/5 ml, ondansetron    Review of Systems   Constitutional: Positive for activity change and fatigue. Negative for chills, diaphoresis and fever.   HENT: Negative for congestion, ear discharge and " ear pain.    Eyes: Negative for photophobia, pain and visual disturbance.   Respiratory: Positive for shortness of breath. Negative for cough, chest tightness and wheezing.    Cardiovascular: Negative for chest pain and palpitations.   Gastrointestinal: Negative for abdominal distention, abdominal pain, constipation, diarrhea and nausea.   Endocrine: Negative for cold intolerance and heat intolerance.   Genitourinary: Negative for enuresis, frequency and genital sores.   Musculoskeletal: Positive for gait problem and myalgias (over left ribs). Negative for arthralgias.   Skin: Negative for color change and wound.   Neurological: Negative for dizziness, seizures, weakness and headaches.   Hematological: Negative for adenopathy.   Psychiatric/Behavioral: Negative for agitation, confusion and hallucinations. The patient is nervous/anxious.      Objective:     Vital Signs (Most Recent):  Temp: 98.4 °F (36.9 °C) (04/04/18 1624)  Pulse: 77 (04/04/18 1624)  Resp: 20 (04/04/18 1624)  BP: (!) 132/56 (04/04/18 1624)  SpO2: 98 % (04/04/18 1624)    Vital Signs (24h Range):  Temp:  [98.3 °F (36.8 °C)-98.4 °F (36.9 °C)] 98.4 °F (36.9 °C)  Pulse:  [54-77] 77  Resp:  [18-20] 20  SpO2:  [91 %-98 %] 98 %  BP: (132-162)/(56-72) 132/56     Physical Exam   Constitutional: She is oriented to person, place, and time. She appears well-developed and well-nourished. No distress.   HENT:   Head: Normocephalic and atraumatic.   Right Ear: External ear normal.   Left Ear: External ear normal.   Nose: Nose normal.   Eyes: Right eye exhibits no discharge. Left eye exhibits no discharge. No scleral icterus.   Neck: Normal range of motion.   Cardiovascular: Normal rate, regular rhythm and intact distal pulses.    Pulmonary/Chest: Effort normal. No respiratory distress. She has no wheezes.   Abdominal: Soft. She exhibits no distension. There is no tenderness.   Musculoskeletal: Normal range of motion. She exhibits no edema or tenderness.    Neurological: She is alert and oriented to person, place, and time.   -  Mental Status:  AAOx3.  Follows commands.  Answers correct age and .  Recent and remote memory intact.  -  Speech and language:  No aphasia, mild dysarthria.    -  Vision:  R hemianopsia.  No ptosis.    -  Facial movement (CN VII): Mild facial droop.   -  Motor:  RUE: 0/5.  LUE: 5/5.  RLE: 4-/5.  LLE: 5/5.  -  Tone:  Decreased RUE.  -  Sensory:  Intact to light touch and pin prick.   Skin: Skin is warm and dry. No rash noted.   Psychiatric: She has a normal mood and affect. Her behavior is normal. Thought content normal. Cognition and memory are impaired.   Vitals reviewed.    NEUROLOGICAL EXAMINATION:     MENTAL STATUS   Oriented to person, place, and time.       Assessment/Plan:      Kristina Aguirre is a 67 y.o. female admitted to inpatient rehabilitation on 2018 for 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 with impaired mobility and ADLs. Patient remains appropriate for PT, OT, and as required Speech therapy. Patient continues to require 24 hour nursing care as well as daily Physician assessment.    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 Pan sensitive    -Continue Augmentin for 7day course        Atrial fibrillation    continue Apixaban     - reports of SOB. EKG shows no sign of A fib with RVR or Mi. CXR showing resolution of pulm edema. Pending labs.        S/p TAVR (transcatheter aortic valve replacement), bioprosthetic    Continue Apixaban and Plavix        Diabetes mellitus, type 2    Continue to monitor blood glucoses     - started on Diabetic diet        Essential hypertension    Elevated on admission. Continue current meds    4/3/18 - started Lisinopril  10mg daily  4/4/18 - slightly improved. Increased to 20mg daily              DISCHARGE PLANNING:  Tentative Discharge Date:     Future Appointments  Date Time Provider Department Center   5/10/2018 10:00 AM EKG, APPT [facility] EKG Simon Dawson   5/10/2018 10:40 AM Sarbjit Banegas MD [facility] ARRHYTH Simon Dunaway MD  Department of Physical Medicine & Rehab   [facility]

## 2018-04-04 NOTE — PROGRESS NOTES
Pt power of  stated that the pain on the left flank took place before pt even coming here. There should be a ER record of this pain.

## 2018-04-04 NOTE — PROGRESS NOTES
Speech Therapy   FIM Scores    Kristina Aguirre   MRN: [id number]          04/04/18 0900   Communication   Comprehension 4   Mode A   Expression 4   Mode V   Social Cognition   Social Interaction 4   Problem Solving 4   Memory 3     Comprehension:  Complex= humor, finances, rationale for medical treatment(hip precautions, pressure relief)  Basic= pain, hunger, thirst, bathroom needs, cold, nutrition, sleep     § Understands basic 75-89%- may need words repeated (4)     Expression:  § Expresses basic 75-89% of time - Needs to repeat words  (4)     Social Interaction:  § Interacts appropriately 75-89% of time - needs redirection for appropriate language or to initiate interaction  (4)     Problem Solving:  § Solves basic problems 75-89% of the time  (4)     Memory:  Recognizes, recalls, or executes 50-74% of time 2 steps of 2 step request (3)    Claire Vázquez CCC-SLP  4/4/2018

## 2018-04-04 NOTE — PROGRESS NOTES
NURSE TECH FIM  REPORT    EATING  [x] Pt. opens packages, cuts food, pours liquids, and feeds self.  Regular consistency (7)  [] Pt. needs dentures, swallow technique, special foods or drink consistency (6)  [] Pt. needs supervision (cueing), set up (open containers, cut food, etc. (5)  [] Pt. needs assist with occasional scooping or checking for food pocketing (75-99%) (4)  [] Pt. needs assist to load each bite on utensils, pt.brings food to mouth (50-74%) (3)  [] Pt. needs assist to scoop, bring food to mouth (hand over hand) (25-49%) (2)  [] Pt. Is receiving IV fluids for hydration or tube feedings  (0-24%) (1)      GROOMING   [] Pt. performs all tasks independently and safely (7)  [] Pt. obtains all articles.  Needs adaptive/assistive device (such as wash mitt) (6)  [x] Pt. needs supervision (cueing), set up (helper obtains articles, applies toothpaste, plugs razor, hands items to patient, opens containers, adjusts water temperature) (5)       [] Pt. doing 3 out of 4,  or 4 out of 5 tasks.  Needs assist to put in or remove dentures.  Needs steadying assist.(Pt. Doing 75-99%. (4)                                                                 [] Pt. doing 2 out of 4, or 3 out of 5 tasks (50-74%) (3)  [] Pt. doing 1 out of 4, or 2 out of 5 tasks (25-49%) (2)  [] Pt. doing 0 out of 4, or 1 out of 5 tasks or needs assistance of 2 helpers (0-24%) (1)  [] Activity done with OT      BATHING  [] Pt. safely bathes whole body (10 parts of 10) (7)  [] Pt. doing 10 out of 10 body parts.  Uses adaptive devices (such as wash mitt, long handled sponge, etc.) (6)  []  Pt. doing 8-9 out of 10 body parts , or pt. needs steadying assistance. (75-99%) (4)  []  Pt. doing 10 out of 10 body parts buts needs supervision or set up (5)  [] Pt. doing 5-7 out of 10 body parts (50-74%) (3)  [] Pt. doing 3-4 of out 10 body parts (25-49%) (2)  [x] Pt. doing 0-2 out of 10 body parts or needs assist of two helpers. (0-24%) (1)    [] Activity  done with OT      DRESSING UPPER BODY  [] Pt. dresses independently and undresses independently, obtaining clothes from          storage area (7)  [] Pt. needs adaptive devices (such as Velcro fastener, reacher, button hook, etc.)          Applies abdominal binder or any orthotic.  Uses  walker for steadying. (6)  [] Pt. needs supervision (cueing), set up (helper brings clothes or applies orthotics (such as abdominal binder, TLSO, cervical collar) (5)  [] Pt. doing 75-99%. (4)  [] Pt. doing 50-74%. (3)  [] Pt. doing 25-49%. (2)  [] Pt. doing 0-24%, or needs assistance of 2 helpers. (1)  [x] Activity done with OT.      DRESSING LOWER BODY  [] Pt. independent with all parts of dressing lower body. (7)  [] Pt. needs adaptive devices ( such as reacher, long handled shoe horn, sock aid) (6)  [] Pt. needs supervision (cueing), set up(helper brings clothes or applies orthotic, such   as JATIN hose, AFO) (5)  [] Pt. doing 75-99%.  Needs steadying assistance: buttoning pants, zipping a zipper,        fastening a belt, tying shoelaces, applying one sock/shoe. (4)  [] Pt. doing 50-74%. (3)  [] Pt. doing 25-49%. (2)  [] Pt. Doing 0-24%, or needs assistance of 2 helpers. (1)  [x] Activity done with OT.      TOILETING  [] Pt. doing 3 out of 3 tasks independently (pulling down clothes, cleansing michele area,  pulling up clothes) (7)  [] Pt. doing 3 out of 3 tasks, but needs assistive device (grab bars, etc.) (6)  [x] Pt. needs supervision (cueing), or set up (obtaining supplies for michele care.) (5)  [x] Pt. doing 3 out of 3 tasks, but needs steadying assistance with one or more parts. (75-90%) (4)  [] Pt. doing 2 out of 3 tasks (50-74%) (3)  [] Pt. Doing 1 out of 3 tasks (25-49%) (2)  [] Pt. needs assist (more than steadying) with all 3 tasks.  Needs assist of 2 helpers.    Incontinent of B/B today. (0-24%) (1)  [] Activity did not occur.      BLADDER  [x] Pt.  uses toilet (7)  [] Pt. is on dialysis - does not urinate (7)  [] Pt. uses  bedside commode (5)  [] Pt. uses bedpan - staff does ____% of tasks(includes rolling on/off, holding bedpan in place, hygiene, and emptying pan)  [] Pt. uses urinal - staff empties (5)                     []  Pt. needs help with positioning the urinal (4)                     []  Pt. needs help holding the urinal (1)  [] Pt. has pretty catheter/suprapubic catheter/concom cath - staff empties (1)  [] Pt. is on ICP:                     []  Pt. does catheterization (6)                     []  Staff does catheterization (1)  [] Pt. is incontinent - staff does ____% of tasks (includes clothes management,  hygiene, and changing diaper)  [x] Number of accidents during this shift _0___       BOWEL  [] Pt. uses toilet (7)  [] Pt. uses bedside commode (5)  [] Pt. uses bedpan - staff does ____% of tasks (includes rolling on/off, holding bedpan                                                          In place, hygiene, and emptying pan)  [] Pt. on bowel program:                    [] Pt.inserts suppository (6)                    [] Nurse inserts suppository (4)                    []  Nurse does dig. stim (1)  [] Pt. has colostomy - staff maintains care and empties (1)                    [] Pt. does ____% of care for colostomy  [] Pt. is incontinent - staff does ____% of tasks (includes clothes management,  hygiene, and changing diaper)  [x] Number of accidents during this shift_0___  [] No bowel movement this shift      TRANSFERS:  BED/CHAIR/WHEELCHAIR  [] Pt. transfers without assistive device into and out of wheelchair/bed/chair (7)  [] Pt. uses assistive/adaptive devices (grab bars, sliding board, walker, bed rails,  wheelchair armrests, etc.) (6)  [] Pt. needs supervision, cues, or head of bed raised by staff (5)  [x] Pt. needs steadying assist with any or all parts of transfer, or needs assist with one  leg during transfer (4)  [] Pt. needs lifting or lowering, or needs assist with 2 legs during transfer (3)  [] Pt. needs  lifting and lowering during transfer (2)  [] Pt. needs assist of 2 helpers or mechanical lift (1)  [] Activity did not occur   ________________________________________________________________  Includes lying to seated position at edge of bed.  Check assist level needed:  [] Used bedrails              []  Supervision                   []   Min. A  [] Mod A                          []  Max A                            []  Total A    If in wheelchair:  Check assist level needed:  [] Lock brakes                 []  Lifts/lowers footrests       []  Removes w/c arm                                                                                (for slide board transfers)    TRANSFER:  TOILET/BEDSIDE COMMODE   [] Pt. transfers from wheelchair or walking without assistive device to toilet.  Gets on and off a standard toilet. (7)  [] Pt. uses assistive/adaptive device (commode, grab bars, sliding board, walker prosthesis, orthotic, raised toilet seat) (6)  [] Pt. needs supervision, cues, or set up (needs assist to lock brakes, remove leg or arm rest, position sliding board) (5)  [] Pt. needs steadying assist with any or all parts of transfer or needs assist with one leg during transfer. (4)  [] Pt. needs lifting or lowering or needs assist with two legs during transfer. (3)  [] Pt. needs lifting and lowering during transfer. (2)  [] Pt. needs assist of 2 helpers or mechanical lift. (1)  [] Activity did not occur.      TRANSFER:  SHOWER  [] Pt. transfers without assistive device into and out of shower. (7)  [] Pt. uses assistive/adaptive device (shower chair/bench, grab bars, sliding board,   walker, prothesis, orthotic, raised toilet seat. (6)  [] Pt. needs supervision, cues, or set up (needs assist to lock brakes, remove leg or armrest, position sliding board) (5)  [] Pt. needs steadying assist with any or all parts of transfer or needs assist with one      leg during transfer. (4)  [] Pt. needs lifting or lowering or needs  assist with two legs during transfer. (3)  [] Pt. needs lifting and lowering during transfer. (2)  [] Pt. needs assist of 2 helpers.  [place] pushes shower chair on wheels in and out of   shower. (1)  [] Activity did not occur.                                                                                                                                                                                 r                                                                                                                    pT.

## 2018-04-04 NOTE — PROGRESS NOTES
Patient complained of left flank area. Patient request to see Dr Dunaway. Dr Dunaway arrived at bedside. States will order EKG and cardiac enzymes.

## 2018-04-05 ENCOUNTER — TELEPHONE (OUTPATIENT)
Dept: PHYSICAL MEDICINE AND REHAB | Facility: CLINIC | Age: 68
End: 2018-04-05

## 2018-04-05 LAB
ANION GAP SERPL CALC-SCNC: 9 MMOL/L
BASOPHILS # BLD AUTO: 0.09 K/UL
BASOPHILS NFR BLD: 0.9 %
BUN SERPL-MCNC: 25 MG/DL
CALCIUM SERPL-MCNC: 9.2 MG/DL
CHLORIDE SERPL-SCNC: 98 MMOL/L
CO2 SERPL-SCNC: 31 MMOL/L
CREAT SERPL-MCNC: 0.8 MG/DL
DIFFERENTIAL METHOD: ABNORMAL
EOSINOPHIL # BLD AUTO: 0.5 K/UL
EOSINOPHIL NFR BLD: 5 %
ERYTHROCYTE [DISTWIDTH] IN BLOOD BY AUTOMATED COUNT: 18.3 %
EST. GFR  (AFRICAN AMERICAN): >60 ML/MIN/1.73 M^2
EST. GFR  (NON AFRICAN AMERICAN): >60 ML/MIN/1.73 M^2
GLUCOSE SERPL-MCNC: 146 MG/DL
HCT VFR BLD AUTO: 28.3 %
HGB BLD-MCNC: 8.8 G/DL
IMM GRANULOCYTES # BLD AUTO: 0.04 K/UL
IMM GRANULOCYTES NFR BLD AUTO: 0.4 %
LYMPHOCYTES # BLD AUTO: 2.5 K/UL
LYMPHOCYTES NFR BLD: 25.2 %
MAGNESIUM SERPL-MCNC: 2 MG/DL
MCH RBC QN AUTO: 24.2 PG
MCHC RBC AUTO-ENTMCNC: 31.1 G/DL
MCV RBC AUTO: 78 FL
MONOCYTES # BLD AUTO: 0.9 K/UL
MONOCYTES NFR BLD: 8.8 %
NEUTROPHILS # BLD AUTO: 5.9 K/UL
NEUTROPHILS NFR BLD: 59.7 %
NRBC BLD-RTO: 0 /100 WBC
PHOSPHATE SERPL-MCNC: 4.4 MG/DL
PLATELET # BLD AUTO: 367 K/UL
PMV BLD AUTO: 9.8 FL
POCT GLUCOSE: 176 MG/DL (ref 70–110)
POCT GLUCOSE: 195 MG/DL (ref 70–110)
POCT GLUCOSE: 222 MG/DL (ref 70–110)
POTASSIUM SERPL-SCNC: 4 MMOL/L
RBC # BLD AUTO: 3.63 M/UL
SODIUM SERPL-SCNC: 138 MMOL/L
TROPONIN I SERPL DL<=0.01 NG/ML-MCNC: 0.28 NG/ML
WBC # BLD AUTO: 9.82 K/UL

## 2018-04-05 PROCEDURE — 85025 COMPLETE CBC W/AUTO DIFF WBC: CPT

## 2018-04-05 PROCEDURE — 25000003 PHARM REV CODE 250: Performed by: PHYSICAL MEDICINE & REHABILITATION

## 2018-04-05 PROCEDURE — 97116 GAIT TRAINING THERAPY: CPT

## 2018-04-05 PROCEDURE — 36415 COLL VENOUS BLD VENIPUNCTURE: CPT

## 2018-04-05 PROCEDURE — 80048 BASIC METABOLIC PNL TOTAL CA: CPT

## 2018-04-05 PROCEDURE — 25000003 PHARM REV CODE 250: Performed by: NURSE PRACTITIONER

## 2018-04-05 PROCEDURE — 25000242 PHARM REV CODE 250 ALT 637 W/ HCPCS: Performed by: NURSE PRACTITIONER

## 2018-04-05 PROCEDURE — 92508 TX SP LANG VOICE COMM GROUP: CPT

## 2018-04-05 PROCEDURE — 99233 SBSQ HOSP IP/OBS HIGH 50: CPT | Mod: ,,, | Performed by: PHYSICAL MEDICINE & REHABILITATION

## 2018-04-05 PROCEDURE — 97112 NEUROMUSCULAR REEDUCATION: CPT

## 2018-04-05 PROCEDURE — 83735 ASSAY OF MAGNESIUM: CPT

## 2018-04-05 PROCEDURE — 84484 ASSAY OF TROPONIN QUANT: CPT

## 2018-04-05 PROCEDURE — 84100 ASSAY OF PHOSPHORUS: CPT

## 2018-04-05 PROCEDURE — 12800000 HC REHAB SEMI-PRIVATE ROOM

## 2018-04-05 PROCEDURE — 92507 TX SP LANG VOICE COMM INDIV: CPT

## 2018-04-05 RX ADMIN — CLOPIDOGREL BISULFATE 75 MG: 75 TABLET ORAL at 08:04

## 2018-04-05 RX ADMIN — INSULIN ASPART 1 UNITS: 100 INJECTION, SOLUTION INTRAVENOUS; SUBCUTANEOUS at 10:04

## 2018-04-05 RX ADMIN — HYDROCODONE BITARTRATE AND ACETAMINOPHEN 1 TABLET: 5; 325 TABLET ORAL at 06:04

## 2018-04-05 RX ADMIN — APIXABAN 5 MG: 5 TABLET, FILM COATED ORAL at 08:04

## 2018-04-05 RX ADMIN — AMIODARONE HYDROCHLORIDE 400 MG: 200 TABLET ORAL at 08:04

## 2018-04-05 RX ADMIN — Medication 500 MG: at 08:04

## 2018-04-05 RX ADMIN — FUROSEMIDE 20 MG: 20 TABLET ORAL at 05:04

## 2018-04-05 RX ADMIN — INSULIN ASPART 4 UNITS: 100 INJECTION, SOLUTION INTRAVENOUS; SUBCUTANEOUS at 05:04

## 2018-04-05 RX ADMIN — FUROSEMIDE 20 MG: 20 TABLET ORAL at 08:04

## 2018-04-05 RX ADMIN — HYDROCODONE BITARTRATE AND ACETAMINOPHEN 1 TABLET: 5; 325 TABLET ORAL at 10:04

## 2018-04-05 RX ADMIN — ATORVASTATIN CALCIUM 40 MG: 20 TABLET, FILM COATED ORAL at 08:04

## 2018-04-05 RX ADMIN — FAMOTIDINE 20 MG: 20 TABLET, FILM COATED ORAL at 08:04

## 2018-04-05 RX ADMIN — AMOXICILLIN AND CLAVULANATE POTASSIUM 500 MG: 500; 125 TABLET, FILM COATED ORAL at 08:04

## 2018-04-05 RX ADMIN — HYDROCODONE BITARTRATE AND ACETAMINOPHEN 1 TABLET: 5; 325 TABLET ORAL at 01:04

## 2018-04-05 RX ADMIN — FLUTICASONE FUROATE AND VILANTEROL TRIFENATATE 1 PUFF: 100; 25 POWDER RESPIRATORY (INHALATION) at 08:04

## 2018-04-05 RX ADMIN — POLYETHYLENE GLYCOL 3350 17 G: 17 POWDER, FOR SOLUTION ORAL at 08:04

## 2018-04-05 RX ADMIN — CHLORTHALIDONE 25 MG: 25 TABLET ORAL at 08:04

## 2018-04-05 RX ADMIN — AMLODIPINE BESYLATE 10 MG: 10 TABLET ORAL at 08:04

## 2018-04-05 RX ADMIN — LISINOPRIL 20 MG: 20 TABLET ORAL at 08:04

## 2018-04-05 NOTE — PROGRESS NOTES
Spoke with Dr. Dunaway concerning recent test results. Dr. Dunaway stated to inform patient that every thing looked good at this point.

## 2018-04-05 NOTE — TELEPHONE ENCOUNTER
Just wanted to inform you of the left side pain and be addressed while she is here.  And give you history of everything that happen at hospital.  Please feel free to call him, thanks

## 2018-04-05 NOTE — PROGRESS NOTES
"Physical Therapy   Treatment    Kristina Aguirre   MRN: [id number]    04/05/18 1027   PT Time Calculation   PT Start Time 1027   PT Stop Time 1112   PT Total Time (min) 45 min   Treatment   Treatment Type Treatment   PT/PTA PT   PTA Visit Number 0   General   PT Received On 04/05/18   Family/Caregiver Present No   Patient Found (position) Right side lying in bed   Pt found with (Holter monitor)   Precautions   General Precautions aspiration;fall;diabetic;vision impaired   Orthopedic No   Required Braces or Orthoses No   Cardiovascular/Pulmonary Other (see comments)  (holter monitor)   Visual/Auditory Vision impaired   Subjective   Patient states "I'm feeling really tired today." "I had an anxiety attack last night."   Pain/Comfort   Pain Rating 1 6/10   Location - Side 1 Bilateral   Location - Orientation 1 generalized   Location 1 shoulder   Pain Addressed 1 Reposition;Distraction   Pain Rating Post-Intervention 1 6/10   Bed Mobility   Bed Mobility yes   Supine to Sit Minimum Assistance   Transfers   Transfer yes   Sit to Stand   Sit <> Stand Assistance Contact Guard Assistance   Sit <> Stand Assistive Device Rolling Walker;No Assistive Device   Trials/Comments multiple trials. Pt was instructed to performed x10 sit to stands without the use of hands, then x10 sit to stands with L LE bias to encourage strengthening of that leg.    Stand to Sit   Assistance Contact Guard Assistance   Assistive Device No Assistive Device;Rolling Walker   Trials/Comments multiple trials   Gait   Gait Yes   Weight Bearing Status full   Gait 1   Surface 1 Level tile   Gait Assistive Device Rolling walker   Assistance 1 Minimum assistance   Gait Distance Pt ambulated for two trials of 100'. PT assisted with holding walker with R UE.   Gait Pattern swing-through gait   Gait Deviation(s) decreased perez;increased time in double stance;decreased velocity of limb motion;decreased step length;decreased stride length;decreased weight-shifting " ability;forward lean   Impairments Contributing to Gait Deviations impaired balance;impaired coordination;impaired motor control;abnormal muscle tone;impaired postural control;decreased strength   Balance   Balance Yes   Dynamic Sitting Balance   Dynamic Sitting-Balance Support Right upper extremity supported;Feet supported   Dynamic Sitting-Balance Ball toss;Forward lean   Dynamic Sitting-Comments Pt sat at EOM for 8 minutes while reaching forward for rings then placing them to her R. She then returned the rings back to tower. While at EOM pt tapped beach ball with PT. These activities were used to encourage core strength for transfers and for attending to R side.    Dynamic Standing Balance   Dynamic Standing-Balance Support Right upper extremity supported   Dynamic Standing-Balance Reaching across midline;Reaching for objects   Dynamic Standing-Comments Pt stood for 5' while reaching for rings and placing them onto new pegs across midline. Pt required CGA for this activity.    Activity Tolerance   Activity Tolerance Patient tolerated treatment well   After Treatment   Patient Position After Treatment Seated in bedside chair  (seat alarm enabled)   Patient after treatment left call button in reach   Assessment   Prognosis Good   Problem List Decreased strength;Decreased endurance;Impaired balance;Decreased mobility;Impaired judgement;Impaired vision;Decreased safety awareness;Pain   Session Assessment progressing toward goals   Assessment Pt continues to make good progress in PT. She was more stable in ambulation with RW during today's session. Pt's anxiety, decreased vision, and decreased balance are the biggest barriers with ambulation at this time. Cont POC   Level of Motivation/Participation good   Barriers to Discharge Decreased caregiver support   Discharge Recommendations   Equipment Needed After Discharge 3-in-1 commode;bath bench   Discharge Facility/Level Of Care Needs outpatient PT   Plan   Planned  Therapy Intervention Continue with current plan   Therapy Frequency 2 times/day;daily;Monday-Friday;Saturday or Sunday   Physical Therapy Follow-up   PT Follow-up? Yes   PT - Next Visit Date 04/06/18   Treatment/Billable Minutes   Gait training 15   Neuromuscular Re-education 30   Total Time 45   Geronimo Deleon, PT   4/5/2018

## 2018-04-05 NOTE — PLAN OF CARE
Problem: Patient Care Overview  Goal: Plan of Care Review  Pt sitting up in bed eating dinner; pt aaox3 throughout day. Pt with mild c/o of back and shoulder pain. Pt standby assist with transfers and ambulates with standby assistance. Pt continent of bowels and bladder. Pt tolerated therapy today and instructed to call for assistance with getting oob; pt verbalized understanding. Call light with in reach.

## 2018-04-05 NOTE — PLAN OF CARE
Problem: Patient Care Overview  Goal: Plan of Care Review  Outcome: Ongoing (interventions implemented as appropriate)  Patient has complained of pain that has be addressed with pain medication with mild results. Patient is up on side of bed with no falls or injuries noted.Plan of care explained to patient and patient verbally acknowledged understanding. Patient is resting with call light in reach, bed in low position. Will continue to monitor patient.

## 2018-04-05 NOTE — TELEPHONE ENCOUNTER
----- Message from Carolyn Porter sent at 4/5/2018  9:34 AM CDT -----  Contact: Lefty Vega (friend / power attny) @ [phone]  This pt is currently at [facility].  Would like to discuss pt.  Pls call.

## 2018-04-05 NOTE — PROGRESS NOTES
"Speech Therapy   Treatment/ Missed Minutes    Kristina Aguirre   MRN: [id number]          04/05/18 1448   Speech Time Calculation   Speech Start Time 1448   Speech Stop Time 1506   Speech Total (min) 18 min   General Information   SLP Treatment Date 04/05/18   General Observations Patient seen while sitting upright in wheelchair in ST office. Patient seen for individual session, immediately after participation in cognitive group therapy session. Patient with change in mood during individual session as patient became increasingly frustrated with questions and refused to participate in full 45 minute session. ST to make up 27 missed minutes at next scheduled therapy sessions.    General Precautions aspiration;fall;diabetic;vision impaired   Visual/Auditory Vision impaired   Subjective   Patient states "I don't have to tell you anything."       SLP Cognitive Treatment   Treatment Detail (SLP Cognitive Treatment) Patient to person, time, and situation independently. Patient recalled therapy sessions that took place earlier today with no difficulty. Patient completed a word deduction task with 100% accuracy independently. ST asked patient to recall information from previous day. Patient with change in mood and behavior as patient stated that she did not want to discuss events that took place yesterday. Patient with increased agitation as ST attempted to educate patient regarding rationale for questioning previous day's events, such as to improve short term recall skills and improve planning for future therapy sessions. Patient refused to continue to participate in session. MD notified of missed minutes.    Assessment   SLP Diagnosis moderate cog-ling deficits; severe visual deficits; R orofacial weakness   Prognosis Good   Problem List decreased attention; decreased reasoning skills   Level of Motivation/Participation fair   Discharge Recommendations   Discharge Facility/Level Of Care Needs outpatient speech therapy   Plan "   Plan Continue with current plan   Therapy Frequency Monday-Friday   SLP Follow-up   SLP Follow-up? Yes   Treatment/Billable Minutes   Speech Therapy Individual 18   Total Time 18       Claire Vázquez CCC-SLP  4/5/2018

## 2018-04-05 NOTE — PROGRESS NOTES
"Occupational Therapy  AM Treatment  Kristina Aguirre   MRN: [id number]   Room/Bed: E261/E261 B       04/05/18 0830   OT Time Calculation   OT Start Time 0830   OT Stop Time 0917   OT Total Time (min) 47 min   General   OT Date of Treatment 04/05/18   Family/Caregiver Present No   Patient Found (position) Seated in wheelchair   Precautions   General Precautions aspiration;diabetic;fall;vision impaired   Visual/Auditory Vision impaired   Subjective   Patient states "I want to shower a little later."   Pain/Comfort   Pain Rating 4/10   Location - Side Right   Location arm  (and hand)   Pain Addressed Pre-medicate for activity;Reposition   Pain Comment with touching or movement   Bed Mobility   Supine to Sit Minimum Assistance   Supine to Sit Comments for trunk elevation to EOB   Sit to Stand   Sit <> Stand Assistance Contact Guard Assistance   Sit <> Stand Assistive Device No Assistive Device   Trials/Comments from EOB   Stand to Sit   Assistance Contact Guard Assistance   Assistive Device No Assistive Device   Trials/Comments to EOB   Bed to Chair   Bed <> Chair Technique Stand Pivot   Bed <> Chair Transfer Assistance Contact Guard Assistance   Bed <> Chair Assistive Device No Assistive Device   Trials/Comments from EOB>wc   Chair to Mat   Chair <>Mat Technique Stand Pivot   Chair <> Mat Assistance Contact Guard Assistance   Chair<> Mat Assistive Device No Assistive Device   Trials/Comments from wc>EOB   LE Dressing   LE Dressing  Level of Assistance Minimum assistance   LE Dressing Where Assessed Edge of bed   LE Dressing Comments assist with donning shoes and fastners.   Feeding:                                                             Discussed feeding strategies. Instructed pt to locate all food items on her plate prior to staff exiting the room to assure that she sees all food items present.                 Exercise Tools   Exercise Tools Yes   Bilateral Henrique 3# x 100 reps flat with RUE secured to device   Weight " Bearing   Weight Bearing Technique Yes   RUE Weight Bearing Extended arm seated   Response to Weight Bearing Technique Pt completed dynamic sitting/weight bearing activity with focus on weight bearing and scanning. Pt completed the following activities using Bioness BITS as follows: Bell Cancellation Task:  TTC: 3:02; 4 Misses; 2 Distractors Hit    Single Target/User Paced:  Accuracy: 87.05%; TTC: 4:00; Reaction time: 1.97sec; 121 Hits   Activity Tolerance   Activity Tolerance Patient tolerated treatment well   After Treatment   Patient Position After Treatment Seated at edge of bed   Patient after treatment left nursing notified   Assessment   Prognosis Fair   Problem List Decreased Self Care skills;Decreased upper extremity range of motion;Decreased upper extremity strength;Decreased safe judgment during ADL;Decreased cognition;Decreased endurance;Decreased sensation;Visual deficit;Decreased fine motor control;Decreased functional mobility;Decreased gross motor control;Decreased IADLs;Decreased Function of right upper extremity;Decreased trunk control for functional activities;Decreased Function of left upper extremity   Assessment Pt participated well in tx session but remains with decreased funcitonal use of RUE, severe visual field deficits and decreased (I) with ADLs and functional mobility.   Level of Motivation/Participation Good   Barriers to Discharge Decreased caregiver support   Barrier Comments Pt lives alone with pet   Discharge Recommendations   Equipment Needed After Discharge 3-in-1 commode;bath bench   Discharge Facility/Level Of Care Needs outpatient OT   Plan   Plan Continue with current plan   Therapy Frequency 2 times/day   Occupational Therapy Follow-up   OT Follow-up? Yes   Treatment/Billable Minutes   Neuromuscular Re-ed 47   Total Time 47       CAROLYN Cuevas  4/5/2018     Patient with wheelchair safety belt fastened and able to self release wheelchair safety belt. Pt left sitting EOB  with call light and all necessities in reach, RN notified.     LEGEND:   CGA: Contact Guard Assist   EOB: Edgeof Bed   HHA: Hand Held Assist   HOB: Head of Bed   (I): Independent-patient performs task in a timely manner   Max (A): Maximal Assist-patient performs 25-49% of task   Min (A): Minimal Assist- patient performs 75% or more of task   Mod (A): Moderate Assist- patient performs 50-74% of task   NA: Not applicable   NT: Not tested   OOB: Out of Bed   PTA: Prior to admit   QC: Quad Cane   RW: Rolling Walker   (S): Supervision- patient requires cues, coaxing, prompting   SBA: Stand By Assist   SC: Straight Cane   SW: Standard Walker   TBA: To be assessed   Total (A): Total Assist- patient performs less than 25% of task   WC: Wheelchair   WFL: Within Functional Limits   WNL: Within Normal Limits

## 2018-04-05 NOTE — PROGRESS NOTES
"Speech Therapy   Cognitive Group Treatment    Kristina Aguirre   MRN: [id number]            04/05/18 1400   Speech Time Calculation   Speech Start Time 1400   Speech Stop Time 1445   Speech Total (min) 45 min   General Information   SLP Treatment Date 04/05/18   General Observations Patient seen while sitting upright in wheelchair in cognitive group treatment.   General Precautions aspiration;diabetic;fall;vision impaired   Visual/Auditory Vision impaired   Subjective   Patient states "I can't pay attention for too long."   Pain/Comfort   Pain Rating 7/10   Location - Side Bilateral   Location shoulder   Pain Addressed Pre-medicate for activity       SLP Cognitive Treatment   Treatment Detail (SLP Cognitive Treatment) Cognitive Group  Objective:  Patient participated in a 45 minute functional cognitive task. The group activity focused on attention, reasoning, short term memory, and sequencing.  Additionally, group discussion (when participating) allows for functional carry-over and self-monitoring of memory strategies/skills, reasoning, attention and sequencing.  The patient was able to use short term memory/cognitive strategies with 70% accuracy with moderate assistance.  Patient was able to attend to tasks within a group setting for 45 minutes given min cues for participation in therapy tasks/discussion.    Goal: Pt will complete reasoning/problem solving tasks of home/hospital environment given mod verbal/visual cues with 85% acc targeting increased independence.    Assessment:  These activities were performed in a group setting to encourage increased participation with peers, target processing skills, and utilization of compensatory cognitive strategies. Pt also benefits from social interaction with peers experiencing similar obstacles within rehabilitation process.   Assessment   SLP Diagnosis moderate cog-ling deficits   Prognosis Good   Problem List decreased attention; decreased recall   Session Assessment " progressing toward goals   Level of Motivation/Participation good   Discharge Recommendations   Discharge Facility/Level Of Care Needs outpatient speech therapy   Plan   Plan Continue with current plan   Therapy Frequency Monday-Friday   SLP Follow-up   SLP Follow-up? Yes   Treatment/Billable Minutes   Speech Therapy Individual 45   Total Time 45         Claire Vázquez CCC-SLP   4/5/2018

## 2018-04-06 LAB
POCT GLUCOSE: 134 MG/DL (ref 70–110)
POCT GLUCOSE: 170 MG/DL (ref 70–110)
POCT GLUCOSE: 183 MG/DL (ref 70–110)
POCT GLUCOSE: 191 MG/DL (ref 70–110)
POCT GLUCOSE: 296 MG/DL (ref 70–110)

## 2018-04-06 PROCEDURE — 97112 NEUROMUSCULAR REEDUCATION: CPT

## 2018-04-06 PROCEDURE — 97150 GROUP THERAPEUTIC PROCEDURES: CPT

## 2018-04-06 PROCEDURE — 99233 SBSQ HOSP IP/OBS HIGH 50: CPT | Mod: ,,, | Performed by: PHYSICAL MEDICINE & REHABILITATION

## 2018-04-06 PROCEDURE — 97110 THERAPEUTIC EXERCISES: CPT

## 2018-04-06 PROCEDURE — 97116 GAIT TRAINING THERAPY: CPT

## 2018-04-06 PROCEDURE — 25000003 PHARM REV CODE 250: Performed by: PHYSICAL MEDICINE & REHABILITATION

## 2018-04-06 PROCEDURE — 97535 SELF CARE MNGMENT TRAINING: CPT

## 2018-04-06 PROCEDURE — 25000003 PHARM REV CODE 250: Performed by: NURSE PRACTITIONER

## 2018-04-06 PROCEDURE — 12800000 HC REHAB SEMI-PRIVATE ROOM

## 2018-04-06 PROCEDURE — 92507 TX SP LANG VOICE COMM INDIV: CPT

## 2018-04-06 PROCEDURE — 97530 THERAPEUTIC ACTIVITIES: CPT

## 2018-04-06 RX ADMIN — ATORVASTATIN CALCIUM 40 MG: 20 TABLET, FILM COATED ORAL at 08:04

## 2018-04-06 RX ADMIN — CLOPIDOGREL BISULFATE 75 MG: 75 TABLET ORAL at 08:04

## 2018-04-06 RX ADMIN — FUROSEMIDE 20 MG: 20 TABLET ORAL at 04:04

## 2018-04-06 RX ADMIN — FUROSEMIDE 20 MG: 20 TABLET ORAL at 08:04

## 2018-04-06 RX ADMIN — HYDROCODONE BITARTRATE AND ACETAMINOPHEN 1 TABLET: 5; 325 TABLET ORAL at 04:04

## 2018-04-06 RX ADMIN — AMOXICILLIN AND CLAVULANATE POTASSIUM 500 MG: 500; 125 TABLET, FILM COATED ORAL at 08:04

## 2018-04-06 RX ADMIN — Medication 500 MG: at 08:04

## 2018-04-06 RX ADMIN — FLUTICASONE FUROATE AND VILANTEROL TRIFENATATE 1 PUFF: 100; 25 POWDER RESPIRATORY (INHALATION) at 08:04

## 2018-04-06 RX ADMIN — POLYETHYLENE GLYCOL 3350 17 G: 17 POWDER, FOR SOLUTION ORAL at 08:04

## 2018-04-06 RX ADMIN — FAMOTIDINE 20 MG: 20 TABLET, FILM COATED ORAL at 09:04

## 2018-04-06 RX ADMIN — AMIODARONE HYDROCHLORIDE 400 MG: 200 TABLET ORAL at 09:04

## 2018-04-06 RX ADMIN — CARVEDILOL 3.12 MG: 3.12 TABLET, FILM COATED ORAL at 08:04

## 2018-04-06 RX ADMIN — FAMOTIDINE 20 MG: 20 TABLET, FILM COATED ORAL at 08:04

## 2018-04-06 RX ADMIN — INSULIN ASPART 3 UNITS: 100 INJECTION, SOLUTION INTRAVENOUS; SUBCUTANEOUS at 09:04

## 2018-04-06 RX ADMIN — LISINOPRIL 20 MG: 20 TABLET ORAL at 08:04

## 2018-04-06 RX ADMIN — APIXABAN 5 MG: 5 TABLET, FILM COATED ORAL at 08:04

## 2018-04-06 RX ADMIN — APIXABAN 5 MG: 5 TABLET, FILM COATED ORAL at 09:04

## 2018-04-06 RX ADMIN — AMOXICILLIN AND CLAVULANATE POTASSIUM 500 MG: 500; 125 TABLET, FILM COATED ORAL at 09:04

## 2018-04-06 RX ADMIN — HYDROCODONE BITARTRATE AND ACETAMINOPHEN 1 TABLET: 5; 325 TABLET ORAL at 09:04

## 2018-04-06 RX ADMIN — INSULIN ASPART 2 UNITS: 100 INJECTION, SOLUTION INTRAVENOUS; SUBCUTANEOUS at 12:04

## 2018-04-06 RX ADMIN — INSULIN ASPART 2 UNITS: 100 INJECTION, SOLUTION INTRAVENOUS; SUBCUTANEOUS at 08:04

## 2018-04-06 RX ADMIN — Medication 500 MG: at 09:04

## 2018-04-06 RX ADMIN — CHLORTHALIDONE 25 MG: 25 TABLET ORAL at 08:04

## 2018-04-06 RX ADMIN — AMIODARONE HYDROCHLORIDE 400 MG: 200 TABLET ORAL at 08:04

## 2018-04-06 RX ADMIN — AMLODIPINE BESYLATE 10 MG: 10 TABLET ORAL at 08:04

## 2018-04-06 NOTE — ASSESSMENT & PLAN NOTE
Elevated on admission. Continue current meds    4/3/18 - started Lisinopril 10mg daily  4/4/18 - slightly improved. Increased to 20mg daily  4/6/18 - still elevated Bp. Increased to 30mg daily

## 2018-04-06 NOTE — PLAN OF CARE
Problem: Patient Care Overview  Goal: Plan of Care Review  Outcome: Ongoing (interventions implemented as appropriate)  Patient aaox3 throughout day. Pt with mild c/o of back and shoulder pain. Pt standby assist with transfers and ambulates with standby assistance. Pt continent of bowels and bladder. Pt tolerated therapy today and instructed to call for assistance with getting oob; pt verbalized understanding. Call light with in reach.

## 2018-04-06 NOTE — SUBJECTIVE & OBJECTIVE
Interval History 4/5/2018:  Patient is seen for follow-up rehab evaluation and recommendations: Pt without new c/o's.  I have reviewed the HPI and PMFSH and there are no changes from admission.       Scheduled Medications:    amiodarone  400 mg Oral BID    amLODIPine  10 mg Oral Daily    amoxicillin-clavulanate 500-125mg  1 tablet Oral BID    apixaban  5 mg Oral BID    ascorbic acid (vitamin C)  500 mg Oral BID    atorvastatin  40 mg Oral Daily    carvedilol  3.125 mg Oral Daily    chlorthalidone  25 mg Oral QAM    clopidogrel  75 mg Oral Daily    famotidine  20 mg Oral BID    fluticasone-vilanterol  1 puff Inhalation Daily    furosemide  20 mg Oral BID    lidocaine  1 patch Transdermal Q24H    lisinopril  20 mg Oral Daily    polyethylene glycol  17 g Oral Daily       Diagnostic Results: Labs: Reviewed    PRN Medications: acetaminophen, albuterol-ipratropium 2.5mg-0.5mg/3mL, bisacodyl, dextrose 50%, dextrose 50%, dextrose 50%, glucagon (human recombinant), glucose, glucose, hydrALAZINE, hydrocodone-acetaminophen 5-325mg, hydrOXYzine pamoate, insulin aspart U-100, magnesium hydroxide 400 mg/5 ml, ondansetron    Review of Systems   Constitutional: Positive for activity change and fatigue. Negative for chills, diaphoresis and fever.   HENT: Negative for congestion, ear discharge and ear pain.    Eyes: Negative for photophobia, pain and visual disturbance.   Respiratory: Positive for shortness of breath. Negative for cough, chest tightness and wheezing.    Cardiovascular: Negative for chest pain and palpitations.   Gastrointestinal: Negative for abdominal distention, abdominal pain, constipation, diarrhea and nausea.   Endocrine: Negative for cold intolerance and heat intolerance.   Genitourinary: Negative for enuresis, frequency and genital sores.   Musculoskeletal: Positive for gait problem and myalgias (over left ribs). Negative for arthralgias.   Skin: Negative for color change and wound.    Neurological: Negative for dizziness, seizures, weakness and headaches.   Hematological: Negative for adenopathy.   Psychiatric/Behavioral: Negative for agitation, confusion and hallucinations. The patient is nervous/anxious.      Objective:     Vital Signs (Most Recent):  Temp: 98 °F (36.7 °C) (18 0700)  Pulse: (!) 53 (18 0820)  Resp: 18 (18 0820)  BP: 131/60 (18 0700)  SpO2: 95 % (18 07)    Vital Signs (24h Range):  Temp:  [98 °F (36.7 °C)] 98 °F (36.7 °C)  Pulse:  [53-54] 53  Resp:  [18] 18  SpO2:  [95 %] 95 %  BP: (131-145)/(60-63) 131/60     Physical Exam   Constitutional: She is oriented to person, place, and time. She appears well-developed and well-nourished. No distress.   HENT:   Head: Normocephalic and atraumatic.   Right Ear: External ear normal.   Left Ear: External ear normal.   Nose: Nose normal.   Eyes: Right eye exhibits no discharge. Left eye exhibits no discharge. No scleral icterus.   Neck: Normal range of motion.   Cardiovascular: Normal rate, regular rhythm and intact distal pulses.    Pulmonary/Chest: Effort normal. No respiratory distress. She has no wheezes.   Abdominal: Soft. She exhibits no distension. There is no tenderness.   Musculoskeletal: Normal range of motion. She exhibits no edema or tenderness.   Neurological: She is alert and oriented to person, place, and time.   -  Mental Status:  AAOx3.  Follows commands.  Answers correct age and .  Recent and remote memory intact.  -  Speech and language:  No aphasia, mild dysarthria.    -  Vision:  R hemianopsia.  No ptosis.    -  Facial movement (CN VII): Mild facial droop.   -  Motor:  RUE: 0/5.  LUE: 5/5.  RLE: 4-/5.  LLE: 5/5.  -  Tone:  Decreased RUE.  -  Sensory:  Intact to light touch and pin prick.   Skin: Skin is warm and dry. No rash noted.   Psychiatric: She has a normal mood and affect. Her behavior is normal. Thought content normal. Cognition and memory are impaired.   Vitals  reviewed.    NEUROLOGICAL EXAMINATION:     MENTAL STATUS   Oriented to person, place, and time.

## 2018-04-06 NOTE — PROGRESS NOTES
[facility]  Physical Medicine & Rehab  Progress Note    Patient Name: Kristina Aguirre  MRN: [id number]  Patient Class: IP- Rehab   Admission Date: 4/2/2018  Length of Stay: 4 days  Attending Physician: Peter Dunaway MD  Primary Care Provider: Robert Mattson MD    Subjective:     Principal Problem: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    Interval History 4/6/2018:  Patient is seen for follow-up rehab evaluation and recommendations: Pt without new c/o's.  I have reviewed the HPI and PMFSH and there are no changes from admission.       Scheduled Medications:    amiodarone  400 mg Oral BID    amLODIPine  10 mg Oral Daily    amoxicillin-clavulanate 500-125mg  1 tablet Oral BID    apixaban  5 mg Oral BID    ascorbic acid (vitamin C)  500 mg Oral BID    atorvastatin  40 mg Oral Daily    carvedilol  3.125 mg Oral Daily    chlorthalidone  25 mg Oral QAM    clopidogrel  75 mg Oral Daily    famotidine  20 mg Oral BID    fluticasone-vilanterol  1 puff Inhalation Daily    furosemide  20 mg Oral BID    lidocaine  1 patch Transdermal Q24H    [START ON 4/7/2018] lisinopril  30 mg Oral Daily    polyethylene glycol  17 g Oral Daily       PRN Medications: acetaminophen, albuterol-ipratropium 2.5mg-0.5mg/3mL, bisacodyl, dextrose 50%, dextrose 50%, dextrose 50%, glucagon (human recombinant), glucose, glucose, hydrALAZINE, hydrocodone-acetaminophen 5-325mg, hydrOXYzine pamoate, insulin aspart U-100, magnesium hydroxide 400 mg/5 ml, ondansetron    Review of Systems   Constitutional: Positive for activity change and fatigue. Negative for chills, diaphoresis and fever.   HENT: Negative for congestion, ear discharge and ear pain.    Eyes: Negative for photophobia, pain and visual disturbance.   Respiratory: Positive for shortness of breath. Negative for cough, chest tightness and wheezing.    Cardiovascular: Negative for chest pain and palpitations.   Gastrointestinal: Negative  for abdominal distention, abdominal pain, constipation, diarrhea and nausea.   Endocrine: Negative for cold intolerance and heat intolerance.   Genitourinary: Negative for enuresis, frequency and genital sores.   Musculoskeletal: Positive for gait problem and myalgias (over left ribs). Negative for arthralgias.   Skin: Negative for color change and wound.   Neurological: Negative for dizziness, seizures, weakness and headaches.   Hematological: Negative for adenopathy.   Psychiatric/Behavioral: Negative for agitation, confusion and hallucinations. The patient is nervous/anxious.      Objective:     Vital Signs (Most Recent):  Temp: 97.9 °F (36.6 °C) (18)  Pulse: (!) 53 (18)  Resp: 16 (18)  BP: (!) 178/75 (18)  SpO2: (!) 93 % (18)    Vital Signs (24h Range):  Temp:  [97.9 °F (36.6 °C)-98.4 °F (36.9 °C)] 97.9 °F (36.6 °C)  Pulse:  [53-93] 53  Resp:  [16-18] 16  SpO2:  [93 %] 93 %  BP: (158-178)/(72-75) 178/75     Physical Exam   Constitutional: She is oriented to person, place, and time. She appears well-developed and well-nourished. No distress.   HENT:   Head: Normocephalic and atraumatic.   Right Ear: External ear normal.   Left Ear: External ear normal.   Nose: Nose normal.   Eyes: Right eye exhibits no discharge. Left eye exhibits no discharge. No scleral icterus.   Neck: Normal range of motion.   Cardiovascular: Normal rate, regular rhythm and intact distal pulses.    Pulmonary/Chest: Effort normal. No respiratory distress. She has no wheezes.   Abdominal: Soft. She exhibits no distension. There is no tenderness.   Musculoskeletal: Normal range of motion. She exhibits no edema or tenderness.   Neurological: She is alert and oriented to person, place, and time.   -  Mental Status:  AAOx3.  Follows commands.  Answers correct age and .  Recent and remote memory intact.  -  Speech and language:  No aphasia, mild dysarthria.    -  Vision:  R hemianopsia.  No  ptosis.    -  Facial movement (CN VII): Mild facial droop.   -  Motor:  RUE: 0/5.  LUE: 5/5.  RLE: 4-/5.  LLE: 5/5.  -  Tone:  Decreased RUE.  -  Sensory:  Intact to light touch and pin prick.   Skin: Skin is warm and dry. No rash noted.   Psychiatric: She has a normal mood and affect. Her behavior is normal. Thought content normal. Cognition and memory are impaired.   Vitals reviewed.    NEUROLOGICAL EXAMINATION:     MENTAL STATUS   Oriented to person, place, and time.       Assessment/Plan:      Kristina Aguirre is a 67 y.o. female admitted to inpatient rehabilitation on 4/2/2018 for 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 with impaired mobility and ADLs. Patient remains appropriate for PT, OT, and as required Speech therapy. Patient continues to require 24 hour nursing care as well as daily Physician assessment.    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 Pan sensitive    -Continue Augmentin for 7day course        Atrial fibrillation    continue Apixaban    4/4 - reports of SOB. EKG shows no sign of A fib with RVR or Mi. CXR showing resolution of pulm edema. Pending labs.        S/p TAVR (transcatheter aortic valve replacement), bioprosthetic    Continue Apixaban and Plavix        Diabetes mellitus, type 2    Continue to monitor blood glucoses    4/4 - started on Diabetic diet        Essential hypertension    Elevated on admission. Continue current meds    4/3/18 - started Lisinopril 10mg daily  4/4/18 - slightly improved. Increased to 20mg daily  4/6/18 - still elevated Bp. Increased to 30mg daily              DISCHARGE PLANNING:  Tentative Discharge Date:     Future Appointments  Date Time Provider Department Center   5/10/2018 10:00 AM EKG, APPT  [facility] EKG Simon Dawson   5/10/2018 10:40 AM Sarbjit Banegas MD [facility] ARRHYTH Simon Dunaway MD  Department of Physical Medicine & Rehab   [facility]

## 2018-04-06 NOTE — PROGRESS NOTES
Speech Therapy   Treatment    Kristina Aguirre   MRN: [id number]            04/06/18 1455   Speech Time Calculation   Speech Start Time 1455   Speech Stop Time 1540   Speech Total (min) 45 min   General Information   SLP Treatment Date 04/06/18   General Observations Patient seen while sitting edge of bed in pt's room.   General Precautions aspiration;diabetic;fall;vision impaired   Visual/Auditory Vision impaired   Subjective   Patient states Pt expressed concerns with safety regarding driving due to vision issues.        SLP Cognitive Treatment   Treatment Detail (SLP Cognitive Treatment) .Pt was oriented to person, situation and place. Pt answered temporal orientation questions with 95% accuracy. Pt completed problem solving picture tasks with 95% accuracy given moderate verbal cues to describe pictures cards due to visual deficits. Pt was able to ask questions regarding safety returning home and what would be her main problems. Pt recalled sets of 3 numbers with 66% accuracy after answering simple questions in between each set.    Assessment   SLP Diagnosis moderate cog-ling deficits   Prognosis Good   Problem List Decreased orientation, decreased delayed recall,  and visual deficits   Session Assessment progressing toward goals   Level of Motivation/Participation good   Discharge Recommendations   Discharge Facility/Level Of Care Needs home health speech therapy   Plan   Plan Continue with current plan   Therapy Frequency Monday-Friday   SLP Follow-up   SLP Follow-up? Yes   Treatment/Billable Minutes   Speech Therapy Individual 45   Total Time 45       Tamra Adler M.S., MINDY-SLP  documenting under  KELLY [place]  4/6/2018

## 2018-04-06 NOTE — PLAN OF CARE
Problem: Patient Care Overview  Goal: Plan of Care Review  Outcome: Ongoing (interventions implemented as appropriate)  Patient has complained of pain that has be addressed with pain medication with mild results. Patient with no falls or injuries noted. Patient is stable with no distress noted. Plan of care explained to patient and patient verbally acknowledged understanding. Patient is resting with call light in reach, bed in low position and bed alarm set. Will continue to monitor patient.

## 2018-04-06 NOTE — PROGRESS NOTES
[facility]  Physical Medicine & Rehab  Progress Note    Patient Name: Kristina Aguirre  MRN: [id number]  Patient Class: IP- Rehab   Admission Date: 4/2/2018  Length of Stay: 3 days  Attending Physician: Peter Dunaway MD  Primary Care Provider: Robert Mattson MD    Subjective:     Principal Problem: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    Interval History 4/5/2018:  Patient is seen for follow-up rehab evaluation and recommendations: Pt without new c/o's.  I have reviewed the HPI and PMFSH and there are no changes from admission.       Scheduled Medications:    amiodarone  400 mg Oral BID    amLODIPine  10 mg Oral Daily    amoxicillin-clavulanate 500-125mg  1 tablet Oral BID    apixaban  5 mg Oral BID    ascorbic acid (vitamin C)  500 mg Oral BID    atorvastatin  40 mg Oral Daily    carvedilol  3.125 mg Oral Daily    chlorthalidone  25 mg Oral QAM    clopidogrel  75 mg Oral Daily    famotidine  20 mg Oral BID    fluticasone-vilanterol  1 puff Inhalation Daily    furosemide  20 mg Oral BID    lidocaine  1 patch Transdermal Q24H    lisinopril  20 mg Oral Daily    polyethylene glycol  17 g Oral Daily       Diagnostic Results: Labs: Reviewed    PRN Medications: acetaminophen, albuterol-ipratropium 2.5mg-0.5mg/3mL, bisacodyl, dextrose 50%, dextrose 50%, dextrose 50%, glucagon (human recombinant), glucose, glucose, hydrALAZINE, hydrocodone-acetaminophen 5-325mg, hydrOXYzine pamoate, insulin aspart U-100, magnesium hydroxide 400 mg/5 ml, ondansetron    Review of Systems   Constitutional: Positive for activity change and fatigue. Negative for chills, diaphoresis and fever.   HENT: Negative for congestion, ear discharge and ear pain.    Eyes: Negative for photophobia, pain and visual disturbance.   Respiratory: Positive for shortness of breath. Negative for cough, chest tightness and wheezing.    Cardiovascular: Negative for chest pain and palpitations.    Gastrointestinal: Negative for abdominal distention, abdominal pain, constipation, diarrhea and nausea.   Endocrine: Negative for cold intolerance and heat intolerance.   Genitourinary: Negative for enuresis, frequency and genital sores.   Musculoskeletal: Positive for gait problem and myalgias (over left ribs). Negative for arthralgias.   Skin: Negative for color change and wound.   Neurological: Negative for dizziness, seizures, weakness and headaches.   Hematological: Negative for adenopathy.   Psychiatric/Behavioral: Negative for agitation, confusion and hallucinations. The patient is nervous/anxious.      Objective:     Vital Signs (Most Recent):  Temp: 98 °F (36.7 °C) (18 0700)  Pulse: (!) 53 (18 0820)  Resp: 18 (18 08)  BP: 131/60 (18 0700)  SpO2: 95 % (18)    Vital Signs (24h Range):  Temp:  [98 °F (36.7 °C)] 98 °F (36.7 °C)  Pulse:  [53-54] 53  Resp:  [18] 18  SpO2:  [95 %] 95 %  BP: (131-145)/(60-63) 131/60     Physical Exam   Constitutional: She is oriented to person, place, and time. She appears well-developed and well-nourished. No distress.   HENT:   Head: Normocephalic and atraumatic.   Right Ear: External ear normal.   Left Ear: External ear normal.   Nose: Nose normal.   Eyes: Right eye exhibits no discharge. Left eye exhibits no discharge. No scleral icterus.   Neck: Normal range of motion.   Cardiovascular: Normal rate, regular rhythm and intact distal pulses.    Pulmonary/Chest: Effort normal. No respiratory distress. She has no wheezes.   Abdominal: Soft. She exhibits no distension. There is no tenderness.   Musculoskeletal: Normal range of motion. She exhibits no edema or tenderness.   Neurological: She is alert and oriented to person, place, and time.   -  Mental Status:  AAOx3.  Follows commands.  Answers correct age and .  Recent and remote memory intact.  -  Speech and language:  No aphasia, mild dysarthria.    -  Vision:  R hemianopsia.  No ptosis.     -  Facial movement (CN VII): Mild facial droop.   -  Motor:  RUE: 0/5.  LUE: 5/5.  RLE: 4-/5.  LLE: 5/5.  -  Tone:  Decreased RUE.  -  Sensory:  Intact to light touch and pin prick.   Skin: Skin is warm and dry. No rash noted.   Psychiatric: She has a normal mood and affect. Her behavior is normal. Thought content normal. Cognition and memory are impaired.   Vitals reviewed.    NEUROLOGICAL EXAMINATION:     MENTAL STATUS   Oriented to person, place, and time.       Assessment/Plan:      Kristina Aguirre is a 67 y.o. female admitted to inpatient rehabilitation on 4/2/2018 for 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 with impaired mobility and ADLs. Patient remains appropriate for PT, OT, and as required Speech therapy. Patient continues to require 24 hour nursing care as well as daily Physician assessment.    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 Pan sensitive    -Continue Augmentin for 7day course        Atrial fibrillation    continue Apixaban    4/4 - reports of SOB. EKG shows no sign of A fib with RVR or Mi. CXR showing resolution of pulm edema. Pending labs.        S/p TAVR (transcatheter aortic valve replacement), bioprosthetic    Continue Apixaban and Plavix        Diabetes mellitus, type 2    Continue to monitor blood glucoses    4/4 - started on Diabetic diet        Essential hypertension    Elevated on admission. Continue current meds    4/3/18 - started Lisinopril 10mg daily  4/4/18 - slightly improved. Increased to 20mg daily              DISCHARGE PLANNING:  Tentative Discharge Date:     Future Appointments  Date Time Provider Department Center   5/10/2018 10:00 AM EKG, APPT [facility] EKG Simon y   5/10/2018 10:40 AM Sarbjit Banegas  MD MALLY Dunaway MD  Department of Physical Medicine & Rehab   [facility]

## 2018-04-06 NOTE — PROGRESS NOTES
Physical Therapy   Treatment    Kristina Aguirre   MRN: [id number]   PTA visit #: 1   04/06/18 1045   PT Time Calculation   PT Start Time 1045   PT Stop Time 1115   PT Total Time (min) 30 min   Treatment   Treatment Type Treatment   PT/PTA PTA   PTA Visit Number 1   General   PT Received On 04/06/18   Family/Caregiver Present No   Patient Found (position) Supine in bed   Precautions   General Precautions aspiration;diabetic;fall;vision impaired   Orthopedic No   Required Braces or Orthoses No   Visual/Auditory Vision impaired   Subjective   Patient states Pt agreeable to tx but with c/o fatigue   Pain/Comfort   Pain Rating 1 5/10   Location - Orientation 1 lower   Location 1 back   Pain Addressed 1 Distraction   Bed Mobility   Bed Mobility yes   Supine to Sit Stand by Assistance   Transfers   Transfer yes   Sit to Stand   Sit <> Stand Assistance Contact Guard Assistance   Sit <> Stand Assistive Device Rolling Walker   Stand to Sit   Assistance Contact Guard Assistance   Assistive Device Rolling Walker   Trials/Comments vc   Bed to Chair   Bed <> Chair Technique Stand Pivot   Bed <> Chair Assistance Contact Guard Assistance   Bed <> Chair Assistive Device No Assistive Device   Wheelchair Activities   Propulsion Yes   Propulsion Type 1 Manual   Level 1 Level tile   Method 1 Left upper extremity;Right lower extremity;Left lower extremity   Level of Assistance 1 Stand by assistsance   Description/ Details 1 40', 50'   Gait   Gait Yes   Weight Bearing Status FWB   Gait 1   Surface 1 Level tile   Gait Assistive Device Rolling walker   Assistance 1 Contact Guard Assistance;Minimum assistance   Gait Distance 100' X 1 with min A for R hand on RW   Gait Pattern swing-through gait   Gait Deviation(s) decreased perez;increased time in double stance;decreased velocity of limb motion;decreased step length;decreased stride length;decreased weight-shifting ability   Impairments Contributing to Gait Deviations impaired balance;impaired  coordination;impaired postural control;decreased strength   Stairs   Stairs No   Seated   Seated-Exercises Lower extremity;Specific exercises   Seated-Exercise Type Ankle pumps;Hip flexion;Long arc quads;ABduction;ADduction   Seated-Exercise Comments B LE 2 x 15 reps   Activity Tolerance   Activity Tolerance Patient tolerated treatment well   After Treatment   Patient Position After Treatment Seated in wheelchair  (hand off to group volleyball)   Assessment   Prognosis Good   Problem List Decreased strength;Decreased endurance;Impaired balance;Decreased mobility;Impaired judgement;Decreased safety awareness;Impaired vision;Pain   Session Assessment progressing toward goals   Assessment Pt patti tx well despite c/o fatigue and LBP.  Cont POC   Level of Motivation/Participation good   Barriers to Discharge Decreased caregiver support   Discharge Recommendations   Equipment Needed After Discharge 3-in-1 commode;bath bench   Discharge Facility/Level Of Care Needs home health PT   Plan   Planned Therapy Intervention Continue with current plan   Therapy Frequency 2 times/day;Monday-Friday;Saturday or Sunday   Physical Therapy Follow-up   PT Follow-up? Yes   Treatment/Billable Minutes   Gait training 15   Therapeutic Exercise 15   Total Time 30   Pt wearing heart monitor    Charlotte Haro, PTA  4/6/2018

## 2018-04-06 NOTE — PROGRESS NOTES
Occupational Therapy  Weekly Reassessment/AM Treatment/Volleyball Group  Kristina Aguirre   MRN: [id number]   Room/Bed: E261/E261 B     04/06/18 0915 04/06/18 1115   OT Time Calculation   OT Start Time 0915 1115   OT Stop Time 1010 1200   OT Total Time (min) 55 min 45 min   General   OT Date of Treatment 04/06/18 --    Family/Caregiver Present No --    Patient Found (position) Right side lying in bed --    Precautions   General Precautions aspiration;fall;diabetic;vision impaired --    Cardiovascular/Pulmonary (Holter monitor) --    Visual/Auditory Vision impaired  (peripheral field cut) --    Pain/Comfort   Pain Rating no pain --    Bed Mobility   Supine to Sit Minimum Assistance --    Supine to Sit Comments for trunk elevation --    Sit to Stand   Sit <> Stand Assistance Contact Guard Assistance --    Sit <> Stand Assistive Device No Assistive Device --    Trials/Comments from EOB --    Stand to Sit   Assistance Contact Guard Assistance --    Assistive Device No Assistive Device --    Trials/Comments to EOB --    Bed to Chair   Bed <> Chair Technique Stand Pivot --    Bed <> Chair Transfer Assistance Contact Guard Assistance --    Bed <> Chair Assistive Device No Assistive Device --    Trials/Comments from EOB>wc --    Shower Transfer   Shower Transfer Technique Stand Pivot --    Shower Transfer Assistance Contact Guard Assistance --    Shower Transfer Assistive Device Shower bench --    Trials/Comments from wc>TTB --    Toilet Transfer   Toilet Transfer Technique Stand Pivot --    Toilet Transfer Assistance Contact Guard Assistance --    Toilet Transfer Assistive Device grab bar --    Trials/Comments to raised toilet --    Feeding   Feeding Level of Assistance Supervision --    Feeding Where Assessed Wheelchair --    Grooming   Grooming Level of Assistance Contact guard assistance --    Grooming Where Assessed Standing sinkside --    Grooming Comments for steadying assist --    Bathing   Bathing Adaptive Equipment Tub  bench --    Bathing Level of Assistance Minimum assistance --    Bathing Where Assessed tub bench --    Bathing Comments assist with washing LUE, steadying from stance for pericare --    UE Dressing   UE Dressing Level of Assistance Supervision --    UE Dressing Where Assessed Other (Comment)  (TTB) --    LE Dressing   LE Dressing  Level of Assistance Minimum assistance --    LE Dressing Where Assessed Other (Comment)  (TTB) --    LE Dressing Comments steadying from stance --    Toileting   Toileting Level of Assistance Minimum assistance --    Toileting Where Assessed Toilet --    Toileting Comments steadying from stance for clothing management --    OT Therapeutic Groups   OT Therapeutic --  Yes   Other --  -The patient performed volleyball group at w/c level with __(S). The patient required _minimal___ rest breaks during this activity.  Patient performed activity using bilateral upper extremities to volley the ball, lateral arm movement noted throughout the activity.  Endurance __13___on the RPE scale. no pain complaints voiced or observed.  -Social Interaction: (choose FIM score rating below) - 5    - Interacts appropriately with others - No medications needed. Patient asleep all shift (7)  - Interacts appropriately with others with medication or extra time(anti-anxiety, antidepressant)  (6)  - Interacts appropriately 90% of time - needs monitoring or encouragement for participation or interaction  (5)  - Interacts appropriately 75-89% of time - needs redirection for appropriate language or to initiate interaction  (4)  - Interacts appropriately 50-74% of time - May be physically or verbally inappropriate   (3)  - Interacts appropriately 25-49% of time - Needs frequent redirection  (2)  - Interacts appropriately less than 25% of time - May be withdrawn or combative  (1)    Goal:  Pts endurance will improve to a RPE of ___16_____ (choose one)      Assessment:  Patient participated in a 45 minute volleyball group  activity.  The group activity challenged dynamic standing/sitting balance, core strengthening, bilateral upper extremity strengthening, cardiovascular and respiratory capacity, hand -eye coordination, visual scanning and social affect/mood.      Activity Tolerance   Activity Tolerance Patient tolerated treatment well --    After Treatment   Patient Position After Treatment Seated at edge of bed --    Patient after treatment left call button in reach --    Assessment   Prognosis Fair --    Problem List Decreased Self Care skills;Decreased upper extremity range of motion;Decreased upper extremity strength;Decreased safe judgment during ADL;Decreased endurance;Decreased sensation;Decreased fine motor control;Visual deficit;Decreased functional mobility;Decreased gross motor control;Decreased IADLs;Decreased Function of right upper extremity;Decreased trunk control for functional activities --    Assessment Pt participated well in tx session but remains with decreased (I) with ADLs and functional mobility. Pt remains with difficulty accepting instruction to increase safety at this time. Pt would continue to benefit form skilled OT services to increase safety and (I) with ADLs and funcctional mobility. --    Level of Motivation/Participation Fair --    Barriers to Discharge Decreased caregiver support --    Short Term Goals   Pt Will Transfer Bed/Chair With stand by assist --    Transfer Bed/Chair - Met/Not Met Not Met --    Pt Will Perform Eating With stand by assistance --    Eating - Met/Not Met Not Met --    Pt Will Perform Grooming With supervision --    Grooming - Met/Not Met Not Met --    Pt Will Perform Bathing With minimal assistance --    Bathing - Met/Not Met Met --    Pt Will Perform UE Dressing With maximum assistance --    UE Dressing - Met/Not Met Met --    Pt Will Perform LE Dressing With minimal assistance --    LE Dressing - Met/Not Met Met --    Pt Will Perform Toileting With contact guard assistance  --    Toileting-Met/Not Met Not Met --    Discharge Recommendations   Equipment Needed After Discharge 3-in-1 commode;bedside commode --    Discharge Facility/Level Of Care Needs home with home health --    Plan   Plan Continue with current plan --    Therapy Frequency 2 times/day --    Occupational Therapy Follow-up   OT Follow-up? Yes --    Treatment/Billable Minutes   Self Care/Home Management 55 --    Therapeutic Group --  45   Total Time 55 45       CAROLYN Cuevas  4/6/2018     Patient with wheelchair safety belt fastened and able to self release wheelchair safety belt. Pt left in room with call light and all necessities in reach, RN notified.     LEGEND:   CGA: Contact Guard Assist   EOB: Edgeof Bed   HHA: Hand Held Assist   HOB: Head of Bed   (I): Independent-patient performs task in a timely manner   Max (A): Maximal Assist-patient performs 25-49% of task   Min (A): Minimal Assist- patient performs 75% or more of task   Mod (A): Moderate Assist- patient performs 50-74% of task   NA: Not applicable   NT: Not tested   OOB: Out of Bed   PTA: Prior to admit   QC: Quad Cane   RW: Rolling Walker   (S): Supervision- patient requires cues, coaxing, prompting   SBA: Stand By Assist   SC: Straight Cane   SW: Standard Walker   TBA: To be assessed   Total (A): Total Assist- patient performs less than 25% of task   WC: Wheelchair   WFL: Within Functional Limits   WNL: Within Normal Limits

## 2018-04-06 NOTE — SUBJECTIVE & OBJECTIVE
Interval History 4/6/2018:  Patient is seen for follow-up rehab evaluation and recommendations: Pt without new c/o's.  I have reviewed the HPI and PMFSH and there are no changes from admission.       Scheduled Medications:    amiodarone  400 mg Oral BID    amLODIPine  10 mg Oral Daily    amoxicillin-clavulanate 500-125mg  1 tablet Oral BID    apixaban  5 mg Oral BID    ascorbic acid (vitamin C)  500 mg Oral BID    atorvastatin  40 mg Oral Daily    carvedilol  3.125 mg Oral Daily    chlorthalidone  25 mg Oral QAM    clopidogrel  75 mg Oral Daily    famotidine  20 mg Oral BID    fluticasone-vilanterol  1 puff Inhalation Daily    furosemide  20 mg Oral BID    lidocaine  1 patch Transdermal Q24H    [START ON 4/7/2018] lisinopril  30 mg Oral Daily    polyethylene glycol  17 g Oral Daily       PRN Medications: acetaminophen, albuterol-ipratropium 2.5mg-0.5mg/3mL, bisacodyl, dextrose 50%, dextrose 50%, dextrose 50%, glucagon (human recombinant), glucose, glucose, hydrALAZINE, hydrocodone-acetaminophen 5-325mg, hydrOXYzine pamoate, insulin aspart U-100, magnesium hydroxide 400 mg/5 ml, ondansetron    Review of Systems   Constitutional: Positive for activity change and fatigue. Negative for chills, diaphoresis and fever.   HENT: Negative for congestion, ear discharge and ear pain.    Eyes: Negative for photophobia, pain and visual disturbance.   Respiratory: Positive for shortness of breath. Negative for cough, chest tightness and wheezing.    Cardiovascular: Negative for chest pain and palpitations.   Gastrointestinal: Negative for abdominal distention, abdominal pain, constipation, diarrhea and nausea.   Endocrine: Negative for cold intolerance and heat intolerance.   Genitourinary: Negative for enuresis, frequency and genital sores.   Musculoskeletal: Positive for gait problem and myalgias (over left ribs). Negative for arthralgias.   Skin: Negative for color change and wound.   Neurological: Negative for  dizziness, seizures, weakness and headaches.   Hematological: Negative for adenopathy.   Psychiatric/Behavioral: Negative for agitation, confusion and hallucinations. The patient is nervous/anxious.      Objective:     Vital Signs (Most Recent):  Temp: 97.9 °F (36.6 °C) (18)  Pulse: (!) 53 (18 0830)  Resp: 16 (18)  BP: (!) 178/75 (18)  SpO2: (!) 93 % (18)    Vital Signs (24h Range):  Temp:  [97.9 °F (36.6 °C)-98.4 °F (36.9 °C)] 97.9 °F (36.6 °C)  Pulse:  [53-93] 53  Resp:  [16-18] 16  SpO2:  [93 %] 93 %  BP: (158-178)/(72-75) 178/75     Physical Exam   Constitutional: She is oriented to person, place, and time. She appears well-developed and well-nourished. No distress.   HENT:   Head: Normocephalic and atraumatic.   Right Ear: External ear normal.   Left Ear: External ear normal.   Nose: Nose normal.   Eyes: Right eye exhibits no discharge. Left eye exhibits no discharge. No scleral icterus.   Neck: Normal range of motion.   Cardiovascular: Normal rate, regular rhythm and intact distal pulses.    Pulmonary/Chest: Effort normal. No respiratory distress. She has no wheezes.   Abdominal: Soft. She exhibits no distension. There is no tenderness.   Musculoskeletal: Normal range of motion. She exhibits no edema or tenderness.   Neurological: She is alert and oriented to person, place, and time.   -  Mental Status:  AAOx3.  Follows commands.  Answers correct age and .  Recent and remote memory intact.  -  Speech and language:  No aphasia, mild dysarthria.    -  Vision:  R hemianopsia.  No ptosis.    -  Facial movement (CN VII): Mild facial droop.   -  Motor:  RUE: 0/5.  LUE: 5/5.  RLE: 4-/5.  LLE: 5/5.  -  Tone:  Decreased RUE.  -  Sensory:  Intact to light touch and pin prick.   Skin: Skin is warm and dry. No rash noted.   Psychiatric: She has a normal mood and affect. Her behavior is normal. Thought content normal. Cognition and memory are impaired.   Vitals  reviewed.    NEUROLOGICAL EXAMINATION:     MENTAL STATUS   Oriented to person, place, and time.

## 2018-04-07 LAB
POCT GLUCOSE: 146 MG/DL (ref 70–110)
POCT GLUCOSE: 159 MG/DL (ref 70–110)
POCT GLUCOSE: 208 MG/DL (ref 70–110)
POCT GLUCOSE: 237 MG/DL (ref 70–110)

## 2018-04-07 PROCEDURE — 25000003 PHARM REV CODE 250: Performed by: PHYSICAL MEDICINE & REHABILITATION

## 2018-04-07 PROCEDURE — 25000003 PHARM REV CODE 250: Performed by: NURSE PRACTITIONER

## 2018-04-07 PROCEDURE — 97530 THERAPEUTIC ACTIVITIES: CPT

## 2018-04-07 PROCEDURE — 97150 GROUP THERAPEUTIC PROCEDURES: CPT

## 2018-04-07 PROCEDURE — 97110 THERAPEUTIC EXERCISES: CPT

## 2018-04-07 PROCEDURE — 12800000 HC REHAB SEMI-PRIVATE ROOM

## 2018-04-07 PROCEDURE — 97116 GAIT TRAINING THERAPY: CPT

## 2018-04-07 PROCEDURE — 92507 TX SP LANG VOICE COMM INDIV: CPT

## 2018-04-07 PROCEDURE — 97112 NEUROMUSCULAR REEDUCATION: CPT

## 2018-04-07 PROCEDURE — 99232 SBSQ HOSP IP/OBS MODERATE 35: CPT | Mod: ,,, | Performed by: PHYSICAL MEDICINE & REHABILITATION

## 2018-04-07 RX ADMIN — CHLORTHALIDONE 25 MG: 25 TABLET ORAL at 08:04

## 2018-04-07 RX ADMIN — Medication 500 MG: at 09:04

## 2018-04-07 RX ADMIN — ATORVASTATIN CALCIUM 40 MG: 20 TABLET, FILM COATED ORAL at 08:04

## 2018-04-07 RX ADMIN — AMIODARONE HYDROCHLORIDE 400 MG: 200 TABLET ORAL at 10:04

## 2018-04-07 RX ADMIN — FLUTICASONE FUROATE AND VILANTEROL TRIFENATATE 1 PUFF: 100; 25 POWDER RESPIRATORY (INHALATION) at 08:04

## 2018-04-07 RX ADMIN — POLYETHYLENE GLYCOL 3350 17 G: 17 POWDER, FOR SOLUTION ORAL at 08:04

## 2018-04-07 RX ADMIN — HYDROCODONE BITARTRATE AND ACETAMINOPHEN 1 TABLET: 5; 325 TABLET ORAL at 12:04

## 2018-04-07 RX ADMIN — LISINOPRIL 30 MG: 20 TABLET ORAL at 08:04

## 2018-04-07 RX ADMIN — FAMOTIDINE 20 MG: 20 TABLET, FILM COATED ORAL at 08:04

## 2018-04-07 RX ADMIN — FAMOTIDINE 20 MG: 20 TABLET, FILM COATED ORAL at 09:04

## 2018-04-07 RX ADMIN — AMOXICILLIN AND CLAVULANATE POTASSIUM 500 MG: 500; 125 TABLET, FILM COATED ORAL at 08:04

## 2018-04-07 RX ADMIN — APIXABAN 5 MG: 5 TABLET, FILM COATED ORAL at 09:04

## 2018-04-07 RX ADMIN — HYDROCODONE BITARTRATE AND ACETAMINOPHEN 1 TABLET: 5; 325 TABLET ORAL at 06:04

## 2018-04-07 RX ADMIN — Medication 500 MG: at 08:04

## 2018-04-07 RX ADMIN — CLOPIDOGREL BISULFATE 75 MG: 75 TABLET ORAL at 08:04

## 2018-04-07 RX ADMIN — FUROSEMIDE 20 MG: 20 TABLET ORAL at 08:04

## 2018-04-07 RX ADMIN — FUROSEMIDE 20 MG: 20 TABLET ORAL at 05:04

## 2018-04-07 RX ADMIN — HYDROCODONE BITARTRATE AND ACETAMINOPHEN 1 TABLET: 5; 325 TABLET ORAL at 03:04

## 2018-04-07 RX ADMIN — AMLODIPINE BESYLATE 10 MG: 10 TABLET ORAL at 08:04

## 2018-04-07 RX ADMIN — AMOXICILLIN AND CLAVULANATE POTASSIUM 500 MG: 500; 125 TABLET, FILM COATED ORAL at 10:04

## 2018-04-07 RX ADMIN — CARVEDILOL 3.12 MG: 3.12 TABLET, FILM COATED ORAL at 08:04

## 2018-04-07 RX ADMIN — HYDROXYZINE PAMOATE 25 MG: 25 CAPSULE ORAL at 10:04

## 2018-04-07 RX ADMIN — APIXABAN 5 MG: 5 TABLET, FILM COATED ORAL at 08:04

## 2018-04-07 RX ADMIN — AMIODARONE HYDROCHLORIDE 400 MG: 200 TABLET ORAL at 12:04

## 2018-04-07 RX ADMIN — HYDROCODONE BITARTRATE AND ACETAMINOPHEN 1 TABLET: 5; 325 TABLET ORAL at 10:04

## 2018-04-07 NOTE — PROGRESS NOTES
"Occupational Therapy  Dressing Group and PM Treatment  Kristina Aguirre   MRN: [id number]   Room/Bed: E261/E261 B     04/07/18 1115 04/07/18 1300   OT Time Calculation   OT Start Time 1115 1300   OT Stop Time 1155 1351   OT Total Time (min) 40 min 51 min   General   OT Date of Treatment 04/07/18 04/07/18   Family/Caregiver Present No No   Patient Found (position) Seated in wheelchair Seated at edge of bed   Precautions   General Precautions aspiration;diabetic;fall;vision impaired aspiration;diabetic;fall;vision impaired   Orthopedic No No   Required Braces or Orthoses No No   Visual/Auditory Vision impaired Vision impaired   Subjective   Patient states "I'm having a hard time getting the shirt on." "This arm is starting to get better. I keep working it out."    Pain/Comfort   Pain Rating 6/10 --    Location - Orientation lower --    Location back --    Pain Addressed Reposition;Distraction --    Transfers   Transfer --  yes   Sit to Stand   Sit <> Stand Assistance --  Contact Guard Assistance   Sit <> Stand Assistive Device --  No Assistive Device   Trials/Comments --  from EOB; from w/c   Stand to Sit   Assistance --  Contact Guard Assistance   Assistive Device --  No Assistive Device   Trials/Comments --  to  w/c; to EOB   Bed to Chair   Bed <> Chair Technique --  Stand Pivot   Bed <> Chair Transfer Assistance --  Contact Guard Assistance   Bed <> Chair Assistive Device --  No Assistive Device   Trials/Comments --  CGA for steadying assist from w/c > EOB   Exercise Tools   Exercise Tools --  Yes   Bilateral Charlee --  5x20 reps 5# flat surface with RUE secured to bilateral charlee to increase UB strength for IADL prep   OT Therapeutic Groups   OT Therapeutic Yes --    Other Pt participated in 45 min functional hemiplegic dressing group. The group activity reviewed safety considerations, energy conservation, and adaptive strategies for upper body and lower body dressing. Patient educated on adaptive equipment available " to assist with dressing (long handled shoe horn, reacher, dressing stick, elastic shoe laces). Patient also educated on dressing tips that promote increased (I) with dressing such as wearing loose fit clothing, using footstool, and adapted clothing available.     UB dressing pullover shirt  min Assist    LB dressing                      (pants)   min Assist                      (shoes)  mod Assist    These activities were performed in a group setting to encourage participation with peers and social interaction skills.    --    Additional Activities   Additional Activities --  Other (Comment)   Additional Activities Comments --  Pt performed multiple trials with functional mobility around table top (31 ft) with CGA for steadying assist; pt performed WBing onto RUE with [place] (A) at table top while t/fing MRMT coins with L hand to increase RUE tone, dynamic standing balance, endurance, weight-shifting.    Activity Tolerance   Activity Tolerance --  Patient tolerated treatment well   After Treatment   Patient Position After Treatment Seated in wheelchair Seated at edge of bed   Patient after treatment left call button in reach  (safety belt intact) call button in reach   Assessment   Prognosis --  Fair   Problem List --  Decreased Self Care skills;Decreased upper extremity range of motion;Decreased upper extremity strength;Decreased safe judgment during ADL;Decreased cognition;Decreased endurance;Decreased sensation;Visual deficit;Decreased fine motor control;Decreased functional mobility;Decreased gross motor control;Decreased IADLs;Decreased Function of right upper extremity;Decreased trunk control for functional activities   Assessment --  Pt tolerated tx session well today. Pt with slight LOB in standing after several functional mobility trials during ther ax; pt able to correct dynamic standing balance with CGA for steadying assist. Pt with improved endurance throughout session. Pt would continue to benefit from  skilled OT services to increase (I) with ADLs/IADLs.   Level of Motivation/Participation --  fair   Barriers to Discharge --  Decreased caregiver support   Barrier Comments --  Pt lives alone with pet   Discharge Recommendations   Equipment Needed After Discharge --  3-in-1 commode;bath bench;walker, rolling   Discharge Facility/Level Of Care Needs --  outpatient OT   Plan   Plan --  Continue with current plan   Therapy Frequency --  2 times/day   Occupational Therapy Follow-up   OT Follow-up? Yes Yes   Treatment/Billable Minutes   Therapeutic Activity --  30   Therapeutic Exercise --  21   Therapeutic Group 40 --    Total Time 40 51       Migdalia Rodarte, KELVIN  4/7/2018    Patient with wheelchair safety belt fastened and able to self release wheelchair safety belt. Pt left seated in wheelchair (location) with call light and all necessities in reach, nursing notified.     LEGEND:   CGA: Contact Guard Assist   EOB: Edgeof Bed   HHA: Hand Held Assist   HOB: Head of Bed   (I): Independent-patient performs task in a timely manner   Max (A): Maximal Assist-patient performs 25-49% of task   Min (A): Minimal Assist- patient performs 75% or more of task   Mod (A): Moderate Assist- patient performs 50-74% of task   NA: Not applicable   NT: Not tested   OOB: Out of Bed   PTA: Prior to admit   QC: Quad Cane   RW: Rolling Walker   (S): Supervision- patient requires cues, coaxing, prompting   SBA: Stand By Assist   SC: Straight Cane   SW: Standard Walker   TBA: To be assessed   Total (A): Total Assist- patient performs less than 25% of task   WC: Wheelchair   WFL: Within Functional Limits   WNL: Within Normal Limits

## 2018-04-07 NOTE — PROGRESS NOTES
"Physical Therapy   Treatment    Kristina Aguirre   MRN: [id number]      04/07/18 1446   PT Time Calculation   PT Start Time 1446   PT Stop Time 1531   PT Total Time (min) 45 min   Treatment   Treatment Type Treatment   PT/PTA PT   General   PT Received On 04/07/18   Family/Caregiver Present No   Patient Found (position) Supine in bed   Pt found with (Holter monitor donned)   Precautions   General Precautions aspiration;diabetic;fall;vision impaired   Orthopedic No   Required Braces or Orthoses No   Visual/Auditory Vision impaired   Subjective   Patient states "I slept so well last night"   Pain/Comfort   Pain Rating 1 5/10   Location - Side 1 Bilateral   Location - Orientation 1 generalized   Location 1 back   Pain Addressed 1 Reposition;Distraction   Pain Rating Post-Intervention 1 5/10   Bed Mobility   Bed Mobility yes   Supine to Sit Supervision  (x 1 trial in bed with HOB flat)   Transfers   Transfer yes   Sit to Stand   Sit <> Stand Assistance Contact Guard Assistance   Sit <> Stand Assistive Device Rolling Walker  (with R NeuroIFRAH paddle )   Trials/Comments Pt performed multiple trials of this transfer throughout PT treatment session   Stand to Sit   Assistance Contact Guard Assistance   Assistive Device Rolling Walker  (R NeuroIFRAH paddle)   Trials/Comments Pt performed multiple trials of this transfer, pt required verbal cues for appropriate UE positioning during transfer   Bed to Chair   Bed <> Chair Technique Stand Pivot   Bed <> Chair Assistance Contact Guard Assistance   Bed <> Chair Assistive Device No Assistive Device   Trials/Comments x 1 trial, assistance required from PT for R UE management    Chair to Bed   Technique Stand Pivot   Assistance Contact Guard Assistance   Assistive Device No Assistive Device   Trials/Comments x 1 trial, assistance required for R UE management    Gait   Gait Yes   Weight Bearing Status full   Gait 1   Surface 1 Level tile   Gait Assistive Device Rolling walker  (R " "NeuroIFRAH paddle)   Assistance 1 Minimum assistance   Gait Distance Pt ambulated x 3 trials with RW with use of R NeuroIFRAH paddle for R UE weight bearing during activity. Pt required seated rest breaks between trials due to fatigue. Pt required verbla cues to increase R great toe clearance during swing phase    Gait Pattern swing-through gait   Gait Deviation(s) decreased perez;increased time in double stance;decreased velocity of limb motion;decreased step length;decreased stride length;decreased toe-to-floor clearance;decreased weight-shifting ability;forward lean;toe drag   Impairments Contributing to Gait Deviations impaired balance;decreased strength;impaired postural control;abnormal muscle tone;impaired motor control;decreased flexibility;impaired coordination   Stairs   Stairs Yes   Stairs/Curb Step   Rails 1 Left   Device 1 No device   Assistance 1 Minimum assistance   Comment/# Steps 1 Pt ascended and descended 8 6" steps leading with R LE to promote increased strength and weight acceptance to R LE during activity    Seated   Seated-Exercises Lower extremity;Specific exercises   Seated-Exercise Type Ankle pumps   Seated-Exercise Comments Pt performed 2 x 50 reps to B LE to promote increased R anterior tibialis active movement   Other Activities   Comments Patient performed step up's onto 6" steps with L LE 2 x 15 reps to promote increased R LE weight bearing and weight acceptance. Patient performed step up's onto 6" step with R LE to promote increased R LE strength and position sense    Patient performed 2 x 15 reps of mini squats    Activity Tolerance   Activity Tolerance Patient tolerated treatment well   After Treatment   Patient Position After Treatment Seated at edge of bed   Patient after treatment left call button in reach   Assessment   Prognosis Good   Problem List Decreased strength;Decreased endurance;Impaired balance;Decreased mobility;Decreased safety awareness;Impaired " judgement;Decreased coordination;Impaired vision;Impaired tone;Pain   Session Assessment progressing toward goals   Assessment Pt demonstrated improved R great toe clearance during ambulation swing phases as compared to previous attempts to perform this during ambulation trials. Pt demonstrated slightly improved R UE motor return as well. Pt will continue to benefit from further skilled PT intervention in order to address these above impairments and to improve pt's functional independence with mobility.    Level of Motivation/Participation good   Barriers to Discharge Decreased caregiver support;Inaccessible home environment   Barriers to Discharge Comments Pt has steps to enter home, pt lives alone   Discharge Recommendations   Equipment Needed After Discharge 3-in-1 commode;bath bench;walker, rolling   Discharge Facility/Level Of Care Needs outpatient PT   Plan   Planned Therapy Intervention Continue with current plan   Therapy Frequency 2 times/day;daily;Monday-Friday;Saturday or Sunday   Physical Therapy Follow-up   PT Follow-up? Yes   PT - Next Visit Date 04/09/18   Treatment/Billable Minutes   Gait training 15   Therapeutic Activity 10   Neuromuscular Re-education 20   Total Time 45       Adry Romo, PT  4/7/2018

## 2018-04-07 NOTE — PROGRESS NOTES
Speech Therapy   Re-assessment/Treatment     Kristina Aguirre   MRN: [id number]   Short Term Goals   Goal 1 Pt will be oriented x4 given supervision with 90% acc. MET   Goal 2 Pt will complete language comprehension tasks given supervision with 90% acc. MET   Goal 3 Pt will complete expressive language tasks given supervision with 90% acc. MET   Goal 4 Pt will complete problem solving/reasoning/sequencing tasks given supervision with 90% acc. CONTINUE   Goal 5 Pt will complete functional/recent recall task given min verbal cues with 80% acc. CONTINUE    Goal 6 Further assessment reading comprehension and visual abilities to determine baseline. NEW GOAL   Goal 7 Pt will complete mental manipulation of functional information regarding time/money management with 90% accuracy with min cues. NEW GOAL   Long Term Goals   Goal 1 Pt will complete visual scanning task given min verbal cues with 80% acc. CONTINUE   Goal 2 Pt will complete language comprehension tasks with modified independence with % acc. CONTINUE   Goal 3 Pt will complete expressive language tasks with modified independence with % acc. CONTINUE   Goal 4 Pt will complete problem solving/reasoning/sequencing tasks with modified independence with % acc. CONTINUE    Goal 5 Pt will complete functional/recent recall task given supervision with 90% acc.CONTINUE          Swallowing/Dysphagia Goals:  Short Term Goals   Goal 1 Patient will complete OME's x10 each given moderate verbal and visual cues to improve R orofacial weakness. CONTINUE   Goal 2 Patient will complete dysphagia exercises x10 each given moderate verbal and visual cues to improve laryngeal elevation and tongue base retraction. CONTINUE   Goal 3 Patient will tolerate current diet of regular solids with thin liquids with no overt s/s aspiration. CONTINUE    Long Term Goals   Goal 1 Patient will complete OME's x10 each given min verbal and visual cues to improve R orofacial weakness.  "CONTINUE   Goal 2 Patient will complete dysphagia exercises x10 each given min verbal and visual cues to improve laryngeal elevation and tongue base retraction. CONTINUE              04/07/18 1000   Speech Time Calculation   Speech Start Time 1000   Speech Stop Time 1045   Speech Total (min) 45 min   General Information   SLP Treatment Date 04/07/18   Referring Physician Dr. Dunaway   General Observations Pt seen in pt's room and ST office for therapy.    General Precautions aspiration;diabetic;fall;vision impaired   Visual/Auditory Vision impaired   Subjective   Patient states "I feel like I'm doing better with my memory, but not like I'd like."   Pain/Comfort   Pain Rating no pain       SLP Cognitive Treatment   Treatment Detail (SLP Cognitive Treatment) Pt reported just getting out of bed upon ST arrival. Functional safety and sequencing tasks for ADLs completed in pt's room with minimal cueing. Pt observed brushing teeth, toileting, and washing hands with ST present;pt able to appropriately sequence steps for all tasks with min verbal prompting. Pt required min verbal cues for WC safety. Pt completed the remainder of session in the ST office. Pt engaged in divergent naming tasks - abstract and concrete with 95% accuracy independently; Convergent naming tasks - abstract with 100% accuracy independently. Patient completed Word list retention - category inclusion of 4 words to improve recall with 60% accuracy with min cues. Pt participated in memory & inferences of short paragraphs task with 80% accuracy independently, 100% with binary choice.        SLP Treatment: Swallowing   Treatment Detail  Pt able to recall safe swallowing compensatory strategies independently. Pt reported occasional pocketing on R side with ability to clear using lingual sweep.    Assessment   SLP Diagnosis mod cog-ling deficit   Prognosis Good   Problem List decreased short term recall, visual deficits   Session Assessment progressing toward " goals   Level of Motivation/Participation good   Discharge Recommendations   Discharge Facility/Level Of Care Needs Home health speech therapy   Plan   Plan Continue with current plan   Therapy Frequency Monday-Friday   SLP Follow-up   SLP Follow-up? Yes   SLP - Next Visit Date 04/09/18   Treatment/Billable Minutes   Speech Therapy Individual 40   Treatment Swallowing Dysfunction 5   Total Time 45     Zainab Jeff CCC-SLP  4/7/2018

## 2018-04-07 NOTE — PROGRESS NOTES
[facility]  Physical Medicine & Rehab  Progress Note    Patient Name: Kristina Aguirre  MRN: [id number]  Patient Class: IP- Rehab   Admission Date: 4/2/2018  Length of Stay: 5 days  Attending Physician: Peter Dunaway MD  Primary Care Provider: Robert Mattson MD    Subjective:     Principal Problem: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    Interval History 4/7/2018:  Patient is seen for follow-up rehab evaluation and recommendations: Pt without new c/o's.  Tolerating group therapy today.    I have reviewed the HPI and PMFSH and there are no changes from admission.       Scheduled Medications:    amiodarone  400 mg Oral BID    amLODIPine  10 mg Oral Daily    amoxicillin-clavulanate 500-125mg  1 tablet Oral BID    apixaban  5 mg Oral BID    ascorbic acid (vitamin C)  500 mg Oral BID    atorvastatin  40 mg Oral Daily    carvedilol  3.125 mg Oral Daily    chlorthalidone  25 mg Oral QAM    clopidogrel  75 mg Oral Daily    famotidine  20 mg Oral BID    fluticasone-vilanterol  1 puff Inhalation Daily    furosemide  20 mg Oral BID    lidocaine  1 patch Transdermal Q24H    lisinopril  30 mg Oral Daily    polyethylene glycol  17 g Oral Daily       PRN Medications: acetaminophen, albuterol-ipratropium 2.5mg-0.5mg/3mL, bisacodyl, dextrose 50%, dextrose 50%, dextrose 50%, glucagon (human recombinant), glucose, glucose, hydrALAZINE, hydrocodone-acetaminophen 5-325mg, hydrOXYzine pamoate, insulin aspart U-100, magnesium hydroxide 400 mg/5 ml, ondansetron    Review of Systems   Constitutional: Positive for activity change and fatigue. Negative for chills, diaphoresis and fever.   HENT: Negative for congestion, ear discharge and ear pain.    Eyes: Negative for photophobia, pain and visual disturbance.   Respiratory: Positive for shortness of breath. Negative for cough, chest tightness and wheezing.    Cardiovascular: Negative for chest pain and palpitations.    Gastrointestinal: Negative for abdominal distention, abdominal pain, constipation, diarrhea and nausea.   Endocrine: Negative for cold intolerance and heat intolerance.   Genitourinary: Negative for enuresis, frequency and genital sores.   Musculoskeletal: Positive for gait problem and myalgias (over left ribs). Negative for arthralgias.   Skin: Negative for color change and wound.   Neurological: Negative for dizziness, seizures, weakness and headaches.   Hematological: Negative for adenopathy.   Psychiatric/Behavioral: Negative for agitation, confusion and hallucinations. The patient is nervous/anxious.      Objective:     Vital Signs (Most Recent):  Temp: 98.3 °F (36.8 °C) (18 0645)  Pulse: (!) 56 (18 0854)  Resp: 18 (18 0854)  BP: (!) 148/68 (18 0645)  SpO2: 96 % (1845)    Vital Signs (24h Range):  Temp:  [98.1 °F (36.7 °C)-98.3 °F (36.8 °C)] 98.3 °F (36.8 °C)  Pulse:  [52-56] 56  Resp:  [16-18] 18  SpO2:  [95 %-96 %] 96 %  BP: (148-166)/(68-69) 148/68     Physical Exam   Constitutional: She is oriented to person, place, and time. She appears well-developed and well-nourished. No distress.   HENT:   Head: Normocephalic and atraumatic.   Right Ear: External ear normal.   Left Ear: External ear normal.   Nose: Nose normal.   Eyes: Right eye exhibits no discharge. Left eye exhibits no discharge. No scleral icterus.   Neck: Normal range of motion.   Cardiovascular: Normal rate, regular rhythm and intact distal pulses.    Pulmonary/Chest: Effort normal. No respiratory distress. She has no wheezes.   Abdominal: Soft. She exhibits no distension. There is no tenderness.   Musculoskeletal: Normal range of motion. She exhibits no edema or tenderness.   Neurological: She is alert and oriented to person, place, and time.   -  Mental Status:  AAOx3.  Follows commands.  Answers correct age and .  Recent and remote memory intact.  -  Speech and language:  No aphasia, mild dysarthria.    -   Vision:  R hemianopsia.  No ptosis.    -  Facial movement (CN VII): Mild facial droop.   -  Motor:  RUE: 0/5.  LUE: 5/5.  RLE: 4-/5.  LLE: 5/5.  -  Tone:  Decreased RUE.  -  Sensory:  Intact to light touch and pin prick.   Skin: Skin is warm and dry. No rash noted.   Psychiatric: She has a normal mood and affect. Her behavior is normal. Thought content normal. Cognition and memory are impaired.   Vitals reviewed.    NEUROLOGICAL EXAMINATION:     MENTAL STATUS   Oriented to person, place, and time.       Assessment/Plan:      Kristina Aguirre is a 67 y.o. female admitted to inpatient rehabilitation on 4/2/2018 for 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 with impaired mobility and ADLs. Patient remains appropriate for PT, OT, and as required Speech therapy. Patient continues to require 24 hour nursing care as well as daily Physician assessment.    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 Pan sensitive    -Continue Augmentin for 7day course        Atrial fibrillation    continue Apixaban    4/4 - reports of SOB. EKG shows no sign of A fib with RVR or Mi. CXR showing resolution of pulm edema. Pending labs.        S/p TAVR (transcatheter aortic valve replacement), bioprosthetic    Continue Apixaban and Plavix        Diabetes mellitus, type 2    Continue to monitor blood glucoses    4/4 - started on Diabetic diet    Lab Results   Component Value Date    HGBA1C 6.5 (H) 03/21/2018       - Diabetic Diet, Diabetic Education   - Monitor POCT TIDAC and qhs   - Continue with  ISS    POCT Glucose   Date Value Ref Range Status   04/07/2018 208 (H) 70 - 110 mg/dL Final   04/07/2018 159 (H) 70 - 110 mg/dL Final   04/06/2018 296 (H) 70 - 110 mg/dL Final   04/06/2018  134 (H) 70 - 110 mg/dL Final   04/06/2018 183 (H) 70 - 110 mg/dL Final   04/06/2018 170 (H) 70 - 110 mg/dL Final   04/05/2018 191 (H) 70 - 110 mg/dL Final   04/05/2018 222 (H) 70 - 110 mg/dL Final   04/05/2018 176 (H) 70 - 110 mg/dL Final   04/05/2018 195 (H) 70 - 110 mg/dL Final   04/04/2018 284 (H) 70 - 110 mg/dL Final   04/04/2018 176 (H) 70 - 110 mg/dL Final                     Essential hypertension    Elevated on admission. Continue current meds    4/3/18 - started Lisinopril 10mg daily  4/4/18 - slightly improved. Increased to 20mg daily  4/6/18 - still elevated Bp. Increased to 30mg daily  4/7 cont to monitor trend     Vitals:    04/06/18 0830 04/06/18 1900 04/07/18 0645 04/07/18 0854   BP:  (!) 166/69 (!) 148/68    BP Location:  Left arm Left arm    Patient Position:  Sitting Lying    Pulse: (!) 53 (!) 52 (!) 54 (!) 56   Resp: 16 16 18 18   Temp:  98.1 °F (36.7 °C) 98.3 °F (36.8 °C)    TempSrc:  Oral Oral    SpO2:  95% 96%    Weight:       Height:                       DISCHARGE PLANNING:  Tentative Discharge Date:     Future Appointments  Date Time Provider Department Center   5/10/2018 10:00 AM EKG, APPT [facility] EKG Simon Dawson   5/10/2018 10:40 AM Sarbjit Banegas MD [facility] ARRHYTH Simon Diaz MD  Department of Physical Medicine & Rehab   [facility]

## 2018-04-07 NOTE — SUBJECTIVE & OBJECTIVE
Interval History 4/7/2018:  Patient is seen for follow-up rehab evaluation and recommendations: Pt without new c/o's.  Tolerating group therapy today.    I have reviewed the HPI and PMFSH and there are no changes from admission.       Scheduled Medications:    amiodarone  400 mg Oral BID    amLODIPine  10 mg Oral Daily    amoxicillin-clavulanate 500-125mg  1 tablet Oral BID    apixaban  5 mg Oral BID    ascorbic acid (vitamin C)  500 mg Oral BID    atorvastatin  40 mg Oral Daily    carvedilol  3.125 mg Oral Daily    chlorthalidone  25 mg Oral QAM    clopidogrel  75 mg Oral Daily    famotidine  20 mg Oral BID    fluticasone-vilanterol  1 puff Inhalation Daily    furosemide  20 mg Oral BID    lidocaine  1 patch Transdermal Q24H    lisinopril  30 mg Oral Daily    polyethylene glycol  17 g Oral Daily       PRN Medications: acetaminophen, albuterol-ipratropium 2.5mg-0.5mg/3mL, bisacodyl, dextrose 50%, dextrose 50%, dextrose 50%, glucagon (human recombinant), glucose, glucose, hydrALAZINE, hydrocodone-acetaminophen 5-325mg, hydrOXYzine pamoate, insulin aspart U-100, magnesium hydroxide 400 mg/5 ml, ondansetron    Review of Systems   Constitutional: Positive for activity change and fatigue. Negative for chills, diaphoresis and fever.   HENT: Negative for congestion, ear discharge and ear pain.    Eyes: Negative for photophobia, pain and visual disturbance.   Respiratory: Positive for shortness of breath. Negative for cough, chest tightness and wheezing.    Cardiovascular: Negative for chest pain and palpitations.   Gastrointestinal: Negative for abdominal distention, abdominal pain, constipation, diarrhea and nausea.   Endocrine: Negative for cold intolerance and heat intolerance.   Genitourinary: Negative for enuresis, frequency and genital sores.   Musculoskeletal: Positive for gait problem and myalgias (over left ribs). Negative for arthralgias.   Skin: Negative for color change and wound.   Neurological:  Negative for dizziness, seizures, weakness and headaches.   Hematological: Negative for adenopathy.   Psychiatric/Behavioral: Negative for agitation, confusion and hallucinations. The patient is nervous/anxious.      Objective:     Vital Signs (Most Recent):  Temp: 98.3 °F (36.8 °C) (18 0645)  Pulse: (!) 56 (18 0854)  Resp: 18 (18 0854)  BP: (!) 148/68 (18 0645)  SpO2: 96 % (18 0645)    Vital Signs (24h Range):  Temp:  [98.1 °F (36.7 °C)-98.3 °F (36.8 °C)] 98.3 °F (36.8 °C)  Pulse:  [52-56] 56  Resp:  [16-18] 18  SpO2:  [95 %-96 %] 96 %  BP: (148-166)/(68-69) 148/68     Physical Exam   Constitutional: She is oriented to person, place, and time. She appears well-developed and well-nourished. No distress.   HENT:   Head: Normocephalic and atraumatic.   Right Ear: External ear normal.   Left Ear: External ear normal.   Nose: Nose normal.   Eyes: Right eye exhibits no discharge. Left eye exhibits no discharge. No scleral icterus.   Neck: Normal range of motion.   Cardiovascular: Normal rate, regular rhythm and intact distal pulses.    Pulmonary/Chest: Effort normal. No respiratory distress. She has no wheezes.   Abdominal: Soft. She exhibits no distension. There is no tenderness.   Musculoskeletal: Normal range of motion. She exhibits no edema or tenderness.   Neurological: She is alert and oriented to person, place, and time.   -  Mental Status:  AAOx3.  Follows commands.  Answers correct age and .  Recent and remote memory intact.  -  Speech and language:  No aphasia, mild dysarthria.    -  Vision:  R hemianopsia.  No ptosis.    -  Facial movement (CN VII): Mild facial droop.   -  Motor:  RUE: 0/5.  LUE: 5/5.  RLE: 4-/5.  LLE: 5/5.  -  Tone:  Decreased RUE.  -  Sensory:  Intact to light touch and pin prick.   Skin: Skin is warm and dry. No rash noted.   Psychiatric: She has a normal mood and affect. Her behavior is normal. Thought content normal. Cognition and memory are impaired.    Vitals reviewed.    NEUROLOGICAL EXAMINATION:     MENTAL STATUS   Oriented to person, place, and time.

## 2018-04-07 NOTE — PLAN OF CARE
Problem: Patient Care Overview  Goal: Plan of Care Review  Outcome: Ongoing (interventions implemented as appropriate)  Patient has complained of pain that has be addressed with pain medication with mild results. Patient is stable with no distress noted.  Patient is up in chair with no falls or injuries noted.Plan of care explained to patient and patient verbally acknowledged understanding. Patient is resting with call light in reach, bed in low position and bed alarm set. Will continue to monitor patient.

## 2018-04-07 NOTE — ASSESSMENT & PLAN NOTE
Continue to monitor blood glucoses    4/4 - started on Diabetic diet    Lab Results   Component Value Date    HGBA1C 6.5 (H) 03/21/2018       - Diabetic Diet, Diabetic Education   - Monitor POCT TIDAC and qhs   - Continue with  ISS    POCT Glucose   Date Value Ref Range Status   04/07/2018 208 (H) 70 - 110 mg/dL Final   04/07/2018 159 (H) 70 - 110 mg/dL Final   04/06/2018 296 (H) 70 - 110 mg/dL Final   04/06/2018 134 (H) 70 - 110 mg/dL Final   04/06/2018 183 (H) 70 - 110 mg/dL Final   04/06/2018 170 (H) 70 - 110 mg/dL Final   04/05/2018 191 (H) 70 - 110 mg/dL Final   04/05/2018 222 (H) 70 - 110 mg/dL Final   04/05/2018 176 (H) 70 - 110 mg/dL Final   04/05/2018 195 (H) 70 - 110 mg/dL Final   04/04/2018 284 (H) 70 - 110 mg/dL Final   04/04/2018 176 (H) 70 - 110 mg/dL Final

## 2018-04-07 NOTE — ASSESSMENT & PLAN NOTE
Elevated on admission. Continue current meds    4/3/18 - started Lisinopril 10mg daily  4/4/18 - slightly improved. Increased to 20mg daily  4/6/18 - still elevated Bp. Increased to 30mg daily  4/7 cont to monitor trend     Vitals:    04/06/18 0830 04/06/18 1900 04/07/18 0645 04/07/18 0854   BP:  (!) 166/69 (!) 148/68    BP Location:  Left arm Left arm    Patient Position:  Sitting Lying    Pulse: (!) 53 (!) 52 (!) 54 (!) 56   Resp: 16 16 18 18   Temp:  98.1 °F (36.7 °C) 98.3 °F (36.8 °C)    TempSrc:  Oral Oral    SpO2:  95% 96%    Weight:       Height:

## 2018-04-08 LAB
POCT GLUCOSE: 143 MG/DL (ref 70–110)
POCT GLUCOSE: 153 MG/DL (ref 70–110)
POCT GLUCOSE: 247 MG/DL (ref 70–110)
POCT GLUCOSE: 256 MG/DL (ref 70–110)

## 2018-04-08 PROCEDURE — 92507 TX SP LANG VOICE COMM INDIV: CPT

## 2018-04-08 PROCEDURE — 12800000 HC REHAB SEMI-PRIVATE ROOM

## 2018-04-08 PROCEDURE — 99232 SBSQ HOSP IP/OBS MODERATE 35: CPT | Mod: ,,, | Performed by: PHYSICAL MEDICINE & REHABILITATION

## 2018-04-08 PROCEDURE — 25000003 PHARM REV CODE 250: Performed by: NURSE PRACTITIONER

## 2018-04-08 PROCEDURE — 25000003 PHARM REV CODE 250: Performed by: PHYSICAL MEDICINE & REHABILITATION

## 2018-04-08 RX ADMIN — FLUTICASONE FUROATE AND VILANTEROL TRIFENATATE 1 PUFF: 100; 25 POWDER RESPIRATORY (INHALATION) at 08:04

## 2018-04-08 RX ADMIN — AMIODARONE HYDROCHLORIDE 400 MG: 200 TABLET ORAL at 11:04

## 2018-04-08 RX ADMIN — APIXABAN 5 MG: 5 TABLET, FILM COATED ORAL at 08:04

## 2018-04-08 RX ADMIN — POLYETHYLENE GLYCOL 3350 17 G: 17 POWDER, FOR SOLUTION ORAL at 08:04

## 2018-04-08 RX ADMIN — APIXABAN 5 MG: 5 TABLET, FILM COATED ORAL at 10:04

## 2018-04-08 RX ADMIN — FUROSEMIDE 20 MG: 20 TABLET ORAL at 05:04

## 2018-04-08 RX ADMIN — INSULIN ASPART 2 UNITS: 100 INJECTION, SOLUTION INTRAVENOUS; SUBCUTANEOUS at 05:04

## 2018-04-08 RX ADMIN — LISINOPRIL 30 MG: 20 TABLET ORAL at 08:04

## 2018-04-08 RX ADMIN — CARVEDILOL 3.12 MG: 3.12 TABLET, FILM COATED ORAL at 08:04

## 2018-04-08 RX ADMIN — FUROSEMIDE 20 MG: 20 TABLET ORAL at 08:04

## 2018-04-08 RX ADMIN — Medication 500 MG: at 08:04

## 2018-04-08 RX ADMIN — CLOPIDOGREL BISULFATE 75 MG: 75 TABLET ORAL at 08:04

## 2018-04-08 RX ADMIN — HYDROCODONE BITARTRATE AND ACETAMINOPHEN 1 TABLET: 5; 325 TABLET ORAL at 08:04

## 2018-04-08 RX ADMIN — AMOXICILLIN AND CLAVULANATE POTASSIUM 500 MG: 500; 125 TABLET, FILM COATED ORAL at 08:04

## 2018-04-08 RX ADMIN — ATORVASTATIN CALCIUM 40 MG: 20 TABLET, FILM COATED ORAL at 08:04

## 2018-04-08 RX ADMIN — FAMOTIDINE 20 MG: 20 TABLET, FILM COATED ORAL at 08:04

## 2018-04-08 RX ADMIN — CHLORTHALIDONE 25 MG: 25 TABLET ORAL at 08:04

## 2018-04-08 RX ADMIN — INSULIN ASPART 5 UNITS: 100 INJECTION, SOLUTION INTRAVENOUS; SUBCUTANEOUS at 12:04

## 2018-04-08 RX ADMIN — HYDROXYZINE PAMOATE 25 MG: 25 CAPSULE ORAL at 10:04

## 2018-04-08 RX ADMIN — INSULIN ASPART 2 UNITS: 100 INJECTION, SOLUTION INTRAVENOUS; SUBCUTANEOUS at 08:04

## 2018-04-08 RX ADMIN — AMIODARONE HYDROCHLORIDE 400 MG: 200 TABLET ORAL at 10:04

## 2018-04-08 RX ADMIN — HYDRALAZINE HYDROCHLORIDE 25 MG: 25 TABLET ORAL at 07:04

## 2018-04-08 RX ADMIN — AMLODIPINE BESYLATE 10 MG: 10 TABLET ORAL at 08:04

## 2018-04-08 NOTE — PROGRESS NOTES
"Speech Therapy   Treatment    Kristina Aguirre   MRN: [id number]        04/08/18 1016   Speech Time Calculation   Speech Start Time 1016   Speech Stop Time 1101   Speech Total (min) 45 min   General Information   SLP Treatment Date 04/08/18   Referring Physician Dr. Dunaway   General Observations Pt found in her room then transfered to the  office to complete the session.   General Precautions fall;aspiration;diabetic;vision impaired   Visual/Auditory Vision impaired   Subjective   Patient states "I can't believe this is that hard" in regards to simple addition problems regarding money.    Pain/Comfort   Pain Rating no pain       SLP Cognitive Treatment   Treatment Detail (SLP Cognitive Treatment) Pt oriented to person, place, and situation indly. Pt answered temporal orientation questions with 90% accuracy; pt presented with decreased orientation from pervious session and required extended time to recall information. Pt engaged in functional problem solving task/safety awareness in pt's room regarding fall risk and transitioning from bed to w/c with 90% accuracy with supervision. Pt participated in mental manipulation task involving F03 alphabetical ordering task with 20% acc indly., given max cues for participation. Pt required repetition of word list and encouragement to attempt task. Pt stated, "I can't do that its too hard" before the pt attempted the task. Pt completed F02 alphabetical ordering task with 90% accuracy indly, and 100% accuracy with min cues. 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  Pt completed identifying which item does not belong in F04 with 100% accuracy with self correction x1.        SLP Treatment: Motor Speech   Treatment Detail (SLP Motor Speech) Pt completed OME of labial protrusion and retraction to " increase R orofacial strength x20 given min cues. Pt provided  educated regarding OMEs.    Assessment   SLP Diagnosis Moderate cog-ling deficits    Prognosis Good   Problem List decreased insight; decreased orientation; decreased recall; visual deficits    Session Assessment progressing toward goals   Level of Motivation/Participation good   Recommendations   Solid Diet Level Regular   Liquid Diet Level Thin   Discharge Recommendations   Discharge Facility/Level Of Care Needs Home health speech therapy   Plan   Plan Continue with current plan   Therapy Frequency Monday-Friday;Saturday or Sunday   SLP Follow-up   SLP Follow-up? Yes   SLP - Next Visit Date 04/09/18   Treatment/Billable Minutes   Speech Therapy Individual 45   Total Time 45       Zainab Jeff CCC-SLP  4/8/2018

## 2018-04-08 NOTE — NURSING
Arouses easily to verbal stimuli, assisted to sit on side of bed for breakfast. Oriented X4. resp even and unlabored. BBS course anteriorly, diminished to bases posteriorly. Holter monitor in place, no display on monitor. Holtor supplies in patient's room,  plugged in to charge battery. Right sided weakness noted. Assessment per flow sheet.

## 2018-04-08 NOTE — PT/OT/SLP DISCHARGE
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  Please refer to prior PT noted date on 18 for functional status.     Assessment:     Patient has not met goals.    Objective:     GOALS:    Physical Therapy Goals     Not on file          Multidisciplinary Problems (Resolved)        Problem: Physical Therapy Goal    Goal Priority Disciplines Outcome Goal Variances Interventions   Physical Therapy Goal   (Resolved)     PT/OT, PT Outcome(s) achieved     Description:  Goals to be met by: 18    Patient will increase functional independence with mobility by performin. Supine to sit with Modified [place] -not met  2. Sit to stand transfer with Modified [place] -not met  3. Gait  x 220 feet with Supervision-not met  4. Ascend/descend 2 stair with no Handrails Supervision . -not met                      Reasons for Discontinuation of Therapy Services  Transfer to alternate level of care.      Plan:     Patient Discharged to: Inpatient Rehab.    Eli Rice, PT  2018

## 2018-04-08 NOTE — NURSING
Had shower assisted by PCT, tolerated well. Holter monitor attached with new electrodes and battery. Resting test submitted, called [facility]- stated received test and monitor is functioning properly.

## 2018-04-08 NOTE — SUBJECTIVE & OBJECTIVE
Interval History 4/8/2018:  Patient is seen for follow-up rehab evaluation and recommendations: Pt without new c/o's.      I have reviewed the HPI and PMFSH and there are no changes from admission.       Scheduled Medications:    amiodarone  400 mg Oral BID    amLODIPine  10 mg Oral Daily    amoxicillin-clavulanate 500-125mg  1 tablet Oral BID    apixaban  5 mg Oral BID    ascorbic acid (vitamin C)  500 mg Oral BID    atorvastatin  40 mg Oral Daily    carvedilol  3.125 mg Oral Daily    chlorthalidone  25 mg Oral QAM    clopidogrel  75 mg Oral Daily    famotidine  20 mg Oral BID    fluticasone-vilanterol  1 puff Inhalation Daily    furosemide  20 mg Oral BID    lidocaine  1 patch Transdermal Q24H    lisinopril  30 mg Oral Daily    polyethylene glycol  17 g Oral Daily       PRN Medications: acetaminophen, albuterol-ipratropium 2.5mg-0.5mg/3mL, bisacodyl, dextrose 50%, dextrose 50%, dextrose 50%, glucagon (human recombinant), glucose, glucose, hydrALAZINE, hydrocodone-acetaminophen 5-325mg, hydrOXYzine pamoate, insulin aspart U-100, magnesium hydroxide 400 mg/5 ml, ondansetron    Review of Systems   Constitutional: Positive for activity change and fatigue. Negative for chills, diaphoresis and fever.   HENT: Negative for congestion, ear discharge and ear pain.    Eyes: Negative for photophobia, pain and visual disturbance.   Respiratory: Positive for shortness of breath. Negative for cough, chest tightness and wheezing.    Cardiovascular: Negative for chest pain and palpitations.   Gastrointestinal: Negative for abdominal distention, abdominal pain, constipation, diarrhea and nausea.   Endocrine: Negative for cold intolerance and heat intolerance.   Genitourinary: Negative for enuresis, frequency and genital sores.   Musculoskeletal: Positive for gait problem and myalgias (over left ribs). Negative for arthralgias.   Skin: Negative for color change and wound.   Neurological: Negative for dizziness,  seizures, weakness and headaches.   Hematological: Negative for adenopathy.   Psychiatric/Behavioral: Negative for agitation, confusion and hallucinations. The patient is nervous/anxious.      Objective:     Vital Signs (Most Recent):  Temp: 98.1 °F (36.7 °C) (18 0700)  Pulse: (!) 52 (18 0800)  Resp: 18 (18 0800)  BP: (!) 161/72 (18 0700)  SpO2: 98 % (18 07)    Vital Signs (24h Range):  Temp:  [98.1 °F (36.7 °C)-98.4 °F (36.9 °C)] 98.1 °F (36.7 °C)  Pulse:  [52-56] 52  Resp:  [14-18] 18  SpO2:  [98 %] 98 %  BP: (146-161)/(62-72) 161/72     Physical Exam   Constitutional: She is oriented to person, place, and time. She appears well-developed and well-nourished. No distress.   HENT:   Head: Normocephalic and atraumatic.   Right Ear: External ear normal.   Left Ear: External ear normal.   Nose: Nose normal.   Eyes: Right eye exhibits no discharge. Left eye exhibits no discharge. No scleral icterus.   Neck: Normal range of motion.   Cardiovascular: Normal rate, regular rhythm and intact distal pulses.    Pulmonary/Chest: Effort normal. No respiratory distress. She has no wheezes.   Abdominal: Soft. She exhibits no distension. There is no tenderness.   Musculoskeletal: Normal range of motion. She exhibits no edema or tenderness.   Neurological: She is alert and oriented to person, place, and time.   -  Mental Status:  AAOx3.  Follows commands.  Answers correct age and .  Recent and remote memory intact.  -  Speech and language:  No aphasia, mild dysarthria.    -  Vision:  R hemianopsia.  No ptosis.    -  Facial movement (CN VII): Mild facial droop.   -  Motor:  RUE: 0/5.  LUE: 5/5.  RLE: 4-/5.  LLE: 5/5.  -  Tone:  Decreased RUE.  -  Sensory:  Intact to light touch and pin prick.   Skin: Skin is warm and dry. No rash noted.   Psychiatric: She has a normal mood and affect. Her behavior is normal. Thought content normal. Cognition and memory are impaired.   Vitals  reviewed.    NEUROLOGICAL EXAMINATION:     MENTAL STATUS   Oriented to person, place, and time.

## 2018-04-08 NOTE — ASSESSMENT & PLAN NOTE
Continue to monitor blood glucoses    4/4 - started on Diabetic diet    Lab Results   Component Value Date    HGBA1C 6.5 (H) 03/21/2018       - Diabetic Diet, Diabetic Education   - Monitor POCT TIDAC and qhs   - Continue with  ISS    POCT Glucose   Date Value Ref Range Status   04/08/2018 256 (H) 70 - 110 mg/dL Final   04/08/2018 143 (H) 70 - 110 mg/dL Final   04/07/2018 237 (H) 70 - 110 mg/dL Final   04/07/2018 146 (H) 70 - 110 mg/dL Final   04/07/2018 208 (H) 70 - 110 mg/dL Final   04/07/2018 159 (H) 70 - 110 mg/dL Final   04/06/2018 296 (H) 70 - 110 mg/dL Final   04/06/2018 134 (H) 70 - 110 mg/dL Final   04/06/2018 183 (H) 70 - 110 mg/dL Final   04/06/2018 170 (H) 70 - 110 mg/dL Final   04/05/2018 191 (H) 70 - 110 mg/dL Final

## 2018-04-08 NOTE — ASSESSMENT & PLAN NOTE
Elevated on admission. Continue current meds    4/3/18 - started Lisinopril 10mg daily  4/4/18 - slightly improved. Increased to 20mg daily  4/6/18 - still elevated Bp. Increased to 30mg daily  4/7 cont to monitor trend     Vitals:    04/07/18 0854 04/07/18 1845 04/08/18 0700 04/08/18 0800   BP:  (!) 146/62 (!) 161/72    BP Location:  Left arm Left arm    Patient Position:  Lying Lying    Pulse: (!) 56 (!) 56 (!) 52 (!) 52   Resp: 18 18 14 18   Temp:  98.4 °F (36.9 °C) 98.1 °F (36.7 °C)    TempSrc:  Oral Oral    SpO2:   98%    Weight:       Height:

## 2018-04-08 NOTE — PROGRESS NOTES
[facility]  Physical Medicine & Rehab  Progress Note    Patient Name: Kristina Aguirre  MRN: [id number]  Patient Class: IP- Rehab   Admission Date: 4/2/2018  Length of Stay: 6 days  Attending Physician: Peter Dunaway MD  Primary Care Provider: Robert Mattson MD    Subjective:     Principal Problem: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    Interval History 4/8/2018:  Patient is seen for follow-up rehab evaluation and recommendations: Pt without new c/o's.      I have reviewed the HPI and PMFSH and there are no changes from admission.       Scheduled Medications:    amiodarone  400 mg Oral BID    amLODIPine  10 mg Oral Daily    amoxicillin-clavulanate 500-125mg  1 tablet Oral BID    apixaban  5 mg Oral BID    ascorbic acid (vitamin C)  500 mg Oral BID    atorvastatin  40 mg Oral Daily    carvedilol  3.125 mg Oral Daily    chlorthalidone  25 mg Oral QAM    clopidogrel  75 mg Oral Daily    famotidine  20 mg Oral BID    fluticasone-vilanterol  1 puff Inhalation Daily    furosemide  20 mg Oral BID    lidocaine  1 patch Transdermal Q24H    lisinopril  30 mg Oral Daily    polyethylene glycol  17 g Oral Daily       PRN Medications: acetaminophen, albuterol-ipratropium 2.5mg-0.5mg/3mL, bisacodyl, dextrose 50%, dextrose 50%, dextrose 50%, glucagon (human recombinant), glucose, glucose, hydrALAZINE, hydrocodone-acetaminophen 5-325mg, hydrOXYzine pamoate, insulin aspart U-100, magnesium hydroxide 400 mg/5 ml, ondansetron    Review of Systems   Constitutional: Positive for activity change and fatigue. Negative for chills, diaphoresis and fever.   HENT: Negative for congestion, ear discharge and ear pain.    Eyes: Negative for photophobia, pain and visual disturbance.   Respiratory: Positive for shortness of breath. Negative for cough, chest tightness and wheezing.    Cardiovascular: Negative for chest pain and palpitations.   Gastrointestinal: Negative for abdominal  distention, abdominal pain, constipation, diarrhea and nausea.   Endocrine: Negative for cold intolerance and heat intolerance.   Genitourinary: Negative for enuresis, frequency and genital sores.   Musculoskeletal: Positive for gait problem and myalgias (over left ribs). Negative for arthralgias.   Skin: Negative for color change and wound.   Neurological: Negative for dizziness, seizures, weakness and headaches.   Hematological: Negative for adenopathy.   Psychiatric/Behavioral: Negative for agitation, confusion and hallucinations. The patient is nervous/anxious.      Objective:     Vital Signs (Most Recent):  Temp: 98.1 °F (36.7 °C) (18 07)  Pulse: (!) 52 (18 08)  Resp: 18 (18)  BP: (!) 161/72 (18)  SpO2: 98 % (18)    Vital Signs (24h Range):  Temp:  [98.1 °F (36.7 °C)-98.4 °F (36.9 °C)] 98.1 °F (36.7 °C)  Pulse:  [52-56] 52  Resp:  [14-18] 18  SpO2:  [98 %] 98 %  BP: (146-161)/(62-72) 161/72     Physical Exam   Constitutional: She is oriented to person, place, and time. She appears well-developed and well-nourished. No distress.   HENT:   Head: Normocephalic and atraumatic.   Right Ear: External ear normal.   Left Ear: External ear normal.   Nose: Nose normal.   Eyes: Right eye exhibits no discharge. Left eye exhibits no discharge. No scleral icterus.   Neck: Normal range of motion.   Cardiovascular: Normal rate, regular rhythm and intact distal pulses.    Pulmonary/Chest: Effort normal. No respiratory distress. She has no wheezes.   Abdominal: Soft. She exhibits no distension. There is no tenderness.   Musculoskeletal: Normal range of motion. She exhibits no edema or tenderness.   Neurological: She is alert and oriented to person, place, and time.   -  Mental Status:  AAOx3.  Follows commands.  Answers correct age and .  Recent and remote memory intact.  -  Speech and language:  No aphasia, mild dysarthria.    -  Vision:  R hemianopsia.  No ptosis.    -   Facial movement (CN VII): Mild facial droop.   -  Motor:  RUE: 0/5.  LUE: 5/5.  RLE: 4-/5.  LLE: 5/5.  -  Tone:  Decreased RUE.  -  Sensory:  Intact to light touch and pin prick.   Skin: Skin is warm and dry. No rash noted.   Psychiatric: She has a normal mood and affect. Her behavior is normal. Thought content normal. Cognition and memory are impaired.   Vitals reviewed.    NEUROLOGICAL EXAMINATION:     MENTAL STATUS   Oriented to person, place, and time.       Assessment/Plan:      Kristina Aguirre is a 67 y.o. female admitted to inpatient rehabilitation on 4/2/2018 for 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 with impaired mobility and ADLs. Patient remains appropriate for PT, OT, and as required Speech therapy. Patient continues to require 24 hour nursing care as well as daily Physician assessment.    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 Pan sensitive    -Continue Augmentin for 7day course        Atrial fibrillation    continue Apixaban    4/4 - reports of SOB. EKG shows no sign of A fib with RVR or Mi. CXR showing resolution of pulm edema. Pending labs.        S/p TAVR (transcatheter aortic valve replacement), bioprosthetic    Continue Apixaban and Plavix        Diabetes mellitus, type 2    Continue to monitor blood glucoses    4/4 - started on Diabetic diet    Lab Results   Component Value Date    HGBA1C 6.5 (H) 03/21/2018       - Diabetic Diet, Diabetic Education   - Monitor POCT TIDAC and qhs   - Continue with  ISS    POCT Glucose   Date Value Ref Range Status   04/08/2018 256 (H) 70 - 110 mg/dL Final   04/08/2018 143 (H) 70 - 110 mg/dL Final   04/07/2018 237 (H) 70 - 110 mg/dL Final   04/07/2018 146 (H) 70 - 110 mg/dL Final   04/07/2018  208 (H) 70 - 110 mg/dL Final   04/07/2018 159 (H) 70 - 110 mg/dL Final   04/06/2018 296 (H) 70 - 110 mg/dL Final   04/06/2018 134 (H) 70 - 110 mg/dL Final   04/06/2018 183 (H) 70 - 110 mg/dL Final   04/06/2018 170 (H) 70 - 110 mg/dL Final   04/05/2018 191 (H) 70 - 110 mg/dL Final                     Essential hypertension    Elevated on admission. Continue current meds    4/3/18 - started Lisinopril 10mg daily  4/4/18 - slightly improved. Increased to 20mg daily  4/6/18 - still elevated Bp. Increased to 30mg daily  4/7 cont to monitor trend     Vitals:    04/07/18 0854 04/07/18 1845 04/08/18 0700 04/08/18 0800   BP:  (!) 146/62 (!) 161/72    BP Location:  Left arm Left arm    Patient Position:  Lying Lying    Pulse: (!) 56 (!) 56 (!) 52 (!) 52   Resp: 18 18 14 18   Temp:  98.4 °F (36.9 °C) 98.1 °F (36.7 °C)    TempSrc:  Oral Oral    SpO2:   98%    Weight:       Height:                       DISCHARGE PLANNING:  Tentative Discharge Date:     Future Appointments  Date Time Provider Department Center   5/10/2018 10:00 AM EKG, APPT [facility] EKG Simon Dawson   5/10/2018 10:40 AM Sarbjit Banegas MD [facility] ARRHYTH Simon Diaz MD  Department of Physical Medicine & Rehab   [facility]

## 2018-04-09 LAB
ANION GAP SERPL CALC-SCNC: 11 MMOL/L
BASOPHILS # BLD AUTO: 0.11 K/UL
BASOPHILS NFR BLD: 1.4 %
BUN SERPL-MCNC: 26 MG/DL
CALCIUM SERPL-MCNC: 8.8 MG/DL
CHLORIDE SERPL-SCNC: 101 MMOL/L
CO2 SERPL-SCNC: 27 MMOL/L
CREAT SERPL-MCNC: 0.8 MG/DL
DIFFERENTIAL METHOD: ABNORMAL
EOSINOPHIL # BLD AUTO: 0.6 K/UL
EOSINOPHIL NFR BLD: 7.4 %
ERYTHROCYTE [DISTWIDTH] IN BLOOD BY AUTOMATED COUNT: 18.2 %
EST. GFR  (AFRICAN AMERICAN): >60 ML/MIN/1.73 M^2
EST. GFR  (NON AFRICAN AMERICAN): >60 ML/MIN/1.73 M^2
GLUCOSE SERPL-MCNC: 149 MG/DL
HCT VFR BLD AUTO: 29 %
HGB BLD-MCNC: 8.9 G/DL
IMM GRANULOCYTES # BLD AUTO: 0.02 K/UL
IMM GRANULOCYTES NFR BLD AUTO: 0.3 %
LYMPHOCYTES # BLD AUTO: 2.1 K/UL
LYMPHOCYTES NFR BLD: 25.7 %
MAGNESIUM SERPL-MCNC: 2.1 MG/DL
MCH RBC QN AUTO: 24 PG
MCHC RBC AUTO-ENTMCNC: 30.7 G/DL
MCV RBC AUTO: 78 FL
MONOCYTES # BLD AUTO: 0.6 K/UL
MONOCYTES NFR BLD: 8 %
NEUTROPHILS # BLD AUTO: 4.6 K/UL
NEUTROPHILS NFR BLD: 57.2 %
NRBC BLD-RTO: 0 /100 WBC
PHOSPHATE SERPL-MCNC: 4.2 MG/DL
PLATELET # BLD AUTO: 361 K/UL
PMV BLD AUTO: 9.9 FL
POCT GLUCOSE: 132 MG/DL (ref 70–110)
POCT GLUCOSE: 168 MG/DL (ref 70–110)
POCT GLUCOSE: 170 MG/DL (ref 70–110)
POCT GLUCOSE: 182 MG/DL (ref 70–110)
POTASSIUM SERPL-SCNC: 3.9 MMOL/L
RBC # BLD AUTO: 3.71 M/UL
SODIUM SERPL-SCNC: 139 MMOL/L
WBC # BLD AUTO: 7.99 K/UL

## 2018-04-09 PROCEDURE — 97530 THERAPEUTIC ACTIVITIES: CPT

## 2018-04-09 PROCEDURE — 36415 COLL VENOUS BLD VENIPUNCTURE: CPT

## 2018-04-09 PROCEDURE — 80048 BASIC METABOLIC PNL TOTAL CA: CPT

## 2018-04-09 PROCEDURE — 84100 ASSAY OF PHOSPHORUS: CPT

## 2018-04-09 PROCEDURE — 97116 GAIT TRAINING THERAPY: CPT

## 2018-04-09 PROCEDURE — 25000003 PHARM REV CODE 250: Performed by: NURSE PRACTITIONER

## 2018-04-09 PROCEDURE — 83735 ASSAY OF MAGNESIUM: CPT

## 2018-04-09 PROCEDURE — 92526 ORAL FUNCTION THERAPY: CPT

## 2018-04-09 PROCEDURE — 25000003 PHARM REV CODE 250: Performed by: PHYSICAL MEDICINE & REHABILITATION

## 2018-04-09 PROCEDURE — 12800000 HC REHAB SEMI-PRIVATE ROOM

## 2018-04-09 PROCEDURE — 97110 THERAPEUTIC EXERCISES: CPT

## 2018-04-09 PROCEDURE — 97150 GROUP THERAPEUTIC PROCEDURES: CPT

## 2018-04-09 PROCEDURE — 85025 COMPLETE CBC W/AUTO DIFF WBC: CPT

## 2018-04-09 PROCEDURE — 99232 SBSQ HOSP IP/OBS MODERATE 35: CPT | Mod: ,,, | Performed by: PHYSICAL MEDICINE & REHABILITATION

## 2018-04-09 PROCEDURE — 92507 TX SP LANG VOICE COMM INDIV: CPT

## 2018-04-09 PROCEDURE — 97535 SELF CARE MNGMENT TRAINING: CPT

## 2018-04-09 RX ORDER — LISINOPRIL 20 MG/1
40 TABLET ORAL DAILY
Status: DISCONTINUED | OUTPATIENT
Start: 2018-04-10 | End: 2018-04-19 | Stop reason: HOSPADM

## 2018-04-09 RX ADMIN — CHLORTHALIDONE 25 MG: 25 TABLET ORAL at 07:04

## 2018-04-09 RX ADMIN — FAMOTIDINE 20 MG: 20 TABLET, FILM COATED ORAL at 08:04

## 2018-04-09 RX ADMIN — FUROSEMIDE 20 MG: 20 TABLET ORAL at 08:04

## 2018-04-09 RX ADMIN — CLOPIDOGREL BISULFATE 75 MG: 75 TABLET ORAL at 08:04

## 2018-04-09 RX ADMIN — INSULIN ASPART 1 UNITS: 100 INJECTION, SOLUTION INTRAVENOUS; SUBCUTANEOUS at 08:04

## 2018-04-09 RX ADMIN — AMOXICILLIN AND CLAVULANATE POTASSIUM 500 MG: 500; 125 TABLET, FILM COATED ORAL at 07:04

## 2018-04-09 RX ADMIN — HYDROCODONE BITARTRATE AND ACETAMINOPHEN 1 TABLET: 5; 325 TABLET ORAL at 03:04

## 2018-04-09 RX ADMIN — APIXABAN 5 MG: 5 TABLET, FILM COATED ORAL at 07:04

## 2018-04-09 RX ADMIN — INSULIN ASPART 2 UNITS: 100 INJECTION, SOLUTION INTRAVENOUS; SUBCUTANEOUS at 08:04

## 2018-04-09 RX ADMIN — AMOXICILLIN AND CLAVULANATE POTASSIUM 500 MG: 500; 125 TABLET, FILM COATED ORAL at 08:04

## 2018-04-09 RX ADMIN — FLUTICASONE FUROATE AND VILANTEROL TRIFENATATE 1 PUFF: 100; 25 POWDER RESPIRATORY (INHALATION) at 08:04

## 2018-04-09 RX ADMIN — ATORVASTATIN CALCIUM 40 MG: 20 TABLET, FILM COATED ORAL at 08:04

## 2018-04-09 RX ADMIN — Medication 500 MG: at 07:04

## 2018-04-09 RX ADMIN — AMLODIPINE BESYLATE 10 MG: 10 TABLET ORAL at 08:04

## 2018-04-09 RX ADMIN — LISINOPRIL 30 MG: 20 TABLET ORAL at 08:04

## 2018-04-09 RX ADMIN — FAMOTIDINE 20 MG: 20 TABLET, FILM COATED ORAL at 07:04

## 2018-04-09 RX ADMIN — AMIODARONE HYDROCHLORIDE 400 MG: 200 TABLET ORAL at 07:04

## 2018-04-09 RX ADMIN — HYDROCODONE BITARTRATE AND ACETAMINOPHEN 1 TABLET: 5; 325 TABLET ORAL at 07:04

## 2018-04-09 RX ADMIN — FUROSEMIDE 20 MG: 20 TABLET ORAL at 04:04

## 2018-04-09 RX ADMIN — CARVEDILOL 3.12 MG: 3.12 TABLET, FILM COATED ORAL at 08:04

## 2018-04-09 RX ADMIN — Medication 500 MG: at 08:04

## 2018-04-09 RX ADMIN — POLYETHYLENE GLYCOL 3350 17 G: 17 POWDER, FOR SOLUTION ORAL at 08:04

## 2018-04-09 RX ADMIN — APIXABAN 5 MG: 5 TABLET, FILM COATED ORAL at 08:04

## 2018-04-09 NOTE — PROGRESS NOTES
"Speech Therapy   Treatment    Kristina Aguirre   MRN: [id number]        04/09/18 1445   Speech Time Calculation   Speech Start Time 1445   Speech Stop Time 1530   Speech Total (min) 45 min   General Information   SLP Treatment Date 04/09/18   General Observations Pt seen individually at bedside and taken to SLP office while sitting upright in w/c.    General Precautions aspiration;diabetic;fall;vision impaired   Visual/Auditory Vision impaired   Subjective   Patient states Pt stated "I will go home if it's safe for me or not."   Pain/Comfort   Pain Rating no pain       SLP Cognitive Treatment   Treatment Detail (SLP Cognitive Treatment) Pt engaged in simple greeting task indly with 100% acc. Recall task completed of previous therapy activities completed with 50% acc indly. Pt required moderate verbal cues and multiple choices presented for increased recall of therapy activities. Pt completed orientation task to place and time indly with 100% acc. Safety awareness targeted via functional task conversation of hypothetical situations of home environment with 60% acc indly, given moderate-max verbal cues for insight into self and current physical limitations, pt with increase to 90% acc. Pt hesitant with acceptance of information regarding safety at home with eagerness to return home independent and to beloved pet. Pt able to verbalize current deficits, but displays deficits with applying new deficits to previous lifestyle.        SLP Treatment: Swallowing   Treatment Detail  Pt engaged in education regarding oral intake and safety. Pt verbalized understanding of aspiration precautions at this time. Pt consumed thin liquids via cup sip x5 without overt s/s of aspiration; however, pt does require supervision for utilization of aspiration precautions consistently.    Assessment   SLP Diagnosis moderate cog-ling deficits; severe visual deficits   Prognosis Good   Problem List decreased awareness; decreased insight and safety; " decreased recall; poor attention skills   Session Assessment progressing toward goals   Level of Motivation/Participation good   Discharge Recommendations   Discharge Facility/Level Of Care Needs outpatient speech therapy   Plan   Plan Continue with current plan   Treatment/Billable Minutes   Speech Therapy Individual 35   Treatment Swallowing Dysfunction 10   Total Time 45     Lala Muñoz CCC-SLP  4/9/2018

## 2018-04-09 NOTE — PROGRESS NOTES
Spoke with patient after team conference about discharge date set for Thursday, 4/19. Pt is in agreement and called her friend/POA and discussed with him and he is also in agreement. Will go home with friend supervision and home health services. CM will continue to follow.

## 2018-04-09 NOTE — PROGRESS NOTES
"Occupational Therapy  PM Treatment  Kristina Aguirre   MRN: [id number]   Room/Bed: E261/E261 B       04/09/18 1300   OT Time Calculation   OT Start Time 1300   OT Stop Time 1349   OT Total Time (min) 49 min   General   OT Date of Treatment 04/09/18   Family/Caregiver Present Yes  (at beginning of session)   Patient Found (position) (standing next to bedside with friend present)   Precautions   General Precautions aspiration;diabetic;fall;vision impaired   Orthopedic No   Required Braces or Orthoses No   Visual/Auditory Vision impaired   Subjective   Patient states "Look what I can do now." Pt stated while performing R digits 1-5 MP, PIP, DIP flexion    Pain/Comfort   Pain Rating 4/10   Location - Orientation lower   Location back   Pain Addressed Reposition;Distraction   Transfers   Transfer yes   Sit to Stand   Sit <> Stand Assistance Contact Guard Assistance   Sit <> Stand Assistive Device No Assistive Device   Trials/Comments CGA for steadying assist from w/c   Stand to Sit   Assistance Contact Guard Assistance   Assistive Device No Assistive Device   Trials/Comments CGA for steadying assist to w/c   Chair to Bed   Technique Stand Pivot   Assistance Contact Guard Assistance   Assistive Device No Assistive Device   Trials/Comments CGA for steadying assist from w/c > EOB   Toilet Transfer   Toilet Transfer Technique Stand Pivot   Toilet Transfer Assistance Contact Guard Assistance   Toilet Transfer Assistive Device grab bar   Trials/Comments CGA for steadying assist from EOB > raised toilet   LE Dressing   LE Dressing Yes   Shoe Level of Assistance Minimum assistance   LE Dressing Comments Min (A) to tie shoes and slightly adjust R shoe over heel    Toileting   Toileting Level of Assistance Contact guard   Toileting Where Assessed Toilet   Toileting Comments CGA for steadying assist as pt completed 3/3 steps with continent urination episode   Exercise Tools   Exercise Tools Yes   Rickshaw 5x20 reps with R hand wrapped " pressing 10# to increase B triceps for functional t/fs   Bilateral Henrique 2x20 reps 5# with RUE wrapped to increase UB strength for IADL prep   Additional Activities   Additional Activities Other (Comment)   Additional Activities Comments Pt performed functional mobility trials around countertop while WBing onto RUE with [place] (A) and retrieving/replacing items in cabinets in PNF patterns to increase RUE proximal stability, dynamic standing balance, endurance, and weight shifting   Activity Tolerance   Activity Tolerance Patient tolerated treatment well   After Treatment   Patient Position After Treatment Seated at edge of bed   Patient after treatment left call button in reach;with bed alarm on   Assessment   Prognosis Fair   Problem List Decreased Self Care skills;Decreased upper extremity range of motion;Decreased upper extremity strength;Decreased safe judgment during ADL;Decreased cognition;Decreased endurance;Decreased functional mobility;Decreased gross motor control;Decreased IADLs;Decreased Function of right upper extremity;Decreased trunk control for functional activities   Assessment Pt tolerated tx session well today. Pt demonstrating improved distal movement with R digits flexion. Pt continues with decreased functional mobility, with slight lateral lean, requiring CGA for steadying assist. Pt would continue to benefit from skilled OT services.    Level of Motivation/Participation good   Barriers to Discharge Decreased caregiver support   Discharge Recommendations   Equipment Needed After Discharge 3-in-1 commode;bath bench;walker, rolling   Discharge Facility/Level Of Care Needs outpatient OT   Plan   Plan Continue with current plan   Therapy Frequency 2 times/day   Occupational Therapy Follow-up   OT Follow-up? Yes   Treatment/Billable Minutes   Self Care/Home Management 15   Therapeutic Activity 14   Therapeutic Exercise 20   Total Time 49       Migdalia Rodarte OT  4/9/2018   Pt left seated at EOB with  bed alarm (location) with call light and all necessities in reach, nursing notified.     LEGEND:   CGA: Contact Guard Assist   EOB: Edgeof Bed   HHA: Hand Held Assist   HOB: Head of Bed   (I): Independent-patient performs task in a timely manner   Max (A): Maximal Assist-patient performs 25-49% of task   Min (A): Minimal Assist- patient performs 75% or more of task   Mod (A): Moderate Assist- patient performs 50-74% of task   NA: Not applicable   NT: Not tested   OOB: Out of Bed   PTA: Prior to admit   QC: Quad Cane   RW: Rolling Walker   (S): Supervision- patient requires cues, coaxing, prompting   SBA: Stand By Assist   SC: Straight Cane   SW: Standard Walker   TBA: To be assessed   Total (A): Total Assist- patient performs less than 25% of task   WC: Wheelchair   WFL: Within Functional Limits   WNL: Within Normal Limits

## 2018-04-09 NOTE — ASSESSMENT & PLAN NOTE
Elevated on admission. Continue current meds    4/3/18 - started Lisinopril 10mg daily  4/4/18 - slightly improved. Increased to 20mg daily  4/6/18 - still elevated Bp. Increased to 30mg daily  4/7 cont to monitor trend   4/9 inc lisinopril to 40  Vitals:    04/08/18 1928 04/08/18 2043 04/09/18 0700 04/09/18 0806   BP: (!) 156/105 127/60 (!) 171/90    BP Location:   Left arm    Patient Position:   Sitting    Pulse:   60 60   Resp:   18 18   Temp:   98.7 °F (37.1 °C)    TempSrc:   Oral    SpO2:   96%    Weight:       Height:

## 2018-04-09 NOTE — PROGRESS NOTES
Physical Therapy   Treatment/Transfer Group    Kristina Aguirre   MRN: [id number]                  04/09/18 1016   PT Time Calculation   PT Start Time 1016   PT Stop Time 1101   PT Total Time (min) 45 min   Treatment   Treatment Type Treatment;Other (see comments)  (Transfer Group)   PT/PTA PT   General   PT Received On 04/09/18   Family/Caregiver Present No   Patient Found (position) Seated in wheelchair;Other (comment)  (in gym)   Pt found with (heart monitor; R UE 1/2 lap tray)   Precautions   General Precautions aspiration;fall;diabetic;vision impaired   Cardiovascular/Pulmonary Other (see comments)  (heart monitor)   Visual/Auditory Vision impaired   Subjective   Patient states she has some pain in her back.   Pain/Comfort   Pain Rating 1 5/10   Location - Side 1 Bilateral   Location - Orientation 1 generalized   Location 1 back   Pain Addressed 1 Reposition;Distraction;Other (see comments)  (pt declined meds at this time)   Pain Rating Post-Intervention 1 5/10   Activity Tolerance   Activity Tolerance Patient tolerated treatment well   After Treatment   Patient Position After Treatment Seated in wheelchair;Other (comment)  (seat belt and R UE 1/2 lap tray in place)   Patient after treatment left call button in reach  (Pt escorted to her room by PT tech)   Assessment   Prognosis Good   Plan   Planned Therapy Intervention Continue with current plan   Therapy Frequency 2 times/day;daily;Monday-Friday;Saturday or Sunday   Physical Therapy Follow-up   PT Follow-up? Yes   PT - Next Visit Date 04/09/18   Treatment/Billable Minutes   Therapeutic Activity Group 45   Total Time 45           Objective:  Patient participated in a functional transfer group. The patient completed wheelchair to mat transfer with min assistance . Patient completed bed mobility from supine to sitting edge of mat with min assistance. Patient completed toilet transfer x 2 trials with contact guard and use of 3 in 1 commode. Patient completed tub  transfer with min/contact guard assistance and use of tub bench.  Patient completed (10) sit to stand transfer(s) from the w/c in gym with contact guard assistance.  Patient demonstrated decreased safety awareness with functional transfers. Educated patient on compensatory and adaptive techniques for functional home transfers. Patient demonstrated increased independence with all transfers (or list specific transfers).   Endurance 9 on the RPE scale. No pain complaints voiced or observed.  For Social Interaction: (choose FIM score rating below)FIM=5    §     Interacts appropriately with others - No medications needed. Patient asleep all shift (7)  §     Interacts appropriately with others with medication or extra time(anti-anxiety, antidepressant)  (6)  §     Interacts appropriately 90% of time - needs monitoring or encouragement for participation or interaction  (5)  §     Interacts appropriately 75-89% of time - needs redirection for appropriate language or to initiate interaction  (4)  §     Interacts appropriately 50-74% of time - May be physically or verbally inappropriate   (3)  §     Interacts appropriately 25-49% of time - Needs frequent redirection  (2)  §     Interacts appropriately less than 25% of time - May be withdrawn or combative  (1)   Assessment:  The patient participated in a 45 functional transfer group. The group activity challenged bilateral upper extremity and lower extremity strengthening as well as cardiovascular and functional endurance. By the end of the group session, the patient was able to verbalize  proper hand placement and DME management during transfer activities. These activities were performed in a group setting in order to facilitate patient learning by providing education about proper DME management and transfer safety, as well as to work towards patients individual transfer goals.          Abdon Carcamo, PT 4/9/2018

## 2018-04-09 NOTE — PROGRESS NOTES
Physical Therapy   Treatment    Kristina Aguirre   MRN: [id number]      04/09/18 1530   PT Time Calculation   PT Start Time 1530   PT Stop Time 1615   PT Total Time (min) 45 min   Treatment   Treatment Type Treatment   PT/PTA PT   PTA Visit Number 0   General   PT Received On 04/09/18   Family/Caregiver Present No   Patient Found (position) Seated in wheelchair  (direct handoff from ST)   Pt found with (heart monitor)   Precautions   General Precautions aspiration;fall;diabetic;vision impaired   Orthopedic No   Required Braces or Orthoses No   Cardiovascular/Pulmonary Other (see comments)  (heart montor)   Visual/Auditory Vision impaired   Subjective   Patient states Pt agreeable to participate in PT session.    Pain/Comfort   Pain Rating 1 other (see comments)  (pt reports minimal pain in shoulers)   Transfers   Transfer yes   Sit to Stand   Sit <> Stand Assistance Stand By Assistance   Sit <> Stand Assistive Device Rolling Walker   Trials/Comments x4 trials   Stand to Sit   Assistance Stand By Assistance   Assistive Device Rolling Walker   Trials/Comments x4 trials   Chair to Bed   Technique Stand Pivot   Assistance Contact Guard Assistance   Assistive Device No Assistive Device   Trials/Comments x1 trial   Gait   Gait Yes   Weight Bearing Status full   Gait 1   Surface 1 Level tile   Gait Assistive Device Rolling walker   Assistance 1 Contact Guard Assistance   Gait Distance pt ambulated for four trials of 100'. She requires verbal cues for taking her time and for ensuring R foot clearance.    Gait Pattern swing-through gait   Gait Deviation(s) decreased perez;decreased velocity of limb motion;decreased toe-to-floor clearance;decreased weight-shifting ability;forward lean   Impairments Contributing to Gait Deviations impaired balance;impaired motor control;impaired postural control;decreased strength   Balance   Balance Yes   Dynamic Standing Balance   Dynamic Standing-Balance Support No upper extremity supported    Dynamic Standing-Balance (BITS computer program, reaching for obj on screen)   Dynamic Standing-Comments Pt stood for 15' at BITS screen while visually scanning for various objects. She required CGA for balance at times and verbal cueing to assist with finding objects from time to time.    Activity Tolerance   Activity Tolerance Patient tolerated treatment well   After Treatment   Patient Position After Treatment Seated at edge of bed   Patient after treatment left call button in reach   Assessment   Prognosis Good   Problem List Decreased strength;Decreased endurance;Impaired balance;Decreased mobility;Impaired judgement;Decreased safety awareness;Impaired vision   Session Assessment progressing toward goals   Assessment Pt continues to make progress in therapy sessions. She is limited by her decreased endurance, decreased strength, decreased balance, and visual deficits. Pt will continue to benefit from skilled PT services for these reasons.    Level of Motivation/Participation good   Barriers to Discharge Decreased caregiver support   Discharge Recommendations   Equipment Needed After Discharge 3-in-1 commode;bath bench;walker, rolling   Discharge Facility/Level Of Care Needs outpatient PT   Plan   Planned Therapy Intervention Continue with current plan   Therapy Frequency 2 times/day;daily;Monday-Friday;Saturday or Sunday   Physical Therapy Follow-up   PT Follow-up? Yes   PT - Next Visit Date 04/10/18   Treatment/Billable Minutes   Gait training 20   Therapeutic Activity 25   Total Time 45   Geronimo Deleon, PT   4/9/2018

## 2018-04-09 NOTE — SUBJECTIVE & OBJECTIVE
Interval History 4/9/2018:  Patient is seen for follow-up rehab evaluation and recommendations: Pt without new c/o's.  Therapy is going well.     I have reviewed the HPI and PMFSH and there are no changes from admission.       Scheduled Medications:    amiodarone  400 mg Oral BID    amLODIPine  10 mg Oral Daily    amoxicillin-clavulanate 500-125mg  1 tablet Oral BID    apixaban  5 mg Oral BID    ascorbic acid (vitamin C)  500 mg Oral BID    atorvastatin  40 mg Oral Daily    carvedilol  3.125 mg Oral Daily    chlorthalidone  25 mg Oral QAM    clopidogrel  75 mg Oral Daily    famotidine  20 mg Oral BID    fluticasone-vilanterol  1 puff Inhalation Daily    furosemide  20 mg Oral BID    lidocaine  1 patch Transdermal Q24H    [START ON 4/10/2018] lisinopril  40 mg Oral Daily    polyethylene glycol  17 g Oral Daily       PRN Medications: acetaminophen, albuterol-ipratropium 2.5mg-0.5mg/3mL, bisacodyl, dextrose 50%, dextrose 50%, dextrose 50%, glucagon (human recombinant), glucose, glucose, hydrALAZINE, hydrocodone-acetaminophen 5-325mg, hydrOXYzine pamoate, insulin aspart U-100, magnesium hydroxide 400 mg/5 ml, ondansetron    Review of Systems   Constitutional: Positive for activity change and fatigue. Negative for chills, diaphoresis and fever.   HENT: Negative for congestion, ear discharge and ear pain.    Eyes: Negative for photophobia, pain and visual disturbance.   Respiratory: Positive for shortness of breath. Negative for cough, chest tightness and wheezing.    Cardiovascular: Negative for chest pain and palpitations.   Gastrointestinal: Negative for abdominal distention, abdominal pain, constipation, diarrhea and nausea.   Endocrine: Negative for cold intolerance and heat intolerance.   Genitourinary: Negative for enuresis, frequency and genital sores.   Musculoskeletal: Positive for gait problem and myalgias (over left ribs). Negative for arthralgias.   Skin: Negative for color change and wound.    Neurological: Negative for dizziness, seizures, weakness and headaches.   Hematological: Negative for adenopathy.   Psychiatric/Behavioral: Negative for agitation, confusion and hallucinations. The patient is nervous/anxious.      Objective:     Vital Signs (Most Recent):  Temp: 98.7 °F (37.1 °C) (18)  Pulse: 60 (18 08)  Resp: 18 (18)  BP: (!) 171/90 (18)  SpO2: 96 % (18)    Vital Signs (24h Range):  Temp:  [98.4 °F (36.9 °C)-98.7 °F (37.1 °C)] 98.7 °F (37.1 °C)  Pulse:  [54-60] 60  Resp:  [18] 18  SpO2:  [96 %-97 %] 96 %  BP: (127-171)/() 171/90     Physical Exam   Constitutional: She is oriented to person, place, and time. She appears well-developed and well-nourished. No distress.   HENT:   Head: Normocephalic and atraumatic.   Right Ear: External ear normal.   Left Ear: External ear normal.   Nose: Nose normal.   Eyes: Right eye exhibits no discharge. Left eye exhibits no discharge. No scleral icterus.   Neck: Normal range of motion.   Cardiovascular: Normal rate, regular rhythm and intact distal pulses.    Pulmonary/Chest: Effort normal. No respiratory distress. She has no wheezes.   Abdominal: Soft. She exhibits no distension. There is no tenderness.   Musculoskeletal: Normal range of motion. She exhibits no edema or tenderness.   Neurological: She is alert and oriented to person, place, and time.   -  Mental Status:  AAOx3.  Follows commands.  Answers correct age and .  Recent and remote memory intact.  -  Speech and language:  No aphasia, mild dysarthria.    -  Vision:  R hemianopsia.  No ptosis.    -  Facial movement (CN VII): Mild facial droop.   -  Motor:  RUE: 2/5 EF, FF, WE.  LUE: 5/5.  RLE: 4-/5.  LLE: 5/5.  -  Tone:  Decreased RUE.  -  Sensory:  Intact to light touch and pin prick.   Skin: Skin is warm and dry. No rash noted.   Psychiatric: She has a normal mood and affect. Her behavior is normal. Thought content normal. Cognition and  memory are impaired.   Vitals reviewed.    NEUROLOGICAL EXAMINATION:     MENTAL STATUS   Oriented to person, place, and time.

## 2018-04-09 NOTE — PROGRESS NOTES
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  Therapy is going well.     I have reviewed the HPI and PMFSH and there are no changes from admission.       Scheduled Medications:    amiodarone  400 mg Oral BID    amLODIPine  10 mg Oral Daily    amoxicillin-clavulanate 500-125mg  1 tablet Oral BID    apixaban  5 mg Oral BID    ascorbic acid (vitamin C)  500 mg Oral BID    atorvastatin  40 mg Oral Daily    carvedilol  3.125 mg Oral Daily    chlorthalidone  25 mg Oral QAM    clopidogrel  75 mg Oral Daily    famotidine  20 mg Oral BID    fluticasone-vilanterol  1 puff Inhalation Daily    furosemide  20 mg Oral BID    lidocaine  1 patch Transdermal Q24H    [START ON 4/10/2018] lisinopril  40 mg Oral Daily    polyethylene glycol  17 g Oral Daily       PRN Medications: acetaminophen, albuterol-ipratropium 2.5mg-0.5mg/3mL, bisacodyl, dextrose 50%, dextrose 50%, dextrose 50%, glucagon (human recombinant), glucose, glucose, hydrALAZINE, hydrocodone-acetaminophen 5-325mg, hydrOXYzine pamoate, insulin aspart U-100, magnesium hydroxide 400 mg/5 ml, ondansetron    Review of Systems   Constitutional: Positive for activity change and fatigue. Negative for chills, diaphoresis and fever.   HENT: Negative for congestion, ear discharge and ear pain.    Eyes: Negative for photophobia, pain and visual disturbance.   Respiratory: Positive for shortness of breath. Negative for cough, chest tightness and wheezing.    Cardiovascular: Negative for chest pain and palpitations.    Gastrointestinal: Negative for abdominal distention, abdominal pain, constipation, diarrhea and nausea.   Endocrine: Negative for cold intolerance and heat intolerance.   Genitourinary: Negative for enuresis, frequency and genital sores.   Musculoskeletal: Positive for gait problem and myalgias (over left ribs). Negative for arthralgias.   Skin: Negative for color change and wound.   Neurological: Negative for dizziness, seizures, weakness and headaches.   Hematological: Negative for adenopathy.   Psychiatric/Behavioral: Negative for agitation, confusion and hallucinations. The patient is nervous/anxious.      Objective:     Vital Signs (Most Recent):  Temp: 98.7 °F (37.1 °C) (18)  Pulse: 60 (18)  Resp: 18 (18)  BP: (!) 171/90 (18)  SpO2: 96 % (18)    Vital Signs (24h Range):  Temp:  [98.4 °F (36.9 °C)-98.7 °F (37.1 °C)] 98.7 °F (37.1 °C)  Pulse:  [54-60] 60  Resp:  [18] 18  SpO2:  [96 %-97 %] 96 %  BP: (127-171)/() 171/90     Physical Exam   Constitutional: She is oriented to person, place, and time. She appears well-developed and well-nourished. No distress.   HENT:   Head: Normocephalic and atraumatic.   Right Ear: External ear normal.   Left Ear: External ear normal.   Nose: Nose normal.   Eyes: Right eye exhibits no discharge. Left eye exhibits no discharge. No scleral icterus.   Neck: Normal range of motion.   Cardiovascular: Normal rate, regular rhythm and intact distal pulses.    Pulmonary/Chest: Effort normal. No respiratory distress. She has no wheezes.   Abdominal: Soft. She exhibits no distension. There is no tenderness.   Musculoskeletal: Normal range of motion. She exhibits no edema or tenderness.   Neurological: She is alert and oriented to person, place, and time.   -  Mental Status:  AAOx3.  Follows commands.  Answers correct age and .  Recent and remote memory intact.  -  Speech and language:  No aphasia, mild dysarthria.    -   Vision:  R hemianopsia.  No ptosis.    -  Facial movement (CN VII): Mild facial droop.   -  Motor:  RUE: 2/5 EF, FF, WE.  LUE: 5/5.  RLE: 4-/5.  LLE: 5/5.  -  Tone:  Decreased RUE.  -  Sensory:  Intact to light touch and pin prick.   Skin: Skin is warm and dry. No rash noted.   Psychiatric: She has a normal mood and affect. Her behavior is normal. Thought content normal. Cognition and memory are impaired.   Vitals reviewed.    NEUROLOGICAL EXAMINATION:     MENTAL STATUS   Oriented to person, place, and time.       Assessment/Plan:      Kristina Aguirre is a 67 y.o. female admitted to inpatient rehabilitation on 4/2/2018 for 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 with impaired mobility and ADLs. Patient remains appropriate for PT, OT, and as required Speech therapy. Patient continues to require 24 hour nursing care as well as daily Physician assessment.    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 Pan sensitive    -Continue Augmentin for 7day course        Atrial fibrillation    continue Apixaban    4/4 - reports of SOB. EKG shows no sign of A fib with RVR or Mi. CXR showing resolution of pulm edema. Pending labs.        S/p TAVR (transcatheter aortic valve replacement), bioprosthetic    Continue Apixaban and Plavix        Diabetes mellitus, type 2    Continue to monitor blood glucoses    4/4 - started on Diabetic diet    Lab Results   Component Value Date    HGBA1C 6.5 (H) 03/21/2018       - Diabetic Diet, Diabetic Education   - Monitor POCT TIDAC and qhs   - Continue with  ISS    POCT Glucose   Date Value Ref Range Status   04/08/2018 256 (H) 70 - 110 mg/dL Final   04/08/2018 143 (H) 70 - 110 mg/dL Final   04/07/2018 237 (H) 70 - 110 mg/dL Final    04/07/2018 146 (H) 70 - 110 mg/dL Final   04/07/2018 208 (H) 70 - 110 mg/dL Final   04/07/2018 159 (H) 70 - 110 mg/dL Final   04/06/2018 296 (H) 70 - 110 mg/dL Final   04/06/2018 134 (H) 70 - 110 mg/dL Final   04/06/2018 183 (H) 70 - 110 mg/dL Final   04/06/2018 170 (H) 70 - 110 mg/dL Final   04/05/2018 191 (H) 70 - 110 mg/dL Final                     Essential hypertension    Elevated on admission. Continue current meds    4/3/18 - started Lisinopril 10mg daily  4/4/18 - slightly improved. Increased to 20mg daily  4/6/18 - still elevated Bp. Increased to 30mg daily  4/7 cont to monitor trend   4/9 inc lisinopril to 40  Vitals:    04/08/18 1928 04/08/18 2043 04/09/18 0700 04/09/18 0806   BP: (!) 156/105 127/60 (!) 171/90    BP Location:   Left arm    Patient Position:   Sitting    Pulse:   60 60   Resp:   18 18   Temp:   98.7 °F (37.1 °C)    TempSrc:   Oral    SpO2:   96%    Weight:       Height:                       DISCHARGE PLANNING:  Tentative Discharge Date:     Future Appointments  Date Time Provider Department Center   5/10/2018 10:00 AM EKG, APPT [facility] EKG Simon Dawson   5/10/2018 10:40 AM Sarbjit Banegas MD [facility] ARRHYTH Simon Munson MD  Department of Physical Medicine & Rehab   [facility]

## 2018-04-10 LAB
POCT GLUCOSE: 122 MG/DL (ref 70–110)
POCT GLUCOSE: 131 MG/DL (ref 70–110)
POCT GLUCOSE: 156 MG/DL (ref 70–110)
POCT GLUCOSE: 206 MG/DL (ref 70–110)

## 2018-04-10 PROCEDURE — 97112 NEUROMUSCULAR REEDUCATION: CPT

## 2018-04-10 PROCEDURE — 25000003 PHARM REV CODE 250: Performed by: PHYSICAL MEDICINE & REHABILITATION

## 2018-04-10 PROCEDURE — 92507 TX SP LANG VOICE COMM INDIV: CPT

## 2018-04-10 PROCEDURE — 97150 GROUP THERAPEUTIC PROCEDURES: CPT

## 2018-04-10 PROCEDURE — 99232 SBSQ HOSP IP/OBS MODERATE 35: CPT | Mod: ,,, | Performed by: PHYSICAL MEDICINE & REHABILITATION

## 2018-04-10 PROCEDURE — 25000003 PHARM REV CODE 250: Performed by: NURSE PRACTITIONER

## 2018-04-10 PROCEDURE — 97110 THERAPEUTIC EXERCISES: CPT

## 2018-04-10 PROCEDURE — 97530 THERAPEUTIC ACTIVITIES: CPT

## 2018-04-10 PROCEDURE — 92526 ORAL FUNCTION THERAPY: CPT

## 2018-04-10 PROCEDURE — 12800000 HC REHAB SEMI-PRIVATE ROOM

## 2018-04-10 RX ADMIN — HYDROCODONE BITARTRATE AND ACETAMINOPHEN 1 TABLET: 5; 325 TABLET ORAL at 08:04

## 2018-04-10 RX ADMIN — CHLORTHALIDONE 25 MG: 25 TABLET ORAL at 08:04

## 2018-04-10 RX ADMIN — LISINOPRIL 40 MG: 20 TABLET ORAL at 08:04

## 2018-04-10 RX ADMIN — CARVEDILOL 3.12 MG: 3.12 TABLET, FILM COATED ORAL at 08:04

## 2018-04-10 RX ADMIN — FUROSEMIDE 20 MG: 20 TABLET ORAL at 08:04

## 2018-04-10 RX ADMIN — ATORVASTATIN CALCIUM 40 MG: 20 TABLET, FILM COATED ORAL at 08:04

## 2018-04-10 RX ADMIN — HYDROCODONE BITARTRATE AND ACETAMINOPHEN 1 TABLET: 5; 325 TABLET ORAL at 12:04

## 2018-04-10 RX ADMIN — Medication 500 MG: at 08:04

## 2018-04-10 RX ADMIN — FLUTICASONE FUROATE AND VILANTEROL TRIFENATATE 1 PUFF: 100; 25 POWDER RESPIRATORY (INHALATION) at 08:04

## 2018-04-10 RX ADMIN — HYDROCODONE BITARTRATE AND ACETAMINOPHEN 1 TABLET: 5; 325 TABLET ORAL at 04:04

## 2018-04-10 RX ADMIN — FAMOTIDINE 20 MG: 20 TABLET, FILM COATED ORAL at 08:04

## 2018-04-10 RX ADMIN — AMIODARONE HYDROCHLORIDE 400 MG: 200 TABLET ORAL at 12:04

## 2018-04-10 RX ADMIN — AMIODARONE HYDROCHLORIDE 400 MG: 200 TABLET ORAL at 08:04

## 2018-04-10 RX ADMIN — CLOPIDOGREL BISULFATE 75 MG: 75 TABLET ORAL at 08:04

## 2018-04-10 RX ADMIN — FUROSEMIDE 20 MG: 20 TABLET ORAL at 04:04

## 2018-04-10 RX ADMIN — INSULIN ASPART 2 UNITS: 100 INJECTION, SOLUTION INTRAVENOUS; SUBCUTANEOUS at 12:04

## 2018-04-10 RX ADMIN — INSULIN ASPART 2 UNITS: 100 INJECTION, SOLUTION INTRAVENOUS; SUBCUTANEOUS at 09:04

## 2018-04-10 RX ADMIN — APIXABAN 5 MG: 5 TABLET, FILM COATED ORAL at 08:04

## 2018-04-10 RX ADMIN — AMOXICILLIN AND CLAVULANATE POTASSIUM 500 MG: 500; 125 TABLET, FILM COATED ORAL at 08:04

## 2018-04-10 RX ADMIN — AMLODIPINE BESYLATE 10 MG: 10 TABLET ORAL at 08:04

## 2018-04-10 NOTE — PROGRESS NOTES
"Speech Therapy   Treatment    Kristina Aguirre   MRN: [id number]        04/10/18 1115   Speech Time Calculation   Speech Start Time 1115   Speech Stop Time 1210   Speech Total (min) 55 min   General Information   SLP Treatment Date 04/10/18   General Observations Pt seen individually at bedside and taken to SLP office while sitting upright in w/c.    General Precautions aspiration;fall;diabetic;vision impaired   Visual/Auditory Vision impaired   Subjective   Patient states "I might have to record the sessions with you" following mood swing with accusing SLP of negative language towards pt.   Pain/Comfort   Pain Rating no pain       SLP Cognitive Treatment   Treatment Detail (SLP Cognitive Treatment) Pt engaged in safety awareness task of mobility from bed to w/c given min verbal cues for slow rate and attention to safety. Pt completed orientation task to time with 75% acc indly, given min verbal cues pt with increase to 100% acc. Visual scanning task completed of reading large print single sentences requiring max verbal cuse for scanning pattern and head movement vs utilizing arms to mobilize target. Pt with 100% acc given max visual scanning cues. Picture ID task completed of F=9 with 66% acc indly, given moderate verbal and visual cues pt with increase to 100% acc. Pt completed attention task and cognitive task of drawing inferences from paragraph read aloud by SLP with 60% acc indly, given moderate verbal repetitions of information and moderate cues for attention, pt with increase to 86% acc. Pt with significant mood changes t/o session with agitation, frustration, and happiness.       SLP Treatment: Swallowing   Treatment Detail  Pt set up for meal with pt requiring moderate verbal cues for use of aspiration precautions.   Assessment   SLP Diagnosis moderate cognitive-language deficits; severe visual deficits;    Prognosis Good   Problem List decreased attention skills; visual changes; decreased thought organization; " decreased reasoning skills; poor recall; decreased awareness of CVA changes   Session Assessment progressing toward goals   Level of Motivation/Participation good   Discharge Recommendations   Discharge Facility/Level Of Care Needs outpatient speech therapy   Plan   Plan Continue with current plan   Treatment/Billable Minutes   Speech Therapy Individual 45   Treatment Swallowing Dysfunction 10   Total Time 55     Lala Muñoz CCC-SLP  4/10/2018

## 2018-04-10 NOTE — PLAN OF CARE
Problem: Patient Care Overview  Goal: Plan of Care Review  Outcome: Ongoing (interventions implemented as appropriate)  Patient has complained of pain that has be addressed with pain medication with mild results. Patient is up with assist with no falls or injuries noted.Patient is stable with no distress noted. Plan of care explained to patient and patient verbally acknowledged understanding. Patient is resting with call light in reach, bed in low position and bed alarm set. Will continue to monitor patient.

## 2018-04-10 NOTE — SUBJECTIVE & OBJECTIVE
Interval History 4/10/2018:  Patient is seen for follow-up rehab evaluation and recommendations: Pt without new c/o's. Slept well last night. Therapy is going well.     I have reviewed the HPI and PMFSH and there are no changes from admission.       Scheduled Medications:    amiodarone  400 mg Oral BID    amLODIPine  10 mg Oral Daily    amoxicillin-clavulanate 500-125mg  1 tablet Oral BID    apixaban  5 mg Oral BID    ascorbic acid (vitamin C)  500 mg Oral BID    atorvastatin  40 mg Oral Daily    carvedilol  3.125 mg Oral Daily    chlorthalidone  25 mg Oral QAM    clopidogrel  75 mg Oral Daily    famotidine  20 mg Oral BID    fluticasone-vilanterol  1 puff Inhalation Daily    furosemide  20 mg Oral BID    lidocaine  1 patch Transdermal Q24H    lisinopril  40 mg Oral Daily    polyethylene glycol  17 g Oral Daily       PRN Medications: acetaminophen, albuterol-ipratropium 2.5mg-0.5mg/3mL, bisacodyl, dextrose 50%, dextrose 50%, dextrose 50%, glucagon (human recombinant), glucose, glucose, hydrALAZINE, hydrocodone-acetaminophen 5-325mg, hydrOXYzine pamoate, insulin aspart U-100, magnesium hydroxide 400 mg/5 ml, ondansetron    Review of Systems   Constitutional: Positive for activity change and fatigue. Negative for chills, diaphoresis and fever.   HENT: Negative for congestion, ear discharge and ear pain.    Eyes: Negative for photophobia, pain and visual disturbance.   Respiratory: Positive for shortness of breath. Negative for cough, chest tightness and wheezing.    Cardiovascular: Negative for chest pain and palpitations.   Gastrointestinal: Negative for abdominal distention, abdominal pain, constipation, diarrhea and nausea.   Endocrine: Negative for cold intolerance and heat intolerance.   Genitourinary: Negative for enuresis, frequency and genital sores.   Musculoskeletal: Positive for gait problem and myalgias (over left ribs). Negative for arthralgias.   Skin: Negative for color change and wound.    Neurological: Negative for dizziness, seizures, weakness and headaches.   Hematological: Negative for adenopathy.   Psychiatric/Behavioral: Negative for agitation, confusion and hallucinations. The patient is nervous/anxious.      Objective:     Vital Signs (Most Recent):  Temp: 98.1 °F (36.7 °C) (04/10/18 0700)  Pulse: (!) 54 (04/10/18 0804)  Resp: 20 (04/10/18 0804)  BP: (!) 175/77 (04/10/18 0801)  SpO2: 97 % (18 1830)    Vital Signs (24h Range):  Temp:  [97.8 °F (36.6 °C)-98.1 °F (36.7 °C)] 98.1 °F (36.7 °C)  Pulse:  [54-55] 54  Resp:  [20] 20  SpO2:  [97 %] 97 %  BP: (120-175)/(58-77) 175/77     Physical Exam   Constitutional: She is oriented to person, place, and time. She appears well-developed and well-nourished. No distress.   HENT:   Head: Normocephalic and atraumatic.   Right Ear: External ear normal.   Left Ear: External ear normal.   Nose: Nose normal.   Eyes: Right eye exhibits no discharge. Left eye exhibits no discharge. No scleral icterus.   Neck: Normal range of motion.   Cardiovascular: Normal rate, regular rhythm and intact distal pulses.    Pulmonary/Chest: Effort normal. No respiratory distress. She has no wheezes.   Abdominal: Soft. She exhibits no distension. There is no tenderness.   Musculoskeletal: Normal range of motion. She exhibits no edema or tenderness.   Neurological: She is alert and oriented to person, place, and time.   -  Mental Status:  AAOx3.  Follows commands.  Answers correct age and .  Recent and remote memory intact.  -  Speech and language:  No aphasia, mild dysarthria.    -  Vision:  R hemianopsia.  No ptosis.    -  Facial movement (CN VII): Mild facial droop.   -  Motor:  RUE: 2/5 EF, FF, WE.  LUE: 5/5.  RLE: 4-/5.  LLE: 5/5.  -  Tone:  Decreased RUE.  -  Sensory:  Intact to light touch and pin prick.   Skin: Skin is warm and dry. No rash noted.   Psychiatric: She has a normal mood and affect. Her behavior is normal. Thought content normal. Cognition and memory  are impaired.   Vitals reviewed.    NEUROLOGICAL EXAMINATION:     MENTAL STATUS   Oriented to person, place, and time.

## 2018-04-10 NOTE — PROGRESS NOTES
"Occupational Therapy  PM Treatment  Kristina Aguirre   MRN: [id number]   Room/Bed: E261/E261 B       04/10/18 1300   OT Time Calculation   OT Start Time 1300   OT Stop Time 1345   OT Total Time (min) 45 min   General   OT Date of Treatment 04/10/18   Family/Caregiver Present No   Patient Found (position) Seated at edge of bed   Precautions   General Precautions aspiration;fall;diabetic;vision impaired   Cardiovascular/Pulmonary Other (see comments)  (Holter monitor)   Visual/Auditory Vision impaired   Subjective   Patient states "I'm hoping I'll get well enough to go back to work."   Pain/Comfort   Pain Rating 4/10   Location - Orientation lower   Location back   Pain Addressed Reposition   Sit to Stand   Sit <> Stand Assistance Contact Guard Assistance   Sit <> Stand Assistive Device No Assistive Device   Trials/Comments from wc   Stand to Sit   Assistance Contact Guard Assistance   Assistive Device No Assistive Device   Trials/Comments to    Bed to Chair   Bed <> Chair Technique Stand Pivot   Bed <> Chair Transfer Assistance Contact Guard Assistance   Bed <> Chair Assistive Device No Assistive Device   Trials/Comments from EOB>wc   Chair to Bed   Technique Stand Pivot   Assistance Contact Guard Assistance   Assistive Device No Assistive Device   Trials/Comments from wc>EOB   Balance   Balance Yes   Dynamic Standing Balance   Dynamic Standing-Balance Support Unilateral upper extremity supported   Dynamic Standing-Balance Reaching for objects;Reaching across midline   Dynamic Standing-Comments with focus on balance and standing endurance as well as weight bearing into RUE.   Weight Bearing   RUE Weight Bearing Extended arm standing   Response to Weight Bearing Technique Pt completed spatial relations board while weight bearing into RUE. Pt required 5 seated rest breaks to complete with c/o low back pain. Pt refused any medication to adress back pain.   Activity Tolerance   Activity Tolerance Patient tolerated treatment " well   After Treatment   Patient Position After Treatment Seated in wheelchair   Patient after treatment left call button in reach   Assessment   Prognosis Fair   Problem List Decreased Self Care skills;Decreased upper extremity range of motion;Decreased upper extremity strength;Decreased safe judgment during ADL;Decreased cognition;Decreased endurance;Decreased functional mobility;Decreased gross motor control;Decreased IADLs;Decreased Function of right upper extremity;Decreased trunk control for functional activities   Assessment Pt participated well in tx session but remains with severely decreaesd movement in RUE, visual deficits and decreased functional mobility. Pt would continue to benefit from skilled OT services.   Level of Motivation/Participation Good   Discharge Recommendations   Equipment Needed After Discharge 3-in-1 commode;bath bench;walker, rolling   Discharge Facility/Level Of Care Needs home with home health   Plan   Plan Continue with current plan   Therapy Frequency 2 times/day   Occupational Therapy Follow-up   OT Follow-up? Yes   Treatment/Billable Minutes   Neuromuscular Re-ed 45   Total Time 45       CAROLYN Cuevas  4/10/2018    Patient with wheelchair safety belt fastened and able to self release wheelchair safety belt. Pt left sitting EOB with call light and all necessities in reach, RN notified.     LEGEND:   CGA: Contact Guard Assist   EOB: Edgeof Bed   HHA: Hand Held Assist   HOB: Head of Bed   (I): Independent-patient performs task in a timely manner   Max (A): Maximal Assist-patient performs 25-49% of task   Min (A): Minimal Assist- patient performs 75% or more of task   Mod (A): Moderate Assist- patient performs 50-74% of task   NA: Not applicable   NT: Not tested   OOB: Out of Bed   PTA: Prior to admit   QC: Quad Cane   RW: Rolling Walker   (S): Supervision- patient requires cues, coaxing, prompting   SBA: Stand By Assist   SC: Straight Cane   SW: Standard Walker   TBA: To be  assessed   Total (A): Total Assist- patient performs less than 25% of task   WC: Wheelchair   WFL: Within Functional Limits   WNL: Within Normal Limits

## 2018-04-10 NOTE — PROGRESS NOTES
" [facility]  Adult Nutrition  Progress Note    SUMMARY       Recommendations    Recommendation/Intervention:   1. Continue Diabetic/Dysphagia soft diet.   2. Double Protein portions to be given at each meal.   3. RD to follow     Goals: Meet >85% of EEN/EPN.   Nutrition Goal Status: goal met  Communication of RD Recs: discussed on rounds (POC)    Reason for Assessment    Reason for Assessment: consult  Diagnosis:  (Gait instability)  Relevant Medical History: SCC s/p lobectomy, CAD, COPD, DM, HTN, HLD  Interdisciplinary Rounds: attended    General Information Comments: Pt reports a good appetite with a 100% intake. Pt remains hungry and asked for double protein portions to meet her needs. Pt denies N/V/D/C.   Nutrition Discharge Planning: Adequate nutrition on cardiac, consistent carb diet.     Nutrition Risk Screen    Nutrition Risk Screen: no indicators present    Nutrition/Diet History    Do you have any cultural, spiritual, Jehovah's witness conflicts, given your current situation?: none  Food Allergies: NKFA    Anthropometrics    Temp: 98.1 °F (36.7 °C)  Height Method: Stated  Height: 5' 2" (157.5 cm)  Height (inches): 62 in  Weight Method: Standard Scale  Weight: 65.5 kg (144 lb 8 oz)  Weight (lb): 144.5 lb  Ideal Body Weight (IBW), Female: 110 lb  % Ideal Body Weight, Female (lb): 131.82 lb  BMI (Calculated): 26.6  BMI Grade: 25 - 29.9 - overweight       Lab/Procedures/Meds    Pertinent Labs Reviewed: reviewed  Pertinent Labs Comments: BUN 26, Gluc 149, POCT Gluc 131  Pertinent Medications Reviewed: reviewed  Pertinent Medications Comments: Vit-C, Carvedilol, lasix, statin, lisinopril, insulin, Mg hydroxide    Physical Findings/Assessment    Overall Physical Appearance: nourished  Oral/Mouth Cavity: tooth/teeth missing  Skin: incision(s), edema    Estimated/Assessed Needs    Weight Used For Calorie Calculations: 65.8 kg (145 lb 1 oz)  Energy Calorie Requirements (kcal): 1420   Energy Need Method: " Mookie Macdonald (PAL x 1.25)  Protein Requirements: 66-79g/day (1.0-1.2g/kg)  Weight Used For Protein Calculations: 65.8 kg (145 lb 1 oz)  Fluid Requirements (mL): 1 mL/kcal or per MD     RDA Method (mL): 1420         Nutrition Prescription Ordered    Current Diet Order: 2000 calorie Dysphagia soft     Evaluation of Received Nutrient/Fluid Intake    Comments: LBM: 4/9/18  % Intake of Estimated Energy Needs: 75 - 100 %  % Meal Intake: 75 - 100 %    Nutrition Risk    Level of Risk/Frequency of Follow-up: low     Assessment and Plan     Nutrition Problem  Inadequate energy intake      Related to (etiology):   Pt's inability to complete meals before therapy sessions     Signs and Symptoms (as evidenced by):   Pt stating that she still feels fatigued and hungry during therapy      Interventions/Recommendations (treatment strategy):  Double protein portions and snacks in between therapy sessions.      Nutrition Diagnosis Status:   Improving        Monitor and Evaluation    Food and Nutrient Intake: energy intake, food and beverage intake  Food and Nutrient Adminstration: diet order  Knowledge/Beliefs/Attitudes: food and nutrition knowledge/skill, beliefs and attitudes  Physical Activity and Function: nutrition-related ADLs and IADLs  Anthropometric Measurements: weight, weight change, body mass index  Biochemical Data, Medical Tests and Procedures: electrolyte and renal panel, gastrointestinal profile, glucose/endocrine profile, inflammatory profile, lipid profile  Nutrition-Focused Physical Findings: overall appearance     Nutrition Follow-Up    RD Follow-up?: Yes

## 2018-04-10 NOTE — PROGRESS NOTES
"Occupational Therapy   ROM Group    Kristina Aguirre   MRN: [id number]     Subjective: "I don't like to speak in public too much."    Pain level: 8/10 R shoulder    Objective:    Pt participated in 45 minute hemiplegic upper extremity range of motion group. The patient performed the following self ROM exercises seated in wheelchair with BUEs clasped; shoulder flexion, elbow flexion/extension, wrist flexion/extension, ulnar deviation, radial deviation, and individual finger stretches at 2 sets of 10 for bilateral upper extremities with an emphasis on range of motion, protection, and positioning of hemiplegic arm. Patient also performed multiple repetitions of scapular retraction, shoulder rolls, cervical flexion/extension at 2 sets of 10 to further promote ROM to scapula and shoulders. Pt educated on anatomy of shoulder, UE deficits after stroke, pain management, and positioning of affected arm in wheelchair and bed to prevent or manage shoulder pain.     Goal(s):  Pt will verbalize appropriate positioning of affected arm after completing group. (Met)  Pt will demonstrate appropriate positioning of affected arm after completing group. (Met/)  Pt will demonstrate 1(or more) self ROM exercise after completing group.  (Met)  Pt's caregiver will demonstrate ability to perform 3 self ROM exercises after completing group.  (Not Met)    These activities were performed in a group setting to promote increased awareness of various stroke impairments, rehabilitation, and recovery process.        04/10/18 1415   OT Time Calculation   OT Start Time 1415   OT Stop Time 1500   OT Total Time (min) 45 min   General   OT Date of Treatment 04/10/18   Family/Caregiver Present No   Patient Found (position) Supine in bed   Precautions   General Precautions aspiration;fall;diabetic;vision impaired   Orthopedic No   Required Braces or Orthoses No   Cardiovascular/Pulmonary Other (see comments)  (holter monitor)   Visual/Auditory Vision impaired "   Treatment/Billable Minutes   Therapeutic Group 45   Total Time 45     CAROLYN Dixon  4/10/2018

## 2018-04-10 NOTE — PROGRESS NOTES
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 Slept well last night. Therapy is going well.     I have reviewed the HPI and PMFSH and there are no changes from admission.       Scheduled Medications:    amiodarone  400 mg Oral BID    amLODIPine  10 mg Oral Daily    amoxicillin-clavulanate 500-125mg  1 tablet Oral BID    apixaban  5 mg Oral BID    ascorbic acid (vitamin C)  500 mg Oral BID    atorvastatin  40 mg Oral Daily    carvedilol  3.125 mg Oral Daily    chlorthalidone  25 mg Oral QAM    clopidogrel  75 mg Oral Daily    famotidine  20 mg Oral BID    fluticasone-vilanterol  1 puff Inhalation Daily    furosemide  20 mg Oral BID    lidocaine  1 patch Transdermal Q24H    lisinopril  40 mg Oral Daily    polyethylene glycol  17 g Oral Daily       PRN Medications: acetaminophen, albuterol-ipratropium 2.5mg-0.5mg/3mL, bisacodyl, dextrose 50%, dextrose 50%, dextrose 50%, glucagon (human recombinant), glucose, glucose, hydrALAZINE, hydrocodone-acetaminophen 5-325mg, hydrOXYzine pamoate, insulin aspart U-100, magnesium hydroxide 400 mg/5 ml, ondansetron    Review of Systems   Constitutional: Positive for activity change and fatigue. Negative for chills, diaphoresis and fever.   HENT: Negative for congestion, ear discharge and ear pain.    Eyes: Negative for photophobia, pain and visual disturbance.   Respiratory: Positive for shortness of breath. Negative for cough, chest tightness and wheezing.    Cardiovascular: Negative for chest pain and palpitations.    Gastrointestinal: Negative for abdominal distention, abdominal pain, constipation, diarrhea and nausea.   Endocrine: Negative for cold intolerance and heat intolerance.   Genitourinary: Negative for enuresis, frequency and genital sores.   Musculoskeletal: Positive for gait problem and myalgias (over left ribs). Negative for arthralgias.   Skin: Negative for color change and wound.   Neurological: Negative for dizziness, seizures, weakness and headaches.   Hematological: Negative for adenopathy.   Psychiatric/Behavioral: Negative for agitation, confusion and hallucinations. The patient is nervous/anxious.      Objective:     Vital Signs (Most Recent):  Temp: 98.1 °F (36.7 °C) (04/10/18 0700)  Pulse: (!) 54 (04/10/18 0804)  Resp: 20 (04/10/18 0804)  BP: (!) 175/77 (04/10/18 0801)  SpO2: 97 % (18 1830)    Vital Signs (24h Range):  Temp:  [97.8 °F (36.6 °C)-98.1 °F (36.7 °C)] 98.1 °F (36.7 °C)  Pulse:  [54-55] 54  Resp:  [20] 20  SpO2:  [97 %] 97 %  BP: (120-175)/(58-77) 175/77     Physical Exam   Constitutional: She is oriented to person, place, and time. She appears well-developed and well-nourished. No distress.   HENT:   Head: Normocephalic and atraumatic.   Right Ear: External ear normal.   Left Ear: External ear normal.   Nose: Nose normal.   Eyes: Right eye exhibits no discharge. Left eye exhibits no discharge. No scleral icterus.   Neck: Normal range of motion.   Cardiovascular: Normal rate, regular rhythm and intact distal pulses.    Pulmonary/Chest: Effort normal. No respiratory distress. She has no wheezes.   Abdominal: Soft. She exhibits no distension. There is no tenderness.   Musculoskeletal: Normal range of motion. She exhibits no edema or tenderness.   Neurological: She is alert and oriented to person, place, and time.   -  Mental Status:  AAOx3.  Follows commands.  Answers correct age and .  Recent and remote memory intact.  -  Speech and language:  No aphasia, mild dysarthria.    -  Vision:   R hemianopsia.  No ptosis.    -  Facial movement (CN VII): Mild facial droop.   -  Motor:  RUE: 2/5 EF, FF, WE.  LUE: 5/5.  RLE: 4-/5.  LLE: 5/5.  -  Tone:  Decreased RUE.  -  Sensory:  Intact to light touch and pin prick.   Skin: Skin is warm and dry. No rash noted.   Psychiatric: She has a normal mood and affect. Her behavior is normal. Thought content normal. Cognition and memory are impaired.   Vitals reviewed.    NEUROLOGICAL EXAMINATION:     MENTAL STATUS   Oriented to person, place, and time.       Assessment/Plan:      Kristina Aguirre is a 67 y.o. female admitted to inpatient rehabilitation on 4/2/2018 for 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 with impaired mobility and ADLs. Patient remains appropriate for PT, OT, and as required Speech therapy. Patient continues to require 24 hour nursing care as well as daily Physician assessment.    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 Pan sensitive    -Continue Augmentin for 7day course        Atrial fibrillation    continue Apixaban    4/4 - reports of SOB. EKG shows no sign of A fib with RVR or Mi. CXR showing resolution of pulm edema. Pending labs.        S/p TAVR (transcatheter aortic valve replacement), bioprosthetic    Continue Apixaban and Plavix        Diabetes mellitus, type 2    Continue to monitor blood glucoses    4/4 - started on Diabetic diet    Lab Results   Component Value Date    HGBA1C 6.5 (H) 03/21/2018       - Diabetic Diet, Diabetic Education   - Monitor POCT TIDAC and qhs   - Continue with  ISS    POCT Glucose   Date Value Ref Range Status   04/08/2018 256 (H) 70 - 110 mg/dL Final   04/08/2018 143 (H) 70 - 110 mg/dL Final   04/07/2018 237 (H) 70 - 110 mg/dL Final   04/07/2018  146 (H) 70 - 110 mg/dL Final   04/07/2018 208 (H) 70 - 110 mg/dL Final   04/07/2018 159 (H) 70 - 110 mg/dL Final   04/06/2018 296 (H) 70 - 110 mg/dL Final   04/06/2018 134 (H) 70 - 110 mg/dL Final   04/06/2018 183 (H) 70 - 110 mg/dL Final   04/06/2018 170 (H) 70 - 110 mg/dL Final   04/05/2018 191 (H) 70 - 110 mg/dL Final                     Essential hypertension    Elevated on admission. Continue current meds    4/3/18 - started Lisinopril 10mg daily  4/4/18 - slightly improved. Increased to 20mg daily  4/6/18 - still elevated Bp. Increased to 30mg daily  4/7 cont to monitor trend   4/9 inc lisinopril to 40  4/10 Cont to monitor. BP elevated 175/77 prior to receiving lisinopril.  Vitals:    04/09/18 1830 04/10/18 0700 04/10/18 0801 04/10/18 0804   BP: (!) 120/58 (!) 175/77 (!) 175/77    BP Location: Left arm Left arm     Patient Position: Lying Sitting     Pulse: (!) 55 (!) 54 (!) 54 (!) 54   Resp: 20 20  20   Temp: 97.8 °F (36.6 °C) 98.1 °F (36.7 °C)     TempSrc: Oral Oral     SpO2: 97%      Weight:       Height:                       DISCHARGE PLANNING:  Tentative Discharge Date:     Future Appointments  Date Time Provider Department Center   5/10/2018 10:00 AM EKG, APPT [facility] EKG Simon Dawson   5/10/2018 10:40 AM Sarbjit Banegas MD [facility] Simon Munson MD  Department of Physical Medicine & Rehab   [facility]

## 2018-04-10 NOTE — PLAN OF CARE
Problem: Patient Care Overview  Goal: Plan of Care Review  Outcome: Pt aaox4, resp unlabored. BP elevated this AM and blood pressure meds given as ordered. Pt SB assist and instructed to call for assistance with activities. Pt reported pain today and medicated as needs. Pt tolerated all therapies today.

## 2018-04-10 NOTE — PLAN OF CARE
"Problem: Patient Care Overview  Goal: Plan of Care Review  Outcome: Ongoing (interventions implemented as appropriate)  Pt aaox4, vss. Pt reports pain in lower back and Lidocaine patch applied. Pt blood glucose level WDL today. Scheduled insulin given. Pretty drain tube accidentally dislodged from peg tub site accidentally this AM by patient; Occlusive dressing in place. Charge nurse Kvng attempted replacing pretty tube and resistance met. Dr. Diaz @ bs and states "Leave it out."  Site free from redness and swelling. Minimal sanguinous drainage noted. Occlusive dressing applied. Family to be educated on s/s to report when patient is d/c'd today.      "

## 2018-04-10 NOTE — PLAN OF CARE
Problem: Patient Care Overview  Goal: Plan of Care Review  Outcome: Ongoing (interventions implemented as appropriate)    Assessment and Plan      Nutrition Problem  Inadequate energy intake      Related to (etiology):   Pt's inability to complete meals before therapy sessions     Signs and Symptoms (as evidenced by):   Pt stating that she still feels fatigued and hungry during therapy      Interventions/Recommendations (treatment strategy):  Double protein portions and snacks in between therapy sessions.      Nutrition Diagnosis Status:   Improving       Recommendation/Intervention:     1. Continue Diabetic/Dysphagia soft diet.   2. Double Protein portions to be given at each meal.   3. RD to follow      Goals: Meet >85% of EEN/EPN.   Nutrition Goal Status: goal met

## 2018-04-10 NOTE — ASSESSMENT & PLAN NOTE
Elevated on admission. Continue current meds    4/3/18 - started Lisinopril 10mg daily  4/4/18 - slightly improved. Increased to 20mg daily  4/6/18 - still elevated Bp. Increased to 30mg daily  4/7 cont to monitor trend   4/9 inc lisinopril to 40  4/10 Cont to monitor. BP elevated 175/77 prior to receiving lisinopril.  Vitals:    04/09/18 1830 04/10/18 0700 04/10/18 0801 04/10/18 0804   BP: (!) 120/58 (!) 175/77 (!) 175/77    BP Location: Left arm Left arm     Patient Position: Lying Sitting     Pulse: (!) 55 (!) 54 (!) 54 (!) 54   Resp: 20 20  20   Temp: 97.8 °F (36.6 °C) 98.1 °F (36.7 °C)     TempSrc: Oral Oral     SpO2: 97%      Weight:       Height:

## 2018-04-10 NOTE — PROGRESS NOTES
Physical Therapy   Treatment    Kristina Aguirre   MRN: [id number]    04/10/18 1531   PT Time Calculation   PT Start Time 1531   PT Stop Time 1616   PT Total Time (min) 45 min   Treatment   Treatment Type Treatment   PT/PTA PT   PTA Visit Number 0   General   PT Received On 04/10/18   Family/Caregiver Present No   Patient Found (position) Supine in bed   Pt found with (holter monitor)   Precautions   General Precautions aspiration;fall;diabetic;vision impaired   Orthopedic No   Required Braces or Orthoses No   Cardiovascular/Pulmonary Other (see comments)  (holter monitor)   Visual/Auditory Vision impaired   Subjective   Patient states Pt agreeable to participate in PT session.    Pain/Comfort   Pain Rating 1 no pain   Pain Rating Post-Intervention 1 no pain   Bed Mobility   Bed Mobility yes   Supine to Sit Supervision   Transfers   Transfer yes   Sit to Stand   Sit <> Stand Assistance Stand By Assistance   Sit <> Stand Assistive Device Rolling Walker   Trials/Comments multiple trials throughout session   Stand to Sit   Assistance Stand By Assistance   Assistive Device Rolling Walker   Trials/Comments multiple trials throughout session   Bed to Chair   Bed <> Chair Technique Stand Pivot   Bed <> Chair Assistance Contact Guard Assistance   Bed <> Chair Assistive Device No Assistive Device   Trials/Comments x1 trial   Chair to Bed   Technique Stand Pivot   Assistance Contact Guard Assistance   Assistive Device No Assistive Device   Trials/Comments x1 trial   Chair to Mat   Chair<> Mat Technique Stand Pivot   Chair<>Mat Assistance Contact Guard Assistance   Chair <> Mat Assistive Device No Assistive Device   Trials/Comments x1 trial   Mat to Chair   Technique Stand Pivot   Assistance Contact Guard Assistance   Assistive Device No Assistive Device   Trials/Comments x1 trial   Wheelchair Activities   Propulsion Yes   Propulsion Type 1 Manual   Level 1 Level tile   Method 1 Right lower extremity;Left lower extremity   Level of  Assistance 1 Stand by assistsance   Description/ Details 1 Pt self propelled for three trials: 50'x2 then 100'   Gait   Gait Yes   Weight Bearing Status full   Gait 1   Surface 1 Level tile   Gait Assistive Device Rolling walker   Assistance 1 Contact Guard Assistance   Gait Distance Pt ambulated for one trial of approximately 100'   Gait Pattern swing-through gait   Gait Deviation(s) decreased perez;increased time in double stance;decreased velocity of limb motion;decreased step length;decreased stride length;decreased weight-shifting ability;forward lean   Impairments Contributing to Gait Deviations impaired balance;impaired motor control;abnormal muscle tone;impaired postural control;decreased strength   Balance   Balance Yes   Dynamic Sitting Balance   Dynamic Sitting-Balance Support Right upper extremity supported   Dynamic Sitting-Balance Forward lean;Reaching for objects;Reaching across midline   Dynamic Sitting-Comments Pt sat EOM for 10' while reaching for objects and PT provided facilitation to R UE for weight bearing. This activity was used to encourage weight through the R UE as well as increase functional seated balance to increase stability with movements.    Equipment Use   Standard Exercise Bike Comments Pt pedals LBE for 12 mins in order to increase endurance and strength in B LE.    Activity Tolerance   Activity Tolerance Patient tolerated treatment well   After Treatment   Patient Position After Treatment Seated at edge of bed   Patient after treatment left call button in reach   Assessment   Prognosis Good   Problem List Decreased strength;Decreased endurance;Impaired balance;Decreased mobility;Impaired judgement;Decreased safety awareness;Impaired vision   Session Assessment progressing toward goals   Assessment Pt continues to make good progress in PT sessions as evidenced by increased balance with movements during today's session. Pt required frequent rest breaks during today's session when  ambulating and propelling WC. She will continue to benefit from skilled PT services in order to continue to address her functional mobility.   Level of Motivation/Participation good   Barriers to Discharge Decreased caregiver support;Inaccessible home environment   Discharge Recommendations   Equipment Needed After Discharge 3-in-1 commode;walker, rolling;wheelchair;bath bench   Discharge Facility/Level Of Care Needs home health PT   Plan   Planned Therapy Intervention Continue with current plan   Therapy Frequency 2 times/day;daily;Monday-Friday;Saturday or Sunday   Physical Therapy Follow-up   PT Follow-up? Yes   PT - Next Visit Date 04/11/18   Treatment/Billable Minutes   Therapeutic Activity 15   Therapeutic Exercise 15   Neuromuscular Re-education 15   Total Time 45   Geronimo Deleon, PT   4/10/2018

## 2018-04-11 LAB
POCT GLUCOSE: 155 MG/DL (ref 70–110)
POCT GLUCOSE: 177 MG/DL (ref 70–110)
POCT GLUCOSE: 253 MG/DL (ref 70–110)
POCT GLUCOSE: 254 MG/DL (ref 70–110)

## 2018-04-11 PROCEDURE — 97150 GROUP THERAPEUTIC PROCEDURES: CPT

## 2018-04-11 PROCEDURE — 12800000 HC REHAB SEMI-PRIVATE ROOM

## 2018-04-11 PROCEDURE — 25000003 PHARM REV CODE 250: Performed by: PHYSICAL MEDICINE & REHABILITATION

## 2018-04-11 PROCEDURE — 97112 NEUROMUSCULAR REEDUCATION: CPT

## 2018-04-11 PROCEDURE — 97116 GAIT TRAINING THERAPY: CPT

## 2018-04-11 PROCEDURE — 97530 THERAPEUTIC ACTIVITIES: CPT

## 2018-04-11 PROCEDURE — 92507 TX SP LANG VOICE COMM INDIV: CPT

## 2018-04-11 PROCEDURE — 25000003 PHARM REV CODE 250: Performed by: NURSE PRACTITIONER

## 2018-04-11 PROCEDURE — 99232 SBSQ HOSP IP/OBS MODERATE 35: CPT | Mod: ,,, | Performed by: PHYSICAL MEDICINE & REHABILITATION

## 2018-04-11 RX ADMIN — INSULIN ASPART 2 UNITS: 100 INJECTION, SOLUTION INTRAVENOUS; SUBCUTANEOUS at 04:04

## 2018-04-11 RX ADMIN — CARVEDILOL 3.12 MG: 3.12 TABLET, FILM COATED ORAL at 07:04

## 2018-04-11 RX ADMIN — FAMOTIDINE 20 MG: 20 TABLET, FILM COATED ORAL at 08:04

## 2018-04-11 RX ADMIN — AMIODARONE HYDROCHLORIDE 400 MG: 200 TABLET ORAL at 07:04

## 2018-04-11 RX ADMIN — CHLORTHALIDONE 25 MG: 25 TABLET ORAL at 07:04

## 2018-04-11 RX ADMIN — AMIODARONE HYDROCHLORIDE 400 MG: 200 TABLET ORAL at 10:04

## 2018-04-11 RX ADMIN — APIXABAN 5 MG: 5 TABLET, FILM COATED ORAL at 07:04

## 2018-04-11 RX ADMIN — HYDROCODONE BITARTRATE AND ACETAMINOPHEN 1 TABLET: 5; 325 TABLET ORAL at 09:04

## 2018-04-11 RX ADMIN — AMLODIPINE BESYLATE 10 MG: 10 TABLET ORAL at 07:04

## 2018-04-11 RX ADMIN — LISINOPRIL 40 MG: 20 TABLET ORAL at 07:04

## 2018-04-11 RX ADMIN — FUROSEMIDE 20 MG: 20 TABLET ORAL at 04:04

## 2018-04-11 RX ADMIN — CLOPIDOGREL BISULFATE 75 MG: 75 TABLET ORAL at 07:04

## 2018-04-11 RX ADMIN — ATORVASTATIN CALCIUM 40 MG: 20 TABLET, FILM COATED ORAL at 07:04

## 2018-04-11 RX ADMIN — FLUTICASONE FUROATE AND VILANTEROL TRIFENATATE 1 PUFF: 100; 25 POWDER RESPIRATORY (INHALATION) at 07:04

## 2018-04-11 RX ADMIN — HYDROCODONE BITARTRATE AND ACETAMINOPHEN 1 TABLET: 5; 325 TABLET ORAL at 02:04

## 2018-04-11 RX ADMIN — HYDROCODONE BITARTRATE AND ACETAMINOPHEN 1 TABLET: 5; 325 TABLET ORAL at 04:04

## 2018-04-11 RX ADMIN — HYDROCODONE BITARTRATE AND ACETAMINOPHEN 1 TABLET: 5; 325 TABLET ORAL at 07:04

## 2018-04-11 RX ADMIN — INSULIN ASPART 5 UNITS: 100 INJECTION, SOLUTION INTRAVENOUS; SUBCUTANEOUS at 12:04

## 2018-04-11 RX ADMIN — INSULIN ASPART 2 UNITS: 100 INJECTION, SOLUTION INTRAVENOUS; SUBCUTANEOUS at 07:04

## 2018-04-11 RX ADMIN — Medication 500 MG: at 07:04

## 2018-04-11 RX ADMIN — INSULIN ASPART 2 UNITS: 100 INJECTION, SOLUTION INTRAVENOUS; SUBCUTANEOUS at 09:04

## 2018-04-11 RX ADMIN — POLYETHYLENE GLYCOL 3350 17 G: 17 POWDER, FOR SOLUTION ORAL at 07:04

## 2018-04-11 RX ADMIN — APIXABAN 5 MG: 5 TABLET, FILM COATED ORAL at 09:04

## 2018-04-11 RX ADMIN — FAMOTIDINE 20 MG: 20 TABLET, FILM COATED ORAL at 07:04

## 2018-04-11 RX ADMIN — FUROSEMIDE 20 MG: 20 TABLET ORAL at 07:04

## 2018-04-11 RX ADMIN — Medication 500 MG: at 09:04

## 2018-04-11 NOTE — PROGRESS NOTES
"Physical Therapy   Treatment    Kristina Aguirre   MRN: [id number]    04/11/18 1024   PT Time Calculation   PT Start Time 1029   PT Stop Time 1114   PT Total Time (min) 45 min   Treatment   Treatment Type Treatment   PT/PTA PT   PTA Visit Number 0   General   PT Received On 04/11/18   Family/Caregiver Present No   Patient Found (position) Supine in bed   Pt found with (holter monitor)   Precautions   General Precautions aspiration;fall;diabetic;vision impaired   Orthopedic No   Required Braces or Orthoses No   Cardiovascular/Pulmonary Other (see comments)  (holter monitor)   Visual/Auditory Vision impaired   Subjective   Patient states "I really was not feeling okay this morning." Pt agreeable to PT session.    Pain/Comfort   Pain Rating 1 5/10   Location - Side 1 Bilateral   Location - Orientation 1 generalized   Location 1 back   Pain Addressed 1 Reposition;Distraction   Pain Rating Post-Intervention 1 5/10   Bed Mobility   Bed Mobility yes   Supine to Sit Supervision   Transfers   Transfer yes   Sit to Stand   Sit <> Stand Assistance Stand By Assistance   Sit <> Stand Assistive Device Rolling Walker   Trials/Comments multiple trials   Stand to Sit   Assistance Stand By Assistance   Assistive Device Rolling Walker   Trials/Comments multiple trials   Bed to Chair   Bed <> Chair Technique Stand Pivot   Bed <> Chair Assistance Contact Guard Assistance   Bed <> Chair Assistive Device No Assistive Device   Trials/Comments x1 trial   Gait   Gait Yes   Weight Bearing Status full   Gait 1   Surface 1 Level tile   Gait Assistive Device Rolling walker  (neuroIFRAH paddle)   Assistance 1 Contact Guard Assistance   Gait Distance Pt ambulated for two trials of approximately 120' each with a short rest break between trials   Gait Pattern swing-through gait   Gait Deviation(s) decreased perez;increased time in double stance;decreased velocity of limb motion;decreased step length;decreased stride length;decreased weight-shifting " ability;forward lean   Impairments Contributing to Gait Deviations impaired balance;impaired coordination;decreased flexibility;impaired motor control;abnormal muscle tone;impaired postural control;decreased strength   Stairs   Stairs No   Balance   Balance Yes   Dynamic Standing Balance   Dynamic Standing-Balance Support Right upper extremity supported   Dynamic Standing-Balance Ball toss   Dynamic Standing-Comments Pt stood for 5' while tapping beach ball. This activity was used to encourage weight bearing on R LE, hand eye coordination, and increased balance with ambulation.    High Level Ambulation   Side Stepping Hand held assist   Side Stepping Comments Pt stepped for 30' in each direction with HHA for safety.    Other Activities   Comments Pt performed stand pivot transfer to toilet at [facility].    Activity Tolerance   Activity Tolerance Patient tolerated treatment well   After Treatment   Patient Position After Treatment Seated in wheelchair  (direct handoff to OT for group)   Patient after treatment left call button in reach   Assessment   Prognosis Good   Problem List Decreased strength;Decreased endurance;Impaired balance;Decreased mobility;Impaired judgement;Decreased safety awareness;Impaired vision   Session Assessment progressing toward goals   Assessment Pt continues to make good progress in PT sessions. She demonstrats more stability with ambulation today and was able to ambulate for farther distances. She needs verbal cues often in sessions to encourage safety. Cont POC   Level of Motivation/Participation good   Barriers to Discharge Inaccessible home environment;Decreased caregiver support   Discharge Recommendations   Equipment Needed After Discharge 3-in-1 commode;wheelchair;walker, rolling;bath bench   Discharge Facility/Level Of Care Needs home health PT   Plan   Planned Therapy Intervention Continue with current plan   Therapy Frequency 2 times/day;daily;Monday-Friday;Saturday or Sunday   Physical  Therapy Follow-up   PT Follow-up? Yes   PT - Next Visit Date 04/12/18   Treatment/Billable Minutes   Gait training 10   Therapeutic Activity 20   Neuromuscular Re-education 15   Total Time 45   Geronimo Deleon, PT   4/11/2018

## 2018-04-11 NOTE — ASSESSMENT & PLAN NOTE
Urine culture positive for Citrobacter freundii complex. Pan sensitive    S/p 7d course of augmentin

## 2018-04-11 NOTE — SUBJECTIVE & OBJECTIVE
Interval History 4/11/2018:  Patient is seen for follow-up rehab evaluation and recommendations: No problems overnight. Patient had some nausea this morning after taking hydrocodone/apap for a tooth ache but it is resolving. She did not want any zofran.   I have reviewed the HPI and PMFSH and there are no changes from admission.       Scheduled Medications:    amiodarone  400 mg Oral BID    amLODIPine  10 mg Oral Daily    apixaban  5 mg Oral BID    ascorbic acid (vitamin C)  500 mg Oral BID    atorvastatin  40 mg Oral Daily    carvedilol  3.125 mg Oral Daily    chlorthalidone  25 mg Oral QAM    clopidogrel  75 mg Oral Daily    famotidine  20 mg Oral BID    fluticasone-vilanterol  1 puff Inhalation Daily    furosemide  20 mg Oral BID    lidocaine  1 patch Transdermal Q24H    lisinopril  40 mg Oral Daily    polyethylene glycol  17 g Oral Daily       PRN Medications: acetaminophen, albuterol-ipratropium 2.5mg-0.5mg/3mL, bisacodyl, dextrose 50%, dextrose 50%, dextrose 50%, glucagon (human recombinant), glucose, glucose, hydrALAZINE, hydrocodone-acetaminophen 5-325mg, hydrOXYzine pamoate, insulin aspart U-100, magnesium hydroxide 400 mg/5 ml, ondansetron    Review of Systems   Constitutional: Positive for activity change and fatigue. Negative for chills, diaphoresis and fever.   HENT: Negative for congestion, ear discharge and ear pain.         Tooth ache   Eyes: Negative for photophobia, pain and visual disturbance.   Respiratory: Negative for chest tightness and shortness of breath.    Cardiovascular: Negative for chest pain and palpitations.   Gastrointestinal: Positive for nausea. Negative for abdominal distention, abdominal pain, constipation and diarrhea.   Endocrine: Negative for cold intolerance and heat intolerance.   Musculoskeletal: Positive for gait problem. Negative for arthralgias. Myalgias: over left ribs.   Skin: Negative for color change and wound.   Neurological: Negative for dizziness,  seizures, weakness and headaches.   Hematological: Negative for adenopathy.   Psychiatric/Behavioral: Negative for agitation, confusion and hallucinations.     Objective:     Vital Signs (Most Recent):  Temp: 98.4 °F (36.9 °C) (18)  Pulse: (!) 55 (18)  Resp: 18 (18)  BP: (!) 137/95 (18)  SpO2: 95 % (18)    Vital Signs (24h Range):  Temp:  [98.4 °F (36.9 °C)] 98.4 °F (36.9 °C)  Pulse:  [55] 55  Resp:  [18] 18  SpO2:  [94 %-95 %] 95 %  BP: (137-165)/(84-95) 137/95     Physical Exam   Constitutional: She is oriented to person, place, and time. She appears well-developed and well-nourished. No distress.   HENT:   Head: Normocephalic and atraumatic.   Right Ear: External ear normal.   Left Ear: External ear normal.   Nose: Nose normal.   Eyes: Right eye exhibits no discharge. Left eye exhibits no discharge. No scleral icterus.   Neck: Normal range of motion.   Cardiovascular: Normal rate, regular rhythm and intact distal pulses.    Pulmonary/Chest: Effort normal. No respiratory distress. She has no wheezes.   Abdominal: Soft. She exhibits no distension. There is no tenderness.   Musculoskeletal: Normal range of motion. She exhibits no edema or tenderness.   Neurological: She is alert and oriented to person, place, and time.   -  Mental Status:  AAOx3.  Follows commands.  Answers correct age and .  Recent and remote memory intact.  -  Speech and language:  No aphasia, mild dysarthria.    -  Vision:  R hemianopsia.  No ptosis.    -  Facial movement (CN VII): Mild facial droop.   -  Motor:  RUE: 2/5 EF, FF, WE.  LUE: 5/5.  RLE: 4-/5.  LLE: 5/5.  -  Tone:  Decreased RUE.  -  Sensory:  Intact to light touch and pin prick.   Skin: Skin is warm and dry. No rash noted.   Psychiatric: She has a normal mood and affect. Her behavior is normal. Thought content normal. Cognition and memory are impaired.   Vitals reviewed.    NEUROLOGICAL EXAMINATION:     MENTAL STATUS    Oriented to person, place, and time.

## 2018-04-11 NOTE — PROGRESS NOTES
"Speech Therapy   Treatment    Kristina Aguirre   MRN: [id number]        04/11/18 0830   Speech Time Calculation   Speech Start Time 0830   Speech Stop Time 0915   Speech Total (min) 45 min   General Information   SLP Treatment Date 04/11/18   General Observations Pt seen individually within ST office while sitting upright in w/c.    General Precautions aspiration;diabetic;fall;vision impaired   Visual/Auditory Vision impaired   Subjective   Patient states "I'm starting to get a headache, but I took medicine already this morning."   Pain/Comfort   Pain Rating no pain   Pain Comment mild onset of HA within session       SLP Cognitive Treatment   Treatment Detail (SLP Cognitive Treatment) Pt engaged in safety awareness and sequencing task of applying face lotion and transferring from EOB to w/c given min verbal cues for slow rate and attention skills. Pt completed orientation task to time with modified independence with 100% acc. Delayed recall task of date completed following 10 minutes with 100% acc. Indly. Pt completed ID common pictured single items on flash cards with 80% acc indly, given min verbal cues for visual scanning strategies, pt with increase to 100% acc. Simple phrase reading completed of flash cards with 70% acc, given moderate verbal cues for use of visual scanning strategies, pt with increase to 100% acc. Visual scanning task of locating single letters completed with 90% acc given max verbal cues for visual scanning strategy use and slow rate. Pt engaged in CVA education regarding incidence, types of CVA, and signs/symptoms of CVA.    Assessment   SLP Diagnosis moderate cog-ling deficits; severe visual deficits   Prognosis Good   Problem List decreased attention skills; decreased safety awareness; word finding deficits; visual scanning deficits; visual deficits   Session Assessment progressing toward goals   Level of Motivation/Participation good   Discharge Recommendations   Discharge Facility/Level Of " Care Needs home health speech therapy   Plan   Plan Continue with current plan   Treatment/Billable Minutes   Speech Therapy Individual 45   Total Time 45     Lala Muñoz CCC-SLP  4/11/2018

## 2018-04-11 NOTE — PROGRESS NOTES
"Occupational Therapy   Dressing Group    Kristina Aguirre  MRN: [id number]  Room/Bed: E261/E261        04/11/18 1115   OT Time Calculation   OT Start Time 1115   OT Stop Time 1200   OT Total Time (min) 45 min   General   OT Date of Treatment 04/11/18   Family/Caregiver Present No   Patient Found (position) Seated in wheelchair   Precautions   General Precautions fall;aspiration   Orthopedic No   Required Braces or Orthoses No   Cardiovascular/Pulmonary Other (see comments)  (holter monitor)   Visual/Auditory Vision impaired   Subjective   Patient states "I learned about the elastic shoe laces"   Pain/Comfort   Pain Rating 6/10   Location other (see comments)  (tooth)   Pain Comment Pt. reported she received pain medication this AM & caused nausea.    Sit to Stand   Sit <> Stand Assistance Contact Guard Assistance   Sit <> Stand Assistive Device Rolling Walker   Trials/Comments from wheelchair   Stand to Sit   Assistance Contact Guard Assistance   Assistive Device Rolling Walker   Trials/Comments to wheelchair   UE Dressing   UE Dressing Level of Assistance Supervision   UE Dressing Where Assessed Wheelchair   UE Dressing Comments Pt. donned/doffed pullover shirt; increased time & cues to complete   LE Dressing   LE Dressing Yes   LE Dressing  Level of Assistance Minimum assistance   LE Dressing Level of Assistance Contact guard   Sock Level of Assistance Supervision   Shoe Level of Assistance Minimum assistance   LE Dressing Where Assessed Wheelchair   LE Dressing Comments Pt. required CGA to pull pants on/off hips; assist to tie shoe laces. RW for balance   OT Therapeutic Groups   OT Therapeutic Yes   Other      Objective:   Pt participated in 45 min functional dressing group. The group activity reviewed safety considerations, energy conservation, and adaptive strategies for upper body and lower body dressing. Patient educated on adaptive equipment available to assist with dressing (button hook, long handled shoe horn, " reacher, dressing stick, elastic shoe laces). Patient also educated on dressing tips that promote increased (I) with dressing such as wearing loose fit clothing, using footstool, and adapted clothing available.      These activities were performed in a group setting to encourage participation with peers and social interaction skills.Social Interaction: (choose FIM score rating below)       §     Interacts appropriately 75-89% of time - needs redirection for appropriate language or to initiate interaction  (4)    Assessment:  Patient participated in a 45 minute dressing group activity.  The group activity challenged dynamic standing/sitting balance, core strengthening, bilateral upper extremity/ lower extremity strengthening, hand -eye coordination, and social affect/mood.  The patient verbalized understanding, demonstrated comprehension of use of AE and adaptive techniques for dressing.    Discharge Recommendations   Discharge Facility/Level Of Care Needs home health OT   Plan   Plan Continue with current plan   Occupational Therapy Follow-up   OT Follow-up? Yes   Treatment/Billable Minutes   Therapeutic Group 45   Total Time 45       CAROLYN Sneed  4/11/2018     Patient with wheelchair safety belt fastened and able to self release wheelchair safety belt. Pt left seated EOB (location) with call light and all necessities in reach, nurse notified.

## 2018-04-11 NOTE — PLAN OF CARE
Problem: Patient Care Overview  Goal: Plan of Care Review  Outcome: Ongoing (interventions implemented as appropriate)  Patient has complained of pain that has be addressed with pain medication with mild results. Patient is up in chair with assist, with no falls or injuries noted. Patient is stable no distress noted. Plan of care explained to patient and patient verbally acknowledged understanding. Patient is resting with call light in reach, bed in low position and bed alarm set. Will continue to monitor patient.

## 2018-04-11 NOTE — PLAN OF CARE
Problem: Patient Care Overview  Goal: Plan of Care Review  Outcome: Ongoing (interventions implemented as appropriate)  Patient AAox4 with no complaints VS stable. Patient ambulated to bathroom with minimal assistance. Instructed patient to use call light for assistance and before getting out of bed. Will continue to monitor patient.

## 2018-04-11 NOTE — PROGRESS NOTES
[facility]  Physical Medicine & Rehab  Progress Note    Patient Name: Kristina Aguirre  MRN: [id number]  Patient Class: IP- Rehab   Admission Date: 4/2/2018  Length of Stay: 9 days  Attending Physician: Peter Dunaway MD  Primary Care Provider: Robert Mattson MD    Subjective:     Principal Problem: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    Interval History 4/11/2018:  Patient is seen for follow-up rehab evaluation and recommendations: No problems overnight. Patient had some nausea this morning after taking hydrocodone/apap for a tooth ache but it is resolving. She did not want any zofran.   I have reviewed the HPI and PMFSH and there are no changes from admission.       Scheduled Medications:    amiodarone  400 mg Oral BID    amLODIPine  10 mg Oral Daily    apixaban  5 mg Oral BID    ascorbic acid (vitamin C)  500 mg Oral BID    atorvastatin  40 mg Oral Daily    carvedilol  3.125 mg Oral Daily    chlorthalidone  25 mg Oral QAM    clopidogrel  75 mg Oral Daily    famotidine  20 mg Oral BID    fluticasone-vilanterol  1 puff Inhalation Daily    furosemide  20 mg Oral BID    lidocaine  1 patch Transdermal Q24H    lisinopril  40 mg Oral Daily    polyethylene glycol  17 g Oral Daily       PRN Medications: acetaminophen, albuterol-ipratropium 2.5mg-0.5mg/3mL, bisacodyl, dextrose 50%, dextrose 50%, dextrose 50%, glucagon (human recombinant), glucose, glucose, hydrALAZINE, hydrocodone-acetaminophen 5-325mg, hydrOXYzine pamoate, insulin aspart U-100, magnesium hydroxide 400 mg/5 ml, ondansetron    Review of Systems   Constitutional: Positive for activity change and fatigue. Negative for chills, diaphoresis and fever.   HENT: Negative for congestion, ear discharge and ear pain.         Tooth ache   Eyes: Negative for photophobia, pain and visual disturbance.   Respiratory: Negative for chest tightness and shortness of breath.    Cardiovascular: Negative for chest pain  and palpitations.   Gastrointestinal: Positive for nausea. Negative for abdominal distention, abdominal pain, constipation and diarrhea.   Endocrine: Negative for cold intolerance and heat intolerance.   Musculoskeletal: Positive for gait problem. Negative for arthralgias. Myalgias: over left ribs.   Skin: Negative for color change and wound.   Neurological: Negative for dizziness, seizures, weakness and headaches.   Hematological: Negative for adenopathy.   Psychiatric/Behavioral: Negative for agitation, confusion and hallucinations.     Objective:     Vital Signs (Most Recent):  Temp: 98.4 °F (36.9 °C) (18)  Pulse: (!) 55 (18)  Resp: 18 (18)  BP: (!) 137/95 (18)  SpO2: 95 % (18)    Vital Signs (24h Range):  Temp:  [98.4 °F (36.9 °C)] 98.4 °F (36.9 °C)  Pulse:  [55] 55  Resp:  [18] 18  SpO2:  [94 %-95 %] 95 %  BP: (137-165)/(84-95) 137/95     Physical Exam   Constitutional: She is oriented to person, place, and time. She appears well-developed and well-nourished. No distress.   HENT:   Head: Normocephalic and atraumatic.   Right Ear: External ear normal.   Left Ear: External ear normal.   Nose: Nose normal.   Eyes: Right eye exhibits no discharge. Left eye exhibits no discharge. No scleral icterus.   Neck: Normal range of motion.   Cardiovascular: Normal rate, regular rhythm and intact distal pulses.    Pulmonary/Chest: Effort normal. No respiratory distress. She has no wheezes.   Abdominal: Soft. She exhibits no distension. There is no tenderness.   Musculoskeletal: Normal range of motion. She exhibits no edema or tenderness.   Neurological: She is alert and oriented to person, place, and time.   -  Mental Status:  AAOx3.  Follows commands.  Answers correct age and .  Recent and remote memory intact.  -  Speech and language:  No aphasia, mild dysarthria.    -  Vision:  R hemianopsia.  No ptosis.    -  Facial movement (CN VII): Mild facial droop.   -  Motor:   RUE: 2/5 EF, FF, WE.  LUE: 5/5.  RLE: 4-/5.  LLE: 5/5.  -  Tone:  Decreased RUE.  -  Sensory:  Intact to light touch and pin prick.   Skin: Skin is warm and dry. No rash noted.   Psychiatric: She has a normal mood and affect. Her behavior is normal. Thought content normal. Cognition and memory are impaired.   Vitals reviewed.    NEUROLOGICAL EXAMINATION:     MENTAL STATUS   Oriented to person, place, and time.       Assessment/Plan:      Kristina Aguirre is a 67 y.o. female admitted to inpatient rehabilitation on 4/2/2018 for 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 with impaired mobility and ADLs. Patient remains appropriate for PT, OT, and as required Speech therapy. Patient continues to require 24 hour nursing care as well as daily Physician assessment.    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 Pan sensitive  s/p 7d course of augmentin         Atrial fibrillation    continue Apixaban    4/4 - reports of SOB. EKG shows no sign of A fib with RVR or Mi. CXR showing resolution of pulm edema. Pending labs.        S/p TAVR (transcatheter aortic valve replacement), bioprosthetic    Continue Apixaban and Plavix        Diabetes mellitus, type 2    Continue to monitor blood glucoses    4/4 - started on Diabetic diet    Lab Results   Component Value Date    HGBA1C 6.5 (H) 03/21/2018       - Diabetic Diet, Diabetic Education   - Monitor POCT TIDAC and qhs   - Continue with  ISS    POCT Glucose   Date Value Ref Range Status   04/08/2018 256 (H) 70 - 110 mg/dL Final   04/08/2018 143 (H) 70 - 110 mg/dL Final   04/07/2018 237 (H) 70 - 110 mg/dL Final   04/07/2018 146 (H) 70 - 110 mg/dL Final   04/07/2018 208 (H) 70 - 110 mg/dL Final   04/07/2018 159 (H) 70 - 110  mg/dL Final   04/06/2018 296 (H) 70 - 110 mg/dL Final   04/06/2018 134 (H) 70 - 110 mg/dL Final   04/06/2018 183 (H) 70 - 110 mg/dL Final   04/06/2018 170 (H) 70 - 110 mg/dL Final   04/05/2018 191 (H) 70 - 110 mg/dL Final                     Essential hypertension    Elevated on admission. Continue current meds    4/3/18 - started Lisinopril 10mg daily  4/4/18 - slightly improved. Increased to 20mg daily  4/6/18 - still elevated Bp. Increased to 30mg daily  4/7 cont to monitor trend   4/9 inc lisinopril to 40  4/10 Cont to monitor. BP elevated 175/77 prior to receiving lisinopril.  4/11 improving  Vitals:    04/09/18 1830 04/10/18 0700 04/10/18 0801 04/10/18 0804   BP: (!) 120/58 (!) 175/77 (!) 175/77    BP Location: Left arm Left arm     Patient Position: Lying Sitting     Pulse: (!) 55 (!) 54 (!) 54 (!) 54   Resp: 20 20  20   Temp: 97.8 °F (36.6 °C) 98.1 °F (36.7 °C)     TempSrc: Oral Oral     SpO2: 97%      Weight:       Height:                       DISCHARGE PLANNING:  Tentative Discharge Date: 4/19/2018    Future Appointments  Date Time Provider Department Center   5/10/2018 10:00 AM EKG, APPT [facility] EKG Simon Dawson   5/10/2018 10:40 AM Sarbjit Banegas MD [facility] ARRHYTH Simon Munson MD  Department of Physical Medicine & Rehab   [facility]

## 2018-04-11 NOTE — PROGRESS NOTES
"Occupational Therapy  PM Treatment  Kristina Aguirre   MRN: [id number]   Room/Bed: E261/E261 B       04/11/18 1440   OT Time Calculation   OT Start Time 1440   OT Stop Time 1529   OT Total Time (min) 49 min   General   OT Date of Treatment 04/11/18   Family/Caregiver Present No   Patient Found (position) Seated in wheelchair   Precautions   General Precautions aspiration;fall;diabetic;vision impaired   Cardiovascular/Pulmonary Other (see comments)  (holter monitor)   Visual/Auditory Vision impaired   Subjective   Patient states "I'm having a little pain in my R hand."   Pain/Comfort   Pain Rating 3/10   Location - Side Right   Location wrist   Pain Addressed Reposition;Distraction   Pain Comment Pt reported dull, achy pain in R wrist   Bed Mobility   Supine to Sit Modified Independent   Supine to Sit Comments to EOB   Sit to Stand   Sit <> Stand Assistance Contact Guard Assistance   Sit <> Stand Assistive Device No Assistive Device   Trials/Comments from EOB   Stand to Sit   Assistance Contact Guard Assistance   Assistive Device No Assistive Device   Trials/Comments to wc   Bed to Chair   Bed <> Chair Technique Stand Pivot   Bed <> Chair Transfer Assistance Contact Guard Assistance   Bed <> Chair Assistive Device No Assistive Device   Trials/Comments from EOB>wc   Chair to Bed   Technique Stand Pivot   Assistance Contact Guard Assistance   Assistive Device No Assistive Device   Trials/Comments from wc>EOB   Toilet Transfer   Toilet Transfer Technique Stand Pivot   Toilet Transfer Assistance Contact Guard Assistance   Toilet Transfer Assistive Device grab bar   Trials/Comments from wc<>toilet   Toileting   Toileting Level of Assistance Contact guard   Toileting Where Assessed Toilet   Toileting Comments assist with clothing management over R hip   Neuromuscular Re-Education   Neuromuscular Education Yes   Comments Pt performed AAROM, table slides in all jt planes with 2.5# wrist weight secured to UE. Pt required hand over " hand to complete full range.    Activity Tolerance   Activity Tolerance Patient tolerated treatment well   After Treatment   Patient Position After Treatment Seated in wheelchair   Patient after treatment left call button in reach   Assessment   Prognosis Fair   Problem List Decreased Self Care skills;Decreased upper extremity range of motion;Decreased upper extremity strength;Decreased safe judgment during ADL;Decreased cognition;Decreased endurance;Decreased functional mobility;Decreased gross motor control;Decreased IADLs;Decreased Function of right upper extremity;Decreased trunk control for functional activities   Assessment Pt participated well in tx session and has shown some  increased movement with RUE. Pt  would continue to benefit from skilled OT services to increase (I) with ADLs and functional moblity.   Level of Motivation/Participation Good   Barriers to Discharge Decreased caregiver support   Discharge Recommendations   Equipment Needed After Discharge 3-in-1 commode;wheelchair;walker, rolling;bath bench   Discharge Facility/Level Of Care Needs home with home health   Plan   Plan Continue with current plan   Therapy Frequency 2 times/day   Occupational Therapy Follow-up   OT Follow-up? Yes   Treatment/Billable Minutes   Neuromuscular Re-ed 49   Total Time 49       CAROLYN Cuevas  4/11/2018    Patient with wheelchair safety belt fastened and able to self release wheelchair safety belt. Pt left sitting EOB with call light and all necessities in reach, RN notified.     LEGEND:   CGA: Contact Guard Assist   EOB: Edgeof Bed   HHA: Hand Held Assist   HOB: Head of Bed   (I): Independent-patient performs task in a timely manner   Max (A): Maximal Assist-patient performs 25-49% of task   Min (A): Minimal Assist- patient performs 75% or more of task   Mod (A): Moderate Assist- patient performs 50-74% of task   NA: Not applicable   NT: Not tested   OOB: Out of Bed   PTA: Prior to admit   QC: Quad Cane    RW: Rolling Walker   (S): Supervision- patient requires cues, coaxing, prompting   SBA: Stand By Assist   SC: Straight Cane   SW: Standard Walker   TBA: To be assessed   Total (A): Total Assist- patient performs less than 25% of task   WC: Wheelchair   WFL: Within Functional Limits   WNL: Within Normal Limits

## 2018-04-12 PROBLEM — K08.89 TOOTHACHE: Status: ACTIVE | Noted: 2018-04-12

## 2018-04-12 LAB
ANION GAP SERPL CALC-SCNC: 8 MMOL/L
BASOPHILS # BLD AUTO: 0.07 K/UL
BASOPHILS NFR BLD: 0.8 %
BUN SERPL-MCNC: 20 MG/DL
CALCIUM SERPL-MCNC: 9.3 MG/DL
CHLORIDE SERPL-SCNC: 100 MMOL/L
CO2 SERPL-SCNC: 31 MMOL/L
CREAT SERPL-MCNC: 0.8 MG/DL
DIFFERENTIAL METHOD: ABNORMAL
EOSINOPHIL # BLD AUTO: 0.6 K/UL
EOSINOPHIL NFR BLD: 6.7 %
ERYTHROCYTE [DISTWIDTH] IN BLOOD BY AUTOMATED COUNT: 18.3 %
EST. GFR  (AFRICAN AMERICAN): >60 ML/MIN/1.73 M^2
EST. GFR  (NON AFRICAN AMERICAN): >60 ML/MIN/1.73 M^2
GLUCOSE SERPL-MCNC: 133 MG/DL
HCT VFR BLD AUTO: 28.8 %
HGB BLD-MCNC: 8.9 G/DL
IMM GRANULOCYTES # BLD AUTO: 0.02 K/UL
IMM GRANULOCYTES NFR BLD AUTO: 0.2 %
LYMPHOCYTES # BLD AUTO: 2.1 K/UL
LYMPHOCYTES NFR BLD: 24.9 %
MAGNESIUM SERPL-MCNC: 2 MG/DL
MCH RBC QN AUTO: 24.1 PG
MCHC RBC AUTO-ENTMCNC: 30.9 G/DL
MCV RBC AUTO: 78 FL
MONOCYTES # BLD AUTO: 0.7 K/UL
MONOCYTES NFR BLD: 8 %
NEUTROPHILS # BLD AUTO: 5 K/UL
NEUTROPHILS NFR BLD: 59.4 %
NRBC BLD-RTO: 0 /100 WBC
PHOSPHATE SERPL-MCNC: 4.3 MG/DL
PLATELET # BLD AUTO: 283 K/UL
PMV BLD AUTO: 9.8 FL
POCT GLUCOSE: 123 MG/DL (ref 70–110)
POCT GLUCOSE: 124 MG/DL (ref 70–110)
POCT GLUCOSE: 150 MG/DL (ref 70–110)
POCT GLUCOSE: 170 MG/DL (ref 70–110)
POTASSIUM SERPL-SCNC: 4.3 MMOL/L
RBC # BLD AUTO: 3.7 M/UL
SODIUM SERPL-SCNC: 139 MMOL/L
WBC # BLD AUTO: 8.39 K/UL

## 2018-04-12 PROCEDURE — 99232 SBSQ HOSP IP/OBS MODERATE 35: CPT | Mod: ,,, | Performed by: PHYSICAL MEDICINE & REHABILITATION

## 2018-04-12 PROCEDURE — 97116 GAIT TRAINING THERAPY: CPT

## 2018-04-12 PROCEDURE — 25000003 PHARM REV CODE 250: Performed by: NURSE PRACTITIONER

## 2018-04-12 PROCEDURE — 80048 BASIC METABOLIC PNL TOTAL CA: CPT

## 2018-04-12 PROCEDURE — 12800000 HC REHAB SEMI-PRIVATE ROOM

## 2018-04-12 PROCEDURE — 97530 THERAPEUTIC ACTIVITIES: CPT

## 2018-04-12 PROCEDURE — 85025 COMPLETE CBC W/AUTO DIFF WBC: CPT

## 2018-04-12 PROCEDURE — 36415 COLL VENOUS BLD VENIPUNCTURE: CPT

## 2018-04-12 PROCEDURE — 25000003 PHARM REV CODE 250: Performed by: PHYSICAL MEDICINE & REHABILITATION

## 2018-04-12 PROCEDURE — 97110 THERAPEUTIC EXERCISES: CPT

## 2018-04-12 PROCEDURE — 97535 SELF CARE MNGMENT TRAINING: CPT

## 2018-04-12 PROCEDURE — 83735 ASSAY OF MAGNESIUM: CPT

## 2018-04-12 PROCEDURE — 84100 ASSAY OF PHOSPHORUS: CPT

## 2018-04-12 PROCEDURE — 97150 GROUP THERAPEUTIC PROCEDURES: CPT

## 2018-04-12 PROCEDURE — 97112 NEUROMUSCULAR REEDUCATION: CPT

## 2018-04-12 RX ORDER — HYDROCODONE BITARTRATE AND ACETAMINOPHEN 7.5; 325 MG/1; MG/1
1 TABLET ORAL EVERY 4 HOURS PRN
Status: DISCONTINUED | OUTPATIENT
Start: 2018-04-12 | End: 2018-04-19 | Stop reason: HOSPADM

## 2018-04-12 RX ORDER — LIDOCAINE HYDROCHLORIDE 20 MG/ML
15 SOLUTION OROPHARYNGEAL EVERY 6 HOURS
Status: DISCONTINUED | OUTPATIENT
Start: 2018-04-12 | End: 2018-04-12

## 2018-04-12 RX ORDER — HYDRALAZINE HYDROCHLORIDE 25 MG/1
25 TABLET, FILM COATED ORAL EVERY 8 HOURS
Status: DISCONTINUED | OUTPATIENT
Start: 2018-04-12 | End: 2018-04-15

## 2018-04-12 RX ORDER — HYDROCODONE BITARTRATE AND ACETAMINOPHEN 5; 325 MG/1; MG/1
1 TABLET ORAL DAILY
Status: COMPLETED | OUTPATIENT
Start: 2018-04-12 | End: 2018-04-12

## 2018-04-12 RX ORDER — LIDOCAINE HYDROCHLORIDE 20 MG/ML
15 SOLUTION OROPHARYNGEAL EVERY 6 HOURS PRN
Status: DISCONTINUED | OUTPATIENT
Start: 2018-04-12 | End: 2018-04-19 | Stop reason: HOSPADM

## 2018-04-12 RX ADMIN — HYDRALAZINE HYDROCHLORIDE 25 MG: 25 TABLET ORAL at 10:04

## 2018-04-12 RX ADMIN — HYDROCODONE BITARTRATE AND ACETAMINOPHEN 1 TABLET: 5; 325 TABLET ORAL at 06:04

## 2018-04-12 RX ADMIN — AMIODARONE HYDROCHLORIDE 400 MG: 200 TABLET ORAL at 08:04

## 2018-04-12 RX ADMIN — POLYETHYLENE GLYCOL 3350 17 G: 17 POWDER, FOR SOLUTION ORAL at 08:04

## 2018-04-12 RX ADMIN — HYDROCODONE BITARTRATE AND ACETAMINOPHEN 1 TABLET: 5; 325 TABLET ORAL at 02:04

## 2018-04-12 RX ADMIN — HYDROCODONE BITARTRATE AND ACETAMINOPHEN 1 TABLET: 5; 325 TABLET ORAL at 03:04

## 2018-04-12 RX ADMIN — FAMOTIDINE 20 MG: 20 TABLET, FILM COATED ORAL at 12:04

## 2018-04-12 RX ADMIN — Medication 500 MG: at 08:04

## 2018-04-12 RX ADMIN — HYDROXYZINE PAMOATE 25 MG: 25 CAPSULE ORAL at 10:04

## 2018-04-12 RX ADMIN — HYDROCODONE BITARTRATE AND ACETAMINOPHEN 1 TABLET: 7.5; 325 TABLET ORAL at 06:04

## 2018-04-12 RX ADMIN — BENZOCAINE: 100 GEL TOPICAL at 08:04

## 2018-04-12 RX ADMIN — HYDRALAZINE HYDROCHLORIDE 25 MG: 25 TABLET ORAL at 03:04

## 2018-04-12 RX ADMIN — FUROSEMIDE 20 MG: 20 TABLET ORAL at 04:04

## 2018-04-12 RX ADMIN — FLUTICASONE FUROATE AND VILANTEROL TRIFENATATE 1 PUFF: 100; 25 POWDER RESPIRATORY (INHALATION) at 12:04

## 2018-04-12 RX ADMIN — CARVEDILOL 3.12 MG: 3.12 TABLET, FILM COATED ORAL at 12:04

## 2018-04-12 RX ADMIN — CHLORTHALIDONE 25 MG: 25 TABLET ORAL at 12:04

## 2018-04-12 RX ADMIN — BENZOCAINE: 100 GEL TOPICAL at 02:04

## 2018-04-12 RX ADMIN — HYDROCODONE BITARTRATE AND ACETAMINOPHEN 1 TABLET: 5; 325 TABLET ORAL at 11:04

## 2018-04-12 RX ADMIN — APIXABAN 5 MG: 5 TABLET, FILM COATED ORAL at 12:04

## 2018-04-12 RX ADMIN — CLOPIDOGREL BISULFATE 75 MG: 75 TABLET ORAL at 12:04

## 2018-04-12 RX ADMIN — ATORVASTATIN CALCIUM 40 MG: 20 TABLET, FILM COATED ORAL at 12:04

## 2018-04-12 RX ADMIN — FUROSEMIDE 20 MG: 20 TABLET ORAL at 12:04

## 2018-04-12 RX ADMIN — AMLODIPINE BESYLATE 10 MG: 10 TABLET ORAL at 12:04

## 2018-04-12 RX ADMIN — Medication 500 MG: at 12:04

## 2018-04-12 RX ADMIN — APIXABAN 5 MG: 5 TABLET, FILM COATED ORAL at 08:04

## 2018-04-12 RX ADMIN — INSULIN ASPART 2 UNITS: 100 INJECTION, SOLUTION INTRAVENOUS; SUBCUTANEOUS at 12:04

## 2018-04-12 RX ADMIN — ONDANSETRON 8 MG: 8 TABLET, ORALLY DISINTEGRATING ORAL at 08:04

## 2018-04-12 RX ADMIN — HYDROCODONE BITARTRATE AND ACETAMINOPHEN 1 TABLET: 7.5; 325 TABLET ORAL at 10:04

## 2018-04-12 RX ADMIN — BENZOCAINE: 100 GEL TOPICAL at 04:04

## 2018-04-12 RX ADMIN — FAMOTIDINE 20 MG: 20 TABLET, FILM COATED ORAL at 08:04

## 2018-04-12 RX ADMIN — LISINOPRIL 40 MG: 20 TABLET ORAL at 12:04

## 2018-04-12 RX ADMIN — LIDOCAINE HYDROCHLORIDE 15 ML: 20 SOLUTION ORAL; TOPICAL at 10:04

## 2018-04-12 NOTE — PROGRESS NOTES
Speech Therapy   Pt Not Seen    Kristina Aguirre   MRN: [id number]        04/12/18 1555   Speech Time Calculation   Speech Start Time 1555   Speech Stop Time 1555   Speech Total (min) 0 min   General Information   SLP Treatment Date 04/12/18   General Observations Pt _Kristina Aguirre__missed  _45___minutes of SLP due to   _pt refusal x3 2' mouth/tooth pain. Pain medication given; however, pt continued to refuse. Attempts made at 09:20, 15:30, and 15:55 to complete schedule therapy minutes_. Will attempt to make up this missed time as soon as possible. Treating physician(Dr Dunaway) has been made aware as well as patient's nurse(Radha).        Treatment/Billable Minutes   Speech Therapy Individual 0   Total Time 0     Lala Muñoz CCC-SLP  4/12/2018

## 2018-04-12 NOTE — PROGRESS NOTES
"Occupational Therapy   Endurance Group    Kristina Aguirre  MRN: [id number]  Room/Bed: E261/E261 B       04/12/18 1324   OT Time Calculation   OT Start Time 1324   OT Stop Time 1409   OT Total Time (min) 45 min   General   OT Date of Treatment 04/12/18   Family/Caregiver Present No   Patient Found (position) Seated in wheelchair   Precautions   General Precautions fall   Orthopedic No   Required Braces or Orthoses No   Cardiovascular/Pulmonary Other (see comments)  (holter monitor)   Visual/Auditory Vision impaired   Subjective   Patient states "They gave me medicine for this toothache"    Pain/Comfort   Pain Rating 6/10   Location - Side Left   Location jaw   Pain Addressed Distraction;Pre-medicate for activity   Pain Comment Pt reports that she has had pain medications prior.    OT Therapeutic Groups   OT Therapeutic Yes   Other    Objective:    Patient participated in a 45 minute seated therapeutic exercises program with SBA  assist. This group activity challenged the patient's upper and lower extremity strength, seated balance, and endurance to improve functional mobility, decrease risk of falls, and increase activity tolerance. Patient completed warm-up and cool-down exercises as well as aerobic wheelchair exercise as follows:     - Alternating lateral and overhead punches, side punches, diagonal reaches, ABD, ADD, elbow flex/ext in rapid succession in varied sequences.  Patient required frequent rest breaks during this activity.     -Endurance 11  on the RPE scale. No pain complaints voiced or observed.      -Social Interaction:  Interacts appropriately 90% of time - needs monitoring or encouragement for participation or interaction  (5)  I   Assessment:  Patient participated in a 45 minute seated endurance activity.  The group activity challenged dynamic sitting balance, core strengthening, bilateral upper and lower extremity strengthening, cardiovascular and respiratory capacity, and social affect/mood. Patient " will improve activity tolerance by requiring occasional rest breaks and maintain a RPE of 13 throughout the session.    Activity Tolerance   Activity Tolerance Patient tolerated treatment well   After Treatment   Patient Position After Treatment Seated in wheelchair  (pt handed off to PTA)   Occupational Therapy Follow-up   OT Follow-up? Yes   Treatment/Billable Minutes   Therapeutic Group 45   Total Time 45       CAROLYN Cuevas  4/12/2018     Patient with wheelchair safety belt fastened and able to self release wheelchair safety belt. Pt left with PTA.   LEGEND:   CGA: Contact Guard Assist   EOB: Edge of Bed   HHA: Hand Held Assist   HOB: Head of Bed   (I): Independent-patient performs task in a timely manner   Max (A): Maximal Assist-patient performs 25-49% of task   Min (A): Minimal Assist- patient performs 75% or more of task   Mod (A): Moderate Assist- patient performs 50-74% of task   NA: Not applicable   NT: Not tested   OOB: Out of Bed   PTA: Prior to admit   QC: Quad Cane   RW: Rolling Walker   (S): Supervision- patient requires cues, coaxing, prompting   SBA: Stand By Assist   SC: Straight Cane   SW: Standard Walker   TBA: To be assessed   Total (A): Total Assist- patient performs less than 25% of task   WC: Wheelchair   WFL: Within Functional Limits   WNL: Within Normal Limits

## 2018-04-12 NOTE — PROGRESS NOTES
"Occupational Therapy  AM Treatment  Kristina Aguirre   MRN: [id number]   Room/Bed: E261/E261 B       04/12/18 1115   OT Time Calculation   OT Start Time 1115   OT Stop Time 1204   OT Total Time (min) 49 min   General   OT Date of Treatment 04/12/18   Family/Caregiver Present No   Patient Found (position) Supine in bed   Pt found with (holter monitor)   Precautions   General Precautions aspiration;fall;diabetic;vision impaired   Orthopedic No   Required Braces or Orthoses No   Cardiovascular/Pulmonary Other (see comments)  (holter monitor)   Visual/Auditory Vision impaired   Subjective   Patient states "I'm not feeling good today. I can't move or get out this bed. My tooth and head are throbbing."    Pain/Comfort   Pain Rating 10/10   Location - Side Left   Location - Orientation lower   Location jaw   Pain Addressed Nurse notified;Pre-medicate for activity   Pain Comment SOFIE Gates administered pain meds at beginning of session   Bed Mobility   Bed Mobility yes   Supine to Sit Modified Independent   Supine to Sit Comments to EOB with bed rail   Transfers   Transfer yes   Sit to Stand   Sit <> Stand Assistance Contact Guard Assistance   Sit <> Stand Assistive Device No Assistive Device   Trials/Comments CGA for steadying assist from EOB   Stand to Sit   Assistance Contact Guard Assistance   Assistive Device No Assistive Device   Trials/Comments CGA for steadying assist to EOB   Feeding   Feeding Level of Assistance Supervision   Feeding Where Assessed (EOB)   Feeding Comments (S) for VCs to open containers; pt able to scoop food, bring hand to mouth, chew/swallow    LE Dressing   LE Dressing Yes   Sock Level of Assistance Supervision   LE Dressing Where Assessed Edge of bed   LE Dressing Comments (S) for safety while seated EOB with [place] (A) for WBing onto RUE; pt uses one handed technique with LUE to don B socks while crossing BLE   Additional Activities   Additional Activities Other (Comment)   Additional Activities " Comments OT performed PROM for wrist/digits extension with RUE; AAROM for bicep flexion/extension 2x10 reps each. Pt performed grasp/release of various sized blocks with RUE while seated EOB with VCs for technique for grasp and timing for release to increase functional use of RUE for ADL prep.    Activity Tolerance   Activity Tolerance Patient tolerated treatment well   After Treatment   Patient Position After Treatment Seated at edge of bed   Patient after treatment left call button in reach;with bed alarm on   Assessment   Prognosis Fair   Problem List Decreased Self Care skills;Decreased upper extremity range of motion;Decreased upper extremity strength;Decreased safe judgment during ADL;Decreased endurance;Decreased cognition;Decreased sensation;Visual deficit;Decreased fine motor control;Decreased functional mobility;Decreased gross motor control;Decreased IADLs;Decreased Function of right upper extremity;Decreased trunk control for functional activities   Assessment Pt initially refused to get OOB at beginning of session 2* to pain in lower jaw and headache. Pt tolerated PROM/AAROM while supine in bed well with no report of discomfort in RUE. Pt reported pain medication began to relieve her pain and she was then able to tolerated sitting EOB to perform fine motor ther ax with RUE. Pt improving with RUE grasp, but compensates by supinating R forearm 2* to decreased wrist/digits extension upon release of objects. Pt would continue to benefit from skilled OT services.    Level of Motivation/Participation good   Barriers to Discharge Decreased caregiver support   Discharge Recommendations   Equipment Needed After Discharge 3-in-1 commode;bath bench;wheelchair;walker, rolling   Discharge Facility/Level Of Care Needs home with home health   Plan   Plan Continue with current plan   Therapy Frequency 2 times/day   Occupational Therapy Follow-up   OT Follow-up? Yes   Treatment/Billable Minutes   Self Care/Home  Management 12   Neuromuscular Re-ed 37   Total Time 49       Migdalia Rodarte, OT  4/12/2018      Pt left seated EOB with lunch tray (location) with call light and all necessities in reach, nursing notified, bed alarm on.     LEGEND:   CGA: Contact Guard Assist   EOB: Edgeof Bed   HHA: Hand Held Assist   HOB: Head of Bed   (I): Independent-patient performs task in a timely manner   Max (A): Maximal Assist-patient performs 25-49% of task   Min (A): Minimal Assist- patient performs 75% or more of task   Mod (A): Moderate Assist- patient performs 50-74% of task   NA: Not applicable   NT: Not tested   OOB: Out of Bed   PTA: Prior to admit   QC: Quad Cane   RW: Rolling Walker   (S): Supervision- patient requires cues, coaxing, prompting   SBA: Stand By Assist   SC: Straight Cane   SW: Standard Walker   TBA: To be assessed   Total (A): Total Assist- patient performs less than 25% of task   WC: Wheelchair   WFL: Within Functional Limits   WNL: Within Normal Limits

## 2018-04-12 NOTE — PROGRESS NOTES
Physical Therapy   Treatment    Kristina Aguirre   MRN: [id number]   PTA visit #: 1   04/12/18 1415   PT Time Calculation   PT Start Time 1415   PT Stop Time 1505   PT Total Time (min) 50 min   Treatment   Treatment Type Treatment   PT/PTA PTA   PTA Visit Number 1   General   PT Received On 04/12/18   Family/Caregiver Present No   Patient Found (position) Seated in wheelchair   Pt found with (holter monitor)   Precautions   General Precautions fall   Orthopedic No   Required Braces or Orthoses No   Visual/Auditory Vision impaired   Subjective   Patient states Pt ref tx in am, but agreeable to work in pm.     Pain/Comfort   Pain Rating 1 8/10   Location 1 (tooth, L ear, L hip, nausea, )   Pain Addressed 1 Nurse notified   Bed Mobility   Bed Mobility yes   Supine to Sit Stand by Assistance   Sit to Supine Supervision   Transfers   Transfer yes   Sit to Stand   Sit <> Stand Assistance Stand By Assistance   Sit <> Stand Assistive Device No Assistive Device;Rolling Walker   Stand to Sit   Assistance Stand By Assistance   Assistive Device No Assistive Device;Rolling Walker   Chair to Bed   Technique Stand Pivot   Assistance Contact Guard Assistance   Assistive Device No Assistive Device   Chair to Mat   Chair<> Mat Technique Stand Pivot   Chair<>Mat Assistance Contact Guard Assistance   Chair <> Mat Assistive Device No Assistive Device   Mat to Chair   Technique Stand Pivot   Assistance Contact Guard Assistance   Assistive Device No Assistive Device   Wheelchair Activities   Propulsion Yes   Propulsion Type 1 Manual   Level 1 Level tile   Method 1 Right lower extremity;Left lower extremity   Level of Assistance 1 Stand by assistsance   Description/ Details 1 200'   Gait   Gait Yes   Weight Bearing Status FWB   Gait 1   Surface 1 Level tile   Gait Assistive Device Rolling walker   Assistance 1 Contact Guard Assistance;Minimum assistance   Gait Distance 137' x 2 with A for R hand on RW   Gait Pattern swing-through gait   Gait  Deviation(s) decreased perez;decreased velocity of limb motion;decreased step length;decreased stride length;decreased toe-to-floor clearance;decreased weight-shifting ability;forward lean   Impairments Contributing to Gait Deviations impaired balance;impaired coordination;impaired motor control;pain;impaired postural control;decreased strength   Stairs   Stairs Yes   Stairs/Curb Step   Rails 1 Left   Assistance 1 Contact guard   Comment/# Steps 1 up and down 8 stairs x 2 trials with lead LE R for increased strength.   Supine   Supine-Exercises Lower extremity;Specific exercises   Supine-Exercise Type Ankle pumps;Glut sets;Short arc quads;ABD/ADD;Heel slides   Supine-Exercise Comments B LE x 20 reps   Other Activities   Comments Pt req min A for clothing management after toileting   Activity Tolerance   Activity Tolerance Patient tolerated treatment well   After Treatment   Patient Position After Treatment Seated at edge of bed  (instructions to not get up w/o asisstance)   Patient after treatment left call button in reach   Assessment   Prognosis Good   Problem List Decreased strength;Decreased endurance;Impaired balance;Decreased mobility;Impaired judgement;Decreased safety awareness;Pain   Session Assessment progressing toward goals   Assessment Pt patti tx well but with multiple complaints of pain.  Cont POC   Level of Motivation/Participation good   Barriers to Discharge Inaccessible home environment;Decreased caregiver support   Discharge Recommendations   Equipment Needed After Discharge 3-in-1 commode;bath bench;walker, rolling;wheelchair   Discharge Facility/Level Of Care Needs home health PT   Plan   Planned Therapy Intervention Continue with current plan   Therapy Frequency 2 times/day;Monday-Friday;Saturday or Sunday   Physical Therapy Follow-up   PT Follow-up? Yes   Treatment/Billable Minutes   Gait training 20   Therapeutic Activity 15   Therapeutic Exercise 15   Total Time 50       Charlotte Haro  PTA  4/12/2018

## 2018-04-12 NOTE — NURSING
Informed by KELVIN Oropeza, that patient was not in her room and he could not find her.  Upon searching, she was found by OT and nurse downstairs at the table by Stoy Goltry smoking.  When informed of therapy starting, she lit up a cigarette and stated that she would come when she finished.  After she finished her cigarette, nurse escorted her to the OT gym for therapy.

## 2018-04-12 NOTE — PLAN OF CARE
Problem: Patient Care Overview  Goal: Plan of Care Review  Outcome: Ongoing (interventions implemented as appropriate)  Ms. Aguirre has had a toothache and headache and missed some therapy today due to refusal.  She did receive oragel and stated that it helped some.  She was smoking downstairs during this shift.  She complained of nausea and received relief with Zofran.

## 2018-04-12 NOTE — SUBJECTIVE & OBJECTIVE
Interval History 4/12/2018:  Patient is seen for follow-up rehab evaluation and recommendations: Complains of toothache. Hydrocodone/apap helps. Oragel ordered this morning.   I have reviewed the HPI and PMFSH and there are no changes from admission.       Scheduled Medications:    amiodarone  400 mg Oral BID    amLODIPine  10 mg Oral Daily    apixaban  5 mg Oral BID    ascorbic acid (vitamin C)  500 mg Oral BID    atorvastatin  40 mg Oral Daily    benzocaine   Mouth/Throat QID    carvedilol  3.125 mg Oral Daily    chlorthalidone  25 mg Oral QAM    clopidogrel  75 mg Oral Daily    famotidine  20 mg Oral BID    fluticasone-vilanterol  1 puff Inhalation Daily    furosemide  20 mg Oral BID    hydrALAZINE  25 mg Oral Q8H    lidocaine  1 patch Transdermal Q24H    lidocaine HCl 2%  15 mL Mucous Membrane Q6H    lisinopril  40 mg Oral Daily    polyethylene glycol  17 g Oral Daily       PRN Medications: acetaminophen, albuterol-ipratropium 2.5mg-0.5mg/3mL, bisacodyl, dextrose 50%, dextrose 50%, dextrose 50%, glucagon (human recombinant), glucose, glucose, hydrALAZINE, hydrocodone-acetaminophen 5-325mg, hydrOXYzine pamoate, insulin aspart U-100, magnesium hydroxide 400 mg/5 ml, ondansetron    Review of Systems   Constitutional: Positive for activity change and fatigue. Negative for chills, diaphoresis and fever.   HENT: Negative for congestion, ear discharge and ear pain.         Tooth ache   Eyes: Negative for photophobia, pain and visual disturbance.   Respiratory: Negative for chest tightness and shortness of breath.    Cardiovascular: Negative for chest pain and palpitations.   Gastrointestinal: Positive for nausea. Negative for abdominal distention, abdominal pain, constipation and diarrhea.   Endocrine: Negative for cold intolerance and heat intolerance.   Musculoskeletal: Positive for gait problem. Negative for arthralgias. Myalgias: over left ribs.   Skin: Negative for color change and wound.    Neurological: Negative for dizziness, seizures, weakness and headaches.   Hematological: Negative for adenopathy.   Psychiatric/Behavioral: Negative for agitation, confusion and hallucinations.     Objective:     Vital Signs (Most Recent):  Temp: 98.2 °F (36.8 °C) (18 0639)  Pulse: (!) 54 (18 1215)  Resp: 16 (18 0639)  BP: (!) 135/57 (18 1212)  SpO2: (!) 93 % (18 0639)    Vital Signs (24h Range):  Temp:  [98.2 °F (36.8 °C)-98.4 °F (36.9 °C)] 98.2 °F (36.8 °C)  Pulse:  [53-58] 54  Resp:  [16-18] 16  SpO2:  [93 %-95 %] 93 %  BP: (135-180)/(57-84) 135/57     Physical Exam   Constitutional: She is oriented to person, place, and time. She appears well-developed and well-nourished. No distress.   HENT:   Head: Normocephalic and atraumatic.   Right Ear: External ear normal.   Left Ear: External ear normal.   Nose: Nose normal.   Eyes: Right eye exhibits no discharge. Left eye exhibits no discharge. No scleral icterus.   Neck: Normal range of motion.   Cardiovascular: Normal rate, regular rhythm and intact distal pulses.    Pulmonary/Chest: Effort normal. No respiratory distress. She has no wheezes.   Abdominal: Soft. She exhibits no distension. There is no tenderness.   Musculoskeletal: Normal range of motion. She exhibits no edema or tenderness.   Neurological: She is alert and oriented to person, place, and time.   -  Mental Status:  AAOx3.  Follows commands.  Answers correct age and .  Recent and remote memory intact.  -  Speech and language:  No aphasia, mild dysarthria.    -  Vision:  R hemianopsia.  No ptosis.    -  Facial movement (CN VII): Mild facial droop.   -  Motor:  RUE: 2/5 EF, FF, WE.  LUE: 5/5.  RLE: 4-/5.  LLE: 5/5.  -  Tone:  Decreased RUE.  -  Sensory:  Intact to light touch and pin prick.   Skin: Skin is warm and dry. No rash noted.   Psychiatric: She has a normal mood and affect. Her behavior is normal. Thought content normal. Cognition and memory are impaired.    Vitals reviewed.    NEUROLOGICAL EXAMINATION:     MENTAL STATUS   Oriented to person, place, and time.

## 2018-04-12 NOTE — PROGRESS NOTES
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 Hydrocodone/apap helps. Oragel ordered this morning.   I have reviewed the HPI and PMFSH and there are no changes from admission.       Scheduled Medications:    amiodarone  400 mg Oral BID    amLODIPine  10 mg Oral Daily    apixaban  5 mg Oral BID    ascorbic acid (vitamin C)  500 mg Oral BID    atorvastatin  40 mg Oral Daily    benzocaine   Mouth/Throat QID    carvedilol  3.125 mg Oral Daily    chlorthalidone  25 mg Oral QAM    clopidogrel  75 mg Oral Daily    famotidine  20 mg Oral BID    fluticasone-vilanterol  1 puff Inhalation Daily    furosemide  20 mg Oral BID    hydrALAZINE  25 mg Oral Q8H    lidocaine  1 patch Transdermal Q24H    lidocaine HCl 2%  15 mL Mucous Membrane Q6H    lisinopril  40 mg Oral Daily    polyethylene glycol  17 g Oral Daily       PRN Medications: acetaminophen, albuterol-ipratropium 2.5mg-0.5mg/3mL, bisacodyl, dextrose 50%, dextrose 50%, dextrose 50%, glucagon (human recombinant), glucose, glucose, hydrALAZINE, hydrocodone-acetaminophen 5-325mg, hydrOXYzine pamoate, insulin aspart U-100, magnesium hydroxide 400 mg/5 ml, ondansetron    Review of Systems   Constitutional: Positive for activity change and fatigue. Negative for chills, diaphoresis and fever.   HENT: Negative for congestion, ear discharge and ear pain.         Tooth ache   Eyes: Negative for photophobia, pain and visual disturbance.   Respiratory: Negative for chest tightness and shortness of breath.     Cardiovascular: Negative for chest pain and palpitations.   Gastrointestinal: Positive for nausea. Negative for abdominal distention, abdominal pain, constipation and diarrhea.   Endocrine: Negative for cold intolerance and heat intolerance.   Musculoskeletal: Positive for gait problem. Negative for arthralgias. Myalgias: over left ribs.   Skin: Negative for color change and wound.   Neurological: Negative for dizziness, seizures, weakness and headaches.   Hematological: Negative for adenopathy.   Psychiatric/Behavioral: Negative for agitation, confusion and hallucinations.     Objective:     Vital Signs (Most Recent):  Temp: 98.2 °F (36.8 °C) (18 0639)  Pulse: (!) 54 (18 1215)  Resp: 16 (18 0639)  BP: (!) 135/57 (18 1212)  SpO2: (!) 93 % (18 0639)    Vital Signs (24h Range):  Temp:  [98.2 °F (36.8 °C)-98.4 °F (36.9 °C)] 98.2 °F (36.8 °C)  Pulse:  [53-58] 54  Resp:  [16-18] 16  SpO2:  [93 %-95 %] 93 %  BP: (135-180)/(57-84) 135/57     Physical Exam   Constitutional: She is oriented to person, place, and time. She appears well-developed and well-nourished. No distress.   HENT:   Head: Normocephalic and atraumatic.   Right Ear: External ear normal.   Left Ear: External ear normal.   Nose: Nose normal.   Eyes: Right eye exhibits no discharge. Left eye exhibits no discharge. No scleral icterus.   Neck: Normal range of motion.   Cardiovascular: Normal rate, regular rhythm and intact distal pulses.    Pulmonary/Chest: Effort normal. No respiratory distress. She has no wheezes.   Abdominal: Soft. She exhibits no distension. There is no tenderness.   Musculoskeletal: Normal range of motion. She exhibits no edema or tenderness.   Neurological: She is alert and oriented to person, place, and time.   -  Mental Status:  AAOx3.  Follows commands.  Answers correct age and .  Recent and remote memory intact.  -  Speech and language:  No aphasia, mild dysarthria.    -  Vision:  R hemianopsia.  No  ptosis.    -  Facial movement (CN VII): Mild facial droop.   -  Motor:  RUE: 2/5 EF, FF, WE.  LUE: 5/5.  RLE: 4-/5.  LLE: 5/5.  -  Tone:  Decreased RUE.  -  Sensory:  Intact to light touch and pin prick.   Skin: Skin is warm and dry. No rash noted.   Psychiatric: She has a normal mood and affect. Her behavior is normal. Thought content normal. Cognition and memory are impaired.   Vitals reviewed.    NEUROLOGICAL EXAMINATION:     MENTAL STATUS   Oriented to person, place, and time.       Assessment/Plan:      Kristina Aguirre is a 67 y.o. female admitted to inpatient rehabilitation on 4/2/2018 for 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 with impaired mobility and ADLs. Patient remains appropriate for PT, OT, and as required Speech therapy. Patient continues to require 24 hour nursing care as well as daily Physician assessment.    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 Pan sensitive    S/p 7d course of augmentin         Atrial fibrillation    continue Apixaban    4/4 - reports of SOB. EKG shows no sign of A fib with RVR or Mi. CXR showing resolution of pulm edema. Pending labs.        S/p TAVR (transcatheter aortic valve replacement), bioprosthetic    Continue Apixaban and Plavix        Diabetes mellitus, type 2    Continue to monitor blood glucoses    4/4 - started on Diabetic diet    Lab Results   Component Value Date    HGBA1C 6.5 (H) 03/21/2018       - Diabetic Diet, Diabetic Education   - Monitor POCT TIDAC and qhs   - Continue with  ISS    POCT Glucose   Date Value Ref Range Status   04/08/2018 256 (H) 70 - 110 mg/dL Final   04/08/2018 143 (H) 70 - 110 mg/dL Final   04/07/2018 237  (H) 70 - 110 mg/dL Final   04/07/2018 146 (H) 70 - 110 mg/dL Final   04/07/2018 208 (H) 70 - 110 mg/dL Final   04/07/2018 159 (H) 70 - 110 mg/dL Final   04/06/2018 296 (H) 70 - 110 mg/dL Final   04/06/2018 134 (H) 70 - 110 mg/dL Final   04/06/2018 183 (H) 70 - 110 mg/dL Final   04/06/2018 170 (H) 70 - 110 mg/dL Final   04/05/2018 191 (H) 70 - 110 mg/dL Final                     Essential hypertension    Elevated on admission. Continue current meds    4/3/18 - started Lisinopril 10mg daily  4/4/18 - slightly improved. Increased to 20mg daily  4/6/18 - still elevated Bp. Increased to 30mg daily  4/7 cont to monitor trend   4/9 inc lisinopril to 40  4/10 Cont to monitor. BP elevated 175/77 prior to receiving lisinopril.  4/12 hydralazine 25 q8  Vitals:    04/09/18 1830 04/10/18 0700 04/10/18 0801 04/10/18 0804   BP: (!) 120/58 (!) 175/77 (!) 175/77    BP Location: Left arm Left arm     Patient Position: Lying Sitting     Pulse: (!) 55 (!) 54 (!) 54 (!) 54   Resp: 20 20  20   Temp: 97.8 °F (36.6 °C) 98.1 °F (36.7 °C)     TempSrc: Oral Oral     SpO2: 97%      Weight:       Height:                       DISCHARGE PLANNING:  Tentative Discharge Date: 4/19/2018    Future Appointments  Date Time Provider Department Center   5/10/2018 10:00 AM EKG, APPT [facility] EKG Simon Dawson   5/10/2018 10:40 AM Sarbjit Banegas MD [facility] ARRHYTH Simon Munson MD  Department of Physical Medicine & Rehab   [facility]

## 2018-04-12 NOTE — ASSESSMENT & PLAN NOTE
Elevated on admission. Continue current meds    4/3/18 - started Lisinopril 10mg daily  4/4/18 - slightly improved. Increased to 20mg daily  4/6/18 - still elevated Bp. Increased to 30mg daily  4/7 cont to monitor trend   4/9 inc lisinopril to 40  4/10 Cont to monitor. BP elevated 175/77 prior to receiving lisinopril.  4/12 hydralazine 25 q8  Vitals:    04/12/18 0639 04/12/18 1212 04/12/18 1215 04/12/18 1221   BP: (!) 180/84 (!) 135/57     BP Location: Left arm      Patient Position: Sitting      Pulse: (!) 53  (!) 54 (!) 54   Resp: 16   16   Temp: 98.2 °F (36.8 °C)      TempSrc: Oral      SpO2: (!) 93%      Weight:       Height:

## 2018-04-13 LAB
BACTERIA #/AREA URNS AUTO: ABNORMAL /HPF
BILIRUB UR QL STRIP: NEGATIVE
CLARITY UR REFRACT.AUTO: ABNORMAL
COLOR UR AUTO: YELLOW
GLUCOSE UR QL STRIP: NEGATIVE
HGB UR QL STRIP: ABNORMAL
HYALINE CASTS UR QL AUTO: 0 /LPF
KETONES UR QL STRIP: NEGATIVE
LEUKOCYTE ESTERASE UR QL STRIP: ABNORMAL
MICROSCOPIC COMMENT: ABNORMAL
NITRITE UR QL STRIP: NEGATIVE
PH UR STRIP: 7 [PH] (ref 5–8)
POCT GLUCOSE: 113 MG/DL (ref 70–110)
POCT GLUCOSE: 118 MG/DL (ref 70–110)
POCT GLUCOSE: 129 MG/DL (ref 70–110)
POCT GLUCOSE: 163 MG/DL (ref 70–110)
PROT UR QL STRIP: ABNORMAL
RBC #/AREA URNS AUTO: >100 /HPF (ref 0–4)
SP GR UR STRIP: 1.01 (ref 1–1.03)
SQUAMOUS #/AREA URNS AUTO: 2 /HPF
URN SPEC COLLECT METH UR: ABNORMAL
UROBILINOGEN UR STRIP-ACNC: NEGATIVE EU/DL
WBC #/AREA URNS AUTO: >100 /HPF (ref 0–5)

## 2018-04-13 PROCEDURE — 92507 TX SP LANG VOICE COMM INDIV: CPT

## 2018-04-13 PROCEDURE — 97530 THERAPEUTIC ACTIVITIES: CPT

## 2018-04-13 PROCEDURE — 25000003 PHARM REV CODE 250: Performed by: PHYSICAL MEDICINE & REHABILITATION

## 2018-04-13 PROCEDURE — 25000003 PHARM REV CODE 250: Performed by: NURSE PRACTITIONER

## 2018-04-13 PROCEDURE — 94761 N-INVAS EAR/PLS OXIMETRY MLT: CPT

## 2018-04-13 PROCEDURE — 81001 URINALYSIS AUTO W/SCOPE: CPT

## 2018-04-13 PROCEDURE — 87086 URINE CULTURE/COLONY COUNT: CPT

## 2018-04-13 PROCEDURE — 97535 SELF CARE MNGMENT TRAINING: CPT

## 2018-04-13 PROCEDURE — 99232 SBSQ HOSP IP/OBS MODERATE 35: CPT | Mod: ,,, | Performed by: PHYSICAL MEDICINE & REHABILITATION

## 2018-04-13 PROCEDURE — 97116 GAIT TRAINING THERAPY: CPT

## 2018-04-13 PROCEDURE — 12800000 HC REHAB SEMI-PRIVATE ROOM

## 2018-04-13 PROCEDURE — 27000221 HC OXYGEN, UP TO 24 HOURS

## 2018-04-13 PROCEDURE — 92526 ORAL FUNCTION THERAPY: CPT

## 2018-04-13 PROCEDURE — 97110 THERAPEUTIC EXERCISES: CPT

## 2018-04-13 RX ORDER — AMOXICILLIN AND CLAVULANATE POTASSIUM 875; 125 MG/1; MG/1
1 TABLET, FILM COATED ORAL EVERY 12 HOURS
Status: DISCONTINUED | OUTPATIENT
Start: 2018-04-13 | End: 2018-04-19 | Stop reason: HOSPADM

## 2018-04-13 RX ADMIN — BENZOCAINE: 100 GEL TOPICAL at 08:04

## 2018-04-13 RX ADMIN — BENZOCAINE: 100 GEL TOPICAL at 05:04

## 2018-04-13 RX ADMIN — FLUTICASONE FUROATE AND VILANTEROL TRIFENATATE 1 PUFF: 100; 25 POWDER RESPIRATORY (INHALATION) at 09:04

## 2018-04-13 RX ADMIN — POLYETHYLENE GLYCOL 3350 17 G: 17 POWDER, FOR SOLUTION ORAL at 09:04

## 2018-04-13 RX ADMIN — BENZOCAINE: 100 GEL TOPICAL at 09:04

## 2018-04-13 RX ADMIN — APIXABAN 5 MG: 5 TABLET, FILM COATED ORAL at 07:04

## 2018-04-13 RX ADMIN — HYDROCODONE BITARTRATE AND ACETAMINOPHEN 1 TABLET: 7.5; 325 TABLET ORAL at 02:04

## 2018-04-13 RX ADMIN — AMIODARONE HYDROCHLORIDE 400 MG: 200 TABLET ORAL at 12:04

## 2018-04-13 RX ADMIN — FAMOTIDINE 20 MG: 20 TABLET, FILM COATED ORAL at 07:04

## 2018-04-13 RX ADMIN — AMLODIPINE BESYLATE 10 MG: 10 TABLET ORAL at 09:04

## 2018-04-13 RX ADMIN — FUROSEMIDE 20 MG: 20 TABLET ORAL at 05:04

## 2018-04-13 RX ADMIN — HYDROCODONE BITARTRATE AND ACETAMINOPHEN 1 TABLET: 7.5; 325 TABLET ORAL at 11:04

## 2018-04-13 RX ADMIN — HYDROCODONE BITARTRATE AND ACETAMINOPHEN 1 TABLET: 7.5; 325 TABLET ORAL at 03:04

## 2018-04-13 RX ADMIN — LISINOPRIL 40 MG: 20 TABLET ORAL at 09:04

## 2018-04-13 RX ADMIN — Medication 500 MG: at 09:04

## 2018-04-13 RX ADMIN — HYDROCODONE BITARTRATE AND ACETAMINOPHEN 1 TABLET: 7.5; 325 TABLET ORAL at 07:04

## 2018-04-13 RX ADMIN — HYDROCODONE BITARTRATE AND ACETAMINOPHEN 1 TABLET: 7.5; 325 TABLET ORAL at 06:04

## 2018-04-13 RX ADMIN — HYDRALAZINE HYDROCHLORIDE 25 MG: 25 TABLET ORAL at 03:04

## 2018-04-13 RX ADMIN — FAMOTIDINE 20 MG: 20 TABLET, FILM COATED ORAL at 09:04

## 2018-04-13 RX ADMIN — HYDRALAZINE HYDROCHLORIDE 25 MG: 25 TABLET ORAL at 09:04

## 2018-04-13 RX ADMIN — AMIODARONE HYDROCHLORIDE 400 MG: 200 TABLET ORAL at 07:04

## 2018-04-13 RX ADMIN — ATORVASTATIN CALCIUM 40 MG: 20 TABLET, FILM COATED ORAL at 09:04

## 2018-04-13 RX ADMIN — AMOXICILLIN AND CLAVULANATE POTASSIUM 1 TABLET: 875; 125 TABLET, FILM COATED ORAL at 07:04

## 2018-04-13 RX ADMIN — APIXABAN 5 MG: 5 TABLET, FILM COATED ORAL at 08:04

## 2018-04-13 RX ADMIN — FUROSEMIDE 20 MG: 20 TABLET ORAL at 09:04

## 2018-04-13 RX ADMIN — INSULIN ASPART 1 UNITS: 100 INJECTION, SOLUTION INTRAVENOUS; SUBCUTANEOUS at 09:04

## 2018-04-13 RX ADMIN — CARVEDILOL 3.12 MG: 3.12 TABLET, FILM COATED ORAL at 09:04

## 2018-04-13 RX ADMIN — Medication 500 MG: at 07:04

## 2018-04-13 RX ADMIN — CHLORTHALIDONE 25 MG: 25 TABLET ORAL at 09:04

## 2018-04-13 RX ADMIN — HYDRALAZINE HYDROCHLORIDE 25 MG: 25 TABLET ORAL at 05:04

## 2018-04-13 RX ADMIN — HYDROCODONE BITARTRATE AND ACETAMINOPHEN 1 TABLET: 7.5; 325 TABLET ORAL at 10:04

## 2018-04-13 RX ADMIN — CLOPIDOGREL BISULFATE 75 MG: 75 TABLET ORAL at 09:04

## 2018-04-13 RX ADMIN — BENZOCAINE: 100 GEL TOPICAL at 12:04

## 2018-04-13 RX ADMIN — AMOXICILLIN AND CLAVULANATE POTASSIUM 1 TABLET: 875; 125 TABLET, FILM COATED ORAL at 09:04

## 2018-04-13 NOTE — PROGRESS NOTES
"Occupational Therapy  AM Treatment/Reassessment  Kristina Aguirre   MRN: [id number]   Room/Bed: E261/E261 B     04/13/18 0830   OT Time Calculation   OT Start Time 0830   OT Stop Time 0929   OT Total Time (min) 59 min   General   OT Date of Treatment 04/13/18   Family/Caregiver Present No   Patient Found (position) Supine in bed   Pt found with (holter monitor)   Precautions   General Precautions aspiration;fall;vision impaired   Orthopedic No   Required Braces or Orthoses No   Cardiovascular/Pulmonary (holter monitor)   Visual/Auditory Vision impaired   Subjective   Patient states "I'm still hurting. My mouth feels so swollen. I can't concentrate."    Pain/Comfort   Pain Rating 10/10   Location - Side Left   Location - Orientation lower   Location jaw   Pain Addressed Pre-medicate for activity;Distraction   Bed Mobility   Bed Mobility yes   Supine to Sit Modified Independent   Supine to Sit Comments to EOB with bed rail   Transfers   Transfer yes   Sit to Stand   Sit <> Stand Assistance Contact Guard Assistance   Sit <> Stand Assistive Device No Assistive Device   Trials/Comments CGA for steadying assist from EOB   Stand to Sit   Assistance Contact Guard Assistance   Assistive Device No Assistive Device   Trials/Comments CGA for steadying assist to EOB   Bed to Chair   Bed <> Chair Technique Stand Pivot   Bed <> Chair Transfer Assistance Contact Guard Assistance   Bed <> Chair Assistive Device No Assistive Device   Trials/Comments CGA for steadying assist from EOB > w/c   Shower Transfer   Shower Transfer Technique Stand Pivot   Shower Transfer Assistance Contact Guard Assistance   Shower Transfer Assistive Device Shower bench   Trials/Comments CGA for steadying assist from EOB > TTB in shower   Toilet Transfer   Toilet Transfer Technique Stand Pivot   Toilet Transfer Assistance Contact Guard Assistance   Toilet Transfer Assistive Device grab bar   Trials/Comments CGA for steadying assist from TTB <> raised toilet "   Grooming   Additional Grooming yes   Grooming Level of Assistance Contact guard assistance   Face Washing Level of Assistance Contact guard assistance   Oral Hygeine Level of Assistance Contact guard assistance   Hair Grooming Level of Assistance Contact guard assistance   Grooming Where Assessed Standing sinkside   Grooming Comments CGA for steadying assist with RUE placed onto sink for WBing as pt completed 3/3 G/H tasks   Bathing   Bathing Adaptive Equipment Tub bench   Bathing Level of Assistance Minimum assistance   Bathing Where Assessed tub bench   Bathing Comments Min (A) for B axilla; holter monitor removed for bathing   UE Dressing   UE Dressing Level of Assistance Supervision   UE Dressing Where Assessed Wheelchair   UE Dressing Comments (S) to don pullover shirt   LE Dressing   LE Dressing Yes   LE Dressing  Level of Assistance Contact guard   LE Dressing Level of Assistance Contact guard   Sock Level of Assistance Modified independent   Adult Briefs Level of Assistance Contact guard   LE Dressing Where Assessed Edge of bed;Wheelchair   LE Dressing Comments CGA for steadying assist to don pants in standing; mod (I) for donning B socks in w/c    Toileting   Toileting Level of Assistance Contact guard   Toileting Where Assessed Toilet   Toileting Comments CGA for steadying assist in stance; pt performed 3/3 steps with continent urination episode   Activity Tolerance   Activity Tolerance Patient limited by pain   After Treatment   Patient Position After Treatment Supine in bed   Patient after treatment left nursing notified (to reapply holter monitor after bathing)    Assessment   Prognosis Fair   Assessment Pt has met 4/7 and 3/8 LTG upon reassessment today. Pt continues to require CGA for steadying assist secondary to slight LOB at times during t/fs. Pt with increased functional use and WBing tolerance with RUE during ADLs. Pt limited by pain, requiring rest breaks during tx session. Pt would continue  to benefit from skilled OT services to increase (I) with ADLs/IADLs.   Level of Motivation/Participation good   Barriers to Discharge Decreased caregiver support   Short Term Goals   Additional Documentation yes   Pt Will Transfer Bed/Chair With stand by assist   Transfer Bed/Chair - Met/Not Met Not Met   Pt Will Perform Eating With stand by assistance   Eating - Met/Not Met Not Met   Pt Will Perform Grooming With supervision   Grooming - Met/Not Met Not Met   Pt Will Perform Bathing With minimal assistance   Bathing - Met/Not Met Met   Pt Will Perform UE Dressing With maximum assistance   UE Dressing - Met/Not Met Met   Pt Will Perform LE Dressing With minimal assistance   LE Dressing - Met/Not Met Met   Pt Will Perform Toileting With contact guard assistance   Toileting-Met/Not Met Met   Long Term Goals   Additional Documentation yes   Pt Will Perform Supine To Sit Independently   Pt Will Perform Sit to Supine Independently   Supine to Sit - Met/Not Met Not Met   Pt Will Transfer Sit to Stand Supervision   Transfer Sit to stand - Met/Not Met Not Met   Pt Will Transfer Bed/Chair Stand Pivot   Transfer Bed/Chair - Met/Not Met Not Met   Pt Will Transfer To Bedside Commode With supervision   Transfer Bedside Commode -Met/Not Met Not Met   Pt Will Transfer To Toilet Supervision   Pt Will Transfer To Shower With supervision   Transfer to Shower-Met/Not Met Not Met   Pt Will Perform Eating Independently   Eating - Met/Not Met Not Met   Pt Will Perform Grooming Independently   Grooming - Met/Not Met Not Met   Pt Will Perform Bathing With minimal assistance   Bathing - Met/Not Met Met   Pt Will Perform UE Dressing With stand by assistance   UE Dressing - Met/Not Met Met   Pt Will Perform LE Dressing With contact guard assistance   LE Dressing - Met/Not Met Met   Pt Will Perform Toileting With stand by assistance   Toileting-Met/Not Met Not Met   Discharge Recommendations   Equipment Needed After Discharge 3-in-1  commode;bath bench;walker, rolling;wheelchair   Discharge Facility/Level Of Care Needs home health OT   Plan   Plan Continue with current plan   Therapy Frequency 2 times/day   Occupational Therapy Follow-up   OT Follow-up? Yes   Treatment/Billable Minutes   Self Care/Home Management 59   Total Time 59       Migdalia Rodarte OT  4/13/2018     Pt left supine in bed (location) with call light and all necessities in reach, nursing notified.     LEGEND:   CGA: Contact Guard Assist   EOB: Edgeof Bed   HHA: Hand Held Assist   HOB: Head of Bed   (I): Independent-patient performs task in a timely manner   Max (A): Maximal Assist-patient performs 25-49% of task   Min (A): Minimal Assist- patient performs 75% or more of task   Mod (A): Moderate Assist- patient performs 50-74% of task   NA: Not applicable   NT: Not tested   OOB: Out of Bed   PTA: Prior to admit   QC: Quad Cane   RW: Rolling Walker   (S): Supervision- patient requires cues, coaxing, prompting   SBA: Stand By Assist   SC: Straight Cane   SW: Standard Walker   TBA: To be assessed   Total (A): Total Assist- patient performs less than 25% of task   WC: Wheelchair   WFL: Within Functional Limits   WNL: Within Normal Limits

## 2018-04-13 NOTE — PROGRESS NOTES
Speech Therapy   Treatment/Reassessment    Kristina Aguirre   MRN: [id number]     Short Term Goals   Goal 1 Pt will be oriented x4 given supervision with 90% acc. MET   Goal 2 Pt will complete language comprehension tasks given supervision with 90% acc. MET   Goal 3 Pt will complete expressive language tasks given supervision with 90% acc. MET   Goal 4 Pt will complete problem solving/reasoning/sequencing tasks given supervision with 90% acc. CONTINUE   Goal 5 Pt will complete functional/recent recall task given min verbal cues with 80% acc. CONTINUE    Goal 6 Further assessment reading comprehension and visual abilities to determine baseline. COMPLETE   Goal 7 Pt will complete mental manipulation of functional information regarding time/money management with 90% accuracy with min cues. CONTINUE   Long Term Goals   Goal 1 Pt will complete visual scanning task given min verbal cues with 80% acc. CONTINUE   Goal 2 Pt will complete language comprehension tasks with modified independence with % acc. CONTINUE   Goal 3 Pt will complete expressive language tasks with modified independence with % acc. CONTINUE   Goal 4 Pt will complete problem solving/reasoning/sequencing tasks with modified independence with % acc. CONTINUE    Goal 5 Pt will complete functional/recent recall task given supervision with 90% acc.CONTINUE      Swallowing/Dysphagia Goals:      Short Term Goals   Goal 1 Patient will complete OME's x10 each given moderate verbal and visual cues to improve R orofacial weakness. CONTINUE   Goal 2 Patient will complete dysphagia exercises x10 each given moderate verbal and visual cues to improve laryngeal elevation and tongue base retraction. CONTINUE   Goal 3 Patient will tolerate current diet of regular solids with thin liquids with no overt s/s aspiration. MET   Long Term Goals   Goal 1 Patient will complete OME's x10 each given min verbal and visual cues to improve R orofacial weakness. CONTINUE  "  Goal 2 Patient will complete dysphagia exercises x10 each given min verbal and visual cues to improve laryngeal elevation and tongue base retraction. CONTINUE        04/13/18 1030   Speech Time Calculation   Speech Start Time 1030   Speech Stop Time 1115   Speech Total (min) 45 min   General Information   SLP Treatment Date 04/13/18   General Observations PT seen sitting at edge of bed for session 2' refusal to get out of bed to therapy gym.   General Precautions aspiration;fall;vision impaired   Visual/Auditory Vision impaired   Subjective   Patient states "I can't do anything because of my mouth pain."   Pain/Comfort   Pain Rating 10/10   Location - Side Left   Location mouth   Pain Addressed Distraction;Nurse notified;Pre-medicate for activity       SLP Cognitive Treatment   Treatment Detail (SLP Cognitive Treatment) Pt completed simple social interaction task of greeting with SLP given min verbal cues for appropriate language and turn taking skills for attention. Pt with poor participation and refusing to complete most tasks presented for therapy session. Pt completed time orientation task utilizing white board in room given min verbal and visual cues with 80% acc. Pt completed safety awareness and sequencing task of utilizing restroom and performing hand washing with 70% acc indly. Pt requires moderate-max verbal and visual cues for increased safety, increased attention skills, and slow rate of tasks for safety. Pt completed visual scanning task of tracing with ~40% acc given max continuous cues to complete task. Pt engaged in medication management with requesting pain medication with 40% acc indly. Pt requires max cues for attention skills and recall of medications taken/schedule. Problem solving task completed of peeling banana with moderate cues required for attempting task with left hand. Verbal reasoning task also completed regarding full participation in therapy program. Pt with many different reports " "of symptoms with self-limiting behavior towards therapy progress.        SLP Treatment: Swallowing   Treatment Detail  Pt reports "I can't swallow" however, pt consumed thin liquids via cup sip x8 and ate soft banana without difficulty. Pt with report of oral discomfort with chewing; therefore, recommended to stick to soft solids at this time. Pt also reporting poor appetite.   Assessment   SLP Diagnosis moderate cog-ling deficits; severe visual deficits   Prognosis Fair   Problem List decreased attention; mood changes; decreased safety; poor insight   Session Assessment progressing toward goals   Level of Motivation/Participation fair   Discharge Recommendations   Discharge Facility/Level Of Care Needs home health speech therapy  (supervision)   Plan   Plan Continue with current plan   Treatment/Billable Minutes   Speech Therapy Individual 35   Treatment Swallowing Dysfunction 10   Total Time 45     Lala Muñoz CCC-SLP  4/13/2018          "

## 2018-04-13 NOTE — SUBJECTIVE & OBJECTIVE
Interval History 4/13/2018:  Patient is seen for follow-up rehab evaluation and recommendations: Toothache improved with oragel and increasing pain medication hydrocodone/apap to 7.5/325. Feels like her mouth is a little swollen but declines ice.   I have reviewed the HPI and PMFSH and there are no changes from admission.       Scheduled Medications:    amiodarone  400 mg Oral BID    amLODIPine  10 mg Oral Daily    amoxicillin-clavulanate 875-125mg  1 tablet Oral Q12H    apixaban  5 mg Oral BID    ascorbic acid (vitamin C)  500 mg Oral BID    atorvastatin  40 mg Oral Daily    benzocaine   Mouth/Throat QID    carvedilol  3.125 mg Oral Daily    chlorthalidone  25 mg Oral QAM    clopidogrel  75 mg Oral Daily    famotidine  20 mg Oral BID    fluticasone-vilanterol  1 puff Inhalation Daily    furosemide  20 mg Oral BID    hydrALAZINE  25 mg Oral Q8H    lidocaine  1 patch Transdermal Q24H    lisinopril  40 mg Oral Daily    polyethylene glycol  17 g Oral Daily       PRN Medications: acetaminophen, albuterol-ipratropium 2.5mg-0.5mg/3mL, bisacodyl, dextrose 50%, dextrose 50%, dextrose 50%, glucagon (human recombinant), glucose, glucose, hydrALAZINE, hydrocodone-acetaminophen 7.5-325mg, hydrOXYzine pamoate, insulin aspart U-100, lidocaine HCl 2%, magnesium hydroxide 400 mg/5 ml, ondansetron    Review of Systems   Constitutional: Positive for activity change and fatigue. Negative for chills, diaphoresis and fever.   HENT: Negative for congestion, ear discharge and ear pain.         Tooth ache   Eyes: Negative for photophobia, pain and visual disturbance.   Respiratory: Negative for chest tightness and shortness of breath.    Cardiovascular: Negative for chest pain and palpitations.   Gastrointestinal: Positive for nausea. Negative for abdominal distention, abdominal pain, constipation and diarrhea.   Endocrine: Negative for cold intolerance and heat intolerance.   Musculoskeletal: Positive for gait problem.  Negative for arthralgias. Myalgias: over left ribs.   Skin: Negative for color change and wound.   Neurological: Negative for dizziness, seizures, weakness and headaches.   Hematological: Negative for adenopathy.   Psychiatric/Behavioral: Negative for agitation, confusion and hallucinations.     Objective:     Vital Signs (Most Recent):  Temp: 98.2 °F (36.8 °C) (18)  Pulse: (!) 58 (18)  Resp: 18 (18)  BP: (!) 176/82 (18 0529)  SpO2: (!) 93 % (18 0639)    Vital Signs (24h Range):  Temp:  [98.2 °F (36.8 °C)] 98.2 °F (36.8 °C)  Pulse:  [54-58] 58  Resp:  [16-18] 18  BP: (135-176)/(57-82) 176/82     Physical Exam   Constitutional: She is oriented to person, place, and time. She appears well-developed and well-nourished. No distress.   HENT:   Head: Normocephalic and atraumatic.   Right Ear: External ear normal.   Left Ear: External ear normal.   Nose: Nose normal.   Eyes: Right eye exhibits no discharge. Left eye exhibits no discharge. No scleral icterus.   Neck: Normal range of motion.   Cardiovascular: Normal rate, regular rhythm and intact distal pulses.    Pulmonary/Chest: Effort normal. No respiratory distress. She has no wheezes.   Abdominal: Soft. She exhibits no distension. There is no tenderness.   Musculoskeletal: Normal range of motion. She exhibits no edema or tenderness.   Neurological: She is alert and oriented to person, place, and time.   -  Mental Status:  AAOx3.  Follows commands.  Answers correct age and .  Recent and remote memory intact.  -  Speech and language:  No aphasia, mild dysarthria.    -  Vision:  R hemianopsia.  No ptosis.    -  Facial movement (CN VII): Mild facial droop.   -  Motor:  RUE: 2/5 EF, FF, WE.  LUE: 5/5.  RLE: 4-/5.  LLE: 5/5.  -  Tone:  Decreased RUE.  -  Sensory:  Intact to light touch and pin prick.   Skin: Skin is warm and dry. No rash noted.   Psychiatric: She has a normal mood and affect. Her behavior is normal. Thought  content normal. Cognition and memory are impaired.   Vitals reviewed.    NEUROLOGICAL EXAMINATION:     MENTAL STATUS   Oriented to person, place, and time.

## 2018-04-13 NOTE — PROGRESS NOTES
[facility]  Physical Medicine & Rehab  Progress Note    Patient Name: Kristina Aguirre  MRN: [id number]  Patient Class: IP- Rehab   Admission Date: 4/2/2018  Length of Stay: 11 days  Attending Physician: Peter Dunaway MD  Primary Care Provider: Robert Mattson MD    Subjective:     Principal Problem: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    Interval History 4/13/2018:  Patient is seen for follow-up rehab evaluation and recommendations: Toothache improved with oragel and increasing pain medication hydrocodone/apap to 7.5/325. Feels like her mouth is a little swollen but declines ice.   I have reviewed the HPI and PMFSH and there are no changes from admission.       Scheduled Medications:    amiodarone  400 mg Oral BID    amLODIPine  10 mg Oral Daily    amoxicillin-clavulanate 875-125mg  1 tablet Oral Q12H    apixaban  5 mg Oral BID    ascorbic acid (vitamin C)  500 mg Oral BID    atorvastatin  40 mg Oral Daily    benzocaine   Mouth/Throat QID    carvedilol  3.125 mg Oral Daily    chlorthalidone  25 mg Oral QAM    clopidogrel  75 mg Oral Daily    famotidine  20 mg Oral BID    fluticasone-vilanterol  1 puff Inhalation Daily    furosemide  20 mg Oral BID    hydrALAZINE  25 mg Oral Q8H    lidocaine  1 patch Transdermal Q24H    lisinopril  40 mg Oral Daily    polyethylene glycol  17 g Oral Daily       PRN Medications: acetaminophen, albuterol-ipratropium 2.5mg-0.5mg/3mL, bisacodyl, dextrose 50%, dextrose 50%, dextrose 50%, glucagon (human recombinant), glucose, glucose, hydrALAZINE, hydrocodone-acetaminophen 7.5-325mg, hydrOXYzine pamoate, insulin aspart U-100, lidocaine HCl 2%, magnesium hydroxide 400 mg/5 ml, ondansetron    Review of Systems   Constitutional: Positive for activity change and fatigue. Negative for chills, diaphoresis and fever.   HENT: Negative for congestion, ear discharge and ear pain.         Tooth ache   Eyes: Negative for photophobia,  pain and visual disturbance.   Respiratory: Negative for chest tightness and shortness of breath.    Cardiovascular: Negative for chest pain and palpitations.   Gastrointestinal: Positive for nausea. Negative for abdominal distention, abdominal pain, constipation and diarrhea.   Endocrine: Negative for cold intolerance and heat intolerance.   Musculoskeletal: Positive for gait problem. Negative for arthralgias. Myalgias: over left ribs.   Skin: Negative for color change and wound.   Neurological: Negative for dizziness, seizures, weakness and headaches.   Hematological: Negative for adenopathy.   Psychiatric/Behavioral: Negative for agitation, confusion and hallucinations.     Objective:     Vital Signs (Most Recent):  Temp: 98.2 °F (36.8 °C) (18)  Pulse: (!) 58 (18)  Resp: 18 (18)  BP: (!) 176/82 (18 0529)  SpO2: (!) 93 % (18 0639)    Vital Signs (24h Range):  Temp:  [98.2 °F (36.8 °C)] 98.2 °F (36.8 °C)  Pulse:  [54-58] 58  Resp:  [16-18] 18  BP: (135-176)/(57-82) 176/82     Physical Exam   Constitutional: She is oriented to person, place, and time. She appears well-developed and well-nourished. No distress.   HENT:   Head: Normocephalic and atraumatic.   Right Ear: External ear normal.   Left Ear: External ear normal.   Nose: Nose normal.   Eyes: Right eye exhibits no discharge. Left eye exhibits no discharge. No scleral icterus.   Neck: Normal range of motion.   Cardiovascular: Normal rate, regular rhythm and intact distal pulses.    Pulmonary/Chest: Effort normal. No respiratory distress. She has no wheezes.   Abdominal: Soft. She exhibits no distension. There is no tenderness.   Musculoskeletal: Normal range of motion. She exhibits no edema or tenderness.   Neurological: She is alert and oriented to person, place, and time.   -  Mental Status:  AAOx3.  Follows commands.  Answers correct age and .  Recent and remote memory intact.  -  Speech and language:  No  aphasia, mild dysarthria.    -  Vision:  R hemianopsia.  No ptosis.    -  Facial movement (CN VII): Mild facial droop.   -  Motor:  RUE: 2/5 EF, FF, WE.  LUE: 5/5.  RLE: 4-/5.  LLE: 5/5.  -  Tone:  Decreased RUE.  -  Sensory:  Intact to light touch and pin prick.   Skin: Skin is warm and dry. No rash noted.   Psychiatric: She has a normal mood and affect. Her behavior is normal. Thought content normal. Cognition and memory are impaired.   Vitals reviewed.    NEUROLOGICAL EXAMINATION:     MENTAL STATUS   Oriented to person, place, and time.       Assessment/Plan:      Kristina Aguirre is a 67 y.o. female admitted to inpatient rehabilitation on 4/2/2018 for 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 with impaired mobility and ADLs. Patient remains appropriate for PT, OT, and as required Speech therapy. Patient continues to require 24 hour nursing care as well as daily Physician assessment.    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 Pan sensitive    S/p 7d course of augmentin         Atrial fibrillation    continue Apixaban    4/4 - reports of SOB. EKG shows no sign of A fib with RVR or Mi. CXR showing resolution of pulm edema. Pending labs.        S/p TAVR (transcatheter aortic valve replacement), bioprosthetic    Continue Apixaban and Plavix        Diabetes mellitus, type 2    Continue to monitor blood glucoses    4/4 - started on Diabetic diet    Lab Results   Component Value Date    HGBA1C 6.5 (H) 03/21/2018       - Diabetic Diet, Diabetic Education   - Monitor POCT TIDAC and qhs   - Continue with  ISS    POCT Glucose   Date Value Ref Range Status   04/08/2018 256 (H) 70 - 110 mg/dL Final    04/08/2018 143 (H) 70 - 110 mg/dL Final   04/07/2018 237 (H) 70 - 110 mg/dL Final   04/07/2018 146 (H) 70 - 110 mg/dL Final   04/07/2018 208 (H) 70 - 110 mg/dL Final   04/07/2018 159 (H) 70 - 110 mg/dL Final   04/06/2018 296 (H) 70 - 110 mg/dL Final   04/06/2018 134 (H) 70 - 110 mg/dL Final   04/06/2018 183 (H) 70 - 110 mg/dL Final   04/06/2018 170 (H) 70 - 110 mg/dL Final   04/05/2018 191 (H) 70 - 110 mg/dL Final                     Essential hypertension    Elevated on admission. Continue current meds    4/3/18 - started Lisinopril 10mg daily  4/4/18 - slightly improved. Increased to 20mg daily  4/6/18 - still elevated Bp. Increased to 30mg daily  4/7 cont to monitor trend   4/9 inc lisinopril to 40  4/10 Cont to monitor. BP elevated 175/77 prior to receiving lisinopril.  4/12 hydralazine 25 q8  Vitals:    04/12/18 0639 04/12/18 1212 04/12/18 1215 04/12/18 1221   BP: (!) 180/84 (!) 135/57     BP Location: Left arm      Patient Position: Sitting      Pulse: (!) 53  (!) 54 (!) 54   Resp: 16   16   Temp: 98.2 °F (36.8 °C)      TempSrc: Oral      SpO2: (!) 93%      Weight:       Height:                       DISCHARGE PLANNING:  Tentative Discharge Date: 4/19/2018    Future Appointments  Date Time Provider Department Center   5/10/2018 10:00 AM EKG, APPT [facility] EKG Simon Dawson   5/10/2018 10:40 AM Sarbjit Banegas MD [facility] ARRHYTH Simon Munson MD  Department of Physical Medicine & Rehab   [facility]

## 2018-04-13 NOTE — PLAN OF CARE
Problem: Patient Care Overview  Goal: Plan of Care Review  Outcome: Ongoing (interventions implemented as appropriate)  Ms. Aguirre has not required insulin during this shift.  She has been having a toothache and is taking pain medication every 4 hours, along with oragel.  She complained of burning with urination and a sample will be sent for analysis.  Her pulse ox has been dropping below 90 when she takes off the oxygen, but is in the mid to upper 90's while on oxygen.  She needs CGA for transfers and ambulation for balance.

## 2018-04-13 NOTE — PROGRESS NOTES
"Physical Therapy   Treatment/Reassessment    Kristina Aguirre   MRN: [id number]      04/13/18 1456   PT Time Calculation   PT Start Time 1455   PT Stop Time 1555   PT Total Time (min) 60 min   Treatment   Treatment Type Treatment  (Reassessment)   PT/PTA PT   General   PT Received On 04/13/18   Family/Caregiver Present No   Patient Found (position) Supine in bed   Pt found with (Holter monitor, 1LO2)   Precautions   General Precautions aspiration;fall;vision impaired   Orthopedic No   Required Braces or Orthoses No   Visual/Auditory Vision impaired   Subjective   Patient states "I am feeling a little better"   Pain/Comfort   Pain Rating 1 8/10   Location - Side 1 Bilateral   Location - Orientation 1 generalized   Location 1 head   Pain Addressed 1 Distraction;Reposition   Pain Rating Post-Intervention 1 8/10   Bed Mobility   Bed Mobility yes   Supine to Sit Moderate Assistance  (x 1 trial on mat, assist required for trunk elevation)   Sit to Supine Supervision  (x 1 trial in bed)   Transfers   Transfer yes   Sit to Stand   Sit <> Stand Assistance Stand By Assistance   Sit <> Stand Assistive Device No Assistive Device   Trials/Comments Pt performed multiple trials of this transfer throughout PT treatment session   Stand to Sit   Assistance Stand By Assistance   Assistive Device No Assistive Device   Trials/Comments Pt performed multiple trials of this transfer throughout PT treatment session   Bed to Chair   Bed <> Chair Technique Stand Pivot   Bed <> Chair Assistance Contact Guard Assistance   Bed <> Chair Assistive Device No Assistive Device   Trials/Comments x 1 trial, HHA required for R UE   Chair to Bed   Technique Stand Pivot   Assistance Contact Guard Assistance   Assistive Device No Assistive Device   Trials/Comments x 1 trial    Wheelchair Activities   Propulsion Yes   Propulsion Type 1 Manual   Level 1 Level tile   Method 1 Right lower extremity;Left lower extremity   Level of Assistance 1 Stand by assistsance " "  Description/ Details 1 Pt propelled self in wheelchair x 100 feet, increased time required for pt to complete activity. Patient performed this activity on room air, pt's SPO2 sats measured at 86% and PT placed pt on 1 L O2   Gait   Gait Yes   Weight Bearing Status full   Gait 1   Surface 1 Level tile   Gait Assistive Device Hand held assist   Other Apparatus 1 Wheelchair follow  (with portable O2 tank)   Assistance 1 Contact Guard Assistance;Minimum assistance   Gait Distance Pt ambulated x 3 trials with no AD and HHA to R UE: 1st trial: 99 feet, 2nd trial: 68 feet. 3rd trial: 102 feet. Pt required seated rest breaks due to fatigue. Pt's SPO2 sats remained between 91-94% on 1LO2   Gait Pattern swing-through gait   Gait Deviation(s) decreased perez;increased time in double stance;decreased velocity of limb motion;decreased step length;decreased stride length;decreased toe-to-floor clearance;decreased weight-shifting ability;forward lean   Impairments Contributing to Gait Deviations impaired balance;decreased strength;impaired sensory feedback;impaired postural control;abnormal muscle tone;impaired coordination;decreased flexibility;impaired motor control   Stairs   Stairs Yes   Stairs/Curb Step   Rails 1 Left   Device 1 No device   Assistance 1 Contact guard   Comment/# Steps 1 Pt ascended and descended 8 6" steps with reciprocal LE gait pattern x 1 trial. Pt required seated rest break following trial due to fatigue   Stairs/ Curb Step  2   Rails 2 None (Comment)   Device 2 No device   Assistance 2 Minimum assistance   Comment/# Steps 2 Pt negotiated 4" curb step x 2 trials with HHA to R UE    Other Activities   Comments Patient required increased encouragement from PT in order to fully participate in PM PT treatment session   Activity Tolerance   Activity Tolerance Patient limited by fatigue  (Pt demonstrated self limiting behavior )   After Treatment   Patient Position After Treatment Supine in bed   Patient " after treatment left call button in reach;nursing notified;with all lines intact   Assessment   Prognosis Fair   Problem List Decreased strength;Decreased endurance;Impaired balance;Decreased mobility;Impaired tone;Pain;Impaired vision;Decreased safety awareness;Decreased cognition;Impaired judgement;Decreased coordination   Session Assessment other (see comments)  (slow progress)   Assessment Pt required increased physical assistance to perform bed mobility due to pt's reported headache pain. Pt demonstrated improved R great toe clearance throughout gait and stair negotiation trials. Pt continues to demonstrate self limiting behaviors and will benefit from further PT if pt is agreeable to perform. Pt's long term goals remain appropriate for pt's next 7 days on IP Rehab.    Level of Motivation/Participation good   Barriers to Discharge Inaccessible home environment;Decreased caregiver support   Barriers to Discharge Comments steps to enter home, pt lives alone   Long Term Goals   Supine to Sit-Met/Not Met Not Met   Sit to Supine - Met/Not Met Not Met   Transfer Sit to stand - Met/Not Met Met   Transfer Bed/Chair - Met/Not Met Not Met   Ambulate - Met/Not Met Not met   Go Up/Down Stairs - Met/Not Met Not met   Go Up/Down Curb- Met/Not Met Not Met   Propel Wheelchair - Met/Not Met Not met   Discharge Recommendations   Equipment Needed After Discharge wheelchair;bath bench   Discharge Facility/Level Of Care Needs home health PT   Plan   Planned Therapy Intervention Continue with current plan   Therapy Frequency 2 times/day;daily;Monday-Friday;Saturday or Sunday   Physical Therapy Follow-up   PT Follow-up? Yes   PT - Next Visit Date 04/13/18   Treatment/Billable Minutes   Gait training 25   Therapeutic Activity 35   Total Time 60     Adry Romo, PT  4/13/2018

## 2018-04-13 NOTE — PROGRESS NOTES
"Occupational Therapy   Missed Treatment    Kristina Aguirre  MRN: [id number]  Room/Bed: E261/E261 B       04/13/18 1115   OT Time Calculation   OT Start Time 1115   OT Stop Time 1115   OT Total Time (min) 0 min   General   OT Date of Treatment 04/13/18   Precautions   General Precautions aspiration;fall;vision impaired   Visual/Auditory Vision impaired   Subjective   Patient states "I don't want to go to the gym.  I don't feel good. "    Pain/Comfort   Pain Rating 10/10   Location mouth   Pain Addressed Pre-medicate for activity;Distraction   Assessment   Assessment Pt. refused to participate in OT group 2* pain, not feeling well.  2 attempts made.  Pt. missed 45 min. of OT group.  MD notified.  Will attempt to make up missed minutes as soon as possible.    Occupational Therapy Follow-up   OT Follow-up? Yes   Treatment/Billable Minutes   Total Time 0       CAROLYN Sneed  4/13/2018     "

## 2018-04-13 NOTE — PLAN OF CARE
Problem: Pain, Acute (Adult)  Goal: Acceptable Pain Control/Comfort Level  Patient will demonstrate the desired outcomes by discharge/transition of care.  Outcome: Ongoing (interventions implemented as appropriate)  Patient up all night. In severe pain due to toothache. States Pain med and anbesol only gives little relief. Request to see Dentist. Dr. Emelyn vallejo.

## 2018-04-13 NOTE — PROGRESS NOTES
"Physical Therapy  AM Treatment    Kristina Aguirre   MRN: [id number]        04/13/18 0944   PT Time Calculation   PT Start Time 0944   PT Stop Time 1015   PT Total Time (min) 31 min   Treatment   Treatment Type Treatment   PT/PTA PT   General   PT Received On 04/13/18   Family/Caregiver Present No   Patient Found (position) Supine in bed   Pt found with (HOB flat)   Precautions   General Precautions fall   Orthopedic No   Required Braces or Orthoses No   Cardiovascular/Pulmonary (Holter monitor)   Visual/Auditory Vision impaired   Subjective   Patient states "I am not getting up with this toothache"   Pain/Comfort   Pain Rating 1 9/10   Location - Side 1 Bilateral   Location - Orientation 1 generalized   Location 1 jaw   Pain Addressed 1 Reposition;Distraction   Pain Rating Post-Intervention 1 9/10   Supine   Supine-Exercises Lower extremity;Specific exercises   Supine-Exercise Type Ankle pumps;Glut sets;Short arc quads;Lower trunk rotation   Supine-Exercise Comments Pt performed 3 x 15 reps to B LE, pt required active assistance. Patient required maximum encouragement in order to fully participate.     PT educated pt on importance of OOB activity and participating in PT treatment sessions. Patient reports "Y'all ask too much of me when I am hurting this much". PT educated patient on importance of participating and the 3 hour rule for IP Rehab. PT will attempt to complete reassessment during PM treatment session with patient. Patient refused to even perform rolling in bed due to toothache pain.    Activity Tolerance   Activity Tolerance Patient limited by pain  (Patient with self limiting behavior)   After Treatment   Patient Position After Treatment Supine in bed   Patient after treatment left call button in reach   Assessment   Prognosis Fair   Problem List Decreased strength;Decreased endurance;Impaired balance;Decreased mobility;Decreased coordination;Decreased cognition;Impaired judgement;Decreased safety " awareness;Pain   Session Assessment increased pain   Assessment Pt would not transfer out of bed to participate in PT reassessment. patient was greatly limited by her toothache. PT will attempt to perform reassessment during PM Treatment session. Patient continues to demonstrate self limiting behavior with PT. Pt will likely not make much progress with PT if pt is unwilling to participate in OOB activities.   Level of Motivation/Participation poor   Barriers to Discharge Inaccessible home environment   Barriers to Discharge Comments Pt lives alone, has 2 CESAR home    Discharge Recommendations   Equipment Needed After Discharge 3-in-1 commode;bath bench;wheelchair;walker, rolling   Discharge Facility/Level Of Care Needs home health PT   Plan   Planned Therapy Intervention Continue with current plan   Therapy Frequency 2 times/day;daily;Monday-Friday;Saturday or Sunday   Physical Therapy Follow-up   PT Follow-up? Yes   PT - Next Visit Date 04/13/18   Treatment/Billable Minutes   Therapeutic Exercise 30   Total Time 30       Adry Romo, PT  4/13/2018

## 2018-04-13 NOTE — PROGRESS NOTES
Occupational Therapy   Missed treatment    Kristina Aguirre  MRN: [id number]  Room/Bed: E261/E261 B    Pt _______Ms. Aguirre_______missed  ______45*_______minutes of OT due to   ______pt. Refusal- pt. in pain 10/10) and not feeling well_______________. Will attempt to make up this missed time as soon as possible. Treating physician(Dr. Dunaway) has been made aware as well as patient's nurse(Radha).         CAROLYN Sneed  4/13/2018

## 2018-04-14 LAB
DEPRECATED S PYO AG THROAT QL EIA: NEGATIVE
POCT GLUCOSE: 114 MG/DL (ref 70–110)
POCT GLUCOSE: 147 MG/DL (ref 70–110)
POCT GLUCOSE: 148 MG/DL (ref 70–110)
POCT GLUCOSE: 151 MG/DL (ref 70–110)

## 2018-04-14 PROCEDURE — 12800000 HC REHAB SEMI-PRIVATE ROOM

## 2018-04-14 PROCEDURE — 87880 STREP A ASSAY W/OPTIC: CPT

## 2018-04-14 PROCEDURE — 99232 SBSQ HOSP IP/OBS MODERATE 35: CPT | Mod: ,,, | Performed by: PHYSICAL MEDICINE & REHABILITATION

## 2018-04-14 PROCEDURE — 25000003 PHARM REV CODE 250: Performed by: NURSE PRACTITIONER

## 2018-04-14 PROCEDURE — 25000003 PHARM REV CODE 250: Performed by: PHYSICAL MEDICINE & REHABILITATION

## 2018-04-14 PROCEDURE — 87081 CULTURE SCREEN ONLY: CPT

## 2018-04-14 RX ADMIN — HYDRALAZINE HYDROCHLORIDE 25 MG: 25 TABLET ORAL at 02:04

## 2018-04-14 RX ADMIN — FAMOTIDINE 20 MG: 20 TABLET, FILM COATED ORAL at 08:04

## 2018-04-14 RX ADMIN — HYDRALAZINE HYDROCHLORIDE 25 MG: 25 TABLET ORAL at 06:04

## 2018-04-14 RX ADMIN — FUROSEMIDE 20 MG: 20 TABLET ORAL at 04:04

## 2018-04-14 RX ADMIN — BENZOCAINE: 100 GEL TOPICAL at 04:04

## 2018-04-14 RX ADMIN — CARVEDILOL 3.12 MG: 3.12 TABLET, FILM COATED ORAL at 08:04

## 2018-04-14 RX ADMIN — CHLORTHALIDONE 25 MG: 25 TABLET ORAL at 08:04

## 2018-04-14 RX ADMIN — AMIODARONE HYDROCHLORIDE 400 MG: 200 TABLET ORAL at 08:04

## 2018-04-14 RX ADMIN — BENZOCAINE: 100 GEL TOPICAL at 08:04

## 2018-04-14 RX ADMIN — Medication 500 MG: at 08:04

## 2018-04-14 RX ADMIN — BENZOCAINE: 100 GEL TOPICAL at 10:04

## 2018-04-14 RX ADMIN — CLOPIDOGREL BISULFATE 75 MG: 75 TABLET ORAL at 08:04

## 2018-04-14 RX ADMIN — AMLODIPINE BESYLATE 10 MG: 10 TABLET ORAL at 08:04

## 2018-04-14 RX ADMIN — AMOXICILLIN AND CLAVULANATE POTASSIUM 1 TABLET: 875; 125 TABLET, FILM COATED ORAL at 08:04

## 2018-04-14 RX ADMIN — APIXABAN 5 MG: 5 TABLET, FILM COATED ORAL at 08:04

## 2018-04-14 RX ADMIN — LISINOPRIL 40 MG: 20 TABLET ORAL at 08:04

## 2018-04-14 RX ADMIN — FLUTICASONE FUROATE AND VILANTEROL TRIFENATATE 1 PUFF: 100; 25 POWDER RESPIRATORY (INHALATION) at 08:04

## 2018-04-14 RX ADMIN — HYDROCODONE BITARTRATE AND ACETAMINOPHEN 1 TABLET: 7.5; 325 TABLET ORAL at 04:04

## 2018-04-14 RX ADMIN — HYDROCODONE BITARTRATE AND ACETAMINOPHEN 1 TABLET: 7.5; 325 TABLET ORAL at 12:04

## 2018-04-14 RX ADMIN — ATORVASTATIN CALCIUM 40 MG: 20 TABLET, FILM COATED ORAL at 08:04

## 2018-04-14 RX ADMIN — HYDROCODONE BITARTRATE AND ACETAMINOPHEN 1 TABLET: 7.5; 325 TABLET ORAL at 08:04

## 2018-04-14 RX ADMIN — BENZOCAINE: 100 GEL TOPICAL at 02:04

## 2018-04-14 RX ADMIN — HYDROCODONE BITARTRATE AND ACETAMINOPHEN 1 TABLET: 7.5; 325 TABLET ORAL at 03:04

## 2018-04-14 RX ADMIN — HYDROCODONE BITARTRATE AND ACETAMINOPHEN 1 TABLET: 7.5; 325 TABLET ORAL at 07:04

## 2018-04-14 RX ADMIN — FUROSEMIDE 20 MG: 20 TABLET ORAL at 08:04

## 2018-04-14 RX ADMIN — HYDRALAZINE HYDROCHLORIDE 25 MG: 25 TABLET ORAL at 08:04

## 2018-04-14 NOTE — SUBJECTIVE & OBJECTIVE
Interval History 4/14/2018:  Patient is seen for follow-up rehab evaluation and recommendations: Toothache persists despite pain medication hydrocodone/apap and oragel. I have reviewed the HPI and PMFSH and there are no changes from admission.       Scheduled Medications:    amiodarone  400 mg Oral BID    amLODIPine  10 mg Oral Daily    amoxicillin-clavulanate 875-125mg  1 tablet Oral Q12H    apixaban  5 mg Oral BID    ascorbic acid (vitamin C)  500 mg Oral BID    atorvastatin  40 mg Oral Daily    benzocaine   Mouth/Throat QID    carvedilol  3.125 mg Oral Daily    chlorthalidone  25 mg Oral QAM    clopidogrel  75 mg Oral Daily    famotidine  20 mg Oral BID    fluticasone-vilanterol  1 puff Inhalation Daily    furosemide  20 mg Oral BID    hydrALAZINE  25 mg Oral Q8H    lidocaine  1 patch Transdermal Q24H    lisinopril  40 mg Oral Daily    polyethylene glycol  17 g Oral Daily       PRN Medications: acetaminophen, albuterol-ipratropium 2.5mg-0.5mg/3mL, bisacodyl, dextrose 50%, dextrose 50%, dextrose 50%, glucagon (human recombinant), glucose, glucose, hydrALAZINE, hydrocodone-acetaminophen 7.5-325mg, hydrOXYzine pamoate, insulin aspart U-100, lidocaine HCl 2%, magnesium hydroxide 400 mg/5 ml, ondansetron    Review of Systems   Constitutional: Positive for activity change and fatigue. Negative for chills, diaphoresis and fever.   HENT: Negative for congestion, ear discharge and ear pain.         Tooth ache   Eyes: Negative for photophobia, pain and visual disturbance.   Respiratory: Negative for chest tightness and shortness of breath.    Cardiovascular: Negative for chest pain and palpitations.   Gastrointestinal: Positive for nausea. Negative for abdominal distention, abdominal pain, constipation and diarrhea.   Endocrine: Negative for cold intolerance and heat intolerance.   Musculoskeletal: Positive for gait problem. Negative for arthralgias. Myalgias: over left ribs.   Skin: Negative for color  change and wound.   Neurological: Negative for dizziness, seizures, weakness and headaches.   Hematological: Negative for adenopathy.   Psychiatric/Behavioral: Negative for agitation, confusion and hallucinations.     Objective:     Vital Signs (Most Recent):  Temp: 98.5 °F (36.9 °C) (18 0700)  Pulse: (!) 55 (18 08)  Resp: 18 (18)  BP: (!) 160/72 (18)  SpO2: 95 % (18)    Vital Signs (24h Range):  Temp:  [97.7 °F (36.5 °C)-98.5 °F (36.9 °C)] 98.5 °F (36.9 °C)  Pulse:  [55-66] 55  Resp:  [18-20] 18  SpO2:  [93 %-96 %] 95 %  BP: (160-211)/(67-88) 160/72     Physical Exam   Constitutional: She is oriented to person, place, and time. She appears well-developed and well-nourished. No distress.   HENT:   Head: Normocephalic and atraumatic.   Right Ear: External ear normal.   Left Ear: External ear normal.   Nose: Nose normal.   Eyes: Right eye exhibits no discharge. Left eye exhibits no discharge. No scleral icterus.   Neck: Normal range of motion.   Cardiovascular: Normal rate, regular rhythm and intact distal pulses.    Pulmonary/Chest: Effort normal. No respiratory distress. She has no wheezes.   Abdominal: Soft. She exhibits no distension. There is no tenderness.   Musculoskeletal: Normal range of motion. She exhibits no edema or tenderness.   Neurological: She is alert and oriented to person, place, and time.   -  Mental Status:  AAOx3.  Follows commands.  Answers correct age and .  Recent and remote memory intact.  -  Speech and language:  No aphasia, mild dysarthria.    -  Vision:  R hemianopsia.  No ptosis.    -  Facial movement (CN VII): Mild facial droop.   -  Motor:  RUE: 2/5 EF, FF, WE.  LUE: 5/5.  RLE: 4-/5.  LLE: 5/5.  -  Tone:  Decreased RUE.  -  Sensory:  Intact to light touch and pin prick.   Skin: Skin is warm and dry. No rash noted.   Psychiatric: She has a normal mood and affect. Her behavior is normal. Thought content normal. Cognition and memory are  impaired.   Vitals reviewed.    NEUROLOGICAL EXAMINATION:     MENTAL STATUS   Oriented to person, place, and time.

## 2018-04-14 NOTE — PROGRESS NOTES
Physical Therapy  Missed therapy Minutes     Kristina Aguirre   MRN: [id number]             Mrs. Aguirre_______missed  ______180 _______minutes of PT/OT/SLP/OT group due to   _____refusal to participate secondary to L mouth pain____________. Will attempt to make up this missed time as soon as possible. Treating physician(Dr. Dunaway) has been made aware as well as patient's nurse(Albaro).               Abdon Carcamo, PT 4/14/2018

## 2018-04-14 NOTE — PROGRESS NOTES
Occupational Therapy   Missed Minutes    Kristina Aguirre  MRN: [id number]  Room/Bed: E261/E261 B       04/14/18 1115   OT Time Calculation   OT Start Time 1115   OT Stop Time 1115   OT Total Time (min) 0 min   General   OT Date of Treatment 04/14/18   OT Therapeutic Groups   Other Dressing Group   Assessment   Assessment Pt. refused OT Dressing Group this am on 2 attempts 2* c/o toothache pain.  Nursing Kvng, and Dr. Dunaway notified.     Plan   Plan Continue with current plan   Occupational Therapy Follow-up   OT Follow-up? Yes   Treatment/Billable Minutes   Total Time 0       CAROLYN Sneed  4/14/2018

## 2018-04-14 NOTE — PLAN OF CARE
Problem: Patient Care Overview  Goal: Plan of Care Review  Outcome: Ongoing (interventions implemented as appropriate)  Patient AAOx4 with complaints of pain a tooth ache. Patient refused therapy today. Instructed patient to use call light for assistance and before getting out of bed. Will continue to monitor patient.

## 2018-04-14 NOTE — PROGRESS NOTES
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 I have reviewed the HPI and PMFSH and there are no changes from admission.       Scheduled Medications:    amiodarone  400 mg Oral BID    amLODIPine  10 mg Oral Daily    amoxicillin-clavulanate 875-125mg  1 tablet Oral Q12H    apixaban  5 mg Oral BID    ascorbic acid (vitamin C)  500 mg Oral BID    atorvastatin  40 mg Oral Daily    benzocaine   Mouth/Throat QID    carvedilol  3.125 mg Oral Daily    chlorthalidone  25 mg Oral QAM    clopidogrel  75 mg Oral Daily    famotidine  20 mg Oral BID    fluticasone-vilanterol  1 puff Inhalation Daily    furosemide  20 mg Oral BID    hydrALAZINE  25 mg Oral Q8H    lidocaine  1 patch Transdermal Q24H    lisinopril  40 mg Oral Daily    polyethylene glycol  17 g Oral Daily       PRN Medications: acetaminophen, albuterol-ipratropium 2.5mg-0.5mg/3mL, bisacodyl, dextrose 50%, dextrose 50%, dextrose 50%, glucagon (human recombinant), glucose, glucose, hydrALAZINE, hydrocodone-acetaminophen 7.5-325mg, hydrOXYzine pamoate, insulin aspart U-100, lidocaine HCl 2%, magnesium hydroxide 400 mg/5 ml, ondansetron    Review of Systems   Constitutional: Positive for activity change and fatigue. Negative for chills, diaphoresis and fever.   HENT: Negative for congestion, ear discharge and ear pain.         Tooth ache   Eyes: Negative for photophobia, pain and visual disturbance.   Respiratory: Negative for chest tightness and  shortness of breath.    Cardiovascular: Negative for chest pain and palpitations.   Gastrointestinal: Positive for nausea. Negative for abdominal distention, abdominal pain, constipation and diarrhea.   Endocrine: Negative for cold intolerance and heat intolerance.   Musculoskeletal: Positive for gait problem. Negative for arthralgias. Myalgias: over left ribs.   Skin: Negative for color change and wound.   Neurological: Negative for dizziness, seizures, weakness and headaches.   Hematological: Negative for adenopathy.   Psychiatric/Behavioral: Negative for agitation, confusion and hallucinations.     Objective:     Vital Signs (Most Recent):  Temp: 98.5 °F (36.9 °C) (18 07)  Pulse: (!) 55 (18)  Resp: 18 (18)  BP: (!) 160/72 (18)  SpO2: 95 % (18)    Vital Signs (24h Range):  Temp:  [97.7 °F (36.5 °C)-98.5 °F (36.9 °C)] 98.5 °F (36.9 °C)  Pulse:  [55-66] 55  Resp:  [18-20] 18  SpO2:  [93 %-96 %] 95 %  BP: (160-211)/(67-88) 160/72     Physical Exam   Constitutional: She is oriented to person, place, and time. She appears well-developed and well-nourished. No distress.   HENT:   Head: Normocephalic and atraumatic.   Right Ear: External ear normal.   Left Ear: External ear normal.   Nose: Nose normal.   Eyes: Right eye exhibits no discharge. Left eye exhibits no discharge. No scleral icterus.   Neck: Normal range of motion.   Cardiovascular: Normal rate, regular rhythm and intact distal pulses.    Pulmonary/Chest: Effort normal. No respiratory distress. She has no wheezes.   Abdominal: Soft. She exhibits no distension. There is no tenderness.   Musculoskeletal: Normal range of motion. She exhibits no edema or tenderness.   Neurological: She is alert and oriented to person, place, and time.   -  Mental Status:  AAOx3.  Follows commands.  Answers correct age and .  Recent and remote memory intact.  -  Speech and language:  No aphasia, mild dysarthria.    -  Vision:  R  hemianopsia.  No ptosis.    -  Facial movement (CN VII): Mild facial droop.   -  Motor:  RUE: 2/5 EF, FF, WE.  LUE: 5/5.  RLE: 4-/5.  LLE: 5/5.  -  Tone:  Decreased RUE.  -  Sensory:  Intact to light touch and pin prick.   Skin: Skin is warm and dry. No rash noted.   Psychiatric: She has a normal mood and affect. Her behavior is normal. Thought content normal. Cognition and memory are impaired.   Vitals reviewed.    NEUROLOGICAL EXAMINATION:     MENTAL STATUS   Oriented to person, place, and time.       Assessment/Plan:      Kristina Aguirre is a 67 y.o. female admitted to inpatient rehabilitation on 4/2/2018 for 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 with impaired mobility and ADLs. Patient remains appropriate for PT, OT, and as required Speech therapy. Patient continues to require 24 hour nursing care as well as daily Physician assessment.    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 Pan sensitive S/p 7d course of augmentin     4/14 u/a +leukocytes, blood, -nitrates. UCx pending        Atrial fibrillation    continue Apixaban    4/4 - reports of SOB. EKG shows no sign of A fib with RVR or Mi. CXR showing resolution of pulm edema. Pending labs.        S/p TAVR (transcatheter aortic valve replacement), bioprosthetic    Continue Apixaban and Plavix        Diabetes mellitus, type 2    Continue to monitor blood glucoses    4/4 - started on Diabetic diet    Lab Results   Component Value Date    HGBA1C 6.5 (H) 03/21/2018       - Diabetic Diet, Diabetic Education   - Monitor POCT TIDAC and qhs   - Continue with  ISS    POCT Glucose   Date Value Ref Range Status   04/08/2018 256 (H) 70 - 110  mg/dL Final   04/08/2018 143 (H) 70 - 110 mg/dL Final   04/07/2018 237 (H) 70 - 110 mg/dL Final   04/07/2018 146 (H) 70 - 110 mg/dL Final   04/07/2018 208 (H) 70 - 110 mg/dL Final   04/07/2018 159 (H) 70 - 110 mg/dL Final   04/06/2018 296 (H) 70 - 110 mg/dL Final   04/06/2018 134 (H) 70 - 110 mg/dL Final   04/06/2018 183 (H) 70 - 110 mg/dL Final   04/06/2018 170 (H) 70 - 110 mg/dL Final   04/05/2018 191 (H) 70 - 110 mg/dL Final                     Essential hypertension    Elevated on admission. Continue current meds    4/3/18 - started Lisinopril 10mg daily  4/4/18 - slightly improved. Increased to 20mg daily  4/6/18 - still elevated Bp. Increased to 30mg daily  4/7 cont to monitor trend   4/9 inc lisinopril to 40  4/10 Cont to monitor. BP elevated 175/77 prior to receiving lisinopril.  4/12 hydralazine 25 q8  Vitals:    04/12/18 0639 04/12/18 1212 04/12/18 1215 04/12/18 1221   BP: (!) 180/84 (!) 135/57     BP Location: Left arm      Patient Position: Sitting      Pulse: (!) 53  (!) 54 (!) 54   Resp: 16   16   Temp: 98.2 °F (36.8 °C)      TempSrc: Oral      SpO2: (!) 93%      Weight:       Height:                       DISCHARGE PLANNING:  Tentative Discharge Date: 4/19/2018    Future Appointments  Date Time Provider Department Center   5/10/2018 10:00 AM EKG, APPT [facility] EKG Simon Dawson   5/10/2018 10:40 AM Sarbjit Banegas MD [facility] ARRHYTH Simon Munson MD  Department of Physical Medicine & Rehab   [facility]

## 2018-04-14 NOTE — PROGRESS NOTES
Speech Therapy   Missed Minutes Note    Kristina Aguirre   MRN: [id number]         Patient missed 45/45 minutes of skilled ST services due to refusal. ST attempted x 3 to encourage patient to participate in therapy services. Patient refused due to self reporting tooth pain/absess. ST notified charge nurse, patient to make up minutes at next scheduled speech therapy session.     Nichol Powers, CCC-SLP

## 2018-04-14 NOTE — PLAN OF CARE
Problem: Patient Care Overview  Goal: Plan of Care Review  Outcome: Ongoing (interventions implemented as appropriate)  Patient has complained of pain that has be addressed with pain medication with mild results. Patient is up with assist with no falls or injuries noted. Patient is stable no distress noted. Plan of care explained to patient and patient verbally acknowledged understanding. Patient is resting with call light in reach, bed in low position. Will continue to monitor patient.

## 2018-04-14 NOTE — ASSESSMENT & PLAN NOTE
Urine culture positive for Citrobacter freundii complex. Pan sensitive S/p 7d course of augmentin     4/14 u/a +leukocytes, blood, -nitrates. UCx pending

## 2018-04-14 NOTE — NURSING
Ms. Aguirre is refusing all therapy today despite attempts per the therapists and nurses to convince her to participate.  MD's are aware of her tooth ache and she was started on antibiotics and remains on medicine for pain control. She is refusing ice packs and oragel.

## 2018-04-14 NOTE — PROGRESS NOTES
Patient refusing any suggestions for help with the pain (ice, heat). Patient stated she wants her tooth pulled and that was it. Charge nurse Cory went and talked to patient but patient still refused any other help. Patient taking PRN pain medication every 4 hours but states it is not helping. Patient does fall asleep after taken pain medication for a while.  Charge nurse stated to call doctor on call. Dr. Sapp called and informed of patients condition. Dr. Sapp stated she could not pull the tooth and [facility] had no dentist to pull the patients tooth. Dr. Sapp stated there is nothing she could do at this point and if patient wanted to go AMA then she could. Patient is taking pain medication now. No new orders at this time

## 2018-04-14 NOTE — PROGRESS NOTES
Called Dr. Sapp to inform her patient was complaining of a tooth ache and asking to call doctor to get more pain medication. Explained that the pain medication was not due till 2325 and the patient also was administered benzocaine. Dr. Sapp ordered to put more benzocaine on the tooth and administer pain medication when due.

## 2018-04-14 NOTE — PROGRESS NOTES
"Physical Therapy   Pt not seen    Kristina Aguirre   MRN: [id number]                  04/14/18 1515   PT Time Calculation   PT Start Time 1515   PT Stop Time 1515   PT Total Time (min) 0 min   Treatment   Treatment Type Treatment   PT/PTA PT   General   Missed Time Reason Patient unwilling to participate  (x 2 attempts)   Family/Caregiver Present No   Patient Found (position) Seated at edge of bed   Pt found with (heart monitor)   Precautions   General Precautions aspiration;fall;vision impaired   Cardiovascular/Pulmonary Other (see comments)  (Heat Monitor)   Visual/Auditory Vision impaired   Subjective   Patient states " They tell me they are going to kick me out if I don't go to therapy....fine, I'm ready to go home."   Pain/Comfort   Pain Rating 1 10/10   Location - Side 1 Left   Location - Orientation 1 generalized   Location 1 mouth   Pain Addressed 1 Pre-medicate for activity   Other Comments   Comments PT attempted to have pt come to gym and work with therapy in supine.  Pt refused and then returned to supine position and turned off her light.   After Treatment   Patient Position After Treatment Supine in bed  (no treatment performed)   Patient after treatment left call button in reach   Treatment/Billable Minutes   Total Time 0           Abdon Carcamo, PT 4/14/2018            "

## 2018-04-14 NOTE — PROGRESS NOTES
"Occupational Therapy  Patient Missed Visit Note  Kristina Aguirre   MRN: [id number]      04/14/18 0900   OT Time Calculation   OT Start Time 0900   OT Stop Time 0900   OT Total Time (min) 0 min   General   OT Date of Treatment 04/14/18   Missed Treatment Reason Other (Comment)  (Patient declined OT session due to toothache x 2 attempts. )   Assessment   Assessment Patient refused OT x 2 attempt due to toothache. Patient stating "I'm not going to therapy. You kick me out of if you want too."  Notified patient's nurse, charge nurse Kvng, and Dr Sapp.   Treatment/Billable Minutes   Total Time 0     A client care conference was performed between the LOTR and ALMAGUER, prior to treatment by ALMAGUER, to discuss the patient's status, treatment plan and established goals.   ROSINA Peraza/NGOZI 4/14/2018     "

## 2018-04-15 LAB
BACTERIA UR CULT: NO GROWTH
POCT GLUCOSE: 121 MG/DL (ref 70–110)
POCT GLUCOSE: 143 MG/DL (ref 70–110)
POCT GLUCOSE: 146 MG/DL (ref 70–110)
POCT GLUCOSE: 180 MG/DL (ref 70–110)
POCT GLUCOSE: 88 MG/DL (ref 70–110)

## 2018-04-15 PROCEDURE — 25000003 PHARM REV CODE 250: Performed by: PHYSICAL MEDICINE & REHABILITATION

## 2018-04-15 PROCEDURE — 99232 SBSQ HOSP IP/OBS MODERATE 35: CPT | Mod: ,,, | Performed by: PHYSICAL MEDICINE & REHABILITATION

## 2018-04-15 PROCEDURE — 25000003 PHARM REV CODE 250: Performed by: NURSE PRACTITIONER

## 2018-04-15 PROCEDURE — 12800000 HC REHAB SEMI-PRIVATE ROOM

## 2018-04-15 RX ORDER — HYDRALAZINE HYDROCHLORIDE 50 MG/1
50 TABLET, FILM COATED ORAL EVERY 8 HOURS
Status: DISCONTINUED | OUTPATIENT
Start: 2018-04-15 | End: 2018-04-19 | Stop reason: HOSPADM

## 2018-04-15 RX ADMIN — FUROSEMIDE 20 MG: 20 TABLET ORAL at 04:04

## 2018-04-15 RX ADMIN — HYDROCODONE BITARTRATE AND ACETAMINOPHEN 1 TABLET: 7.5; 325 TABLET ORAL at 08:04

## 2018-04-15 RX ADMIN — FUROSEMIDE 20 MG: 20 TABLET ORAL at 08:04

## 2018-04-15 RX ADMIN — AMIODARONE HYDROCHLORIDE 400 MG: 200 TABLET ORAL at 08:04

## 2018-04-15 RX ADMIN — HYDROCODONE BITARTRATE AND ACETAMINOPHEN 1 TABLET: 7.5; 325 TABLET ORAL at 04:04

## 2018-04-15 RX ADMIN — HYDROCODONE BITARTRATE AND ACETAMINOPHEN 1 TABLET: 7.5; 325 TABLET ORAL at 12:04

## 2018-04-15 RX ADMIN — BENZOCAINE: 100 GEL TOPICAL at 09:04

## 2018-04-15 RX ADMIN — HYDRALAZINE HYDROCHLORIDE 50 MG: 50 TABLET ORAL at 09:04

## 2018-04-15 RX ADMIN — FAMOTIDINE 20 MG: 20 TABLET, FILM COATED ORAL at 08:04

## 2018-04-15 RX ADMIN — AMOXICILLIN AND CLAVULANATE POTASSIUM 1 TABLET: 875; 125 TABLET, FILM COATED ORAL at 08:04

## 2018-04-15 RX ADMIN — INSULIN ASPART 2 UNITS: 100 INJECTION, SOLUTION INTRAVENOUS; SUBCUTANEOUS at 04:04

## 2018-04-15 RX ADMIN — ATORVASTATIN CALCIUM 40 MG: 20 TABLET, FILM COATED ORAL at 08:04

## 2018-04-15 RX ADMIN — APIXABAN 5 MG: 5 TABLET, FILM COATED ORAL at 10:04

## 2018-04-15 RX ADMIN — APIXABAN 5 MG: 5 TABLET, FILM COATED ORAL at 08:04

## 2018-04-15 RX ADMIN — Medication 500 MG: at 08:04

## 2018-04-15 RX ADMIN — HYDRALAZINE HYDROCHLORIDE 25 MG: 25 TABLET ORAL at 05:04

## 2018-04-15 RX ADMIN — BENZOCAINE: 100 GEL TOPICAL at 12:04

## 2018-04-15 RX ADMIN — HYDRALAZINE HYDROCHLORIDE 50 MG: 50 TABLET ORAL at 02:04

## 2018-04-15 RX ADMIN — CHLORTHALIDONE 25 MG: 25 TABLET ORAL at 08:04

## 2018-04-15 RX ADMIN — BENZOCAINE: 100 GEL TOPICAL at 04:04

## 2018-04-15 RX ADMIN — LISINOPRIL 40 MG: 20 TABLET ORAL at 08:04

## 2018-04-15 RX ADMIN — CARVEDILOL 3.12 MG: 3.12 TABLET, FILM COATED ORAL at 08:04

## 2018-04-15 RX ADMIN — FLUTICASONE FUROATE AND VILANTEROL TRIFENATATE 1 PUFF: 100; 25 POWDER RESPIRATORY (INHALATION) at 08:04

## 2018-04-15 RX ADMIN — HYDROCODONE BITARTRATE AND ACETAMINOPHEN 1 TABLET: 7.5; 325 TABLET ORAL at 07:04

## 2018-04-15 RX ADMIN — AMLODIPINE BESYLATE 10 MG: 10 TABLET ORAL at 09:04

## 2018-04-15 RX ADMIN — BENZOCAINE: 100 GEL TOPICAL at 08:04

## 2018-04-15 RX ADMIN — CLOPIDOGREL BISULFATE 75 MG: 75 TABLET ORAL at 08:04

## 2018-04-15 NOTE — PROGRESS NOTES
[facility]  Physical Medicine & Rehab  Progress Note    Patient Name: Kristina Aguirre  MRN: [id number]  Patient Class: IP- Rehab   Admission Date: 4/2/2018  Length of Stay: 13 days  Attending Physician: Peter Dunaway MD  Primary Care Provider: Robert Mattson MD    Subjective:     Principal Problem: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    Interval History 4/15/2018:  Patient is seen for follow-up rehab evaluation and recommendations: Patient still complaining of toothache. Using oragel and taking hydrocodone/apap prn. Prelim strep throat culture no growth. UCx showed no growth. I have reviewed the HPI and PMFSH and there are no changes from admission.       Scheduled Medications:    amiodarone  400 mg Oral BID    amLODIPine  10 mg Oral Daily    amoxicillin-clavulanate 875-125mg  1 tablet Oral Q12H    apixaban  5 mg Oral BID    ascorbic acid (vitamin C)  500 mg Oral BID    atorvastatin  40 mg Oral Daily    benzocaine   Mouth/Throat QID    carvedilol  3.125 mg Oral Daily    chlorthalidone  25 mg Oral QAM    clopidogrel  75 mg Oral Daily    famotidine  20 mg Oral BID    fluticasone-vilanterol  1 puff Inhalation Daily    furosemide  20 mg Oral BID    hydrALAZINE  25 mg Oral Q8H    lidocaine  1 patch Transdermal Q24H    lisinopril  40 mg Oral Daily    polyethylene glycol  17 g Oral Daily       PRN Medications: acetaminophen, albuterol-ipratropium 2.5mg-0.5mg/3mL, bisacodyl, dextrose 50%, dextrose 50%, dextrose 50%, glucagon (human recombinant), glucose, glucose, hydrALAZINE, hydrocodone-acetaminophen 7.5-325mg, hydrOXYzine pamoate, insulin aspart U-100, lidocaine HCl 2%, magnesium hydroxide 400 mg/5 ml, ondansetron    Review of Systems   Constitutional: Positive for activity change and fatigue. Negative for chills, diaphoresis and fever.   HENT: Negative for congestion, ear discharge and ear pain.         Tooth ache   Eyes: Negative for photophobia, pain and  visual disturbance.   Respiratory: Negative for chest tightness and shortness of breath.    Cardiovascular: Negative for chest pain and palpitations.   Gastrointestinal: Positive for nausea. Negative for abdominal distention, abdominal pain, constipation and diarrhea.   Endocrine: Negative for cold intolerance and heat intolerance.   Musculoskeletal: Positive for gait problem. Negative for arthralgias. Myalgias: over left ribs.   Skin: Negative for color change and wound.   Neurological: Negative for dizziness, seizures, weakness and headaches.   Hematological: Negative for adenopathy.   Psychiatric/Behavioral: Negative for agitation, confusion and hallucinations.     Objective:     Vital Signs (Most Recent):  Temp: 98.2 °F (36.8 °C) (18)  Pulse: (!) 52 (04/15/18 0535)  Resp: 18 (18)  BP: (!) 149/69 (04/15/18 0535)  SpO2: (!) 93 % (04/15/18 0535)    Vital Signs (24h Range):  Temp:  [98.2 °F (36.8 °C)] 98.2 °F (36.8 °C)  Pulse:  [50-55] 52  Resp:  [18] 18  SpO2:  [91 %-93 %] 93 %  BP: (134-151)/(61-69) 149/69     Physical Exam   Constitutional: She is oriented to person, place, and time. She appears well-developed and well-nourished. No distress.   HENT:   Head: Normocephalic and atraumatic.   Right Ear: External ear normal.   Left Ear: External ear normal.   Nose: Nose normal.   Eyes: Right eye exhibits no discharge. Left eye exhibits no discharge. No scleral icterus.   Neck: Normal range of motion.   Cardiovascular: Normal rate, regular rhythm and intact distal pulses.    Pulmonary/Chest: Effort normal. No respiratory distress. She has no wheezes.   Abdominal: Soft. She exhibits no distension. There is no tenderness.   Musculoskeletal: Normal range of motion. She exhibits no edema or tenderness.   Neurological: She is alert and oriented to person, place, and time.   -  Mental Status:  AAOx3.  Follows commands.  Answers correct age and .  Recent and remote memory intact.  -  Speech and  language:  No aphasia, mild dysarthria.    -  Vision:  R hemianopsia.  No ptosis.    -  Facial movement (CN VII): Mild facial droop.   -  Motor:  RUE: 2/5 EF, FF, WE.  LUE: 5/5.  RLE: 4-/5.  LLE: 5/5.  -  Tone:  Decreased RUE.  -  Sensory:  Intact to light touch and pin prick.   Skin: Skin is warm and dry. No rash noted.   Psychiatric: She has a normal mood and affect. Her behavior is normal. Thought content normal. Cognition and memory are impaired.   Vitals reviewed.    NEUROLOGICAL EXAMINATION:     MENTAL STATUS   Oriented to person, place, and time.       Assessment/Plan:      Kristina Aguirre is a 67 y.o. female admitted to inpatient rehabilitation on 4/2/2018 for 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 with impaired mobility and ADLs. Patient remains appropriate for PT, OT, and as required Speech therapy. Patient continues to require 24 hour nursing care as well as daily Physician assessment.    * 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    MRI showed multiple bilateral R>L acute infarcts on cerebral and cerebellar hemispheres highly suggestive of embolic phenomenon  -PT/OT/SLP        Toothache    4/12 ordered oragel and lidocaine solution  4/14 ordered rapid strep for sore throat associated with toothache  4/15 Prelim strep throat culture no growth.        Acute cystitis    Urine culture positive for Citrobacter freundii complex. Pan sensitive S/p 7d course of augmentin     4/14 u/a +leukocytes, blood, -nitrates. UCx pending  4/15 no growth on UCx      Atrial fibrillation    continue Apixaban    4/4 - reports of SOB. EKG shows no sign of A fib with RVR or Mi. CXR showing resolution of pulm edema. Pending labs.        S/p TAVR (transcatheter aortic valve replacement), bioprosthetic    Continue Apixaban and Plavix        Diabetes mellitus, type 2    Continue to monitor blood glucoses    4/4 - started on Diabetic diet    Lab Results   Component Value Date    HGBA1C 6.5  (H) 03/21/2018       - Diabetic Diet, Diabetic Education   - Monitor POCT TIDAC and qhs   - Continue with  ISS    POCT Glucose   Date Value Ref Range Status   04/08/2018 256 (H) 70 - 110 mg/dL Final   04/08/2018 143 (H) 70 - 110 mg/dL Final   04/07/2018 237 (H) 70 - 110 mg/dL Final   04/07/2018 146 (H) 70 - 110 mg/dL Final   04/07/2018 208 (H) 70 - 110 mg/dL Final   04/07/2018 159 (H) 70 - 110 mg/dL Final   04/06/2018 296 (H) 70 - 110 mg/dL Final   04/06/2018 134 (H) 70 - 110 mg/dL Final   04/06/2018 183 (H) 70 - 110 mg/dL Final   04/06/2018 170 (H) 70 - 110 mg/dL Final   04/05/2018 191 (H) 70 - 110 mg/dL Final                     Essential hypertension    Elevated on admission. Continue current meds    4/3/18 - started Lisinopril 10mg daily  4/4/18 - slightly improved. Increased to 20mg daily  4/6/18 - still elevated Bp. Increased to 30mg daily  4/7 cont to monitor trend   4/9 inc lisinopril to 40  4/10 Cont to monitor. BP elevated 175/77 prior to receiving lisinopril.  4/12 hydralazine 25 q8  4/15 inc hydralazine to 50 q8  Vitals:    04/14/18 2017 04/15/18 0535 04/15/18 0700 04/15/18 0808   BP:  (!) 149/69 (!) 174/67    BP Location:   Left arm    Patient Position:   Lying    Pulse:  (!) 52 (!) 54 (!) 54   Resp:   18 18   Temp:   98.1 °F (36.7 °C)    TempSrc:   Oral    SpO2: (!) 93% (!) 93%     Weight:       Height:                       DISCHARGE PLANNING:  Tentative Discharge Date: 4/19/2018    Future Appointments  Date Time Provider Department Center   5/10/2018 10:00 AM EKG, APPT [facility] EKG Simon Dawson   5/10/2018 10:40 AM [facility] Simon joshua Munson MD  Department of Physical Medicine & Rehab   [facility]

## 2018-04-15 NOTE — ASSESSMENT & PLAN NOTE
Elevated on admission. Continue current meds    4/3/18 - started Lisinopril 10mg daily  4/4/18 - slightly improved. Increased to 20mg daily  4/6/18 - still elevated Bp. Increased to 30mg daily  4/7 cont to monitor trend   4/9 inc lisinopril to 40  4/10 Cont to monitor. BP elevated 175/77 prior to receiving lisinopril.  4/12 hydralazine 25 q8  4/15 inc hydralazine to 50 q8  Vitals:    04/14/18 2017 04/15/18 0535 04/15/18 0700 04/15/18 0808   BP:  (!) 149/69 (!) 174/67    BP Location:   Left arm    Patient Position:   Lying    Pulse:  (!) 52 (!) 54 (!) 54   Resp:   18 18   Temp:   98.1 °F (36.7 °C)    TempSrc:   Oral    SpO2: (!) 93% (!) 93%     Weight:       Height:

## 2018-04-15 NOTE — PLAN OF CARE
Problem: Patient Care Overview  Goal: Plan of Care Review  Outcome: Ongoing (interventions implemented as appropriate)  Patient AAOx4 with complaints of pain a tooth ache. Patient resting today. Instructed patient to use call light for assistance and before getting out of bed. Will continue to monitor patient.

## 2018-04-15 NOTE — SUBJECTIVE & OBJECTIVE
Interval History 4/15/2018:  Patient is seen for follow-up rehab evaluation and recommendations: Patient still complaining of toothache. Using oragel and taking hydrocodone/apap prn. Prelim strep throat culture no growth. I have reviewed the HPI and PMFSH and there are no changes from admission.       Scheduled Medications:    amiodarone  400 mg Oral BID    amLODIPine  10 mg Oral Daily    amoxicillin-clavulanate 875-125mg  1 tablet Oral Q12H    apixaban  5 mg Oral BID    ascorbic acid (vitamin C)  500 mg Oral BID    atorvastatin  40 mg Oral Daily    benzocaine   Mouth/Throat QID    carvedilol  3.125 mg Oral Daily    chlorthalidone  25 mg Oral QAM    clopidogrel  75 mg Oral Daily    famotidine  20 mg Oral BID    fluticasone-vilanterol  1 puff Inhalation Daily    furosemide  20 mg Oral BID    hydrALAZINE  25 mg Oral Q8H    lidocaine  1 patch Transdermal Q24H    lisinopril  40 mg Oral Daily    polyethylene glycol  17 g Oral Daily       PRN Medications: acetaminophen, albuterol-ipratropium 2.5mg-0.5mg/3mL, bisacodyl, dextrose 50%, dextrose 50%, dextrose 50%, glucagon (human recombinant), glucose, glucose, hydrALAZINE, hydrocodone-acetaminophen 7.5-325mg, hydrOXYzine pamoate, insulin aspart U-100, lidocaine HCl 2%, magnesium hydroxide 400 mg/5 ml, ondansetron    Review of Systems   Constitutional: Positive for activity change and fatigue. Negative for chills, diaphoresis and fever.   HENT: Negative for congestion, ear discharge and ear pain.         Tooth ache   Eyes: Negative for photophobia, pain and visual disturbance.   Respiratory: Negative for chest tightness and shortness of breath.    Cardiovascular: Negative for chest pain and palpitations.   Gastrointestinal: Positive for nausea. Negative for abdominal distention, abdominal pain, constipation and diarrhea.   Endocrine: Negative for cold intolerance and heat intolerance.   Musculoskeletal: Positive for gait problem. Negative for arthralgias.  Myalgias: over left ribs.   Skin: Negative for color change and wound.   Neurological: Negative for dizziness, seizures, weakness and headaches.   Hematological: Negative for adenopathy.   Psychiatric/Behavioral: Negative for agitation, confusion and hallucinations.     Objective:     Vital Signs (Most Recent):  Temp: 98.2 °F (36.8 °C) (18)  Pulse: (!) 52 (04/15/18 0535)  Resp: 18 (18)  BP: (!) 149/69 (04/15/18 0535)  SpO2: (!) 93 % (04/15/18 0535)    Vital Signs (24h Range):  Temp:  [98.2 °F (36.8 °C)] 98.2 °F (36.8 °C)  Pulse:  [50-55] 52  Resp:  [18] 18  SpO2:  [91 %-93 %] 93 %  BP: (134-151)/(61-69) 149/69     Physical Exam   Constitutional: She is oriented to person, place, and time. She appears well-developed and well-nourished. No distress.   HENT:   Head: Normocephalic and atraumatic.   Right Ear: External ear normal.   Left Ear: External ear normal.   Nose: Nose normal.   Eyes: Right eye exhibits no discharge. Left eye exhibits no discharge. No scleral icterus.   Neck: Normal range of motion.   Cardiovascular: Normal rate, regular rhythm and intact distal pulses.    Pulmonary/Chest: Effort normal. No respiratory distress. She has no wheezes.   Abdominal: Soft. She exhibits no distension. There is no tenderness.   Musculoskeletal: Normal range of motion. She exhibits no edema or tenderness.   Neurological: She is alert and oriented to person, place, and time.   -  Mental Status:  AAOx3.  Follows commands.  Answers correct age and .  Recent and remote memory intact.  -  Speech and language:  No aphasia, mild dysarthria.    -  Vision:  R hemianopsia.  No ptosis.    -  Facial movement (CN VII): Mild facial droop.   -  Motor:  RUE: 2/5 EF, FF, WE.  LUE: 5/5.  RLE: 4-/5.  LLE: 5/5.  -  Tone:  Decreased RUE.  -  Sensory:  Intact to light touch and pin prick.   Skin: Skin is warm and dry. No rash noted.   Psychiatric: She has a normal mood and affect. Her behavior is normal. Thought content  normal. Cognition and memory are impaired.   Vitals reviewed.    NEUROLOGICAL EXAMINATION:     MENTAL STATUS   Oriented to person, place, and time.

## 2018-04-15 NOTE — NURSING
Patient complained of left face pain and stated pain pill only helps for 2-3 hours and never helps four hours.Patient stated the pain affects eating and sleeping.

## 2018-04-16 ENCOUNTER — HOME CARE VISIT (OUTPATIENT)
Dept: NEUROLOGY | Facility: HOSPITAL | Age: 68
End: 2018-04-16

## 2018-04-16 VITALS — HEART RATE: 75 BPM | DIASTOLIC BLOOD PRESSURE: 77 MMHG | SYSTOLIC BLOOD PRESSURE: 133 MMHG | OXYGEN SATURATION: 95 %

## 2018-04-16 LAB
ANION GAP SERPL CALC-SCNC: 11 MMOL/L
BACTERIA THROAT CULT: NORMAL
BASOPHILS # BLD AUTO: 0.07 K/UL
BASOPHILS NFR BLD: 1.1 %
BUN SERPL-MCNC: 28 MG/DL
CALCIUM SERPL-MCNC: 9.9 MG/DL
CHLORIDE SERPL-SCNC: 91 MMOL/L
CO2 SERPL-SCNC: 29 MMOL/L
CREAT SERPL-MCNC: 1 MG/DL
DIFFERENTIAL METHOD: ABNORMAL
EOSINOPHIL # BLD AUTO: 0.4 K/UL
EOSINOPHIL NFR BLD: 6.9 %
ERYTHROCYTE [DISTWIDTH] IN BLOOD BY AUTOMATED COUNT: 17.5 %
EST. GFR  (AFRICAN AMERICAN): >60 ML/MIN/1.73 M^2
EST. GFR  (NON AFRICAN AMERICAN): 57.6 ML/MIN/1.73 M^2
GLUCOSE SERPL-MCNC: 112 MG/DL
HCT VFR BLD AUTO: 32.6 %
HGB BLD-MCNC: 10.4 G/DL
IMM GRANULOCYTES # BLD AUTO: 0.01 K/UL
IMM GRANULOCYTES NFR BLD AUTO: 0.2 %
LYMPHOCYTES # BLD AUTO: 1.7 K/UL
LYMPHOCYTES NFR BLD: 27.8 %
MAGNESIUM SERPL-MCNC: 1.9 MG/DL
MCH RBC QN AUTO: 24.4 PG
MCHC RBC AUTO-ENTMCNC: 31.9 G/DL
MCV RBC AUTO: 77 FL
MONOCYTES # BLD AUTO: 0.7 K/UL
MONOCYTES NFR BLD: 10.9 %
NEUTROPHILS # BLD AUTO: 3.3 K/UL
NEUTROPHILS NFR BLD: 53.1 %
NRBC BLD-RTO: 0 /100 WBC
PHOSPHATE SERPL-MCNC: 4.5 MG/DL
PLATELET # BLD AUTO: 284 K/UL
PMV BLD AUTO: 9.8 FL
POCT GLUCOSE: 114 MG/DL (ref 70–110)
POCT GLUCOSE: 127 MG/DL (ref 70–110)
POCT GLUCOSE: 143 MG/DL (ref 70–110)
POCT GLUCOSE: 146 MG/DL (ref 70–110)
POCT GLUCOSE: 158 MG/DL (ref 70–110)
POTASSIUM SERPL-SCNC: 3.8 MMOL/L
RBC # BLD AUTO: 4.26 M/UL
SODIUM SERPL-SCNC: 131 MMOL/L
WBC # BLD AUTO: 6.25 K/UL

## 2018-04-16 PROCEDURE — 85025 COMPLETE CBC W/AUTO DIFF WBC: CPT

## 2018-04-16 PROCEDURE — 97112 NEUROMUSCULAR REEDUCATION: CPT

## 2018-04-16 PROCEDURE — 36415 COLL VENOUS BLD VENIPUNCTURE: CPT

## 2018-04-16 PROCEDURE — 99232 SBSQ HOSP IP/OBS MODERATE 35: CPT | Mod: ,,, | Performed by: PHYSICAL MEDICINE & REHABILITATION

## 2018-04-16 PROCEDURE — 25000003 PHARM REV CODE 250: Performed by: PHYSICAL MEDICINE & REHABILITATION

## 2018-04-16 PROCEDURE — 84100 ASSAY OF PHOSPHORUS: CPT

## 2018-04-16 PROCEDURE — 83735 ASSAY OF MAGNESIUM: CPT

## 2018-04-16 PROCEDURE — 97116 GAIT TRAINING THERAPY: CPT

## 2018-04-16 PROCEDURE — 97535 SELF CARE MNGMENT TRAINING: CPT

## 2018-04-16 PROCEDURE — 25000003 PHARM REV CODE 250: Performed by: NURSE PRACTITIONER

## 2018-04-16 PROCEDURE — 80048 BASIC METABOLIC PNL TOTAL CA: CPT

## 2018-04-16 PROCEDURE — 97110 THERAPEUTIC EXERCISES: CPT

## 2018-04-16 PROCEDURE — 12800000 HC REHAB SEMI-PRIVATE ROOM

## 2018-04-16 PROCEDURE — 92507 TX SP LANG VOICE COMM INDIV: CPT

## 2018-04-16 RX ADMIN — AMLODIPINE BESYLATE 10 MG: 10 TABLET ORAL at 08:04

## 2018-04-16 RX ADMIN — CHLORTHALIDONE 25 MG: 25 TABLET ORAL at 06:04

## 2018-04-16 RX ADMIN — HYDRALAZINE HYDROCHLORIDE 50 MG: 50 TABLET ORAL at 04:04

## 2018-04-16 RX ADMIN — LISINOPRIL 40 MG: 20 TABLET ORAL at 08:04

## 2018-04-16 RX ADMIN — ATORVASTATIN CALCIUM 40 MG: 20 TABLET, FILM COATED ORAL at 08:04

## 2018-04-16 RX ADMIN — Medication 500 MG: at 08:04

## 2018-04-16 RX ADMIN — BENZOCAINE: 100 GEL TOPICAL at 04:04

## 2018-04-16 RX ADMIN — BENZOCAINE: 100 GEL TOPICAL at 01:04

## 2018-04-16 RX ADMIN — POLYETHYLENE GLYCOL 3350 17 G: 17 POWDER, FOR SOLUTION ORAL at 08:04

## 2018-04-16 RX ADMIN — FLUTICASONE FUROATE AND VILANTEROL TRIFENATATE 1 PUFF: 100; 25 POWDER RESPIRATORY (INHALATION) at 08:04

## 2018-04-16 RX ADMIN — APIXABAN 5 MG: 5 TABLET, FILM COATED ORAL at 08:04

## 2018-04-16 RX ADMIN — ONDANSETRON 8 MG: 8 TABLET, ORALLY DISINTEGRATING ORAL at 03:04

## 2018-04-16 RX ADMIN — AMIODARONE HYDROCHLORIDE 400 MG: 200 TABLET ORAL at 08:04

## 2018-04-16 RX ADMIN — HYDROCODONE BITARTRATE AND ACETAMINOPHEN 1 TABLET: 7.5; 325 TABLET ORAL at 08:04

## 2018-04-16 RX ADMIN — FUROSEMIDE 20 MG: 20 TABLET ORAL at 04:04

## 2018-04-16 RX ADMIN — HYDROCODONE BITARTRATE AND ACETAMINOPHEN 1 TABLET: 7.5; 325 TABLET ORAL at 12:04

## 2018-04-16 RX ADMIN — HYDROCODONE BITARTRATE AND ACETAMINOPHEN 1 TABLET: 7.5; 325 TABLET ORAL at 04:04

## 2018-04-16 RX ADMIN — FUROSEMIDE 20 MG: 20 TABLET ORAL at 08:04

## 2018-04-16 RX ADMIN — AMOXICILLIN AND CLAVULANATE POTASSIUM 1 TABLET: 875; 125 TABLET, FILM COATED ORAL at 08:04

## 2018-04-16 RX ADMIN — CARVEDILOL 3.12 MG: 3.12 TABLET, FILM COATED ORAL at 08:04

## 2018-04-16 RX ADMIN — HYDROCODONE BITARTRATE AND ACETAMINOPHEN 1 TABLET: 7.5; 325 TABLET ORAL at 06:04

## 2018-04-16 RX ADMIN — FAMOTIDINE 20 MG: 20 TABLET, FILM COATED ORAL at 08:04

## 2018-04-16 RX ADMIN — CLOPIDOGREL BISULFATE 75 MG: 75 TABLET ORAL at 08:04

## 2018-04-16 RX ADMIN — HYDRALAZINE HYDROCHLORIDE 50 MG: 50 TABLET ORAL at 08:04

## 2018-04-16 RX ADMIN — HYDRALAZINE HYDROCHLORIDE 50 MG: 50 TABLET ORAL at 05:04

## 2018-04-16 RX ADMIN — HYDROCODONE BITARTRATE AND ACETAMINOPHEN 1 TABLET: 7.5; 325 TABLET ORAL at 10:04

## 2018-04-16 RX ADMIN — INSULIN ASPART 1 UNITS: 100 INJECTION, SOLUTION INTRAVENOUS; SUBCUTANEOUS at 09:04

## 2018-04-16 RX ADMIN — BENZOCAINE: 100 GEL TOPICAL at 08:04

## 2018-04-16 NOTE — PROGRESS NOTES
Ms. Timothy VS are WNL on today's visit. She denies any personal history of stroke. States prior to stroke she worked overnight and lives with family in and off. PMH of tobacco use denies ETOH use. Patient seen at [place] inpatient rehab was working with PT walking in room and hallway. LHE educated patient in SM program, stroke prevention, and stroke RF.

## 2018-04-16 NOTE — NURSING
Placed electrode pads and connected Holter monitor (was not on her at this time.)  Changed rechargeable battery and placed the one taken from the holter monitor into the recharger.

## 2018-04-16 NOTE — PROGRESS NOTES
Occupational Therapy  PM Group [facility]  Kristina Aguirre   MRN: [id number]   A client care conference was performed between the LOTR and ALMAGUER, prior to treatment by ROSINA, to discuss the patient's status, treatment plan and established goals.     04/16/18 1325   OT Time Calculation   OT Start Time 1325   OT Stop Time 1410   OT Total Time (min) 45 min   General   OT Date of Treatment 04/16/18   Treatment/Billable Minutes   Therapeutic Group 45   Total Time 45     Subjective: Ask pt how they responding to their therapy treatment sessions.ok  Pain level:                         5/10              How was pain addressed: shoulder R  Precautions:fall  Pt participated in kitchen mobility group from w/c level /or standing with/without AD. Pt tolerated activity for ___45__minutes. _ The patient required _short__ rest breaks during this activity.   The group activity reviewed safety techniques, energy conservation and work simplification for kitchen accessibility and safe mobility. The patient performed kitchen mobility in ADL Therapy Kitchen. Patient completed kitchen mobility (standing with RW or at w/c level) with _SBA___ assistance. Patient demonstrated appropriate safety awareness with item-retrieval from a variety of surfaces within kitchen. Patient able to successfully transfer items from kitchen to table with __SBA_ assistance. .  Endurance _moderate__(6-20)on the RPE scale. no pain complaints voiced or observed during functional mobility. Patient completed setting table appropriately with efficient timing. Patient demonstrated increased awareness with compensatory techniques with any home barriers within kitchen.   -Social Interaction: (choose FIM score rating below) 5  - Interacts appropriately with others - No medications needed. Patient asleep all shift (7)  - Interacts appropriately with others with medication or extra time(anti-anxiety, antidepressant)  (6)  - Interacts appropriately 90% of time - needs monitoring or  encouragement for participation or interaction  (5)  - Interacts appropriately 75-89% of time - needs redirection for appropriate language or to initiate interaction  (4)  - Interacts appropriately 50-74% of time - May be physically or verbally inappropriate   (3)  - Interacts appropriately 25-49% of time - Needs frequent redirection  (2)  - Interacts appropriately less than 25% of time - May be withdrawn or combative  (1)  Assessment:  Patient participated in a 45 minute kitchen mobility activity.  The group activity challenged dynamic standing/sitting balance, core strengthening, bilateral upper extremity strengthening, cardiovascular and respiratory capacity, hand -eye coordination, visual scanning and social affect/mood.       Davian Ortiz, SARAH 4/16/2018

## 2018-04-16 NOTE — PROGRESS NOTES
Spoke with pt's BAILEY Hernandez. We spoke about pt being discharged on Thursday. He asked when someone was coming out to do a home eval of her trailer. I stated we don't do that at this time and the pt told us on admit she lived in a small trailer with what equipment she didn't have. Tried to explain we would order DME and HH before patient was discharged and what this included but David kept speaking over me and never stated understanding. Informed Dr. Dunaway of pt and BAILEY's complaint of ear pain. CM will continue to follow until discharge.

## 2018-04-16 NOTE — SUBJECTIVE & OBJECTIVE
Interval History 4/16/2018:  Patient is seen for follow-up rehab evaluation and recommendations: Toothache persists. States she feels a little better this morning. Tells other staff she wants to have her teeth pulled. Taking hydrocodone/apap prn and using oragel for pain. Prelim strep throat culture no growth. I have reviewed the HPI and PMFSH and there are no changes from admission.       Scheduled Medications:    amiodarone  400 mg Oral BID    amLODIPine  10 mg Oral Daily    amoxicillin-clavulanate 875-125mg  1 tablet Oral Q12H    apixaban  5 mg Oral BID    ascorbic acid (vitamin C)  500 mg Oral BID    atorvastatin  40 mg Oral Daily    benzocaine   Mouth/Throat QID    carvedilol  3.125 mg Oral Daily    chlorthalidone  25 mg Oral QAM    clopidogrel  75 mg Oral Daily    famotidine  20 mg Oral BID    fluticasone-vilanterol  1 puff Inhalation Daily    furosemide  20 mg Oral BID    hydrALAZINE  50 mg Oral Q8H    lidocaine  1 patch Transdermal Q24H    lisinopril  40 mg Oral Daily    polyethylene glycol  17 g Oral Daily       PRN Medications: acetaminophen, albuterol-ipratropium 2.5mg-0.5mg/3mL, bisacodyl, dextrose 50%, dextrose 50%, dextrose 50%, glucagon (human recombinant), glucose, glucose, hydrALAZINE, hydrocodone-acetaminophen 7.5-325mg, hydrOXYzine pamoate, insulin aspart U-100, lidocaine HCl 2%, magnesium hydroxide 400 mg/5 ml, ondansetron    Review of Systems   Constitutional: Positive for activity change and fatigue. Negative for chills, diaphoresis and fever.   HENT: Negative for congestion, ear discharge and ear pain.         Tooth ache   Eyes: Negative for photophobia, pain and visual disturbance.   Respiratory: Negative for chest tightness and shortness of breath.    Cardiovascular: Negative for chest pain and palpitations.   Gastrointestinal: Positive for nausea. Negative for abdominal distention, abdominal pain, constipation and diarrhea.   Endocrine: Negative for cold intolerance and  heat intolerance.   Musculoskeletal: Positive for gait problem. Negative for arthralgias. Myalgias: over left ribs.   Skin: Negative for color change and wound.   Neurological: Negative for dizziness, seizures, weakness and headaches.   Hematological: Negative for adenopathy.   Psychiatric/Behavioral: Negative for agitation, confusion and hallucinations.     Objective:     Vital Signs (Most Recent):  Temp: 97.8 °F (36.6 °C) (18 06)  Pulse: (!) 51 (18 08)  Resp: 16 (18)  BP: 131/60 (18 0647)  SpO2: (!) 93 % (04/15/18 1837)    Vital Signs (24h Range):  Temp:  [97.8 °F (36.6 °C)-98.3 °F (36.8 °C)] 97.8 °F (36.6 °C)  Pulse:  [51-52] 51  Resp:  [16-18] 16  SpO2:  [93 %] 93 %  BP: (124-195)/(57-79) 131/60     Physical Exam   Constitutional: She is oriented to person, place, and time. She appears well-developed and well-nourished. No distress.   HENT:   Head: Normocephalic and atraumatic.   Right Ear: External ear normal.   Left Ear: External ear normal.   Nose: Nose normal.   Eyes: Right eye exhibits no discharge. Left eye exhibits no discharge. No scleral icterus.   Neck: Normal range of motion.   Cardiovascular: Normal rate, regular rhythm and intact distal pulses.    Pulmonary/Chest: Effort normal. No respiratory distress. She has no wheezes.   Abdominal: Soft. She exhibits no distension. There is no tenderness.   Musculoskeletal: Normal range of motion. She exhibits no edema or tenderness.   Neurological: She is alert and oriented to person, place, and time.   -  Mental Status:  AAOx3.  Follows commands.  Answers correct age and .  Recent and remote memory intact.  -  Speech and language:  No aphasia, mild dysarthria.    -  Vision:  R hemianopsia.  No ptosis.    -  Facial movement (CN VII): Mild facial droop.   -  Motor:  RUE: 2/5 EF, FF, WE.  LUE: 5/5.  RLE: 4-/5.  LLE: 5/5.  -  Tone:  Decreased RUE.  -  Sensory:  Intact to light touch and pin prick.   Skin: Skin is warm and dry.  No rash noted.   Psychiatric: She has a normal mood and affect. Her behavior is normal. Thought content normal. Cognition and memory are impaired.   Vitals reviewed.    NEUROLOGICAL EXAMINATION:     MENTAL STATUS   Oriented to person, place, and time.

## 2018-04-16 NOTE — PROGRESS NOTES
"Occupational Therapy  AM Treatment  Kristina Aguirre   MRN: [id number]   Room/Bed: E261/E261 B     04/16/18 0930   OT Time Calculation   OT Start Time 0930   OT Stop Time 1015   OT Total Time (min) 45 min   General   OT Date of Treatment 04/16/18   Family/Caregiver Present No   Patient Found (position) Other (comment)  (seated on TTB in shower with PCT present)   Precautions   General Precautions aspiration;fall;vision impaired   Orthopedic No   Required Braces or Orthoses No   Cardiovascular/Pulmonary (holter monitor)   Visual/Auditory Vision impaired   Subjective   Patient states "I just want to lay down now. I'll do exercise in bed."    Pain/Comfort   Pain Rating 6/10   Location - Side Right   Location shoulder   Pain Addressed Reposition;Distraction   Vital Signs   Pulse (!) 53   SpO2 95 %   Bed Mobility   Bed Mobility yes   Sit to Supine Modified Independent   Sit to Supine Comments Mod (I) with safety concerns from EOB to flat bed   Transfers   Transfer yes   Sit to Stand   Sit <> Stand Assistance Contact Guard Assistance   Sit <> Stand Assistive Device No Assistive Device   Trials/Comments CGA for steadying assist from TTB   Stand to Sit   Assistance Contact Guard Assistance   Assistive Device No Assistive Device   Trials/Comments CGA for steadying assist to TTB   Chair to Bed   Technique Stand Pivot   Assistance Contact Guard Assistance   Assistive Device No Assistive Device   Trials/Comments CGA for steadying assist as pt ambulated from TTB in bathroom > EOB   Grooming   Additional Grooming yes   Grooming Level of Assistance Contact guard assistance   Oral Hygeine Level of Assistance Contact guard assistance   Hair Grooming Level of Assistance Contact guard assistance   Grooming Where Assessed Standing sinkside   Grooming Comments CGA for steadying assist standing sinkside for 2/2 G/H tasks   LE Dressing   LE Dressing Yes   LE Dressing  Level of Assistance Contact guard   LE Dressing Level of Assistance Minimum " "assistance   Shoe Level of Assistance Stand by assistance  (for slip on shoes)   Adult Briefs Level of Assistance Contact guard   LE Dressing Where Assessed (TTB)   LE Dressing Comments Min (A) for donning pants over R hip and steadying assist in stance   Exercise Tools   Exercise Tools Yes   Other Exercise Tool 1 2x15 reps chest press while supine in bed with 1# dowel to increase RUE strength and AAROM; 1x10 reps shld flexion with BUE while supine in bed to increase RUE strength and AAROM for ADL prep   Activity Tolerance   Activity Tolerance Patient limited by pain   Medical Staff Made Aware NSG   After Treatment   Patient Position After Treatment Supine in bed   Patient after treatment left call button in reach;with bed alarm on   Assessment   Prognosis Fair   Problem List Decreased Self Care skills;Decreased upper extremity range of motion;Decreased upper extremity strength;Decreased safe judgment during ADL;Decreased cognition;Decreased endurance;Visual deficit;Decreased fine motor control;Decreased functional mobility;Decreased gross motor control;Decreased IADLs;Decreased Function of right upper extremity;Decreased trunk control for functional activities   Assessment Pt reported decreased pain in L lower jaw region this morning, but reported "pressure" in L ear. Pt with more agitation with dressing and required VCs to fully engage in dressing to increase (I) with donning briefs and pants. Pt O2 dropped to 87% after ambulating from TTB > EOB; increased to 95% with 1L oxygen and stabilized. Pt would continue to benefit from skilled OT services to increase (I) with ADLs/IADLs.    Level of Motivation/Participation good   Barriers to Discharge Decreased caregiver support   Discharge Recommendations   Equipment Needed After Discharge bath bench;wheelchair   Discharge Facility/Level Of Care Needs home health OT   Plan   Plan Continue with current plan   Therapy Frequency 2 times/day   Occupational Therapy Follow-up "   OT Follow-up? Yes   Treatment/Billable Minutes   Self Care/Home Management 30   Neuromuscular Re-ed 15   Total Time 45       Migdalia Rodarte OT  4/16/2018  Pt left supine in bed with bed alarm (location) with call light and all necessities in reach, nursing notified.     LEGEND:   CGA: Contact Guard Assist   EOB: Edgeof Bed   HHA: Hand Held Assist   HOB: Head of Bed   (I): Independent-patient performs task in a timely manner   Max (A): Maximal Assist-patient performs 25-49% of task   Min (A): Minimal Assist- patient performs 75% or more of task   Mod (A): Moderate Assist- patient performs 50-74% of task   NA: Not applicable   NT: Not tested   OOB: Out of Bed   PTA: Prior to admit   QC: Quad Cane   RW: Rolling Walker   (S): Supervision- patient requires cues, coaxing, prompting   SBA: Stand By Assist   SC: Straight Cane   SW: Standard Walker   TBA: To be assessed   Total (A): Total Assist- patient performs less than 25% of task   WC: Wheelchair   WFL: Within Functional Limits   WNL: Within Normal Limits

## 2018-04-16 NOTE — ASSESSMENT & PLAN NOTE
4/12 ordered oragel and lidocaine solution  4/13 amoxicillin-clavulanate  4/14 ordered rapid strep for sore throat associated with toothache  4/15 Prelim strep throat culture no growth.  4/16 cont amoxicillin-clavulanate

## 2018-04-16 NOTE — PROGRESS NOTES
[facility]  Physical Medicine & Rehab  Progress Note    Patient Name: Kristina Aguirre  MRN: [id number]  Patient Class: IP- Rehab   Admission Date: 4/2/2018  Length of Stay: 14 days  Attending Physician: Peter Dunaway MD  Primary Care Provider: Robert Mattson MD    Subjective:     Principal Problem: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    Interval History 4/16/2018:  Patient is seen for follow-up rehab evaluation and recommendations: Toothache persists. States she feels a little better this morning. Tells other staff she wants to have her teeth pulled. Taking hydrocodone/apap prn and using oragel for pain. Prelim strep throat culture no growth. I have reviewed the HPI and PMFSH and there are no changes from admission.       Scheduled Medications:    amiodarone  400 mg Oral BID    amLODIPine  10 mg Oral Daily    amoxicillin-clavulanate 875-125mg  1 tablet Oral Q12H    apixaban  5 mg Oral BID    ascorbic acid (vitamin C)  500 mg Oral BID    atorvastatin  40 mg Oral Daily    benzocaine   Mouth/Throat QID    carvedilol  3.125 mg Oral Daily    chlorthalidone  25 mg Oral QAM    clopidogrel  75 mg Oral Daily    famotidine  20 mg Oral BID    fluticasone-vilanterol  1 puff Inhalation Daily    furosemide  20 mg Oral BID    hydrALAZINE  50 mg Oral Q8H    lidocaine  1 patch Transdermal Q24H    lisinopril  40 mg Oral Daily    polyethylene glycol  17 g Oral Daily       PRN Medications: acetaminophen, albuterol-ipratropium 2.5mg-0.5mg/3mL, bisacodyl, dextrose 50%, dextrose 50%, dextrose 50%, glucagon (human recombinant), glucose, glucose, hydrALAZINE, hydrocodone-acetaminophen 7.5-325mg, hydrOXYzine pamoate, insulin aspart U-100, lidocaine HCl 2%, magnesium hydroxide 400 mg/5 ml, ondansetron    Review of Systems   Constitutional: Positive for activity change and fatigue. Negative for chills, diaphoresis and fever.   HENT: Negative for congestion, ear discharge and ear  pain.         Tooth ache   Eyes: Negative for photophobia, pain and visual disturbance.   Respiratory: Negative for chest tightness and shortness of breath.    Cardiovascular: Negative for chest pain and palpitations.   Gastrointestinal: Positive for nausea. Negative for abdominal distention, abdominal pain, constipation and diarrhea.   Endocrine: Negative for cold intolerance and heat intolerance.   Musculoskeletal: Positive for gait problem. Negative for arthralgias. Myalgias: over left ribs.   Skin: Negative for color change and wound.   Neurological: Negative for dizziness, seizures, weakness and headaches.   Hematological: Negative for adenopathy.   Psychiatric/Behavioral: Negative for agitation, confusion and hallucinations.     Objective:     Vital Signs (Most Recent):  Temp: 97.8 °F (36.6 °C) (04/16/18 0647)  Pulse: (!) 51 (04/16/18 0827)  Resp: 16 (04/16/18 0827)  BP: 131/60 (04/16/18 0647)  SpO2: (!) 93 % (04/15/18 1837)    Vital Signs (24h Range):  Temp:  [97.8 °F (36.6 °C)-98.3 °F (36.8 °C)] 97.8 °F (36.6 °C)  Pulse:  [51-52] 51  Resp:  [16-18] 16  SpO2:  [93 %] 93 %  BP: (124-195)/(57-79) 131/60     Physical Exam   Constitutional: She is oriented to person, place, and time. She appears well-developed and well-nourished. No distress.   HENT:   Head: Normocephalic and atraumatic.   Right Ear: External ear normal.   Left Ear: External ear normal.   Nose: Nose normal.   Eyes: Right eye exhibits no discharge. Left eye exhibits no discharge. No scleral icterus.   Neck: Normal range of motion.   Cardiovascular: Normal rate, regular rhythm and intact distal pulses.    Pulmonary/Chest: Effort normal. No respiratory distress. She has no wheezes.   Abdominal: Soft. She exhibits no distension. There is no tenderness.   Musculoskeletal: Normal range of motion. She exhibits no edema or tenderness.   Neurological: She is alert and oriented to person, place, and time.   -  Mental Status:  AAOx3.  Follows commands.   Answers correct age and .  Recent and remote memory intact.  -  Speech and language:  No aphasia, mild dysarthria.    -  Vision:  R hemianopsia.  No ptosis.    -  Facial movement (CN VII): Mild facial droop.   -  Motor:  RUE: 2/5 EF, FF, WE.  LUE: 5/5.  RLE: 4-/5.  LLE: 5/5.  -  Tone:  Decreased RUE.  -  Sensory:  Intact to light touch and pin prick.   Skin: Skin is warm and dry. No rash noted.   Psychiatric: She has a normal mood and affect. Her behavior is normal. Thought content normal. Cognition and memory are impaired.   Vitals reviewed.    NEUROLOGICAL EXAMINATION:     MENTAL STATUS   Oriented to person, place, and time.       Assessment/Plan:      Kristina Aguirre is a 67 y.o. female admitted to inpatient rehabilitation on 2018 for 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 with impaired mobility and ADLs. Patient remains appropriate for PT, OT, and as required Speech therapy. Patient continues to require 24 hour nursing care as well as daily Physician assessment.    * 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    MRI showed multiple bilateral R>L acute infarcts on cerebral and cerebellar hemispheres highly suggestive of embolic phenomenon  -PT/OT/SLP        Toothache     ordered oragel and lidocaine solution   amoxicillin-clavulanate   ordered rapid strep for sore throat associated with toothache  4/15 Prelim strep throat culture no growth.   cont amoxicillin-clavulanate        Acute cystitis    Urine culture positive for Citrobacter freundii complex. Pan sensitive S/p 7d course of augmentin      u/a +leukocytes, blood, -nitrates. UCx pending        Atrial fibrillation    continue Apixaban     - reports of SOB. EKG shows no sign of A fib with RVR or Mi. CXR showing resolution of pulm edema. Pending labs.        S/p TAVR (transcatheter aortic valve replacement), bioprosthetic    Continue Apixaban and Plavix        Diabetes mellitus, type 2     Continue to monitor blood glucoses    4/4 - started on Diabetic diet    Lab Results   Component Value Date    HGBA1C 6.5 (H) 03/21/2018       - Diabetic Diet, Diabetic Education   - Monitor POCT TIDAC and qhs   - Continue with  ISS    POCT Glucose   Date Value Ref Range Status   04/08/2018 256 (H) 70 - 110 mg/dL Final   04/08/2018 143 (H) 70 - 110 mg/dL Final   04/07/2018 237 (H) 70 - 110 mg/dL Final   04/07/2018 146 (H) 70 - 110 mg/dL Final   04/07/2018 208 (H) 70 - 110 mg/dL Final   04/07/2018 159 (H) 70 - 110 mg/dL Final   04/06/2018 296 (H) 70 - 110 mg/dL Final   04/06/2018 134 (H) 70 - 110 mg/dL Final   04/06/2018 183 (H) 70 - 110 mg/dL Final   04/06/2018 170 (H) 70 - 110 mg/dL Final   04/05/2018 191 (H) 70 - 110 mg/dL Final                     Essential hypertension    Elevated on admission. Continue current meds    4/3/18 - started Lisinopril 10mg daily  4/4/18 - slightly improved. Increased to 20mg daily  4/6/18 - still elevated Bp. Increased to 30mg daily  4/7 cont to monitor trend   4/9 inc lisinopril to 40  4/10 Cont to monitor. BP elevated 175/77 prior to receiving lisinopril.  4/12 hydralazine 25 q8  4/15 inc hydralazine to 50 q8  Vitals:    04/14/18 2017 04/15/18 0535 04/15/18 0700 04/15/18 0808   BP:  (!) 149/69 (!) 174/67    BP Location:   Left arm    Patient Position:   Lying    Pulse:  (!) 52 (!) 54 (!) 54   Resp:   18 18   Temp:   98.1 °F (36.7 °C)    TempSrc:   Oral    SpO2: (!) 93% (!) 93%     Weight:       Height:                       DISCHARGE PLANNING:  Tentative Discharge Date: 4/19/2018    Future Appointments  Date Time Provider Department Center   5/10/2018 10:00 AM EKG, APPT [facility]   5/10/2018 10:40 AM Sarbjit Banegas MD [facility] Simon Munson MD  Department of Physical Medicine & Rehab   [facility]

## 2018-04-16 NOTE — PROGRESS NOTES
"Physical Therapy   Treatment    Kristina Aguirre   MRN: [id number]        04/16/18 1115   PT Time Calculation   PT Start Time 1115   PT Stop Time 1202   PT Total Time (min) 47 min   Treatment   Treatment Type Treatment   PT/PTA PT   PTA Visit Number 0   General   PT Received On 04/16/18   Patient Found (position) Supine in bed   Pt found with (holter monitor)   Precautions   General Precautions fall   Orthopedic No   Required Braces or Orthoses No   Cardiovascular/Pulmonary (Holter monitor )   Visual/Auditory Vision impaired   Subjective   Patient states "I'm just so weak, I know its from lying in the bed the past few days"    Pain/Comfort   Pain Rating 1 other (see comments)  (8.5)   Location - Side 1 Left   Location 1 (Ear and Mouth)   Pain Addressed 1 Pre-medicate for activity;Reposition;Distraction   Pain Rating Post-Intervention 1 8/10   Bed Mobility   Bed Mobility yes   Supine to Sit Stand by Assistance   Sit to Supine Stand by Assistance   Transfers   Transfer yes   Sit to Stand   Sit <> Stand Assistance Contact Guard Assistance;Minimum Assistance   Sit <> Stand Assistive Device Other (see comments)  (L HHA)   Trials/Comments Pt perfromed 8 times throughout treatment session   Stand to Sit   Assistance Contact Guard Assistance   Assistive Device No Assistive Device   Trials/Comments (8 trials throuhgout treatment session)   Gait   Gait Yes   Weight Bearing Status Full   Gait 1   Surface 1 Level tile   Gait Assistive Device (HHA at the start of each trial.)   Assistance 1 Minimum assistance;Contact Guard Assistance   Gait Distance Pt ambulated 66', 96', and 72' with a seated rest break between each trial. Pt also ambulated 215 feet with two standing rest breaks.  Pt requires Min A and occasional Mod A for balance, especially around turns and when returning to sit at the end of a trial.    Gait Pattern swing-through gait   Gait Deviation(s) decreased perez;increased time in double stance;decreased velocity of limb " motion;decreased step length;decreased stride length;increased stride width;decreased toe-to-floor clearance;decreased weight-shifting ability   Impairments Contributing to Gait Deviations impaired balance;impaired coordination;impaired motor control;pain;impaired postural control;decreased sensation   Supine   Supine-Exercises Lower extremity   Supine-Exercise Type Ankle pumps;Heel slides   Supine-Exercise Comments Pt performed 3X20 reps to BLE   Seated   Seated-Exercises Lower extremity   Seated-Exercise Type Long arc quads   Seated-Exercise Comments BLE 3X10   Other Activities   Other Activities Other (Comment)  (Sit to stands from bed without UE support. X5 with CGA.)   Assessment   Prognosis Fair   Problem List Decreased strength;Decreased endurance;Impaired balance;Decreased mobility;Impaired judgement;Decreased safety awareness;Impaired vision;Impaired sensation;Pain   Session Assessment progressing toward goals   Assessment Mrs. Aguirre was agreeable to PT treatment session in room only. Pt complained of SOB after ambulation SPO2 was 95% and she recovered to 99% within 1 minute. PT demonstrated and pt performed PLB during ambulation and pt reported improvements in SOB. Mrs. Aguirre presents with impaired safety awareness and requires assistance during ambulation, especially during turning to prevent LOB.  Pt requires increased verbal cueing to decrease gait speed at the end of ambulation trials to improve balance, and safety. Pt has trouble staying on the task at hand but can be easily redirected. Pt would benefit from continued skilled PT services to improve current impairments and return safely to a more independent functional mobility level.    Level of Motivation/Participation Good   Barriers to Discharge Inaccessible home environment;Decreased caregiver support   Barriers to Discharge Comments Pt lives alone with 2 CESAR home   Discharge Recommendations   Equipment Needed After Discharge bath bench;wheelchair    Discharge Facility/Level Of Care Needs home health PT   Plan   Planned Therapy Intervention Continue with current plan;Bed mobility training;Transfer training;Gait training;Balance Training;Stretching;Strengthening;ROM;Neuromuscular Re-education;Motor Coordination Training;Postural Re-education;Manual therapy techniques;Wheelchair Management/Propulsion   Therapy Frequency 2 times/day;Monday-Friday;Saturday or Sunday   Plan of Care Expires on 04/21/18   Physical Therapy Follow-up   PT Follow-up? Yes   PT - Next Visit Date 04/17/18   Treatment/Billable Minutes   Gait training 25   Therapeutic Exercise 22   Total Time 47         Jerod Lozano, PT, DPT  4/16/2018

## 2018-04-16 NOTE — PROGRESS NOTES
Spoke with patient this morning who is irate stating her tooth still hurts but her ear is popping and hurts and no one has done anything about it. Nurse at bedside and informed, will let Dr. Dunaway know as this is the first time CM has heard about this. States she doesn't want to stay here any longer and she is done with this place. Asked patient if she wanted to be discharged early, pt states she doesn't believe anyone but never answered the question. Told the patient that she can go ahead and take her morning medication and I will check on her later. Will continue to follow.

## 2018-04-16 NOTE — PROGRESS NOTES
Patient states she is still in pain with her tooth. Spoke with Dr. Dunaway and called the appointment line at [facility] who stated that this patient hasn't been there before but they had no appointments today, she would have to present to the ER. Dr. Dunaway presented with this information. Pt started on antibiotics and states will wait to see if these work before she leaves [facility].

## 2018-04-16 NOTE — PROGRESS NOTES
"Speech Therapy   Treatment    Kristina Aguirre   MRN: [id number]        04/16/18 1245   Speech Time Calculation   Speech Start Time 1245   Speech Stop Time 1330   Speech Total (min) 45 min   General Information   SLP Treatment Date 04/16/18   General Observations Pt seen individually at bedside for ST session.    General Precautions aspiration;fall;vision impaired   Visual/Auditory Vision impaired   Subjective   Patient states "I can't see becuase I'm loaded."   Pain/Comfort   Pain Rating 8/10   Location - Side Bilateral   Location - Orientation posterior   Location neck   Pain Addressed Pre-medicate for activity;Distraction;Reposition       SLP Cognitive Treatment   Treatment Detail (SLP Cognitive Treatment) Pt completed simple greeting task given min verbal cues for eye contact and appropriate language use with request to discontinue use of profanity with SLP. Pt completed time orientation task with 90% acc indly, given min verbal cues pt with increase to 100% acc. Discussed d/c plan and d/c date with 50% acc idnly, given moderate verbal cues for safety and days left to participate in therapy, pt with increase to 90% acc. Pt with avoidance behaviors t/o session with many verbal situations presented with c/o discomfort, pain, nausea, and visual changes. Pt completed visual scanning task for reading single words with 60% acc indly, given moderate verbal cues for utilization of head turn and eye mobilization, pt with increase to 90% acc. Pt completed simple name/face recall task following 3 minute delay with 0% acc indly. Pt required max verbal cues for utilization of recall strategies with minimal increase to 20% acc.    Assessment   SLP Diagnosis moderate cog-ling deficits; severe visual deficits   Prognosis Fair   Problem List decreased attention skills; poor safety; poor insight; decreased carry over of strategies; poor recall   Session Assessment progressing toward goals   Level of Motivation/Participation fair "   Discharge Recommendations   Discharge Facility/Level Of Care Needs home health speech therapy  (supervision)   Plan   Plan Continue with current plan   Treatment/Billable Minutes   Speech Therapy Individual 45   Total Time 45     Lala Muñoz CCC-SLP  4/16/2018

## 2018-04-16 NOTE — PROGRESS NOTES
Patient is complaining of toothache and how severe it is. States wants to go to the ER to get it pulled. Spoke with Dr. Dunaway, patient and caregiver David and explained that if pt goes to the ER, she will have to be discharged from rehab to go but we can set her up with HH therapy and any DME. Both patient and David declined this stating they know patient needs continued rehab.

## 2018-04-17 LAB
POCT GLUCOSE: 104 MG/DL (ref 70–110)
POCT GLUCOSE: 106 MG/DL (ref 70–110)
POCT GLUCOSE: 139 MG/DL (ref 70–110)
POCT GLUCOSE: 200 MG/DL (ref 70–110)

## 2018-04-17 PROCEDURE — 97542 WHEELCHAIR MNGMENT TRAINING: CPT

## 2018-04-17 PROCEDURE — 97116 GAIT TRAINING THERAPY: CPT

## 2018-04-17 PROCEDURE — 97537 COMMUNITY/WORK REINTEGRATION: CPT

## 2018-04-17 PROCEDURE — 25000003 PHARM REV CODE 250: Performed by: PHYSICAL MEDICINE & REHABILITATION

## 2018-04-17 PROCEDURE — 97110 THERAPEUTIC EXERCISES: CPT

## 2018-04-17 PROCEDURE — 25000003 PHARM REV CODE 250: Performed by: NURSE PRACTITIONER

## 2018-04-17 PROCEDURE — 92507 TX SP LANG VOICE COMM INDIV: CPT

## 2018-04-17 PROCEDURE — 97150 GROUP THERAPEUTIC PROCEDURES: CPT

## 2018-04-17 PROCEDURE — 99232 SBSQ HOSP IP/OBS MODERATE 35: CPT | Mod: ,,, | Performed by: PHYSICAL MEDICINE & REHABILITATION

## 2018-04-17 PROCEDURE — 97530 THERAPEUTIC ACTIVITIES: CPT

## 2018-04-17 PROCEDURE — 12800000 HC REHAB SEMI-PRIVATE ROOM

## 2018-04-17 PROCEDURE — 97803 MED NUTRITION INDIV SUBSEQ: CPT

## 2018-04-17 RX ADMIN — FLUTICASONE FUROATE AND VILANTEROL TRIFENATATE 1 PUFF: 100; 25 POWDER RESPIRATORY (INHALATION) at 08:04

## 2018-04-17 RX ADMIN — FAMOTIDINE 20 MG: 20 TABLET, FILM COATED ORAL at 08:04

## 2018-04-17 RX ADMIN — BENZOCAINE: 100 GEL TOPICAL at 12:04

## 2018-04-17 RX ADMIN — INSULIN ASPART 1 UNITS: 100 INJECTION, SOLUTION INTRAVENOUS; SUBCUTANEOUS at 09:04

## 2018-04-17 RX ADMIN — APIXABAN 5 MG: 5 TABLET, FILM COATED ORAL at 09:04

## 2018-04-17 RX ADMIN — HYDRALAZINE HYDROCHLORIDE 50 MG: 50 TABLET ORAL at 02:04

## 2018-04-17 RX ADMIN — HYDRALAZINE HYDROCHLORIDE 50 MG: 50 TABLET ORAL at 09:04

## 2018-04-17 RX ADMIN — HYDROCODONE BITARTRATE AND ACETAMINOPHEN 1 TABLET: 7.5; 325 TABLET ORAL at 09:04

## 2018-04-17 RX ADMIN — Medication 500 MG: at 09:04

## 2018-04-17 RX ADMIN — HYDROCODONE BITARTRATE AND ACETAMINOPHEN 1 TABLET: 7.5; 325 TABLET ORAL at 12:04

## 2018-04-17 RX ADMIN — AMIODARONE HYDROCHLORIDE 400 MG: 200 TABLET ORAL at 08:04

## 2018-04-17 RX ADMIN — BENZOCAINE: 100 GEL TOPICAL at 09:04

## 2018-04-17 RX ADMIN — BENZOCAINE: 100 GEL TOPICAL at 08:04

## 2018-04-17 RX ADMIN — BENZOCAINE: 100 GEL TOPICAL at 05:04

## 2018-04-17 RX ADMIN — Medication 5 DROP: at 12:04

## 2018-04-17 RX ADMIN — HYDRALAZINE HYDROCHLORIDE 50 MG: 50 TABLET ORAL at 05:04

## 2018-04-17 RX ADMIN — FUROSEMIDE 20 MG: 20 TABLET ORAL at 05:04

## 2018-04-17 RX ADMIN — FAMOTIDINE 20 MG: 20 TABLET, FILM COATED ORAL at 09:04

## 2018-04-17 RX ADMIN — CARVEDILOL 3.12 MG: 3.12 TABLET, FILM COATED ORAL at 08:04

## 2018-04-17 RX ADMIN — CHLORTHALIDONE 25 MG: 25 TABLET ORAL at 08:04

## 2018-04-17 RX ADMIN — AMLODIPINE BESYLATE 10 MG: 10 TABLET ORAL at 08:04

## 2018-04-17 RX ADMIN — Medication 500 MG: at 08:04

## 2018-04-17 RX ADMIN — FUROSEMIDE 20 MG: 20 TABLET ORAL at 08:04

## 2018-04-17 RX ADMIN — POLYETHYLENE GLYCOL 3350 17 G: 17 POWDER, FOR SOLUTION ORAL at 08:04

## 2018-04-17 RX ADMIN — APIXABAN 5 MG: 5 TABLET, FILM COATED ORAL at 08:04

## 2018-04-17 RX ADMIN — AMOXICILLIN AND CLAVULANATE POTASSIUM 1 TABLET: 875; 125 TABLET, FILM COATED ORAL at 08:04

## 2018-04-17 RX ADMIN — AMOXICILLIN AND CLAVULANATE POTASSIUM 1 TABLET: 875; 125 TABLET, FILM COATED ORAL at 09:04

## 2018-04-17 RX ADMIN — ATORVASTATIN CALCIUM 40 MG: 20 TABLET, FILM COATED ORAL at 08:04

## 2018-04-17 RX ADMIN — CLOPIDOGREL BISULFATE 75 MG: 75 TABLET ORAL at 08:04

## 2018-04-17 RX ADMIN — AMIODARONE HYDROCHLORIDE 400 MG: 200 TABLET ORAL at 09:04

## 2018-04-17 RX ADMIN — LISINOPRIL 40 MG: 20 TABLET ORAL at 08:04

## 2018-04-17 NOTE — SUBJECTIVE & OBJECTIVE
Interval History 4/17/2018:  Patient is seen for follow-up rehab evaluation and recommendations: No complaints this morning. [facility] and [facility] are no changes from [facility].       Scheduled Medications:    amiodarone  400 mg Oral BID    amLODIPine  10 mg Oral Daily    amoxicillin-clavulanate 875-125mg  1 tablet Oral Q12H    apixaban  5 mg Oral BID    ascorbic acid (vitamin C)  500 mg Oral BID    atorvastatin  40 mg Oral Daily    benzocaine   Mouth/Throat QID    carbamide peroxide  5 drop Left Ear BID    carvedilol  3.125 mg Oral Daily    chlorthalidone  25 mg Oral QAM    clopidogrel  75 mg Oral Daily    famotidine  20 mg Oral BID    fluticasone-vilanterol  1 puff Inhalation Daily    furosemide  20 mg Oral BID    hydrALAZINE  50 mg Oral Q8H    lidocaine  1 patch Transdermal Q24H    lisinopril  40 mg Oral Daily    polyethylene glycol  17 g Oral Daily       PRN Medications: acetaminophen, albuterol-ipratropium 2.5mg-0.5mg/3mL, bisacodyl, dextrose 50%, dextrose 50%, dextrose 50%, glucagon (human recombinant), glucose, glucose, hydrALAZINE, hydrocodone-acetaminophen 7.5-325mg, hydrOXYzine pamoate, insulin aspart U-100, lidocaine HCl 2%, magnesium hydroxide 400 mg/5 ml, ondansetron    Review of Systems   Constitutional: Positive for activity change and fatigue. Negative for chills, diaphoresis and fever.   HENT: Negative for congestion, ear discharge and ear pain.         Tooth ache   Eyes: Negative for photophobia, pain and visual disturbance.   Respiratory: Negative for chest tightness and shortness of breath.    Cardiovascular: Negative for chest pain and palpitations.   Gastrointestinal: Positive for nausea. Negative for abdominal distention, abdominal pain, constipation and diarrhea.   Endocrine: Negative for cold intolerance and heat intolerance.   Musculoskeletal: Positive for gait problem. Negative for arthralgias. Myalgias: over left ribs.   Skin: Negative for color  change and wound.   Neurological: Negative for dizziness, seizures, weakness and headaches.   Hematological: Negative for adenopathy.   Psychiatric/Behavioral: Negative for agitation, confusion and hallucinations.     Objective:     Vital Signs (Most Recent):  Temp: 98.4 °F (36.9 °C) (18 0700)  Pulse: (!) 52 (18 0831)  Resp: 18 (18 08)  BP: (!) 130/50 (18 07)  SpO2: (!) 91 % (18 07)    Vital Signs (24h Range):  Temp:  [98.4 °F (36.9 °C)-98.5 °F (36.9 °C)] 98.4 °F (36.9 °C)  Pulse:  [52-75] 52  Resp:  [14-18] 18  SpO2:  [91 %-95 %] 91 %  BP: (130-154)/(50-77) 130/50     Physical Exam   Constitutional: She is oriented to person, place, and time. She appears well-developed and well-nourished. No distress.   HENT:   Head: Normocephalic and atraumatic.   Right Ear: External ear normal.   Left Ear: External ear normal.   Nose: Nose normal.   Eyes: Right eye exhibits no discharge. Left eye exhibits no discharge. No scleral icterus.   Neck: Normal range of motion.   Cardiovascular: Normal rate, regular rhythm and intact distal pulses.    Pulmonary/Chest: Effort normal. No respiratory distress. She has no wheezes.   Abdominal: Soft. She exhibits no distension. There is no tenderness.   Musculoskeletal: Normal range of motion. She exhibits no edema or tenderness.   Neurological: She is alert and oriented to person, place, and time.   -  Mental Status:  AAOx3.  Follows commands.  Answers correct age and .  Recent and remote memory intact.  -  Speech and language:  No aphasia, mild dysarthria.    -  Vision:  R hemianopsia.  No ptosis.    -  Facial movement (CN VII): Mild facial droop.   -  Motor:  RUE: 2/5 EF, FF, WE.  LUE: 5/5.  RLE: 4-/5.  LLE: 5/5.  -  Tone:  Decreased RUE.  -  Sensory:  Intact to light touch and pin prick.   Skin: Skin is warm and dry. No rash noted.   Psychiatric: She has a normal mood and affect. Her behavior is normal. Thought content normal. Cognition and memory  are impaired.   Vitals reviewed.    NEUROLOGICAL EXAMINATION:     MENTAL STATUS   Oriented to person, place, and time.

## 2018-04-17 NOTE — HOSPITAL COURSE
Ms snowden completed inpatient rehab. Toothache was managed with hydrocodone/apap and oragel. She was given a follow up appt with [facility] dentistry. Antihypertensives were titrated up for hypertension. She completed abx for a UTI. She was continent of bowel and bladder. She slept well.  Transfers   Bed/Chair/WC 5   Toilet 5   Tub 4   Shower 4   Self Care   Eating 5   Grooming 5   Bathing 4   Dressing-Upper 4   Dressing-Lower 4   Toileting 4   Communication   Comprehension 4   Mode B   Expression 4   Mode V   Social Cognition   Social Interaction 4   Problem Solving 4   Memory 3

## 2018-04-17 NOTE — PROGRESS NOTES
"Occupational Therapy   Dressing Group    Kristina Aguirre   MRN: [id number]        04/17/18 1353   OT Time Calculation   OT Start Time 1353   OT Stop Time 1438   OT Total Time (min) 45 min   General   OT Date of Treatment 04/17/18   Family/Caregiver Present No   Patient Found (position) Seated in wheelchair   Precautions   General Precautions aspiration;fall;vision impaired   Orthopedic No   Required Braces or Orthoses No   Visual/Auditory Vision impaired   Subjective   Patient states "I don't want to practice pants"   Pain/Comfort   Pain Rating no pain   UE Dressing   UE Dressing Level of Assistance Minimum assistance   UE Dressing Where Assessed Other (Comment)  (standing)   UE Dressing Comments Assist to bring jacket around back. Provided pt ring to assist with zipper management.   LE Dressing   Sock Level of Assistance Modified independent   LE Dressing Comments Pt refused attempt to don pants   OT Therapeutic Groups   OT Therapeutic Yes   Other   Objective:   Pt participated in 45 min functional dressing group. The group activity reviewed safety considerations, energy conservation, and adaptive strategies for upper body and lower body dressing. Patient educated on adaptive equipment available to assist with dressing (button hook, long handled shoe horn, reacher, dressing stick, elastic shoe laces). Patient also educated on dressing tips that promote increased (I) with dressing such as wearing loose fit clothing, using footstool, and adapted clothing available.          These activities were performed in a group setting to encourage participation with peers and social interaction skills.  Social Interaction: (choose FIM score rating below)       §     Interacts appropriately 75-89% of time - needs redirection for appropriate language or to initiate interaction  (4)       Assessment:  Patient participated in a 45 minute dressing group activity.  The group activity challenged dynamic standing/sitting balance, core " strengthening, bilateral upper extremity/ lower extremity strengthening, hand -eye coordination, and social affect/mood.  The patient verbalized understanding, demonstrated comprehension of use of AE and adaptive techniques for dressing.       Activity Tolerance   Activity Tolerance Patient tolerated treatment well   After Treatment   Patient Position After Treatment Seated in wheelchair   Patient after treatment left call button in reach   Occupational Therapy Follow-up   OT Follow-up? Yes   Treatment/Billable Minutes   Therapeutic Group 45   Total Time 45       CAROLYN Dixon  4/17/2018

## 2018-04-17 NOTE — PROGRESS NOTES
[facility]  Physical Medicine & Rehab  Progress Note    Patient Name: Kristina Aguirre  MRN: [id number]  Patient Class: IP- Rehab   Admission Date: 4/2/2018  Length of Stay: 15 days  Attending Physician: Peter Dunaway MD  Primary Care Provider: Robert Mattson MD    Subjective:     Principal Problem: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    Interval History 4/17/2018:  Patient is seen for follow-up rehab evaluation and recommendations: No complaints this morning. I have reviewed the HPI and PMFSH and there are no changes from admission.       Scheduled Medications:    amiodarone  400 mg Oral BID    amLODIPine  10 mg Oral Daily    amoxicillin-clavulanate 875-125mg  1 tablet Oral Q12H    apixaban  5 mg Oral BID    ascorbic acid (vitamin C)  500 mg Oral BID    atorvastatin  40 mg Oral Daily    benzocaine   Mouth/Throat QID    carbamide peroxide  5 drop Left Ear BID    carvedilol  3.125 mg Oral Daily    chlorthalidone  25 mg Oral QAM    clopidogrel  75 mg Oral Daily    famotidine  20 mg Oral BID    fluticasone-vilanterol  1 puff Inhalation Daily    furosemide  20 mg Oral BID    hydrALAZINE  50 mg Oral Q8H    lidocaine  1 patch Transdermal Q24H    lisinopril  40 mg Oral Daily    polyethylene glycol  17 g Oral Daily       PRN Medications: acetaminophen, albuterol-ipratropium 2.5mg-0.5mg/3mL, bisacodyl, dextrose 50%, dextrose 50%, dextrose 50%, glucagon (human recombinant), glucose, glucose, hydrALAZINE, hydrocodone-acetaminophen 7.5-325mg, hydrOXYzine pamoate, insulin aspart U-100, lidocaine HCl 2%, magnesium hydroxide 400 mg/5 ml, ondansetron    Review of Systems   Constitutional: Positive for activity change and fatigue. Negative for chills, diaphoresis and fever.   HENT: Negative for congestion, ear discharge and ear pain.         Tooth ache   Eyes: Negative for photophobia, pain and visual disturbance.   Respiratory: Negative for chest tightness and  shortness of breath.    Cardiovascular: Negative for chest pain and palpitations.   Gastrointestinal: Positive for nausea. Negative for abdominal distention, abdominal pain, constipation and diarrhea.   Endocrine: Negative for cold intolerance and heat intolerance.   Musculoskeletal: Positive for gait problem. Negative for arthralgias. Myalgias: over left ribs.   Skin: Negative for color change and wound.   Neurological: Negative for dizziness, seizures, weakness and headaches.   Hematological: Negative for adenopathy.   Psychiatric/Behavioral: Negative for agitation, confusion and hallucinations.     Objective:     Vital Signs (Most Recent):  Temp: 98.4 °F (36.9 °C) (18 0700)  Pulse: (!) 52 (18 08)  Resp: 18 (18)  BP: (!) 130/50 (18)  SpO2: (!) 91 % (18)    Vital Signs (24h Range):  Temp:  [98.4 °F (36.9 °C)-98.5 °F (36.9 °C)] 98.4 °F (36.9 °C)  Pulse:  [52-75] 52  Resp:  [14-18] 18  SpO2:  [91 %-95 %] 91 %  BP: (130-154)/(50-77) 130/50     Physical Exam   Constitutional: She is oriented to person, place, and time. She appears well-developed and well-nourished. No distress.   HENT:   Head: Normocephalic and atraumatic.   Right Ear: External ear normal.   Left Ear: External ear normal.   Nose: Nose normal.   Eyes: Right eye exhibits no discharge. Left eye exhibits no discharge. No scleral icterus.   Neck: Normal range of motion.   Cardiovascular: Normal rate, regular rhythm and intact distal pulses.    Pulmonary/Chest: Effort normal. No respiratory distress. She has no wheezes.   Abdominal: Soft. She exhibits no distension. There is no tenderness.   Musculoskeletal: Normal range of motion. She exhibits no edema or tenderness.   Neurological: She is alert and oriented to person, place, and time.   -  Mental Status:  AAOx3.  Follows commands.  Answers correct age and .  Recent and remote memory intact.  -  Speech and language:  No aphasia, mild dysarthria.    -   Vision:  R hemianopsia.  No ptosis.    -  Facial movement (CN VII): Mild facial droop.   -  Motor:  RUE: 2/5 EF, FF, WE.  LUE: 5/5.  RLE: 4-/5.  LLE: 5/5.  -  Tone:  Decreased RUE.  -  Sensory:  Intact to light touch and pin prick.   Skin: Skin is warm and dry. No rash noted.   Psychiatric: She has a normal mood and affect. Her behavior is normal. Thought content normal. Cognition and memory are impaired.   Vitals reviewed.    NEUROLOGICAL EXAMINATION:     MENTAL STATUS   Oriented to person, place, and time.       Assessment/Plan:      Kristina Aguirre is a 68 y.o. female admitted to inpatient rehabilitation on 4/2/2018 for 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 with impaired mobility and ADLs. Patient remains appropriate for PT, OT, and as required Speech therapy. Patient continues to require 24 hour nursing care as well as daily Physician assessment.    * 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    MRI showed multiple bilateral R>L acute infarcts on cerebral and cerebellar hemispheres highly suggestive of embolic phenomenon  -PT/OT/SLP        Toothache    4/12 ordered oragel and lidocaine solution  4/13 amoxicillin-clavulanate  4/14 ordered rapid strep for sore throat associated with toothache  4/15 Prelim strep throat culture no growth.  4/16 cont amoxicillin-clavulanate. F/U appt with [facility] dentistry.        Acute cystitis    Urine culture positive for Citrobacter freundii complex. Pan sensitive S/p 7d course of augmentin     4/14 u/a +leukocytes, blood, -nitrates. UCx pending  4/16 UCx showed no growth.         Atrial fibrillation    continue Apixaban    4/4 - reports of SOB. EKG shows no sign of A fib with RVR or Mi. CXR showing resolution of pulm edema. Pending labs.        S/p TAVR (transcatheter aortic valve replacement), bioprosthetic    Continue Apixaban and Plavix        Diabetes mellitus, type 2    Continue to monitor blood glucoses    4/4 - started on Diabetic  diet    Lab Results   Component Value Date    HGBA1C 6.5 (H) 03/21/2018       - Diabetic Diet, Diabetic Education   - Monitor POCT TIDAC and qhs   - Continue with  ISS    POCT Glucose   Date Value Ref Range Status   04/08/2018 256 (H) 70 - 110 mg/dL Final   04/08/2018 143 (H) 70 - 110 mg/dL Final   04/07/2018 237 (H) 70 - 110 mg/dL Final   04/07/2018 146 (H) 70 - 110 mg/dL Final   04/07/2018 208 (H) 70 - 110 mg/dL Final   04/07/2018 159 (H) 70 - 110 mg/dL Final   04/06/2018 296 (H) 70 - 110 mg/dL Final   04/06/2018 134 (H) 70 - 110 mg/dL Final   04/06/2018 183 (H) 70 - 110 mg/dL Final   04/06/2018 170 (H) 70 - 110 mg/dL Final   04/05/2018 191 (H) 70 - 110 mg/dL Final                     Essential hypertension    Elevated on admission. Continue current meds    4/3/18 - started Lisinopril 10mg daily  4/4/18 - slightly improved. Increased to 20mg daily  4/6/18 - still elevated Bp. Increased to 30mg daily  4/7 cont to monitor trend   4/9 inc lisinopril to 40  4/10 Cont to monitor. BP elevated 175/77 prior to receiving lisinopril.  4/12 hydralazine 25 q8  4/15 inc hydralazine to 50 q8  4/17 cont to monitor. 1302-150s, in persistent pain  Vitals:    04/16/18 1631 04/16/18 1921 04/17/18 0700 04/17/18 0831   BP: (!) 154/76 133/63 (!) 130/50    BP Location:  Left arm Left arm    Patient Position:  Lying Lying    Pulse:  63 (!) 52 (!) 52   Resp:  18 14 18   Temp:  98.5 °F (36.9 °C) 98.4 °F (36.9 °C)    TempSrc:  Oral Oral    SpO2:  (!) 94% (!) 91%    Weight:       Height:                       DISCHARGE PLANNING:  Tentative Discharge Date: 4/19/2018    Future Appointments  Date Time Provider Department Center   5/10/2018 10:00 AM EKG, APPT [facility] EKG Simon Dawson   5/10/2018 10:40 AM Sarbjit Banegas MD [facility] AR[facility] Simon Munson MD  Department of Physical Medicine & Rehab   [facility]

## 2018-04-17 NOTE — ASSESSMENT & PLAN NOTE
Urine culture positive for Citrobacter freundii complex. Pan sensitive S/p 7d course of augmentin     4/14 u/a +leukocytes, blood, -nitrates. UCx pending  4/16 UCx showed no growth.

## 2018-04-17 NOTE — PROGRESS NOTES
" [facility]  Adult Nutrition  Progress Note    SUMMARY       Recommendations    Recommendation/Intervention:   1. Continue Diabetic/ Dysphagia soft diet.   2. RD to follow.     Goals: Meet >85% of EEN/EPN.   Nutrition Goal Status: goal met    Communication of RD Recs:  (POC)    Reason for Assessment    Reason for Assessment: RD follow-up  Diagnosis:  (Gait instability)  Relevant Medical History: SCC s/p lobectomy, CAD, COPD, DM, HTN, HLD  Interdisciplinary Rounds: attended    General Information Comments: Spoke to Pt this afternoon and Pt reported a good appetite and consumed all of her food on her tray. Pt denies N/V/D/C.     Nutrition Discharge Planning: Adequate nutrition on cardiac, consistent carb diet.     Nutrition Risk Screen    Nutrition Risk Screen: no indicators present    Nutrition/Diet History    Do you have any cultural, spiritual, Lutheran conflicts, given your current situation?: none  Food Allergies: NKFA    Anthropometrics    Temp: 98.5 °F (36.9 °C)  Height Method: Stated  Height: 5' 2" (157.5 cm)  Height (inches): 62 in  Weight Method: Standard Scale  Weight: 65.5 kg (144 lb 8 oz)  Weight (lb): 144.5 lb  Ideal Body Weight (IBW), Female: 110 lb  % Ideal Body Weight, Female (lb): 131.82 lb  BMI (Calculated): 26.6  BMI Grade: 25 - 29.9 - overweight       Lab/Procedures/Meds    Pertinent Labs Reviewed: reviewed  Pertinent Labs Comments: Na 131, BUN 28, Gluc 112  Pertinent Medications Reviewed: reviewed  Pertinent Medications Comments: Vit-C, Carvedilol, lasix, statin, lisinopril, insulin, Mg hydroxide    Physical Findings/Assessment    Overall Physical Appearance: nourished  Oral/Mouth Cavity: tooth/teeth missing  Skin: incision(s), edema    Estimated/Assessed Needs    Weight Used For Calorie Calculations: 65.8 kg (145 lb 1 oz)  Energy Calorie Requirements (kcal): 1420   Energy Need Method: Marie-[place]donald (PAL x 1.25)  Protein Requirements: 66-79g/day (1.0-1.2g/kg)  Weight Used " For Protein Calculations: 65.8 kg (145 lb 1 oz)  Fluid Requirements (mL): 1 mL/kcal or per MD     RDA Method (mL): 1420     Nutrition Prescription Ordered    Current Diet Order: 2000 calorie Dysphagia soft   Nutrition Order Comments: Double meats.     Evaluation of Received Nutrient/Fluid Intake    Energy Calories Required: meeting needs  Protein Required: meeting needs  Fluid Required: meeting needs  Comments: LBM: 4/15/18  % Intake of Estimated Energy Needs: 75 - 100 %  % Meal Intake: 75 - 100 %    Nutrition Risk    Level of Risk/Frequency of Follow-up: low     Assessment and Plan   Nutrition Problem  Inadequate energy intake      Related to (etiology):   Pt's inability to complete meals before therapy sessions     Signs and Symptoms (as evidenced by):   Pt stating that she still feels fatigued and hungry during therapy      Interventions/Recommendations (treatment strategy):  Double protein portions and snacks in between therapy sessions.      Nutrition Diagnosis Status:   Resolved      Monitor and Evaluation    Food and Nutrient Intake: energy intake, food and beverage intake  Food and Nutrient Adminstration: diet order  Knowledge/Beliefs/Attitudes: food and nutrition knowledge/skill, beliefs and attitudes  Physical Activity and Function: nutrition-related ADLs and IADLs  Anthropometric Measurements: weight, weight change, body mass index  Biochemical Data, Medical Tests and Procedures: electrolyte and renal panel, gastrointestinal profile, glucose/endocrine profile, inflammatory profile, lipid profile  Nutrition-Focused Physical Findings: overall appearance     Nutrition Follow-Up    RD Follow-up?: Yes

## 2018-04-17 NOTE — PROGRESS NOTES
"Speech Therapy   Treatment    Kristina Aguirre   MRN: [id number]        04/17/18 0930   Speech Time Calculation   Speech Start Time 0930   Speech Stop Time 1015   Speech Total (min) 45 min   General Information   SLP Treatment Date 04/17/18   General Observations Pt seen individually [facility] office while sitting upright in w/c.    General Precautions aspiration;fall;vision impaired   Visual/Auditory Vision impaired   Subjective   Patient states "I do notice I can't remember some stuff."   Pain/Comfort   Pain Rating 7/10   Location - Side Bilateral   Location - Orientation posterior   Location neck   Pain Addressed Pre-medicate for activity;Distraction;Cessation of Activity       SLP Cognitive Treatment   Treatment Detail (SLP Cognitive Treatment) Pt completed orientation task x4 indly with 100% acc. Pt verbalized differences of self following CVA given min-moderate verbal cues for utilization of specific details with 70% acc. Pt completed recall task of recent events and medication taken this morning with 85% acc indly, given min-supervision pt with increase to 100% acc. Pt completed drawing inferences task of paragraph read aloud by SLP with 20% acc indly, given max verbal repetitions and moderate verbal reasoning cues, pt with increase to 60% acc. Pt completed thought organization task of opposites with 93% acc indly. Word deduction task also completed targeting attention and thought organization with 70% acc indly, given moderate verbal repetitions of information with verbal cues, pt with increase to 100% acc. Pt verbalized similarities/differences of two concrete objects with 30% acc indly. Pt required max verbal cues for word selection and thought organization strategies for increase to 100% acc. Visual scanning targeted via shape tracing task with moderate verbal cues required for slow rate and increased attention. Pt requires supervision at home for safety.    Assessment   SLP Diagnosis moderate cog-ling " deficits; severe visual deficits   Prognosis Fair   Problem List decreased attention skills; poor safety; poor insight; decreased carry over of strategies; poor recall   Session Assessment progressing toward goals   Level of Motivation/Participation good   Discharge Recommendations   Discharge Facility/Level Of Care Needs home health speech therapy  (supervision at home for safety)   Plan   Plan Continue with current plan   Treatment/Billable Minutes   Speech Therapy Individual 45   Total Time 45     Lala Muñoz CCC-SLP  4/17/2018

## 2018-04-17 NOTE — PLAN OF CARE
Problem: Patient Care Overview  Goal: Plan of Care Review  Outcome: Ongoing (interventions implemented as appropriate)  Pt care plan reviewed and progress continues.  See associated flowsheets for relevant documentation. Frequent rounds made per protocol to maintain pt safety and meet pt needs.  Continuing to monitor and follow up as needed.      Problem: Fall Risk (Adult)  Goal: Absence of Falls  Patient will demonstrate the desired outcomes by discharge/transition of care.   Outcome: Ongoing (interventions implemented as appropriate)  Pt remains free from falls or trauma thus far this shift.      Problem: Pressure Ulcer Risk (Micah Scale) (Adult,Obstetrics,Pediatric)  Goal: Skin Integrity  Patient will demonstrate the desired outcomes by discharge/transition of care.   Outcome: Ongoing (interventions implemented as appropriate)  Pt turns and repositions as needed to maintain skin integrity.  No breakdown noted.      Problem: Stroke (Ischemic) (Adult)  Goal: Signs and Symptoms of Listed Potential Problems Will be Absent, Minimized or Managed (Stroke)  Signs and symptoms of listed potential problems will be absent, minimized or managed by discharge/transition of care (reference Stroke (Ischemic) (Adult) CPG).   Outcome: Ongoing (interventions implemented as appropriate)  No new signs or symptoms noted.      Problem: Pain, Acute (Adult)  Goal: Acceptable Pain Control/Comfort Level  Patient will demonstrate the desired outcomes by discharge/transition of care.   Outcome: Ongoing (interventions implemented as appropriate)  Pt reports adequate pain control achieved with meds as ordered.

## 2018-04-17 NOTE — PROGRESS NOTES
"Physical Therapy   Treatment    Kristina Aguirre   MRN: [id number]    04/17/18 1117   PT Time Calculation   PT Start Time 1117   PT Stop Time 1202   PT Total Time (min) 45 min   Treatment   Treatment Type Treatment   PT/PTA PT   General   PT Received On 04/17/18   Family/Caregiver Present No   Patient Found (position) Supine in bed   Pt found with (heart monitor)   Precautions   General Precautions aspiration;fall;vision impaired   Orthopedic No   Required Braces or Orthoses No   Cardiovascular/Pulmonary (holter monitor)   Visual/Auditory Vision impaired   Subjective   Patient states "I've been bad lately."   Pain/Comfort   Pain Rating 1 4/10   Location - Side 1 Left   Location - Orientation 1 generalized   Location 1 (tooth)   Pain Addressed 1 Pre-medicate for activity;Distraction   Pain Rating Post-Intervention 1 4/10   Bed Mobility   Bed Mobility yes   Supine to Sit Supervision   Transfers   Transfer yes   Sit to Stand   Sit <> Stand Assistance Stand By Assistance   Sit <> Stand Assistive Device Rolling Walker   Trials/Comments multiple trials   Stand to Sit   Assistance Stand By Assistance   Assistive Device Rolling Walker   Trials/Comments multiple trials   Bed to Chair   Bed <> Chair Technique Stand Pivot   Bed <> Chair Assistance Contact Guard Assistance   Bed <> Chair Assistive Device No Assistive Device   Trials/Comments x1 trial, HHA required for R UE    Wheelchair Activities   Propulsion Yes   Propulsion Type 1 Manual   Level 1 Level tile   Method 1 Right lower extremity;Left lower extremity   Level of Assistance 1 Supervision   Description/ Details 1 Pt self propelled for 161' at supervision   Gait   Gait Yes   Weight Bearing Status full   Gait 1   Surface 1 Level tile   Gait Assistive Device Rolling walker   Assistance 1 Contact Guard Assistance   Gait Distance Pt ambulated for 100'   Gait Pattern swing-through gait   Gait Deviation(s) decreased perez;increased time in double stance;decreased velocity of " "limb motion;decreased step length;decreased stride length;decreased weight-shifting ability   Impairments Contributing to Gait Deviations impaired balance;impaired motor control;impaired postural control;decreased strength   Gait 2   Surface 2 Level tile   Device 2 Hand held assist   Assistance 2 Minimum assistance   Comments/Distance (ft) 2 Pt ambulated 171'   Gait Pattern Used swing-through gait   Gait Deviations Noted decreased perez;increased time in double stance;decreased velocity of limb motion;decreased step length;decreased stride length;decreased toe-to-floor clearance;decreased weight-shifting ability   Impairments Contributing to Gait Deviations impaired balance;impaired coordination;impaired motor control;abnormal muscle tone;impaired postural control;decreased strength   Stairs   Stairs Yes   Stairs/Curb Step   Rails 1 Bilateral   Device 1 No device   Assistance 1 Contact guard   Comment/# Steps 1 Pt traversed 4, 6" steps    Equipment Use   Standard Exercise Bike Comments Pt pedals LBE for 8 minutes to increase endurance and strength in B LE.    Activity Tolerance   Activity Tolerance Patient tolerated treatment well   After Treatment   Patient Position After Treatment Seated at edge of bed   Patient after treatment left call button in reach   Assessment   Prognosis Fair   Problem List Decreased strength;Decreased endurance;Impaired balance;Decreased mobility;Impaired judgement;Decreased safety awareness;Impaired vision;Pain   Session Assessment progressing toward goals   Assessment Pt tolerated treatment session well today. PT and pt agree that pt is more safe/sturdy when ambulating with RW and at this time she requires someone to walk with her. She requires encouragement throughout session to complete each task. Continue POC   Level of Motivation/Participation good   Barriers to Discharge Inaccessible home environment;Decreased caregiver support   Discharge Recommendations   Equipment Needed After " Discharge bath bench;wheelchair   Discharge Facility/Level Of Care Needs home health PT   Plan   Planned Therapy Intervention Continue with current plan   Therapy Frequency 2 times/day;daily;Monday-Friday;Saturday or Sunday   Physical Therapy Follow-up   PT Follow-up? Yes   PT - Next Visit Date 04/18/18   Treatment/Billable Minutes   Gait training 20   Therapeutic Activity 15   Train/Wheelchair Management 10   Total Time 45   Geronimo Deleon, PT   4/17/2018

## 2018-04-17 NOTE — PLAN OF CARE
Problem: Patient Care Overview  Goal: Plan of Care Review  Outcome: Ongoing (interventions implemented as appropriate)  Ms. Aguirre states that she feels better today than she has for the last 5 days.  She has not taken pain medication during this shift.  She received one dose of the eardrops today.  She is able to move independently and can ambulate to the bathroom with CGA for balance.

## 2018-04-17 NOTE — ASSESSMENT & PLAN NOTE
4/12 ordered oragel and lidocaine solution  4/13 amoxicillin-clavulanate  4/14 ordered rapid strep for sore throat associated with toothache  4/15 Prelim strep throat culture no growth.  4/16 cont amoxicillin-clavulanate. F/U appt with [facility] dentistry.

## 2018-04-17 NOTE — HPI
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  Patient followed by CTS for severe aortic stenosis and admitted to [facility] [place] on [place]/22/18 for surgical intervention.  Now, s/p L transaxillary transcatheter aortic valve replacement with a 26 mm evolut valve (TAVR) on 3/22/18.  Post-op course complicated by stroke (Stroke code called on 3/24 for worsening RUE weakness, R facial droop, dysarthria, and aphasia.  CTA without LVO.  Not tPA or IR candidate.  MRI showed multiple bilateral R>L acute infarcts on cerebral and cerebellar hemispheres highly suggestive of embolic phenomenon.) and a-fib with RVR requiring amiodarone load and infusion.  ROSEY revealed grade 4 atheroma on aorta.  Stroke etiology likely iatrogenic 2/2 TAVR as pattern is concerning for embolic phenomena. Currently below functional baseline.

## 2018-04-17 NOTE — PROGRESS NOTES
Occupational Therapy   Pre-Driving Assessment    Kristina Aguirre  MRN: [id number]  Room/Bed: E261/E261 B  General   OT Date of Treatment 04/17/18   Chart Reviewed Yes   Onset of Illness/Injury or Date of Surgery 03/23/18   Diagnosis PCA CVA s/p TAVR; TAVR 3/22/18   Additional Pertinent History Past Medical History:   Diagnosis Date    Abdominal aortic aneurysm (AAA) without rupture 9/12/2017    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 3/26/2018    Arthritis      Asthma      Atrial fibrillation 3/25/2018    Bilateral carotid artery stenosis 9/12/2017    Cancer       lung    COPD (chronic obstructive pulmonary disease)      Coronary artery disease of native artery with stable angina pectoris 9/29/2017    Diabetes mellitus      Gastroesophageal reflux disease without esophagitis 9/12/2017    Heart valve disorder      Hyperlipidemia      Hypertension      Long term current use of antithrombotics/antiplatelets      S/p TAVR (transcatheter aortic valve replacement), bioprosthetic 3/22/2018    Severe aortic stenosis 9/12/2017            Past Surgical History:   Procedure Laterality Date    HAND SURGERY Right      KNEE ARTHROSCOPY        LUNG LOBECTOMY Right       middle lobe    mediastinoscopy        SPINAL FUSION        TONSILLECTOMY        WRIST SURGERY Right        From H&P: 67-year-old female with PMHx of HTN, HLD, DMII, COPD, 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  Patient followed by CTS for severe aortic stenosis and [place] [facility] [place] surgical intervention.  Now, s/p L transaxillary transcatheter aortic valve replacement with a 26 mm evolut valve (TAVR) on 3/22/18.  Post-op course complicated by stroke (Stroke code called on 3/24 for worsening RUE weakness, R facial droop, dysarthria, and aphasia.  CTA without LVO.  Not tPA or IR  "candidate.  MRI showed multiple bilateral R>L acute infarcts on cerebral and cerebellar hemispheres highly suggestive of embolic phenomenon.) and a-fib with RVR requiring amiodarone load and infusion.  ROSEY revealed grade 4 atheroma on aorta.  Stroke etiology likely iatrogenic 2/2 TAVR as pattern is concerning for embolic phenomena. Currently below functional baseline.    Date Referred to OT 18   Referring Physician Dr. Dunaway   Family/Caregiver Present No   Patient Found (position) Supine in bed   Pt found with (Holter monitor)   Precautions   General Precautions fall;diabetic;vision impaired   Orthopedic No   Required Braces or Orthoses No   Cardiovascular/Pulmonary Other (see comments)  (Holter heart monitor)   Visual/Auditory Vision impaired  (decreased peripheral, wears glasses)   BADL History   Bed Mobility/Transfers independent   Grooming independent   Bathing independent   Upper Body Dressing independent   Lower Body Dressing independent   Toileting independent   Home Management Skills independent   IADL History   Driving License Yes   Mode of Transportation Car   Occupation Part time employment   Type of Occupation Pt works 3x per week at [employer]   Leisure and Hobbies Pt reports that she walks her dog 2x per day.   IADL Comments R handed   Living Environment   Lives With alone   Living Arrangements mobile home   Home Accessibility stairs to enter home   Home Layout (tub-shower combo)   Number of Stairs to Enter Home 2   Stair Railings at Home none   Transportation Available none   Living Environment Comment Prior to admit pt was living alone with dog in [place] where she reports (I) as PLOF. Pt has limited support, friends from [employer] mostly.   Equipment Currently Used at Home none   Subjective   Patient states "I guess I'm not ready to drive right now?" following assessement   Patient stated goals Return to PLOF.     OBJECTIVE:   I. Physical function testin. ROM testing:   R UE: " Moderate impairment  L UE: WNL  R LE: WFL  L LE: WNL     2. MMT:   R UE: 2/5   L UE: 4/5   R LE: 3/5   L LE: 4/5     3. Sensory:   R UE: impaired   L UE: intact  R LE: mildly impaired   L LE: intact     4. Standing and sitting balance Standing: Fair+/ Sitting: Good     5. Means of mobility: Gait with RW    6. Transfers: (S) with RW     II. Perceptual   - L/R & auditory discrimination: 5/5   - Letter cancellation: 31/34 Time: 3:51  - Line bisection: 8/8  - MVPT: 28/36 (78%) Time: 12:30  III. Vision: wears reading glasses     IV. Cognitive:   1. [facility] Mental Status Exam (SLUMS): 15/30 which is a base score indicating severe neurocognitive disorder at this time   2. Short story: 1/4   3. Immediate recall memory #'s correct:   A. 6>: 2/2   B. 5>: 1/2   C. 4>: 2/2   D. 3>: 2/2   4. Cognitive sequencing of months: Unable to complete without delay.  5. Long term memory: intact per pt report     Testing was stopped at this point 2* pt displays severe neurocognitive deficits as indicated by testing (SLUMS), self reported vision inadequacies, and balance deficits. Pt also remains with mild mobility impairments. These deficits will present obsticles with driving safely at this time.     Endurance: Good     ASSESSMENT:   Patient tolerated treatment session well. Patient continues to progress well toward goals but remains with deficits that could create a potentially hazerdous   driving situation at this time. Pt to complete assesment at the appropriate time. Pt would continue to benefit from skilled OT services to increase independence with ADLs and functional mobility.     PLAN:   Continue Occupational Therapy for remainder of rehab stay.   Charges: 53 min Community reentry     CAROLYN Cuevas  4/17/2018     Patient with wheelchair safety belt fastened and able to self release wheelchair safety belt. Pt handed off to PT dressing group with all necessities in reach, OT notified.     LEGEND:   CGA: Contact  Guard Assist   EOB: Edge of Bed   HHA: Hand Held Assist   HOB: Head of Bed   (I): Independent-patient performs task in a timely manner   Max (A): Maximal Assist-patient performs 25-49% of task   Min (A): Minimal Assist- patient performs 75% or more of task   Mod (A): Moderate Assist- patient performs 50-74% of task   NA: Not applicable   NT: Not tested   OOB: Out of Bed   PTA: Prior to admit   QC: Quad Cane   RW: Rolling Walker   (S): Supervision- patient requires cues, coaxing, prompting   SBA: Stand By Assist   SC: Straight Cane   SW: Standard Walker   TBA: To be assessed   Total (A): Total Assist- patient performs less than 25% of task   WC: Wheelchair   WFL: Within Functional Limits   WNL: Within Normal Limits

## 2018-04-17 NOTE — TREATMENT PLAN
"Physical Therapy   DME Recommendation    Kristina Aguirre   MRN: [id number]   Rehab Services' DME recommendations  DME to be delivered home unless otherwise specified.         [] NO DME NEEDED ________________________________________________________________________    []Walker:(can only select one of these)  []Adult (5'4"-6'6") []Brandon (4'4"-5'7")                           [] Wide/ Heavy Duty         []Boy                  []  Rollator                                                 [] knee walker       Accessories/Other:____________________________________     Wheels:(must complete) [] Yes  [] No     []Crutches:  []Axillary []Loft strand    []Cane:              []Straight []Narrow based quad   []Wide based quad         [] Other:____________________________________________    [] Transfer Devices:   []Мария Lift    []Slide Board ( [] with cut out  []26  []29)    ______________________________________________________________________    [x]Wheelchair: (please check)    Number of hours up in wheelchair per     day:___8__________     Style:  [] Standard [] Pediatric  [x] Light weight  []Reclining     []Amputee [] Boy [] POV []Custom     Custom Vendor:________________________________________                                                      Seat Width: [x]18 (standard adult)    []16" (small adult)   []14(child)      [] 20  [] 22 [] 24         Seat Depth:   [x]16    []18  []20      Back Height:    [x]Standard      []Boy          []Other     Leg Support: []Standard [x]Heel loops []Elevating leg rest    []Articulating              [x]Swing Away     Arm Height:  []Detachable []Full   [x] Desk  [x]Swing Away [] Adjustable Height          Lap Belt: []Velcro [x]Buckle                               Accessories: [] Front brakes          [x] Brake Extensions  []Anti-tippers                          [x]Safety Belt    Other:_______________________________________________     Cushion: [x]Basic []Foam []Gel  []Maishao []Richard POLLARD   "    Justification for Cushion(Must complete):_Pt spends 8+ hours a day in chair, in order to avoid pressure sore formation a wheelchair cushion is required at this time._____________________________________________________________        For wheelchair order: (please choose all that apply)  - Caregiver is capable and willing to operate wheelchair safely. []  - Patients upper body strength is sufficient for propulsion. []   - The patient has a cast, brace, or musculoskeletal condition which prevents 90 degree flexion of the knee. []  - The patient has significant edema of the lower extremities that requires elevating leg rests.  []  - A reclining back is ordered. []  - The patient requires the use of a wheel chair for ADLs within the home. [x]  - Patient mobility limitations cannot be sufficiently resolved by the use of other ambulatory therapies. [x]         __    []3 in 1 commode: []Standard     []Wide-Heavy Duty           []Drop arm                                      []Heavy Duty Drop Arm                              []Tub bench:  []Padded      [](Unpadded=standard)     []Commode Opening                                          [] Heavy Duty    [x]Shower Chair: [x]With back   []Without back      []Hospital Bed: []Semi Electric    []Manual    []Electric   []Heavy Duty  []Extra Heavy Duty(>600#)  Patient requires a bed height different than a fixed height hospital bed to permit transfers to chair, wheel chair or standing. []Yes         []N/A     []Accessories: [] Gel Overlay Mattress     []Low Air Mattress   []Alternating Pressure Pad                              []Trapeze    [] Hip Kit:  [] Short Horn     [] Long Horn         []Other:________________________________________________________________    ________________________________________________________________________    [x]Home Health:  [x]OT [x]PT [x]SLP   [] MSW   [] Aide    []SN        []Outpatient:  []OT []PT []SLP [] MSW   [] CM      [] Internal  Referral      [] External Referral  ________________________________________________________________________    Toni Deleon, PT 4/17/2018

## 2018-04-17 NOTE — ASSESSMENT & PLAN NOTE
Elevated on admission. Continue current meds    4/3/18 - started Lisinopril 10mg daily  4/4/18 - slightly improved. Increased to 20mg daily  4/6/18 - still elevated Bp. Increased to 30mg daily  4/7 cont to monitor trend   4/9 inc lisinopril to 40  4/10 Cont to monitor. BP elevated 175/77 prior to receiving lisinopril.  4/12 hydralazine 25 q8  4/15 inc hydralazine to 50 q8  4/17 cont to monitor. 1302-150s, in persistent pain  Vitals:    04/16/18 1631 04/16/18 1921 04/17/18 0700 04/17/18 0831   BP: (!) 154/76 133/63 (!) 130/50    BP Location:  Left arm Left arm    Patient Position:  Lying Lying    Pulse:  63 (!) 52 (!) 52   Resp:  18 14 18   Temp:  98.5 °F (36.9 °C) 98.4 °F (36.9 °C)    TempSrc:  Oral Oral    SpO2:  (!) 94% (!) 91%    Weight:       Height:

## 2018-04-18 LAB
POCT GLUCOSE: 115 MG/DL (ref 70–110)
POCT GLUCOSE: 154 MG/DL (ref 70–110)

## 2018-04-18 PROCEDURE — 97535 SELF CARE MNGMENT TRAINING: CPT

## 2018-04-18 PROCEDURE — 97116 GAIT TRAINING THERAPY: CPT

## 2018-04-18 PROCEDURE — 92507 TX SP LANG VOICE COMM INDIV: CPT

## 2018-04-18 PROCEDURE — 12800000 HC REHAB SEMI-PRIVATE ROOM

## 2018-04-18 PROCEDURE — 99232 SBSQ HOSP IP/OBS MODERATE 35: CPT | Mod: ,,, | Performed by: PHYSICAL MEDICINE & REHABILITATION

## 2018-04-18 PROCEDURE — 25000003 PHARM REV CODE 250: Performed by: PHYSICAL MEDICINE & REHABILITATION

## 2018-04-18 PROCEDURE — 25000242 PHARM REV CODE 250 ALT 637 W/ HCPCS: Performed by: NURSE PRACTITIONER

## 2018-04-18 PROCEDURE — 97150 GROUP THERAPEUTIC PROCEDURES: CPT

## 2018-04-18 PROCEDURE — 97110 THERAPEUTIC EXERCISES: CPT

## 2018-04-18 PROCEDURE — 97530 THERAPEUTIC ACTIVITIES: CPT

## 2018-04-18 PROCEDURE — 25000003 PHARM REV CODE 250: Performed by: NURSE PRACTITIONER

## 2018-04-18 RX ORDER — HYDROCODONE BITARTRATE AND ACETAMINOPHEN 7.5; 325 MG/1; MG/1
1 TABLET ORAL EVERY 4 HOURS PRN
Qty: 30 TABLET | Refills: 0 | Status: SHIPPED | OUTPATIENT
Start: 2018-04-18

## 2018-04-18 RX ORDER — LISINOPRIL 40 MG/1
40 TABLET ORAL DAILY
Qty: 90 TABLET | Refills: 3 | Status: SHIPPED | OUTPATIENT
Start: 2018-04-19 | End: 2019-04-19

## 2018-04-18 RX ORDER — AMLODIPINE BESYLATE 10 MG/1
10 TABLET ORAL DAILY
Qty: 30 TABLET | Refills: 11 | Status: SHIPPED | OUTPATIENT
Start: 2018-04-19 | End: 2019-04-19

## 2018-04-18 RX ORDER — ASPIRIN 81 MG/1
81 TABLET ORAL DAILY
Refills: 0
Start: 2018-04-18 | End: 2018-04-19

## 2018-04-18 RX ORDER — AMOXICILLIN AND CLAVULANATE POTASSIUM 875; 125 MG/1; MG/1
1 TABLET, FILM COATED ORAL EVERY 12 HOURS
Qty: 14 TABLET | Refills: 0 | Status: SHIPPED | OUTPATIENT
Start: 2018-04-19 | End: 2018-05-02

## 2018-04-18 RX ORDER — FUROSEMIDE 20 MG/1
20 TABLET ORAL 2 TIMES DAILY
Qty: 60 TABLET | Refills: 11 | Status: SHIPPED | OUTPATIENT
Start: 2018-04-19 | End: 2019-04-19

## 2018-04-18 RX ORDER — HYDRALAZINE HYDROCHLORIDE 50 MG/1
50 TABLET, FILM COATED ORAL EVERY 8 HOURS
Qty: 90 TABLET | Refills: 11 | Status: SHIPPED | OUTPATIENT
Start: 2018-04-19 | End: 2019-04-19

## 2018-04-18 RX ADMIN — HYDROCODONE BITARTRATE AND ACETAMINOPHEN 1 TABLET: 7.5; 325 TABLET ORAL at 05:04

## 2018-04-18 RX ADMIN — LIDOCAINE HYDROCHLORIDE 15 ML: 20 SOLUTION ORAL; TOPICAL at 05:04

## 2018-04-18 RX ADMIN — AMOXICILLIN AND CLAVULANATE POTASSIUM 1 TABLET: 875; 125 TABLET, FILM COATED ORAL at 09:04

## 2018-04-18 RX ADMIN — BENZOCAINE: 100 GEL TOPICAL at 11:04

## 2018-04-18 RX ADMIN — FAMOTIDINE 20 MG: 20 TABLET, FILM COATED ORAL at 11:04

## 2018-04-18 RX ADMIN — AMLODIPINE BESYLATE 10 MG: 10 TABLET ORAL at 08:04

## 2018-04-18 RX ADMIN — AMOXICILLIN AND CLAVULANATE POTASSIUM 1 TABLET: 875; 125 TABLET, FILM COATED ORAL at 08:04

## 2018-04-18 RX ADMIN — FLUTICASONE FUROATE AND VILANTEROL TRIFENATATE 1 PUFF: 100; 25 POWDER RESPIRATORY (INHALATION) at 08:04

## 2018-04-18 RX ADMIN — CHLORTHALIDONE 25 MG: 25 TABLET ORAL at 08:04

## 2018-04-18 RX ADMIN — LISINOPRIL 40 MG: 20 TABLET ORAL at 08:04

## 2018-04-18 RX ADMIN — HYDRALAZINE HYDROCHLORIDE 50 MG: 50 TABLET ORAL at 09:04

## 2018-04-18 RX ADMIN — CLOPIDOGREL BISULFATE 75 MG: 75 TABLET ORAL at 08:04

## 2018-04-18 RX ADMIN — ATORVASTATIN CALCIUM 40 MG: 20 TABLET, FILM COATED ORAL at 08:04

## 2018-04-18 RX ADMIN — FUROSEMIDE 20 MG: 20 TABLET ORAL at 08:04

## 2018-04-18 RX ADMIN — BENZOCAINE: 100 GEL TOPICAL at 09:04

## 2018-04-18 RX ADMIN — APIXABAN 5 MG: 5 TABLET, FILM COATED ORAL at 09:04

## 2018-04-18 RX ADMIN — Medication 500 MG: at 09:04

## 2018-04-18 RX ADMIN — BENZOCAINE: 100 GEL TOPICAL at 05:04

## 2018-04-18 RX ADMIN — FUROSEMIDE 20 MG: 20 TABLET ORAL at 05:04

## 2018-04-18 RX ADMIN — APIXABAN 5 MG: 5 TABLET, FILM COATED ORAL at 08:04

## 2018-04-18 RX ADMIN — AMIODARONE HYDROCHLORIDE 400 MG: 200 TABLET ORAL at 09:04

## 2018-04-18 RX ADMIN — HYDRALAZINE HYDROCHLORIDE 50 MG: 50 TABLET ORAL at 11:04

## 2018-04-18 RX ADMIN — Medication 5 DROP: at 09:04

## 2018-04-18 RX ADMIN — AMIODARONE HYDROCHLORIDE 400 MG: 200 TABLET ORAL at 11:04

## 2018-04-18 RX ADMIN — CARVEDILOL 3.12 MG: 3.12 TABLET, FILM COATED ORAL at 08:04

## 2018-04-18 RX ADMIN — HYDROCODONE BITARTRATE AND ACETAMINOPHEN 1 TABLET: 7.5; 325 TABLET ORAL at 09:04

## 2018-04-18 RX ADMIN — Medication 500 MG: at 08:04

## 2018-04-18 RX ADMIN — Medication 5 DROP: at 11:04

## 2018-04-18 RX ADMIN — HYDRALAZINE HYDROCHLORIDE 50 MG: 50 TABLET ORAL at 01:04

## 2018-04-18 RX ADMIN — FAMOTIDINE 20 MG: 20 TABLET, FILM COATED ORAL at 09:04

## 2018-04-18 NOTE — PROGRESS NOTES
[facility]  Physical Medicine & Rehab  Progress Note    Patient Name: Kristina Aguirre  MRN: [id number]  Patient Class: IP- Rehab   Admission Date: 4/2/2018  Length of Stay: 16 days  Attending Physician: Peter Dunaway MD  Primary Care Provider: Robert Mattson MD    Subjective:     Principal Problem: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    Interval History 4/18/2018:  Patient is seen for follow-up rehab evaluation and recommendations: Feels better getting out of bed this morning. No complaints this morning. I have reviewed the HPI and PMFSH and there are no changes from admission.       Scheduled Medications:    amiodarone  400 mg Oral BID    amLODIPine  10 mg Oral Daily    amoxicillin-clavulanate 875-125mg  1 tablet Oral Q12H    apixaban  5 mg Oral BID    ascorbic acid (vitamin C)  500 mg Oral BID    atorvastatin  40 mg Oral Daily    benzocaine   Mouth/Throat QID    carbamide peroxide  5 drop Left Ear BID    carvedilol  3.125 mg Oral Daily    chlorthalidone  25 mg Oral QAM    clopidogrel  75 mg Oral Daily    famotidine  20 mg Oral BID    fluticasone-vilanterol  1 puff Inhalation Daily    furosemide  20 mg Oral BID    hydrALAZINE  50 mg Oral Q8H    lidocaine  1 patch Transdermal Q24H    lisinopril  40 mg Oral Daily    polyethylene glycol  17 g Oral Daily       PRN Medications: acetaminophen, albuterol-ipratropium 2.5mg-0.5mg/3mL, bisacodyl, dextrose 50%, dextrose 50%, dextrose 50%, glucagon (human recombinant), glucose, glucose, hydrALAZINE, hydrocodone-acetaminophen 7.5-325mg, hydrOXYzine pamoate, insulin aspart U-100, lidocaine HCl 2%, magnesium hydroxide 400 mg/5 ml, ondansetron    Review of Systems   Constitutional: Positive for activity change. Negative for chills, diaphoresis, fatigue and fever.   HENT: Negative for congestion, ear discharge and ear pain.         Tooth ache   Eyes: Negative for photophobia, pain and visual disturbance.    Respiratory: Negative for chest tightness and shortness of breath.    Cardiovascular: Negative for chest pain and palpitations.   Gastrointestinal: Negative for abdominal distention, abdominal pain, constipation and diarrhea.   Endocrine: Negative for cold intolerance and heat intolerance.   Musculoskeletal: Negative for arthralgias and myalgias.   Skin: Negative for color change and wound.   Neurological: Positive for weakness. Negative for dizziness, seizures and headaches.   Hematological: Negative for adenopathy.   Psychiatric/Behavioral: Negative for agitation, confusion and hallucinations.     Objective:     Vital Signs (Most Recent):  Temp: 98.4 °F (36.9 °C) (18 2100)  Pulse: (!) 53 (18 0848)  Resp: 18 (18 0848)  BP: (!) 137/58 (18 0847)  SpO2: 95 % (18 0848)    Vital Signs (24h Range):  Temp:  [98.4 °F (36.9 °C)-98.5 °F (36.9 °C)] 98.4 °F (36.9 °C)  Pulse:  [52-53] 53  Resp:  [16-18] 18  SpO2:  [91 %-95 %] 95 %  BP: (133-137)/(56-63) 137/58     Physical Exam   Constitutional: She is oriented to person, place, and time. She appears well-developed and well-nourished. No distress.   HENT:   Head: Normocephalic and atraumatic.   Right Ear: External ear normal.   Left Ear: External ear normal.   Nose: Nose normal.   Eyes: Right eye exhibits no discharge. Left eye exhibits no discharge. No scleral icterus.   Neck: Normal range of motion.   Cardiovascular: Normal rate, regular rhythm and intact distal pulses.    Pulmonary/Chest: Effort normal. No respiratory distress. She has no wheezes.   Abdominal: Soft. She exhibits no distension. There is no tenderness.   Musculoskeletal: Normal range of motion. She exhibits no edema or tenderness.   Neurological: She is alert and oriented to person, place, and time.   -  Mental Status:  AAOx3.  Follows commands.  Answers correct age and .  Recent and remote memory intact.  -  Speech and language:  No aphasia, mild dysarthria.    -  Vision:  R  hemianopsia.  No ptosis.    -  Facial movement (CN VII): Mild facial droop.   -  Motor:  RUE: 2/5 EF, FF, WE.  LUE: 5/5.  RLE: 4-/5.  LLE: 5/5.  -  Tone:  Decreased RUE.  -  Sensory:  Intact to light touch and pin prick.   Skin: Skin is warm and dry. No rash noted.   Psychiatric: She has a normal mood and affect. Her behavior is normal. Thought content normal. Cognition and memory are impaired.   Vitals reviewed.    NEUROLOGICAL EXAMINATION:     MENTAL STATUS   Oriented to person, place, and time.       Assessment/Plan:      Kristina Aguirre is a 68 y.o. female admitted to inpatient rehabilitation on 4/2/2018 for 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 with impaired mobility and ADLs. Patient remains appropriate for PT, OT, and as required Speech therapy. Patient continues to require 24 hour nursing care as well as daily Physician assessment.    * 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    MRI showed multiple bilateral R>L acute infarcts on cerebral and cerebellar hemispheres highly suggestive of embolic phenomenon  -PT/OT/SLP        Toothache    4/12 ordered oragel and lidocaine solution  4/13 amoxicillin-clavulanate  4/14 ordered rapid strep for sore throat associated with toothache  4/15 Prelim strep throat culture no growth.  4/16 cont amoxicillin-clavulanate. F/U appt with [facility] dentistry.        Acute cystitis    Urine culture positive for Citrobacter freundii complex. Pan sensitive S/p 7d course of augmentin     4/14 u/a +leukocytes, blood, -nitrates. UCx pending  4/16 UCx showed no growth.         Atrial fibrillation    continue Apixaban    4/4 - reports of SOB. EKG shows no sign of A fib with RVR or Mi. CXR showing resolution of pulm edema. Pending labs.        S/p TAVR (transcatheter aortic valve replacement), bioprosthetic    Continue Apixaban and Plavix        Diabetes mellitus, type 2    Continue to monitor blood glucoses    4/4 - started on Diabetic diet    Lab  Results   Component Value Date    HGBA1C 6.5 (H) 03/21/2018       - Diabetic Diet, Diabetic Education   - Monitor POCT TIDAC and qhs   - Continue with  ISS    POCT Glucose   Date Value Ref Range Status   04/08/2018 256 (H) 70 - 110 mg/dL Final   04/08/2018 143 (H) 70 - 110 mg/dL Final   04/07/2018 237 (H) 70 - 110 mg/dL Final   04/07/2018 146 (H) 70 - 110 mg/dL Final   04/07/2018 208 (H) 70 - 110 mg/dL Final   04/07/2018 159 (H) 70 - 110 mg/dL Final   04/06/2018 296 (H) 70 - 110 mg/dL Final   04/06/2018 134 (H) 70 - 110 mg/dL Final   04/06/2018 183 (H) 70 - 110 mg/dL Final   04/06/2018 170 (H) 70 - 110 mg/dL Final   04/05/2018 191 (H) 70 - 110 mg/dL Final                     Essential hypertension    Elevated on admission. Continue current meds    4/3/18 - started Lisinopril 10mg daily  4/4/18 - slightly improved. Increased to 20mg daily  4/6/18 - still elevated Bp. Increased to 30mg daily  4/7 cont to monitor trend   4/9 inc lisinopril to 40  4/10 Cont to monitor. BP elevated 175/77 prior to receiving lisinopril.  4/12 hydralazine 25 q8  4/15 inc hydralazine to 50 q8  4/17 cont to monitor. 130s-150s, in persistent pain  4/18 SBP 130s  Vitals:    04/17/18 1424 04/17/18 2100 04/18/18 0847 04/18/18 0848   BP: 133/63 (!) 134/56 (!) 137/58    BP Location:  Left arm     Patient Position:  Lying     Pulse: (!) 52 (!) 53  (!) 53   Resp: 16 18 18   Temp: 98.5 °F (36.9 °C) 98.4 °F (36.9 °C)     TempSrc: Oral Oral     SpO2: 95% (!) 91%  95%   Weight:       Height:                       DISCHARGE PLANNING:  Tentative Discharge Date: 4/19/2018    Future Appointments  Date Time Provider Department Center   5/10/2018 10:00 AM EKG, APPT [facility] EKG Simon Dawson   5/10/2018 10:40 AM Sarbjit Banegas MD [facility] Simon Munson MD  Department of Physical Medicine & Rehab   [facility]

## 2018-04-18 NOTE — SUBJECTIVE & OBJECTIVE
Interval History 4/18/2018:  Patient is seen for follow-up rehab evaluation and recommendations: Feels better getting out of bed this morning. No complaints this morning. I have reviewed the HPI and PMFSH and there are no changes from admission.       Scheduled Medications:    amiodarone  400 mg Oral BID    amLODIPine  10 mg Oral Daily    amoxicillin-clavulanate 875-125mg  1 tablet Oral Q12H    apixaban  5 mg Oral BID    ascorbic acid (vitamin C)  500 mg Oral BID    atorvastatin  40 mg Oral Daily    benzocaine   Mouth/Throat QID    carbamide peroxide  5 drop Left Ear BID    carvedilol  3.125 mg Oral Daily    chlorthalidone  25 mg Oral QAM    clopidogrel  75 mg Oral Daily    famotidine  20 mg Oral BID    fluticasone-vilanterol  1 puff Inhalation Daily    furosemide  20 mg Oral BID    hydrALAZINE  50 mg Oral Q8H    lidocaine  1 patch Transdermal Q24H    lisinopril  40 mg Oral Daily    polyethylene glycol  17 g Oral Daily       PRN Medications: acetaminophen, albuterol-ipratropium 2.5mg-0.5mg/3mL, bisacodyl, dextrose 50%, dextrose 50%, dextrose 50%, glucagon (human recombinant), glucose, glucose, hydrALAZINE, hydrocodone-acetaminophen 7.5-325mg, hydrOXYzine pamoate, insulin aspart U-100, lidocaine HCl 2%, magnesium hydroxide 400 mg/5 ml, ondansetron    Review of Systems   Constitutional: Positive for activity change. Negative for chills, diaphoresis, fatigue and fever.   HENT: Negative for congestion, ear discharge and ear pain.         Tooth ache   Eyes: Negative for photophobia, pain and visual disturbance.   Respiratory: Negative for chest tightness and shortness of breath.    Cardiovascular: Negative for chest pain and palpitations.   Gastrointestinal: Negative for abdominal distention, abdominal pain, constipation and diarrhea.   Endocrine: Negative for cold intolerance and heat intolerance.   Musculoskeletal: Negative for arthralgias and myalgias (over left ribs).   Skin: Negative for color  change and wound.   Neurological: Positive for weakness. Negative for dizziness, seizures and headaches.   Hematological: Negative for adenopathy.   Psychiatric/Behavioral: Negative for agitation, confusion and hallucinations.     Objective:     Vital Signs (Most Recent):  Temp: 98.4 °F (36.9 °C) (18 2100)  Pulse: (!) 53 (18 0848)  Resp: 18 (18 0848)  BP: (!) 137/58 (18 0847)  SpO2: 95 % (18 0848)    Vital Signs (24h Range):  Temp:  [98.4 °F (36.9 °C)-98.5 °F (36.9 °C)] 98.4 °F (36.9 °C)  Pulse:  [52-53] 53  Resp:  [16-18] 18  SpO2:  [91 %-95 %] 95 %  BP: (133-137)/(56-63) 137/58     Physical Exam   Constitutional: She is oriented to person, place, and time. She appears well-developed and well-nourished. No distress.   HENT:   Head: Normocephalic and atraumatic.   Right Ear: External ear normal.   Left Ear: External ear normal.   Nose: Nose normal.   Eyes: Right eye exhibits no discharge. Left eye exhibits no discharge. No scleral icterus.   Neck: Normal range of motion.   Cardiovascular: Normal rate, regular rhythm and intact distal pulses.    Pulmonary/Chest: Effort normal. No respiratory distress. She has no wheezes.   Abdominal: Soft. She exhibits no distension. There is no tenderness.   Musculoskeletal: Normal range of motion. She exhibits no edema or tenderness.   Neurological: She is alert and oriented to person, place, and time.   -  Mental Status:  AAOx3.  Follows commands.  Answers correct age and .  Recent and remote memory intact.  -  Speech and language:  No aphasia, mild dysarthria.    -  Vision:  R hemianopsia.  No ptosis.    -  Facial movement (CN VII): Mild facial droop.   -  Motor:  RUE: 2/5 EF, FF, WE.  LUE: 5/5.  RLE: 4-/5.  LLE: 5/5.  -  Tone:  Decreased RUE.  -  Sensory:  Intact to light touch and pin prick.   Skin: Skin is warm and dry. No rash noted.   Psychiatric: She has a normal mood and affect. Her behavior is normal. Thought content normal. Cognition and  memory are impaired.   Vitals reviewed.    NEUROLOGICAL EXAMINATION:     MENTAL STATUS   Oriented to person, place, and time.

## 2018-04-18 NOTE — ASSESSMENT & PLAN NOTE
Elevated on admission. Continue current meds    4/3/18 - started Lisinopril 10mg daily  4/4/18 - slightly improved. Increased to 20mg daily  4/6/18 - still elevated Bp. Increased to 30mg daily  4/7 cont to monitor trend   4/9 inc lisinopril to 40  4/10 Cont to monitor. BP elevated 175/77 prior to receiving lisinopril.  4/12 hydralazine 25 q8  4/15 inc hydralazine to 50 q8  4/17 cont to monitor. 130s-150s, in persistent pain  4/18 SBP 130s  Vitals:    04/17/18 1424 04/17/18 2100 04/18/18 0847 04/18/18 0848   BP: 133/63 (!) 134/56 (!) 137/58    BP Location:  Left arm     Patient Position:  Lying     Pulse: (!) 52 (!) 53  (!) 53   Resp: 16 18 18   Temp: 98.5 °F (36.9 °C) 98.4 °F (36.9 °C)     TempSrc: Oral Oral     SpO2: 95% (!) 91%  95%   Weight:       Height:

## 2018-04-18 NOTE — PROGRESS NOTES
"Physical Therapy   Treatment/Patient Discharge Summary    Kristina Aguirre   MRN: [id number]      04/18/18 1600   PT Time Calculation   PT Start Time 1600   PT Stop Time 1645   PT Total Time (min) 45 min   Treatment   Treatment Type Treatment  (Patient Discharge Summary)   PT/PTA PT   General   PT Received On 04/18/18   Family/Caregiver Present No   Patient Found (position) Seated in wheelchair   Pt found with (seated in wheelchair from PT group)   Precautions   General Precautions aspiration;fall;vision impaired   Orthopedic No   Required Braces or Orthoses No   Cardiovascular/Pulmonary (Holter monitor)   Visual/Auditory Vision impaired   Subjective   Patient states "I just have no energy this afternoon"   Pain/Comfort   Pain Rating 1 4/10   Location - Side 1 Left   Location - Orientation 1 generalized   Location 1 head   Pain Addressed 1 Reposition;Distraction   Pain Rating Post-Intervention 1 4/10   Bed Mobility   Bed Mobility yes   Rolling/Turning to Left Modified independent  (x 1 trial on mat )   Rolling/Turning Right Modified independent  (x 1 trial on mat )   Supine to Sit Modified Independent  (x 1 trial on mat)   Sit to Supine Modified Independent  (x 1 trial on mat )   Transfers   Transfer yes   Sit to Stand   Sit <> Stand Assistance Stand By Assistance   Sit <> Stand Assistive Device No Assistive Device  (HHA)   Trials/Comments Pt performed multiple trials of this transfer throughout PT treatment session   Stand to Sit   Assistance Stand By Assistance   Assistive Device No Assistive Device  (HHA)   Trials/Comments Pt performed multiple trials of this transfer throughout PT treatment session   Chair to Bed   Technique Stand Pivot   Assistance Stand By Assistance   Assistive Device No Assistive Device   Trials/Comments x 1 trial    Chair to Mat   Chair<> Mat Technique Stand Pivot   Chair<>Mat Assistance Stand By Assistance   Chair <> Mat Assistive Device No Assistive Device   Trials/Comments x 1 trial    Mat to " Chair   Technique Stand Pivot   Assistance Stand By Assistance   Assistive Device No Assistive Device   Trials/Comments x 1 trial    Tub Bench Transfer   Technique Stand Pivot   Assistance Contact Guard Assistance   Assistive Device No Assistive Device   Trials/Comments x 1 trial, pt required touching assistance from PT to maintain balance while bringing B LE into and out of tub to complete transfer   Car Transfer   Car Transfer Technique Stand Pivot   Car Transfer Assistance Stand By Assistance   Car Transfer Assistive Device No Assistive Device   Trials/Comments x 1 trial, pt was able to bring B LE into and out of car to complete transfer   Wheelchair Activities   Propulsion Yes   Propulsion Type 1 Manual   Level 1 Level tile   Method 1 Right lower extremity;Left lower extremity;Left upper extremity   Level of Assistance 1 Supervision   Description/ Details 1 Pt propelled self in wheelchair x 200 feet, increased time required. Pt was able to negotiate 2 turns in wheelchair    Gait   Gait Yes   Weight Bearing Status full   Gait 1   Surface 1 Level tile;Carpet;Ramp   Gait Assistive Device No device;Hand held assist   Assistance 1 Contact Guard Assistance   Gait Distance Pt ambulated x 2 trials with no AD: 1st trial: 167 feet (with 2 standing rest breaks), 2nd trial: 100 feet, 3rd trial: 100 feet. Pt required seated rest breaks between each trial due to pt fatigue. Patient ambulated 1 trial up and down carpeted ramp wtih no AD and CGA for 20 feet    Gait Pattern swing-through gait   Gait Deviation(s) decreased perez;increased time in double stance;decreased velocity of limb motion;decreased step length;decreased stride length;decreased weight-shifting ability;decreased toe-to-floor clearance;forward lean   Impairments Contributing to Gait Deviations impaired balance;decreased strength;impaired postural control;impaired motor control;abnormal muscle tone;impaired coordination   Stairs   Stairs Yes   Stairs/Curb Step  "  Rails 1 Left   Device 1 No device   Assistance 1 Contact guard   Comment/# Steps 1 Pt ascended and descended 12 6" steps with non reciprocal LE gait pattern x 1 trial    Stairs/ Curb Step  2   Rails 2 None (Comment)   Device 2 No device;Hand held assist   Assistance 2 Contact Guard Assistance   Comment/# Steps 2 Pt negotiated 4" curb step x 2 trials with HHA of 1    Seated   Seated-Exercises Lower extremity;Specific exercises   Seated-Exercise Type Ankle pumps;Glut sets;Hip flexion;Long arc quads;ADduction   Seated-Exercise Comments Pt performed 2 x 20 reps to B LE, PT educated pt on appropriate LE HEP to perform at D/C. Pt was given appropriate illustrations and explanations to perform at D/C    Other Activities   Comments Patient performed stand pivot transfer from wheelchair to commode with no AD and SBA x 1 trial    Patient picked towel up off of floor x 1 trial with CGA    Activity Tolerance   Activity Tolerance Patient tolerated treatment well   After Treatment   Patient Position After Treatment Seated at edge of bed   Patient after treatment left call button in reach   Assessment   Prognosis Fair   Problem List Decreased strength;Decreased range of motion;Decreased endurance;Impaired balance;Decreased mobility;Decreased coordination;Decreased cognition;Impaired judgement;Decreased safety awareness;Impaired vision;Impaired sensation;Impaired tone   Session Assessment progressing toward goals   Assessment Pt has made good progress with skilled PT intervention. Pt continues to require between CGA to SBA to perform mobility and therefore PT continues to recommend that pt have 24 hour supervision at D/C. Pt will continue to benefit from further skilled home health PT upon D/C. pt was able to achieve 7 of 9 long term goals for physical therapy.    Level of Motivation/Participation good   Barriers to Discharge Inaccessible home environment;Decreased caregiver support   Barriers to Discharge Comments pt lives alone "   Long Term Goals   Supine to Sit-Met/Not Met Met   Sit to Supine - Met/Not Met Met   Transfer Sit to stand - Met/Not Met Met   Transfer Bed/Chair - Met/Not Met Met   Ambulate - Met/Not Met Not met   Go Up/Down Stairs - Met/Not Met Not met   Go Up/Down Curb- Met/Not Met Met   Propel Wheelchair - Met/Not Met Met   Other Goal - Progress Met   Discharge Recommendations   Equipment Needed After Discharge bath bench;wheelchair   Discharge Facility/Level Of Care Needs home health PT   Treatment/Billable Minutes   Gait training 15   Therapeutic Activity 20   Therapeutic Exercise 10   Total Time 45     Adry Romo, PT  4/18/2018

## 2018-04-18 NOTE — PROGRESS NOTES
Speech Therapy   Treatment/Discharge Summary    Kristina Aguirre   MRN: [id number]     Recommend continued skilled ST service via home health setting targeting safety, cognition, dysphagia, and visual deficits for increased independence. Pt requires supervision at this time. Pt tolerating soft diet with thin liquids at this time with inconsistent use of aspiration precautions 2' poor participation/motivation in therapy services. Due to pt's recent oral pain/toothache, pt with decreased oral intake.     Short Term Goals   Goal 1 Pt will be oriented x4 given supervision with 90% acc. MET   Goal 2 Pt will complete language comprehension tasks given supervision with 90% acc. MET   Goal 3 Pt will complete expressive language tasks given supervision with 90% acc. MET   Goal 4 Pt will complete problem solving/reasoning/sequencing tasks given supervision with 90% acc. NOT MET   Goal 5 Pt will complete functional/recent recall task given min verbal cues with 80% acc. NOT MET   Goal 6 Further assessment reading comprehension and visual abilities to determine baseline. COMPLETE   Goal 7 Pt will complete mental manipulation of functional information regarding time/money management with 90% accuracy with min cues. NOT MET   Long Term Goals   Goal 1 Pt will complete visual scanning task given min verbal cues with 80% acc. Inconsistently MET   Goal 2 Pt will complete language comprehension tasks with modified independence with % acc. NOT MET   Goal 3 Pt will complete expressive language tasks with modified independence with % acc. NOT MET   Goal 4 Pt will complete problem solving/reasoning/sequencing tasks with modified independence with % acc. NOT MET   Goal 5 Pt will complete functional/recent recall task given supervision with 90% acc.NOT MET      Swallowing/Dysphagia Goals:        Short Term Goals   Goal 1 Patient will complete OME's x10 each given moderate verbal and visual cues to improve R orofacial weakness.  "Inconsistently completed   Goal 2 Patient will complete dysphagia exercises x10 each given moderate verbal and visual cues to improve laryngeal elevation and tongue base retraction. Inconsistently completed   Goal 3 Patient will tolerate current diet of DENTAL SOFT solids with thin liquids with no overt s/s aspiration. MET   Long Term Goals   Goal 1 Patient will complete OME's x10 each given min verbal and visual cues to improve R orofacial weakness. NOT MET   Goal 2 Patient will complete dysphagia exercises x10 each given min verbal and visual cues to improve laryngeal elevation and tongue base retraction. NOT MET        04/18/18 1030   Speech Time Calculation   Speech Start Time 1030   Speech Stop Time 1115   Speech Total (min) 45 min   General Information   SLP Treatment Date 04/18/18   General Observations Pt seen individually within ST office while sitting upright in w/c.    General Precautions aspiration;fall;vision impaired   Visual/Auditory Vision impaired   Subjective   Patient states "I don't think a walker will fit in my trailer."   Pain/Comfort   Pain Rating no pain       SLP Cognitive Treatment   Treatment Detail (SLP Cognitive Treatment) Pt noted with increased participation and appropriate behavior today. Pt agreeable to all tasks with increase motivation. Pt completed orientation task to time indly with 100% acc. Pt recalled safety strategies to utilize within home environment given min verbal cues with 80% acc. Pt with unrealistic expectations of independence of home environment. Pt completed hypothetical problem solving task of home situation with 75% acc indly, given min verbal cues for attention details, attention to safety, and attention to physical limitations, pt with increase to 100% acc. Pt completed verbal structured conversation task of medication management and visual strategy management within home environment with 85% acc indly, given supervision pt with increase to 100% acc. Pt " requires supervision at home for safety. Pt completed visual scanning task targeting utilization of strategies with moderate verbal cues required for slow rate and attention skills to task.     Education provided at length to pt regarding safety recommendations.    Assessment   SLP Diagnosis moderate cognitive-language deficits; severe visual deficits;    Prognosis Fair   Problem List decreased attention skills; visual changes; decreased thought organization; decreased reasoning skills; poor recall; decreased awareness of CVA changes   Session Assessment progressing toward goals   Level of Motivation/Participation good   Discharge Recommendations   Discharge Facility/Level Of Care Needs home health speech therapy  (supervision at home)   Plan   Plan Alter current plan  (pt planned to d/c home 4/19/18)   Treatment/Billable Minutes   Speech Therapy Individual 45   Total Time 45     Lala Muñoz CCC-SLP  4/18/2018

## 2018-04-18 NOTE — PROGRESS NOTES
Physical Therapy   Discharge Physical Therapy FIM Scores    Kristina Aguirre   MRN: [id number]        04/18/18 1600   Transfers   Bed/Chair/WC 5   Toilet 5   Tub 4   Locomotion   Distance Walked 2   Distance Wheelchair 3   Walk 2   Wheelchair 5   Mode C   Stairs 4     Adry Romo, PT  4/18/2018

## 2018-04-18 NOTE — PROGRESS NOTES
Speech Therapy   Discharge FIM Scores    Kristina Aguirre   MRN: [id number]        04/18/18 1030   Communication   Comprehension 4   Mode A   Expression 4   Mode V   Social Cognition   Social Interaction 4   Problem Solving 4   Memory 3     Comprehension:  Complex= humor, finances, rationale for medical treatment(hip precautions, pressure relief)  Basic= pain, hunger, thirst, bathroom needs, cold, nutrition, sleep    · Understands basic 75-89%- may need words repeated (4)    Expression:  - Expresses basic 75-89% of time - Needs to repeat words  (4)    Social Interaction:  - Interacts appropriately 75-89% of time - needs redirection for appropriate language or to initiate interaction  (4)    Problem Solving:  - Solves basic problems 75-89% of the time  (4)    Memory:  - Recognizes, recalls, or executes 50-74% of time 2 steps of 2 step request (3)    Lala Muñoz CCC-SLP  4/18/2018

## 2018-04-18 NOTE — PROGRESS NOTES
"Occupational Therapy  AM Treatment Session / DC  Kristina Aguirre   MRN: [id number]    04/18/18 0930   OT Time Calculation   OT Start Time 0930   OT Stop Time 1019   OT Total Time (min) 49 min   General   OT Date of Treatment 04/18/18   Family/Caregiver Present No   Patient Found (position) Supine in bed   Pt found with (holter monitor)   Precautions   General Precautions aspiration;fall;vision impaired   Orthopedic No   Required Braces or Orthoses No   Cardiovascular/Pulmonary (holter monitor)   Visual/Auditory Vision impaired   Subjective   Patient states "I think that was well" re: bathing   Family states    Bed Mobility   Bed Mobility yes   Rolling/Turning to Left Minimum assistance   Rolling/Turning Right Modified independent   Rolling/Turning Right Comments bedrail   Supine to Sit Independent   Supine to Sit Comments from flat surface on matress with no bedrail   Sit to Supine Independent   Sit to Supine Comments same   Sit to Stand   Sit <> Stand Assistance Supervision   Sit <> Stand Assistive Device No Assistive Device   Trials/Comments from EOB; for safety   Stand to Sit   Assistance Stand By Assistance   Assistive Device No Assistive Device   Trials/Comments to toilet   Bed to Chair   Bed <> Chair Technique Stand Pivot   Bed <> Chair Transfer Assistance Stand By Assistance   Bed <> Chair Assistive Device No Assistive Device   Trials/Comments for safety during pivot    Tub Bench Transfer   Tub Transfer Technique Stand Pivot   Tub Bench Transfer Assistance Contact Guard Assistance   Tub Transfer Assistive Device Tub transfer bench   Shower Transfer   Shower Transfer Technique Stand Pivot   Shower Transfer Assistance Contact Guard Assistance   Shower Transfer Assistive Device Shower bench   Toilet Transfer   Toilet Transfer Technique Stand Pivot   Toilet Transfer Assistance Stand By Assistance   Toilet Transfer Assistive Device No Assistive Device   Trials/Comments ambulated ~25' from EOB > toilet   Feeding "   Feeding Level of Assistance Supervision   Feeding Where Assessed Wheelchair   Feeding Comments Pt (S) for Self Feeding for aspiration precautions    Grooming   Grooming Level of Assistance Supervision   Face Washing Level of Assistance Supervision   Oral Hygeine Level of Assistance Supervision   Hair Grooming Level of Assistance Supervision   Grooming Where Assessed Sitting sinkside   Grooming Comments Pt (S) for G/h 4/4 steps while seeated sinkside with all personal care items within reach; cues for using bi-lateral integration of UE for manipulating containers   Bathing   Bathing Level of Assistance Minimum assistance   Bathing Where Assessed Shower Chair/bench   Bathing Comments Min (A) for B axilla   UE Dressing   UE Dressing Level of Assistance Minimum assistance   UE Dressing Where Assessed Other (Comment)  (Pt seated @ TTB)   UE Dressing Comments Pt (MIN A) for UB Dressing @ TTB; SBA to Doff; MIN A to jhonathan long sleeve with RUE   LE Dressing   LE Dressing  Level of Assistance Minimum assistance   LE Dressing Level of Assistance Contact guard   Sock Level of Assistance Modified independent   Shoe Level of Assistance Minimum assistance  (to tie (B) shoe)   Adult Briefs Level of Assistance Contact guard   LE Dressing Where Assessed Edge of bed;Other (Comment)  (Pt seated @ TTB and EOB)   Toileting   Toileting Level of Assistance Contact guard   Toileting Where Assessed Toilet   Toileting Comments for safety   Activity Tolerance   Activity Tolerance Patient tolerated treatment well   After Treatment   Patient Position After Treatment Seated in wheelchair  (safety belt)   Patient after treatment left (awaiting ST)   Assessment   Prognosis Fair   Problem List Decreased Self Care skills;Decreased upper extremity range of motion;Decreased upper extremity strength;Decreased safe judgment during ADL;Decreased cognition;Decreased endurance;Visual deficit;Decreased fine motor control;Decreased functional  mobility;Decreased gross motor control;Decreased IADLs;Decreased Function of right upper extremity;Decreased trunk control for functional activities   Assessment Pt pleasant and mostly agreable during therapy sessions working towards therapy goals with encouragement for full participation. Pt has achieved 7/7 STGs and 8/13 LTGs requiring additonal practice with functional mobility to progress from SBA > (S). Pt is discharged from current OT services to Home with home health.   Level of Motivation/Participation Good   Barriers to Discharge Decreased caregiver support   Barrier Comments Pt lives alone with pet   Short Term Goals   Pt Will Transfer Bed/Chair With stand by assist   Transfer Bed/Chair - Met/Not Met Met   Pt Will Perform Eating With stand by assistance   Eating - Met/Not Met Met   Pt Will Perform Grooming With supervision   Grooming - Met/Not Met Met   Pt Will Perform Bathing With minimal assistance   Bathing - Met/Not Met Met   Pt Will Perform UE Dressing With maximum assistance   UE Dressing - Met/Not Met Met   Pt Will Perform LE Dressing With minimal assistance   LE Dressing - Met/Not Met Met   Pt Will Perform Toileting With contact guard assistance   Toileting-Met/Not Met Met   Long Term Goals   Pt Will Perform Supine To Sit Independently   Supine to Sit - Met/Not Met Met   Pt Will Perform Sit to Supine Independently   Supine to Sit - Met/Not Met Met   Pt Will Transfer Sit to Stand Supervision   Transfer Sit to stand - Met/Not Met Met   Pt Will Transfer Bed/Chair Stand Pivot;With stand by assist   Transfer Bed/Chair - Met/Not Met Met   Pt Will Transfer To Bedside Commode With supervision   Transfer Bedside Commode -Met/Not Met Not Met   Pt Will Transfer To Toilet Supervision   Transfer Toilet - Met/Not Met Not Met   Pt Will Transfer To Shower With supervision   Transfer to Shower-Met/Not Met Not Met   Pt Will Perform Eating Independently   Eating - Met/Not Met Not Met   Pt Will Perform Grooming  Independently   Grooming - Met/Not Met Not Met   Pt Will Perform Bathing With minimal assistance   Bathing - Met/Not Met Met   Pt Will Perform UE Dressing With stand by assistance   UE Dressing - Met/Not Met Met   Pt Will Perform LE Dressing With contact guard assistance   LE Dressing - Met/Not Met Met   Pt Will Perform Toileting With stand by assistance   Toileting-Met/Not Met Met   Discharge Recommendations   Equipment Needed After Discharge bath bench   Discharge Facility/Level Of Care Needs home with home health   Plan   Plan Alter current plan   Plan Comments Discharge from current OT services    Treatment/Billable Minutes   Self Care/Home Management 49   Total Time 49   Expression:    - Expresses basic 75-89% of time - Needs to repeat words  (4)  Social Interaction:    - Interacts appropriately 75-89% of time - needs redirection for appropriate language or to initiate interaction  (4)  .Problem Solving:    - Solves basic problems 75-89% of the time  (4)    Memory:      - Recognizes, recalls, or executes 50-74% of time 2 steps of 2 step request (3)      Comprehension:  Complex= humor, finances, rationale for medical treatment(hip precautions, pressure relief)  Basic= pain, hunger, thirst, bathroom needs, cold, nutrition, sleep      · Understands basic 75-89%- may need words repeated (4)      The NASRAR and ALMAGUER have collaborated and discussed the patient's status, treatment plan and progress toward established goals. Patient with wheelchair safety belt and able to self release wheelchair safety belt.    Davian Ortiz, SARAH 4/18/2018

## 2018-04-18 NOTE — PROGRESS NOTES
"Spoke with patient about discharge tomorrow. "I'm excited to go home and see my puppy." Pt understands the home health process and her following up with her PCP and at the dental clinic. Wheelchair [place] [facility] in [place]'s room. Spoke with David OLIVIER), he is building a ramp for pt tonight. He will be out by at 1pm tomorrow to transport pt home. All questions answered. CM will continue to follow.   "

## 2018-04-18 NOTE — PROGRESS NOTES
Occupational Therapy  [facility]  Kristina Aguirre   MRN: [id number]      04/18/18 0930   Transfers   Bed/Chair/WC 5   Toilet 5   Tub 4   Shower 4   Self Care   Eating 5   Grooming 5   Bathing 4   Dressing-Upper 4   Dressing-Lower 4   Toileting 4   Communication   Comprehension 4   Mode B   Expression 4   Mode V   Social Cognition   Social Interaction 4   Problem Solving 4   Memory 3       SARAH Jackson 4/18/2018

## 2018-04-18 NOTE — PROGRESS NOTES
Physical Therapy   PT WC Group    Kristina Aguirre   MRN: [id number]        04/18/18 1515   PT Time Calculation   PT Start Time 1515   PT Stop Time 1600   PT Total Time (min) 45 min   Treatment   Treatment Type Treatment   PT/PTA PT   General   PT Received On 04/18/18   Family/Caregiver Present No   Patient Found (position) Seated in wheelchair   Precautions   General Precautions aspiration;fall;vision impaired   Orthopedic No   Required Braces or Orthoses No   Cardiovascular/Pulmonary (holter monitor)   Visual/Auditory Vision impaired   Subjective   Patient states Pt is agreeable to PT group   Pain/Comfort   Pain Rating 1 no pain   Pain Rating Post-Intervention 1 no pain   Other Activities   Comments Patient participated in a wheelchair management and mobility 45 min group session.  The patients were involved in group activities with emphasis on education, patients propulsion of wheelchair, community mobility, and endurance building. The patient performedan endurance activity with a distance of 285' with SBA assist requiring 5 rest breaks. PT educated patient on pressure relief using lateral weight shifts every 15 minutes in order to reduce the risk of skin breakdown. Pt performed x2 lateral weight shift to demonstrate understanding. Patient practiced up and down 3 % grade incline with mod assist. Patient also practiced community mobility including negotiating obstacles via elevators, uneven surfaces, cement, concrete, and ramps with mod.  Endurance 13 (6-20) on RPE scale  - Locomotion: Wheelchair (FIM): 2   Activity Tolerance   Activity Tolerance Patient tolerated treatment well   After Treatment   Patient Position After Treatment Seated in wheelchair  (hand off to PT)   Plan   Planned Therapy Intervention Continue with current plan   Physical Therapy Follow-up   PT Follow-up? Yes   Treatment/Billable Minutes   Therapeutic Activity Group 45   Total Time 45     Cat Payton, PT, DPT   4/18/2018

## 2018-04-19 VITALS
HEIGHT: 62 IN | WEIGHT: 144.5 LBS | BODY MASS INDEX: 26.59 KG/M2 | HEART RATE: 52 BPM | SYSTOLIC BLOOD PRESSURE: 157 MMHG | TEMPERATURE: 98 F | RESPIRATION RATE: 17 BRPM | OXYGEN SATURATION: 95 % | DIASTOLIC BLOOD PRESSURE: 67 MMHG

## 2018-04-19 LAB
ANION GAP SERPL CALC-SCNC: 8 MMOL/L
BASOPHILS # BLD AUTO: 0.07 K/UL
BASOPHILS NFR BLD: 1.2 %
BUN SERPL-MCNC: 31 MG/DL
CALCIUM SERPL-MCNC: 9.5 MG/DL
CHLORIDE SERPL-SCNC: 99 MMOL/L
CO2 SERPL-SCNC: 29 MMOL/L
CREAT SERPL-MCNC: 0.9 MG/DL
DIFFERENTIAL METHOD: ABNORMAL
EOSINOPHIL # BLD AUTO: 0.4 K/UL
EOSINOPHIL NFR BLD: 5.9 %
ERYTHROCYTE [DISTWIDTH] IN BLOOD BY AUTOMATED COUNT: 17.8 %
EST. GFR  (AFRICAN AMERICAN): >60 ML/MIN/1.73 M^2
EST. GFR  (NON AFRICAN AMERICAN): >60 ML/MIN/1.73 M^2
GLUCOSE SERPL-MCNC: 121 MG/DL
HCT VFR BLD AUTO: 29.7 %
HGB BLD-MCNC: 9.4 G/DL
IMM GRANULOCYTES # BLD AUTO: 0.01 K/UL
IMM GRANULOCYTES NFR BLD AUTO: 0.2 %
LYMPHOCYTES # BLD AUTO: 1.7 K/UL
LYMPHOCYTES NFR BLD: 28 %
MAGNESIUM SERPL-MCNC: 2 MG/DL
MCH RBC QN AUTO: 24.5 PG
MCHC RBC AUTO-ENTMCNC: 31.6 G/DL
MCV RBC AUTO: 78 FL
MONOCYTES # BLD AUTO: 0.5 K/UL
MONOCYTES NFR BLD: 8.2 %
NEUTROPHILS # BLD AUTO: 3.4 K/UL
NEUTROPHILS NFR BLD: 56.5 %
NRBC BLD-RTO: 0 /100 WBC
PHOSPHATE SERPL-MCNC: 3.9 MG/DL
PLATELET # BLD AUTO: 271 K/UL
PMV BLD AUTO: 9.5 FL
POCT GLUCOSE: 130 MG/DL (ref 70–110)
POCT GLUCOSE: 162 MG/DL (ref 70–110)
POCT GLUCOSE: 179 MG/DL (ref 70–110)
POTASSIUM SERPL-SCNC: 3.9 MMOL/L
RBC # BLD AUTO: 3.83 M/UL
SODIUM SERPL-SCNC: 136 MMOL/L
WBC # BLD AUTO: 6.08 K/UL

## 2018-04-19 PROCEDURE — 80048 BASIC METABOLIC PNL TOTAL CA: CPT

## 2018-04-19 PROCEDURE — 36415 COLL VENOUS BLD VENIPUNCTURE: CPT

## 2018-04-19 PROCEDURE — 99232 SBSQ HOSP IP/OBS MODERATE 35: CPT | Mod: ,,, | Performed by: PHYSICAL MEDICINE & REHABILITATION

## 2018-04-19 PROCEDURE — 25000003 PHARM REV CODE 250: Performed by: PHYSICAL MEDICINE & REHABILITATION

## 2018-04-19 PROCEDURE — 63600175 PHARM REV CODE 636 W HCPCS: Performed by: NURSE PRACTITIONER

## 2018-04-19 PROCEDURE — 83735 ASSAY OF MAGNESIUM: CPT

## 2018-04-19 PROCEDURE — 25000003 PHARM REV CODE 250: Performed by: NURSE PRACTITIONER

## 2018-04-19 PROCEDURE — 25000242 PHARM REV CODE 250 ALT 637 W/ HCPCS: Performed by: NURSE PRACTITIONER

## 2018-04-19 PROCEDURE — 84100 ASSAY OF PHOSPHORUS: CPT

## 2018-04-19 PROCEDURE — 85025 COMPLETE CBC W/AUTO DIFF WBC: CPT

## 2018-04-19 RX ADMIN — FUROSEMIDE 20 MG: 20 TABLET ORAL at 09:04

## 2018-04-19 RX ADMIN — ATORVASTATIN CALCIUM 40 MG: 20 TABLET, FILM COATED ORAL at 09:04

## 2018-04-19 RX ADMIN — CHLORTHALIDONE 25 MG: 25 TABLET ORAL at 09:04

## 2018-04-19 RX ADMIN — FAMOTIDINE 20 MG: 20 TABLET, FILM COATED ORAL at 09:04

## 2018-04-19 RX ADMIN — HYDROCODONE BITARTRATE AND ACETAMINOPHEN 1 TABLET: 7.5; 325 TABLET ORAL at 01:04

## 2018-04-19 RX ADMIN — CLOPIDOGREL BISULFATE 75 MG: 75 TABLET ORAL at 09:04

## 2018-04-19 RX ADMIN — FLUTICASONE FUROATE AND VILANTEROL TRIFENATATE 1 PUFF: 100; 25 POWDER RESPIRATORY (INHALATION) at 09:04

## 2018-04-19 RX ADMIN — APIXABAN 5 MG: 5 TABLET, FILM COATED ORAL at 09:04

## 2018-04-19 RX ADMIN — HYDRALAZINE HYDROCHLORIDE 50 MG: 50 TABLET ORAL at 06:04

## 2018-04-19 RX ADMIN — LISINOPRIL 40 MG: 20 TABLET ORAL at 09:04

## 2018-04-19 RX ADMIN — AMLODIPINE BESYLATE 10 MG: 10 TABLET ORAL at 09:04

## 2018-04-19 RX ADMIN — BENZOCAINE: 100 GEL TOPICAL at 09:04

## 2018-04-19 RX ADMIN — AMOXICILLIN AND CLAVULANATE POTASSIUM 1 TABLET: 875; 125 TABLET, FILM COATED ORAL at 09:04

## 2018-04-19 RX ADMIN — CARVEDILOL 3.12 MG: 3.12 TABLET, FILM COATED ORAL at 09:04

## 2018-04-19 RX ADMIN — Medication 500 MG: at 09:04

## 2018-04-19 RX ADMIN — Medication 5 DROP: at 09:04

## 2018-04-19 RX ADMIN — HYDRALAZINE HYDROCHLORIDE 50 MG: 50 TABLET ORAL at 01:04

## 2018-04-19 RX ADMIN — AMIODARONE HYDROCHLORIDE 400 MG: 200 TABLET ORAL at 09:04

## 2018-04-19 RX ADMIN — INSULIN ASPART 2 UNITS: 100 INJECTION, SOLUTION INTRAVENOUS; SUBCUTANEOUS at 02:04

## 2018-04-19 RX ADMIN — HYDROCODONE BITARTRATE AND ACETAMINOPHEN 1 TABLET: 7.5; 325 TABLET ORAL at 04:04

## 2018-04-19 NOTE — PROGRESS NOTES
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 I have reviewed the HPI and PMFSH and there are no changes from admission.       Scheduled Medications:    amiodarone  400 mg Oral BID    amLODIPine  10 mg Oral Daily    amoxicillin-clavulanate 875-125mg  1 tablet Oral Q12H    apixaban  5 mg Oral BID    ascorbic acid (vitamin C)  500 mg Oral BID    atorvastatin  40 mg Oral Daily    benzocaine   Mouth/Throat QID    carbamide peroxide  5 drop Left Ear BID    carvedilol  3.125 mg Oral Daily    chlorthalidone  25 mg Oral QAM    clopidogrel  75 mg Oral Daily    famotidine  20 mg Oral BID    fluticasone-vilanterol  1 puff Inhalation Daily    furosemide  20 mg Oral BID    hydrALAZINE  50 mg Oral Q8H    lidocaine  1 patch Transdermal Q24H    lisinopril  40 mg Oral Daily    polyethylene glycol  17 g Oral Daily       PRN Medications: acetaminophen, albuterol-ipratropium 2.5mg-0.5mg/3mL, bisacodyl, dextrose 50%, dextrose 50%, dextrose 50%, glucagon (human recombinant), glucose, glucose, hydrALAZINE, hydrocodone-acetaminophen 7.5-325mg, hydrOXYzine pamoate, insulin aspart U-100, lidocaine HCl 2%, magnesium hydroxide 400 mg/5 ml, ondansetron    Review of Systems   Constitutional: Positive for activity change. Negative for chills, diaphoresis, fatigue and fever.   HENT: Negative for congestion, ear discharge and ear pain.         Tooth ache   Eyes: Negative for photophobia, pain and visual disturbance.   Respiratory: Negative for chest tightness and shortness of  breath.    Cardiovascular: Negative for chest pain and palpitations.   Gastrointestinal: Negative for abdominal distention, abdominal pain, constipation and diarrhea.   Endocrine: Negative for cold intolerance and heat intolerance.   Musculoskeletal: Negative for arthralgias and myalgias (over left ribs).   Skin: Negative for color change and wound.   Neurological: Positive for weakness. Negative for dizziness, seizures and headaches.   Hematological: Negative for adenopathy.   Psychiatric/Behavioral: Negative for agitation, confusion and hallucinations.     Objective:     Vital Signs (Most Recent):  Temp: 97.9 °F (36.6 °C) (18 1908)  Pulse: (!) 52 (18 09)  Resp: 17 (18)  BP: (!) 119/54 (18)  SpO2: 95 % (18)    Vital Signs (24h Range):  Temp:  [97.9 °F (36.6 °C)] 97.9 °F (36.6 °C)  Pulse:  [52-56] 52  Resp:  [16-17] 17  SpO2:  [95 %-99 %] 95 %  BP: (119-156)/(54-70) 119/54     Physical Exam   Constitutional: She is oriented to person, place, and time. She appears well-developed and well-nourished. No distress.   HENT:   Head: Normocephalic and atraumatic.   Right Ear: External ear normal.   Left Ear: External ear normal.   Nose: Nose normal.   Eyes: Right eye exhibits no discharge. Left eye exhibits no discharge. No scleral icterus.   Neck: Normal range of motion.   Cardiovascular: Normal rate, regular rhythm and intact distal pulses.    Pulmonary/Chest: Effort normal. No respiratory distress. She has no wheezes.   Abdominal: Soft. She exhibits no distension. There is no tenderness.   Musculoskeletal: Normal range of motion. She exhibits no edema or tenderness.   Neurological: She is alert and oriented to person, place, and time.   -  Mental Status:  AAOx3.  Follows commands.  Answers correct age and .  Recent and remote memory intact.  -  Speech and language:  No aphasia, mild dysarthria.    -  Vision:  R hemianopsia.  No ptosis.    -  Facial movement (CN VII): Mild  facial droop.   -  Motor:  RUE: 2/5 EF, FF, WE.  LUE: 5/5.  RLE: 4-/5.  LLE: 5/5.  -  Tone:  Decreased RUE.  -  Sensory:  Intact to light touch and pin prick.   Skin: Skin is warm and dry. No rash noted.   Psychiatric: She has a normal mood and affect. Her behavior is normal. Thought content normal. Cognition and memory are impaired.   Vitals reviewed.    NEUROLOGICAL EXAMINATION:     MENTAL STATUS   Oriented to person, place, and time.       Assessment/Plan:      Kristina Aguirre is a 68 y.o. female admitted to inpatient rehabilitation on 4/2/2018 for 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 with impaired mobility and ADLs. Patient remains appropriate for PT, OT, and as required Speech therapy. Patient continues to require 24 hour nursing care as well as daily Physician assessment.    * 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    MRI showed multiple bilateral R>L acute infarcts on cerebral and cerebellar hemispheres highly suggestive of embolic phenomenon  -PT/OT/SLP        Toothache    4/12 ordered oragel and lidocaine solution  4/13 amoxicillin-clavulanate  4/14 ordered rapid strep for sore throat associated with toothache  4/15 Prelim strep throat culture no growth.  4/16 cont amoxicillin-clavulanate. F/U appt with [facility] dentistry.        Acute cystitis    Urine culture positive for Citrobacter freundii complex. Pan sensitive S/p 7d course of augmentin     4/14 u/a +leukocytes, blood, -nitrates. UCx pending  4/16 UCx showed no growth.         Atrial fibrillation    continue Apixaban    4/4 - reports of SOB. EKG shows no sign of A fib with RVR or Mi. CXR showing resolution of pulm edema. Pending labs.        S/p TAVR (transcatheter aortic valve replacement), bioprosthetic    Continue Apixaban and Plavix        Diabetes mellitus, type 2    Continue to monitor blood glucoses    4/4 - started on Diabetic diet    Lab Results   Component Value Date    HGBA1C 6.5 (H) 03/21/2018        - Diabetic Diet, Diabetic Education   - Monitor POCT TIDAC and qhs   - Continue with  ISS    POCT Glucose   Date Value Ref Range Status   04/08/2018 256 (H) 70 - 110 mg/dL Final   04/08/2018 143 (H) 70 - 110 mg/dL Final   04/07/2018 237 (H) 70 - 110 mg/dL Final   04/07/2018 146 (H) 70 - 110 mg/dL Final   04/07/2018 208 (H) 70 - 110 mg/dL Final   04/07/2018 159 (H) 70 - 110 mg/dL Final   04/06/2018 296 (H) 70 - 110 mg/dL Final   04/06/2018 134 (H) 70 - 110 mg/dL Final   04/06/2018 183 (H) 70 - 110 mg/dL Final   04/06/2018 170 (H) 70 - 110 mg/dL Final   04/05/2018 191 (H) 70 - 110 mg/dL Final                     Essential hypertension    Elevated on admission. Continue current meds    4/3/18 - started Lisinopril 10mg daily  4/4/18 - slightly improved. Increased to 20mg daily  4/6/18 - still elevated Bp. Increased to 30mg daily  4/7 cont to monitor trend   4/9 inc lisinopril to 40  4/10 Cont to monitor. BP elevated 175/77 prior to receiving lisinopril.  4/12 hydralazine 25 q8  4/15 inc hydralazine to 50 q8  4/17 cont to monitor. 130s-150s, in persistent pain  4/18 SBP 130s  Vitals:    04/17/18 1424 04/17/18 2100 04/18/18 0847 04/18/18 0848   BP: 133/63 (!) 134/56 (!) 137/58    BP Location:  Left arm     Patient Position:  Lying     Pulse: (!) 52 (!) 53  (!) 53   Resp: 16 18 18   Temp: 98.5 °F (36.9 °C) 98.4 °F (36.9 °C)     TempSrc: Oral Oral     SpO2: 95% (!) 91%  95%   Weight:       Height:                       DISCHARGE PLANNING:  Tentative Discharge Date: 4/19/2018    Future Appointments  Date Time Provider Department Center   5/10/2018 10:00 AM EKG, APPT [facility] EKG [facility]   5/10/2018 10:40 AM [facility]  Department of Physical Medicine & Rehab   [facility]

## 2018-04-19 NOTE — SUBJECTIVE & OBJECTIVE
Interval History 4/19/2018:  Patient is seen for follow-up rehab evaluation and recommendations: Ready to go home. [facility] and [facility] from [facility].       Scheduled Medications:    amiodarone  400 mg Oral BID    amLODIPine  10 mg Oral Daily    amoxicillin-clavulanate 875-125mg  1 tablet Oral Q12H    apixaban  5 mg Oral BID    ascorbic acid (vitamin C)  500 mg Oral BID    atorvastatin  40 mg Oral Daily    benzocaine   Mouth/Throat QID    carbamide peroxide  5 drop Left Ear BID    carvedilol  3.125 mg Oral Daily    chlorthalidone  25 mg Oral QAM    clopidogrel  75 mg Oral Daily    famotidine  20 mg Oral BID    fluticasone-vilanterol  1 puff Inhalation Daily    furosemide  20 mg Oral BID    hydrALAZINE  50 mg Oral Q8H    lidocaine  1 patch Transdermal Q24H    lisinopril  40 mg Oral Daily    polyethylene glycol  17 g Oral Daily       PRN Medications: acetaminophen, albuterol-ipratropium 2.5mg-0.5mg/3mL, bisacodyl, dextrose 50%, dextrose 50%, dextrose 50%, glucagon (human recombinant), glucose, glucose, hydrALAZINE, hydrocodone-acetaminophen 7.5-325mg, hydrOXYzine pamoate, insulin aspart U-100, lidocaine HCl 2%, magnesium hydroxide 400 mg/5 ml, ondansetron    Review of Systems   Constitutional: Positive for activity change. Negative for chills, diaphoresis, fatigue and fever.   HENT: Negative for congestion, ear discharge and ear pain.         Tooth ache   Eyes: Negative for photophobia, pain and visual disturbance.   Respiratory: Negative for chest tightness and shortness of breath.    Cardiovascular: Negative for chest pain and palpitations.   Gastrointestinal: Negative for abdominal distention, abdominal pain, constipation and diarrhea.   Endocrine: Negative for cold intolerance and heat intolerance.   Musculoskeletal: Negative for arthralgias and myalgias (over left ribs).   Skin: Negative for color change and wound.   Neurological: Positive for weakness.  Negative for dizziness, seizures and headaches.   Hematological: Negative for adenopathy.   Psychiatric/Behavioral: Negative for agitation, confusion and hallucinations.     Objective:     Vital Signs (Most Recent):  Temp: 97.9 °F (36.6 °C) (188)  Pulse: (!) 52 (18 09)  Resp: 17 (18)  BP: (!) 119/54 (18)  SpO2: 95 % (18)    Vital Signs (24h Range):  Temp:  [97.9 °F (36.6 °C)] 97.9 °F (36.6 °C)  Pulse:  [52-56] 52  Resp:  [16-17] 17  SpO2:  [95 %-99 %] 95 %  BP: (119-156)/(54-70) 119/54     Physical Exam   Constitutional: She is oriented to person, place, and time. She appears well-developed and well-nourished. No distress.   HENT:   Head: Normocephalic and atraumatic.   Right Ear: External ear normal.   Left Ear: External ear normal.   Nose: Nose normal.   Eyes: Right eye exhibits no discharge. Left eye exhibits no discharge. No scleral icterus.   Neck: Normal range of motion.   Cardiovascular: Normal rate, regular rhythm and intact distal pulses.    Pulmonary/Chest: Effort normal. No respiratory distress. She has no wheezes.   Abdominal: Soft. She exhibits no distension. There is no tenderness.   Musculoskeletal: Normal range of motion. She exhibits no edema or tenderness.   Neurological: She is alert and oriented to person, place, and time.   -  Mental Status:  AAOx3.  Follows commands.  Answers correct age and .  Recent and remote memory intact.  -  Speech and language:  No aphasia, mild dysarthria.    -  Vision:  R hemianopsia.  No ptosis.    -  Facial movement (CN VII): Mild facial droop.   -  Motor:  RUE: 2/5 EF, FF, WE.  LUE: 5/5.  RLE: 4-/5.  LLE: 5/5.  -  Tone:  Decreased RUE.  -  Sensory:  Intact to light touch and pin prick.   Skin: Skin is warm and dry. No rash noted.   Psychiatric: She has a normal mood and affect. Her behavior is normal. Thought content normal. Cognition and memory are impaired.   Vitals reviewed.    NEUROLOGICAL EXAMINATION:      MENTAL STATUS   Oriented to person, place, and time.

## 2018-04-19 NOTE — DISCHARGE SUMMARY
[facility]  Physical Medicine & Rehab  Discharge Summary    Patient Name: Kristina Aguirre  MRN: [id number]  Admission Date: 4/2/2018  Hospital Length of Stay: 17 days  Discharge Date and Time: No discharge date for patient encounter.  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  Patient followed by CTS for severe aortic stenosis and [place] [facility] [place] for surgical intervention.  Now, s/p L transaxillary transcatheter aortic valve replacement with a 26 mm evolut valve (TAVR) on 3/22/18.  Post-op course complicated by stroke (Stroke code called on 3/24 for worsening RUE weakness, R facial droop, dysarthria, and aphasia.  CTA without LVO.  Not tPA or IR candidate.  MRI showed multiple bilateral R>L acute infarcts on cerebral and cerebellar hemispheres highly suggestive of embolic phenomenon.) and a-fib with RVR requiring amiodarone load and infusion.  ROSEY revealed grade 4 atheroma on aorta.  Stroke etiology likely iatrogenic 2/2 TAVR as pattern is concerning for embolic phenomena. Currently below functional baseline.     * No surgery found *     Indwelling Lines/Drains at time of discharge:   Lines/Drains/Airways          No matching active lines, drains, or airways          Hospital Course:   Ms aguirre completed inpatient rehab. Toothache was managed with hydrocodone/apap and oragel. She was given a follow up appt with [facility] dentistry. Antihypertensives were titrated up for hypertension. She completed abx for a UTI. She was continent of bowel and bladder. She slept well.  Transfers   Bed/Chair/WC 5   Toilet 5   Tub 4   Shower 4   Self Care   Eating 5   Grooming 5    Bathing 4   Dressing-Upper 4   Dressing-Lower 4   Toileting 4   Communication   Comprehension 4   Mode B   Expression 4   Mode V   Social Cognition   Social Interaction 4   Problem Solving 4   Memory 3          Consults         Status Ordering Provider     Inpatient consult to Registered Dietitian/Nutritionist  Once     Provider:  (Not yet assigned)    Completed ABDOULAYE VEGA          Significant Diagnostic Studies: Labs: All labs within the past 24 hours have been reviewed    Final Active Diagnoses:    Diagnosis Date Noted POA    PRINCIPAL PROBLEM:  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 [I63.8] 03/26/2018 Yes    Toothache [K08.89] 04/12/2018 Yes    Acute cystitis [N30.00] 03/30/2018 Yes    Atrial fibrillation [I48.91] 03/25/2018 Yes    S/p TAVR (transcatheter aortic valve replacement), bioprosthetic [Z95.3] 03/22/2018 Not Applicable    Diabetes mellitus, type 2 [E11.9] 09/29/2017 Yes    Essential hypertension [I10] 09/12/2017 Yes      Problems Resolved During this Admission:    Diagnosis Date Noted Date Resolved POA       Discharged Condition: good    Disposition: Home or Self Care    Follow Up:  Follow-up Information     [facility]. Go on 4/30/2018.    Why:  11a.m. Appointment. Need $50 to pay up-front to [facility] in the back area (please don't pay at the 3B ). Bring parking stub that the Dental Clinic will validate for free parking.   Contact information:  [address]   [place], [place]  3rd floor Clinic Building  (Don't go to the [facility]. address that will show up on the mailed appointment reminder)           Robert Mattson MD In 1 week.    Specialty:  General Practice  Contact information:  [place] [id number] [phone]                 Patient Instructions:     WHEELCHAIR FOR HOME USE   Order Specific Question Answer Comments   Hours in W/C per day: 10    Type of Wheelchair: Lightweight    Patient unable to propel in  "Standard wheelchair? Yes    Size(Width): 18"(STD adult)    Leg Support: Swing Away    Arm Height: Desk length    Arm Height: Swing away    Lap Belt: Buckle    Accessories: Brake extensions    Accessories: Safety belt    Accessories: Heel loops    Cushion: Basic    Justification for cushion: Prevent pressure ulcers    Height: 5' 2" (1.575 m)    Weight: 65.5 kg (144 lb 8 oz)    Does patient have medical equipment at home? none    Length of need (1-99 months): 99    Please check all that apply: Patient's upper body strength is sufficient for propulsion.    Please check all that apply: Caregiver is capable and willing to operate wheelchair safely.    Please check all that apply: The patient requires the use of a w/c for activities of daily living within the Home.    Please check all that apply: Patient mobility limitations cannot be sufficiently resolved by the use of other ambulatory therapies.    Vendor: [employer]    Expected Date of Delivery: 4/18/2018      Referral to Home health   Referral Priority: Routine Referral Type: Home Health Care   Referral Reason: Specialty Services Required    Requested Specialty: Home Health Services    Number of Visits Requested: 1        Medications:  Reconciled Home Medications:      Medication List      START taking these medications    amoxicillin-clavulanate 875-125mg 875-125 mg per tablet  Commonly known as:  AUGMENTIN  Take 1 tablet by mouth every 12 (twelve) hours.     benzocaine 10 % mucosal gel  Commonly known as:  ORAJEL  Use as directed in the mouth or throat 4 (four) times daily.     hydrALAZINE 50 MG tablet  Commonly known as:  APRESOLINE  Take 1 tablet (50 mg total) by mouth every 8 (eight) hours.     hydrocodone-acetaminophen 7.5-325mg 7.5-325 mg per tablet  Commonly known as:  NORCO  Take 1 tablet by mouth every 4 (four) hours as needed.  Replaces:  hydrocodone-acetaminophen 10-325mg  mg Tab     lisinopril 40 MG tablet  Commonly known as:  PRINIVIL,ZESTRIL  Take " 1 tablet (40 mg total) by mouth once daily.        CHANGE how you take these medications    furosemide 20 MG tablet  Commonly known as:  LASIX  Take 1 tablet (20 mg total) by mouth 2 (two) times daily.  What changed:  Another medication with the same name was removed. Continue taking this medication, and follow the directions you see here.        CONTINUE taking these medications    albuterol-ipratropium 2.5mg-0.5mg/3mL 0.5 mg-3 mg(2.5 mg base)/3 mL nebulizer solution  Commonly known as:  DUO-NEB  Take 3 mLs by nebulization every 4 (four) hours as needed for Wheezing. Rescue     amiodarone 400 MG tablet  Commonly known as:  PACERONE  Take 1 tablet (400 mg total) by mouth 2 (two) times daily.     amLODIPine 10 MG tablet  Commonly known as:  NORVASC  Take 1 tablet (10 mg total) by mouth once daily.     apixaban 5 mg Tab  Commonly known as:  ELIQUIS  Take 1 tablet (5 mg total) by mouth 2 (two) times daily.     aspirin 81 MG EC tablet  Commonly known as:  ECOTRIN  Take 1 tablet (81 mg total) by mouth once daily.     atorvastatin 40 MG tablet  Commonly known as:  LIPITOR  Take 1 tablet (40 mg total) by mouth once daily.     carvedilol 3.125 MG tablet  Commonly known as:  COREG  Take 1 tablet (3.125 mg total) by mouth once daily.     cetirizine 10 MG tablet  Commonly known as:  ZYRTEC  Take 10 mg by mouth once daily.     chlorthalidone 25 MG Tab  Commonly known as:  HYGROTEN  Take 1 tablet (25 mg total) by mouth every morning.     clopidogrel 75 mg tablet  Commonly known as:  PLAVIX  Take 1 tablet (75 mg total) by mouth once daily.     metFORMIN 1000 MG tablet  Commonly known as:  GLUCOPHAGE  Take 1 tablet (1,000 mg total) by mouth 2 (two) times daily.     SYMBICORT 80-4.5 mcg/actuation Hfaa  Generic drug:  budesonide-formoterol 80-4.5 mcg  Inhale 2 puffs into the lungs 2 (two) times daily.     TRUE METRIX GLUCOSE TEST STRIP Strp  Generic drug:  blood sugar diagnostic  test DAILY     TRUEPLUS LANCETS 30 gauge  Misc  Generic drug:  lancets  USE DAILY when testing     VENTOLIN HFA 90 mcg/actuation inhaler  Generic drug:  albuterol  Inhale 2 puffs into the lungs every 4 (four) hours as needed.     VICTOZA 3-JOSE 0.6 mg/0.1 mL (18 mg/3 mL) Pnij  Generic drug:  liraglutide 0.6 mg/0.1 mL (18 mg/3 mL) subq PNIJ  INJECT 1.8 milligrams under the skin EVERY DAY        STOP taking these medications    acetaminophen-codeine 120mg 12mg 5mL Soln     diphenoxylate-atropine 2.5-0.025 mg 2.5-0.025 mg per tablet  Commonly known as:  LOMOTIL     hydrocodone-acetaminophen 10-325mg  mg Tab  Commonly known as:  NORCO  Replaced by:  hydrocodone-acetaminophen 7.5-325mg 7.5-325 mg per tablet     NexIUM 40 MG capsule  Generic drug:  esomeprazole     potassium chloride SA 20 MEQ tablet  Commonly known as:  K-DUR,KLOR-SUNNY            Time spent on the discharge of patient: 20 minutes    Andreina Munson MD  Department of Physical Medicine & Rehab  [facility]

## 2018-04-19 NOTE — DISCHARGE INSTRUCTIONS
[facility] ([phone]) will provide therapy in your home. You will be contacted to schedule initial visit. If you have not received a call the day after discharge you should call the company at the number provided.    Wheelchair provided by [employer] [phone].

## 2018-04-20 DIAGNOSIS — I10 ESSENTIAL HYPERTENSION: Primary | ICD-10-CM

## 2018-04-20 DIAGNOSIS — I63.9 CEREBROVASCULAR ACCIDENT (CVA), UNSPECIFIED MECHANISM: ICD-10-CM

## 2018-04-20 DIAGNOSIS — I73.9 PAD (PERIPHERAL ARTERY DISEASE): ICD-10-CM

## 2018-04-20 RX ORDER — CHLORTHALIDONE 25 MG/1
25 TABLET ORAL EVERY MORNING
Qty: 90 TABLET | Refills: 0 | Status: SHIPPED | OUTPATIENT
Start: 2018-04-20 | End: 2018-07-19

## 2018-04-24 ENCOUNTER — TELEPHONE (OUTPATIENT)
Dept: CARDIOTHORACIC SURGERY | Facility: CLINIC | Age: 68
End: 2018-04-24

## 2018-04-24 DIAGNOSIS — Z95.3 S/P TAVR (TRANSCATHETER AORTIC VALVE REPLACEMENT), BIOPROSTHETIC: Primary | ICD-10-CM

## 2018-04-24 NOTE — TELEPHONE ENCOUNTER
Returned pt's call.  Pt scheduled to see Dr. Carvajal on May 2.    ----- Message from Sukh Mercado sent at 4/24/2018  2:05 PM CDT -----  Contact: Don//Friend  Caller states that (s)he needs to speak with nurse in ref to scheduling the pts PO appt//please call back at [phone]//thank you

## 2018-05-02 ENCOUNTER — OFFICE VISIT (OUTPATIENT)
Dept: CARDIOTHORACIC SURGERY | Facility: CLINIC | Age: 68
End: 2018-05-02
Payer: MEDICARE

## 2018-05-02 ENCOUNTER — HOSPITAL ENCOUNTER (OUTPATIENT)
Dept: RADIOLOGY | Facility: HOSPITAL | Age: 68
Discharge: HOME | End: 2018-05-02
Attending: THORACIC SURGERY (CARDIOTHORACIC VASCULAR SURGERY)
Payer: MEDICARE

## 2018-05-02 ENCOUNTER — HOSPITAL ENCOUNTER (OUTPATIENT)
Dept: CARDIOLOGY | Facility: CLINIC | Age: 68
Discharge: HOME | End: 2018-05-02
Attending: THORACIC SURGERY (CARDIOTHORACIC VASCULAR SURGERY)
Payer: MEDICARE

## 2018-05-02 VITALS
OXYGEN SATURATION: 94 % | HEIGHT: 61 IN | BODY MASS INDEX: 25.3 KG/M2 | HEART RATE: 56 BPM | WEIGHT: 134 LBS | TEMPERATURE: 98 F

## 2018-05-02 DIAGNOSIS — Z95.3 S/P TAVR (TRANSCATHETER AORTIC VALVE REPLACEMENT), BIOPROSTHETIC: Primary | ICD-10-CM

## 2018-05-02 DIAGNOSIS — Z95.3 S/P TAVR (TRANSCATHETER AORTIC VALVE REPLACEMENT), BIOPROSTHETIC: ICD-10-CM

## 2018-05-02 DIAGNOSIS — I35.0 AORTIC VALVE STENOSIS, ETIOLOGY OF CARDIAC VALVE DISEASE UNSPECIFIED: ICD-10-CM

## 2018-05-02 PROCEDURE — 99024 POSTOP FOLLOW-UP VISIT: CPT | Mod: POP,,, | Performed by: THORACIC SURGERY (CARDIOTHORACIC VASCULAR SURGERY)

## 2018-05-02 PROCEDURE — 93010 ELECTROCARDIOGRAM REPORT: CPT | Mod: S$PBB,,, | Performed by: INTERNAL MEDICINE

## 2018-05-02 PROCEDURE — 93005 ELECTROCARDIOGRAM TRACING: CPT | Mod: PBBFAC | Performed by: INTERNAL MEDICINE

## 2018-05-02 PROCEDURE — 99999 PR PBB SHADOW E&M-EST. PATIENT-LVL III: CPT | Mod: PBBFAC,,, | Performed by: THORACIC SURGERY (CARDIOTHORACIC VASCULAR SURGERY)

## 2018-05-02 PROCEDURE — 71046 X-RAY EXAM CHEST 2 VIEWS: CPT | Mod: 26,,, | Performed by: RADIOLOGY

## 2018-05-02 PROCEDURE — 71046 X-RAY EXAM CHEST 2 VIEWS: CPT | Mod: TC,FY

## 2018-05-02 PROCEDURE — 99213 OFFICE O/P EST LOW 20 MIN: CPT | Mod: PBBFAC,25 | Performed by: THORACIC SURGERY (CARDIOTHORACIC VASCULAR SURGERY)

## 2018-05-02 NOTE — PROGRESS NOTES
Patient seen and examined. Patient is progressively increasing activity. No significant complaints.  Post-op course complicated by stroke (Stroke code called on 3/24 for worsening RUE weakness, R facial droop, dysarthria, and aphasia.  CTA without LVO.  Not tPA or IR candidate.  MRI showed multiple bilateral R>L acute infarcts on cerebral and cerebellar hemispheres highly suggestive of embolic phenomenon.) She was transferred to neurology team while in patient and DC to an inpatient rehab. She was DC from rehab with home health on 4/19/18     Incision site: CDI  Chest xray: Acceptable post op chest  EKG: SB- 54      Assessment:   Left trans axillary AVR     Plan:  Can begin driving as long as he has power steering  We will refer to cardiology to assume care      No scheduled appointment, RTC prn

## 2018-05-10 ENCOUNTER — TELEPHONE (OUTPATIENT)
Dept: ADMINISTRATIVE | Facility: CLINIC | Age: 68
End: 2018-05-10

## 2018-05-21 ENCOUNTER — HOME CARE VISIT (OUTPATIENT)
Dept: NEUROLOGY | Facility: HOSPITAL | Age: 68
End: 2018-05-21

## 2018-05-22 PROCEDURE — 93272 ECG/REVIEW INTERPRET ONLY: CPT | Mod: ,,, | Performed by: INTERNAL MEDICINE

## 2018-05-22 NOTE — PROGRESS NOTES
Ms. Aguirre has  nurse present and her home is very limited in space. Patient does not remember  team visiting her in the inpatient rehab center.  nurse re educated patient on purpose of stroke mobile program.

## 2018-05-30 ENCOUNTER — TELEPHONE (OUTPATIENT)
Dept: PHYSICAL MEDICINE AND REHAB | Facility: CLINIC | Age: 68
End: 2018-05-30

## 2018-05-30 NOTE — TELEPHONE ENCOUNTER
----- Message from Alexy Quintero sent at 5/29/2018  2:37 PM CDT -----  Contact: Samy (speech therapist) @  Needs Advice    Reason for call: Samy is calling to extend speech therapy to 06/18. Pls call.  Communication Preference: [phone]  Additional Information:

## 2018-06-06 RX ORDER — CARVEDILOL 3.12 MG/1
TABLET ORAL
Qty: 30 TABLET | OUTPATIENT
Start: 2018-06-06

## 2018-06-29 ENCOUNTER — HOME CARE VISIT (OUTPATIENT)
Dept: NEUROLOGY | Facility: HOSPITAL | Age: 68
End: 2018-06-29

## 2018-07-07 VITALS
BODY MASS INDEX: 26.57 KG/M2 | HEART RATE: 64 BPM | OXYGEN SATURATION: 93 % | DIASTOLIC BLOOD PRESSURE: 54 MMHG | WEIGHT: 140.63 LBS | SYSTOLIC BLOOD PRESSURE: 120 MMHG

## 2018-07-08 NOTE — PROGRESS NOTES
Patient VS are WNL on today's visit she denies any ER visits or hospital stays. She lives alone in a trailer and is still receiving [facility] nursing. physical therapy has discharged the patient.  nurse spoke with patient regarding what she eats. Patient has a lot processed canned foods, and frozen foods.  nurse educated patient on how to read nutritional labels, salt content and how salt directly effects blood pressure. She continues to smoke 1/2 pack a day.

## 2018-07-12 ENCOUNTER — TELEPHONE (OUTPATIENT)
Dept: ADMINISTRATIVE | Facility: CLINIC | Age: 68
End: 2018-07-12

## 2018-07-30 ENCOUNTER — HOME CARE VISIT (OUTPATIENT)
Dept: NEUROLOGY | Facility: HOSPITAL | Age: 68
End: 2018-07-30

## 2018-07-30 VITALS — DIASTOLIC BLOOD PRESSURE: 80 MMHG | HEART RATE: 77 BPM | OXYGEN SATURATION: 86 % | SYSTOLIC BLOOD PRESSURE: 144 MMHG

## 2018-07-30 NOTE — PROGRESS NOTES
Ms. Aguirre continues to smoke 1 pk every 3 days. She states there has been symptoms of a cold with congestion for 4 days now. Reports her home health nurse will be there on tomorrow to draw blood. E urged her to report this to  nurse if symptoms are still present,

## 2018-08-28 ENCOUNTER — HOME CARE VISIT (OUTPATIENT)
Dept: NEUROLOGY | Facility: HOSPITAL | Age: 68
End: 2018-08-28

## 2021-06-29 NOTE — ASSESSMENT & PLAN NOTE
68 y/o F with PMHx CAD, PAD, DM2, HTN, and severe AS s/p TAVR on 3/22. Stroke called on 3/24 at 2004 for worsening RUE weakness, right facial droop, dysarthria and aphasia. CTA without LVO. MRI with multiple bilateral (R>L) acute infarcts on cerebral and cerebellar hemispheres, highly suggestive of embolic phenomenon. No tpa as outside window.     Events from admission regarding placement-   Spoke with EP team regarding pt's discharge to [facility] without a 30-day monitor. EP concerned for 3rd-degree heart block in this pt with recent TAVR placement; MD stating they recommend pt go to a facility that either has telemetry or an [facility] facility so she can have 30-day monitor while admitted. CM discussed this with pt's friend who is her POA, David. He discussed with pt and they decided to proceed with plan for d/c to [facility], knowing she will not get the heart monitoring she needs and that this is against EP's recommendations. Pt with plans to follow up with Dr. Carvajal after rehab discharge.    [facility] now denying pt due to need for continued cardiac monitoring. No longer safe option to go to [facility] either as unable to obtain a cardiac event monitor on a holiday.  Adjusted plan is now to consult EP on Sunday so pt can hopefully have ILR placed on Monday and d/c to [facility] following. BAILEY Hernandez updated and confirmed current plan.    UA ordered, not yet obtained. CM attempting to arrange pet therapy situation for pt.     Antithrombotics for secondary stroke prevention: Antiplatelets: Aspirin: 81 mg daily and Clopidogrel: 75 mg daily for now. Discussed with EP and Interventional Cards- Will plan to change to Eliquis 5mg BID + Plavix 75mg Daily (no ASA) on 4/1/18  Statins for secondary stroke prevention and hyperlipidemia, if present:   Statins: Atorvastatin- 40 mg daily  Aggressive risk factor modification: HTN, Smoking, DM, CAD, bilateral carotid stenosis, s/p TAVR  Rehab efforts: PT/OT/SLP  recommend Rehab; Hopeful d/c to [place] on Monday or Tuesday  Diagnostics ordered/pending: None   VTE prophylaxis: Enoxaparin 40 mg SQ every 24 hours  BP parameters: SBP < 160    Private car

## 2021-10-22 NOTE — SUBJECTIVE & OBJECTIVE
Past Medical History:   Diagnosis Date    Abdominal aortic aneurysm (AAA) without rupture 9/12/2017    Arthritis     Asthma     Bilateral carotid artery stenosis 9/12/2017    Cancer     lung    COPD (chronic obstructive pulmonary disease)     Coronary artery disease of native artery with stable angina pectoris 9/29/2017    Diabetes mellitus     Heart valve disorder     Hyperlipidemia     Hypertension     Long term current use of antithrombotics/antiplatelets      Past Surgical History:   Procedure Laterality Date    HAND SURGERY Right     KNEE ARTHROSCOPY      LUNG LOBECTOMY Right     middle lobe    mediastinoscopy      SPINAL FUSION      TONSILLECTOMY      WRIST SURGERY Right      Family History   Problem Relation Age of Onset    Throat cancer Mother     Heart attack Father     No Known Problems Sister     No Known Problems Brother     No Known Problems Maternal Aunt     No Known Problems Maternal Uncle     No Known Problems Paternal Aunt     No Known Problems Paternal Uncle     No Known Problems Maternal Grandmother     No Known Problems Maternal Grandfather     No Known Problems Paternal Grandmother     No Known Problems Paternal Grandfather      Social History   Substance Use Topics    Smoking status: Former Smoker     Packs/day: 1.50     Types: Vaping w/o nicotine    Smokeless tobacco: Never Used    Alcohol use No     Review of patient's allergies indicates:  No Known Allergies    Medications: I have reviewed the current medication administration record.    Prescriptions Prior to Admission   Medication Sig Dispense Refill Last Dose    ACETAMINOPHEN WITH CODEINE (ACETAMINOPHEN-CODEINE) 120mg 12mg 5mL Soln TAKE 1 TEASPOON (5 MILLILITERS) BY MOUTH EVERY 6 HOURS AS NEEDED FOR COUGH  0 Past Week at Unknown time    amlodipine (NORVASC) 10 MG tablet Take 10 mg by mouth once daily.  5 3/21/2018 at 1200    aspirin (ECOTRIN) 81 MG EC tablet Take 1 tablet (81 mg total) by mouth  once daily.   3/21/2018 at 1200    cetirizine (ZYRTEC) 10 MG tablet Take 10 mg by mouth once daily.  6 3/21/2018 at 1200    chlorthalidone (HYGROTEN) 25 MG Tab Take 1 tablet (25 mg total) by mouth every morning. 90 tablet 3 3/21/2018 at 1200    clopidogrel (PLAVIX) 75 mg tablet Take 75 mg by mouth once daily.  3 3/21/2018 at 1200    diphenoxylate-atropine 2.5-0.025 mg (LOMOTIL) 2.5-0.025 mg per tablet Take 1 tablet by mouth 4 (four) times daily as needed for Diarrhea.   Past Week at Unknown time    fenofibrate 160 MG Tab Take 160 mg by mouth once daily.  5 3/21/2018 at 1200    hydrocodone-acetaminophen 10-325mg (NORCO)  mg Tab Take 1 tablet by mouth 3 (three) times daily as needed.  0 Past Week at Unknown time    JANUVIA 100 mg Tab Take 100 mg by mouth once daily.  3 3/21/2018 at 1200    losartan (COZAAR) 50 MG tablet Take 1 tablet (50 mg total) by mouth every evening. 90 tablet 3 3/21/2018 at 1200    losartan-hydrochlorothiazide 100-25 mg (HYZAAR) 100-25 mg per tablet Take 1 tablet by mouth once daily.  1 3/21/2018 at 1200    metFORMIN (GLUCOPHAGE) 1000 MG tablet Take 1 tablet (1,000 mg total) by mouth 2 (two) times daily.  5 3/21/2018 at 2100    NEXIUM 40 mg capsule Take 40 mg by mouth once daily.  5 3/21/2018 at 1200    pravastatin (PRAVACHOL) 40 MG tablet Take 40 mg by mouth every evening.  5 3/21/2018 at 1200    SYMBICORT 80-4.5 mcg/actuation HFAA Inhale 2 puffs into the lungs 2 (two) times daily.  1 3/21/2018 at 2100    VENTOLIN HFA 90 mcg/actuation inhaler Inhale 2 puffs into the lungs every 4 (four) hours as needed.  0 3/21/2018 at 2100    amoxicillin-clavulanate 875-125mg (AUGMENTIN) 875-125 mg per tablet 1 TABLET BY MOUTH EVERY 1 2 HOURS FOR 10 DAYS  0 More than a month at Unknown time    TRUE METRIX GLUCOSE TEST STRIP Strp test DAILY  3 3/21/2018    TRUEPLUS LANCETS 30 gauge Misc USE DAILY when testing  10 3/21/2018    VICTOZA 3-JOSE 0.6 mg/0.1 mL (18 mg/3 mL) PnIj INJECT 1.8  milligrams under the skin EVERY DAY  2 3/20/2018       Review of Systems   Constitutional: Negative for chills and fever.   HENT: Negative for ear discharge and ear pain.    Eyes: Negative for pain and itching.   Respiratory: Positive for shortness of breath. Negative for chest tightness.    Cardiovascular: Negative for chest pain and leg swelling.   Gastrointestinal: Negative for abdominal distention and abdominal pain.   Genitourinary: Negative for difficulty urinating and dysuria.   Musculoskeletal: Positive for arthralgias.   Neurological: Positive for facial asymmetry, speech difficulty and weakness.   Psychiatric/Behavioral: Positive for confusion.     Objective:     Vital Signs (Most Recent):  Temp: 98.5 °F (36.9 °C) (03/24/18 1957)  Pulse: 89 (03/24/18 1957)  Resp: 16 (03/24/18 1516)  BP: (!) 164/72 (03/24/18 1957)  SpO2: 95 % (03/24/18 1957)    Vital Signs Range (Last 24H):  Temp:  [98 °F (36.7 °C)-99.1 °F (37.3 °C)]   Pulse:  [63-89]   Resp:  [10-38]   BP: ()/(41-77)   SpO2:  [88 %-100 %]   Arterial Line BP: (126-169)/(37-51)     Physical Exam   Constitutional: She appears well-developed and well-nourished.   HENT:   Head: Normocephalic and atraumatic.   Eyes: EOM are normal. Pupils are equal, round, and reactive to light.   Neck: Normal range of motion.   Cardiovascular: Normal rate.    Pulmonary/Chest: Effort normal.   Abdominal: Soft.   Neurological: She is alert.   Right facial droop, sided weakness, sensory deficit, hemianopsia, dysarthria   Skin: Skin is warm and dry.   Nursing note and vitals reviewed.      Neurological Exam:   LOC: alert  Attention Span: poor  Language: No aphasia  Articulation: Dysarthria  Orientation: Not oriented to time  Visual Fields: Full  Hemianopsia right  EOM (CN III, IV, VI): Full/intact  Pupils (CN II, III): PERRL  Facial Sensation (CN V): Normal  Facial Movement (CN VII): Lower facial weakness on the Right  Gag Reflex: present  Reflexes: 2+ throughout  Motor: Arm  left  Normal 5/5  Leg left  Normal 5/5  Arm right  Plegia 0/5  Leg right Normal 5/5  Cebellar: No evidence of appendicular or axial ataxia  Sensation: decrease sensation on right arm  Tone: Flaccid  RUE      Laboratory:  CMP:   Recent Labs  Lab 03/24/18  0310   CALCIUM 8.8      K 4.0   CO2 25      BUN 12   CREATININE 0.7     CBC:   Recent Labs  Lab 03/24/18  0310   WBC 10.35   RBC 3.17*   HGB 7.7*  7.7*   HCT 24.6*  24.6*      MCV 78*   MCH 24.3*   MCHC 31.3*     Lipid Panel: No results for input(s): CHOL, LDLCALC, HDL, TRIG in the last 168 hours.  Coagulation:   Recent Labs  Lab 03/23/18  0331 03/23/18  0340   INR 1.0  --    APTT  --  22.1     Hgb A1C:   Recent Labs  Lab 03/21/18  1003   HGBA1C 6.5*     TSH: No results for input(s): TSH in the last 168 hours.    Diagnostic Results:      Brain imaging:      Vessel Imaging:  CTA Head and neck multiphase 3-24-18 results:  CTA demonstrates no high-grade stenosis or large vessel occlusion.    Non-contrast CT head demonstrates age indeterminate infarcts in the bilateral paramedian occipital lobes, and the left corona radiata.  Consider MRI for further evaluation if clinically indicated.    Additional subtle hypoattenuation in the right centrum semiovale which may represent age indeterminate infarction.    Osseous fusion of C4-C6 with left-sided hemilaminectomies.    Cervical spondylosis most prominent at C3-4 with some effacement of the anterior thecal sac and moderate right/severe left neural foraminal narrowing.    Small amount of subcutaneous air in the left anterosuperior chest wall and shoulder.    Cardiac Evaluation:   ROSEY 3-22-18 results:  The left atrium is normal in size  1 - Low normal to mildly depressed left ventricular systolic function (EF 50-55%).     2 - Mild mitral regurgitation.     3 - Severe aortic stenosis.     4 - Grade 4 atheroma disease of aorta.     5 - Successful implantation of a 26mm Evolut R CoreValve into the aortic  position with no evidence of regurgitation.    Topical Retinoid counseling:  Patient advised to apply a pea-sized amount only at bedtime and wait 30 minutes after washing their face before applying.  If too drying, patient may add a non-comedogenic moisturizer. The patient verbalized understanding of the proper use and possible adverse effects of retinoids.  All of the patient's questions and concerns were addressed.

## 2022-02-22 NOTE — PROGRESS NOTES
Home Health recert with [facility] - Dr. Peter Dunaway. Pt. Received  services.  
22-Feb-2022 10:41

## 2023-03-11 NOTE — NURSING
Pulse ox 96% on 1 L of oxygen.  She wanted it off at this time.  Turned O2 tank off and d/c'd NC.   Self

## 2024-04-17 NOTE — ASSESSMENT & PLAN NOTE
S/p TAVR 3/22, followed by CTS   SPOKE W/ PT - DELIVERY IS SCHEDULED FOR WEDNESDAY BY 9 PM.  ASKED PT IF THERE ARE ANY QUESTIONS TO A PHARMACIST. PT SAID: NO QUESTIONS.

## 2024-06-11 NOTE — PLAN OF CARE
[facility] [place]  [facility]  Heart Transplant Clinic Follow Up     Date of Visit:  6/11/2024  Patient Name:  Betsy Robbins  Medical Record Number:  [id number]  YOB: 1955   Primary Clinician:  Otto Tapia APNP    CC: Follow up for heart transplant related care      Betsy Robbins is a 69 year old female who presents to the clinic with Heart Transplant history:    Transplant: OHT   Date: 5/28/2023   Surgeon: Dr. Tai  Etiology of cardiomyopathy prior to heart transplant: Non-Ischemic / Dilated    Pathology report of native heart:  Explanted native heart:   -Dilated cardiomegaly.  -Moderate to severe patchy interstitial fibrosis.  -Moderate hypertrophic nuclear change.  -Long clinical history of nonischemic cardiomyopathy.  -Special stains for iron and amyloid are negative.    HISTORICAL EPISODES  05/03 - 06/08/2023: Admitted with cardiogenic shock requiring Impella support - listed status 2 for heart transplant. Suitable donor found 05/29 - first biopsy negative. Patient will need repeat lupus testing at 8 week (from prior testing). She will need 3 month follow up CT to reassess lung nodule.  6/26-7/25/2023: admit for pericardial window.  Left apical pneumo.  Chest tube removed 7/24. 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     Patient presents for 1 year post transplant follow-up:    She is unaccompanied.     Endorses some shortness of breath after a flight of stairs. She does have fatigue in the afternoon and requires a nap. She denies any shortness of breath at rest with her ADLs or activities.  Continues to participate in cardiac rehab 2 times a week. She doesn't exercise in between her cardiac rehab, will start going to the  on Mondays. She once in a while feels that her heart is beating fast, but does not feel that it is irregular. She denies any chest pain, dizziness, lightheadedness orthopnea, paroxysmal nocturnal  Pt was accepted to [facility].  They will see her on Monday.    Ana Coulter, [facility]  [id number]   dyspnea, lower extremity swelling or abdominal bloating.    She takes her medications every day, never misses a dose, and has had no problems with them.        COMPLIANCE   Description   [x]  YES    []  NO Medication:   [x]  YES    []  NO Diet:    REVIEW OF SYSTEMS:  10 point review of systems was completed and is negative except as stated above.    MEDICATIONS:  Current Outpatient Medications   Medication Sig Dispense Refill    ketoconazole (NIZORAL) 2 % shampoo Apply to the scalp, chest and back, leave on for 3-5 minutes. Then rinse off. Use minimum 2 times per week. 240 mL 11    triamcinolone (ARISTOCORT) 0.1 % ointment Apply by topical route twice daily to the hands 80 g 3    terbinafine (LamISIL) 250 MG tablet Take 1 tablet by mouth once per day 30 tablet 2    TACROlimus (PROGRAF) 1 MG capsule Take 2 capsules by mouth in the morning and 2 capsules in the evening. Take 2 of 1mg capsules along with 1 of 0.5mg capsule (=2.5mg) every morning and take 2 of 1mg capsules (=2mg) every evening 360 capsule 3    mycophenolate (CELLCEPT) 250 MG capsule Take 4 capsules by mouth in the morning and 4 capsules in the evening. 240 capsule 11    pravastatin (PRAVACHOL) 20 MG tablet Take 1 tablet by mouth nightly. 90 tablet 3    magnesium oxide (MAG-OX) 400 MG tablet Take 1 tablet by mouth daily. 30 tablet 11    Cholecalciferol (Vitamin D) 50 mcg (2,000 units) tablet Take 1 tablet by mouth daily. 90 tablet 3    aspirin 81 MG chewable tablet Chew 1 tablet by mouth daily. 90 tablet 3    acetaminophen (TYLENOL) 500 MG tablet Take 500 mg by mouth every 6 hours as needed for Pain.      senna (Senokot) 8.6 MG tablet Take 1 tablet by mouth as needed.      Calcium Carbonate-Vitamin D 600-5 MG-MCG Tab Take 1 tablet by mouth daily (at noon). 90 tablet 3     No current facility-administered medications for this visit.       Allergies, Past Medical, Family and Social History reviewed in Epic and edited where appropriate.     PHYSICAL  EXAMINATION:  Vitals:    Visit Vitals  /83   Pulse 99   Resp 18   Ht 6' (1.829 m)   Wt 76.2 kg (168 lb)   SpO2 94%   BMI 22.78 kg/m²     BMI (body mass index):  Body mass index is 22.78 kg/m².  Constitutional:  pleasant, well nourished 69 year old female in no acute distress  Skin:  warm, dry, intact, no lesions  HEENT:  normocephalic, atraumatic; oral mucous membranes moist; extraocular movements intact  Neck:  supple; trachea midline; JVP = 5 cm H2O; negative hepatojugular reflux; negative carotid bruits bilaterally  Cardiovascular:  regular rate and rhythm; normal S1, S2; no murmur; negative S3, S4  Respiratory:  anterior/posterior lung sounds clear to auscultation bilaterally  Abdomen:  normal bowel sounds; abdomen soft, nontender, nondistended; no hepatomegaly  Musculoskeletal/Extremities:  Capillary refill time <3; no clubbing, no cyanosis; no peripheral edema  Neurological:  no focal neurological deficits; speech normal; sensation grossly intact  Psychiatric:  alert and oriented to person, place and time       DIAGNOSTICS:  The following diagnostics were reviewed:    Laboratory:  Recent Labs   Lab 06/03/24  0608 04/18/24  0928 02/06/24  0914 11/30/23  0837 10/26/23  0814   WBC 4.4 3.4* 3.4* 2.9* 4.2   RBC 4.16 4.53 4.29 3.82* 3.50*   HGB 12.1 12.8 12.2 11.2* 10.6*   HCT 39.1 41.3 40.0 35.9* 34.9*   MCV 94.0 91.2 93.2 94.0 99.7   MCH 29.1 28.3 28.4 29.3 30.3   MCHC 30.9* 31.0* 30.5* 31.2* 30.4*   RDW-CV 12.7 12.7 12.5 13.8 14.4    234 231 247 333       Recent Labs   Lab 06/07/24  1035 06/03/24  0608 04/18/24  0928 02/23/24  1002 02/14/24  1013   Sodium 143 142 142 142 145   Potassium 4.0 3.8 4.1 4.2 4.2   Chloride 104 109 108 105 103   Carbon Dioxide 28 29 28 30 31   BUN 25* 37* 33* 33* 27*   Creatinine 1.21* 1.44* 1.18* 1.15* 1.26*   Glomerular Filtration Rate 48* 39* 49* 51* 46*   Calcium 8.2* 9.9 9.7 9.0 9.5   Anion Gap 15 8 10 11 15   Glucose 92 102* 105* 96 89       Recent Labs   Lab  06/27/23  0703 06/21/23  1654   NT-proBNP 1,351* 1,707*       Recent Labs   Lab 06/03/24  0608 11/30/23  0837 07/25/23  0655 07/24/23  0743 07/23/23  0721   GOT/AST 20 17 16 22 23   Alkaline Phosphatase 88 104 87 88 87   GPT 19 18 42 41 39   Bilirubin, Total 0.5 0.7 0.4 0.4 0.6   Globulin 3.4 3.2 2.5 2.5 2.4   Protein, Total 7.5 7.0 5.5* 5.5* 5.3*   Albumin 4.1 3.8 3.0* 3.0* 2.9*       Recent Labs   Lab 06/03/24  0608 04/18/24  0928 02/23/24  1002 02/14/24  1013 02/06/24  0914   Tacrolimus 7.2 6.9 8.6 10.0 10.3       Recent Labs   Lab 06/03/24  0608 11/30/23  0837   Cholesterol 223* 212*   Triglycerides 69 66    85   Cholesterol/ HDL Ratio 2.2 2.5   CALCLDL 107 114   Non-HDL Cholesterol 121 127       Recent Labs   Lab 07/05/23  1426   Uric Acid 3.9     Recent Labs   Lab 06/03/24  0608 11/30/23  0837   Hemoglobin A1C 5.6 5.4     Recent Labs   Lab 06/03/24  0608 11/30/23  0837   TSH 2.982 1.284         Cardiac   Ejection Fraction (%)   Date Value   06/03/2024 59   02/01/2024 52   11/28/2023 55     LV End Systolic Longitudinal Strain Global (%)   Date Value   06/03/2024 -16   02/01/2024 -12.4   11/28/2023 -15.2     Left Ventricular Internal Dimension in Diastole (cm)   Date Value   06/03/2024 3.978   02/01/2024 3.882   11/28/2023 3.821     MV E Wave Palomo/E Tissue Palomo Med (unitless)   Date Value   06/03/2024 7.66   02/01/2024 9.102   11/28/2023 12.221     Est Right Vent Systolic Pressure by Tricuspid Regurgitation Jet (mmHg)   Date Value   06/03/2024 22.058   11/28/2023 31.483   07/24/2023 33.108       EMBx Results:  06/06/2023:  1R/AMR 0  06/13/2023: 0R/AMR 0  06/19/2023: 0R/AMR 0  06/26/2023: 0R/AMR 0  07/17/2023: 0R/AMR 0  07/24/2023: 0R/AMR 0  08/21/2023:   0R/AMR 0    Echo 6/3/2024    * S/P Heart Transplant (5/28/23).    * Normal left ventricular size and systolic function, EF 59 %.    * Mildly increased left ventricular wall thickness.    * Normal right ventricular size and systolic function.    * Right  ventricular systolic pressure; 22 mmHg.    * No pericardial effusion.    * Compared to prior TTE from 2/1/24, there is no significant change.    Echo 2/1/2024  * Patient s/p orthotopic heart transplant (OHT) in 5/2023.    * Normal left ventricular chamber size.    * Low-normal left ventricular systolic function.    * Left ventricular ejection fraction, 52-54%.    * No left ventricular regional wall motion abnormalities.    * Abnormal septal wall motion.    * Normal right ventricular chamber size.    * Normal right ventricular systolic function.    * Left atrial appearance consistent with cardiac transplantation.    * Right atrial appearance consistent with cardiac transplantation.    * Mild tricuspid valve regurgitation.    * Compared to the TTE performed on 11/28/2023, mild changes are noted.    Echo 11/28/2023    * Clinical information : heart transplant 5/28/23.    * Normal left ventricular chamber size, wall thickness and systolic function with no regional wall motion abnormalities. LV EF 55 %.    * Normal diastolic function.    * Abnormal LV Global longitudinal strain -15.2 %.    * Normal right ventricular size and systolic function. RVSP is estimated to be 31 mm, Hg.    * Left atrial appearance consistent with cardiac transplantation.    * Normal valves.    * Compared to prior study on 10/19/23 no significant change.    TTE Limited 10/19/2023  Patient s/p orthotopic heart transplant (5/2023).  Normal left ventricular chamber size.  Normal left ventricular systolic function.  Left ventricular ejection fraction, 54%.  No left ventricular regional wall motion abnormalities.  Normal right ventricular chamber size.  Normal right ventricular systolic function.  Left atrial appearance consistent with cardiac transplantation.  Right atrial appearance consistent with cardiac transplantation.  Compared to the TTE performed on 9/18/2023, mild changes are noted.    TTE Limited 9/18/2023  Focused TTE: LV function and  effusion. S/p Heart Transplant (5.29.2023).  Normal LV size and systolic function, EF 54%.  No regional wall motion abnormalities.  Normal RV size with mildly decreased systolic function.  No pericardial effusion.  Compared to the previous echo from 8/7/2023, the RV function is slightly worse. The pericardial effusion has resolved.    RHC 8/21/2023:  BP= 140/69 mmHg.        HR= 98  RA= 3 mmHg (post-biopsy= 3 mmHg).  RV= 32/5 mmHg (post-biopsy= 33/4 mmHg).   PAP= 35/15 mmHg.  PCWP= 15 mmHg.  Serg cardiac output (L/min)/cardiac index (L/min/m2) 5.62/3.02.    Echo 8/7/2023  EF 55%, normal RV size and function.  Small pericardial effusion adjacent to the right ventricle, more pronounced as compared to 07/24/2023, yet not as large is 06/28/2023.    ASSESSMENT/PLAN:  S/P Orthotopic Heart Transplant (5/28/2023, [place] ):  Normal allograft function.    Donor Risk: [] Standard [] High Risk  Rejection: no - Followed with myocardial biopsies per protocol  CAV 0 Last [facility] (Presumed)  DSA: no - ; Date last monitored: 7/24/2023, pending 8/21/2023    Immunosuppression         -Tacrolimus titrated to maintain target level 6-9 (11/30/23)   -Cellcept 1000 mg BID     Prophylaxis  - Statin & ASA for CAV prophylaxis  - CMV Status: Donor negative Recipient negative.       Graft Function          -Surveillance per protocol    Other pertinent diagnoses:    Essential  Hypertension: Controlled at home. Goal less than 130/80 mmHg.    Positive Lupus Anticoagulant / Positive Left internal jugular Thrombus (resolved):   - On Asa 81 mg only due to extensive bruising.  - Repeat lupus anticoagulant testing 7/22/2023 was NEGATIVE.     CKD Stage 3A : Stable  Creatinine (mg/dL)   Date Value   06/07/2024 1.21 (H)     Recent Labs   Lab 06/07/24  1035 06/03/24  0608 04/18/24  0928 02/23/24  1002 02/14/24  1013   Glomerular Filtration Rate 48* 39* 49* 51* 46*     RLL Lung Nodule: Repeat CT chest for re-evaluation of the RLL nodule 09/11/2023.  Follow-up in  pulmonology clinic as scheduled thereafter.    Recommendations:   - Labs and LHC reviewed from 6/2024.  - Continue on current immunosuppressive regimen.  - Ok to increase Pravastatin to 40 mg daily.    Follow-up:   Clinic: 4 months MD  Tests: Annual testing with transplant imaging and labs per protocol    Visit coordinated by:  KELSIE Hernandez.     Patient understands she can return sooner if any issues should arise.     Yamilka Dixon MD  Cardiovascular Fellow PGY-V   Pager [phone]     Heart Failure-Transplant Cardiology Attending Note    I saw and evaluated the patient. I discussed the case with Dr Dixon and agree with the findings and plan as documented. More than 50% of my time was spent in counseling and/or coordination of care including: Counseling patient regarding diagnosis, prognosis, and treatment plan (including risks and benefits) and discussing case with care team members.     Patient is post heart transplant since May 2023. Today we had an annual visit and reviewed recent echo and angiography which had no significant abnormalities. Overall she is doing very well. We plan to continue current immunosuppressive therapy + increase Pravastatin to 40 mg daily. I personally emphasized the need for staying updated with vaccinations / dental appointments and surveillance for skin cancer by seeing a dermatologist at least annually. Follow up in 4 months per protocol.    Other issues per the note, which I have cosigned.    Inder Lopez M.D. M.S.  Advanced HF/ Heart Transplant/ Mechanical Circulatory Support  [facility]

## (undated) DEVICE — SEE MEDLINE ITEM 157117

## (undated) DEVICE — PAD DEFIB CADENCE ADULT R2

## (undated) DEVICE — DRESSING TRANS 4X4 TEGADERM

## (undated) DEVICE — TRAY HEART

## (undated) DEVICE — GOWN SMART IMP BREATHABLE XXLG

## (undated) DEVICE — DRESSING TELFA STRL 4X3 LF

## (undated) DEVICE — SOL NS 1000CC

## (undated) DEVICE — SUT SILK 2-0 SH 18IN BLACK

## (undated) DEVICE — SUT MONOCRYL 4-0 PS-1 UND

## (undated) DEVICE — Device

## (undated) DEVICE — DRESSING TELFA N ADH 3X8

## (undated) DEVICE — DRAIN CHEST DRY SUCTION

## (undated) DEVICE — SEE MEDLINE ITEM 146417

## (undated) DEVICE — GAUZE SPONGE 4X4 12PLY

## (undated) DEVICE — DRESSING ABSRBNT ISLAND 3.6X8

## (undated) DEVICE — SUT 1 36IN COATED VICRYL UN

## (undated) DEVICE — SUT PROLENE 2-0 36IN MH BLU

## (undated) DEVICE — RETRACTOR OCTOBASE INSERT HOLD

## (undated) DEVICE — SET DECANTER MEDICHOICE

## (undated) DEVICE — ELECTRODE REM PLYHSV RETURN 9

## (undated) DEVICE — SUT VICRYL 2-0 36 CT-1

## (undated) DEVICE — DRAPE SPLIT STERILE

## (undated) DEVICE — TRAY FOLEY 16FR INFECTION CONT

## (undated) DEVICE — PROBE CATH TEMP 16 FRFOLEY 400

## (undated) DEVICE — DRAIN CHANNEL ROUND 19FR